# Patient Record
Sex: MALE | Race: WHITE | NOT HISPANIC OR LATINO | Employment: OTHER | URBAN - METROPOLITAN AREA
[De-identification: names, ages, dates, MRNs, and addresses within clinical notes are randomized per-mention and may not be internally consistent; named-entity substitution may affect disease eponyms.]

---

## 2017-01-11 DIAGNOSIS — E11.9 DIABETES MELLITUS, TYPE 2 (H): Primary | ICD-10-CM

## 2017-01-11 DIAGNOSIS — E11.9 TYPE 2 DIABETES MELLITUS (H): Primary | ICD-10-CM

## 2017-01-11 RX ORDER — METFORMIN HCL 500 MG
1000 TABLET, EXTENDED RELEASE 24 HR ORAL DAILY
Qty: 180 TABLET | Refills: 3 | Status: SHIPPED | OUTPATIENT
Start: 2017-01-11 | End: 2018-03-16

## 2017-01-11 NOTE — TELEPHONE ENCOUNTER
metformin 500 mg ER      Last Written Prescription Date:  6-16-16  Last Fill Quantity: 180,   # refills: 1  Last Office Visit : 11-3-16  Future Office visit:  5-16-17    Kathleen M Doege RN

## 2017-01-11 NOTE — TELEPHONE ENCOUNTER
[facility] called re: glipizide. Have been requesting new Rx since 1/8/17, pt out of meds, cannot give another emergency dose. Please advise. Message sent to refill pool.

## 2017-01-12 RX ORDER — GLIPIZIDE 10 MG/1
10 TABLET, FILM COATED, EXTENDED RELEASE ORAL DAILY
Qty: 90 TABLET | Refills: 1 | Status: SHIPPED | OUTPATIENT
Start: 2017-01-12 | End: 2017-07-24

## 2017-05-11 ENCOUNTER — TELEPHONE (OUTPATIENT)
Dept: ENDOCRINOLOGY | Facility: CLINIC | Age: 82
End: 2017-05-11

## 2017-05-11 DIAGNOSIS — E11.8 TYPE 2 DIABETES MELLITUS WITH COMPLICATION (H): ICD-10-CM

## 2017-06-13 ENCOUNTER — TELEPHONE (OUTPATIENT)
Dept: ENDOCRINOLOGY | Facility: CLINIC | Age: 82
End: 2017-06-13

## 2017-06-13 DIAGNOSIS — E11.9 DIABETES MELLITUS, TYPE 2 (H): Primary | ICD-10-CM

## 2017-06-13 NOTE — TELEPHONE ENCOUNTER
Pt called re: test strips. Was Rx'd OneTouch Verio IQ strips. Does not have meter, realized this when brought strips home. Pharmacy not allowing pt to bring strips back. Requesting either meter that works w/ strips or OneTouch Ultra Rx to go w/ meter he does have. Please advise at [phone]. DVM fine. Sent to [facility].

## 2017-06-13 NOTE — TELEPHONE ENCOUNTER
Pt called re: BG numbers. Lower than usually w/ pre-meal AM BG in 70s, 80s. Decreased PM Lantus from 10 to 8. Denied Sx's w/ exception of looking slightly pale. Please advise at  [phone]. DVM fine. Sent to [facility] pool.

## 2017-06-13 NOTE — TELEPHONE ENCOUNTER
I sent a one touch verio IQ meter to  Pharmacy as requested.  He was given  the  One touch Verio IQ test strips

## 2017-06-14 ENCOUNTER — TELEPHONE (OUTPATIENT)
Dept: ENDOCRINOLOGY | Facility: CLINIC | Age: 82
End: 2017-06-14

## 2017-06-14 NOTE — TELEPHONE ENCOUNTER
Dr Amin responded to this message to stop the lantus  for 1 week and call us with BS readings in one week. Disregard Dr Augustin suggestion.

## 2017-06-14 NOTE — TELEPHONE ENCOUNTER
Spoke with pt relayed MD message below and he will call next week with readings.  ----- Message from Jose Amin MD sent at 6/14/2017 11:10 AM CDT -----  Regarding: RE: Low blood sugars  Contact: [phone]  Can stop lantus and monitor BG. Call us with readings in 1 week   thanks  ----- Message -----     From: Emerita Lafleur RN     Sent: 6/13/2017   1:16 PM       To: Jose Amin MD  Subject: Low blood sugars                                 Former pt of Dr. SANDOR Augustin, will see you 8/1. Called today to report low BG AM, asymptomatic. Runs 70s-80s every AM, this morning was 87. Dr. Augustin told him wean off Lantus, he started 20 and is now doing 8 units HS, wants to know if he should stop Lantus or continue weaning?. Also takes glipiZIDE 10 mg QD, metFORMIN 1,000 QD and sitagliptin 50mg QD.

## 2017-06-23 ENCOUNTER — TELEPHONE (OUTPATIENT)
Dept: ENDOCRINOLOGY | Facility: CLINIC | Age: 82
End: 2017-06-23

## 2017-06-23 NOTE — TELEPHONE ENCOUNTER
Spoke with pt and relayed MD message below.  ----- Message from Jose Amin MD sent at 6/23/2017  1:40 PM CDT -----  Regarding: RE: BG updates  BG look ok, he should continue to monitor. No changes in meds  Send us reading again in 1-2 weeks. Also check BG occasionally during the day.     ----- Message -----     From: Emerita Lafleur RN     Sent: 6/23/2017  10:22 AM       To: Jose Amin MD  Subject: BG updates                                       Pt called back today to report BG since he stopped Lantus last week:  6/16 AM fasting 88  6/17 AM fasting 78  6/18 AM fasting 110  6/19 AM fasting 110  6/20 AM fasting 88  6/21 AM fasting 88  6/22 AM fasting 111  6/23 AM fasting 121  Continues taking glipiZIDE, Metformin and 1/2 tabs sitagliptin.  Any changes let us know.

## 2017-07-24 DIAGNOSIS — E11.9 DIABETES MELLITUS, TYPE 2 (H): Primary | ICD-10-CM

## 2017-07-24 RX ORDER — GLIPIZIDE 10 MG/1
10 TABLET, FILM COATED, EXTENDED RELEASE ORAL DAILY
Qty: 90 TABLET | Refills: 3 | Status: SHIPPED | OUTPATIENT
Start: 2017-07-24 | End: 2018-06-12

## 2017-07-26 ENCOUNTER — TELEPHONE (OUTPATIENT)
Dept: ENDOCRINOLOGY | Facility: CLINIC | Age: 82
End: 2017-07-26

## 2017-07-26 NOTE — TELEPHONE ENCOUNTER
Pt called today hoping to speak with Emerita, Per pt he gradually stopped taking lantus a while ago but continues to take glipizide, metformin, and sitagliptin.   Pt states that he gets tired easily. Pt is also getting high blood sugar readings in the afternoon. Today's reading was 339. Other readings not available. Pt just wanted to update you. Encouraged pt call again tomorrow with readings.   Message to Dr. Amin.

## 2017-07-31 NOTE — TELEPHONE ENCOUNTER
Spoke with pt on 7/27/17 he was busy and didn't have his meter, called today and left him voicemail to call back with BG readings dates/times.       ----- Message -----     From: Jose Amin MD     Sent: 7/26/2017   3:07 PM       To: Sanjuana Velázquez RN  Subject: RE: High afternon blood sugar                    Yes, please send few more days of glucose data for review  thanks  ----- Message -----     From: Sanjuana Velázquez RN     Sent: 7/26/2017   2:39 PM       To: Jose Amin MD  Subject: High afternon blood sugar                        Pt called today hoping to speak with Emerita, Per pt he gradually stopped taking lantus a while ago but continues to take glipizide, metformin, and sitagliptin.   Pt states that he gets tired easily. Pt is also getting high blood sugar readings in the afternoon. Today's reading was 339. Other readings not available. Pt just wanted to update you. Encouraged pt call again tomorrow with readings.     Thanks,   Sanjuana DENNY

## 2017-08-01 ENCOUNTER — OFFICE VISIT (OUTPATIENT)
Dept: ENDOCRINOLOGY | Facility: CLINIC | Age: 82
End: 2017-08-01

## 2017-08-01 VITALS
HEART RATE: 74 BPM | WEIGHT: 169.9 LBS | SYSTOLIC BLOOD PRESSURE: 120 MMHG | HEIGHT: 72 IN | DIASTOLIC BLOOD PRESSURE: 69 MMHG | BODY MASS INDEX: 23.01 KG/M2

## 2017-08-01 DIAGNOSIS — Z79.4 TYPE 2 DIABETES MELLITUS WITH COMPLICATION, WITH LONG-TERM CURRENT USE OF INSULIN (H): ICD-10-CM

## 2017-08-01 DIAGNOSIS — R35.1 NOCTURIA: ICD-10-CM

## 2017-08-01 DIAGNOSIS — E11.8 TYPE 2 DIABETES MELLITUS WITH COMPLICATION, WITH LONG-TERM CURRENT USE OF INSULIN (H): ICD-10-CM

## 2017-08-01 DIAGNOSIS — E11.8 TYPE 2 DIABETES MELLITUS WITH COMPLICATION, WITH LONG-TERM CURRENT USE OF INSULIN (H): Primary | ICD-10-CM

## 2017-08-01 DIAGNOSIS — Z79.4 TYPE 2 DIABETES MELLITUS WITH COMPLICATION, WITH LONG-TERM CURRENT USE OF INSULIN (H): Primary | ICD-10-CM

## 2017-08-01 DIAGNOSIS — D63.1 ANEMIA IN STAGE 3 CHRONIC KIDNEY DISEASE (H): ICD-10-CM

## 2017-08-01 DIAGNOSIS — N18.30 ANEMIA IN STAGE 3 CHRONIC KIDNEY DISEASE (H): ICD-10-CM

## 2017-08-01 DIAGNOSIS — I15.1 HYPERTENSION SECONDARY TO OTHER RENAL DISORDERS: ICD-10-CM

## 2017-08-01 LAB
ALT SERPL W P-5'-P-CCNC: 11 U/L (ref 0–70)
CK SERPL-CCNC: 34 U/L (ref 30–300)
CREAT SERPL-MCNC: 1.94 MG/DL (ref 0.66–1.25)
CREAT UR-MCNC: 147 MG/DL
ERYTHROCYTE [DISTWIDTH] IN BLOOD BY AUTOMATED COUNT: 13.1 % (ref 10–15)
GFR SERPL CREATININE-BSD FRML MDRD: 33 ML/MIN/1.7M2
GLUCOSE SERPL-MCNC: 228 MG/DL (ref 70–99)
HBA1C MFR BLD: 7.8 % (ref 4.3–6)
HCT VFR BLD AUTO: 35.2 % (ref 40–53)
HGB BLD-MCNC: 11.1 G/DL (ref 13.3–17.7)
LDLC SERPL DIRECT ASSAY-MCNC: 55 MG/DL
MCH RBC QN AUTO: 29.1 PG (ref 26.5–33)
MCHC RBC AUTO-ENTMCNC: 31.5 G/DL (ref 31.5–36.5)
MCV RBC AUTO: 92 FL (ref 78–100)
MICROALBUMIN UR-MCNC: 86 MG/L
MICROALBUMIN/CREAT UR: 58.44 MG/G CR (ref 0–17)
PLATELET # BLD AUTO: 351 10E9/L (ref 150–450)
POTASSIUM SERPL-SCNC: 4.9 MMOL/L (ref 3.4–5.3)
PSA SERPL-MCNC: 13 UG/L (ref 0–4)
RBC # BLD AUTO: 3.81 10E12/L (ref 4.4–5.9)
SODIUM SERPL-SCNC: 136 MMOL/L (ref 133–144)
T4 FREE SERPL-MCNC: 0.98 NG/DL (ref 0.76–1.46)
TSH SERPL DL<=0.05 MIU/L-ACNC: 4.43 MU/L (ref 0.4–4)
VIT B12 SERPL-MCNC: 536 PG/ML (ref 193–986)
WBC # BLD AUTO: 7.5 10E9/L (ref 4–11)

## 2017-08-01 ASSESSMENT — PAIN SCALES - GENERAL: PAINLEVEL: NO PAIN (0)

## 2017-08-01 NOTE — NURSING NOTE
Chief Complaint   Patient presents with     RECHECK     type 2 diabetes f/u        Initial /69 (BP Location: Right arm, Patient Position: Sitting, Cuff Size: Adult Regular)  Pulse 74  Ht 1.829 m (6')  Wt 77.1 kg (169 lb 14.4 oz)  BMI 23.04 kg/m2 Estimated body mass index is 23.04 kg/(m^2) as calculated from the following:    Height as of this encounter: 1.829 m (6').    Weight as of this encounter: 77.1 kg (169 lb 14.4 oz).  Medication Reconciliation: complete

## 2017-08-01 NOTE — LETTER
8/1/2017       RE: Farzad East  [address]  [facility] [address]     Dear Colleague,    Thank you for referring your patient, Farzad East, to the [facility] ENDOCRINOLOGY at [facility]. Please see a copy of my visit note below.    Farzad is a 81 year old male presents today for RECHECK (type 2 diabetes f/u )    HPI  Alessio is a 81-year-old male who is here for follow-up of type 2 diabetes and related problems.  Alessio was previously following with my colleague Dr. Augustin and I'm seeing him for the first time in clinic.  long-standing history of type 2 diabetes, diabetes was diagnosed when he was in his 50s.  Currently he is taking metformin  mg daily, glipizide XL 10 mg daily and Januvia 50 mg daily.  He was started on Lantus insulin 10 units at bedtime at last clinic visit.  He subsequently reduced the dose to eight units due to low blood sugars in the morning.  Despite lowering the dose he continued to have low blood glucose and subsequently stopped Lantus completely.  He checks his blood glucose mainly in the morning and recent blood glucose off of Lantus range between 103 235 mg/dL.  He occasionally checks blood glucose during the day and which are usually in the 200s off Lantus.    Diabetes related complications  Background retinopathy-follows regularly with ophthalmology  Nephropathy-creatinine has been stable over the last few years.  Most recent creatinine was 1.9, patient takes metformin and is aware that use of metformin is not recommended at this renal function.  However he is aware of the benefits and side effects and would prefer to continue.  Peripheral neuropathy-numbness and toes bilaterally  Erectile dysfunction    Patient also follows with cardiology and neurology.  He does not currently have a primary care provider at this time.    He overall feels well and denies any acute complaints today.  Denies any chest pain or shortness  of breath with exertion.  No lightheadedness or dizziness.  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    Past Medical History:   Diagnosis Date     Atrial flutter (H)      Choroidal nevus of left eye      CKD (chronic kidney disease) stage 3, GFR 30-59 ml/min      Diabetes mellitus (H)      Diabetic peripheral neuropathy (H)      Hypertension      Microalbuminuria      Overweight(278.02)      Rectal-type adenocarcinoma (H)      Retinopathies, diabetic      Vitreous detachment of both eyes        Allergies  Allergies   Allergen Reactions     Sulfa Drugs      Unknown reaction. Occurred during childhood. Has not taken Sulfa medications since allergic response.      Medications  Current Outpatient Prescriptions   Medication Sig Dispense Refill     glipiZIDE (GLIPIZIDE XL) 10 MG 24 hr tablet Take 1 tablet (10 mg) by mouth daily 90 tablet 3     blood glucose monitoring (ONETOUCH VERIO IQ SYSTEM) meter device kit Use to test blood sugar daily 1 kit 0     blood glucose monitoring (ONE TOUCH VERIO IQ) test strip Use to test blood sugar 2-3 daily or as directed. 270 each 3     metFORMIN (GLUCOPHAGE-XR) 500 MG 24 hr tablet Take 2 tablets (1,000 mg) by mouth daily 180 tablet 3     losartan-hydrochlorothiazide (HYZAAR) 100-25 MG per tablet Take 1 tablet by mouth daily 90 tablet 3     insulin pen needle 31G X 5 MM Use 1 pen needles daily or as directed. 100 each 3     sitagliptin (JANUVIA) 100 MG tablet Take 0.5 tablets (50 mg) by mouth daily 90 tablet 3     tamsulosin (FLOMAX) 0.4 MG 24 hr capsule Take 1 capsule (0.4 mg) by mouth daily 90 capsule 3     aspirin 81 MG tablet Take 1 tablet by mouth daily.       insulin glargine (LANTUS SOLOSTAR) 100 UNIT/ML PEN Inject 20 Units Subcutaneous At Bedtime (Patient not taking: Reported on 8/1/2017) 18 mL 3     sildenafil (VIAGRA) 100 MG tablet Take 1 tablet (100 mg) by mouth daily as needed for  erectile dysfunction 6 tablet 11     Family History  family history includes Arthritis in his mother; Circulatory in his father; DIABETES in his father; Macular Degeneration in his father. There is no history of Amblyopia, Retinal detachment, or Glaucoma.  Social History     Social History     Marital status:      Spouse name: N/A     Number of children: N/A     Years of education: N/A     Occupational History      [employer]     Social History Main Topics     Smoking status: Never Smoker     Smokeless tobacco: Never Used     Alcohol use 1.5 - 2.0 oz/week     3 - 4 Standard drinks or equivalent per week      Comment: 3 martinis per week     Drug use: No     Sexual activity: Not on file     Other Topics Concern     Parent/Sibling W/ Cabg, Mi Or Angioplasty Before 65f 55m? No     Social History Narrative     lives with his wife in [place], [place].  Recently they sold their house and moved into a [employer].  Alessio is retiring this week, he worked as a .  He plans to spend otto in [place] depending on the health of his wife.    ROS  Constitutional: Has lost about 10 pounds since the last visit, attributes it to change in diet  Eyes: no vision change, diplopia or red eyes   Ears, Nose, Mouth, Throat: negative  Cardiovascular: no chest pain, palpitations, or pain with walking, no orthopnea or PND   Respiratory: no dyspnea, cough, shortness of breath or wheezing   GI: no nausea, vomiting, diarrhea or constipation, no abdominal pain   : Complains of nocturia 2-4 times every night  Musculoskeletal: Chronic pain in the left leg where he underwent surgery for femur fracture  Integumentary: no concerning lesions or moles   Neuro: no loss of strength or sensation, no numbness or tingling, no tremor, no dizziness, no headache   Endo: negative  Heme/Lymph: negative   Allergy: no environmental allergies   Psych: per HPI    Physical Exam  /69 (BP Location: Right arm,  Patient Position: Sitting, Cuff Size: Adult Regular)  Pulse 74  Ht 1.829 m (6')  Wt 77.1 kg (169 lb 14.4 oz)  BMI 23.04 kg/m2  Body mass index is 23.04 kg/(m^2).    Constitutional: no distress, comfortable, pleasant   Eyes: anicteric, normal extra-ocular movements, No lig lag, retraction or proptosis  Ears, Nose and Throat: throat clear  Neck: supple, no thyromegaly.   Cardiovascular: regular rate and rhythm, normal S1 and S2, no murmurs  Respiratory: clear to auscultation, no wheezes or crackles, normal breath sounds   Gastrointestinal: nontender, no hepatosplenomegaly, no masses   Musculoskeletal: no edema   Skin: no concerning lesions, no jaundice   Neurological: cranial nerves intact, normal strength, reflexes at patella and biceps normal, mild fine tremor b/l  Psychological: appropriate mood   Lymphatic: no cervical lymphadenopathy    RESULTS  RESULTS  Lab Results   Component Value Date    A1C 7.8 08/01/2017    A1C 9.1 11/03/2016    A1C 8.2 05/05/2016    A1C 8.6 11/19/2015    A1C 7.5 05/07/2015       TSH   Date Value Ref Range Status   08/01/2017 4.43 (H) 0.40 - 4.00 mU/L Final   11/03/2016 5.27 (H) 0.40 - 4.00 mU/L Final   05/05/2016 4.38 (H) 0.40 - 4.00 mU/L Final   11/19/2015 5.03 (H) 0.40 - 4.00 mU/L Final   05/07/2015 4.65 (H) 0.40 - 4.00 mU/L Final     T4 Free   Date Value Ref Range Status   08/01/2017 0.98 0.76 - 1.46 ng/dL Final   11/03/2016 1.23 0.76 - 1.46 ng/dL Final   05/05/2016 1.00 0.76 - 1.46 ng/dL Final   11/19/2015 1.04 0.76 - 1.46 ng/dL Final   06/04/2010 1.07 0.70 - 1.85 ng/dL Final       Creatinine   Date Value Ref Range Status   08/01/2017 1.94 (H) 0.66 - 1.25 mg/dL Final   11/03/2016 2.17 (H) 0.66 - 1.25 mg/dL Final   ]    Albumin   Date Value Ref Range Status   01/25/2014 3.1 (L) 3.3 - 4.9 g/dL Final   ]    ALT   Date Value Ref Range Status   08/01/2017 11 0 - 70 U/L Final   11/03/2016 11 0 - 70 U/L Final   ]    Recent Labs   Lab Test  05/05/16   0825  11/19/15   0806  05/07/15   0739   11/07/14   0813   CHOL  119   --   120  118   HDL  43   --   38*  45   LDL  53  70  62  52   TRIG  115   --   102  105   CHOLHDLRATIO   --    --   3.2  2.6       Vitamin B12   Date Value Ref Range Status   08/01/2017 536 193 - 986 pg/mL Final   05/07/2015 1064 (H) 193 - 986 pg/mL Final     Comment:     Interp: 247-911 = Normal       ASSESSMENT AND PLAN:     1.  Type 2 diabetes: A1c is 7.8% today, discussed that given his long-standing diabetes and related complications, not targeting very tightly controlled glucose and also preventing hypoglycemia.  His blood glucose are running higher during the day mostly after meals.  We discussed that Lantus insulin alone may not address the postprandial hyperglycemia.  However he would like to do a trial of restarting Lantus at a lower dose.  At this time his goal is to keep the diabetes regimen  simple and avoid multiple injections.  Will start Lantus five units daily in the morning  Continue metformin and glipizide XL at current doses.    2.  Nephropathy: Creatinine has been stable, he also has chronic anemia.  Will check iron studies.  If there is any decline in renal function or worsening of anemia consider nephrology consult.    #3 hypertension: Well controlled continue current medications.    #4 mildly elevated TSH: Continue to monitor at this time    #5 BPH: Follow up with urology    Establish care with PCP    Return to clinic in about 3-4 months    This note was completed in part using Dragon voice recognition, and may contain word and grammatical errors.      Jose Amin MD

## 2017-08-01 NOTE — PROGRESS NOTES
Farzad is a 81 year old male presents today for RECHECK (type 2 diabetes f/u )    HPI  Alessio is a 81-year-old male who is here for follow-up of type 2 diabetes and related problems.  Alessio was previously following with my colleague Dr. Augustin and I'm seeing him for the first time in clinic.  long-standing history of type 2 diabetes, diabetes was diagnosed when he was in his 50s.  Currently he is taking metformin  mg daily, glipizide XL 10 mg daily and Januvia 50 mg daily.  He was started on Lantus insulin 10 units at bedtime at last clinic visit.  He subsequently reduced the dose to eight units due to low blood sugars in the morning.  Despite lowering the dose he continued to have low blood glucose and subsequently stopped Lantus completely.  He checks his blood glucose mainly in the morning and recent blood glucose off of Lantus range between 103 235 mg/dL.  He occasionally checks blood glucose during the day and which are usually in the 200s off Lantus.    Diabetes related complications  Background retinopathy-follows regularly with ophthalmology  Nephropathy-creatinine has been stable over the last few years.  Most recent creatinine was 1.9, patient takes metformin and is aware that use of metformin is not recommended at this renal function.  However he is aware of the benefits and side effects and would prefer to continue.  Peripheral neuropathy-numbness and toes bilaterally  Erectile dysfunction    Patient also follows with cardiology and neurology.  He does not currently have a primary care provider at this time.    He overall feels well and denies any acute complaints today.  Denies any chest pain or shortness of breath with exertion.  No lightheadedness or dizziness.  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    Past Medical History:   Diagnosis Date     Atrial flutter (H)      Choroidal nevus of left eye       CKD (chronic kidney disease) stage 3, GFR 30-59 ml/min      Diabetes mellitus (H)      Diabetic peripheral neuropathy (H)      Hypertension      Microalbuminuria      Overweight(278.02)      Rectal-type adenocarcinoma (H)      Retinopathies, diabetic      Vitreous detachment of both eyes        Allergies  Allergies   Allergen Reactions     Sulfa Drugs      Unknown reaction. Occurred during childhood. Has not taken Sulfa medications since allergic response.      Medications  Current Outpatient Prescriptions   Medication Sig Dispense Refill     glipiZIDE (GLIPIZIDE XL) 10 MG 24 hr tablet Take 1 tablet (10 mg) by mouth daily 90 tablet 3     blood glucose monitoring (ONETOUCH VERIO IQ SYSTEM) meter device kit Use to test blood sugar daily 1 kit 0     blood glucose monitoring (ONE TOUCH VERIO IQ) test strip Use to test blood sugar 2-3 daily or as directed. 270 each 3     metFORMIN (GLUCOPHAGE-XR) 500 MG 24 hr tablet Take 2 tablets (1,000 mg) by mouth daily 180 tablet 3     losartan-hydrochlorothiazide (HYZAAR) 100-25 MG per tablet Take 1 tablet by mouth daily 90 tablet 3     insulin pen needle 31G X 5 MM Use 1 pen needles daily or as directed. 100 each 3     sitagliptin (JANUVIA) 100 MG tablet Take 0.5 tablets (50 mg) by mouth daily 90 tablet 3     tamsulosin (FLOMAX) 0.4 MG 24 hr capsule Take 1 capsule (0.4 mg) by mouth daily 90 capsule 3     aspirin 81 MG tablet Take 1 tablet by mouth daily.       insulin glargine (LANTUS SOLOSTAR) 100 UNIT/ML PEN Inject 20 Units Subcutaneous At Bedtime (Patient not taking: Reported on 8/1/2017) 18 mL 3     sildenafil (VIAGRA) 100 MG tablet Take 1 tablet (100 mg) by mouth daily as needed for erectile dysfunction 6 tablet 11     Family History  family history includes Arthritis in his mother; Circulatory in his father; DIABETES in his father; Macular Degeneration in his father. There is no history of Amblyopia, Retinal detachment, or Glaucoma.  Social History     Social History      Marital status:      Spouse name: N/A     Number of children: N/A     Years of education: N/A     Occupational History      [employer]     Social History Main Topics     Smoking status: Never Smoker     Smokeless tobacco: Never Used     Alcohol use 1.5 - 2.0 oz/week     3 - 4 Standard drinks or equivalent per week      Comment: 3 martinis per week     Drug use: No     Sexual activity: Not on file     Other Topics Concern     Parent/Sibling W/ Cabg, Mi Or Angioplasty Before 65f 55m? No     Social History Narrative     lives with his wife in [place], [place].  Recently they sold their house and moved into a [employer].  Alessio is retiring this week, he worked as a .  He plans to spend otto in [place] depending on the health of his wife.    ROS  Constitutional: Has lost about 10 pounds since the last visit, attributes it to change in diet  Eyes: no vision change, diplopia or red eyes   Ears, Nose, Mouth, Throat: negative  Cardiovascular: no chest pain, palpitations, or pain with walking, no orthopnea or PND   Respiratory: no dyspnea, cough, shortness of breath or wheezing   GI: no nausea, vomiting, diarrhea or constipation, no abdominal pain   : Complains of nocturia 2-4 times every night  Musculoskeletal: Chronic pain in the left leg where he underwent surgery for femur fracture  Integumentary: no concerning lesions or moles   Neuro: no loss of strength or sensation, no numbness or tingling, no tremor, no dizziness, no headache   Endo: negative  Heme/Lymph: negative   Allergy: no environmental allergies   Psych: per HPI    Physical Exam  /69 (BP Location: Right arm, Patient Position: Sitting, Cuff Size: Adult Regular)  Pulse 74  Ht 1.829 m (6')  Wt 77.1 kg (169 lb 14.4 oz)  BMI 23.04 kg/m2  Body mass index is 23.04 kg/(m^2).    Constitutional: no distress, comfortable, pleasant   Eyes: anicteric, normal extra-ocular movements, No lig lag, retraction or  proptosis  Ears, Nose and Throat: throat clear  Neck: supple, no thyromegaly.   Cardiovascular: regular rate and rhythm, normal S1 and S2, no murmurs  Respiratory: clear to auscultation, no wheezes or crackles, normal breath sounds   Gastrointestinal: nontender, no hepatosplenomegaly, no masses   Musculoskeletal: no edema   Skin: no concerning lesions, no jaundice   Neurological: cranial nerves intact, normal strength, reflexes at patella and biceps normal, mild fine tremor b/l  Psychological: appropriate mood   Lymphatic: no cervical lymphadenopathy    RESULTS  RESULTS  Lab Results   Component Value Date    A1C 7.8 08/01/2017    A1C 9.1 11/03/2016    A1C 8.2 05/05/2016    A1C 8.6 11/19/2015    A1C 7.5 05/07/2015       TSH   Date Value Ref Range Status   08/01/2017 4.43 (H) 0.40 - 4.00 mU/L Final   11/03/2016 5.27 (H) 0.40 - 4.00 mU/L Final   05/05/2016 4.38 (H) 0.40 - 4.00 mU/L Final   11/19/2015 5.03 (H) 0.40 - 4.00 mU/L Final   05/07/2015 4.65 (H) 0.40 - 4.00 mU/L Final     T4 Free   Date Value Ref Range Status   08/01/2017 0.98 0.76 - 1.46 ng/dL Final   11/03/2016 1.23 0.76 - 1.46 ng/dL Final   05/05/2016 1.00 0.76 - 1.46 ng/dL Final   11/19/2015 1.04 0.76 - 1.46 ng/dL Final   06/04/2010 1.07 0.70 - 1.85 ng/dL Final       Creatinine   Date Value Ref Range Status   08/01/2017 1.94 (H) 0.66 - 1.25 mg/dL Final   11/03/2016 2.17 (H) 0.66 - 1.25 mg/dL Final   ]    Albumin   Date Value Ref Range Status   01/25/2014 3.1 (L) 3.3 - 4.9 g/dL Final   ]    ALT   Date Value Ref Range Status   08/01/2017 11 0 - 70 U/L Final   11/03/2016 11 0 - 70 U/L Final   ]    Recent Labs   Lab Test  05/05/16   0825  11/19/15   0806  05/07/15   0739  11/07/14   0813   CHOL  119   --   120  118   HDL  43   --   38*  45   LDL  53  70  62  52   TRIG  115   --   102  105   CHOLHDLRATIO   --    --   3.2  2.6       Vitamin B12   Date Value Ref Range Status   08/01/2017 536 193 - 986 pg/mL Final   05/07/2015 1064 (H) 193 - 986 pg/mL Final      Comment:     Interp: 247-91 = Normal   ]        ASSESSMENT AND PLAN:     1.  Type 2 diabetes: A1c is 7.8% today, discussed that given his long-standing diabetes and related complications, not targeting very tightly controlled glucose and also preventing hypoglycemia.  His blood glucose are running higher during the day mostly after meals.  We discussed that Lantus insulin alone may not address the postprandial hyperglycemia.  However he would like to do a trial of restarting Lantus at a lower dose.  At this time his goal is to keep the diabetes regimen  simple and avoid multiple injections.  Will start Lantus five units daily in the morning  Continue metformin and glipizide XL at current doses.    2.  Nephropathy: Creatinine has been stable, he also has chronic anemia.  Will check iron studies.  If there is any decline in renal function or worsening of anemia consider nephrology consult.    #3 hypertension: Well controlled continue current medications.    #4 mildly elevated TSH: Continue to monitor at this time    #5 BPH: Follow up with urology    Establish care with PCP    Return to clinic in about 3-4 months    This note was completed in part using Dragon voice recognition, and may contain word and grammatical errors.

## 2017-08-01 NOTE — MR AVS SNAPSHOT
After Visit Summary   2017    Farzad East    MRN: [id number]           Patient Information     Date Of Birth          1935        Visit Information        Provider Department      2017 4:30 PM Jose Amin MD M [facility] Endocrinology         Follow-ups after your visit        Your next 10 appointments already scheduled     Dec 12, 2017  3:30 PM CST   (Arrive by 3:15 PM)   RETURN DIABETES with MD STEVEN Nuñez [facility] Endocrinology ([facility] [place])    [address] [place] [id number] [phone]              Who to contact     Please call your clinic at [phone] to:    Ask questions about your health    Make or cancel appointments    Discuss your medicines    Learn about your test results    Speak to your doctor   If you have compliments or concerns about an experience at your clinic, or if you wish to file a complaint, please contact [facility] Patient Relations at [phone] or email us at [email or web address]         Additional Information About Your Visit        Pepp[employer]ornhart Information     [employer] is an electronic gateway that provides easy, online access to your medical records. With [employer], you can request a clinic appointment, read your test results, renew a prescription or communicate with your care team.     To sign up for [employer] visit the website at [email or web address]   You will be asked to enter the access code listed below, as well as some personal information. Please follow the directions to create your username and password.     Your access code is: [email or web address]  Expires: 10/30/2017  5:33 PM     Your access code will  in 90 days. If you need help or a new code, please contact your [facility] or call [phone] for assistance.        Care EveryWhere ID     This is your Care EveryWhere ID. This could be used by other  organizations to access your [facility] medical records  [id number]        Your Vitals Were     Pulse Height BMI (Body Mass Index)             74 1.829 m (6') 23.04 kg/m2          Blood Pressure from Last 3 Encounters:   08/01/17 120/69   11/03/16 97/57   08/19/16 145/71    Weight from Last 3 Encounters:   08/01/17 77.1 kg (169 lb 14.4 oz)   08/19/16 81.6 kg (180 lb)   05/05/16 83.5 kg (184 lb)              Today, you had the following     No orders found for display       Primary Care Provider Office Phone # Fax #    Marcus Augustin [phone]        PHYSICIANS [address] [address]        Equal Access to Services     GARRETT DAVIS : Zbigniew alanis Sonorris, waaxda luqadaha, qaybta kaalmada adeegyada, carmel todd . So [facility] [phone].    ATENCIÓN: Si habla español, tiene a dalton disposición servicios gratuitos de asistencia lingüística. Llame al [phone].    We comply with applicable federal civil rights laws and [place] laws. We do not discriminate on the basis of race, color, national origin, age, disability sex, sexual orientation or gender identity.            Thank you!     Thank you for choosing [facility]  for your care. Our goal is always to provide you with excellent care. Hearing back from our patients is one way we can continue to improve our services. Please take a few minutes to complete the written survey that you may receive in the mail after your visit with us. Thank you!             Your Updated Medication List - Protect others around you: Learn how to safely use, store and throw away your medicines at www.disposemymeds.org.          This list is accurate as of: 8/1/17  5:33 PM.  Always use your most recent med list.                   Brand Name Dispense Instructions for use Diagnosis    aspirin 81 MG tablet      Take 1 tablet by mouth daily.        blood glucose monitoring meter device kit     1 kit     Use to test blood sugar daily    Diabetes mellitus, type 2 (H)       blood glucose monitoring test strip    ONE TOUCH VERIO IQ    270 each    Use to test blood sugar 2-3 daily or as directed.    Type 2 diabetes mellitus with complication (H)       glipiZIDE 10 MG 24 hr tablet    glipiZIDE XL    90 tablet    Take 1 tablet (10 mg) by mouth daily    Diabetes mellitus, type 2 (H)       insulin glargine 100 UNIT/ML injection    LANTUS SOLOSTAR    18 mL    Inject 20 Units Subcutaneous At Bedtime    Type 2 diabetes mellitus with complication, with long-term current use of insulin (H)       insulin pen needle 31G X 5 MM     100 each    Use 1 pen needles daily or as directed.    Type 2 diabetes mellitus with complication, with long-term current use of insulin (H)       JANUVIA 100 MG tablet   Generic drug:  sitagliptin     90 tablet    Take 0.5 tablets (50 mg) by mouth daily        losartan-hydrochlorothiazide 100-25 MG per tablet    HYZAAR    90 tablet    Take 1 tablet by mouth daily    Essential hypertension, Type 2 diabetes, HbA1c goal < 7% (H)       metFORMIN 500 MG 24 hr tablet    GLUCOPHAGE-XR    180 tablet    Take 2 tablets (1,000 mg) by mouth daily    Type 2 diabetes mellitus (H)       sildenafil 100 MG cap/tab    REVATIO/VIAGRA    6 tablet    Take 1 tablet (100 mg) by mouth daily as needed for erectile dysfunction    Type 2 diabetes mellitus with complication (H)       tamsulosin 0.4 MG capsule    FLOMAX    90 capsule    Take 1 capsule (0.4 mg) by mouth daily    Type 2 diabetes mellitus with complication, with long-term current use of insulin (H)

## 2017-08-21 ENCOUNTER — PRE VISIT (OUTPATIENT)
Dept: UROLOGY | Facility: CLINIC | Age: 82
End: 2017-08-21

## 2017-09-05 ENCOUNTER — OFFICE VISIT (OUTPATIENT)
Dept: UROLOGY | Facility: CLINIC | Age: 82
End: 2017-09-05

## 2017-09-05 VITALS
HEIGHT: 70 IN | DIASTOLIC BLOOD PRESSURE: 60 MMHG | SYSTOLIC BLOOD PRESSURE: 110 MMHG | WEIGHT: 170 LBS | BODY MASS INDEX: 24.34 KG/M2 | HEART RATE: 82 BPM

## 2017-09-05 DIAGNOSIS — R97.20 ELEVATED PROSTATE SPECIFIC ANTIGEN (PSA): Primary | ICD-10-CM

## 2017-09-05 ASSESSMENT — PAIN SCALES - GENERAL: PAINLEVEL: NO PAIN (0)

## 2017-09-05 NOTE — LETTER
9/5/2017       RE: Farzad East  [address]  [place] [facility] [address]     Dear Colleague,    Thank you for referring your patient, Farzad East, to the [facility] UROLOGY AND INST FOR PROSTATE AND UROLOGIC CANCERS at [facility]. Please see a copy of my visit note below.    REASON FOR VISIT TODAY:  Elevated PSA and abnormal digital rectal exam.      HISTORY OF PRESENT ILLNESS:  Mr. East is an 82-year-old gentleman followed in our clinic for history of an abnormal digital rectal exam and mildly elevated PSA in the past.  The patient has been known to have an abnormal digital rectal exam for the past several years, and we have discussed that he very well may have some degree of prostate cancer, but the patient has opted to continue to observe things without a biopsy.  He had a PSA of 5.9 on 11/03/2016 and comes in today noting that he had another PSA drawn on 08/01/2017 that was up to 13.  He has had some worsening of nocturia and is now up 3-4 times per night, but denies any urgency during the day.  No blood in the urine.  He is on Flomax.      PHYSICAL EXAMINATION:  His blood pressure is 110/60.  Pulse is 82.  He is in no acute distress.  Digital rectal exam was deferred today.        DATA:  The patient voided, and a postvoid residual was measured at 83 mL, although the patient had voided about an hour previously.      ASSESSMENT AND PLAN:  Over half of today's 15 minute visit was spent counseling the patient regarding his elevated PSA.  I suggested to Mr. East that, unfortunately, there has been a significant jump in his PSA, although the meaning of this is unclear at this time.  I have suggested that we go ahead and obtain an MRI of the prostate to see if there are any highly suspicious lesions on the MRI that would be important for biopsy.  In the absence of any obvious lesions, we may decide whether or not we need to move forward with a standard  transrectal ultrasound-guided biopsy or not.  We again discussed that one does require a 10-15 year life expectancy to benefit from treatment of screen-detected prostate cancer.  Mr. Chaves is age 82 years, and it is unclear whether he has that life expectancy, but all in all he does continue to do well.  In any case, we will simply see the patient back after his MRI of the prostate, and we will make further decisions.         NETTIE VALENZUELA MD             D: 2017 14:12   T: 2017 10:57   MT: yanick      Name:     JEN CHAVES   MRN:      [id number]        Account:      [id number]   :      1935           Service Date: 2017      Document: [id number]

## 2017-09-05 NOTE — PATIENT INSTRUCTIONS
Please get MRI and then follow up with  after.    It was a pleasure meeting with you today.  Thank you for allowing me and my team the privilege of caring for you today.  YOU are the reason we are here, and I truly hope we provided you with the excellent service you deserve.  Please let us know if there is anything else we can do for you so that we can be sure you are leaving completely satisfied with your care experience.

## 2017-09-05 NOTE — MR AVS SNAPSHOT
After Visit Summary   9/5/2017    Farzad East    MRN: [id number]           Patient Information     Date Of Birth          1935        Visit Information        Provider Department      9/5/2017 1:30 PM Marcello Mendoza MD [facility] Urology and [facility] for Prostate and Urologic Cancers        Today's Diagnoses     Elevated prostate specific antigen (PSA)    -  1      Care Instructions    Please get MRI and then follow up with  after.    It was a pleasure meeting with you today.  Thank you for allowing me and my team the privilege of caring for you today.  YOU are the reason we are here, and I truly hope we provided you with the excellent service you deserve.  Please let us know if there is anything else we can do for you so that we can be sure you are leaving completely satisfied with your care experience.                  Follow-ups after your visit        Your next 10 appointments already scheduled     Oct 16, 2017 10:45 AM CDT   (Arrive by 10:30 AM)   MR PROSTATE with [id number]   [facility] MRI ([facility])    [address] [place] [address] [phone]           Take your medicines as usual, unless your doctor tells you not to. Bring a list of your current medicines to your exam (including vitamins, minerals and over-the-counter drugs).  You will be given intravenous contrast for this exam. To prepare:   The day before your exam, drink extra fluids at least six 8-ounce glasses (unless your doctor tells you to restrict your fluids).   Have a blood test (creatinine test) within 30 days of your exam. Go to your clinic or Diagnostic Imaging Department for this test.  The MRI machine uses a strong magnet. Please wear clothes without metal (snaps, zippers). A sweatsuit works well, or we may give you a hospital gown.  Please remove any body piercings and hair extensions before you arrive. You will also remove  watches, jewelry, hairpins, wallets, dentures, partial dental plates and hearing aids. You may wear contact lenses, and you may be able to wear your rings. We have a safe place to keep your personal items, but it is safer to leave them at home.   **IMPORTANT** THE INSTRUCTIONS BELOW ARE ONLY FOR THOSE PATIENTS WHO HAVE BEEN TOLD THEY WILL RECEIVE SEDATION OR GENERAL ANESTHESIA DURING THEIR MRI PROCEDURE:  IF YOU WILL RECEIVE SEDATION (take medicine to help you relax during your exam):   You must get the medicine from your doctor before you arrive. Bring the medicine to the exam. Do not take it at home.   Arrive one hour early. Bring someone who can take you home after the test. Your medicine will make you sleepy. After the exam, you may not drive, take a bus or take a taxi by yourself.   No eating 8 hours before your exam. You may have clear liquids up until 4 hours before your exam. (Clear liquids include water, clear tea, black coffee and fruit juice without pulp.)  IF YOU WILL RECEIVE ANESTHESIA (be asleep for your exam):   Arrive 1 1/2 hours early. Bring someone who can take you home after the test. You may not drive, take a bus or take a taxi by yourself.   No eating 8 hours before your exam. You may have clear liquids up until 4 hours before your exam. (Clear liquids include water, clear tea, black coffee and fruit juice without pulp.)  Please call the Imaging Department at your exam site with any questions.            Nov 07, 2017  2:00 PM CST   (Arrive by 1:45 PM)   Return Visit with Marcello Mendoza MD   [facility] Urology and Inst for Prostate and Urologic Cancers ([facility] [place])    [address] [place] [id number]   [phone]            Dec 12, 2017  3:30 PM CST   (Arrive by 3:15 PM)   RETURN DIABETES with Jose Amin MD   [facility] Endocrinology ([facility])    [address] [place] [address]  [id number] [phone]              Future tests that were ordered for you today     Open Future Orders        Priority Expected Expires Ordered    MR Prostate Routine  10/20/2017 2017            Who to contact     Please call your clinic at [phone] to:    Ask questions about your health    Make or cancel appointments    Discuss your medicines    Learn about your test results    Speak to your doctor   If you have compliments or concerns about an experience at your clinic, or if you wish to file a complaint, please contact [facility] Patient Relations at [phone] or email us at [email or web address]         Additional Information About Your Visit        [employer] Information     [employer] is an electronic gateway that provides easy, online access to your medical records. With [employer], you can request a clinic appointment, read your test results, renew a prescription or communicate with your care team.     To sign up for [employer] visit the website at [email or web address]   You will be asked to enter the access code listed below, as well as some personal information. Please follow the directions to create your username and password.     Your access code is: [email or web address]  Expires: 10/30/2017  5:33 PM     Your access code will  in 90 days. If you need help or a new code, please contact your [facility] or call [phone] for assistance.        Care EveryWhere ID     This is your Care EveryWhere ID. This could be used by other organizations to access your [facility] medical records  [id number]        Your Vitals Were     Pulse Height BMI (Body Mass Index)             82 1.778 m (5' 10\") 24.39 kg/m2          Blood Pressure from Last 3 Encounters:   17 110/60   17 120/69   16 97/57    Weight from Last 3 Encounters:   17 77.1 kg (170 lb)   17 77.1 kg (169 lb 14.4 oz)   16 81.6 kg (180 lb)         "      We Performed the Following     POST-VOID RESIDUAL BLADDER SCAN        Primary Care Provider Office Phone # Fax #    Marcus Augustin [phone]       [address] [place] [address]        Equal Access to Services     GARRETT DAVIS : Hadhemant elsa baires zeke Sonorris, waaxda luqadaha, qaybta kaalmada adeegyada, carmel huerta perez sheffield. So [facility] [phone].    ATENCIÓN: Si habla español, tiene a dalton disposición servicios gratuitos de asistencia lingüística. Llame al [phone].    We comply with applicable federal civil rights laws and [place] laws. We do not discriminate on the basis of race, color, national origin, age, disability sex, sexual orientation or gender identity.            Thank you!     Thank you for choosing [facility] UROLOGY AND [facility] FOR PROSTATE AND UROLOGIC CANCERS  for your care. Our goal is always to provide you with excellent care. Hearing back from our patients is one way we can continue to improve our services. Please take a few minutes to complete the written survey that you may receive in the mail after your visit with us. Thank you!             Your Updated Medication List - Protect others around you: Learn how to safely use, store and throw away your medicines at www.disposemymeds.org.          This list is accurate as of: 9/5/17  2:15 PM.  Always use your most recent med list.                   Brand Name Dispense Instructions for use Diagnosis    aspirin 81 MG tablet      Take 1 tablet by mouth daily.        blood glucose monitoring meter device kit     1 kit    Use to test blood sugar daily    Diabetes mellitus, type 2 (H)       blood glucose monitoring test strip    ONE TOUCH VERIO IQ    270 each    Use to test blood sugar 2-3 daily or as directed.    Type 2 diabetes mellitus with complication (H)       glipiZIDE 10 MG 24 hr tablet    glipiZIDE XL    90 tablet    Take 1 tablet (10 mg) by mouth daily    Diabetes mellitus, type 2 (H)        insulin glargine 100 UNIT/ML injection    LANTUS SOLOSTAR    18 mL    Inject 20 Units Subcutaneous At Bedtime    Type 2 diabetes mellitus with complication, with long-term current use of insulin (H)       insulin pen needle 31G X 5 MM     100 each    Use 1 pen needles daily or as directed.    Type 2 diabetes mellitus with complication, with long-term current use of insulin (H)       JANUVIA 100 MG tablet   Generic drug:  sitagliptin     90 tablet    Take 0.5 tablets (50 mg) by mouth daily        losartan-hydrochlorothiazide 100-25 MG per tablet    HYZAAR    90 tablet    Take 1 tablet by mouth daily    Essential hypertension, Type 2 diabetes, HbA1c goal < 7% (H)       metFORMIN 500 MG 24 hr tablet    GLUCOPHAGE-XR    180 tablet    Take 2 tablets (1,000 mg) by mouth daily    Type 2 diabetes mellitus (H)       sildenafil 100 MG cap/tab    REVATIO/VIAGRA    6 tablet    Take 1 tablet (100 mg) by mouth daily as needed for erectile dysfunction    Type 2 diabetes mellitus with complication (H)       tamsulosin 0.4 MG capsule    FLOMAX    90 capsule    Take 1 capsule (0.4 mg) by mouth daily    Type 2 diabetes mellitus with complication, with long-term current use of insulin (H)

## 2017-09-07 NOTE — PROGRESS NOTES
REASON FOR VISIT TODAY:  Elevated PSA and abnormal digital rectal exam.      HISTORY OF PRESENT ILLNESS:  Mr. East is an 82-year-old gentleman followed in our clinic for history of an abnormal digital rectal exam and mildly elevated PSA in the past.  The patient has been known to have an abnormal digital rectal exam for the past several years, and we have discussed that he very well may have some degree of prostate cancer, but the patient has opted to continue to observe things without a biopsy.  He had a PSA of 5.9 on 11/03/2016 and comes in today noting that he had another PSA drawn on 08/01/2017 that was up to 13.  He has had some worsening of nocturia and is now up 3-4 times per night, but denies any urgency during the day.  No blood in the urine.  He is on Flomax.      PHYSICAL EXAMINATION:  His blood pressure is 110/60.  Pulse is 82.  He is in no acute distress.  Digital rectal exam was deferred today.        DATA:  The patient voided, and a postvoid residual was measured at 83 mL, although the patient had voided about an hour previously.      ASSESSMENT AND PLAN:  Over half of today's 15 minute visit was spent counseling the patient regarding his elevated PSA.  I suggested to Mr. East that, unfortunately, there has been a significant jump in his PSA, although the meaning of this is unclear at this time.  I have suggested that we go ahead and obtain an MRI of the prostate to see if there are any highly suspicious lesions on the MRI that would be important for biopsy.  In the absence of any obvious lesions, we may decide whether or not we need to move forward with a standard transrectal ultrasound-guided biopsy or not.  We again discussed that one does require a 10-15 year life expectancy to benefit from treatment of screen-detected prostate cancer.  Mr. East is age 82 years, and it is unclear whether he has that life expectancy, but all in all he does continue to do well.  In any case, we will simply  see the patient back after his MRI of the prostate, and we will make further decisions.         NETTEI VALENZUELA MD             D: 2017 14:12   T: 2017 10:57   MT: yanick      Name:     JEN CHAVES   MRN:      [id number]        Account:      [id number]   :      1935           Service Date: 2017      Document: [id number]

## 2017-09-23 DIAGNOSIS — E11.65 TYPE 2 DIABETES MELLITUS WITH HYPERGLYCEMIA, UNSPECIFIED LONG TERM INSULIN USE STATUS: Primary | ICD-10-CM

## 2017-09-23 NOTE — TELEPHONE ENCOUNTER
"Received refill request from [facility] ([address]) for Januvia 100 mg tablets, stating that patient asked for new prescription.      Per Dr. Amin's progress note from 8/1/17:    \"Currently he is taking metformin  mg daily, glipizide XL 10 mg daily and Januvia 50 mg daily.  He was started on Lantus insulin 10 units at bedtime at last clinic visit.  He subsequently reduced the dose to eight units due to low blood sugars in the morning.  Despite lowering the dose he continued to have low blood glucose and subsequently stopped Lantus completely.  He checks his blood glucose mainly in the morning and recent blood glucose off of Lantus range between 103 235 mg/dL.  He occasionally checks blood glucose during the day and which are usually in the 200s off Lantus\".      \"ASSESSMENT AND PLAN:      1.  Type 2 diabetes: A1c is 7.8% today, discussed that given his long-standing diabetes and related complications, not targeting very tightly controlled glucose and also preventing hypoglycemia.  His blood glucose are running higher during the day mostly after meals.  We discussed that Lantus insulin alone may not address the postprandial hyperglycemia.  However he would like to do a trial of restarting Lantus at a lower dose.  At this time his goal is to keep the diabetes regimen  simple and avoid multiple injections.  Will start Lantus five units daily in the morning  Continue metformin and glipizide XL at current doses\".          Contacted patient to review. Patient confirms that he is still taking Januvia 50 mg daily (1/2 of 100 mg tablet). Asked patient how how Lantus 5 units daily is going. Questioned if he is having any hypoglycemia. Patient states that he has only been taking his Lantus 5 units daily about twice weekly. He states that his blood sugars have been running good (between ) so he is nervous to take the Lantus daily. Patient denies any hypoglycemia when he does take the " Lantus.      Advised patient that writer will send prescription request to Dr. Amin and update him on Lantus. Patient confirms that he has enough Januvia for the weekend. Patient verbalizes understanding and agrees to plan.         Shelby Hall RN  Endocrine Care Coordinator  [facility]

## 2017-10-31 DIAGNOSIS — R97.20 ELEVATED PROSTATE SPECIFIC ANTIGEN (PSA): Primary | ICD-10-CM

## 2017-11-10 DIAGNOSIS — E11.8 TYPE 2 DIABETES MELLITUS WITH COMPLICATION, WITH LONG-TERM CURRENT USE OF INSULIN (H): ICD-10-CM

## 2017-11-10 DIAGNOSIS — Z79.4 TYPE 2 DIABETES MELLITUS WITH COMPLICATION, WITH LONG-TERM CURRENT USE OF INSULIN (H): ICD-10-CM

## 2017-11-10 NOTE — TELEPHONE ENCOUNTER
flomax    Last Written Prescription Date:  11/3/16  Last Fill Quantity: 90,   # refills: 3  Last Office Visit : 8/1/17  Future Office visit:  12/12/17    Routing refill request to provider for review/approval because:  Drug not on the [facility], [facility] or [facility] refill protocol

## 2017-11-13 RX ORDER — TAMSULOSIN HYDROCHLORIDE 0.4 MG/1
0.4 CAPSULE ORAL DAILY
Qty: 90 CAPSULE | Refills: 3 | Status: SHIPPED | OUTPATIENT
Start: 2017-11-13 | End: 2019-01-22

## 2017-11-17 DIAGNOSIS — E11.9 TYPE 2 DIABETES, HBA1C GOAL < 7% (H): ICD-10-CM

## 2017-11-17 DIAGNOSIS — I10 ESSENTIAL HYPERTENSION: ICD-10-CM

## 2017-11-20 RX ORDER — LOSARTAN POTASSIUM AND HYDROCHLOROTHIAZIDE 25; 100 MG/1; MG/1
1 TABLET ORAL DAILY
Qty: 90 TABLET | Refills: 3 | Status: SHIPPED | OUTPATIENT
Start: 2017-11-20 | End: 2018-12-09

## 2017-11-20 NOTE — TELEPHONE ENCOUNTER
Last Written Prescription Date:  12/5/16  Last Fill Quantity: 90,   # refills: 3  Last Office Visit : 8/1/17  Future Office visit:  12/12/17

## 2017-12-01 DIAGNOSIS — E11.9 TYPE 2 DIABETES MELLITUS (H): Primary | ICD-10-CM

## 2017-12-12 ENCOUNTER — OFFICE VISIT (OUTPATIENT)
Dept: ENDOCRINOLOGY | Facility: CLINIC | Age: 82
End: 2017-12-12

## 2017-12-12 VITALS
HEART RATE: 75 BPM | SYSTOLIC BLOOD PRESSURE: 172 MMHG | DIASTOLIC BLOOD PRESSURE: 76 MMHG | HEIGHT: 70 IN | BODY MASS INDEX: 24.6 KG/M2 | WEIGHT: 171.8 LBS

## 2017-12-12 DIAGNOSIS — Z79.4 TYPE 2 DIABETES MELLITUS WITH COMPLICATION, WITH LONG-TERM CURRENT USE OF INSULIN (H): Primary | ICD-10-CM

## 2017-12-12 DIAGNOSIS — E11.8 TYPE 2 DIABETES MELLITUS WITH COMPLICATION, WITH LONG-TERM CURRENT USE OF INSULIN (H): ICD-10-CM

## 2017-12-12 DIAGNOSIS — D63.1 ANEMIA IN STAGE 3 CHRONIC KIDNEY DISEASE (H): ICD-10-CM

## 2017-12-12 DIAGNOSIS — Z79.4 TYPE 2 DIABETES MELLITUS WITH COMPLICATION, WITH LONG-TERM CURRENT USE OF INSULIN (H): ICD-10-CM

## 2017-12-12 DIAGNOSIS — E11.8 TYPE 2 DIABETES MELLITUS WITH COMPLICATION, WITH LONG-TERM CURRENT USE OF INSULIN (H): Primary | ICD-10-CM

## 2017-12-12 DIAGNOSIS — N18.30 ANEMIA IN STAGE 3 CHRONIC KIDNEY DISEASE (H): ICD-10-CM

## 2017-12-12 LAB
ANION GAP SERPL CALCULATED.3IONS-SCNC: 6 MMOL/L (ref 3–14)
BASOPHILS # BLD AUTO: 0 10E9/L (ref 0–0.2)
BASOPHILS NFR BLD AUTO: 0.4 %
BUN SERPL-MCNC: 36 MG/DL (ref 7–30)
CALCIUM SERPL-MCNC: 8.9 MG/DL (ref 8.5–10.1)
CHLORIDE SERPL-SCNC: 107 MMOL/L (ref 94–109)
CO2 SERPL-SCNC: 24 MMOL/L (ref 20–32)
CREAT SERPL-MCNC: 1.59 MG/DL (ref 0.66–1.25)
DIFFERENTIAL METHOD BLD: ABNORMAL
EOSINOPHIL # BLD AUTO: 0.3 10E9/L (ref 0–0.7)
EOSINOPHIL NFR BLD AUTO: 4.2 %
ERYTHROCYTE [DISTWIDTH] IN BLOOD BY AUTOMATED COUNT: 13.9 % (ref 10–15)
FERRITIN SERPL-MCNC: 207 NG/ML (ref 26–388)
GFR SERPL CREATININE-BSD FRML MDRD: 42 ML/MIN/1.7M2
GLUCOSE SERPL-MCNC: 133 MG/DL (ref 70–99)
HBA1C MFR BLD: 7.9 % (ref 4.3–6)
HCT VFR BLD AUTO: 30.8 % (ref 40–53)
HGB BLD-MCNC: 9.2 G/DL (ref 13.3–17.7)
IMM GRANULOCYTES # BLD: 0 10E9/L (ref 0–0.4)
IMM GRANULOCYTES NFR BLD: 0.4 %
IRON SATN MFR SERPL: 9 % (ref 15–46)
IRON SERPL-MCNC: 28 UG/DL (ref 35–180)
LYMPHOCYTES # BLD AUTO: 1.1 10E9/L (ref 0.8–5.3)
LYMPHOCYTES NFR BLD AUTO: 13.5 %
MCH RBC QN AUTO: 26.9 PG (ref 26.5–33)
MCHC RBC AUTO-ENTMCNC: 29.9 G/DL (ref 31.5–36.5)
MCV RBC AUTO: 90 FL (ref 78–100)
MONOCYTES # BLD AUTO: 0.6 10E9/L (ref 0–1.3)
MONOCYTES NFR BLD AUTO: 7.6 %
NEUTROPHILS # BLD AUTO: 5.8 10E9/L (ref 1.6–8.3)
NEUTROPHILS NFR BLD AUTO: 73.9 %
NRBC # BLD AUTO: 0 10*3/UL
NRBC BLD AUTO-RTO: 0 /100
PLATELET # BLD AUTO: 345 10E9/L (ref 150–450)
POTASSIUM SERPL-SCNC: 5.1 MMOL/L (ref 3.4–5.3)
RBC # BLD AUTO: 3.42 10E12/L (ref 4.4–5.9)
SODIUM SERPL-SCNC: 137 MMOL/L (ref 133–144)
TIBC SERPL-MCNC: 301 UG/DL (ref 240–430)
TSH SERPL DL<=0.005 MIU/L-ACNC: 3.58 MU/L (ref 0.4–4)
WBC # BLD AUTO: 7.9 10E9/L (ref 4–11)

## 2017-12-12 ASSESSMENT — PAIN SCALES - GENERAL: PAINLEVEL: NO PAIN (0)

## 2017-12-12 NOTE — PROGRESS NOTES
Farzad is a 82 year old male presents today for RECHECK (return diabetes )    HPI  Alessio is a 82-year-old male who is here for follow-up of type 2 diabetes and related problems.    long-standing history of type 2 diabetes, diabetes was diagnosed when he was in his 50s.    Currently he is taking metformin XR 1000 mg daily, glipizide XL 10 mg daily and Januvia 50 mg daily.   He's had a difficult last few months.  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  He was discharged to rehab and now he is back at home.  During hospitalization and while in rehabilitation, he was treated with NovoLog insulin before meals and correction scale.  However he is not using any insulin at this time  He did not bring his glucose meter.  But reports that blood glucose at home range between 150-225.  Denies any hypoglycemia or falls  Also reports that his wife is having some significant cognitive decline and they are considering to move into an assisted living.  Usually spends about 4-5 months in [place] during otto however he is not sure that if he will be traveling this year.  He overall reports that his appetite is okay, frequently eats out.  He currently using a walker, denies any pain.      Diabetes reled complications  Background retinopathy-follows regularly with ophthalmology  Nephropathy-creatinine has been stable over the last few years.  Most recent creatinine was 1.9, patient takes metformin and is aware that use of metformin is not recommended at this renal function.  However he is aware of the benefits and side effects and would prefer to continue.  Peripheral neuropathy-numbness and toes bilaterally  Erectile dysfunction    Patient also follows with urology, cardiology and orthopedics.  Reports that recently he is PSA was noted to be elevated and underwent MRI.     denies any acute complaints today.  Denies any chest pain or shortness of breath with exertion.  No  lightheadedness or dizziness.      Past Medical History:   Diagnosis Date     Atrial flutter (H)      Choroidal nevus of left eye      CKD (chronic kidney disease) stage 3, GFR 30-59 ml/min      Diabetes mellitus (H)      Diabetic peripheral neuropathy (H)      Hypertension      Microalbuminuria      Overweight(278.02)      Rectal-type adenocarcinoma (H)      Retinopathies, diabetic      Vitreous detachment of both eyes        Allergies  Allergies   Allergen Reactions     Sulfa Drugs      Unknown reaction. Occurred during childhood. Has not taken Sulfa medications since allergic response.      Medications  Current Outpatient Prescriptions   Medication Sig Dispense Refill     ONETOUCH ULTRA test strip Use to test blood sugar 3 times daily or as directed. 270 each 3     losartan-hydrochlorothiazide (HYZAAR) 100-25 MG per tablet Take 1 tablet by mouth daily 90 tablet 3     tamsulosin (FLOMAX) 0.4 MG capsule Take 1 capsule (0.4 mg) by mouth daily 90 capsule 3     sitagliptin (JANUVIA) 100 MG tablet Take 0.5 tablets (50 mg) by mouth daily 90 tablet 3     glipiZIDE (GLIPIZIDE XL) 10 MG 24 hr tablet Take 1 tablet (10 mg) by mouth daily 90 tablet 3     blood glucose monitoring (ONETOUCH VERIO IQ SYSTEM) meter device kit Use to test blood sugar daily 1 kit 0     blood glucose monitoring (ONE TOUCH VERIO IQ) test strip Use to test blood sugar 2-3 daily or as directed. 270 each 3     metFORMIN (GLUCOPHAGE-XR) 500 MG 24 hr tablet Take 2 tablets (1,000 mg) by mouth daily 180 tablet 3     insulin glargine (LANTUS SOLOSTAR) 100 UNIT/ML PEN Inject 20 Units Subcutaneous At Bedtime 18 mL 3     insulin pen needle 31G X 5 MM Use 1 pen needles daily or as directed. 100 each 3     sildenafil (VIAGRA) 100 MG tablet Take 1 tablet (100 mg) by mouth daily as needed for erectile dysfunction 6 tablet 11     aspirin 81 MG tablet Take 1 tablet by mouth daily.       Family History  family history includes Arthritis in his mother; Circulatory in  "his father; DIABETES in his father; Macular Degeneration in his father. There is no history of Amblyopia, Retinal detachment, or Glaucoma.  Social History     Social History     Marital status:      Spouse name: N/A     Number of children: N/A     Years of education: N/A     Occupational History      [employer]     Social History Main Topics     Smoking status: Never Smoker     Smokeless tobacco: Never Used     Alcohol use 1.5 - 2.0 oz/week     3 - 4 Standard drinks or equivalent per week      Comment: 3 martinis per week     Drug use: No     Sexual activity: Not on file     Other Topics Concern     Parent/Sibling W/ Cabg, Mi Or Angioplasty Before 65f 55m? No     Social History Narrative     lives with his wife in [place], [place].  Recently they sold their house and moved into a [employer] [place].  Alessio is retiring this week, he worked as a .  He plans to spend otto in [place] depending on the health of his wife.       ROS: 8 point ROS neg other than the symptoms noted above in the HPI.  Physical Exam  /76  Pulse 75  Ht 1.778 m (5' 10\")  Wt 77.9 kg (171 lb 12.8 oz)  BMI 24.65 kg/m2  Body mass index is 24.65 kg/(m^2).    Constitutional: no distress, comfortable, pleasant   Eyes: anicteric, normal extra-ocular movements, No lig lag, retraction or proptosis  Ears, Nose and Throat: throat clear  Neck: supple, no thyromegaly.   Cardiovascular: regular rate and rhythm, normal S1 and S2, no murmurs  Respiratory: clear to auscultation, no wheezes or crackles, normal breath sounds   Gastrointestinal: nontender, no hepatosplenomegaly, no masses   Musculoskeletal: b/l LE edema   Skin:  no jaundice   Psychological: appropriate mood   Lymphatic: no cervical lymphadenopathy    RESULTS  Lab Results   Component Value Date    A1C 7.8 08/01/2017    A1C 9.1 11/03/2016    A1C 8.2 05/05/2016    A1C 8.6 11/19/2015    A1C 7.5 05/07/2015       TSH   Date Value Ref Range Status "   08/01/2017 4.43 (H) 0.40 - 4.00 mU/L Final   11/03/2016 5.27 (H) 0.40 - 4.00 mU/L Final   05/05/2016 4.38 (H) 0.40 - 4.00 mU/L Final   11/19/2015 5.03 (H) 0.40 - 4.00 mU/L Final   05/07/2015 4.65 (H) 0.40 - 4.00 mU/L Final     T4 Free   Date Value Ref Range Status   08/01/2017 0.98 0.76 - 1.46 ng/dL Final   11/03/2016 1.23 0.76 - 1.46 ng/dL Final   05/05/2016 1.00 0.76 - 1.46 ng/dL Final   11/19/2015 1.04 0.76 - 1.46 ng/dL Final   06/04/2010 1.07 0.70 - 1.85 ng/dL Final       Creatinine   Date Value Ref Range Status   08/01/2017 1.94 (H) 0.66 - 1.25 mg/dL Final   11/03/2016 2.17 (H) 0.66 - 1.25 mg/dL Final   ]    Albumin   Date Value Ref Range Status   01/25/2014 3.1 (L) 3.3 - 4.9 g/dL Final   ]    ALT   Date Value Ref Range Status   08/01/2017 11 0 - 70 U/L Final   11/03/2016 11 0 - 70 U/L Final   ]    Recent Labs   Lab Test  05/05/16   0825  11/19/15   0806  05/07/15   0739  11/07/14   0813   CHOL  119   --   120  118   HDL  43   --   38*  45   LDL  53  70  62  52   TRIG  115   --   102  105   CHOLHDLRATIO   --    --   3.2  2.6       Vitamin B12   Date Value Ref Range Status   08/01/2017 536 193 - 986 pg/mL Final   05/07/2015 1064 (H) 193 - 986 pg/mL Final     Comment:     Interp: [id number] = Normal   ]      ASSESSMENT AND PLAN:     1.  Type 2 diabetes: last A1c was 7.1% , given his long-standing diabetes and related complications, not targeting very tightly controlled glucose and also preventing hypoglycemia.  He will check blood glucose 3-4 times daily for 2-3 days and send the numbers for review.  Will consider if Lantus needs to be restarted based on pending data.  I also discussed the risk of hypoglycemia with Lantus and glipizide.  We reviewed that he may hold glipizide on days he expects that he will have poor oral intake.      2.  Nephropathy: Creatinine has been stable    #3 hypertension: high today, but has been ok in past    #4 mildly elevated TSH: check labs    #5 BPH: Follow up with  urology    Establish care with PCP    Return to clinic in about 4 weeks.     Orders Placed This Encounter   Procedures     TSH     Basic metabolic panel     CBC with platelets differential       JUDD Briones    This note was completed in part using Dragon voice recognition, and may contain word and grammatical errors.

## 2017-12-12 NOTE — MR AVS SNAPSHOT
After Visit Summary   12/12/2017    Farzad East    MRN: [id number]           Patient Information     Date Of Birth          1935        Visit Information        Provider Department      12/12/2017 3:30 PM Jose Amin MD  [facility]        Today's Diagnoses     Type 2 diabetes mellitus with complication, with long-term current use of insulin (H)    -  1       Follow-ups after your visit        Follow-up notes from your care team     Return in about 4 weeks (around 1/9/2018).      Your next 10 appointments already scheduled     Jan 09, 2018  2:30 PM CST   (Arrive by 2:15 PM)   RETURN DIABETES with Jose Amin MD   [facility] Endocrinology ([facility])    [address] [place] [address]  [address]rd [facility] [address] [phone]              Future tests that were ordered for you today     Open Future Orders        Priority Expected Expires Ordered    TSH Routine 12/12/2017 12/12/2018 12/12/2017    Basic metabolic panel Routine 12/12/2018 12/12/2018 12/12/2017    CBC with platelets differential Routine 12/12/2018 12/12/2018 12/12/2017            Who to contact     Please call your clinic at [phone] to:    Ask questions about your health    Make or cancel appointments    Discuss your medicines    Learn about your test results    Speak to your doctor   If you have compliments or concerns about an experience at your clinic, or if you wish to file a complaint, please contact [facility] Patient Relations at [phone] or email us at [email or web address]         Additional Information About Your Visit        MyChart Information     Priztag is an electronic gateway that provides easy, online access to your medical records. With Priztag, you can request a clinic appointment, read your test results, renew a prescription or communicate with your care team.     To sign up for Priztag visit the website at  [email or web address]   You will be asked to enter the access code listed below, as well as some personal information. Please follow the directions to create your username and password.     Your access code is: [email or web address]  Expires: 2018  6:30 AM     Your access code will  in 90 days. If you need help or a new code, please contact your [facility] or call [phone] for assistance.        Care EveryWhere ID     This is your Care EveryWhere ID. This could be used by other organizations to access your [place] medical records  [id number]        Your Vitals Were     Pulse Height BMI (Body Mass Index)             75 1.778 m (5' 10\") 24.65 kg/m2          Blood Pressure from Last 3 Encounters:   17 172/76   17 110/60   17 120/69    Weight from Last 3 Encounters:   17 77.9 kg (171 lb 12.8 oz)   17 77.1 kg (170 lb)   17 77.1 kg (169 lb 14.4 oz)               Primary Care Provider Office Phone # Fax #    Marcus Augustin [phone]       [address] [place] [address]        Equal Access to Services     GARRETT DAVIS AH: Hadii elsa ku hadasho Soomaali, waaxda luqadaha, qaybta kaalmada adeegyada, carmel johnson hayjose todd . So [facility] [phone].    ATENCIÓN: Si habla español, tiene a dalton disposición servicios gratuitos de asistencia lingüística. Llame al [phone].    We comply with applicable federal civil rights laws and [place] laws. We do not discriminate on the basis of race, color, national origin, age, disability, sex, sexual orientation, or gender identity.            Thank you!     Thank you for choosing [facility]  for your care. Our goal is always to provide you with excellent care. Hearing back from our patients is one way we can continue to improve our services. Please take a few minutes to complete the written survey that you may receive in the mail after your " visit with us. Thank you!             Your Updated Medication List - Protect others around you: Learn how to safely use, store and throw away your medicines at www.disposemymeds.org.          This list is accurate as of: 12/12/17  4:34 PM.  Always use your most recent med list.                   Brand Name Dispense Instructions for use Diagnosis    aspirin 81 MG tablet      Take 1 tablet by mouth daily.        blood glucose monitoring meter device kit     1 kit    Use to test blood sugar daily    Diabetes mellitus, type 2 (H)       * blood glucose monitoring test strip    ONETOUCH VERIO IQ    270 each    Use to test blood sugar 2-3 daily or as directed.    Type 2 diabetes mellitus with complication (H)       * ONETOUCH ULTRA test strip   Generic drug:  blood glucose monitoring     270 each    Use to test blood sugar 3 times daily or as directed.    Type 2 diabetes mellitus (H)       glipiZIDE 10 MG 24 hr tablet    glipiZIDE XL    90 tablet    Take 1 tablet (10 mg) by mouth daily    Diabetes mellitus, type 2 (H)       insulin glargine 100 UNIT/ML injection    LANTUS SOLOSTAR    18 mL    Inject 20 Units Subcutaneous At Bedtime    Type 2 diabetes mellitus with complication, with long-term current use of insulin (H)       insulin pen needle 31G X 5 MM     100 each    Use 1 pen needles daily or as directed.    Type 2 diabetes mellitus with complication, with long-term current use of insulin (H)       losartan-hydrochlorothiazide 100-25 MG per tablet    HYZAAR    90 tablet    Take 1 tablet by mouth daily    Essential hypertension, Type 2 diabetes, HbA1c goal < 7% (H)       metFORMIN 500 MG 24 hr tablet    GLUCOPHAGE-XR    180 tablet    Take 2 tablets (1,000 mg) by mouth daily    Type 2 diabetes mellitus (H)       sildenafil 100 MG tablet    VIAGRA    6 tablet    Take 1 tablet (100 mg) by mouth daily as needed for erectile dysfunction    Type 2 diabetes mellitus with complication (H)       sitagliptin 100 MG tablet     JANUVIA    90 tablet    Take 0.5 tablets (50 mg) by mouth daily    Type 2 diabetes mellitus with hyperglycemia, unspecified long term insulin use status (H)       tamsulosin 0.4 MG capsule    FLOMAX    90 capsule    Take 1 capsule (0.4 mg) by mouth daily    Type 2 diabetes mellitus with complication, with long-term current use of insulin (H)       * Notice:  This list has 2 medication(s) that are the same as other medications prescribed for you. Read the directions carefully, and ask your doctor or other care provider to review them with you.

## 2017-12-12 NOTE — LETTER
12/12/2017       RE: Farzad East  [address]  [facility] [address]     Dear Colleague,    Thank you for referring your patient, Farzad East, to the [facility] ENDOCRINOLOGY at [facility]. Please see a copy of my visit note below.    Farzad is a 82 year old male presents today for RECHECK (return diabetes )    HPI  Alessio is a 82-year-old male who is here for follow-up of type 2 diabetes and related problems.    long-standing history of type 2 diabetes, diabetes was diagnosed when he was in his 50s.    Currently he is taking metformin XR 1000 mg daily, glipizide XL 10 mg daily and Januvia 50 mg daily.   He's had a difficult last few months.  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  He was discharged to rehab and now he is back at home.  During hospitalization and while in rehabilitation, he was treated with NovoLog insulin before meals and correction scale.  However he is not using any insulin at this time  He did not bring his glucose meter.  But reports that blood glucose at home range between 150-225.  Denies any hypoglycemia or falls  Also reports that his wife is having some significant cognitive decline and they are considering to move into an assisted living.  Usually spends about 4-5 months in [place] during otto however he is not sure that if he will be traveling this year.  He overall reports that his appetite is okay, frequently eats out.  He currently using a walker, denies any pain.      Diabetes reled complications  Background retinopathy-follows regularly with ophthalmology  Nephropathy-creatinine has been stable over the last few years.  Most recent creatinine was 1.9, patient takes metformin and is aware that use of metformin is not recommended at this renal function.  However he is aware of the benefits and side effects and would prefer to continue.  Peripheral  neuropathy-numbness and toes bilaterally  Erectile dysfunction    Patient also follows with urology, cardiology and orthopedics.  Reports that recently he is PSA was noted to be elevated and underwent MRI.     denies any acute complaints today.  Denies any chest pain or shortness of breath with exertion.  No lightheadedness or dizziness.      Past Medical History:   Diagnosis Date     Atrial flutter (H)      Choroidal nevus of left eye      CKD (chronic kidney disease) stage 3, GFR 30-59 ml/min      Diabetes mellitus (H)      Diabetic peripheral neuropathy (H)      Hypertension      Microalbuminuria      Overweight(278.02)      Rectal-type adenocarcinoma (H)      Retinopathies, diabetic      Vitreous detachment of both eyes        Allergies  Allergies   Allergen Reactions     Sulfa Drugs      Unknown reaction. Occurred during childhood. Has not taken Sulfa medications since allergic response.      Medications  Current Outpatient Prescriptions   Medication Sig Dispense Refill     ONETOUCH ULTRA test strip Use to test blood sugar 3 times daily or as directed. 270 each 3     losartan-hydrochlorothiazide (HYZAAR) 100-25 MG per tablet Take 1 tablet by mouth daily 90 tablet 3     tamsulosin (FLOMAX) 0.4 MG capsule Take 1 capsule (0.4 mg) by mouth daily 90 capsule 3     sitagliptin (JANUVIA) 100 MG tablet Take 0.5 tablets (50 mg) by mouth daily 90 tablet 3     glipiZIDE (GLIPIZIDE XL) 10 MG 24 hr tablet Take 1 tablet (10 mg) by mouth daily 90 tablet 3     blood glucose monitoring (ONETOUCH VERIO IQ SYSTEM) meter device kit Use to test blood sugar daily 1 kit 0     blood glucose monitoring (ONE TOUCH VERIO IQ) test strip Use to test blood sugar 2-3 daily or as directed. 270 each 3     metFORMIN (GLUCOPHAGE-XR) 500 MG 24 hr tablet Take 2 tablets (1,000 mg) by mouth daily 180 tablet 3     insulin glargine (LANTUS SOLOSTAR) 100 UNIT/ML PEN Inject 20 Units Subcutaneous At Bedtime 18 mL 3     insulin pen needle 31G X 5 MM Use 1  "pen needles daily or as directed. 100 each 3     sildenafil (VIAGRA) 100 MG tablet Take 1 tablet (100 mg) by mouth daily as needed for erectile dysfunction 6 tablet 11     aspirin 81 MG tablet Take 1 tablet by mouth daily.       Family History  family history includes Arthritis in his mother; Circulatory in his father; DIABETES in his father; Macular Degeneration in his father. There is no history of Amblyopia, Retinal detachment, or Glaucoma.  Social History     Social History     Marital status:      Spouse name: N/A     Number of children: N/A     Years of education: N/A     Occupational History      [employer]     Social History Main Topics     Smoking status: Never Smoker     Smokeless tobacco: Never Used     Alcohol use 1.5 - 2.0 oz/week     3 - 4 Standard drinks or equivalent per week      Comment: 3 martinis per week     Drug use: No     Sexual activity: Not on file     Other Topics Concern     Parent/Sibling W/ Cabg, Mi Or Angioplasty Before 65f 55m? No     Social History Narrative     lives with his wife in [place], [place].  Recently they sold their house and moved into a [employer].  Alessio is retiring this week, he worked as a .  He plans to spend otto in [place] depending on the health of his wife.       ROS: 8 point ROS neg other than the symptoms noted above in the HPI.  Physical Exam  /76  Pulse 75  Ht 1.778 m (5' 10\")  Wt 77.9 kg (171 lb 12.8 oz)  BMI 24.65 kg/m2  Body mass index is 24.65 kg/(m^2).    Constitutional: no distress, comfortable, pleasant   Eyes: anicteric, normal extra-ocular movements, No lig lag, retraction or proptosis  Ears, Nose and Throat: throat clear  Neck: supple, no thyromegaly.   Cardiovascular: regular rate and rhythm, normal S1 and S2, no murmurs  Respiratory: clear to auscultation, no wheezes or crackles, normal breath sounds   Gastrointestinal: nontender, no hepatosplenomegaly, no masses   Musculoskeletal: " b/l LE edema   Skin:  no jaundice   Psychological: appropriate mood   Lymphatic: no cervical lymphadenopathy    RESULTS  Lab Results   Component Value Date    A1C 7.8 08/01/2017    A1C 9.1 11/03/2016    A1C 8.2 05/05/2016    A1C 8.6 11/19/2015    A1C 7.5 05/07/2015       TSH   Date Value Ref Range Status   08/01/2017 4.43 (H) 0.40 - 4.00 mU/L Final   11/03/2016 5.27 (H) 0.40 - 4.00 mU/L Final   05/05/2016 4.38 (H) 0.40 - 4.00 mU/L Final   11/19/2015 5.03 (H) 0.40 - 4.00 mU/L Final   05/07/2015 4.65 (H) 0.40 - 4.00 mU/L Final     T4 Free   Date Value Ref Range Status   08/01/2017 0.98 0.76 - 1.46 ng/dL Final   11/03/2016 1.23 0.76 - 1.46 ng/dL Final   05/05/2016 1.00 0.76 - 1.46 ng/dL Final   11/19/2015 1.04 0.76 - 1.46 ng/dL Final   06/04/2010 1.07 0.70 - 1.85 ng/dL Final       Creatinine   Date Value Ref Range Status   08/01/2017 1.94 (H) 0.66 - 1.25 mg/dL Final   11/03/2016 2.17 (H) 0.66 - 1.25 mg/dL Final   ]    Albumin   Date Value Ref Range Status   01/25/2014 3.1 (L) 3.3 - 4.9 g/dL Final   ]    ALT   Date Value Ref Range Status   08/01/2017 11 0 - 70 U/L Final   11/03/2016 11 0 - 70 U/L Final   ]    Recent Labs   Lab Test  05/05/16   0825  11/19/15   0806  05/07/15   0739  11/07/14   0813   CHOL  119   --   120  118   HDL  43   --   38*  45   LDL  53  70  62  52   TRIG  115   --   102  105   CHOLHDLRATIO   --    --   3.2  2.6       Vitamin B12   Date Value Ref Range Status   08/01/2017 536 193 - 986 pg/mL Final   05/07/2015 1064 (H) 193 - 986 pg/mL Final     Comment:     Interp: [id number] = Normal   ]      ASSESSMENT AND PLAN:     1.  Type 2 diabetes: last A1c was 7.1% , given his long-standing diabetes and related complications, not targeting very tightly controlled glucose and also preventing hypoglycemia.  He will check blood glucose 3-4 times daily for 2-3 days and send the numbers for review.  Will consider if Lantus needs to be restarted based on pending data.  I also discussed the risk of hypoglycemia  with Lantus and glipizide.  We reviewed that he may hold glipizide on days he expects that he will have poor oral intake.      2.  Nephropathy: Creatinine has been stable    #3 hypertension: high today, but has been ok in past    #4 mildly elevated TSH: check labs    #5 BPH: Follow up with urology    Establish care with PCP    Return to clinic in about 4 weeks.     Orders Placed This Encounter   Procedures     TSH     Basic metabolic panel     CBC with platelets differential       JUDD Briones    This note was completed in part using Dragon voice recognition, and may contain word and grammatical errors.

## 2017-12-13 ENCOUNTER — TELEPHONE (OUTPATIENT)
Dept: ENDOCRINOLOGY | Facility: CLINIC | Age: 82
End: 2017-12-13

## 2017-12-13 NOTE — TELEPHONE ENCOUNTER
Labs discussed over the phone.   Hbg is 9.2, on review of labs in care everywhere it was 8 in setting of hip surgery and hospitalization.   He is on iron supplement.    Recheck labs in 4-6 week at next visit.

## 2018-01-09 ENCOUNTER — OFFICE VISIT (OUTPATIENT)
Dept: ENDOCRINOLOGY | Facility: CLINIC | Age: 83
End: 2018-01-09
Payer: COMMERCIAL

## 2018-01-09 VITALS
DIASTOLIC BLOOD PRESSURE: 65 MMHG | SYSTOLIC BLOOD PRESSURE: 164 MMHG | WEIGHT: 171.4 LBS | BODY MASS INDEX: 24.54 KG/M2 | HEIGHT: 70 IN | HEART RATE: 71 BPM

## 2018-01-09 DIAGNOSIS — E11.39 TYPE 2 DIABETES MELLITUS WITH OTHER OPHTHALMIC COMPLICATION, WITH LONG-TERM CURRENT USE OF INSULIN (H): Primary | ICD-10-CM

## 2018-01-09 DIAGNOSIS — Z79.4 TYPE 2 DIABETES MELLITUS WITH OTHER OPHTHALMIC COMPLICATION, WITH LONG-TERM CURRENT USE OF INSULIN (H): Primary | ICD-10-CM

## 2018-01-09 NOTE — LETTER
1/9/2018       RE: Farzad East  [address]  [facility] [address]     Dear Colleague,    Thank you for referring your patient, Farzad East, to the [facility] ENDOCRINOLOGY at [facility]. Please see a copy of my visit note below.    Farzad is a 82 year old male presents today for RECHECK (DIABETES TYPE 2 F/U )    HPI  Alessio is a 82-year-old male who is here for follow-up of type 2 diabetes and related problems.    long-standing history of type 2 diabetes, diabetes was diagnosed when he was in his 50s.    Currently he is taking metformin XR 1000 mg daily, glipizide XL 10 mg daily and Januvia 50 mg daily.   Since the last visit, he has restarted Lantus 5 units in AM.     Reports recovering well from the hip surgery.  Feels that his overall strength is improved.  Blood glucose meter was downloaded and reviewed.  Meal blood sugars morning for 137 standard deviation 32 mean blood glucose at bedtime is 276 data deviation of 83  Denies any hypoglycemia or falls  Also reports that his wife is having some significant cognitive decline and they are considering to move into an assisted living.  He reports that if wife health remains stable, they may be able to make their usual trip to [place] later this month.  He plans to be in [place] for 2-3 months.  He overall reports that his appetite is okay, frequently eats out.  denies any pain.    Diabetes reled complications  Background retinopathy-follows regularly with ophthalmology  Nephropathy-creatinine has been stable over the last few years.  Most recent creatinine was 1.9, patient takes metformin and is aware that use of metformin is not recommended at this renal function.  However he is aware of the benefits and side effects and would prefer to continue.  Peripheral neuropathy-numbness and toes bilaterally  Erectile dysfunction    Patient also follows with urology, cardiology and orthopedics.  History of  elevated  PSA     denies any acute complaints today.  Denies any chest pain or shortness of breath with exertion.  No lightheadedness or dizziness.      Past Medical History:   Diagnosis Date     Atrial flutter (H)      Choroidal nevus of left eye      CKD (chronic kidney disease) stage 3, GFR 30-59 ml/min      Diabetes mellitus (H)      Diabetic peripheral neuropathy (H)      Hypertension      Microalbuminuria      Overweight(278.02)      Rectal-type adenocarcinoma (H)      Retinopathies, diabetic      Vitreous detachment of both eyes        Allergies  Allergies   Allergen Reactions     Sulfa Drugs      Unknown reaction. Occurred during childhood. Has not taken Sulfa medications since allergic response.      Medications  Current Outpatient Prescriptions   Medication Sig Dispense Refill     insulin glargine (LANTUS SOLOSTAR) 100 UNIT/ML injection Inject 5 Units Subcutaneous every morning 15 mL 3     ONETOUCH ULTRA test strip Use to test blood sugar 3 times daily or as directed. 270 each 3     losartan-hydrochlorothiazide (HYZAAR) 100-25 MG per tablet Take 1 tablet by mouth daily 90 tablet 3     tamsulosin (FLOMAX) 0.4 MG capsule Take 1 capsule (0.4 mg) by mouth daily 90 capsule 3     sitagliptin (JANUVIA) 100 MG tablet Take 0.5 tablets (50 mg) by mouth daily 90 tablet 3     glipiZIDE (GLIPIZIDE XL) 10 MG 24 hr tablet Take 1 tablet (10 mg) by mouth daily 90 tablet 3     blood glucose monitoring (ONETOUCH VERIO IQ SYSTEM) meter device kit Use to test blood sugar daily 1 kit 0     blood glucose monitoring (ONE TOUCH VERIO IQ) test strip Use to test blood sugar 2-3 daily or as directed. 270 each 3     metFORMIN (GLUCOPHAGE-XR) 500 MG 24 hr tablet Take 2 tablets (1,000 mg) by mouth daily 180 tablet 3     insulin pen needle 31G X 5 MM Use 1 pen needles daily or as directed. 100 each 3     sildenafil (VIAGRA) 100 MG tablet Take 1 tablet (100 mg) by mouth daily as needed for erectile dysfunction 6 tablet 11     aspirin 81  "MG tablet Take 1 tablet by mouth daily.       Family History  family history includes Arthritis in his mother; Circulatory in his father; DIABETES in his father; Macular Degeneration in his father. There is no history of Amblyopia, Retinal detachment, or Glaucoma.  Social History     Social History     Marital status:      Spouse name: N/A     Number of children: N/A     Years of education: N/A     Occupational History      [employer]     Social History Main Topics     Smoking status: Never Smoker     Smokeless tobacco: Never Used     Alcohol use 1.5 - 2.0 oz/week     3 - 4 Standard drinks or equivalent per week      Comment: 3 martinis per week     Drug use: No     Sexual activity: Not on file     Other Topics Concern     Parent/Sibling W/ Cabg, Mi Or Angioplasty Before 65f 55m? No     Social History Narrative     lives with his wife in [place], [place].  Recently they sold their house and moved into a [employer].  Alessio is retiring this week, he worked as a .  He plans to spend otto in [place] depending on the health of his wife.     ROS: 8 point ROS neg other than the symptoms noted above in the HPI.  Physical Exam  /65 (BP Location: Right arm, Patient Position: Sitting, Cuff Size: Adult Regular)  Pulse 71  Ht 1.778 m (5' 10\")  Wt 77.7 kg (171 lb 6.4 oz)  BMI 24.59 kg/m2  Body mass index is 24.59 kg/(m^2).  Repeat 142/60    Constitutional: no distress, comfortable, pleasant   Neuro: alert and oriented   Psychological: appropriate mood       RESULTS  Lab Results   Component Value Date    A1C 7.8 08/01/2017    A1C 9.1 11/03/2016    A1C 8.2 05/05/2016    A1C 8.6 11/19/2015    A1C 7.5 05/07/2015       TSH   Date Value Ref Range Status   12/12/2017 3.58 0.40 - 4.00 mU/L Final   08/01/2017 4.43 (H) 0.40 - 4.00 mU/L Final   11/03/2016 5.27 (H) 0.40 - 4.00 mU/L Final   05/05/2016 4.38 (H) 0.40 - 4.00 mU/L Final   11/19/2015 5.03 (H) 0.40 - 4.00 mU/L Final "     T4 Free   Date Value Ref Range Status   08/01/2017 0.98 0.76 - 1.46 ng/dL Final   11/03/2016 1.23 0.76 - 1.46 ng/dL Final   05/05/2016 1.00 0.76 - 1.46 ng/dL Final   11/19/2015 1.04 0.76 - 1.46 ng/dL Final   06/04/2010 1.07 0.70 - 1.85 ng/dL Final       Creatinine   Date Value Ref Range Status   12/12/2017 1.59 (H) 0.66 - 1.25 mg/dL Final   08/01/2017 1.94 (H) 0.66 - 1.25 mg/dL Final   ]    Albumin   Date Value Ref Range Status   01/25/2014 3.1 (L) 3.3 - 4.9 g/dL Final   ]    ALT   Date Value Ref Range Status   08/01/2017 11 0 - 70 U/L Final   11/03/2016 11 0 - 70 U/L Final   ]    Recent Labs   Lab Test  05/05/16   0825  11/19/15   0806  05/07/15   0739  11/07/14   0813   CHOL  119   --   120  118   HDL  43   --   38*  45   LDL  53  70  62  52   TRIG  115   --   102  105   CHOLHDLRATIO   --    --   3.2  2.6       Vitamin B12   Date Value Ref Range Status   08/01/2017 536 193 - 986 pg/mL Final   05/07/2015 1064 (H) 193 - 986 pg/mL Final     Comment:     Interp: [id number] = Normal       ASSESSMENT AND PLAN:     #1.  Type 2 diabetes: last A1c was 7.9% , given his long-standing diabetes and related complications, not targeting very tightly controlled glucose and also preventing hypoglycemia.  He is slowly recovering from his hip surgery but overall has not returned to his baseline in terms of general health.  There is significant variability in his blood glucose readings.  Highest readings tend to be after dinner which is his largest meal of the day.  We discussed changes in oral medications as well as insulin regimen to improve glycemic control. Overall his goal is to keep her regimen simple and would like to avoid multiple daily injections if possible.  At this time will try to increase Lantus dose along with continuing the current oral medications.  We discussed that we may need to add Humalog insulin before dinner.  Increase lantus to 8 units daily. Counseled that based on AM BG can further increase to 10 units  daily.   Patient will call with blood glucose readings every few weeks, if there are any problems.    #2.  Nephropathy: Creatinine has been stable    #3 hypertension: continue to monitor     #4 mildly elevated TSH: TSH on last times was normal     #5 BPH: Follow up with urology    Establish care with PCP    Return to clinic in about 2-3 months after returning from [place].    JUDD Briones    I spent 25 minutes with this patient face to face and explained the conditions and plans (more than 50% of time was counseling/coordination of care, diabetes management) . The patient understood and is satisfied with today's visit.     This note was completed in part using Dragon voice recognition, and may contain word and grammatical errors.

## 2018-01-09 NOTE — MR AVS SNAPSHOT
After Visit Summary   2018    Farzad East    MRN: [id number]           Patient Information     Date Of Birth          1935        Visit Information        Provider Department      2018 2:30 PM Jose Amin MD [facility] Endocrinology        Today's Diagnoses     Type 2 diabetes mellitus with other ophthalmic complication, with long-term current use of insulin (H)    -  1       Follow-ups after your visit        Follow-up notes from your care team     Return in about 3 months (around 2018).      Who to contact     Please call your clinic at [phone] to:    Ask questions about your health    Make or cancel appointments    Discuss your medicines    Learn about your test results    Speak to your doctor   If you have compliments or concerns about an experience at your clinic, or if you wish to file a complaint, please contact [facility] Patient Relations at [phone] or email us at [email or web address]         Additional Information About Your Visit        MyChart Information     Visualnest is an electronic gateway that provides easy, online access to your medical records. With Visualnest, you can request a clinic appointment, read your test results, renew a prescription or communicate with your care team.     To sign up for [employer] visit the website at [email or web address]   You will be asked to enter the access code listed below, as well as some personal information. Please follow the directions to create your username and password.     Your access code is: [email or web address]  Expires: 2018  6:30 AM     Your access code will  in 90 days. If you need help or a new code, please contact your [facility] Clinic or call [phone] for assistance.        Care EveryWhere ID     This is your Care EveryWhere ID. This could be used by other organizations to access your [facility] medical records  [id number]       "  Your Vitals Were     Pulse Height BMI (Body Mass Index)             71 1.778 m (5' 10\") 24.59 kg/m2          Blood Pressure from Last 3 Encounters:   01/09/18 164/65   12/12/17 172/76   09/05/17 110/60    Weight from Last 3 Encounters:   01/09/18 77.7 kg (171 lb 6.4 oz)   12/12/17 77.9 kg (171 lb 12.8 oz)   09/05/17 77.1 kg (170 lb)              Today, you had the following     No orders found for display       Primary Care Provider Office Phone # Fax #    Marcus Augustin [phone]       [address] [address]        Equal Access to Services     GARRETT DAVIS : Zbiginew hargroveo Sonorris, waaxda luqadaha, qaybta kaalmada adeegyada, carmel todd . So [facility] [phone].    ATENCIÓN: Si habla español, tiene a dalton disposición servicios gratuitos de asistencia lingüística. Ll[facility] [phone].    We comply with applicable federal civil rights laws and [place] laws. We do not discriminate on the basis of race, color, national origin, age, disability, sex, sexual orientation, or gender identity.            Thank you!     Thank you for choosing [facility]  for your care. Our goal is always to provide you with excellent care. Hearing back from our patients is one way we can continue to improve our services. Please take a few minutes to complete the written survey that you may receive in the mail after your visit with us. Thank you!             Your Updated Medication List - Protect others around you: Learn how to safely use, store and throw away your medicines at www.disposemymeds.org.          This list is accurate as of: 1/9/18  3:18 PM.  Always use your most recent med list.                   Brand Name Dispense Instructions for use Diagnosis    aspirin 81 MG tablet      Take 1 tablet by mouth daily.        blood glucose monitoring meter device kit     1 kit    Use to test blood sugar daily    Diabetes mellitus, type 2 (H)       * " blood glucose monitoring test strip    ONETOUCH VERIO IQ    270 each    Use to test blood sugar 2-3 daily or as directed.    Type 2 diabetes mellitus with complication (H)       * ONETOUCH ULTRA test strip   Generic drug:  blood glucose monitoring     270 each    Use to test blood sugar 3 times daily or as directed.    Type 2 diabetes mellitus (H)       glipiZIDE 10 MG 24 hr tablet    glipiZIDE XL    90 tablet    Take 1 tablet (10 mg) by mouth daily    Diabetes mellitus, type 2 (H)       insulin glargine 100 UNIT/ML injection    LANTUS SOLOSTAR    15 mL    Inject 5 Units Subcutaneous every morning    Type 2 diabetes mellitus with complication, with long-term current use of insulin (H)       insulin pen needle 31G X 5 MM     100 each    Use 1 pen needles daily or as directed.    Type 2 diabetes mellitus with complication, with long-term current use of insulin (H)       losartan-hydrochlorothiazide 100-25 MG per tablet    HYZAAR    90 tablet    Take 1 tablet by mouth daily    Essential hypertension, Type 2 diabetes, HbA1c goal < 7% (H)       metFORMIN 500 MG 24 hr tablet    GLUCOPHAGE-XR    180 tablet    Take 2 tablets (1,000 mg) by mouth daily    Type 2 diabetes mellitus (H)       sildenafil 100 MG tablet    VIAGRA    6 tablet    Take 1 tablet (100 mg) by mouth daily as needed for erectile dysfunction    Type 2 diabetes mellitus with complication (H)       sitagliptin 100 MG tablet    JANUVIA    90 tablet    Take 0.5 tablets (50 mg) by mouth daily    Type 2 diabetes mellitus with hyperglycemia, unspecified long term insulin use status (H)       tamsulosin 0.4 MG capsule    FLOMAX    90 capsule    Take 1 capsule (0.4 mg) by mouth daily    Type 2 diabetes mellitus with complication, with long-term current use of insulin (H)       * Notice:  This list has 2 medication(s) that are the same as other medications prescribed for you. Read the directions carefully, and ask your doctor or other care provider to review them  with you.

## 2018-01-09 NOTE — NURSING NOTE
"Chief Complaint   Patient presents with     RECHECK     DIABETES TYPE 2 F/U        Initial /65 (BP Location: Right arm, Patient Position: Sitting, Cuff Size: Adult Regular)  Pulse 71  Ht 1.778 m (5' 10\")  Wt 77.7 kg (171 lb 6.4 oz)  BMI 24.59 kg/m2 Estimated body mass index is 24.59 kg/(m^2) as calculated from the following:    Height as of this encounter: 1.778 m (5' 10\").    Weight as of this encounter: 77.7 kg (171 lb 6.4 oz).  Medication Reconciliation: complete      "

## 2018-01-09 NOTE — PROGRESS NOTES
Farzad is a 82 year old male presents today for RECHECK (DIABETES TYPE 2 F/U )    HPI  Alessio is a 82-year-old male who is here for follow-up of type 2 diabetes and related problems.    long-standing history of type 2 diabetes, diabetes was diagnosed when he was in his 50s.    Currently he is taking metformin XR 1000 mg daily, glipizide XL 10 mg daily and Januvia 50 mg daily.   Since the last visit, he has restarted Lantus 5 units in AM.     Reports recovering well from the hip surgery.  Feels that his overall strength is improved.  Blood glucose meter was downloaded and reviewed.  Meal blood sugars morning for 137 standard deviation 32 mean blood glucose at bedtime is 276 data deviation of 83  Denies any hypoglycemia or falls  Also reports that his wife is having some significant cognitive decline and they are considering to move into an assisted living.  He reports that if wife health remains stable, they may be able to make their usual trip to [place] later this month.  He plans to be in [place] for 2-3 months.  He overall reports that his appetite is okay, frequently eats out.  denies any pain.    Diabetes reled complications  Background retinopathy-follows regularly with ophthalmology  Nephropathy-creatinine has been stable over the last few years.  Most recent creatinine was 1.9, patient takes metformin and is aware that use of metformin is not recommended at this renal function.  However he is aware of the benefits and side effects and would prefer to continue.  Peripheral neuropathy-numbness and toes bilaterally  Erectile dysfunction    Patient also follows with urology, cardiology and orthopedics.  History of elevated  PSA     denies any acute complaints today.  Denies any chest pain or shortness of breath with exertion.  No lightheadedness or dizziness.      Past Medical History:   Diagnosis Date     Atrial flutter (H)      Choroidal nevus of left eye      CKD (chronic kidney disease) stage 3, GFR 30-59  ml/min      Diabetes mellitus (H)      Diabetic peripheral neuropathy (H)      Hypertension      Microalbuminuria      Overweight(278.02)      Rectal-type adenocarcinoma (H)      Retinopathies, diabetic      Vitreous detachment of both eyes        Allergies  Allergies   Allergen Reactions     Sulfa Drugs      Unknown reaction. Occurred during childhood. Has not taken Sulfa medications since allergic response.      Medications  Current Outpatient Prescriptions   Medication Sig Dispense Refill     insulin glargine (LANTUS SOLOSTAR) 100 UNIT/ML injection Inject 5 Units Subcutaneous every morning 15 mL 3     ONETOUCH ULTRA test strip Use to test blood sugar 3 times daily or as directed. 270 each 3     losartan-hydrochlorothiazide (HYZAAR) 100-25 MG per tablet Take 1 tablet by mouth daily 90 tablet 3     tamsulosin (FLOMAX) 0.4 MG capsule Take 1 capsule (0.4 mg) by mouth daily 90 capsule 3     sitagliptin (JANUVIA) 100 MG tablet Take 0.5 tablets (50 mg) by mouth daily 90 tablet 3     glipiZIDE (GLIPIZIDE XL) 10 MG 24 hr tablet Take 1 tablet (10 mg) by mouth daily 90 tablet 3     blood glucose monitoring (ONETOUCH VERIO IQ SYSTEM) meter device kit Use to test blood sugar daily 1 kit 0     blood glucose monitoring (ONE TOUCH VERIO IQ) test strip Use to test blood sugar 2-3 daily or as directed. 270 each 3     metFORMIN (GLUCOPHAGE-XR) 500 MG 24 hr tablet Take 2 tablets (1,000 mg) by mouth daily 180 tablet 3     insulin pen needle 31G X 5 MM Use 1 pen needles daily or as directed. 100 each 3     sildenafil (VIAGRA) 100 MG tablet Take 1 tablet (100 mg) by mouth daily as needed for erectile dysfunction 6 tablet 11     aspirin 81 MG tablet Take 1 tablet by mouth daily.       Family History  family history includes Arthritis in his mother; Circulatory in his father; DIABETES in his father; Macular Degeneration in his father. There is no history of Amblyopia, Retinal detachment, or Glaucoma.  Social History     Social History  "    Marital status:      Spouse name: N/A     Number of children: N/A     Years of education: N/A     Occupational History      [employer]     Social History Main Topics     Smoking status: Never Smoker     Smokeless tobacco: Never Used     Alcohol use 1.5 - 2.0 oz/week     3 - 4 Standard drinks or equivalent per week      Comment: 3 martinis per week     Drug use: No     Sexual activity: Not on file     Other Topics Concern     Parent/Sibling W/ Cabg, Mi Or Angioplasty Before 65f 55m? No     Social History Narrative     lives with his wife in [place], [place].  Recently they sold their house and moved into a [employer].  Alessio is retiring this week, he worked as a .  He plans to spend otto in [place] depending on the health of his wife.     ROS: 8 point ROS neg other than the symptoms noted above in the HPI.  Physical Exam  /65 (BP Location: Right arm, Patient Position: Sitting, Cuff Size: Adult Regular)  Pulse 71  Ht 1.778 m (5' 10\")  Wt 77.7 kg (171 lb 6.4 oz)  BMI 24.59 kg/m2  Body mass index is 24.59 kg/(m^2).  Repeat 142/60    Constitutional: no distress, comfortable, pleasant   Neuro: alert and oriented   Psychological: appropriate mood       RESULTS  Lab Results   Component Value Date    A1C 7.8 08/01/2017    A1C 9.1 11/03/2016    A1C 8.2 05/05/2016    A1C 8.6 11/19/2015    A1C 7.5 05/07/2015       TSH   Date Value Ref Range Status   12/12/2017 3.58 0.40 - 4.00 mU/L Final   08/01/2017 4.43 (H) 0.40 - 4.00 mU/L Final   11/03/2016 5.27 (H) 0.40 - 4.00 mU/L Final   05/05/2016 4.38 (H) 0.40 - 4.00 mU/L Final   11/19/2015 5.03 (H) 0.40 - 4.00 mU/L Final     T4 Free   Date Value Ref Range Status   08/01/2017 0.98 0.76 - 1.46 ng/dL Final   11/03/2016 1.23 0.76 - 1.46 ng/dL Final   05/05/2016 1.00 0.76 - 1.46 ng/dL Final   11/19/2015 1.04 0.76 - 1.46 ng/dL Final   06/04/2010 1.07 0.70 - 1.85 ng/dL Final       Creatinine   Date Value Ref Range Status "   12/12/2017 1.59 (H) 0.66 - 1.25 mg/dL Final   08/01/2017 1.94 (H) 0.66 - 1.25 mg/dL Final   ]    Albumin   Date Value Ref Range Status   01/25/2014 3.1 (L) 3.3 - 4.9 g/dL Final   ]    ALT   Date Value Ref Range Status   08/01/2017 11 0 - 70 U/L Final   11/03/2016 11 0 - 70 U/L Final   ]    Recent Labs   Lab Test  05/05/16   0825  11/19/15   0806  05/07/15   0739  11/07/14   0813   CHOL  119   --   120  118   HDL  43   --   38*  45   LDL  53  70  62  52   TRIG  115   --   102  105   CHOLHDLRATIO   --    --   3.2  2.6       Vitamin B12   Date Value Ref Range Status   08/01/2017 536 193 - 986 pg/mL Final   05/07/2015 1064 (H) 193 - 986 pg/mL Final     Comment:     Interp: [id number] = Normal       ASSESSMENT AND PLAN:     #1.  Type 2 diabetes: last A1c was 7.9% , given his long-standing diabetes and related complications, not targeting very tightly controlled glucose and also preventing hypoglycemia.  He is slowly recovering from his hip surgery but overall has not returned to his baseline in terms of general health.  There is significant variability in his blood glucose readings.  Highest readings tend to be after dinner which is his largest meal of the day.  We discussed changes in oral medications as well as insulin regimen to improve glycemic control. Overall his goal is to keep her regimen simple and would like to avoid multiple daily injections if possible.  At this time will try to increase Lantus dose along with continuing the current oral medications.  We discussed that we may need to add Humalog insulin before dinner.  Increase lantus to 8 units daily. Counseled that based on AM BG can further increase to 10 units daily.   Patient will call with blood glucose readings every few weeks, if there are any problems.    #2.  Nephropathy: Creatinine has been stable    #3 hypertension: continue to monitor     #4 mildly elevated TSH: TSH on last times was normal     #5 BPH: Follow up with urology    Establish care with  PCP    Return to clinic in about 2-3 months after returning from [place].    JUDD Briones    I spent 25 minutes with this patient face to face and explained the conditions and plans (more than 50% of time was counseling/coordination of care, diabetes management) . The patient understood and is satisfied with today's visit.     This note was completed in part using Dragon voice recognition, and may contain word and grammatical errors.

## 2018-02-07 DIAGNOSIS — E11.8 TYPE 2 DIABETES MELLITUS WITH COMPLICATION (H): ICD-10-CM

## 2018-02-26 DIAGNOSIS — Z79.4 TYPE 2 DIABETES MELLITUS WITH COMPLICATION, WITH LONG-TERM CURRENT USE OF INSULIN (H): ICD-10-CM

## 2018-02-26 DIAGNOSIS — E11.8 TYPE 2 DIABETES MELLITUS WITH COMPLICATION, WITH LONG-TERM CURRENT USE OF INSULIN (H): ICD-10-CM

## 2018-03-16 DIAGNOSIS — E11.65 TYPE 2 DIABETES MELLITUS WITH HYPERGLYCEMIA, UNSPECIFIED LONG TERM INSULIN USE STATUS: Primary | ICD-10-CM

## 2018-03-16 DIAGNOSIS — E11.9 TYPE 2 DIABETES MELLITUS (H): ICD-10-CM

## 2018-03-19 RX ORDER — METFORMIN HCL 500 MG
1000 TABLET, EXTENDED RELEASE 24 HR ORAL
Qty: 180 TABLET | Refills: 2 | Status: SHIPPED | OUTPATIENT
Start: 2018-03-19 | End: 2018-12-17

## 2018-03-19 NOTE — TELEPHONE ENCOUNTER
metFORMIN (GLUCOPHAGE-XR) 500 MG 24 hr tablet  Last Written Prescription Date:  1/11/17  Last Fill Quantity: 180,   # refills: 3  Last Office Visit :1/11/17  Future Office visit: none    Routing  Because:  bp.lab

## 2018-04-06 ENCOUNTER — TELEPHONE (OUTPATIENT)
Dept: ENDOCRINOLOGY | Facility: CLINIC | Age: 83
End: 2018-04-06

## 2018-04-06 NOTE — TELEPHONE ENCOUNTER
Hospital since 04/01/2018 - for bronchitis/PNU - steroids - d/c today - no steroids after d/c Drug changes at d/c are to take off metformin creatinine was at 1.7, which he states he has had for years - Kidney specializt states no other problems - MD attempting to place on alternative - Actos produces severe gastro-side effects - now taking glipizide and jenuvia and 1000mg of metformin 1x daily - Pt is concerned about what they will put him on he would like to stay on metformin A1c was 7.7 on 04/02. Best number to reach Pt is . Forwarded to Physician on call Dr Tucker

## 2018-04-10 ENCOUNTER — TELEPHONE (OUTPATIENT)
Dept: ENDOCRINOLOGY | Facility: CLINIC | Age: 83
End: 2018-04-10

## 2018-04-10 NOTE — TELEPHONE ENCOUNTER
Unable to reach patient on tel # noted below. Left voice mail.     Joes Amin    ----- Message from Herbert Tucker MD sent at 4/9/2018  8:35 AM CDT -----  Regarding: RE: Questions regarding Rx on hospital d/c  I will let Dr. Amin address this.   Herbert      ----- Message -----     From: Harmony Hooper RN     Sent: 4/6/2018   2:20 PM       To: Jose Amin MD, Herbert Tucker MD  Subject: Questions regarding Rx on hospital d/c           Eloisa Pt:  Dr Tucker, Pt states in Hospital since 04/01/2018 - for bronchitis/PNU - on steroids - d/c today - no steroids after d/c today. Drug changes at d/c are to take Pt off metformin r/t creatinine at 1.7, which he states he has had for years - Kidney specializt states no other problems - MD attempting to place on alternative - Actos produces severe gastro-side effects so Pt has declined - now taking glipizide and januvia and 1000mg of metformin 1x daily - Pt is concerned about what they will put him on he would like to stay on metformin, or discuss your recommendations. states A1c was 7.7 on 04/02. Best number to reach Pt is . Thank you.

## 2018-04-16 ENCOUNTER — TELEPHONE (OUTPATIENT)
Dept: ENDOCRINOLOGY | Facility: CLINIC | Age: 83
End: 2018-04-16

## 2018-04-16 NOTE — TELEPHONE ENCOUNTER
Dr Amin  has  tried to reach Farzad  at both numbers provided without success. He did leave a  voicemail .

## 2018-04-16 NOTE — TELEPHONE ENCOUNTER
----- Message from Jose Amin MD sent at 4/16/2018  1:01 PM CDT -----  Regarding: RE: wants a call asap  I called him today on both numbers and left voice mail. I had also call last week and left a voice mail    ----- Message -----     From: Chrissie Medley RN     Sent: 4/13/2018   5:20 PM       To: Jose Amin MD  Subject: wants a call asap                                He is still trying to  Reach you about medications after hospitalization in [place] Cell [phone] land line  [phone]

## 2018-04-16 NOTE — TELEPHONE ENCOUNTER
Care discussed with patient over the phone.  Patient was recently hospitalized in [place] for pneumonia.  He has been discharged and and feels back to baseline.  He plans to return to [place] next month.  Patient wanted to discuss post discharge changes in medications.  Patient reports that he was told to stop metformin and start Actos.  Patient has used Actos in the past and this was stopped due to side effects.  Patient does not want to retry Actos.  Also patient has been on metformin for many years.  He has had stable chronic kidney disease, recent creatinine in [place] was 1.7 which is close to his baseline.  Patient is aware that use of metformin is not recommended at this renal function.  However he is aware of the benefits and side effects and has chosen to continue metformin.  Patient called to inform that he would like to continue his usual diabetes regimen of metformin 1000 mg daily, glipizide 10 mg daily and Januvia 50 mg daily.  Patient is also on a small dose of Lantus eight units daily however he reports that he is not using it recently.  He reports that his blood sugars have been well controlled so he has stopped Lantus for now.  Patient will call clinic if there are any issues with blood sugar control.  He was scheduled clinic appointment on his return to [place] next month.

## 2018-04-16 NOTE — TELEPHONE ENCOUNTER
----- Message from Chrissie Medley RN sent at 4/13/2018  5:20 PM CDT -----  Regarding: wants a call asap  He is still trying to  Reach you about medications after hospitalization in [place] Cell [phone] land line  [phone]

## 2018-04-17 ENCOUNTER — TELEPHONE (OUTPATIENT)
Dept: ENDOCRINOLOGY | Facility: CLINIC | Age: 83
End: 2018-04-17

## 2018-04-17 NOTE — TELEPHONE ENCOUNTER
Left message on pt vm to call clinic number to clarify any questions he would like us to forward to Dr Amin

## 2018-05-17 ENCOUNTER — TELEPHONE (OUTPATIENT)
Dept: ENDOCRINOLOGY | Facility: CLINIC | Age: 83
End: 2018-05-17

## 2018-06-12 ENCOUNTER — OFFICE VISIT (OUTPATIENT)
Dept: ENDOCRINOLOGY | Facility: CLINIC | Age: 83
End: 2018-06-12
Payer: COMMERCIAL

## 2018-06-12 VITALS
SYSTOLIC BLOOD PRESSURE: 123 MMHG | HEART RATE: 66 BPM | DIASTOLIC BLOOD PRESSURE: 73 MMHG | WEIGHT: 197.8 LBS | BODY MASS INDEX: 28.38 KG/M2

## 2018-06-12 DIAGNOSIS — E11.65 TYPE 2 DIABETES MELLITUS WITH HYPERGLYCEMIA, UNSPECIFIED LONG TERM INSULIN USE STATUS: Primary | ICD-10-CM

## 2018-06-12 DIAGNOSIS — E11.65 TYPE 2 DIABETES MELLITUS WITH HYPERGLYCEMIA, UNSPECIFIED LONG TERM INSULIN USE STATUS: ICD-10-CM

## 2018-06-12 LAB
ANION GAP SERPL CALCULATED.3IONS-SCNC: 6 MMOL/L (ref 3–14)
BUN SERPL-MCNC: 48 MG/DL (ref 7–30)
CALCIUM SERPL-MCNC: 9 MG/DL (ref 8.5–10.1)
CHLORIDE SERPL-SCNC: 109 MMOL/L (ref 94–109)
CO2 SERPL-SCNC: 22 MMOL/L (ref 20–32)
CREAT SERPL-MCNC: 1.92 MG/DL (ref 0.66–1.25)
GFR SERPL CREATININE-BSD FRML MDRD: 34 ML/MIN/1.7M2
GLUCOSE SERPL-MCNC: 110 MG/DL (ref 70–99)
HBA1C MFR BLD: 6.8 % (ref 4.3–6)
POTASSIUM SERPL-SCNC: 5.4 MMOL/L (ref 3.4–5.3)
SODIUM SERPL-SCNC: 138 MMOL/L (ref 133–144)

## 2018-06-12 RX ORDER — GLIPIZIDE 5 MG/1
5 TABLET, FILM COATED, EXTENDED RELEASE ORAL DAILY
Qty: 90 TABLET | Refills: 1 | Status: SHIPPED | OUTPATIENT
Start: 2018-06-12 | End: 2019-02-14

## 2018-06-12 ASSESSMENT — PAIN SCALES - GENERAL: PAINLEVEL: NO PAIN (0)

## 2018-06-12 NOTE — PROGRESS NOTES
[facility]  Endocrinology  Jose Amin MD  06/12/2018      Chief Complaint:   Diabetes    History of Present Illness:   Farzad East is a 82 year old male with a history of stage 3 CKD, diabetic peripheral neuropathy, and hypertension who presents for follow-up of type 2 diabetes and related problems. He was diagnosed when he was in his 50s and is currently taking metformin XR 1000 mg daily, glipizide XL 10 mg daily, and Januvia 50 mg daily. At our last visit on 1/9/18 he was experiencing significant variability in his blood glucose readings with the highest usually after dinner, which is his largest meal of the day and we increased his Lantus from 5 units to 8 units. The patient was recently in the hospital in [place] for 5-6 days for Pneumonia, where his medications were changed. He however was not happy with the changes and has returned to the normal regime stated above, except Lantus, which he discontinued 3-4 months ago. Of note, he does not take glipizide when he is his morning BG is <80.  A1c today: 6.8  He denies problems with low blood sugar during the day and denies dizziness. He does report being slightly fatigued, occasionally falling asleep for 30 minutes at a time during the day.     He reports a fair appetite with breakfast, a snack and 5PM dinner. His weight has been stable    Blood Glucose Monitoring:  We reviewed glucometer data together.  Check only fasting BG in mornings  Avg 105 with SD 21  Range:     Diabetes monitoring and complications:  CAD: No  Last eye exam results: : 12/12/2016 background retinopathy - follows regularly with ophthalmology   Last dental exam: not discussed today  Microalbuminuria: 58.44 (H) on 8/1/17.  HTN: Yes  On Statin: No  On Aspirin: No  Depression: No  Erectile dysfunction: Yes  Nephropathy - creatinine has been stable over the last few years.  Most recent creatinine was 1.9, patient takes metformin and is aware that use of metformin is not  recommended at this renal function.  However he is aware of the benefits and side effects and would prefer to continue.  Peripheral neuropathy-numbness and toes bilaterally     Review of Systems:   Pertinent items are noted in HPI.  All other systems are negative.    Active Medications:      aspirin 81 MG tablet, Take 1 tablet by mouth daily., Disp: , Rfl:      blood glucose monitoring (ONETOUCH VERIO IQ SYSTEM) meter device kit, Use to test blood sugar daily, Disp: 1 kit, Rfl: 0     blood glucose monitoring (ONETOUCH VERIO IQ) test strip, Use to test blood sugar 2-3 daily or as directed., Disp: 300 each, Rfl: 3     glipiZIDE (GLIPIZIDE XL) 10 MG 24 hr tablet, Take 1 tablet (10 mg) by mouth daily, Disp: 90 tablet, Rfl: 3     insulin glargine (LANTUS SOLOSTAR) 100 UNIT/ML injection, Inject 5 Units Subcutaneous every morning, Disp: 15 mL, Rfl: 3     insulin pen needle 31G X 5 MM, Use 1 pen needle daily or as directed, Disp: 100 each, Rfl: 3     losartan-hydrochlorothiazide (HYZAAR) 100-25 MG per tablet, Take 1 tablet by mouth daily, Disp: 90 tablet, Rfl: 3     metFORMIN (GLUCOPHAGE-XR) 500 MG 24 hr tablet, Take 2 tablets (1,000 mg) by mouth daily with food, Disp: 180 tablet, Rfl: 2     ONETOUCH ULTRA test strip, Use to test blood sugar 3 times daily or as directed., Disp: 270 each, Rfl: 3     sildenafil (VIAGRA) 100 MG tablet, Take 1 tablet (100 mg) by mouth daily as needed for erectile dysfunction, Disp: 6 tablet, Rfl: 11     sitagliptin (JANUVIA) 100 MG tablet, Take 0.5 tablets (50 mg) by mouth daily, Disp: 90 tablet, Rfl: 3     tamsulosin (FLOMAX) 0.4 MG capsule, Take 1 capsule (0.4 mg) by mouth daily, Disp: 90 capsule, Rfl: 3      Allergies:   Sulfa drugs      Past Medical History:  Atrial flutter  Choroidal nevus of left eye  CKD (chronic kidney disease) stage 3  DM2  Diabetic peripheral neuropathy  Hypertension  microalbuminuria  Overweight  Rectal-type adenocarcinoma  Vitreous detachment of both eyes     Past  Surgical History:  Cataract IOL, bilateral  Open reduction internal fixation femur proximal  Left hip replacement    Family History:   Father: diabetes, circulatory, macular degeneration  Mother: arthritis      Social History:   The patient is , a nonsmoker and consumes alcohol     Physical Exam:   BP: 123/66, Pulse 66    Wt Readings from Last 10 Encounters:   06/12/18 171.4 lb   01/09/18 77.7 kg (171 lb 6.4 oz)   12/12/17 77.9 kg (171 lb 12.8 oz)   09/05/17 77.1 kg (170 lb)   08/01/17 77.1 kg (169 lb 14.4 oz)   08/19/16 81.6 kg (180 lb)   05/05/16 83.5 kg (184 lb)   01/12/16 82.6 kg (182 lb)   11/19/15 83.8 kg (184 lb 12.8 oz)   06/23/15 87 kg (191 lb 11.2 oz)   05/07/15 82.9 kg (182 lb 11.2 oz)      Constitutional: no distress, comfortable, pleasant   Throat: throat clear  Neck: supple, no thyromegaly.   Cardiovascular: regular rate and rhythm, normal S1 and S2, no murmurs  Respiratory: clear to auscultation, no wheezes or crackles, normal breath sounds   Musculoskeletal: edema in bilateral ankles  Lymphatic: no cervical lymphadenopathy     Data:  Lab Results   Component Value Date     12/12/2017    POTASSIUM 5.1 12/12/2017    CHLORIDE 107 12/12/2017    CO2 24 12/12/2017    ANIONGAP 6 12/12/2017     (H) 12/12/2017    BUN 36 (H) 12/12/2017    CR 1.59 (H) 12/12/2017    DUSTIN 8.9 12/12/2017     Lab Results   Component Value Date    GFRESTIMATED 42 (L) 12/12/2017    GFRESTIMATED 33 (L) 08/01/2017    GFRESTIMATED 29 (L) 11/03/2016    GFRESTBLACK 51 (L) 12/12/2017    GFRESTBLACK 40 (L) 08/01/2017    GFRESTBLACK 36 (L) 11/03/2016      Lab Results   Component Value Date    MICROL 86 08/01/2017    UMALCR 58.44 (H) 08/01/2017        Lab Results   Component Value Date    A1C 7.9 (H) 12/12/2017    A1C 7.8 (H) 08/01/2017    A1C 9.1 (H) 11/03/2016    A1C 8.2 (H) 05/05/2016    A1C 8.6 (H) 11/19/2015    HEMOGLOBINA1 7.0 (A) 05/15/2014    HEMOGLOBINA1 7.6 (H) 05/05/2011    HEMOGLOBINA1 7.5 (H) 12/04/2009     HEMOGLOBINA1 7.9 (H) 08/01/2007     Lab Results   Component Value Date    CPEPT 4.2 05/05/2016       Lab Results   Component Value Date    CHOL 119 05/05/2016    CHOL 120 05/07/2015    TRIG 115 05/05/2016    TRIG 102 05/07/2015    HDL 43 05/05/2016    HDL 38 (L) 05/07/2015    LDL 55 08/01/2017    LDL 53 05/05/2016    NHDL 76 05/05/2016       Assessment and Plan:    Type 2 diabetes mellitus with hyperglycemia, unspecified long term insulin use status (H)  His A1c today is 6.8; discussed again risk of hypoglycemia with glipizide . Suggest stopping glipizide and start Lantus if needed.   He would prefer to continue glipizide but ok with reducing dose from 10mg to 5mg. If he experience lows,  he will call and we will consider using Lantus again.  - Hemoglobin A1c POCT  - glipiZIDE (GLUCOTROL XL) 5 MG 24 hr tablet  Dispense: 90 tablet; Refill: 1  - Basic metabolic panel     #2 Nephropathy - If creatinine is stable, we will continue with metformin.     #3 Deconditioning- I advised him to work on regaining his physical strength over the summer. He will consider f/u with PT    Follow-up: Return for f/u with me in 2 months.     >50% of 30 minute visit spent in face to face counseling, education and coordination of care related to options for better glycemic control as well as preventing, detecting, and treating hypoglycemia.      Scribe Disclosure:   I, Tika Shukla, am serving as a scribe to document services personally performed by Jose Amin MD at this visit, based upon the provider's statements to me. All documentation has been reviewed by the aforementioned provider prior to being entered into the official medical record.     Portions of this medical record were completed by a scribe. UPON MY REVIEW AND AUTHENTICATION BY ELECTRONIC SIGNATURE, this confirms (a) I performed the applicable clinical services, and (b) the record is accurate.      JUDD Fenton

## 2018-06-12 NOTE — LETTER
6/12/2018       RE: Farzad East  [address]  [facility] [address]     Dear Colleague,    Thank you for referring your patient, Farzad East, to the [facility] ENDOCRINOLOGY at [facility]. Please see a copy of my visit note below.    [facility]  Endocrinology  Jose Amin MD  06/12/2018      Chief Complaint:   Diabetes    History of Present Illness:   Farzad East is a 82 year old male with a history of stage 3 CKD, diabetic peripheral neuropathy, and hypertension who presents for follow-up of type 2 diabetes and related problems. He was diagnosed when he was in his 50s and is currently taking metformin XR 1000 mg daily, glipizide XL 10 mg daily, and Januvia 50 mg daily. At our last visit on 1/9/18 he was experiencing significant variability in his blood glucose readings with the highest usually after dinner, which is his largest meal of the day and we increased his Lantus from 5 units to 8 units. The patient was recently in the hospital in [place] for 5-6 days for Pneumonia, where his medications were changed. He however was not happy with the changes and has returned to the normal regime stated above, except Lantus, which he discontinued 3-4 months ago. Of note, he does not take glipizide when he is his morning BG is <80.  A1c today: 6.8  He denies problems with low blood sugar during the day and denies dizziness. He does report being slightly fatigued, occasionally falling asleep for 30 minutes at a time during the day.     He reports a fair appetite with breakfast, a snack and 5PM dinner. His weight has been stable    Blood Glucose Monitoring:  We reviewed glucometer data together.  Check only fasting BG in mornings  Avg 105 with SD 21  Range:     Diabetes monitoring and complications:  CAD: No  Last eye exam results: : 12/12/2016 background retinopathy - follows regularly with ophthalmology   Last dental exam: not discussed  CC: 28w4d IUP  Fetus active  Denies ctx  Was out of town last month-disc compliance  VS reviewed and WNL  A: 28 1/2 wk IUP  P: GTS today  O pos   today  Microalbuminuria: 58.44 (H) on 8/1/17.  HTN: Yes  On Statin: No  On Aspirin: No  Depression: No  Erectile dysfunction: Yes  Nephropathy - creatinine has been stable over the last few years.  Most recent creatinine was 1.9, patient takes metformin and is aware that use of metformin is not recommended at this renal function.  However he is aware of the benefits and side effects and would prefer to continue.  Peripheral neuropathy-numbness and toes bilaterally     Review of Systems:   Pertinent items are noted in HPI.  All other systems are negative.    Active Medications:      aspirin 81 MG tablet, Take 1 tablet by mouth daily., Disp: , Rfl:      blood glucose monitoring (ONETOUCH VERIO IQ SYSTEM) meter device kit, Use to test blood sugar daily, Disp: 1 kit, Rfl: 0     blood glucose monitoring (ONETOUCH VERIO IQ) test strip, Use to test blood sugar 2-3 daily or as directed., Disp: 300 each, Rfl: 3     glipiZIDE (GLIPIZIDE XL) 10 MG 24 hr tablet, Take 1 tablet (10 mg) by mouth daily, Disp: 90 tablet, Rfl: 3     insulin glargine (LANTUS SOLOSTAR) 100 UNIT/ML injection, Inject 5 Units Subcutaneous every morning, Disp: 15 mL, Rfl: 3     insulin pen needle 31G X 5 MM, Use 1 pen needle daily or as directed, Disp: 100 each, Rfl: 3     losartan-hydrochlorothiazide (HYZAAR) 100-25 MG per tablet, Take 1 tablet by mouth daily, Disp: 90 tablet, Rfl: 3     metFORMIN (GLUCOPHAGE-XR) 500 MG 24 hr tablet, Take 2 tablets (1,000 mg) by mouth daily with food, Disp: 180 tablet, Rfl: 2     ONETOUCH ULTRA test strip, Use to test blood sugar 3 times daily or as directed., Disp: 270 each, Rfl: 3     sildenafil (VIAGRA) 100 MG tablet, Take 1 tablet (100 mg) by mouth daily as needed for erectile dysfunction, Disp: 6 tablet, Rfl: 11     sitagliptin (JANUVIA) 100 MG tablet, Take 0.5 tablets (50 mg) by mouth daily, Disp: 90 tablet, Rfl: 3     tamsulosin (FLOMAX) 0.4 MG capsule, Take 1 capsule (0.4 mg) by mouth daily, Disp: 90 capsule, Rfl:  3      Allergies:   Sulfa drugs      Past Medical History:  Atrial flutter  Choroidal nevus of left eye  CKD (chronic kidney disease) stage 3  DM2  Diabetic peripheral neuropathy  Hypertension  microalbuminuria  Overweight  Rectal-type adenocarcinoma  Vitreous detachment of both eyes     Past Surgical History:  Cataract IOL, bilateral  Open reduction internal fixation femur proximal  Left hip replacement    Family History:   Father: diabetes, circulatory, macular degeneration  Mother: arthritis      Social History:   The patient is , a nonsmoker and consumes alcohol     Physical Exam:   BP: 123/66, Pulse 66    Wt Readings from Last 10 Encounters:   06/12/18 171.4 lb   01/09/18 77.7 kg (171 lb 6.4 oz)   12/12/17 77.9 kg (171 lb 12.8 oz)   09/05/17 77.1 kg (170 lb)   08/01/17 77.1 kg (169 lb 14.4 oz)   08/19/16 81.6 kg (180 lb)   05/05/16 83.5 kg (184 lb)   01/12/16 82.6 kg (182 lb)   11/19/15 83.8 kg (184 lb 12.8 oz)   06/23/15 87 kg (191 lb 11.2 oz)   05/07/15 82.9 kg (182 lb 11.2 oz)      Constitutional: no distress, comfortable, pleasant   Throat: throat clear  Neck: supple, no thyromegaly.   Cardiovascular: regular rate and rhythm, normal S1 and S2, no murmurs  Respiratory: clear to auscultation, no wheezes or crackles, normal breath sounds   Musculoskeletal: edema in bilateral ankles  Lymphatic: no cervical lymphadenopathy     Data:  Lab Results   Component Value Date     12/12/2017    POTASSIUM 5.1 12/12/2017    CHLORIDE 107 12/12/2017    CO2 24 12/12/2017    ANIONGAP 6 12/12/2017     (H) 12/12/2017    BUN 36 (H) 12/12/2017    CR 1.59 (H) 12/12/2017    DUSTIN 8.9 12/12/2017     Lab Results   Component Value Date    GFRESTIMATED 42 (L) 12/12/2017    GFRESTIMATED 33 (L) 08/01/2017    GFRESTIMATED 29 (L) 11/03/2016    GFRESTBLACK 51 (L) 12/12/2017    GFRESTBLACK 40 (L) 08/01/2017    GFRESTBLACK 36 (L) 11/03/2016      Lab Results   Component Value Date    MICROL 86 08/01/2017    UMALCR 58.44 (H)  08/01/2017        Lab Results   Component Value Date    A1C 7.9 (H) 12/12/2017    A1C 7.8 (H) 08/01/2017    A1C 9.1 (H) 11/03/2016    A1C 8.2 (H) 05/05/2016    A1C 8.6 (H) 11/19/2015    HEMOGLOBINA1 7.0 (A) 05/15/2014    HEMOGLOBINA1 7.6 (H) 05/05/2011    HEMOGLOBINA1 7.5 (H) 12/04/2009    HEMOGLOBINA1 7.9 (H) 08/01/2007     Lab Results   Component Value Date    CPEPT 4.2 05/05/2016       Lab Results   Component Value Date    CHOL 119 05/05/2016    CHOL 120 05/07/2015    TRIG 115 05/05/2016    TRIG 102 05/07/2015    HDL 43 05/05/2016    HDL 38 (L) 05/07/2015    LDL 55 08/01/2017    LDL 53 05/05/2016    NHDL 76 05/05/2016       Assessment and Plan:    Type 2 diabetes mellitus with hyperglycemia, unspecified long term insulin use status (H)  His A1c today is 6.8; discussed again risk of hypoglycemia with glipizide . Suggest stopping glipizide and start Lantus if needed.   He would prefer to continue glipizide but ok with reducing dose from 10mg to 5mg. If he experience lows,  he will call and we will consider using Lantus again.  - Hemoglobin A1c POCT  - glipiZIDE (GLUCOTROL XL) 5 MG 24 hr tablet  Dispense: 90 tablet; Refill: 1  - Basic metabolic panel     #2 Nephropathy - If creatinine is stable, we will continue with metformin.     #3 Deconditioning- I advised him to work on regaining his physical strength over the summer. He will consider f/u with PT    Follow-up: Return for f/u with me in 2 months.     >50% of 30 minute visit spent in face to face counseling, education and coordination of care related to options for better glycemic control as well as preventing, detecting, and treating hypoglycemia.      Scribe Disclosure:   Tika PYLE, am serving as a scribe to document services personally performed by Jose Amin MD at this visit, based upon the provider's statements to me. All documentation has been reviewed by the aforementioned provider prior to being entered into the official medical  record.     Portions of this medical record were completed by a scribe. UPON MY REVIEW AND AUTHENTICATION BY ELECTRONIC SIGNATURE, this confirms (a) I performed the applicable clinical services, and (b) the record is accurate.        Again, thank you for allowing me to participate in the care of your patient.      Sincerely,    Jose Amin MD

## 2018-06-14 ENCOUNTER — TELEPHONE (OUTPATIENT)
Dept: ENDOCRINOLOGY | Facility: CLINIC | Age: 83
End: 2018-06-14

## 2018-06-14 DIAGNOSIS — E11.39 TYPE 2 DIABETES MELLITUS WITH OTHER OPHTHALMIC COMPLICATION, WITH LONG-TERM CURRENT USE OF INSULIN (H): Primary | ICD-10-CM

## 2018-06-14 DIAGNOSIS — Z79.4 TYPE 2 DIABETES MELLITUS WITH OTHER OPHTHALMIC COMPLICATION, WITH LONG-TERM CURRENT USE OF INSULIN (H): Primary | ICD-10-CM

## 2018-06-14 NOTE — TELEPHONE ENCOUNTER
Lab results reviewed over the phone with patient.   K is slightly high, recheck labs in 1-2 weeks.

## 2018-06-19 DIAGNOSIS — Z79.4 TYPE 2 DIABETES MELLITUS WITH OTHER OPHTHALMIC COMPLICATION, WITH LONG-TERM CURRENT USE OF INSULIN (H): ICD-10-CM

## 2018-06-19 DIAGNOSIS — E11.39 TYPE 2 DIABETES MELLITUS WITH OTHER OPHTHALMIC COMPLICATION, WITH LONG-TERM CURRENT USE OF INSULIN (H): ICD-10-CM

## 2018-06-19 LAB
ANION GAP SERPL CALCULATED.3IONS-SCNC: 8 MMOL/L (ref 3–14)
BUN SERPL-MCNC: 44 MG/DL (ref 7–30)
CALCIUM SERPL-MCNC: 8.3 MG/DL (ref 8.5–10.1)
CHLORIDE SERPL-SCNC: 108 MMOL/L (ref 94–109)
CO2 SERPL-SCNC: 22 MMOL/L (ref 20–32)
CREAT SERPL-MCNC: 1.82 MG/DL (ref 0.66–1.25)
GFR SERPL CREATININE-BSD FRML MDRD: 36 ML/MIN/1.7M2
GLUCOSE SERPL-MCNC: 199 MG/DL (ref 70–99)
POTASSIUM SERPL-SCNC: 5 MMOL/L (ref 3.4–5.3)
SODIUM SERPL-SCNC: 139 MMOL/L (ref 133–144)

## 2018-06-27 DIAGNOSIS — Z79.4 TYPE 2 DIABETES MELLITUS WITH COMPLICATION, WITH LONG-TERM CURRENT USE OF INSULIN (H): ICD-10-CM

## 2018-06-27 DIAGNOSIS — E11.8 TYPE 2 DIABETES MELLITUS WITH COMPLICATION, WITH LONG-TERM CURRENT USE OF INSULIN (H): ICD-10-CM

## 2018-07-17 ENCOUNTER — APPOINTMENT (OUTPATIENT)
Age: 83
Setting detail: DERMATOLOGY
End: 2018-07-18

## 2018-07-17 DIAGNOSIS — Z71.89 OTHER SPECIFIED COUNSELING: ICD-10-CM

## 2018-07-17 PROBLEM — C44.212 BASAL CELL CARCINOMA OF SKIN OF RIGHT EAR AND EXTERNAL AURICULAR CANAL: Status: ACTIVE | Noted: 2018-07-17

## 2018-07-17 PROCEDURE — OTHER CONSULTATION FOR MOHS SURGERY: OTHER

## 2018-07-17 PROCEDURE — 14060 TIS TRNFR E/N/E/L 10 SQ CM/<: CPT

## 2018-07-17 PROCEDURE — OTHER COUNSELING: OTHER

## 2018-07-17 PROCEDURE — OTHER MIPS QUALITY: OTHER

## 2018-07-17 PROCEDURE — 17311 MOHS 1 STAGE H/N/HF/G: CPT

## 2018-07-17 PROCEDURE — OTHER DIAGNOSIS COMMENT: OTHER

## 2018-07-17 PROCEDURE — OTHER MOHS SURGERY: OTHER

## 2018-07-17 NOTE — PROCEDURE: MIPS QUALITY
Quality 110: Preventive Care And Screening: Influenza Immunization: Influenza Immunization previously received during influenza season
Detail Level: Detailed
Quality 226: Preventive Care And Screening: Tobacco Use: Screening And Cessation Intervention: Patient screened for tobacco and never smoked
Quality 130: Documentation Of Current Medications In The Medical Record: Current Medications Documented
Quality 431: Preventive Care And Screening: Unhealthy Alcohol Use - Screening: Patient screened for unhealthy alcohol use using a single question and scores less than 2 times per year
Quality 143: Oncology: Medical And Radiation- Pain Intensity Quantified: Pain severity quantified, no pain present

## 2018-07-17 NOTE — PROCEDURE: MOHS SURGERY
Body Location Override (Optional - Billing Will Still Be Based On Selected Body Map Location If Applicable): Right Superior Helix

## 2018-07-17 NOTE — PROCEDURE: MOHS SURGERY
Post-Care Instructions: The patient was provided with detailed verbal and written instructions for daily wound care, including use of H2O2 cleansing, followed by application of plain Vaseline and a bandage.  These instructions including Dr. Rosas's contact information should there be any questions or concerns.  The patient is not to engage in any heavy lifting, exercise, or swimming for the next week.  Should the patient develop any fevers, chills, bleeding, or pain not controlled by OTC analgesics, s/he should contact the office immediately.

## 2018-07-17 NOTE — PROCEDURE: CONSULTATION FOR MOHS SURGERY
Name Of The Referring Provider For Procedure: Nikki Jyo MD Name Of The Referring Provider For Procedure: Nikki Joy MD

## 2018-07-31 ENCOUNTER — APPOINTMENT (OUTPATIENT)
Age: 83
Setting detail: DERMATOLOGY
End: 2018-08-01

## 2018-07-31 DIAGNOSIS — Z71.89 OTHER SPECIFIED COUNSELING: ICD-10-CM

## 2018-07-31 DIAGNOSIS — Z48.02 ENCOUNTER FOR REMOVAL OF SUTURES: ICD-10-CM

## 2018-07-31 PROBLEM — D04.39 CARCINOMA IN SITU OF SKIN OF OTHER PARTS OF FACE: Status: ACTIVE | Noted: 2018-07-31

## 2018-07-31 PROCEDURE — OTHER MOHS SURGERY: OTHER

## 2018-07-31 PROCEDURE — OTHER SUTURE REMOVAL (GLOBAL PERIOD): OTHER

## 2018-07-31 PROCEDURE — OTHER MIPS QUALITY: OTHER

## 2018-07-31 PROCEDURE — 99024 POSTOP FOLLOW-UP VISIT: CPT

## 2018-07-31 PROCEDURE — 13132 CMPLX RPR F/C/C/M/N/AX/G/H/F: CPT | Mod: 79

## 2018-07-31 PROCEDURE — OTHER COUNSELING: OTHER

## 2018-07-31 PROCEDURE — 17311 MOHS 1 STAGE H/N/HF/G: CPT | Mod: 79

## 2018-07-31 PROCEDURE — OTHER DIAGNOSIS COMMENT: OTHER

## 2018-07-31 ASSESSMENT — LOCATION ZONE DERM: LOCATION ZONE: EAR

## 2018-07-31 ASSESSMENT — LOCATION DETAILED DESCRIPTION DERM: LOCATION DETAILED: RIGHT SUPERIOR HELIX

## 2018-07-31 ASSESSMENT — LOCATION SIMPLE DESCRIPTION DERM: LOCATION SIMPLE: RIGHT EAR

## 2018-07-31 NOTE — PROCEDURE: MOHS SURGERY
Body Location Override (Optional - Billing Will Still Be Based On Selected Body Map Location If Applicable): Left Lateral Forehead

## 2018-07-31 NOTE — PROCEDURE: DIAGNOSIS COMMENT
Detail Level: Simple
Comment: S/P Mohs Micrographic surgery for a nodular BCC
Comment: The following information was printed and given to the patient.

## 2018-07-31 NOTE — PROCEDURE: MIPS QUALITY
Quality 143: Oncology: Medical And Radiation- Pain Intensity Quantified: Pain severity quantified, no pain present
Detail Level: Detailed
Quality 431: Preventive Care And Screening: Unhealthy Alcohol Use - Screening: Patient screened for unhealthy alcohol use using a single question and scores less than 2 times per year
Quality 130: Documentation Of Current Medications In The Medical Record: Current Medications Documented
Quality 110: Preventive Care And Screening: Influenza Immunization: Influenza Immunization previously received during influenza season
Quality 226: Preventive Care And Screening: Tobacco Use: Screening And Cessation Intervention: Patient screened for tobacco and is an ex-smoker

## 2018-07-31 NOTE — PROCEDURE: SUTURE REMOVAL (GLOBAL PERIOD)
Add 37326 Cpt? (Important Note: In 2017 The Use Of 06743 Is Being Tracked By Cms To Determine Future Global Period Reimbursement For Global Periods): yes
Detail Level: Simple
Body Location Override (Optional - Billing Will Still Be Based On Selected Body Map Location If Applicable): R superior helix

## 2018-08-07 ENCOUNTER — APPOINTMENT (OUTPATIENT)
Age: 83
Setting detail: DERMATOLOGY
End: 2018-08-12

## 2018-08-07 DIAGNOSIS — L98419 CHRONIC ULCER OF OTHER SPECIFIED SITES: ICD-10-CM

## 2018-08-07 DIAGNOSIS — L98429 CHRONIC ULCER OF OTHER SPECIFIED SITES: ICD-10-CM

## 2018-08-07 DIAGNOSIS — Z48.02 ENCOUNTER FOR REMOVAL OF SUTURES: ICD-10-CM

## 2018-08-07 PROBLEM — L98.499 NON-PRESSURE CHRONIC ULCER OF SKIN OF OTHER SITES WITH UNSPECIFIED SEVERITY: Status: ACTIVE | Noted: 2018-08-07

## 2018-08-07 PROCEDURE — OTHER COUNSELING: OTHER

## 2018-08-07 PROCEDURE — OTHER RETURN TO REFERRING PROVIDER: OTHER

## 2018-08-07 PROCEDURE — OTHER SUTURE REMOVAL (GLOBAL PERIOD): OTHER

## 2018-08-07 PROCEDURE — OTHER DIAGNOSIS COMMENT: OTHER

## 2018-08-07 PROCEDURE — 99024 POSTOP FOLLOW-UP VISIT: CPT

## 2018-08-07 PROCEDURE — OTHER POST-OP WOUND EVALUATION: OTHER

## 2018-08-07 PROCEDURE — OTHER MIPS QUALITY: OTHER

## 2018-08-07 ASSESSMENT — LOCATION SIMPLE DESCRIPTION DERM
LOCATION SIMPLE: RIGHT EAR
LOCATION SIMPLE: LEFT FOREHEAD

## 2018-08-07 ASSESSMENT — LOCATION DETAILED DESCRIPTION DERM
LOCATION DETAILED: RIGHT SUPERIOR HELIX
LOCATION DETAILED: LEFT FOREHEAD

## 2018-08-07 ASSESSMENT — LOCATION ZONE DERM
LOCATION ZONE: FACE
LOCATION ZONE: EAR

## 2018-08-07 NOTE — PROCEDURE: SUTURE REMOVAL (GLOBAL PERIOD)
Body Location Override (Optional - Billing Will Still Be Based On Selected Body Map Location If Applicable): left lateral forehead
Detail Level: Simple
Add 93453 Cpt? (Important Note: In 2017 The Use Of 89497 Is Being Tracked By Cms To Determine Future Global Period Reimbursement For Global Periods): yes

## 2018-08-07 NOTE — PROCEDURE: POST-OP WOUND EVALUATION
Body Location Override (Optional): right superior helix
Wound Crusting?: crusted
Follow Up Units (Optional): 0
Wound Evaluated By (Optional): Selina Rosas, MPhil, MD
Detail Level: Detailed
Wound Diameter In Cm(Optional): 0.5
Percent Granulation (Optional): 90%

## 2018-08-07 NOTE — PROCEDURE: MIPS QUALITY
Quality 130: Documentation Of Current Medications In The Medical Record: Current Medications Documented
Detail Level: Detailed
Quality 110: Preventive Care And Screening: Influenza Immunization: Influenza Immunization previously received during influenza season
Quality 431: Preventive Care And Screening: Unhealthy Alcohol Use - Screening: Patient screened for unhealthy alcohol use using a single question and scores less than 2 times per year
Quality 226: Preventive Care And Screening: Tobacco Use: Screening And Cessation Intervention: Patient screened for tobacco and is an ex-smoker

## 2018-08-07 NOTE — PROCEDURE: DIAGNOSIS COMMENT
Detail Level: Simple
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
Comment: S/P Mohs Micrographic Surgery for Primary Nodular BCC

## 2018-09-04 ENCOUNTER — OFFICE VISIT (OUTPATIENT)
Dept: ENDOCRINOLOGY | Facility: CLINIC | Age: 83
End: 2018-09-04
Payer: COMMERCIAL

## 2018-09-04 VITALS
HEIGHT: 70 IN | WEIGHT: 180.7 LBS | DIASTOLIC BLOOD PRESSURE: 70 MMHG | BODY MASS INDEX: 25.87 KG/M2 | SYSTOLIC BLOOD PRESSURE: 139 MMHG | HEART RATE: 64 BPM

## 2018-09-04 DIAGNOSIS — Z79.4 TYPE 2 DIABETES MELLITUS WITH OTHER OPHTHALMIC COMPLICATION, WITH LONG-TERM CURRENT USE OF INSULIN (H): Primary | ICD-10-CM

## 2018-09-04 DIAGNOSIS — E11.39 TYPE 2 DIABETES MELLITUS WITH OTHER OPHTHALMIC COMPLICATION, WITH LONG-TERM CURRENT USE OF INSULIN (H): Primary | ICD-10-CM

## 2018-09-04 LAB — HBA1C MFR BLD: 7.6 % (ref 4.3–6)

## 2018-09-04 ASSESSMENT — PAIN SCALES - GENERAL: PAINLEVEL: NO PAIN (0)

## 2018-09-04 NOTE — LETTER
9/4/2018     RE: Farzad East  [address]  [facility] [address]     Dear Colleague,    Thank you for referring your patient, Farzad East, to the [facility] ENDOCRINOLOGY at [facility]. Please see a copy of my visit note below.    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 Of note, the patient and his wife are looking to transition to assisted living in the near future and have started exploring other living options for them.     Diabetes: The patient was diagnosed with diabetes when he was in his 50s and has managed using Metformin, glipizide, Lantus, and Januvia in the past. Lantus  was discontinued toward the beginning of 2018. The patient's HbA1c was found to be 6.8% in June 2018. It was recommended that he discontinue glipizide and restart Lantus due to his hypoglycemia risk. The patient elected to decrease his glipizide to 5 mg daily instead to avoid restarting Lantus.     He is currently taking Metformin XR 1000 mg daily, glipizide XL 5 mg daily, and Januvia 50 mg daily. He denies daytime low blood glucose levels. He has not experienced any appetite changes    Denies any falls     Blood Glucose Monitoring:  We reviewed glucometer data together.    Range: - 130, checking once daily     Diabetes monitoring and complications:  CAD: Not at this time  Last eye exam results: 12/12/2016, history of background retinopathy  Last dental exam: Not addressed at this time  Microalbuminuria: 58.44 (H) on 08/01/2017  HTN: Yes, 1 tablet of losartan-hydrochlorothiazide daily   On Statin: No  On Aspirin: Yes, on 81 mg daily   Depression: No  Erectile  dysfunction: Yes, using sildenafil 100 mg as needed   Nephropathy: Creatinine has been stable. Most recent creatinine was 1.9, patient takes metformin and is aware that use of metformin is not recommended at this renal function. However he is aware of the benefits and side effects and would prefer to continue.  Peripheral neuropathy: Numbness and toes bilaterally    Urinary concerns: elevated PSA, follows with urology    Strength: Overall, he reports feeling like he has been losing a some of his strength. He denies current work with physical therapy though feels that he could improve his strength with their help. The patient notes that he does not always have the energy to move around as he is occasionally staying up throughout the night to help his wife with dementia.     Cardiovascular health: The patient was previously under the care of Dr. Johnny Gonzalez of cardiology. He is now looking for a new referral to cardiology as Dr. Johnny Gonzalez has since left the [place] system. During the patient's last visit with cardiology in 2016, it was recommended that he visit with Dr. Cool at [place] for continued cares. He has experienced some slight swelling of his lower extremities and notes a history of diuretic use. He denies recent chest pain, shortness of breath, and difficulty breathing with exertion as well as dizziness.     Skin concern: Since the patient's last visit to clinic, he has had multiple melanomas removed.     Other:  1. Shingrix- recently received   2. Flu shot- recommended   3. Pneumonia vaccination- up to date     Review of Systems:   Pertinent items are noted in HPI.  All other systems are negative.    Active Medications:      aspirin 81 MG tablet, Take 1 tablet by mouth daily., Disp: , Rfl:      glipiZIDE (GLUCOTROL XL) 5 MG 24 hr tablet, Take 1 tablet (5 mg) by mouth daily, Disp: 90 tablet, Rfl: 1     insulin pen needle 31G X 5 MM, Use 1 pen needle daily or as directed, Disp: 100 each, Rfl: 3      "losartan-hydrochlorothiazide (HYZAAR) 100-25 MG per tablet, Take 1 tablet by mouth daily, Disp: 90 tablet, Rfl: 3     metFORMIN (GLUCOPHAGE-XR) 500 MG 24 hr tablet, Take 2 tablets (1,000 mg) by mouth daily with food, Disp: 180 tablet, Rfl: 2     sildenafil (VIAGRA) 100 MG tablet, Take 1 tablet (100 mg) by mouth daily as needed for erectile dysfunction, Disp: 6 tablet, Rfl: 11     sitagliptin (JANUVIA) 100 MG tablet, Take 0.5 tablets (50 mg) by mouth daily, Disp: 90 tablet, Rfl: 3     tamsulosin (FLOMAX) 0.4 MG capsule, Take 1 capsule (0.4 mg) by mouth daily, Disp: 90 capsule, Rfl: 3      Allergies:   Sulfa drugs      Past Medical History:  Atrial flutter   Choroidal nevus, left eye   Chronic kidney disease (CKD), stage 3 GFR 30-59 ml/min  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 Of note, [place] from [place].      Physical Exam:   /70  Pulse 64  Ht 1.778 m (5' 10\")  Wt 82 kg (180 lb 11.2 oz)  BMI 25.93 kg/m2     Wt Readings from Last 10 Encounters:   09/04/18 82 kg (180 lb 11.2 oz)   06/12/18 89.7 kg (197 lb 12.8 oz)   01/09/18 77.7 kg (171 lb 6.4 oz)   12/12/17 77.9 kg (171 lb 12.8 oz)   09/05/17 77.1 kg (170 lb)   08/01/17 77.1 kg (169 lb 14.4 oz)   08/19/16 81.6 kg (180 lb)   05/05/16 83.5 kg (184 lb)   01/12/16 82.6 kg (182 lb)   11/19/15 " 83.8 kg (184 lb 12.8 oz)     Constitutional: no distress, comfortable, pleasant   Eyes: anicteric, normal extra-ocular movements, No lig lag, retraction or proptosis  Ears, Nose and Throat: throat clear  Neck: supple, no thyromegaly.   Cardiovascular: regular rate and rhythm, normal S1 and S2, soft systolic heart murmur   Respiratory: clear to auscultation, no wheezes or crackles, normal breath sounds   Gastrointestinal: nontender, no striae   Musculoskeletal: trace bilateral ankle edema   Skin:  no jaundice   Neurological: cranial nerves intact, normal strength, reflexes at patella and biceps normal, no tremor   Psychological: appropriate mood   Lymphatic: no cervical lymphadenopathy     Data:  Lab Results   Component Value Date     06/19/2018    POTASSIUM 5.0 06/19/2018    CHLORIDE 108 06/19/2018    CO2 22 06/19/2018    ANIONGAP 8 06/19/2018     (H) 06/19/2018    BUN 44 (H) 06/19/2018    CR 1.82 (H) 06/19/2018    DUSTIN 8.3 (L) 06/19/2018     Lab Results   Component Value Date    GFRESTIMATED 36 (L) 06/19/2018    GFRESTIMATED 34 (L) 06/12/2018    GFRESTIMATED 42 (L) 12/12/2017    GFRESTBLACK 43 (L) 06/19/2018    GFRESTBLACK 41 (L) 06/12/2018    GFRESTBLACK 51 (L) 12/12/2017      Lab Results   Component Value Date    MICROL 86 08/01/2017    UMALCR 58.44 (H) 08/01/2017        Lab Results   Component Value Date    A1C 7.9 (H) 12/12/2017    A1C 7.8 (H) 08/01/2017    A1C 9.1 (H) 11/03/2016    A1C 8.2 (H) 05/05/2016    A1C 8.6 (H) 11/19/2015    HEMOGLOBINA1 7.6 (A) 09/04/2018    HEMOGLOBINA1 6.8 (A) 06/12/2018    HEMOGLOBINA1 7.0 (A) 05/15/2014    HEMOGLOBINA1 7.6 (H) 05/05/2011    HEMOGLOBINA1 7.5 (H) 12/04/2009     Lab Results   Component Value Date    CPEPT 4.2 05/05/2016       Lab Results   Component Value Date    CHOL 119 05/05/2016    CHOL 120 05/07/2015    TRIG 115 05/05/2016    TRIG 102 05/07/2015    HDL 43 05/05/2016    HDL 38 (L) 05/07/2015    LDL 55 08/01/2017    LDL 53 05/05/2016    NHDL 76  05/05/2016     Assessment and Plan:  Farzad East is a  83 year old male with a history of stage 3 CKD, 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    1. Type 2 diabetes mellitus  Patient's blood glucose levels are well controlled. If patient experiences lows or persistant elevated blood glucose levels, he should contact clinic. He could consider skipping his glipizide if he experiences persistent lows. Patient can completed the following laboratory studies prior to his next visit in Mid-December. Patient is due to follow up with ophthalmology.   - Hemoglobin A1c POCT  - Albumin Random Urine Quantitative with Creat Ratio  - Basic metabolic panel  - AST  - ALT  - Hemoglobin A1c  - TSH with free T4 reflex  - Lipid Profile  - CBC with platelets differential   BP controlled at this time on losartan-hydrochlorothiazide.    Cr is stable     Patient was previously followed by Dr. Johnny Gonzalez of cardiology who has left the [place] system. Patient would like to establish care with new cardiologist at the [facility] location.      Follow-up: Patient should return in Mid-December for recheck. He should complete his laboratory studies prior to evaluation.     >50% of 30 minute visit spent in face to face counseling, education and coordination of care related to options for better glycemic control as well as preventing, detecting, and treating hypoglycemia.       Scribe Disclosure:  I, Franci Garcia, am serving as a scribe to document services personally performed by Jose Amin MD at this visit, based upon the provider's statements to me. All documentation has been reviewed by the aforementioned provider prior to being entered into the official medical record.     Portions of this medical record were completed by a scribe. UPON MY REVIEW AND AUTHENTICATION BY ELECTRONIC SIGNATURE, this confirms (a) I performed the applicable  clinical services, and (b) the record is accurate.     JUDD Fenton

## 2018-09-04 NOTE — PROGRESS NOTES
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 Of note, the patient and his wife are looking to transition to assisted living in the near future and have started exploring other living options for them.     Diabetes: The patient was diagnosed with diabetes when he was in his 50s and has managed using Metformin, glipizide, Lantus, and Januvia in the past. Lantus  was discontinued toward the beginning of 2018. The patient's HbA1c was found to be 6.8% in June 2018. It was recommended that he discontinue glipizide and restart Lantus due to his hypoglycemia risk. The patient elected to decrease his glipizide to 5 mg daily instead to avoid restarting Lantus.     He is currently taking Metformin XR 1000 mg daily, glipizide XL 5 mg daily, and Januvia 50 mg daily. He denies daytime low blood glucose levels. He has not experienced any appetite changes    Denies any falls     Blood Glucose Monitoring:  We reviewed glucometer data together.    Range: - 130, checking once daily     Diabetes monitoring and complications:  CAD: Not at this time  Last eye exam results: 12/12/2016, history of background retinopathy  Last dental exam: Not addressed at this time  Microalbuminuria: 58.44 (H) on 08/01/2017  HTN: Yes, 1 tablet of losartan-hydrochlorothiazide daily   On Statin: No  On Aspirin: Yes, on 81 mg daily   Depression: No  Erectile dysfunction: Yes, using sildenafil 100 mg as needed   Nephropathy: Creatinine has been stable. Most recent creatinine was 1.9, patient takes metformin and is aware that use of metformin is not recommended at this renal function. However he is aware of the benefits and side effects and would prefer to  continue.  Peripheral neuropathy: Numbness and toes bilaterally    Urinary concerns: elevated PSA, follows with urology    Strength: Overall, he reports feeling like he has been losing a some of his strength. He denies current work with physical therapy though feels that he could improve his strength with their help. The patient notes that he does not always have the energy to move around as he is occasionally staying up throughout the night to help his wife with dementia.     Cardiovascular health: The patient was previously under the care of Dr. Johnny Gonzalez of cardiology. He is now looking for a new referral to cardiology as Dr. Johnny Gonzalez has since left the [place] system. During the patient's last visit with cardiology in 2016, it was recommended that he visit with Dr. Cool at [place] for continued cares. He has experienced some slight swelling of his lower extremities and notes a history of diuretic use. He denies recent chest pain, shortness of breath, and difficulty breathing with exertion as well as dizziness.     Skin concern: Since the patient's last visit to clinic, he has had multiple melanomas removed.     Other:  1. Shingrix- recently received   2. Flu shot- recommended   3. Pneumonia vaccination- up to date     Review of Systems:   Pertinent items are noted in HPI.  All other systems are negative.    Active Medications:      aspirin 81 MG tablet, Take 1 tablet by mouth daily., Disp: , Rfl:      glipiZIDE (GLUCOTROL XL) 5 MG 24 hr tablet, Take 1 tablet (5 mg) by mouth daily, Disp: 90 tablet, Rfl: 1     insulin pen needle 31G X 5 MM, Use 1 pen needle daily or as directed, Disp: 100 each, Rfl: 3     losartan-hydrochlorothiazide (HYZAAR) 100-25 MG per tablet, Take 1 tablet by mouth daily, Disp: 90 tablet, Rfl: 3     metFORMIN (GLUCOPHAGE-XR) 500 MG 24 hr tablet, Take 2 tablets (1,000 mg) by mouth daily with food, Disp: 180 tablet, Rfl: 2     sildenafil (VIAGRA) 100 MG tablet, Take 1 tablet (100  "mg) by mouth daily as needed for erectile dysfunction, Disp: 6 tablet, Rfl: 11     sitagliptin (JANUVIA) 100 MG tablet, Take 0.5 tablets (50 mg) by mouth daily, Disp: 90 tablet, Rfl: 3     tamsulosin (FLOMAX) 0.4 MG capsule, Take 1 capsule (0.4 mg) by mouth daily, Disp: 90 capsule, Rfl: 3      Allergies:   Sulfa drugs      Past Medical History:  Atrial flutter   Choroidal nevus, left eye   Chronic kidney disease (CKD), stage 3 GFR 30-59 ml/min  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 Of note, the patient plans on [place] from [place] through [place].      Physical Exam:   /70  Pulse 64  Ht 1.778 m (5' 10\")  Wt 82 kg (180 lb 11.2 oz)  BMI 25.93 kg/m2     Wt Readings from Last 10 Encounters:   09/04/18 82 kg (180 lb 11.2 oz)   06/12/18 89.7 kg (197 lb 12.8 oz)   01/09/18 77.7 kg (171 lb 6.4 oz)   12/12/17 77.9 kg (171 lb 12.8 oz)   09/05/17 77.1 kg (170 lb)   08/01/17 77.1 kg (169 lb 14.4 oz)   08/19/16 81.6 kg (180 lb)   05/05/16 83.5 kg (184 lb)   01/12/16 82.6 kg (182 lb)   11/19/15 83.8 kg (184 lb 12.8 oz)     Constitutional: no distress, comfortable, pleasant   Eyes: anicteric, normal extra-ocular movements, No lig lag, retraction or proptosis  Ears, Nose and Throat: throat clear  Neck: supple, no thyromegaly.   Cardiovascular: regular rate and rhythm, normal S1 and S2, soft " systolic heart murmur   Respiratory: clear to auscultation, no wheezes or crackles, normal breath sounds   Gastrointestinal: nontender, no striae   Musculoskeletal: trace bilateral ankle edema   Skin:  no jaundice   Neurological: cranial nerves intact, normal strength, reflexes at patella and biceps normal, no tremor   Psychological: appropriate mood   Lymphatic: no cervical lymphadenopathy     Data:  Lab Results   Component Value Date     06/19/2018    POTASSIUM 5.0 06/19/2018    CHLORIDE 108 06/19/2018    CO2 22 06/19/2018    ANIONGAP 8 06/19/2018     (H) 06/19/2018    BUN 44 (H) 06/19/2018    CR 1.82 (H) 06/19/2018    DUSTIN 8.3 (L) 06/19/2018     Lab Results   Component Value Date    GFRESTIMATED 36 (L) 06/19/2018    GFRESTIMATED 34 (L) 06/12/2018    GFRESTIMATED 42 (L) 12/12/2017    GFRESTBLACK 43 (L) 06/19/2018    GFRESTBLACK 41 (L) 06/12/2018    GFRESTBLACK 51 (L) 12/12/2017      Lab Results   Component Value Date    MICROL 86 08/01/2017    UMALCR 58.44 (H) 08/01/2017        Lab Results   Component Value Date    A1C 7.9 (H) 12/12/2017    A1C 7.8 (H) 08/01/2017    A1C 9.1 (H) 11/03/2016    A1C 8.2 (H) 05/05/2016    A1C 8.6 (H) 11/19/2015    HEMOGLOBINA1 7.6 (A) 09/04/2018    HEMOGLOBINA1 6.8 (A) 06/12/2018    HEMOGLOBINA1 7.0 (A) 05/15/2014    HEMOGLOBINA1 7.6 (H) 05/05/2011    HEMOGLOBINA1 7.5 (H) 12/04/2009     Lab Results   Component Value Date    CPEPT 4.2 05/05/2016       Lab Results   Component Value Date    CHOL 119 05/05/2016    CHOL 120 05/07/2015    TRIG 115 05/05/2016    TRIG 102 05/07/2015    HDL 43 05/05/2016    HDL 38 (L) 05/07/2015    LDL 55 08/01/2017    LDL 53 05/05/2016    NHDL 76 05/05/2016       Assessment and Plan:  Farzad East is a  83 year old male with a history of stage 3 CKD, 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    1. Type 2 diabetes mellitus  Patient's blood glucose levels  are well controlled. If patient experiences lows or persistant elevated blood glucose levels, he should contact clinic. He could consider skipping his glipizide if he experiences persistent lows. Patient can completed the following laboratory studies prior to his next visit in Mid-December. Patient is due to follow up with ophthalmology.   - Hemoglobin A1c POCT  - Albumin Random Urine Quantitative with Creat Ratio  - Basic metabolic panel  - AST  - ALT  - Hemoglobin A1c  - TSH with free T4 reflex  - Lipid Profile  - CBC with platelets differential   BP controlled at this time on losartan-hydrochlorothiazide.    Cr is stable     Patient was previously followed by Dr. Johnny Gonzalez of cardiology who has left the [place] system. Patient would like to establish care with new cardiologist at the [facility] location.      Follow-up: Patient should return in Mid-December for recheck. He should complete his laboratory studies prior to evaluation.     >50% of 30 minute visit spent in face to face counseling, education and coordination of care related to options for better glycemic control as well as preventing, detecting, and treating hypoglycemia.       Scribe Disclosure:  I, Franci Garcia, am serving as a scribe to document services personally performed by Jose Amin MD at this visit, based upon the provider's statements to me. All documentation has been reviewed by the aforementioned provider prior to being entered into the official medical record.     Portions of this medical record were completed by a scribe. UPON MY REVIEW AND AUTHENTICATION BY ELECTRONIC SIGNATURE, this confirms (a) I performed the applicable clinical services, and (b) the record is accurate.     JUDD Fenton

## 2018-09-04 NOTE — MR AVS SNAPSHOT
After Visit Summary   9/4/2018    Farzad East    MRN: [id number]           Patient Information     Date Of Birth          1935        Visit Information        Provider Department      9/4/2018 4:30 PM Jose Amin MD [facility] Endocrinology        Today's Diagnoses     Type 2 diabetes mellitus with other ophthalmic complication, with long-term current use of insulin (H)    -  1       Follow-ups after your visit        Your next 10 appointments already scheduled     Oct 03, 2018 12:30 PM CDT   (Arrive by 12:15 PM)   Return Visit with Eddie Levi MD   [facility] ([facility] [place])    [address]  [address]  [facility] [address]   [phone]            Dec 04, 2018  4:00 PM CST   (Arrive by 3:45 PM)   RETURN DIABETES with Jose Amin MD   [facility] Endocrinology ([facility] [place])    [address]  3rd Floor  [facility] [facility] [id number] [phone]            Dec 18, 2018  1:15 PM CST   RETURN RETINA with Rosemary Arreguin MD   [facility] ([facility])    [address]  [facility] [address]   [phone]              Future tests that were ordered for you today     Open Future Orders        Priority Expected Expires Ordered    Albumin Random Urine Quantitative with Creat Ratio Routine 12/3/2018 9/4/2019 9/4/2018    Basic metabolic panel Routine 12/3/2018 9/4/2019 9/4/2018    AST Routine 12/3/2018 9/4/2019 9/4/2018    ALT Routine 12/3/2018 9/4/2019 9/4/2018    Hemoglobin A1c Routine 12/3/2018 9/4/2019 9/4/2018    TSH with free T4 reflex Routine 12/3/2018 9/4/2019 9/4/2018    Lipid Profile Routine 12/3/2018 9/4/2019 9/4/2018    CBC with platelets differential Routine 12/3/2018 9/4/2019 9/4/2018            Who to contact     Please call your clinic at [phone] to:    Ask questions about your health    Make or cancel appointments    Discuss  "your medicines    Learn about your test results    Speak to your doctor            Additional Information About Your Visit        Care EveryWhere ID     This is your Care EveryWhere ID. This could be used by other organizations to access your [place] medical records  [id number]        Your Vitals Were     Pulse Height BMI (Body Mass Index)             64 1.778 m (5' 10\") 25.93 kg/m2          Blood Pressure from Last 3 Encounters:   09/04/18 139/70   06/12/18 123/73   01/09/18 164/65    Weight from Last 3 Encounters:   09/04/18 82 kg (180 lb 11.2 oz)   06/12/18 89.7 kg (197 lb 12.8 oz)   01/09/18 77.7 kg (171 lb 6.4 oz)              We Performed the Following     Hemoglobin A1c POCT          Today's Medication Changes          These changes are accurate as of 9/4/18  5:09 PM.  If you have any questions, ask your nurse or doctor.               Stop taking these medicines if you haven't already. Please contact your care team if you have questions.     insulin glargine 100 UNIT/ML injection   Commonly known as:  LANTUS SOLOSTAR   Stopped by:  Jose Amin MD                    Primary Care Provider Office Phone # Fax #    Marcus Augustin [phone]       [address] [place] [address]        Equal Access to Services     [facility] Center[facility]RUTHI AH: Hadii elsa alanis Sonorris, waaxda luqadaha, qaybta kaalmada daija, carmel sheffield. So [facility] [phone].    ATENCIÓN: Si habla español, tiene a dalton disposición servicios gratuitos de asistencia lingüística. Bayron al [phone].    We comply with applicable federal civil rights laws and [place] laws. We do not discriminate on the basis of race, color, national origin, age, disability, sex, sexual orientation, or gender identity.            Thank you!     Thank you for choosing [facility] ENDOCRINOLOGY  for your care. Our goal is always to provide you with excellent care. Hearing back from our patients is one " way we can continue to improve our services. Please take a few minutes to complete the written survey that you may receive in the mail after your visit with us. Thank you!             Your Updated Medication List - Protect others around you: Learn how to safely use, store and throw away your medicines at www.disposemymeds.org.          This list is accurate as of 9/4/18  5:09 PM.  Always use your most recent med list.                   Brand Name Dispense Instructions for use Diagnosis    aspirin 81 MG tablet      Take 1 tablet by mouth daily.        blood glucose monitoring meter device kit     1 kit    Use to test blood sugar daily    Diabetes mellitus, type 2 (H)       glipiZIDE 5 MG 24 hr tablet    glipiZIDE XL    90 tablet    Take 1 tablet (5 mg) by mouth daily    Type 2 diabetes mellitus with hyperglycemia, unspecified long term insulin use status (H)       insulin pen needle 31G X 5 MM     100 each    Use 1 pen needle daily or as directed    Type 2 diabetes mellitus with complication, with long-term current use of insulin (H)       losartan-hydrochlorothiazide 100-25 MG per tablet    HYZAAR    90 tablet    Take 1 tablet by mouth daily    Essential hypertension, Type 2 diabetes, HbA1c goal < 7% (H)       metFORMIN 500 MG 24 hr tablet    GLUCOPHAGE-XR    180 tablet    Take 2 tablets (1,000 mg) by mouth daily with food    Type 2 diabetes mellitus with hyperglycemia, unspecified long term insulin use status (H)       * ONETOUCH ULTRA test strip   Generic drug:  blood glucose monitoring     270 each    Use to test blood sugar 3 times daily or as directed.    Type 2 diabetes mellitus (H)       * blood glucose monitoring test strip    ONETOUCH VERIO IQ    300 each    Use to test blood sugar 2-3 daily or as directed.    Type 2 diabetes mellitus with complication (H)       sildenafil 100 MG tablet    VIAGRA    6 tablet    Take 1 tablet (100 mg) by mouth daily as needed for erectile dysfunction    Type 2 diabetes  mellitus with complication (H)       sitagliptin 100 MG tablet    JANUVIA    90 tablet    Take 0.5 tablets (50 mg) by mouth daily    Type 2 diabetes mellitus with hyperglycemia, unspecified long term insulin use status (H)       tamsulosin 0.4 MG capsule    FLOMAX    90 capsule    Take 1 capsule (0.4 mg) by mouth daily    Type 2 diabetes mellitus with complication, with long-term current use of insulin (H)       * Notice:  This list has 2 medication(s) that are the same as other medications prescribed for you. Read the directions carefully, and ask your doctor or other care provider to review them with you.

## 2018-10-03 ENCOUNTER — OFFICE VISIT (OUTPATIENT)
Dept: CARDIOLOGY | Facility: CLINIC | Age: 83
End: 2018-10-03
Attending: INTERNAL MEDICINE
Payer: MEDICARE

## 2018-10-03 VITALS
BODY MASS INDEX: 24.64 KG/M2 | HEIGHT: 71 IN | HEART RATE: 70 BPM | SYSTOLIC BLOOD PRESSURE: 152 MMHG | DIASTOLIC BLOOD PRESSURE: 70 MMHG | WEIGHT: 176 LBS | OXYGEN SATURATION: 96 %

## 2018-10-03 DIAGNOSIS — R60.0 BILATERAL LOWER EXTREMITY EDEMA: ICD-10-CM

## 2018-10-03 DIAGNOSIS — I10 HYPERTENSION, UNSPECIFIED TYPE: Primary | ICD-10-CM

## 2018-10-03 DIAGNOSIS — R01.1 HEART MURMUR: ICD-10-CM

## 2018-10-03 PROCEDURE — 93005 ELECTROCARDIOGRAM TRACING: CPT | Mod: ZF

## 2018-10-03 PROCEDURE — 99214 OFFICE O/P EST MOD 30 MIN: CPT | Mod: ZP | Performed by: INTERNAL MEDICINE

## 2018-10-03 PROCEDURE — 93010 ELECTROCARDIOGRAM REPORT: CPT | Mod: ZP | Performed by: INTERNAL MEDICINE

## 2018-10-03 PROCEDURE — G0463 HOSPITAL OUTPT CLINIC VISIT: HCPCS | Mod: 25,ZF

## 2018-10-03 RX ORDER — AMLODIPINE BESYLATE 2.5 MG/1
2.5 TABLET ORAL EVERY MORNING
Qty: 90 TABLET | Refills: 1 | Status: SHIPPED | OUTPATIENT
Start: 2018-10-03 | End: 2019-03-30

## 2018-10-03 ASSESSMENT — PAIN SCALES - GENERAL: PAINLEVEL: NO PAIN (0)

## 2018-10-03 NOTE — PROGRESS NOTES
"Service Date: 10/03/2018      October 3, 2018      Jose Amin MD    [facility] Endocrinology    [address]    [place]  [address]       RE: Farzad East   MRN: [id number]   : 1935      Dear Dr. Amin:      It was a pleasure participating in the care of your patient, Mr. Farzad East.  As you know, he is an 83-year-old gentleman whom I see today to establish cardiac care from his prior primary cardiologist, Dr. Estrada.      His past medical history is significant for the followin.  Type 2 diabetes.   2.  Hypertension.   3.  Chronic renal insufficiency with a baseline GFR of 36 mL/min as of 2018.   4.  Left hip pain.   5.  Left femur fracture.      He last saw Dr. Estrada 2016.  At that time, he had changed his metoprolol to diltiazem.  He presents today to establish cardiac care.      Since his last visit, he has been doing well.  He has noticed some ankle edema since the summer months that improves and resolves by the morning hours after a full night's sleep.  If he is on his feet during the day he will have increased amounts of edema.      He otherwise denies chest pains or shortness of breath.  He denies PND or orthopnea.  He denies palpitations, syncope or near-syncope.  He does give a history of supposedly having rheumatic fever in his college days.      In terms of his cardiac risk factors, he does have diabetes and hypertension.  He does not smoke, drinks 2-3 drinks a week.  Denies family history of heart disease.  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  He is finding it difficult in [employer] because he enjoyed work so much and he is now taking care of his wife as well, who has Lewy body dementia.      CURRENT MEDICATIONS:     1.  Aspirin 81 mg a day.   2.  Glipizide.   3.  Losartan/hydrochlorothiazide 100/25 a day.   4.  Metformin.   5.  Viagra.      PHYSICAL " EXAMINATION:     VITAL SIGNS:  His blood pressure here is 152/70.  At home it runs in the 130s.  Pulse of 70.  His weight is 176 pounds.   NECK:  Exam reveals a jugular venous pressure of 7 without significant hepatojugular reflux.   LUNGS:  Clear to auscultation.  Respiratory effort is normal.   CARDIAC:  Reveals a regular rate and rhythm.  There is a 1 to 2/6 systolic ejection murmur and also a 2/6 diastolic murmur present as well.   ABDOMEN:  Belly soft, nontender.   EXTREMITIES:  Significant for +2 pitting edema.      Echocardiogram 12/22/2008 revealed normal LV systolic function without gross valvular pathology.     06/19/2018 Potassium 5.0, GFR 36.        IMPRESSION:      Alessio is an 83-year-old gentleman who has several active cardiac issues:     1.  Heart murmur.      He has a systolic murmur on exam today, and has not had an echocardiogram in the past 10 years.  He also gives a verbal history of having had rheumatic fever in the past in his college years.  Further noninvasive evaluation would be indicated.     2.  Hypertension.      His blood pressures at home are running above 130 mmHg range.  Goal range would be less than 130mmHg if tolerated without side effects in an ideal setting.  We will attempt to optimize blood pressure control.        PLAN:     1.  Echocardiogram to rule out significant valvular pathology.     2.  Below-knee compression stockings to be worn during the day and can be taken off at night.     3.  Amlodipine 2.5 mg a day, in order to optimize blood pressure control.  However, he can stop this if he notices increased degree of lower extremity edema.     4.  Follow up in 6 months or earlier if needed.      Once again, it was a pleasure participating in the care of your patient, Mr. Farzad East.  Please feel free to contact me anytime if you have any questions regarding his care in the future.      Sincerely,            Eddie Levi MD         Addendum:    Echo reveals no gross  valvular pathology, normal EF    F/u as scheduled             D: 10/03/2018   T: 10/03/2018   MT: EVELYN      Name:     JEN CHAVES   MRN:      [id number]        Account:      [id number]   :      1935           Service Date: 10/03/2018      Document: [id number]

## 2018-10-03 NOTE — MR AVS SNAPSHOT
After Visit Summary   10/3/2018    Farzad East    MRN: [id number]           Patient Information     Date Of Birth          1935        Visit Information        Provider Department      10/3/2018 12:30 PM Eddie Levi MD [facility]        Today's Diagnoses     Hypertension, unspecified type    -  1    Heart murmur        Bilateral lower extremity edema          Care Instructions    Patient Instructions:  It was a pleasure to see you in the cardiology clinic today.      If you have any questions, you can reach my nurse, Nishi Shelley LPN, at [phone].  Press Option #1 for the [facility], and then press Option #3 for nursing.    We are encouraging the use of CNS Responsehart to communicate with your HealthCare Provider    Medication Changes: Start Amlodipine 2.5 mg every morning.    Studies Ordered: Echocardiogram in 1-2 weeks at your convenience.    The results from today include: EKG    Recommendations:  Compression stockings daily.    Please follow up: With Dr. Levi in 6 months.    Sincerely,    Eddie Levi MD     If you have an urgent need after hours (8:00 am to 4:30 pm) please call [phone] and ask for the cardiology fellow on call.                    Follow-ups after your visit        Additional Services     Follow-Up with Cardiologist                 Your next 10 appointments already scheduled     Oct 11, 2018 11:00 AM CDT   Ech Complete with [id number]   [facility] Echo ([facility])    [address] [place] [address] [phone]           1.  Please bring or wear a comfortable two-piece outfit. 2.  You may eat, drink and take your normal medicines. 3.  For any questions that cannot be answered, please contact the ordering physician 4.  Please do not wear perfumes or scented lotions on the day of your exam.            Dec 04, 2018  3:30 PM CST   LAB with  LAB   [facility] [place] [facility]    [address]  1st Floor  [facility] [address]   [phone]           Please do not eat 10-12 hours before your appointment if you are coming in fasting for labs on lipids, cholesterol, or glucose (sugar). This does not apply to pregnant women. Water, hot tea and black coffee (with nothing added) are okay. Do not drink other fluids, diet soda or chew gum.            Dec 04, 2018  4:00 PM CST   (Arrive by 3:45 PM)   RETURN DIABETES with Jose Amin MD   [facility] Endocrinology ([facility])    [address]  3rd Floor  [facility] [address]   [phone]            Dec 18, 2018  1:15 PM CST   RETURN RETINA with Rosemary Arreguin MD   [facility] ([facility])    [address]  [facility] [address]   [phone]            May 01, 2019 12:30 PM CDT   (Arrive by 12:15 PM)   Return Visit with Eddie Levi MD   [facility] ([facility])    [address]  [address]  [facility] [address] [phone]              Future tests that were ordered for you today     Open Future Orders        Priority Expected Expires Ordered    Follow-Up with Cardiologist Routine 4/1/2019 10/3/2019 10/3/2018    Echo Complete Routine 10/10/2018 10/3/2019 10/3/2018            Who to contact     If you have questions or need follow up information about today's clinic visit or your schedule please contact [facility] directly at [phone].  Normal or non-critical lab and imaging results will be communicated to you by MyChart, letter or phone within 4 business days after the clinic has received the results. If you do not hear from us within 7 days, please contact the clinic through MyChart or phone. If you have a critical or abnormal lab result, we will notify you by phone as soon as possible.  Submit refill requests through [employer]ert or  "call your pharmacy and they will forward the refill request to us. Please allow 3 business days for your refill to be completed.          Additional Information About Your Visit        Care EveryWhere ID     This is your Care EveryWhere ID. This could be used by other organizations to access your [place] medical records  [id number]        Your Vitals Were     Pulse Height Pulse Oximetry BMI (Body Mass Index)          70 1.803 m (5' 11\") 96% 24.55 kg/m2         Blood Pressure from Last 3 Encounters:   10/03/18 152/70   09/04/18 139/70   06/12/18 123/73    Weight from Last 3 Encounters:   10/03/18 79.8 kg (176 lb)   09/04/18 82 kg (180 lb 11.2 oz)   06/12/18 89.7 kg (197 lb 12.8 oz)              We Performed the Following     EKG 12-lead, tracing only (Same Day)          Today's Medication Changes          These changes are accurate as of 10/3/18  1:06 PM.  If you have any questions, ask your nurse or doctor.               Start taking these medicines.        Dose/Directions    amLODIPine 2.5 MG tablet   Commonly known as:  NORVASC   Used for:  Hypertension, unspecified type   Started by:  Eddie Levi MD        Dose:  2.5 mg   Take 1 tablet (2.5 mg) by mouth every morning   Quantity:  90 tablet   Refills:  1       COMPRESSION STOCKINGS   Used for:  Bilateral lower extremity edema   Started by:  Eddie Levi MD        Dose:  1 each   1 each daily   Quantity:  2 each   Refills:  11            Where to get your medicines      These medications were sent to [facility]/pharmacy #[id number] - [place] [id number] AT [facility] OF [address] [place] [address]     Phone:  [phone]     amLODIPine 2.5 MG tablet         Some of these will need a paper prescription and others can be bought over the counter.  Ask your nurse if you have questions.     Bring a paper prescription for each of these medications     COMPRESSION STOCKINGS                Primary Care Provider Office Phone # Fax #    Marcus " Mirza Augustin -[phone]       [address] [place] [address]        Equal Access to Services     GARRETT DAVIS : Zbigniew aad ku hadalida Coon, wajuan carlosda katarinakarlieha, mercedesta kanickda daija, carmel blancocaity nettie. So [facility] [phone].    ATENCIÓN: Si habla español, tiene a dalton disposición servicios gratuitos de asistencia lingüística. Llame al [phone].    We comply with applicable federal civil rights laws and [place] laws. We do not discriminate on the basis of race, color, national origin, age, disability, sex, sexual orientation, or gender identity.            Thank you!     Thank you for choosing [facility]  for your care. Our goal is always to provide you with excellent care. Hearing back from our patients is one way we can continue to improve our services. Please take a few minutes to complete the written survey that you may receive in the mail after your visit with us. Thank you!             Your Updated Medication List - Protect others around you: Learn how to safely use, store and throw away your medicines at www.disposemymeds.org.          This list is accurate as of 10/3/18  1:06 PM.  Always use your most recent med list.                   Brand Name Dispense Instructions for use Diagnosis    amLODIPine 2.5 MG tablet    NORVASC    90 tablet    Take 1 tablet (2.5 mg) by mouth every morning    Hypertension, unspecified type       aspirin 81 MG tablet      Take 1 tablet by mouth daily.        blood glucose monitoring meter device kit     1 kit    Use to test blood sugar daily    Diabetes mellitus, type 2 (H)       COMPRESSION STOCKINGS     2 each    1 each daily    Bilateral lower extremity edema       glipiZIDE 5 MG 24 hr tablet    glipiZIDE XL    90 tablet    Take 1 tablet (5 mg) by mouth daily    Type 2 diabetes mellitus with hyperglycemia, unspecified long term insulin use status       insulin pen needle 31G X 5 MM     100 each    Use 1  pen needle daily or as directed    Type 2 diabetes mellitus with complication, with long-term current use of insulin (H)       losartan-hydrochlorothiazide 100-25 MG per tablet    HYZAAR    90 tablet    Take 1 tablet by mouth daily    Essential hypertension, Type 2 diabetes, HbA1c goal < 7% (H)       metFORMIN 500 MG 24 hr tablet    GLUCOPHAGE-XR    180 tablet    Take 2 tablets (1,000 mg) by mouth daily with food    Type 2 diabetes mellitus with hyperglycemia, unspecified long term insulin use status       * ONETOUCH ULTRA test strip   Generic drug:  blood glucose monitoring     270 each    Use to test blood sugar 3 times daily or as directed.    Type 2 diabetes mellitus (H)       * blood glucose monitoring test strip    ONETOUCH VERIO IQ    300 each    Use to test blood sugar 2-3 daily or as directed.    Type 2 diabetes mellitus with complication (H)       sildenafil 100 MG tablet    VIAGRA    6 tablet    Take 1 tablet (100 mg) by mouth daily as needed for erectile dysfunction    Type 2 diabetes mellitus with complication (H)       sitagliptin 100 MG tablet    JANUVIA    90 tablet    Take 0.5 tablets (50 mg) by mouth daily    Type 2 diabetes mellitus with hyperglycemia, unspecified long term insulin use status       tamsulosin 0.4 MG capsule    FLOMAX    90 capsule    Take 1 capsule (0.4 mg) by mouth daily    Type 2 diabetes mellitus with complication, with long-term current use of insulin (H)       * Notice:  This list has 2 medication(s) that are the same as other medications prescribed for you. Read the directions carefully, and ask your doctor or other care provider to review them with you.

## 2018-10-03 NOTE — LETTER
"10/3/2018      RE: Farzad East  [address]  [facility] [address]         Service Date: 10/03/2018      October 3, 2018      Jose Amin MD    [facility] Endocrinology    [address] [facility] [address]    [place]  [address]       RE: Farzad East   MRN: [id number]   : 1935      Dear Dr. Amin:      It was a pleasure participating in the care of your patient, Mr. Farzad East.  As you know, he is an 83-year-old gentleman whom I see today to establish cardiac care from his prior primary cardiologist, Dr. Estrada.      His past medical history is significant for the followin.  Type 2 diabetes.   2.  Hypertension.   3.  Chronic renal insufficiency with a baseline GFR of 36 mL/min as of 2018.   4.  Left hip pain.   5.  Left femur fracture.      He last saw Dr. Estrada 2016.  At that time, he had changed his metoprolol to diltiazem.  He presents today to establish cardiac care.      Since his last visit, he has been doing well.  He has noticed some ankle edema since the summer months that improves and resolves by the morning hours after a full night's sleep.  If he is on his feet during the day he will have increased amounts of edema.      He otherwise denies chest pains or shortness of breath.  He denies PND or orthopnea.  He denies palpitations, syncope or near-syncope.  He does give a history of supposedly having rheumatic fever in his college days.      In terms of his cardiac risk factors, he does have diabetes and hypertension.  He does not smoke, drinks 2-3 drinks a week.  Denies family history of heart disease.  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  He is finding it difficult in [employer] because he enjoyed work so much and he is now taking care of his wife as well, who has Lewy body dementia.      CURRENT MEDICATIONS:   1.  Aspirin 81 mg a day.   2.  Glipizide. "   3.  Losartan/hydrochlorothiazide 100/25 a day.   4.  Metformin.   5.  Viagra.      PHYSICAL EXAMINATION:   VITAL SIGNS:  His blood pressure here is 152/70.  At home it runs in the 130s.  Pulse of 70.  His weight is 176 pounds.   NECK:  Exam reveals a jugular venous pressure of 7 without significant hepatojugular reflux.   LUNGS:  Clear to auscultation.  Respiratory effort is normal.   CARDIAC:  Reveals a regular rate and rhythm.  There is a 1 to 2/6 systolic ejection murmur and also a 2/6 diastolic murmur present as well.   ABDOMEN:  Belly soft, nontender.   EXTREMITIES:  Significant for +2 pitting edema.      Echocardiogram 12/22/2008 revealed normal LV systolic function without gross valvular pathology.     06/19/2018 Potassium 5.0, GFR 36.      IMPRESSION:  Alessio is an 83-year-old gentleman who has several active cardiac issues:   1.  Heart murmurs.  He has both systolic and diastolic murmur on exam today.  He has not had an echocardiogram in the past 10 years.  He also gives a verbal history of having had rheumatic fever in the past in his college years.  Further noninvasive evaluation would be indicated.   2.  Hypertension.  His blood pressures at home are running above 130 mmHg range.  Goal range would be less than 130 if tolerated without side effects in an ideal setting.  We will attempt to optimize blood pressure control.      PLAN:   1.  Echocardiogram to rule out significant valvular pathology.   2.  Below-knee compression stockings to be worn during the day and can be taken off at night.   3.  Amlodipine 2.5 mg a day, in order to optimize blood pressure control.  However, he can stop this if he notices increased degree of lower extremity edema.   4.  Follow up in 6 months or earlier if needed.      Once again, it was a pleasure participating in the care of your patient, Mr. Farzad East.  Please feel free to contact me anytime if you have any questions regarding his care in the future.       Sincerely,            Eddie Levi MD              D: 10/03/2018   T: 10/03/2018   MT: EVELYN      Name:     JEN CHAVES   MRN:      [id number]        Account:      [id number]   :      1935           Service Date: 10/03/2018      Document: [id number]

## 2018-10-03 NOTE — PATIENT INSTRUCTIONS
Patient Instructions:  It was a pleasure to see you in the cardiology clinic today.      If you have any questions, you can reach my nurse, Nishi Shelley LPN, at [phone].  Press Option #1 for the [facility], and then press Option #3 for nursing.    We are encouraging the use of [employer] to communicate with your HealthCare Provider    Medication Changes: Start Amlodipine 2.5 mg every morning.    Studies Ordered: Echocardiogram in 1-2 weeks at your convenience.    The results from today include: EKG    Recommendations:  Compression stockings daily.    Please follow up: With Dr. Levi in 6 months.    Sincerely,    Eddie Levi MD     If you have an urgent need after hours (8:00 am to 4:30 pm) please call [phone] and ask for the cardiology fellow on call.

## 2018-10-03 NOTE — NURSING NOTE
Chief Complaint   Patient presents with     Follow Up For     Return Visit - Establish Care     Vitals were taken and medications were reconciled. EKG was performed    Emily MCLEOD  12:04 PM

## 2018-10-03 NOTE — LETTER
10/3/2018      RE: Farzad East  [address]  [facility] [address]       Dear Colleague,    Thank you for the opportunity to participate in the care of your patient, Farzad East, at the [facility]. Please see a copy of my visit note below.    [id number]      Please do not hesitate to contact me if you have any questions/concerns.     Sincerely,     Eddie Levi MD

## 2018-10-03 NOTE — NURSING NOTE
Cardiac Testing: Patient given instructions regarding  echocardiogram . Discussed purpose, preparation, procedure and when to expect results reported back to the patient. Patient demonstrated understanding of this information and agreed to call with further questions or concerns.  Med Reconcile: Reviewed and verified all current medications with the patient. The updated medication list was printed and given to the patient.  New Medication: Patient was educated regarding newly prescribed medication, including discussion of  the indication, administration, side effects, and when to report to MD or RN. Patient demonstrated understanding of this information and agreed to call with further questions or concerns.  Return Appointment: Patient given instructions regarding scheduling next clinic visit. Patient demonstrated understanding of this information and agreed to call with further questions or concerns.  Patient stated he understood all health information given and agreed to call with further questions or concerns.    Nishi Shelley LPN

## 2018-10-08 LAB — INTERPRETATION ECG - MUSE: NORMAL

## 2018-10-11 ENCOUNTER — RADIANT APPOINTMENT (OUTPATIENT)
Dept: CARDIOLOGY | Facility: CLINIC | Age: 83
End: 2018-10-11
Payer: COMMERCIAL

## 2018-10-11 DIAGNOSIS — R01.1 HEART MURMUR: ICD-10-CM

## 2018-10-11 NOTE — NURSING NOTE
Patient presents today for resting echo ordered by MD.   Echo Tech provided patient education.    Echo technician completed resting echo. Data transferred to [facility] for final interpretation through [employer].   Patient education provided about cardiology interpretation and primary provider will be notified of results.    Ofelia Martinez RDCS

## 2018-11-01 ENCOUNTER — APPOINTMENT (OUTPATIENT)
Age: 83
Setting detail: DERMATOLOGY
End: 2018-11-02

## 2018-11-01 DIAGNOSIS — Z71.89 OTHER SPECIFIED COUNSELING: ICD-10-CM

## 2018-11-01 PROBLEM — C44.311 BASAL CELL CARCINOMA OF SKIN OF NOSE: Status: ACTIVE | Noted: 2018-11-01

## 2018-11-01 PROCEDURE — 13151 CMPLX RPR E/N/E/L 1.1-2.5 CM: CPT

## 2018-11-01 PROCEDURE — OTHER PRESCRIPTION: OTHER

## 2018-11-01 PROCEDURE — OTHER COUNSELING: OTHER

## 2018-11-01 PROCEDURE — OTHER MIPS QUALITY: OTHER

## 2018-11-01 PROCEDURE — OTHER MOHS SURGERY: OTHER

## 2018-11-01 PROCEDURE — 17311 MOHS 1 STAGE H/N/HF/G: CPT

## 2018-11-01 RX ORDER — AMOXICILLIN 500 MG/1
TABLET, FILM COATED ORAL
Qty: 10 | Refills: 0 | Status: ERX | COMMUNITY
Start: 2018-11-01

## 2018-11-01 NOTE — PROCEDURE: MIPS QUALITY
Quality 431: Preventive Care And Screening: Unhealthy Alcohol Use - Screening: Patient screened for unhealthy alcohol use using a single question and scores less than 2 times per year
Quality 143: Oncology: Medical And Radiation- Pain Intensity Quantified: Pain severity quantified, no pain present
Detail Level: Detailed
Quality 130: Documentation Of Current Medications In The Medical Record: Current Medications Documented
Quality 110: Preventive Care And Screening: Influenza Immunization: Influenza Immunization previously received during influenza season
Quality 226: Preventive Care And Screening: Tobacco Use: Screening And Cessation Intervention: Patient screened for tobacco and never smoked

## 2018-11-09 ENCOUNTER — THERAPY VISIT (OUTPATIENT)
Dept: PHYSICAL THERAPY | Facility: CLINIC | Age: 83
End: 2018-11-09
Payer: MEDICARE

## 2018-11-09 DIAGNOSIS — R29.898 WEAKNESS OF LEFT LOWER EXTREMITY: Primary | ICD-10-CM

## 2018-11-09 PROBLEM — M25.552 HIP PAIN, LEFT: Status: ACTIVE | Noted: 2018-11-09

## 2018-11-09 PROCEDURE — G8982 BODY POS GOAL STATUS: HCPCS | Mod: GP | Performed by: PHYSICAL THERAPIST

## 2018-11-09 PROCEDURE — 97161 PT EVAL LOW COMPLEX 20 MIN: CPT | Mod: GP | Performed by: PHYSICAL THERAPIST

## 2018-11-09 PROCEDURE — G8981 BODY POS CURRENT STATUS: HCPCS | Mod: GP | Performed by: PHYSICAL THERAPIST

## 2018-11-09 PROCEDURE — 97110 THERAPEUTIC EXERCISES: CPT | Mod: GP | Performed by: PHYSICAL THERAPIST

## 2018-11-09 ASSESSMENT — ACTIVITIES OF DAILY LIVING (ADL)
RECREATIONAL_ACTIVITIES: EXTREME DIFFICULTY
GOING_UP_1_FLIGHT_OF_STAIRS: MODERATE DIFFICULTY
PUTTING_ON_SOCKS_AND_SHOES: MODERATE DIFFICULTY
WALKING_APPROXIMATELY_10_MINUTES: MODERATE DIFFICULTY
WALKING_DOWN_STEEP_HILLS: MODERATE DIFFICULTY
STANDING_FOR_15_MINUTES: MODERATE DIFFICULTY
HOS_ADL_SCORE(%): 53.12
HOS_ADL_ITEM_SCORE_TOTAL: 34
HOS_ADL_HIGHEST_POTENTIAL_SCORE: 64
WALKING_INITIALLY: MODERATE DIFFICULTY
HOS_ADL_COUNT: 16
STEPPING_UP_AND_DOWN_CURBS: MODERATE DIFFICULTY
GOING_DOWN_1_FLIGHT_OF_STAIRS: MODERATE DIFFICULTY
WALKING_15_MINUTES_OR_GREATER: EXTREME DIFFICULTY
DEEP_SQUATTING: EXTREME DIFFICULTY
TWISTING/PIVOTING_ON_INVOLVED_LEG: SLIGHT DIFFICULTY
HEAVY_WORK: MODERATE DIFFICULTY
GETTING_INTO_AND_OUT_OF_AN_AVERAGE_CAR: SLIGHT DIFFICULTY
WALKING_UP_STEEP_HILLS: MODERATE DIFFICULTY
ROLLING_OVER_IN_BED: NO DIFFICULTY AT ALL
SITTING_FOR_15_MINUTES: NO DIFFICULTY AT ALL
LIGHT_TO_MODERATE_WORK: SLIGHT DIFFICULTY

## 2018-11-09 NOTE — PROGRESS NOTES
[facility] Initial Evaluation -- Lower Extremity    Evaluation Date: November 9, 2018  Farzad East is a 83 year old male with a L hip condition.   Referral: ortho- [facility]  Work mechanical stresses:  Retired   Employment status:   Leisure mechanical stresses: amb with [employer] 1/2 block caregiver to wife who has dementia  Functional disability score:   VAS score (0-10): 0  Patient goals/expectations:  Uses a walker periodically around the house or a SEC for balance and weakness and he would like to be less dependent on assistive devices. Bend over to  paper off floor with confidence.     HISTORY:    Present symptoms: L hip weakness LOB during gait  Pain quality (sharp/shooting/stabbing/aching/burning/cramping):      Present since (onset date): MD order 10.18.2018 KIMBERLY revision 10/11/2017 after he got up one am and couldn't put weight on leg the tyra broke.[facility]. He did well with PT there.    Symptoms (improving/unchaning/worsening):   Worsening because he sits at computer a lot.    Symptoms commenced as a result of: surgery   Condition occurred in the following environment:     Symptoms at onset:   Paresthesia (yes/no):    Spinal history:    Cough/Sneeze (pos/neg):      Constant symptoms:   Intermittent symptoms:     Symptoms are worse with the following: Other -    Symptoms are better with the following: Other -     Continued use makes the pain (better/worse/no effect):     Disturbed night (yes/no):       Pain at rest (yes/no):    Site (back/hip/knee/ankle/foot):      Other questions (swelling/clicking/locking/giving way/falling):  none     Previous episodes: PT prior to revision in 2015  Previous treatments:     Specific Questions:  General health (excellent/good/fair/poor):  good  Pertinent medical history includes: He had pneumonia 3/2018 hospitalized for 5-7 days in [facility].  He had a visiting nurse for 5 weeks or so. He came home in May 2018.  HTN diabetic.CA.  Medications (nil/NSAIDS/analg/steroids/anticoag/other):  None  Medical allergies:  sulfa  Imaging (none/Xray/MRI/other):  none  Recent or major surgery (yes/no):  no  Night pain (yes/no):  no  Accidents (yes/no):  no  Unexplained weight loss (yes/no):  na  Barriers at home: loss of sleep due to wife's dementia  Other red flags: none    Sites for physical examination (back/hip/knee/ankle/foot/other): LE    EXAMINATION    Posture:  Sitting (good/fair/poor):     Correction of Posture (better/worse/no effect/NA):   Standing (good/fair/poor):   Other observations:      Neurological: (NA/motor/sensory/reflexes/dural):     Baselines (pain or functional activity):     Extremities (Hip / Knee / Ankle / Foot):     Movement Loss Dago Mod Min Nil Pain   Flexion        Extension        Abduction        Adduction        Internal Rotation        External Rotation        Dorsiflexion        Plantarflexion        Inversion        Eversion          Passive Movement (+/- over pressure)/(PDM/ERP):  Hip ROM wnl  Resisted Test Response (pain): standing SL heel raise 10 x max with effort, pushing with arms knee bends with poor technique 20 x max HAB 2+/5   Other Tests: amb with SEC antalgic- he locks L knee  SLS left with EO 15 sec    Spine:  Movement loss:   Effect of repeated movements:   Effect of static positioning:   Spine testing (not relevant/relevant/secondary problem):     Baseline Symptoms:   Repeated Tests Symptom Response Mechanical Response   Active/Passive movement, resisted test, functional test During -  Produce, Abolish, Increase, Decrease, NE After -  Better, Worse, NB, NW, NE Effect -   ? or ? ROM, strength or key functional test No   Effect                                       Effect of static positioning                  Provisional Classification (Extremity/Spine):  Extremity - weakness LOB      Princicple of Management:   Education:  ROM at hip is wnl weakness quads calf gluts    Equipment  provided:    Exercise and dosage:  See FS    ASSESSMENT/PLAN:    Patient is a 83 year old male with left side hip complaints.    Patient has the following significant findings with corresponding treatment plan.                Diagnosis 1:  weakness  Decreased strength - therapeutic exercise, therapeutic activities and home program  Impaired balance - neuro re-education, gait training, therapeutic activities, adaptive equipment/assistive device and home program  Impaired gait - gait training, assistive devices and home program  Decreased function - therapeutic activities and home program    Therapy Evaluation Codes:   1) History comprised of:   Personal factors that impact the plan of care:      None.    Comorbidity factors that impact the plan of care are:      Cancer, Diabetes, High blood pressure and Weakness.     Medications impacting care: None.  2) Examination of Body Systems comprised of:   Body structures and functions that impact the plan of care:      Hip.   Activity limitations that impact the plan of care are:      Bending and Walking.  3) Clinical presentation characteristics are:   Evolving/Changing.  4) Decision-Making    Low complexity using standardized patient assessment instrument and/or measureable assessment of functional outcome.  Cumulative Therapy Evaluation is: Low complexity.    Previous and current functional limitations:  (See Goal Flow Sheet for this information)    Short term and Long term goals: (See Goal Flow Sheet for this information)     Communication ability:  Patient appears to be able to clearly communicate and understand verbal and written communication and follow directions correctly.  Treatment Explanation - The following has been discussed with the patient:   RX ordered/plan of care  Anticipated outcomes  Possible risks and side effects  This patient would benefit from PT intervention to resume normal activities.   Rehab potential is good.    Frequency:  2 X week, once  daily  Duration:  for 4 weeks  Discharge Plan:  Achieve all LTG.  Independent in home treatment program.  Reach maximal therapeutic benefit.    Please refer to the daily flowsheet for treatment today, total treatment time and time spent performing 1:1 timed codes.     [facility] Initial Evaluation  Subjective:  HPI                    Objective:  System    Physical Exam    General     ROS

## 2018-11-09 NOTE — LETTER
DEPARTMENT OF HEALTH AND HUMAN SERVICES  CENTERS FOR MEDICARE & MEDICAID SERVICES    PLAN/UPDATED PLAN OF PROGRESS FOR OUTPATIENT REHABILITATION    PATIENTS NAME:  Farzad East   : 1935    PROVIDER NUMBER:    [id number]  HICN:  [id number]    PROVIDER NAME: [place] [facility] PHYSICAL THERAPY  MEDICAL RECORD NUMBER: [id number]     START OF CARE DATE:  SOC Date: 18   TYPE:  PT    PRIMARY/TREATMENT DIAGNOSIS: (Pertinent Medical Diagnosis)  Weakness of left lower extremity    VISITS FROM START OF CARE:    1     [place] [facility] Initial Evaluation -- Lower Extremity  Evaluation Date: 2018  Farzad East is a 83 year old male with a L hip condition.   Referral: ortho- [facility]  Work mechanical stresses:  Retired   Employment status:   Leisure mechanical stresses: amb with [employer] 1/2 block caregiver to wife who has dementia  Functional disability score:   VAS score (0-10): 0  Patient goals/expectations:  Uses a walker periodically around the house or a SEC for balance and weakness and he would like to be less dependent on assistive devices. Bend over to  paper off floor with confidence.     HISTORY:  Present symptoms: L hip weakness LOB during gait  Pain quality (sharp/shooting/stabbing/aching/burning/cramping):    Present since (onset date): MD order 10.18..2018 KIMBERLY revision 10/11/2017 after he got up one am and couldn't put weight on leg the tyra broke.[facility]. He did well with PT there.    Symptoms (improving/unchaning/worsening):   Worsening because he sits at computer a lot.  Symptoms commenced as a result of: surgery   Condition occurred in the following environment:   Symptoms at onset:     Paresthesia (yes/no):    Spinal history:      Cough/Sneeze (pos/neg):    Constant symptoms:   Intermittent symptoms:   Symptoms are worse with the following: Other -    Symptoms are better with the following:  Other -     PATIENTS NAME:  Farzad East   : 1935  PRIMARY/TREATMENT DIAGNOSIS: (Pertinent Medical Diagnosis)  Weakness of left lower extremity    HISTORY (continued)  Continued use makes the pain (better/worse/no effect):   Disturbed night (yes/no):     Pain at rest (yes/no):      Site (back/hip/knee/ankle/foot):    Other questions (swelling/clicking/locking/giving way/falling):  none  Previous episodes: PT prior to revision in   Previous treatments:     Specific Questions:  General health (excellent/good/fair/poor):  good  Pertinent medical history includes: He had pneumonia 3/2018 hospitalized for 5-7 days in [facility].  He had a visiting nurse for 5 weeks or so. He came home in May 2018. HTN diabetic.CA.  Medications (nil/NSAIDS/analg/steroids/anticoag/other):  None  Medical allergies:  sulfa  Imaging (none/Xray/MRI/other):  none  Recent or major surgery (yes/no):  no  Night pain (yes/no):  no  Accidents (yes/no):  no  Unexplained weight loss (yes/no):  na  Barriers at home: loss of sleep due to wife's dementia  Other red flags: none    Sites for physical examination (back/hip/knee/ankle/foot/other): LE    EXAMINATION    Posture:  Sitting (good/fair/poor):     Correction of Posture (better/worse/no effect/NA):   Standing (good/fair/poor):   Other observations:    Neurological: (NA/motor/sensory/reflexes/dural):   Baselines (pain or functional activity):                               PATIENTS NAME:  Farzad East   : 1935  PRIMARY/TREATMENT DIAGNOSIS: (Pertinent Medical Diagnosis)  Weakness of left lower extremity    Extremities (Hip / Knee / Ankle / Foot):   Movement Loss Dago Mod Min Nil Pain   Flexion        Extension        Abduction        Adduction        Internal Rotation        External Rotation        Dorsiflexion        Plantarflexion        Inversion        Eversion        Passive Movement (+/- over pressure)/(PDM/ERP):  Hip ROM wnl  Resisted Test Response (pain): standing SL heel  raise 10 x max with effort, pushing with arms knee bends with poor technique 20 x max HAB 2+/5   Other Tests: amb with SEC antalgic- he locks L knee  SLS left with EO 15 sec    Spine:  Movement loss:   Effect of repeated movements:   Effect of static positioning:   Spine testing (not relevant/relevant/secondary problem):     Baseline Symptoms:   Repeated Tests Symptom Response Mechanical Response   Active/Passive movement, resisted test, functional test During -  Produce, Abolish, Increase, Decrease, NE After -  Better, Worse, NB, NW, NE Effect -   ? or ? ROM, strength or key functional test No   Effect   Effect of static positioning       Provisional Classification (Extremity/Spine):  Extremity - weakness LOB    Princicple of Management:   Education:  ROM at hip is wnl weakness quads calf gluts    Equipment provided:    Exercise and dosage:  See FS          PATIENTS NAME:  Farzad East   : 1935  PRIMARY/TREATMENT DIAGNOSIS: (Pertinent Medical Diagnosis)  Weakness of left lower extremity    ASSESSMENT/PLAN:  Patient is a 83 year old male with left side hip complaints.    Patient has the following significant findings with corresponding treatment plan.                Diagnosis 1:  weakness  Decreased strength - therapeutic exercise, therapeutic activities and home program  Impaired balance - neuro re-education, gait training, therapeutic activities, adaptive equipment/assistive device and home program  Impaired gait - gait training, assistive devices and home program  Decreased function - therapeutic activities and home program    Therapy Evaluation Codes:   1) History comprised of:   Personal factors that impact the plan of care:      None.    Comorbidity factors that impact the plan of care are:      Cancer, Diabetes, High blood pressure and Weakness.     Medications impacting care: None.  2) Examination of Body Systems comprised of:   Body structures and functions that impact the plan of care:   "    Hip.   Activity limitations that impact the plan of care are:      Bending and Walking.  3) Clinical presentation characteristics are:   Evolving/Changing.  4) Decision-Making    Low complexity using standardized patient assessment instrument and/or   measureable assessment of functional outcome.  Cumulative Therapy Evaluation is: Low complexity.    Previous and current functional limitations:  (See Goal Flow Sheet for this information)    Short term and Long term goals: (See Goal Flow Sheet for this information)   Communication ability:  Patient appears to be able to clearly communicate and understand verbal and written communication and follow directions correctly.  Treatment Explanation - The following has been discussed with the patient:   RX ordered/plan of care, Anticipated outcomes, Possible risks and side effects                            PATIENTS NAME:  Farzad East   : 1935  PRIMARY/TREATMENT DIAGNOSIS: (Pertinent Medical Diagnosis)  Weakness of left lower extremity    This patient would benefit from PT intervention to resume normal activities.   Rehab potential is good.  Frequency:  2 X week, once daily  Duration:  for 4 weeks  Discharge Plan:  Achieve all LTG.  Independent in home treatment program.  Reach maximal therapeutic benefit.          Caregiver Signature/Credentials _____________________________ Date ________         Felipa Laguerre, PT, Cert. MDT     I have reviewed and certified the need for these services and plan of treatment while under my care.        PHYSICIAN'S SIGNATURE:   _________________________________________      Date___________   Andi Garcia MD    Certification period:  Beginning of Cert date period: 18 to   19     Functional Level Progress Report: Please see attached \"Goal Flow sheet for Functional level.\"    ____X____ Continue Services or       ________ DC Services                Service dates: From  SOC Date: 18  to present            "

## 2018-11-12 ENCOUNTER — APPOINTMENT (OUTPATIENT)
Age: 83
Setting detail: DERMATOLOGY
End: 2018-11-18

## 2018-11-12 DIAGNOSIS — Z48.02 ENCOUNTER FOR REMOVAL OF SUTURES: ICD-10-CM

## 2018-11-12 PROCEDURE — OTHER DIAGNOSIS COMMENT: OTHER

## 2018-11-12 PROCEDURE — OTHER COUNSELING: OTHER

## 2018-11-12 PROCEDURE — OTHER MIPS QUALITY: OTHER

## 2018-11-12 PROCEDURE — OTHER RETURN TO REFERRING PROVIDER: OTHER

## 2018-11-12 PROCEDURE — OTHER SUTURE REMOVAL (GLOBAL PERIOD): OTHER

## 2018-11-12 ASSESSMENT — LOCATION SIMPLE DESCRIPTION DERM: LOCATION SIMPLE: NOSE

## 2018-11-12 ASSESSMENT — LOCATION DETAILED DESCRIPTION DERM: LOCATION DETAILED: NASAL SUPRATIP

## 2018-11-12 ASSESSMENT — LOCATION ZONE DERM: LOCATION ZONE: NOSE

## 2018-11-12 NOTE — PROCEDURE: SUTURE REMOVAL (GLOBAL PERIOD)
Body Location Override (Optional - Billing Will Still Be Based On Selected Body Map Location If Applicable): nasal tip
Detail Level: Detailed
Add 06642 Cpt? (Important Note: In 2017 The Use Of 66171 Is Being Tracked By Cms To Determine Future Global Period Reimbursement For Global Periods): no

## 2018-11-16 ENCOUNTER — THERAPY VISIT (OUTPATIENT)
Dept: PHYSICAL THERAPY | Facility: CLINIC | Age: 83
End: 2018-11-16
Payer: MEDICARE

## 2018-11-16 DIAGNOSIS — M25.552 HIP PAIN, LEFT: ICD-10-CM

## 2018-11-16 DIAGNOSIS — R29.898 WEAKNESS OF LEFT LOWER EXTREMITY: ICD-10-CM

## 2018-11-16 PROCEDURE — 97112 NEUROMUSCULAR REEDUCATION: CPT | Mod: GP | Performed by: PHYSICAL THERAPIST

## 2018-11-16 PROCEDURE — 97110 THERAPEUTIC EXERCISES: CPT | Mod: GP | Performed by: PHYSICAL THERAPIST

## 2018-11-23 ENCOUNTER — THERAPY VISIT (OUTPATIENT)
Dept: PHYSICAL THERAPY | Facility: CLINIC | Age: 83
End: 2018-11-23
Payer: MEDICARE

## 2018-11-23 DIAGNOSIS — M25.552 HIP PAIN, LEFT: ICD-10-CM

## 2018-11-23 DIAGNOSIS — R29.898 WEAKNESS OF LEFT LOWER EXTREMITY: ICD-10-CM

## 2018-11-23 PROCEDURE — 97110 THERAPEUTIC EXERCISES: CPT | Mod: GP | Performed by: PHYSICAL THERAPIST

## 2018-11-28 ENCOUNTER — THERAPY VISIT (OUTPATIENT)
Dept: PHYSICAL THERAPY | Facility: CLINIC | Age: 83
End: 2018-11-28
Payer: MEDICARE

## 2018-11-28 DIAGNOSIS — M25.552 HIP PAIN, LEFT: ICD-10-CM

## 2018-11-28 DIAGNOSIS — R29.898 WEAKNESS OF LEFT LOWER EXTREMITY: ICD-10-CM

## 2018-11-28 PROCEDURE — 97110 THERAPEUTIC EXERCISES: CPT | Mod: GP | Performed by: PHYSICAL THERAPIST

## 2018-11-28 PROCEDURE — 97530 THERAPEUTIC ACTIVITIES: CPT | Mod: GP | Performed by: PHYSICAL THERAPIST

## 2018-12-04 ENCOUNTER — OFFICE VISIT (OUTPATIENT)
Dept: ENDOCRINOLOGY | Facility: CLINIC | Age: 83
End: 2018-12-04
Payer: COMMERCIAL

## 2018-12-04 VITALS
SYSTOLIC BLOOD PRESSURE: 111 MMHG | DIASTOLIC BLOOD PRESSURE: 56 MMHG | BODY MASS INDEX: 24.78 KG/M2 | HEART RATE: 95 BPM | HEIGHT: 71 IN | WEIGHT: 177 LBS

## 2018-12-04 DIAGNOSIS — Z79.4 TYPE 2 DIABETES MELLITUS WITH OTHER OPHTHALMIC COMPLICATION, WITH LONG-TERM CURRENT USE OF INSULIN (H): Primary | ICD-10-CM

## 2018-12-04 DIAGNOSIS — E11.39 TYPE 2 DIABETES MELLITUS WITH OTHER OPHTHALMIC COMPLICATION, WITH LONG-TERM CURRENT USE OF INSULIN (H): Primary | ICD-10-CM

## 2018-12-04 DIAGNOSIS — Z79.4 TYPE 2 DIABETES MELLITUS WITH OTHER OPHTHALMIC COMPLICATION, WITH LONG-TERM CURRENT USE OF INSULIN (H): ICD-10-CM

## 2018-12-04 DIAGNOSIS — E11.39 TYPE 2 DIABETES MELLITUS WITH OTHER OPHTHALMIC COMPLICATION, WITH LONG-TERM CURRENT USE OF INSULIN (H): ICD-10-CM

## 2018-12-04 LAB
ALT SERPL W P-5'-P-CCNC: 16 U/L (ref 0–70)
ANION GAP SERPL CALCULATED.3IONS-SCNC: 5 MMOL/L (ref 3–14)
AST SERPL W P-5'-P-CCNC: 10 U/L (ref 0–45)
BASOPHILS # BLD AUTO: 0 10E9/L (ref 0–0.2)
BASOPHILS NFR BLD AUTO: 0.4 %
BUN SERPL-MCNC: 49 MG/DL (ref 7–30)
CALCIUM SERPL-MCNC: 9 MG/DL (ref 8.5–10.1)
CHLORIDE SERPL-SCNC: 108 MMOL/L (ref 94–109)
CHOLEST SERPL-MCNC: 120 MG/DL
CO2 SERPL-SCNC: 26 MMOL/L (ref 20–32)
CREAT SERPL-MCNC: 2.19 MG/DL (ref 0.66–1.25)
CREAT UR-MCNC: 117 MG/DL
DIFFERENTIAL METHOD BLD: ABNORMAL
EOSINOPHIL # BLD AUTO: 0.3 10E9/L (ref 0–0.7)
EOSINOPHIL NFR BLD AUTO: 3.9 %
ERYTHROCYTE [DISTWIDTH] IN BLOOD BY AUTOMATED COUNT: 14.5 % (ref 10–15)
GFR SERPL CREATININE-BSD FRML MDRD: 29 ML/MIN/1.7M2
GLUCOSE SERPL-MCNC: 163 MG/DL (ref 70–99)
HBA1C MFR BLD: 8.3 % (ref 0–5.6)
HCT VFR BLD AUTO: 34.9 % (ref 40–53)
HDLC SERPL-MCNC: 48 MG/DL
HGB BLD-MCNC: 10.9 G/DL (ref 13.3–17.7)
IMM GRANULOCYTES # BLD: 0 10E9/L (ref 0–0.4)
IMM GRANULOCYTES NFR BLD: 0.3 %
LDLC SERPL CALC-MCNC: 54 MG/DL
LYMPHOCYTES # BLD AUTO: 1.5 10E9/L (ref 0.8–5.3)
LYMPHOCYTES NFR BLD AUTO: 20.8 %
MCH RBC QN AUTO: 29.1 PG (ref 26.5–33)
MCHC RBC AUTO-ENTMCNC: 31.2 G/DL (ref 31.5–36.5)
MCV RBC AUTO: 93 FL (ref 78–100)
MICROALBUMIN UR-MCNC: 442 MG/L
MICROALBUMIN/CREAT UR: 377.78 MG/G CR (ref 0–17)
MONOCYTES # BLD AUTO: 0.6 10E9/L (ref 0–1.3)
MONOCYTES NFR BLD AUTO: 8.4 %
NEUTROPHILS # BLD AUTO: 4.7 10E9/L (ref 1.6–8.3)
NEUTROPHILS NFR BLD AUTO: 66.2 %
NONHDLC SERPL-MCNC: 73 MG/DL
NRBC # BLD AUTO: 0 10*3/UL
NRBC BLD AUTO-RTO: 0 /100
PLATELET # BLD AUTO: 267 10E9/L (ref 150–450)
POTASSIUM SERPL-SCNC: 5.4 MMOL/L (ref 3.4–5.3)
RBC # BLD AUTO: 3.74 10E12/L (ref 4.4–5.9)
SODIUM SERPL-SCNC: 139 MMOL/L (ref 133–144)
T4 FREE SERPL-MCNC: 0.92 NG/DL (ref 0.76–1.46)
TRIGL SERPL-MCNC: 92 MG/DL
TSH SERPL DL<=0.005 MIU/L-ACNC: 4.49 MU/L (ref 0.4–4)
WBC # BLD AUTO: 7.2 10E9/L (ref 4–11)

## 2018-12-04 RX ORDER — MULTIPLE VITAMINS W/ MINERALS TAB 9MG-400MCG
1 TAB ORAL DAILY
COMMUNITY
End: 2022-01-06

## 2018-12-04 ASSESSMENT — PAIN SCALES - GENERAL: PAINLEVEL: NO PAIN (0)

## 2018-12-04 NOTE — LETTER
12/4/2018       RE: Farzad East  [address]  [facility] [address]     Dear Colleague,    Thank you for referring your patient, Farzad East, to the [facility] ENDOCRINOLOGY at [facility]. Please see a copy of my visit note below.    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 He ambulates with a cane. The patient was diagnosed with diabetes in his 50s and has managed using Metformin, glipizide, Lantus, and Januvia in the past. Lantus was discontinued toward the beginning of 2018. His A1c was 6.8 in June 2018 so he decreased his glipizide to 5 mg daily due to hypoglycemia risk. His current regimen is Metformin XR 1000 mg daily, glipizide XL 5 mg daily, and Januvia 50 mg daily.     Over the past few weeks, the patient reports a blood sugar average of about 100-110 with some highs in the 150's. He reports only a few highs which he attributes to eating an unhealthy meal. Also, he feels that if he does not eat regularly, his blood glucose rises.     He had lab work done completed today, but most of his results have not returned. We discussed the results of his CBC and I will send him his results later. We reviewed his previous labs.     Blood Glucose Monitoring:  We reviewed reported data together.    Reported avg around 100-110  Some highs in the 150's     Hypoglycemia:  Denies any symptoms or readings of blood sugars well below goal. Reports lowest blood glucose rarely in the 70s.     Diabetes monitoring and complications:  CAD: No  Last eye exam results: : 12/12/2016  Last dental exam: not discussed today   Microalbuminuria: 58.44 on 8/1/17  HTN: Yes: blood pressure today is  controlled at 111/56. He is on amlodipine 2.5 mg every morning.   On Statin: No  On Aspirin: Yes  Depression: No  Erectile dysfunction: Yes  Does note some occasional stinging sensation in his feet when laying in bed at night. Denies any other problems with his feet.     Ankle Edema: He has not gotten his compression stockings yet, but is hoping to get them soon especially before traveling to [place] this winter. The swelling is usually slightly worse in the left leg.     Other Concerns Discussed:  1. His wife is sick which causes stress for the patient.   2. Starting to get arthritis with some occasional difficulties buttoning his shirt. He uses Tylenol for pain relief and is doing physical therapy. Denies any falls.   3. Weight is relatively stable.    Review of Systems:   Pertinent items are noted in HPI.  All other systems are negative.    Active Medications:      amLODIPine (NORVASC) 2.5 MG tablet, Take 1 tablet (2.5 mg) by mouth every morning, Disp: 90 tablet, Rfl: 1     aspirin 81 MG tablet, Take 1 tablet by mouth daily., Disp: , Rfl:      Cholecalciferol (VITAMIN D-3 PO), , Disp: , Rfl:      glipiZIDE (GLUCOTROL XL) 5 MG 24 hr tablet, Take 1 tablet (5 mg) by mouth daily, Disp: 90 tablet, Rfl: 1     losartan-hydrochlorothiazide (HYZAAR) 100-25 MG per tablet, Take 1 tablet by mouth daily, Disp: 90 tablet, Rfl: 3     metFORMIN (GLUCOPHAGE-XR) 500 MG 24 hr tablet, Take 2 tablets (1,000 mg) by mouth daily with food, Disp: 180 tablet, Rfl: 2     multivitamin w/minerals (MULTI-VITAMIN) tablet, Take 1 tablet by mouth daily, Disp: , Rfl:      sitagliptin (JANUVIA) 100 MG tablet, Take 0.5 tablets (50 mg) by mouth daily, Disp: 90 tablet, Rfl: 3     tamsulosin (FLOMAX) 0.4 MG capsule, Take 1 capsule (0.4 mg) by mouth daily, Disp: 90 capsule, Rfl: 3     insulin pen needle 31G X 5 MM, Use 1 pen needle daily or as directed (Patient not taking: Reported on 10/3/2018), Disp: 100 each, Rfl: 3     sildenafil (VIAGRA) 100 MG  "tablet, Take 1 tablet (100 mg) by mouth daily as needed for erectile dysfunction (Patient not taking: Reported on 12/4/2018), Disp: 6 tablet, Rfl: 11     Allergies:   Sulfa drugs      Past Medical History:  Atrial flutter  CKD stage III  Type II diabetes mellitus  Hypertension  Rectal-type adenocarcinoma  Retinopathy  Hip joint pain  Femur fracture  Leg weakness  Abnormal gait      Past Surgical History:  Cataract IOL, bilateral  Left hip replacement  Open reduction internal fixation femur proximal     Family History:   Father: diabetes, PAD, macular degeneration  Mother: arthritis       Social History:   The patient is , a nonsmoker, and consumes alcohol.     Physical Exam:   /56  Pulse 95  Ht 1.803 m (5' 11\")  Wt 80.3 kg (177 lb)  BMI 24.69 kg/m2     Wt Readings from Last 10 Encounters:   12/04/18 80.3 kg (177 lb)   10/03/18 79.8 kg (176 lb)   09/04/18 82 kg (180 lb 11.2 oz)   06/12/18 89.7 kg (197 lb 12.8 oz)   01/09/18 77.7 kg (171 lb 6.4 oz)   12/12/17 77.9 kg (171 lb 12.8 oz)   09/05/17 77.1 kg (170 lb)   08/01/17 77.1 kg (169 lb 14.4 oz)   08/19/16 81.6 kg (180 lb)   05/05/16 83.5 kg (184 lb)      Constitutional: no distress, comfortable, pleasant   Eyes: anicteric, normal extra-ocular movements, No lig lag, retraction or proptosis  Ears, Nose and Throat: throat clear  Neck: supple, no thyromegaly.   Cardiovascular: regular rate and rhythm, normal S1 and S2, no murmurs  Respiratory: clear to auscultation, no wheezes or crackles, normal breath sounds   Musculoskeletal: bilateral swelling in LE's, left more than right.   Skin:  no jaundice   Neurological:  reflexes at patella and biceps normal, no tremor   Psychological: appropriate mood   Lymphatic: no cervical lymphadenopathy      Data:  Component Latest Ref Rng & Units 12/4/2018   WBC 4.0 - 11.0 10e9/L 7.2   RBC Count 4.4 - 5.9 10e12/L 3.74 (L)   Hemoglobin 13.3 - 17.7 g/dL 10.9 (L)   Hematocrit 40.0 - 53.0 % 34.9 (L)   MCV 78 - 100 fl 93 "   MCH 26.5 - 33.0 pg 29.1   MCHC 31.5 - 36.5 g/dL 31.2 (L)   RDW 10.0 - 15.0 % 14.5   Platelet Count 150 - 450 10e9/L 267   Diff Method  Automated Method   % Neutrophils % 66.2   % Lymphocytes % 20.8   % Monocytes % 8.4   % Eosinophils % 3.9   % Basophils % 0.4   % Immature Granulocytes % 0.3   Nucleated RBCs 0 /100 0   Absolute Neutrophil 1.6 - 8.3 10e9/L 4.7   Absolute Lymphocytes 0.8 - 5.3 10e9/L 1.5   Absolute Monocytes 0.0 - 1.3 10e9/L 0.6   Absolute Eosinophils 0.0 - 0.7 10e9/L 0.3   Absolute Basophils 0.0 - 0.2 10e9/L 0.0   Abs Immature Granulocytes 0 - 0.4 10e9/L 0.0   Absolute Nucleated RBC  0.0     Lab Results   Component Value Date     12/04/2018    POTASSIUM 5.4 (H) 12/04/2018    CHLORIDE 108 12/04/2018    CO2 26 12/04/2018    ANIONGAP 5 12/04/2018     (H) 12/04/2018    BUN 49 (H) 12/04/2018    CR 2.19 (H) 12/04/2018    DUSTIN 9.0 12/04/2018     Lab Results   Component Value Date    GFRESTIMATED 29 (L) 12/04/2018    GFRESTIMATED 36 (L) 06/19/2018    GFRESTIMATED 34 (L) 06/12/2018    GFRESTBLACK 35 (L) 12/04/2018    GFRESTBLACK 43 (L) 06/19/2018    GFRESTBLACK 41 (L) 06/12/2018      Lab Results   Component Value Date    MICROL 86 08/01/2017    UMALCR 58.44 (H) 08/01/2017      Lab Results   Component Value Date    A1C 7.9 (H) 12/12/2017    A1C 7.8 (H) 08/01/2017    A1C 9.1 (H) 11/03/2016    A1C 8.2 (H) 05/05/2016    A1C 8.6 (H) 11/19/2015    HEMOGLOBINA1 7.6 (A) 09/04/2018    HEMOGLOBINA1 6.8 (A) 06/12/2018    HEMOGLOBINA1 7.0 (A) 05/15/2014    HEMOGLOBINA1 7.6 (H) 05/05/2011    HEMOGLOBINA1 7.5 (H) 12/04/2009     Lab Results   Component Value Date    CPEPT 4.2 05/05/2016     Lab Results   Component Value Date    CHOL 120 12/04/2018    CHOL 119 05/05/2016    TRIG 92 12/04/2018    TRIG 115 05/05/2016    HDL 48 12/04/2018    HDL 43 05/05/2016    LDL 54 12/04/2018    LDL 55 08/01/2017    NHDL 73 12/04/2018    NHDL 76 05/05/2016     Assessment and Plan:  Farzad East is a 83 year old here for a  diabetes mellitus follow-up.     Type II diabetes mellitus   Appears to be in good control based on self-reported home blood glucose readings. Denies hypoglycemia. A1c from today is pending (drawn in lab). We discussed again, the potential risk of hypoglycemia with Glipizide especially in elderly and in the setting of CKD. However, he has not had any issues and would like to continue  at this time.   We also reviewed fall precautions. Blood pressure is in good range, continue current diabetes medications. Labs from today are pending, will communicate the results with him once available.      Follow-up: Return in about 4 months (around 4/4/2019).     >50% of 25 minute visit spent in face to face counseling, education and coordination of care related to options for better glycemic control as well as preventing, detecting, and treating hypoglycemia.      Scribe Disclosure:   I, Tika Shukla, am serving as a scribe to document services personally performed by Jose Amin MD at this visit, based upon the provider's statements to me. All documentation has been reviewed by the aforementioned provider prior to being entered into the official medical record.     Portions of this medical record were completed by a scribe. UPON MY REVIEW AND AUTHENTICATION BY ELECTRONIC SIGNATURE, this confirms (a) I performed the applicable clinical services, and (b) the record is accurate.      JUDD Fenton

## 2018-12-04 NOTE — MR AVS SNAPSHOT
After Visit Summary   12/4/2018    Farzad East    MRN: [id number]           Patient Information     Date Of Birth          1935        Visit Information        Provider Department      12/4/2018 4:00 PM Jose Amin MD [facility]         Follow-ups after your visit        Follow-up notes from your care team     Return in about 4 months (around 4/4/2019).      Your next 10 appointments already scheduled     Dec 12, 2018  1:50 PM CST   GURDEEP Extremity with Felipa Laguerre PT   [facility] [place] [facility] ([facility] [place])    [address]  [place] [address]   [phone]            Dec 18, 2018  1:15 PM CST   RETURN RETINA with Rosemary Arreguin MD   [facility] ([facility])    [address]  [address]  [facility] [address]   [phone]            Dec 19, 2018  1:50 PM CST   GURDEEP Extremity with Felipa Laguerre PT   [facility] [place] [facility] ([facility] [place])    [address]  [place] [address]   [phone]            Dec 26, 2018  1:50 PM CST   GURDEEP Extremity with Felipa Laguerre PT   [facility] [place] [facility] ([facility] [place])    [address]  [place] [address]   [phone]            May 01, 2019 12:30 PM CDT   (Arrive by 12:15 PM)   Return Visit with Eddie Levi MD   [facility] ([facility] [place])    [address]  [address]  [facility] [address] [phone]            May 14, 2019  3:30 PM CDT   (Arrive by 3:15 PM)   RETURN DIABETES with Jose Amin MD   [facility] ([facility] [place])    [address]  [address]  [facility] [address] [phone]              Who to contact     Please call your clinic at [phone] to:    Ask questions about your health    Make or cancel  "appointments    Discuss your medicines    Learn about your test results    Speak to your doctor            Additional Information About Your Visit        [employer] Information     [employer] gives you secure access to your electronic health record. If you see a primary care provider, you can also send messages to your care team and make appointments. If you have questions, please call your primary care clinic.  If you do not have a primary care provider, please call [phone] and they will assist you.      [employer] is an electronic gateway that provides easy, online access to your medical records. With [employer], you can request a clinic appointment, read your test results, renew a prescription or communicate with your care team.     To access your existing account, please contact your [facility] or call [phone] for assistance.        Care EveryWhere ID     This is your Care EveryWhere ID. This could be used by other organizations to access your [place] medical records  [id number]        Your Vitals Were     Pulse Height BMI (Body Mass Index)             95 1.803 m (5' 11\") 24.69 kg/m2          Blood Pressure from Last 3 Encounters:   12/04/18 111/56   10/03/18 152/70   09/04/18 139/70    Weight from Last 3 Encounters:   12/04/18 80.3 kg (177 lb)   10/03/18 79.8 kg (176 lb)   09/04/18 82 kg (180 lb 11.2 oz)              Today, you had the following     No orders found for display       Primary Care Provider Office Phone # Fax #    [facility] [phone] [address]  [facility] [address]        Equal Access to Services     GARRETT DAVIS : Hadii elsa baires hadasho Soarjunali, waaxda luqadaha, qaybta kaalmada adeegyada, carmel sheffield. So [facility] [phone].    ATENCIÓN: Si habla español, tiene a dalton disposición servicios gratuitos de asistencia lingüística. Llame al [phone].    We comply with applicable federal civil " rights laws and [place] laws. We do not discriminate on the basis of race, color, national origin, age, disability, sex, sexual orientation, or gender identity.            Thank you!     Thank you for choosing [facility] ENDOCRINOLOGY  for your care. Our goal is always to provide you with excellent care. Hearing back from our patients is one way we can continue to improve our services. Please take a few minutes to complete the written survey that you may receive in the mail after your visit with us. Thank you!             Your Updated Medication List - Protect others around you: Learn how to safely use, store and throw away your medicines at www.disposemymeds.org.          This list is accurate as of 12/4/18  4:12 PM.  Always use your most recent med list.                   Brand Name Dispense Instructions for use Diagnosis    amLODIPine 2.5 MG tablet    NORVASC    90 tablet    Take 1 tablet (2.5 mg) by mouth every morning    Hypertension, unspecified type       aspirin 81 MG tablet    ASA     Take 1 tablet by mouth daily.        blood glucose monitoring meter device kit     1 kit    Use to test blood sugar daily    Diabetes mellitus, type 2 (H)       COMPRESSION STOCKINGS     2 each    1 each daily    Bilateral lower extremity edema       glipiZIDE 5 MG 24 hr tablet    glipiZIDE XL    90 tablet    Take 1 tablet (5 mg) by mouth daily    Type 2 diabetes mellitus with hyperglycemia, unspecified long term insulin use status       insulin pen needle 31G X 5 MM miscellaneous    31G X 5 MM    100 each    Use 1 pen needle daily or as directed    Type 2 diabetes mellitus with complication, with long-term current use of insulin (H)       losartan-hydrochlorothiazide 100-25 MG tablet    HYZAAR    90 tablet    Take 1 tablet by mouth daily    Essential hypertension, Type 2 diabetes, HbA1c goal < 7% (H)       metFORMIN 500 MG 24 hr tablet    GLUCOPHAGE-XR    180 tablet    Take 2 tablets (1,000 mg) by mouth daily with food     Type 2 diabetes mellitus with hyperglycemia, unspecified long term insulin use status       Multi-vitamin tablet      Take 1 tablet by mouth daily        * ONETOUCH ULTRA test strip   Generic drug:  blood glucose monitoring     270 each    Use to test blood sugar 3 times daily or as directed.    Type 2 diabetes mellitus (H)       * blood glucose monitoring test strip    ONETOUCH VERIO IQ    300 each    Use to test blood sugar 2-3 daily or as directed.    Type 2 diabetes mellitus with complication (H)       sildenafil 100 MG tablet    VIAGRA    6 tablet    Take 1 tablet (100 mg) by mouth daily as needed for erectile dysfunction    Type 2 diabetes mellitus with complication (H)       sitagliptin 100 MG tablet    JANUVIA    90 tablet    Take 0.5 tablets (50 mg) by mouth daily    Type 2 diabetes mellitus with hyperglycemia, unspecified long term insulin use status       tamsulosin 0.4 MG capsule    FLOMAX    90 capsule    Take 1 capsule (0.4 mg) by mouth daily    Type 2 diabetes mellitus with complication, with long-term current use of insulin (H)       VITAMIN D-3 PO           * Notice:  This list has 2 medication(s) that are the same as other medications prescribed for you. Read the directions carefully, and ask your doctor or other care provider to review them with you.

## 2018-12-04 NOTE — PROGRESS NOTES
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 He ambulates with a cane. The patient was diagnosed with diabetes in his 50s and has managed using Metformin, glipizide, Lantus, and Januvia in the past. Lantus was discontinued toward the beginning of 2018. His A1c was 6.8 in June 2018 so he decreased his glipizide to 5 mg daily due to hypoglycemia risk. His current regimen is Metformin XR 1000 mg daily, glipizide XL 5 mg daily, and Januvia 50 mg daily.     Over the past few weeks, the patient reports a blood sugar average of about 100-110 with some highs in the 150's. He reports only a few highs which he attributes to eating an unhealthy meal. Also, he feels that if he does not eat regularly, his blood glucose rises.     He had lab work done completed today, but most of his results have not returned. We discussed the results of his CBC and I will send him his results later. We reviewed his previous labs.     Blood Glucose Monitoring:  We reviewed reported data together.    Reported avg around 100-110  Some highs in the 150's     Hypoglycemia:  Denies any symptoms or readings of blood sugars well below goal. Reports lowest blood glucose rarely in the 70s.     Diabetes monitoring and complications:  CAD: No  Last eye exam results: : 12/12/2016  Last dental exam: not discussed today   Microalbuminuria: 58.44 on 8/1/17  HTN: Yes: blood pressure today is controlled at 111/56. He is on amlodipine 2.5 mg every morning.   On Statin: No  On Aspirin: Yes  Depression: No  Erectile dysfunction: Yes  Does note some occasional stinging sensation in his feet when laying in bed at night. Denies any other problems with his feet.     Ankle Edema: He has not  gotten his compression stockings yet, but is hoping to get them soon especially before traveling to [place] this winter. The swelling is usually slightly worse in the left leg.     Other Concerns Discussed:  1. His wife is sick which causes stress for the patient.   2. Starting to get arthritis with some occasional difficulties buttoning his shirt. He uses Tylenol for pain relief and is doing physical therapy. Denies any falls.   3. Weight is relatively stable.    Review of Systems:   Pertinent items are noted in HPI.  All other systems are negative.    Active Medications:      amLODIPine (NORVASC) 2.5 MG tablet, Take 1 tablet (2.5 mg) by mouth every morning, Disp: 90 tablet, Rfl: 1     aspirin 81 MG tablet, Take 1 tablet by mouth daily., Disp: , Rfl:      Cholecalciferol (VITAMIN D-3 PO), , Disp: , Rfl:      glipiZIDE (GLUCOTROL XL) 5 MG 24 hr tablet, Take 1 tablet (5 mg) by mouth daily, Disp: 90 tablet, Rfl: 1     losartan-hydrochlorothiazide (HYZAAR) 100-25 MG per tablet, Take 1 tablet by mouth daily, Disp: 90 tablet, Rfl: 3     metFORMIN (GLUCOPHAGE-XR) 500 MG 24 hr tablet, Take 2 tablets (1,000 mg) by mouth daily with food, Disp: 180 tablet, Rfl: 2     multivitamin w/minerals (MULTI-VITAMIN) tablet, Take 1 tablet by mouth daily, Disp: , Rfl:      sitagliptin (JANUVIA) 100 MG tablet, Take 0.5 tablets (50 mg) by mouth daily, Disp: 90 tablet, Rfl: 3     tamsulosin (FLOMAX) 0.4 MG capsule, Take 1 capsule (0.4 mg) by mouth daily, Disp: 90 capsule, Rfl: 3     insulin pen needle 31G X 5 MM, Use 1 pen needle daily or as directed (Patient not taking: Reported on 10/3/2018), Disp: 100 each, Rfl: 3     sildenafil (VIAGRA) 100 MG tablet, Take 1 tablet (100 mg) by mouth daily as needed for erectile dysfunction (Patient not taking: Reported on 12/4/2018), Disp: 6 tablet, Rfl: 11     Allergies:   Sulfa drugs      Past Medical History:  Atrial flutter  CKD stage III  Type II diabetes mellitus  Hypertension  Rectal-type  "adenocarcinoma  Retinopathy  Hip joint pain  Femur fracture  Leg weakness  Abnormal gait      Past Surgical History:  Cataract IOL, bilateral  Left hip replacement  Open reduction internal fixation femur proximal     Family History:   Father: diabetes, PAD, macular degeneration  Mother: arthritis       Social History:   The patient is , a nonsmoker, and consumes alcohol.     Physical Exam:   /56  Pulse 95  Ht 1.803 m (5' 11\")  Wt 80.3 kg (177 lb)  BMI 24.69 kg/m2     Wt Readings from Last 10 Encounters:   12/04/18 80.3 kg (177 lb)   10/03/18 79.8 kg (176 lb)   09/04/18 82 kg (180 lb 11.2 oz)   06/12/18 89.7 kg (197 lb 12.8 oz)   01/09/18 77.7 kg (171 lb 6.4 oz)   12/12/17 77.9 kg (171 lb 12.8 oz)   09/05/17 77.1 kg (170 lb)   08/01/17 77.1 kg (169 lb 14.4 oz)   08/19/16 81.6 kg (180 lb)   05/05/16 83.5 kg (184 lb)      Constitutional: no distress, comfortable, pleasant   Eyes: anicteric, normal extra-ocular movements, No lig lag, retraction or proptosis  Ears, Nose and Throat: throat clear  Neck: supple, no thyromegaly.   Cardiovascular: regular rate and rhythm, normal S1 and S2, no murmurs  Respiratory: clear to auscultation, no wheezes or crackles, normal breath sounds   Musculoskeletal: bilateral swelling in LE's, left more than right.   Skin:  no jaundice   Neurological:  reflexes at patella and biceps normal, no tremor   Psychological: appropriate mood   Lymphatic: no cervical lymphadenopathy      Data:  Component Latest Ref Rng & Units 12/4/2018   WBC 4.0 - 11.0 10e9/L 7.2   RBC Count 4.4 - 5.9 10e12/L 3.74 (L)   Hemoglobin 13.3 - 17.7 g/dL 10.9 (L)   Hematocrit 40.0 - 53.0 % 34.9 (L)   MCV 78 - 100 fl 93   MCH 26.5 - 33.0 pg 29.1   MCHC 31.5 - 36.5 g/dL 31.2 (L)   RDW 10.0 - 15.0 % 14.5   Platelet Count 150 - 450 10e9/L 267   Diff Method  Automated Method   % Neutrophils % 66.2   % Lymphocytes % 20.8   % Monocytes % 8.4   % Eosinophils % 3.9   % Basophils % 0.4   % Immature Granulocytes % 0.3 "   Nucleated RBCs 0 /100 0   Absolute Neutrophil 1.6 - 8.3 10e9/L 4.7   Absolute Lymphocytes 0.8 - 5.3 10e9/L 1.5   Absolute Monocytes 0.0 - 1.3 10e9/L 0.6   Absolute Eosinophils 0.0 - 0.7 10e9/L 0.3   Absolute Basophils 0.0 - 0.2 10e9/L 0.0   Abs Immature Granulocytes 0 - 0.4 10e9/L 0.0   Absolute Nucleated RBC  0.0     Lab Results   Component Value Date     12/04/2018    POTASSIUM 5.4 (H) 12/04/2018    CHLORIDE 108 12/04/2018    CO2 26 12/04/2018    ANIONGAP 5 12/04/2018     (H) 12/04/2018    BUN 49 (H) 12/04/2018    CR 2.19 (H) 12/04/2018    DUSTIN 9.0 12/04/2018     Lab Results   Component Value Date    GFRESTIMATED 29 (L) 12/04/2018    GFRESTIMATED 36 (L) 06/19/2018    GFRESTIMATED 34 (L) 06/12/2018    GFRESTBLACK 35 (L) 12/04/2018    GFRESTBLACK 43 (L) 06/19/2018    GFRESTBLACK 41 (L) 06/12/2018      Lab Results   Component Value Date    MICROL 86 08/01/2017    UMALCR 58.44 (H) 08/01/2017      Lab Results   Component Value Date    A1C 7.9 (H) 12/12/2017    A1C 7.8 (H) 08/01/2017    A1C 9.1 (H) 11/03/2016    A1C 8.2 (H) 05/05/2016    A1C 8.6 (H) 11/19/2015    HEMOGLOBINA1 7.6 (A) 09/04/2018    HEMOGLOBINA1 6.8 (A) 06/12/2018    HEMOGLOBINA1 7.0 (A) 05/15/2014    HEMOGLOBINA1 7.6 (H) 05/05/2011    HEMOGLOBINA1 7.5 (H) 12/04/2009     Lab Results   Component Value Date    CPEPT 4.2 05/05/2016     Lab Results   Component Value Date    CHOL 120 12/04/2018    CHOL 119 05/05/2016    TRIG 92 12/04/2018    TRIG 115 05/05/2016    HDL 48 12/04/2018    HDL 43 05/05/2016    LDL 54 12/04/2018    LDL 55 08/01/2017    NHDL 73 12/04/2018    NHDL 76 05/05/2016     Assessment and Plan:  Farzad East is a 83 year old here for a diabetes mellitus follow-up.     Type II diabetes mellitus   Appears to be in good control based on self-reported home blood glucose readings. Denies hypoglycemia. A1c from today is pending (drawn in lab). We discussed again, the potential risk of hypoglycemia with Glipizide especially in  elderly and in the setting of CKD. However, he has not had any issues and would like to continue  at this time.   We also reviewed fall precautions. Blood pressure is in good range, continue current diabetes medications. Labs from today are pending, will communicate the results with him once available.      Follow-up: Return in about 4 months (around 4/4/2019).     >50% of 25 minute visit spent in face to face counseling, education and coordination of care related to options for better glycemic control as well as preventing, detecting, and treating hypoglycemia.      Scribe Disclosure:   I, Tika Shukla, am serving as a scribe to document services personally performed by Jose Amin MD at this visit, based upon the provider's statements to me. All documentation has been reviewed by the aforementioned provider prior to being entered into the official medical record.     Portions of this medical record were completed by a scribe. UPON MY REVIEW AND AUTHENTICATION BY ELECTRONIC SIGNATURE, this confirms (a) I performed the applicable clinical services, and (b) the record is accurate.      JUDD Fenton

## 2018-12-07 ENCOUNTER — TELEPHONE (OUTPATIENT)
Dept: ENDOCRINOLOGY | Facility: CLINIC | Age: 83
End: 2018-12-07

## 2018-12-07 NOTE — TELEPHONE ENCOUNTER
Called patient x 2 today on home and cell #'s  to review abnormal lab results.  Left voice mail with my telephone # to call back.     Jose Amin

## 2018-12-09 ENCOUNTER — TELEPHONE (OUTPATIENT)
Dept: ENDOCRINOLOGY | Facility: CLINIC | Age: 83
End: 2018-12-09

## 2018-12-09 DIAGNOSIS — E11.65 TYPE 2 DIABETES MELLITUS WITH HYPERGLYCEMIA, WITHOUT LONG-TERM CURRENT USE OF INSULIN (H): Primary | ICD-10-CM

## 2018-12-09 RX ORDER — LOSARTAN POTASSIUM AND HYDROCHLOROTHIAZIDE 12.5; 5 MG/1; MG/1
1 TABLET ORAL DAILY
Qty: 90 TABLET | Refills: 3 | Status: SHIPPED | OUTPATIENT
Start: 2018-12-09 | End: 2020-01-03

## 2018-12-09 NOTE — TELEPHONE ENCOUNTER
Lab results reviewed with Alessio over the phone.  Discussed that Cr is higher compared to baseline and K is also high.  He c/o some diarrhea for last couple of days. Otherwise feels fine and denies any other symptoms.   Discussed that we would hold metformin for now.   Also reviewed that would discharge current dose of Hyzaar. Will recheck  BMP on Tuesday when he comes for an eye visit.  Also would restart Hyzaar at lower dose, 50-12.5 mg daily.   If Cr remains above baseline would consider renal consult.      Results for ALESSIO CHAVES (MRN [id number]) as of 12/9/2018 15:36   Ref. Range 12/4/2018 15:26 12/4/2018 15:32   Sodium Latest Ref Range: 133 - 144 mmol/L 139    Potassium Latest Ref Range: 3.4 - 5.3 mmol/L 5.4 (H)    Chloride Latest Ref Range: 94 - 109 mmol/L 108    Carbon Dioxide Latest Ref Range: 20 - 32 mmol/L 26    Urea Nitrogen Latest Ref Range: 7 - 30 mg/dL 49 (H)    Creatinine Latest Ref Range: 0.66 - 1.25 mg/dL 2.19 (H)    GFR Estimate Latest Ref Range: >60 mL/min/1.7m2 29 (L)    GFR Estimate If Black Latest Ref Range: >60 mL/min/1.7m2 35 (L)    Calcium Latest Ref Range: 8.5 - 10.1 mg/dL 9.0    Anion Gap Latest Ref Range: 3 - 14 mmol/L 5    ALT Latest Ref Range: 0 - 70 U/L 16    AST Latest Ref Range: 0 - 45 U/L 10    Hemoglobin A1C Latest Ref Range: 0 - 5.6 % 8.3 (H)    Albumin Urine mg/g Cr Latest Ref Range: 0 - 17 mg/g Cr  377.78 (H)   Albumin Urine mg/L Latest Units: mg/L  442   Cholesterol Latest Ref Range: <200 mg/dL 120    Creatinine Urine Latest Units: mg/dL  117   HDL Cholesterol Latest Ref Range: >39 mg/dL 48    LDL Cholesterol Calculated Latest Ref Range: <100 mg/dL 54    Non HDL Cholesterol Latest Ref Range: <130 mg/dL 73    T4 Free Latest Ref Range: 0.76 - 1.46 ng/dL 0.92    Triglycerides Latest Ref Range: <150 mg/dL 92    TSH Latest Ref Range: 0.40 - 4.00 mU/L 4.49 (H)    Glucose Latest Ref Range: 70 - 99 mg/dL 163 (H)    WBC Latest Ref Range: 4.0 - 11.0 10e9/L 7.2    Hemoglobin Latest  Ref Range: 13.3 - 17.7 g/dL 10.9 (L)    Hematocrit Latest Ref Range: 40.0 - 53.0 % 34.9 (L)    Platelet Count Latest Ref Range: 150 - 450 10e9/L 267    RBC Count Latest Ref Range: 4.4 - 5.9 10e12/L 3.74 (L)    MCV Latest Ref Range: 78 - 100 fl 93    MCH Latest Ref Range: 26.5 - 33.0 pg 29.1    MCHC Latest Ref Range: 31.5 - 36.5 g/dL 31.2 (L)    RDW Latest Ref Range: 10.0 - 15.0 % 14.5    Diff Method Unknown Automated Method    % Neutrophils Latest Units: % 66.2    % Lymphocytes Latest Units: % 20.8    % Monocytes Latest Units: % 8.4    % Eosinophils Latest Units: % 3.9    % Basophils Latest Units: % 0.4    % Immature Granulocytes Latest Units: % 0.3    Nucleated RBCs Latest Ref Range: 0 /100 0    Absolute Neutrophil Latest Ref Range: 1.6 - 8.3 10e9/L 4.7    Absolute Lymphocytes Latest Ref Range: 0.8 - 5.3 10e9/L 1.5    Absolute Monocytes Latest Ref Range: 0.0 - 1.3 10e9/L 0.6    Absolute Eosinophils Latest Ref Range: 0.0 - 0.7 10e9/L 0.3    Absolute Basophils Latest Ref Range: 0.0 - 0.2 10e9/L 0.0    Abs Immature Granulocytes Latest Ref Range: 0 - 0.4 10e9/L 0.0    Absolute Nucleated RBC Unknown 0.0

## 2018-12-13 DIAGNOSIS — E11.65 TYPE 2 DIABETES MELLITUS WITH HYPERGLYCEMIA (H): ICD-10-CM

## 2018-12-16 NOTE — TELEPHONE ENCOUNTER
sitagliptin (JANUVIA) 100 MG tablet   Last Written Prescription Date:  9/25/17  Last Fill Quantity: 90,   # refills: 3  Last Office Visit :12/4/18  Future Office visit:  5/4/19    Routing refill request to provider for review/approval because:  Creat HIGH.

## 2018-12-17 DIAGNOSIS — E11.65 TYPE 2 DIABETES MELLITUS WITH HYPERGLYCEMIA (H): ICD-10-CM

## 2018-12-17 RX ORDER — SITAGLIPTIN 100 MG/1
TABLET, FILM COATED ORAL
Qty: 90 TABLET | Refills: 0 | Status: SHIPPED | OUTPATIENT
Start: 2018-12-17 | End: 2019-04-24

## 2018-12-18 ENCOUNTER — OFFICE VISIT (OUTPATIENT)
Dept: OPHTHALMOLOGY | Facility: CLINIC | Age: 83
End: 2018-12-18
Attending: OPHTHALMOLOGY
Payer: MEDICARE

## 2018-12-18 DIAGNOSIS — H43.813 VITREOUS DEGENERATION, BILATERAL: ICD-10-CM

## 2018-12-18 DIAGNOSIS — H35.3131 EARLY DRY STAGE NONEXUDATIVE AGE-RELATED MACULAR DEGENERATION OF BOTH EYES: ICD-10-CM

## 2018-12-18 DIAGNOSIS — D31.32 CHOROIDAL NEVUS OF LEFT EYE: ICD-10-CM

## 2018-12-18 DIAGNOSIS — E11.65 TYPE 2 DIABETES MELLITUS WITH HYPERGLYCEMIA, WITHOUT LONG-TERM CURRENT USE OF INSULIN (H): ICD-10-CM

## 2018-12-18 LAB
ANION GAP SERPL CALCULATED.3IONS-SCNC: 8 MMOL/L (ref 3–14)
BUN SERPL-MCNC: 47 MG/DL (ref 7–30)
CALCIUM SERPL-MCNC: 8.8 MG/DL (ref 8.5–10.1)
CHLORIDE SERPL-SCNC: 107 MMOL/L (ref 94–109)
CO2 SERPL-SCNC: 23 MMOL/L (ref 20–32)
CREAT SERPL-MCNC: 1.83 MG/DL (ref 0.66–1.25)
GFR SERPL CREATININE-BSD FRML MDRD: 36 ML/MIN/1.7M2
GLUCOSE SERPL-MCNC: 266 MG/DL (ref 70–99)
POTASSIUM SERPL-SCNC: 5.3 MMOL/L (ref 3.4–5.3)
SODIUM SERPL-SCNC: 138 MMOL/L (ref 133–144)

## 2018-12-18 PROCEDURE — 92250 FUNDUS PHOTOGRAPHY W/I&R: CPT | Mod: ZF | Performed by: OPHTHALMOLOGY

## 2018-12-18 PROCEDURE — G0463 HOSPITAL OUTPT CLINIC VISIT: HCPCS | Mod: ZF

## 2018-12-18 PROCEDURE — 92134 CPTRZ OPH DX IMG PST SGM RTA: CPT | Mod: ZF | Performed by: OPHTHALMOLOGY

## 2018-12-18 PROCEDURE — 92015 DETERMINE REFRACTIVE STATE: CPT | Mod: ZF

## 2018-12-18 RX ORDER — METFORMIN HCL 500 MG
1000 TABLET, EXTENDED RELEASE 24 HR ORAL
Qty: 180 TABLET | Refills: 1 | Status: SHIPPED | OUTPATIENT
Start: 2018-12-18 | End: 2019-07-09

## 2018-12-18 ASSESSMENT — TONOMETRY
IOP_METHOD: TONOPEN
OS_IOP_MMHG: 10
OD_IOP_MMHG: 10

## 2018-12-18 ASSESSMENT — VISUAL ACUITY
METHOD: SNELLEN - LINEAR
OD_SC: 20/20
OS_SC: 20/25
OD_SC+: -2
OS_SC+: -2

## 2018-12-18 ASSESSMENT — SLIT LAMP EXAM - LIDS
COMMENTS: DERMATOCHALSIS
COMMENTS: DERMATOCHALSIS

## 2018-12-18 ASSESSMENT — CONF VISUAL FIELD
OS_NORMAL: 1
OD_NORMAL: 1

## 2018-12-18 ASSESSMENT — CUP TO DISC RATIO
OS_RATIO: 0.2
OD_RATIO: 0.2

## 2018-12-18 ASSESSMENT — REFRACTION_MANIFEST
OS_AXIS: 119
OD_CYLINDER: +0.25
OD_ADD: +2.75
OD_AXIS: 160
OS_ADD: +2.75
OS_CYLINDER: +0.25
OS_SPHERE: +0.50
OD_SPHERE: +0.25

## 2018-12-18 ASSESSMENT — EXTERNAL EXAM - RIGHT EYE: OD_EXAM: NORMAL

## 2018-12-18 ASSESSMENT — EXTERNAL EXAM - LEFT EYE: OS_EXAM: NORMAL

## 2018-12-18 NOTE — TELEPHONE ENCOUNTER
metFORMIN (GLUCOPHAGE-XR) 500 MG 24 hr tablet      Last Written Prescription Date:  3/19/18  Last Fill Quantity: 180,   # refills: 2  Last Office Visit : 12/4/18  Future Office visit:  5/14/19    Routing refill request to provider for review/approval because:  Failed protocol: abnormal/not within parameters labs- Creatinine, EGFR

## 2018-12-18 NOTE — PROGRESS NOTES
CC - Diabetes Mellitus & Choroidal Nevus    INTERVAL HISTORY -   No changes tressa OTERO  Says his vision remains good  He is able to do what he wants to do  Eyes are comfortable   No current concerns, no f/f  Last A1c 8.0 12/2018    HPI -  Farzad East is an 83 year old male who presents for follow-up for diabetes mellitus, choridal nevus, and dry AMD      PAST OCULAR SURGERY  CE/IOL OU ~ 2009    RETINAL IMAGING  OCT 12-18-18  OD - mild atrophic change, PVD  OS -  ERM, central distortion with cyst, ?VMT, stable from 2012    Photos 12-18-18  Right eye: normal  Left eye: inferiortemporal choroidal nevus, flat, pigmented, drusen; stable from prior photos 2005    ASSESSMENT/PLAN  1. DM II with mild NPDR OU   - BP/BG control    2. PVD OU with VMT OS   - OCT with vitreomacular attachment/traction left eye    -advised S/Sx RD 12/2018    3. Choroidal Nevus LE   - located inferior temporally, trace elevation, pigmented, drusen, no orange deposits or fluid   - stable from previous photos (poor quality today)    4.  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 Conjunctival Lesions, Left Eye   Biopsided 5/2015 and found to be an epidermoid cyst    6.  Pseudophakia, Both Eyes   Multifocal lenses      Return to Clinic: 1 year, OCT OU, photos OS    Brodie Rivera M.D.  PGY-3, Ophthalmology    ATTESTATION     Attending Physician Attestation:      Complete documentation of historical and exam elements from today's encounter can be found in the full encounter summary report (not reduplicated in this progress note).  I personally obtained the chief complaint(s) and history of present illness.  I confirmed and edited as necessary the review of systems, past medical/surgical history, family history, social history, and examination findings as documented by others; and I examined the patient myself.  I personally  reviewed the relevant tests, images, and reports as documented above.  I personally reviewed the ophthalmic test(s) associated with this encounter, agree with the interpretation(s) as documented by the resident/fellow, and have edited the corresponding report(s) as necessary.   I formulated and edited as necessary the assessment and plan and discussed the findings and management plan with the patient and family    Rosemary Arreguin MD, PhD  , Vitreoretinal Surgery  Department of Ophthalmology  [facility]

## 2018-12-20 ENCOUNTER — TELEPHONE (OUTPATIENT)
Dept: ENDOCRINOLOGY | Facility: CLINIC | Age: 83
End: 2018-12-20

## 2018-12-20 DIAGNOSIS — E11.8 TYPE 2 DIABETES MELLITUS WITH COMPLICATION, UNSPECIFIED WHETHER LONG TERM INSULIN USE: Primary | ICD-10-CM

## 2018-12-20 NOTE — TELEPHONE ENCOUNTER
Called patient x 2 to review labs but unable to reach over the phone, left voice mail.     JUDD Fenton

## 2018-12-21 NOTE — TELEPHONE ENCOUNTER
labs reviewed with patient over the phone.   Patient picked up the lower dose prescription of Hyzaar couple of days ago.  Cr is around baseline, discussed that K in high normal.   restart metformin, recheck BMP in about 10-14 days on the new dose of Hyzaar. Patient is agreeable.

## 2018-12-26 ENCOUNTER — THERAPY VISIT (OUTPATIENT)
Dept: PHYSICAL THERAPY | Facility: CLINIC | Age: 83
End: 2018-12-26
Payer: MEDICARE

## 2018-12-26 DIAGNOSIS — M25.552 HIP PAIN, LEFT: Primary | ICD-10-CM

## 2018-12-26 DIAGNOSIS — R29.898 WEAKNESS OF LEFT LOWER EXTREMITY: ICD-10-CM

## 2018-12-26 PROCEDURE — 97530 THERAPEUTIC ACTIVITIES: CPT | Mod: GP | Performed by: PHYSICAL THERAPIST

## 2018-12-26 PROCEDURE — 97110 THERAPEUTIC EXERCISES: CPT | Mod: GP | Performed by: PHYSICAL THERAPIST

## 2018-12-31 ENCOUNTER — THERAPY VISIT (OUTPATIENT)
Dept: PHYSICAL THERAPY | Facility: CLINIC | Age: 83
End: 2018-12-31
Payer: MEDICARE

## 2018-12-31 DIAGNOSIS — M25.552 HIP PAIN, LEFT: ICD-10-CM

## 2018-12-31 DIAGNOSIS — R29.898 WEAKNESS OF LEFT LOWER EXTREMITY: ICD-10-CM

## 2018-12-31 PROCEDURE — 97530 THERAPEUTIC ACTIVITIES: CPT | Mod: GP | Performed by: PHYSICAL THERAPIST

## 2018-12-31 PROCEDURE — 97110 THERAPEUTIC EXERCISES: CPT | Mod: GP | Performed by: PHYSICAL THERAPIST

## 2019-01-11 ENCOUNTER — THERAPY VISIT (OUTPATIENT)
Dept: PHYSICAL THERAPY | Facility: CLINIC | Age: 84
End: 2019-01-11
Payer: MEDICARE

## 2019-01-11 DIAGNOSIS — R29.898 WEAKNESS OF LEFT LOWER EXTREMITY: ICD-10-CM

## 2019-01-11 DIAGNOSIS — M25.552 HIP PAIN, LEFT: Primary | ICD-10-CM

## 2019-01-11 PROCEDURE — 97110 THERAPEUTIC EXERCISES: CPT | Mod: GP | Performed by: PHYSICAL THERAPIST

## 2019-01-11 PROCEDURE — 97530 THERAPEUTIC ACTIVITIES: CPT | Mod: GP | Performed by: PHYSICAL THERAPIST

## 2019-01-11 NOTE — PROGRESS NOTES
Subjective:  HPI                    Objective:  System    Physical Exam    General     ROS    Assessment/Plan:    PROGRESS  REPORT    Progress reporting period is from [id number] to [id number]      SUBJECTIVE  Subjective changes noted by patient:  Subjective: He has difficulty lifting L leg up stairs and somewhat cautious on descending.      Changes in function:  Yes (See Goal flowsheet attached for changes in current functional level)  Adverse reaction to treatment or activity: None    OBJECTIVE  Changes noted in objective findings:  Yes,   Objective: MMT: quad 2 + HS no loss HF 2+ GM  2   He is now able to march in place and hold weight on his L leg.      ASSESSMENT/PLAN  Updated problem list and treatment plan: Diagnosis 1:  GM weakness LOB [place]  Decreased strength - therapeutic exercise and therapeutic activities  Impaired balance - therapeutic activities and home program  Impaired gait - home program  Decreased function - therapeutic activities and home program  STG/LTGs have been met or progress has been made towards goals:  Yes (See Goal flow sheet completed today.)  Assessment of Progress: The patient's condition is improving.  Self Management Plans:  Patient is independent in a home treatment program.  Patient is independent in self management of symptoms.    Farzad continues to require the following intervention to meet STG and LTG's:  PT    Recommendations:  This patient would benefit from continued therapy.     Frequency:  1 X week, once daily  Duration:  for 4 weeks        Please refer to the daily flowsheet for treatment today, total treatment time and time spent performing 1:1 timed codes.

## 2019-01-22 DIAGNOSIS — Z79.4 TYPE 2 DIABETES MELLITUS WITH COMPLICATION, WITH LONG-TERM CURRENT USE OF INSULIN (H): ICD-10-CM

## 2019-01-22 DIAGNOSIS — E11.8 TYPE 2 DIABETES MELLITUS WITH COMPLICATION, WITH LONG-TERM CURRENT USE OF INSULIN (H): ICD-10-CM

## 2019-01-22 RX ORDER — TAMSULOSIN HYDROCHLORIDE 0.4 MG/1
0.4 CAPSULE ORAL DAILY
Qty: 90 CAPSULE | Refills: 1 | Status: SHIPPED | OUTPATIENT
Start: 2019-01-22 | End: 2019-07-31

## 2019-01-22 NOTE — TELEPHONE ENCOUNTER
tamsulosin (FLOMAX) 0.4 MG capsule        Last Written Prescription Date:  11/13/2017  Last Fill Quantity: 90,   # refills: 3  Last Office Visit : 12/04/2018  Future Office visit:  05/14/2019    Routing refill request to provider for review/approval because: potential drug interaction with sildenafil

## 2019-02-13 RX ORDER — LOSARTAN POTASSIUM AND HYDROCHLOROTHIAZIDE 25; 100 MG/1; MG/1
TABLET ORAL
Qty: 90 TABLET | Refills: 2 | OUTPATIENT
Start: 2019-02-13

## 2019-02-14 DIAGNOSIS — E11.65 TYPE 2 DIABETES MELLITUS WITH HYPERGLYCEMIA (H): ICD-10-CM

## 2019-02-14 RX ORDER — GLIPIZIDE 5 MG/1
5 TABLET, FILM COATED, EXTENDED RELEASE ORAL DAILY
Qty: 90 TABLET | Refills: 1 | Status: SHIPPED | OUTPATIENT
Start: 2019-02-14 | End: 2019-10-02

## 2019-02-14 NOTE — TELEPHONE ENCOUNTER
glipiZIDE (GLUCOTROL XL) 5 MG 24 hr tablet        Last Written Prescription Date:  06/12/2018  Last Fill Quantity: 90,   # refills: 1  Last Office Visit : 12/04/2018  Future Office visit:  05/14/2019    Routing refill request to provider for review/approval because: labs

## 2019-03-30 DIAGNOSIS — I10 HYPERTENSION, UNSPECIFIED TYPE: ICD-10-CM

## 2019-04-02 NOTE — TELEPHONE ENCOUNTER
amLODIPine (NORVASC) 2.5 MG tablet      Last Written Prescription Date:  10-3-18  Last Fill Quantity: 90,   # refills: 1  Last Office Visit : 10-3-18  Future Office visit:  5-1-19    Routing refill request to provider for review/approval because:  Abnormal lab: Cr    Lab Test 12/18/18  1231   CR 1.83*       90 day RX pending

## 2019-04-05 RX ORDER — AMLODIPINE BESYLATE 2.5 MG/1
2.5 TABLET ORAL EVERY MORNING
Qty: 90 TABLET | Refills: 0 | Status: SHIPPED | OUTPATIENT
Start: 2019-04-05 | End: 2019-07-01

## 2019-04-24 DIAGNOSIS — E11.65 TYPE 2 DIABETES MELLITUS WITH HYPERGLYCEMIA (H): ICD-10-CM

## 2019-04-25 NOTE — TELEPHONE ENCOUNTER
JANUVIA 100 MG tablet   Last Written Prescription Date:  12/17/18  Last Fill Quantity: 90,   # refills: 0  Last Office Visit :12/4/18  Future Office visit:  5/14/19    Routing refill request to provider for review/approval because:  Sukhwinder GOODMAN Microalbumin. A1C past due

## 2019-05-10 ENCOUNTER — TELEPHONE (OUTPATIENT)
Dept: CARDIOLOGY | Facility: CLINIC | Age: 84
End: 2019-05-10

## 2019-05-10 NOTE — TELEPHONE ENCOUNTER
Called and left VM for Pt requesting a return call to clinic.  Clinic telephone provided.    **Need to discuss with Pt waitlist and Dr Levi availability.    Nishi Shelley LPN

## 2019-05-14 ENCOUNTER — TELEPHONE (OUTPATIENT)
Dept: CARDIOLOGY | Facility: CLINIC | Age: 84
End: 2019-05-14

## 2019-05-14 NOTE — TELEPHONE ENCOUNTER
Called and left VM for Pt to return call to clinic to discuss wait list.  Clinic telephone provided.    **Availability with Dr Levi 05/15 at 12:30 PM.    Nishi Shelley LPN

## 2019-06-21 DIAGNOSIS — Z79.4 TYPE 2 DIABETES MELLITUS WITH COMPLICATION, WITH LONG-TERM CURRENT USE OF INSULIN (H): ICD-10-CM

## 2019-06-21 DIAGNOSIS — E11.8 TYPE 2 DIABETES MELLITUS WITH COMPLICATION, WITH LONG-TERM CURRENT USE OF INSULIN (H): ICD-10-CM

## 2019-07-01 DIAGNOSIS — I10 HYPERTENSION, UNSPECIFIED TYPE: ICD-10-CM

## 2019-07-03 RX ORDER — AMLODIPINE BESYLATE 2.5 MG/1
2.5 TABLET ORAL DAILY
Qty: 30 TABLET | Refills: 0 | Status: SHIPPED | OUTPATIENT
Start: 2019-07-03 | End: 2019-08-09

## 2019-07-22 DIAGNOSIS — E11.65 TYPE 2 DIABETES MELLITUS WITH HYPERGLYCEMIA, UNSPECIFIED WHETHER LONG TERM INSULIN USE (H): ICD-10-CM

## 2019-07-22 LAB
BASOPHILS # BLD AUTO: 0 10E9/L (ref 0–0.2)
BASOPHILS NFR BLD AUTO: 0.5 %
DIFFERENTIAL METHOD BLD: ABNORMAL
EOSINOPHIL # BLD AUTO: 0.4 10E9/L (ref 0–0.7)
EOSINOPHIL NFR BLD AUTO: 4 %
ERYTHROCYTE [DISTWIDTH] IN BLOOD BY AUTOMATED COUNT: 14.9 % (ref 10–15)
HBA1C MFR BLD: 7.7 % (ref 0–5.6)
HCT VFR BLD AUTO: 35.3 % (ref 40–53)
HGB BLD-MCNC: 11.2 G/DL (ref 13.3–17.7)
LYMPHOCYTES # BLD AUTO: 1.4 10E9/L (ref 0.8–5.3)
LYMPHOCYTES NFR BLD AUTO: 15.9 %
MCH RBC QN AUTO: 29 PG (ref 26.5–33)
MCHC RBC AUTO-ENTMCNC: 31.7 G/DL (ref 31.5–36.5)
MCV RBC AUTO: 92 FL (ref 78–100)
MONOCYTES # BLD AUTO: 0.7 10E9/L (ref 0–1.3)
MONOCYTES NFR BLD AUTO: 7.8 %
NEUTROPHILS # BLD AUTO: 6.3 10E9/L (ref 1.6–8.3)
NEUTROPHILS NFR BLD AUTO: 71.8 %
PLATELET # BLD AUTO: 294 10E9/L (ref 150–450)
RBC # BLD AUTO: 3.86 10E12/L (ref 4.4–5.9)
WBC # BLD AUTO: 8.8 10E9/L (ref 4–11)

## 2019-07-22 PROCEDURE — 80061 LIPID PANEL: CPT | Performed by: INTERNAL MEDICINE

## 2019-07-22 PROCEDURE — 84439 ASSAY OF FREE THYROXINE: CPT | Performed by: INTERNAL MEDICINE

## 2019-07-22 PROCEDURE — 82306 VITAMIN D 25 HYDROXY: CPT | Performed by: INTERNAL MEDICINE

## 2019-07-22 PROCEDURE — 36415 COLL VENOUS BLD VENIPUNCTURE: CPT | Performed by: INTERNAL MEDICINE

## 2019-07-22 PROCEDURE — 83036 HEMOGLOBIN GLYCOSYLATED A1C: CPT | Performed by: INTERNAL MEDICINE

## 2019-07-22 PROCEDURE — 85025 COMPLETE CBC W/AUTO DIFF WBC: CPT | Performed by: INTERNAL MEDICINE

## 2019-07-22 PROCEDURE — 80053 COMPREHEN METABOLIC PANEL: CPT | Performed by: INTERNAL MEDICINE

## 2019-07-22 PROCEDURE — 84443 ASSAY THYROID STIM HORMONE: CPT | Performed by: INTERNAL MEDICINE

## 2019-07-23 ENCOUNTER — OFFICE VISIT (OUTPATIENT)
Dept: ENDOCRINOLOGY | Facility: CLINIC | Age: 84
End: 2019-07-23
Payer: MEDICARE

## 2019-07-23 VITALS
WEIGHT: 181.5 LBS | DIASTOLIC BLOOD PRESSURE: 72 MMHG | BODY MASS INDEX: 25.41 KG/M2 | HEART RATE: 84 BPM | SYSTOLIC BLOOD PRESSURE: 146 MMHG | HEIGHT: 71 IN

## 2019-07-23 DIAGNOSIS — M81.0 OSTEOPOROSIS, UNSPECIFIED OSTEOPOROSIS TYPE, UNSPECIFIED PATHOLOGICAL FRACTURE PRESENCE: ICD-10-CM

## 2019-07-23 DIAGNOSIS — Z96.649 HIP JOINT REPLACEMENT BY OTHER MEANS: ICD-10-CM
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
## 2019-07-23 DIAGNOSIS — E55.9 VITAMIN D DEFICIENCY: Primary | ICD-10-CM

## 2019-07-23 DIAGNOSIS — R26.89 BALANCE PROBLEMS: ICD-10-CM

## 2019-07-23 LAB
ALBUMIN SERPL-MCNC: 3.5 G/DL (ref 3.4–5)
ALP SERPL-CCNC: 87 U/L (ref 40–150)
ALT SERPL W P-5'-P-CCNC: 13 U/L (ref 0–70)
ANION GAP SERPL CALCULATED.3IONS-SCNC: 7 MMOL/L (ref 3–14)
AST SERPL W P-5'-P-CCNC: 8 U/L (ref 0–45)
BILIRUB SERPL-MCNC: 0.3 MG/DL (ref 0.2–1.3)
BUN SERPL-MCNC: 52 MG/DL (ref 7–30)
CALCIUM SERPL-MCNC: 8.2 MG/DL (ref 8.5–10.1)
CHLORIDE SERPL-SCNC: 109 MMOL/L (ref 94–109)
CHOLEST SERPL-MCNC: 126 MG/DL
CO2 SERPL-SCNC: 23 MMOL/L (ref 20–32)
CREAT SERPL-MCNC: 2 MG/DL (ref 0.66–1.25)
GFR SERPL CREATININE-BSD FRML MDRD: 30 ML/MIN/{1.73_M2}
GLUCOSE SERPL-MCNC: 217 MG/DL (ref 70–99)
HDLC SERPL-MCNC: 52 MG/DL
LDLC SERPL CALC-MCNC: 54 MG/DL
NONHDLC SERPL-MCNC: 74 MG/DL
POTASSIUM SERPL-SCNC: 4.8 MMOL/L (ref 3.4–5.3)
PROT SERPL-MCNC: 7.5 G/DL (ref 6.8–8.8)
SODIUM SERPL-SCNC: 139 MMOL/L (ref 133–144)
T4 FREE SERPL-MCNC: 0.91 NG/DL (ref 0.76–1.46)
TRIGL SERPL-MCNC: 100 MG/DL
TSH SERPL DL<=0.005 MIU/L-ACNC: 4.11 MU/L (ref 0.4–4)

## 2019-07-23 ASSESSMENT — MIFFLIN-ST. JEOR: SCORE: 1545.41

## 2019-07-23 ASSESSMENT — PAIN SCALES - GENERAL: PAINLEVEL: NO PAIN (0)

## 2019-07-23 NOTE — PROGRESS NOTES
[facility]  Endocrinology  Amisulema Amin MD  07/23/2019      Chief Complaint:   Diabetes    History of Present Illness:   Frazad East is a 83 year old male with a history of  CKD stage 3, type 2 diabetes mellitus with nephropathy, and hypertension who presents for follow up of type 2 diabetes mellitus. The patient was diagnosed with diabetes in his 50s and has managed using Metformin, glipizide, Lantus, and Januvia in the past. Lantus was discontinued toward the beginning of 2018. His A1c was 6.8 in June 2018 so he decreased his glipizide to 5 mg daily due to hypoglycemia risk.     During his winter in [place] he felt his blood glucose was well controlled, around 100 fasting. If he noticed sugars below 100 he would hold Glipizide for that day. Once he returned to [place] he has had a couple high readings, up to 160. He occasionally skips meals if his schedule does not permit him to stop. His GFR was 30 on most recent labs, he understands the increased risk of hypoglycemia with his current regimen and impaired renal function. He is on Januvia 50mg, Metformin 1000mg, and Glipizide 5 mg and would like to remain on this with continued monitoring of his kidney function. His lowest blood sugars have been in the 80s and 90s.     He had h a fall after losing his balance while getting into his car, injuring his knee, shoulder, and head. No fractures. He had some bleeding from the head which stopped on its own, he did not go to the ED for evaluation. Because of this fall he would like a DEXA, his hip/emur fracture was ~2005 and he had a revision for hardware failure in 2014. His hip pain limits him and he requires a cane. He is on a vitamin D supplement, no calcium supplement but he does drink 2-5 glasses of milk per day with occasional yogurt. He has grab bars in the shower and on his toilet for fall prevention.     Blood Glucose Monitoring:  We reviewed glucometer data together. Checks BG daily in the morning   Average  127 fasting    Diabetes monitoring and complications:  CAD: No  Last eye exam [id number]/8/2018 mild NPDR  Microalbuminuria: 377 mg/ g Cr 12/4/2018  HTN: Yes  On Statin: No  On Aspirin: Yes  Erectile dysfunction: Yes    Review of Systems:   Pertinent items are noted in HPI.  All other systems are negative.    Active Medications:     Current Outpatient Medications:      amLODIPine (NORVASC) 2.5 MG tablet, Take 1 tablet (2.5 mg) by mouth daily Next refill at 7-23-19 appointment., Disp: 30 tablet, Rfl: 0     aspirin 81 MG tablet, Take 1 tablet by mouth daily., Disp: , Rfl:      blood glucose monitoring (ONETOUCH VERIO IQ SYSTEM) meter device kit, Use to test blood sugar daily, Disp: 1 kit, Rfl: 0     blood glucose monitoring (ONETOUCH VERIO IQ) test strip, Use to test blood sugar 2-3 daily or as directed., Disp: 300 each, Rfl: 3     Cholecalciferol (VITAMIN D-3 PO), , Disp: , Rfl:      COMPRESSION STOCKINGS, 1 each daily, Disp: 2 each, Rfl: 11     glipiZIDE (GLUCOTROL XL) 5 MG 24 hr tablet, Take 1 tablet (5 mg) by mouth daily, Disp: 90 tablet, Rfl: 1     hydrochlorothiazide (MICROZIDE) 12.5 MG capsule, Take 1 capsule (12.5 mg) by mouth daily Use while combination (HyZaar) is unavailable., Disp: 90 capsule, Rfl: 0     insulin pen needle (31G X 5 MM) 31G X 5 MM miscellaneous, Use 1 pen needle daily or as directed, Disp: 100 each, Rfl: 1     losartan (COZAAR) 50 MG tablet, Take 1 tablet (50 mg) by mouth daily Use while Combination (HyZaar) is unavailable., Disp: 90 tablet, Rfl: 0     losartan-hydrochlorothiazide (HYZAAR) 50-12.5 MG tablet, Take 1 tablet by mouth daily, Disp: 90 tablet, Rfl: 3     metFORMIN (GLUCOPHAGE-XR) 500 MG 24 hr tablet, Take 2 tablets (1,000 mg) by mouth daily with food, Disp: 180 tablet, Rfl: 1     multivitamin w/minerals (MULTI-VITAMIN) tablet, Take 1 tablet by mouth daily, Disp: , Rfl:      ONETOUCH ULTRA test strip, Use to test blood sugar 3 times daily or as directed., Disp: 270 each, Rfl: 3      "sildenafil (VIAGRA) 100 MG tablet, Take 1 tablet (100 mg) by mouth daily as needed for erectile dysfunction, Disp: 6 tablet, Rfl: 11     sitagliptin (JANUVIA) 50 MG tablet, Take 1 tablet (50 MG) by mouth daily., Disp: 90 tablet, Rfl: 1     tamsulosin (FLOMAX) 0.4 MG capsule, Take 1 capsule (0.4 mg) by mouth daily, Disp: 90 capsule, Rfl: 1      Allergies:   Sulfa drugs      Past Medical History:  Atrial flutter  Chronic kidney disease stage 3  Type 2 diabetes mellitus  Hypertension  Peripheral neuropathy  Rectal type adenocarcinoma  Femur fracture     Past Surgical History:  Cataract  ORIF femur  Left KIMBERLY    Family History:   Father: diabetes mellitus , peripheral artery disease , macular degeneration  Mother: arthritis      Social History:     Non smoker     Physical Exam:   /72   Pulse 84   Ht 1.803 m (5' 11\")   Wt 82.3 kg (181 lb 8 oz)   BMI 25.31 kg/m       Wt Readings from Last 10 Encounters:   07/23/19 82.3 kg (181 lb 8 oz)   12/04/18 80.3 kg (177 lb)   10/03/18 79.8 kg (176 lb)   09/04/18 82 kg (180 lb 11.2 oz)   06/12/18 89.7 kg (197 lb 12.8 oz)   01/09/18 77.7 kg (171 lb 6.4 oz)   12/12/17 77.9 kg (171 lb 12.8 oz)   09/05/17 77.1 kg (170 lb)   08/01/17 77.1 kg (169 lb 14.4 oz)   08/19/16 81.6 kg (180 lb)        Constitutional: no distress, comfortable, pleasant   Eyes: anicteric, normal extra-ocular movements, No lig lag, retraction or proptosis  Ears, Nose and Throat: throat clear  Neck: supple, no thyromegaly.   Cardiovascular: regular rate and rhythm, normal S1 and S2, no murmurs  Respiratory: clear to auscultation, no wheezes or crackles, normal breath sounds   Gastrointestinal: nontender, no striae   Musculoskeletal: b/l LE  edema   Skin:  no jaundice   Psychological: appropriate mood   Lymphatic: no cervical lymphadenopathy        Data:  Component      Latest Ref Rng & Units 7/22/2019   WBC      4.0 - 11.0 10e9/L 8.8   RBC Count      4.4 - 5.9 10e12/L 3.86 (L)   Hemoglobin      13.3 - " 17.7 g/dL 11.2 (L)   Hematocrit      40.0 - 53.0 % 35.3 (L)   MCV      78 - 100 fl 92   MCH      26.5 - 33.0 pg 29.0   MCHC      31.5 - 36.5 g/dL 31.7   RDW      10.0 - 15.0 % 14.9   Platelet Count      150 - 450 10e9/L 294   % Neutrophils      % 71.8   % Lymphocytes      % 15.9   % Monocytes      % 7.8   % Eosinophils      % 4.0   % Basophils      % 0.5   Absolute Neutrophil      1.6 - 8.3 10e9/L 6.3   Absolute Lymphocytes      0.8 - 5.3 10e9/L 1.4   Absolute Monocytes      0.0 - 1.3 10e9/L 0.7   Absolute Eosinophils      0.0 - 0.7 10e9/L 0.4   Absolute Basophils      0.0 - 0.2 10e9/L 0.0   Diff Method       Automated Method   Sodium      133 - 144 mmol/L 139   Potassium      3.4 - 5.3 mmol/L 4.8   Chloride      94 - 109 mmol/L 109   Carbon Dioxide      20 - 32 mmol/L 23   Anion Gap      3 - 14 mmol/L 7   Glucose      70 - 99 mg/dL 217 (H)   Urea Nitrogen      7 - 30 mg/dL 52 (H)   Creatinine      0.66 - 1.25 mg/dL 2.00 (H)   GFR Estimate      >60 mL/min/1.73:m2 30 (L)   GFR Estimate If Black      >60 mL/min/1.73:m2 35 (L)   Calcium      8.5 - 10.1 mg/dL 8.2 (L)   Bilirubin Total      0.2 - 1.3 mg/dL 0.3   Albumin      3.4 - 5.0 g/dL 3.5   Protein Total      6.8 - 8.8 g/dL 7.5   Alkaline Phosphatase      40 - 150 U/L 87   ALT      0 - 70 U/L 13   AST      0 - 45 U/L 8   Cholesterol      <200 mg/dL 126   Triglycerides      <150 mg/dL 100   HDL Cholesterol      >39 mg/dL 52   LDL Cholesterol Calculated      <100 mg/dL 54   Non HDL Cholesterol      <130 mg/dL 74   Hemoglobin A1C      0 - 5.6 % 7.7 (H)   TSH      0.40 - 4.00 mU/L 4.11 (H)   T4 Free      0.76 - 1.46 ng/dL 0.91     Assessment and Plan:     Type 2 diabetes, complicated by nephropathy     We reviewed the risk of hypoglycemia in setting of his impaired renal function with creatinine of 2.0 and GFR of 30 He is satisfied with his current management and would like to remain on this for now. He is tolerating this and based on recent A1c of 7.7% and fasting  glucometer data it is working well for him. His creatinine has fluctuated slightly over time but is overall stable.  We have reviewed the concern about metformin and SPENCER multiple times in the past and prefers to continue current regimen.  He agreed to close monitoring and will follow up with labs in 3 months.   Consider nephrology consult, he was not interested when offered in the past     Vitamin D deficiency  Vitamin D added to labs from his blood draw yesterday.     Osteoporosis, unspecified osteoporosis type, unspecified pathological fracture presence  Patient has a history of hip fracture and is at risk for falling. He has not had a  DEXA  and and is requested one to be done.   Review results and treatment option at next visit  - Dexa hip/pelvis/spine    Fall, subsequent encounter, Balance problems  His balance is worsening leading to a fall two weeks ago. He is at risk for future falls and would benefit from PT to work on strength, balance, and fall prevention. He agrees that this would be helpful.   - PHYSICAL THERAPY REFERRAL     Follow-up: Return in about 3 months (around 10/23/2019).     >50% of 30 minute visit spent in face to face counseling, education and coordination of care related to options for better glycemic control as well as preventing, detecting, and treating hypoglycemia.       Scribe Disclosure:  I, Negrito Milner, am serving as a scribe to document services personally performed by Jose Amin MD at this visit, based upon the provider's statements to me. All documentation has been reviewed by the aforementioned provider prior to being entered into the official medical record.     Portions of this medical record were completed by a scribe. UPON MY REVIEW AND AUTHENTICATION BY ELECTRONIC SIGNATURE, this confirms (a) I performed the applicable clinical services, and (b) the record is accurate.      JUDD Fenton

## 2019-07-23 NOTE — LETTER
7/23/2019       RE: Farzad East  [address]  [facility] [address]     Dear Colleague,    Thank you for referring your patient, Farzad East, to the [facility] ENDOCRINOLOGY at [facility]. Please see a copy of my visit note below.    [facility]  Endocrinology  Jose Amin MD  07/23/2019      Chief Complaint:   Diabetes    History of Present Illness:   Farzad East is a 83 year old male with a history of  CKD stage 3, type 2 diabetes mellitus with nephropathy, and hypertension who presents for follow up of type 2 diabetes mellitus. The patient was diagnosed with diabetes in his 50s and has managed using Metformin, glipizide, Lantus, and Januvia in the past. Lantus was discontinued toward the beginning of 2018. His A1c was 6.8 in June 2018 so he decreased his glipizide to 5 mg daily due to hypoglycemia risk.     During his winter in [place] he felt his blood glucose was well controlled, around 100 fasting. If he noticed sugars below 100 he would hold Glipizide for that day. Once he returned to [place] he has had a couple high readings, up to 160. He occasionally skips meals if his schedule does not permit him to stop. His GFR was 30 on most recent labs, he understands the increased risk of hypoglycemia with his current regimen and impaired renal function. He is on Januvia 50mg, Metformin 1000mg, and Glipizide 5 mg and would like to remain on this with continued monitoring of his kidney function. His lowest blood sugars have been in the 80s and 90s.     He had h a fall after losing his balance while getting into his car, injuring his knee, shoulder, and head. No fractures. He had some bleeding from the head which stopped on its own, he did not go to the ED for evaluation. Because of this fall he would like a DEXA, his hip/emur fracture was ~2005 and he had a revision for hardware failure in 2014. His hip pain limits him and he requires a cane. He is on  a vitamin D supplement, no calcium supplement but he does drink 2-5 glasses of milk per day with occasional yogurt. He has grab bars in the shower and on his toilet for fall prevention.     Blood Glucose Monitoring:  We reviewed glucometer data together. Checks BG daily in the morning   Average 127 fasting    Diabetes monitoring and complications:  CAD: No  Last eye exam [id number]/8/2018 mild NPDR  Microalbuminuria: 377 mg/ g Cr 12/4/2018  HTN: Yes  On Statin: No  On Aspirin: Yes  Erectile dysfunction: Yes    Review of Systems:   Pertinent items are noted in HPI.  All other systems are negative.    Active Medications:     Current Outpatient Medications:      amLODIPine (NORVASC) 2.5 MG tablet, Take 1 tablet (2.5 mg) by mouth daily Next refill at 7-23-19 appointment., Disp: 30 tablet, Rfl: 0     aspirin 81 MG tablet, Take 1 tablet by mouth daily., Disp: , Rfl:      blood glucose monitoring (ONETOUCH VERIO IQ SYSTEM) meter device kit, Use to test blood sugar daily, Disp: 1 kit, Rfl: 0     blood glucose monitoring (ONETOUCH VERIO IQ) test strip, Use to test blood sugar 2-3 daily or as directed., Disp: 300 each, Rfl: 3     Cholecalciferol (VITAMIN D-3 PO), , Disp: , Rfl:      COMPRESSION STOCKINGS, 1 each daily, Disp: 2 each, Rfl: 11     glipiZIDE (GLUCOTROL XL) 5 MG 24 hr tablet, Take 1 tablet (5 mg) by mouth daily, Disp: 90 tablet, Rfl: 1     hydrochlorothiazide (MICROZIDE) 12.5 MG capsule, Take 1 capsule (12.5 mg) by mouth daily Use while combination (HyZaar) is unavailable., Disp: 90 capsule, Rfl: 0     insulin pen needle (31G X 5 MM) 31G X 5 MM miscellaneous, Use 1 pen needle daily or as directed, Disp: 100 each, Rfl: 1     losartan (COZAAR) 50 MG tablet, Take 1 tablet (50 mg) by mouth daily Use while Combination (HyZaar) is unavailable., Disp: 90 tablet, Rfl: 0     losartan-hydrochlorothiazide (HYZAAR) 50-12.5 MG tablet, Take 1 tablet by mouth daily, Disp: 90 tablet, Rfl: 3     metFORMIN (GLUCOPHAGE-XR) 500 MG 24  "hr tablet, Take 2 tablets (1,000 mg) by mouth daily with food, Disp: 180 tablet, Rfl: 1     multivitamin w/minerals (MULTI-VITAMIN) tablet, Take 1 tablet by mouth daily, Disp: , Rfl:      ONETOUCH ULTRA test strip, Use to test blood sugar 3 times daily or as directed., Disp: 270 each, Rfl: 3     sildenafil (VIAGRA) 100 MG tablet, Take 1 tablet (100 mg) by mouth daily as needed for erectile dysfunction, Disp: 6 tablet, Rfl: 11     sitagliptin (JANUVIA) 50 MG tablet, Take 1 tablet (50 MG) by mouth daily., Disp: 90 tablet, Rfl: 1     tamsulosin (FLOMAX) 0.4 MG capsule, Take 1 capsule (0.4 mg) by mouth daily, Disp: 90 capsule, Rfl: 1      Allergies:   Sulfa drugs      Past Medical History:  Atrial flutter  Chronic kidney disease stage 3  Type 2 diabetes mellitus  Hypertension  Peripheral neuropathy  Rectal type adenocarcinoma  Femur fracture     Past Surgical History:  Cataract  ORIF femur  Left KIMBERLY    Family History:   Father: diabetes mellitus , peripheral artery disease , macular degeneration  Mother: arthritis      Social History:     Non smoker     Physical Exam:   /72   Pulse 84   Ht 1.803 m (5' 11\")   Wt 82.3 kg (181 lb 8 oz)   BMI 25.31 kg/m        Wt Readings from Last 10 Encounters:   07/23/19 82.3 kg (181 lb 8 oz)   12/04/18 80.3 kg (177 lb)   10/03/18 79.8 kg (176 lb)   09/04/18 82 kg (180 lb 11.2 oz)   06/12/18 89.7 kg (197 lb 12.8 oz)   01/09/18 77.7 kg (171 lb 6.4 oz)   12/12/17 77.9 kg (171 lb 12.8 oz)   09/05/17 77.1 kg (170 lb)   08/01/17 77.1 kg (169 lb 14.4 oz)   08/19/16 81.6 kg (180 lb)        Constitutional: no distress, comfortable, pleasant   Eyes: anicteric, normal extra-ocular movements, No lig lag, retraction or proptosis  Ears, Nose and Throat: throat clear  Neck: supple, no thyromegaly.   Cardiovascular: regular rate and rhythm, normal S1 and S2, no murmurs  Respiratory: clear to auscultation, no wheezes or crackles, normal breath sounds   Gastrointestinal: nontender, no " striae   Musculoskeletal: b/l LE  edema   Skin:  no jaundice   Psychological: appropriate mood   Lymphatic: no cervical lymphadenopathy        Data:  Component      Latest Ref Rng & Units 7/22/2019   WBC      4.0 - 11.0 10e9/L 8.8   RBC Count      4.4 - 5.9 10e12/L 3.86 (L)   Hemoglobin      13.3 - 17.7 g/dL 11.2 (L)   Hematocrit      40.0 - 53.0 % 35.3 (L)   MCV      78 - 100 fl 92   MCH      26.5 - 33.0 pg 29.0   MCHC      31.5 - 36.5 g/dL 31.7   RDW      10.0 - 15.0 % 14.9   Platelet Count      150 - 450 10e9/L 294   % Neutrophils      % 71.8   % Lymphocytes      % 15.9   % Monocytes      % 7.8   % Eosinophils      % 4.0   % Basophils      % 0.5   Absolute Neutrophil      1.6 - 8.3 10e9/L 6.3   Absolute Lymphocytes      0.8 - 5.3 10e9/L 1.4   Absolute Monocytes      0.0 - 1.3 10e9/L 0.7   Absolute Eosinophils      0.0 - 0.7 10e9/L 0.4   Absolute Basophils      0.0 - 0.2 10e9/L 0.0   Diff Method       Automated Method   Sodium      133 - 144 mmol/L 139   Potassium      3.4 - 5.3 mmol/L 4.8   Chloride      94 - 109 mmol/L 109   Carbon Dioxide      20 - 32 mmol/L 23   Anion Gap      3 - 14 mmol/L 7   Glucose      70 - 99 mg/dL 217 (H)   Urea Nitrogen      7 - 30 mg/dL 52 (H)   Creatinine      0.66 - 1.25 mg/dL 2.00 (H)   GFR Estimate      >60 mL/min/1.73:m2 30 (L)   GFR Estimate If Black      >60 mL/min/1.73:m2 35 (L)   Calcium      8.5 - 10.1 mg/dL 8.2 (L)   Bilirubin Total      0.2 - 1.3 mg/dL 0.3   Albumin      3.4 - 5.0 g/dL 3.5   Protein Total      6.8 - 8.8 g/dL 7.5   Alkaline Phosphatase      40 - 150 U/L 87   ALT      0 - 70 U/L 13   AST      0 - 45 U/L 8   Cholesterol      <200 mg/dL 126   Triglycerides      <150 mg/dL 100   HDL Cholesterol      >39 mg/dL 52   LDL Cholesterol Calculated      <100 mg/dL 54   Non HDL Cholesterol      <130 mg/dL 74   Hemoglobin A1C      0 - 5.6 % 7.7 (H)   TSH      0.40 - 4.00 mU/L 4.11 (H)   T4 Free      0.76 - 1.46 ng/dL 0.91     Assessment and Plan:     Type 2 diabetes,  complicated by nephropathy     We reviewed the risk of hypoglycemia in setting of his impaired renal function with creatinine of 2.0 and GFR of 30 He is satisfied with his current management and would like to remain on this for now. He is tolerating this and based on recent A1c of 7.7% and fasting glucometer data it is working well for him. His creatinine has fluctuated slightly over time but is overall stable.  We have reviewed the concern about metformin and SPENCER multiple times in the past and prefers to continue current regimen.  He agreed to close monitoring and will follow up with labs in 3 months.   Consider nephrology consult, he was not interested when offered in the past     Vitamin D deficiency  Vitamin D added to labs from his blood draw yesterday.     Osteoporosis, unspecified osteoporosis type, unspecified pathological fracture presence  Patient has a history of hip fracture and is at risk for falling. He has not had a  DEXA  and and is requested one to be done.   Review results and treatment option at next visit  - Dexa hip/pelvis/spine    Fall, subsequent encounter, Balance problems  His balance is worsening leading to a fall two weeks ago. He is at risk for future falls and would benefit from PT to work on strength, balance, and fall prevention. He agrees that this would be helpful.   - PHYSICAL THERAPY REFERRAL     Follow-up: Return in about 3 months (around 10/23/2019).     >50% of 30 minute visit spent in face to face counseling, education and coordination of care related to options for better glycemic control as well as preventing, detecting, and treating hypoglycemia.       Scribe Disclosure:  I, Negrito Milner, am serving as a scribe to document services personally performed by Jose Amin MD at this visit, based upon the provider's statements to me. All documentation has been reviewed by the aforementioned provider prior to being entered into the official medical record.     Portions of  this medical record were completed by a scribe. UPON MY REVIEW AND AUTHENTICATION BY ELECTRONIC SIGNATURE, this confirms (a) I performed the applicable clinical services, and (b) the record is accurate.      JUDD Fenton

## 2019-07-24 LAB — DEPRECATED CALCIDIOL+CALCIFEROL SERPL-MC: 25 UG/L (ref 20–75)

## 2019-07-30 ENCOUNTER — ANCILLARY PROCEDURE (OUTPATIENT)
Dept: BONE DENSITY | Facility: CLINIC | Age: 84
End: 2019-07-30
Attending: INTERNAL MEDICINE
Payer: MEDICARE

## 2019-07-30 DIAGNOSIS — E55.9 VITAMIN D DEFICIENCY: ICD-10-CM

## 2019-07-30 DIAGNOSIS — Z79.4 TYPE 2 DIABETES MELLITUS WITH COMPLICATION, WITH LONG-TERM CURRENT USE OF INSULIN (H): ICD-10-CM

## 2019-07-30 DIAGNOSIS — E11.8 TYPE 2 DIABETES MELLITUS WITH COMPLICATION, WITH LONG-TERM CURRENT USE OF INSULIN (H): ICD-10-CM

## 2019-07-30 DIAGNOSIS — M81.0 OSTEOPOROSIS, UNSPECIFIED OSTEOPOROSIS TYPE, UNSPECIFIED PATHOLOGICAL FRACTURE PRESENCE: ICD-10-CM

## 2019-07-31 NOTE — TELEPHONE ENCOUNTER
tamsulosin (FLOMAX) 0.4 MG capsule      Last Written Prescription Date:  1-22-19  Last Fill Quantity: 90,   # refills: 1  Last Office Visit : 7-23-19  Future Office visit:  11-5-19    Routing refill request to provider for review/approval because:  Viagra on med list  FYI: BP slightly above protocol parameter

## 2019-08-01 RX ORDER — TAMSULOSIN HYDROCHLORIDE 0.4 MG/1
0.4 CAPSULE ORAL DAILY
Qty: 90 CAPSULE | Refills: 3 | Status: SHIPPED | OUTPATIENT
Start: 2019-08-01 | End: 2020-07-30

## 2019-08-09 DIAGNOSIS — I10 HYPERTENSION, UNSPECIFIED TYPE: ICD-10-CM

## 2019-08-13 ENCOUNTER — DOCUMENTATION ONLY (OUTPATIENT)
Dept: CARE COORDINATION | Facility: CLINIC | Age: 84
End: 2019-08-13

## 2019-08-13 RX ORDER — AMLODIPINE BESYLATE 2.5 MG/1
2.5 TABLET ORAL DAILY
Qty: 30 TABLET | Refills: 1 | Status: SHIPPED | OUTPATIENT
Start: 2019-08-13 | End: 2019-10-14

## 2019-08-13 NOTE — TELEPHONE ENCOUNTER
amLODIPine (NORVASC) 2.5 MG tablet      Last Written Prescription Date:  7-3-19  Last Fill Quantity: 30,   # refills: 0  Last Office Visit : 10-3-19  Future Office visit:  9-25-19    Routing refill request to provider for review/approval because:  Failed protocol: abnormal Lab; Cr    Creatinine   Date Value Ref Range Status   07/22/2019 2.00 (H) 0.66 - 1.25 mg/dL Final     FYI:  BP Readings from Last 3 Encounters:   07/23/19 146/72   12/04/18 111/56   10/03/18 152/70

## 2019-08-28 ENCOUNTER — THERAPY VISIT (OUTPATIENT)
Dept: PHYSICAL THERAPY | Facility: CLINIC | Age: 84
End: 2019-08-28
Attending: INTERNAL MEDICINE
Payer: MEDICARE
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
## 2019-08-28 DIAGNOSIS — R26.89 BALANCE PROBLEMS: ICD-10-CM

## 2019-08-28 NOTE — PROGRESS NOTES
[facility]        OUTPATIENT PHYSICAL THERAPY FUNCTIONAL EVALUATION  PLAN OF TREATMENT FOR OUTPATIENT REHABILITATION  (COMPLETE FOR INITIAL CLAIMS ONLY)  Patient's Last Name, First Name, M.I.  YOB: 1935  Farzad East     Provider's Name   [facility]   Medical Record No.  [id number]     Start of Care Date:  08/28/19   Onset Date:      Type:     _X__PT   ____OT  ____SLP Medical Diagnosis:        PT Diagnosis:  impaired balance and gait Visits from SOC:  1                              __________________________________________________________________________________  Plan of Treatment/Functional Goals:  balance training, gait training, strengthening           GOALS  falls  no falls in 6 week period  11/20/19    step length  patient to perform 25' timed walk in 18 steps or less with SPC or 4WW  11/20/19    endurance  patient to demonstrate 10 minutes continuous ambulation with SPC or 4WW   11/20/19                                                           Therapy Frequency:  other (see comments)(4 visits)   Predicted Duration of Therapy Intervention:  12 weeks    Michael Xie PT                                  {Rehab Co-Sign/Paper:[id number]}    Certification Date From:      Certification Date To:       Referring Provider:  Jose Amin MD    Initial Assessment  See Epic Evaluation- Start of Care Date: 08/28/19

## 2019-08-28 NOTE — PROGRESS NOTES
[facility]        OUTPATIENT PHYSICAL THERAPY FUNCTIONAL EVALUATION  PLAN OF TREATMENT FOR OUTPATIENT REHABILITATION  (COMPLETE FOR INITIAL CLAIMS ONLY)  Patient's Last Name, First Name, M.I.  YOB: 1935  Farzad East     Provider's Name   [facility]   Medical Record No.  [id number]     Start of Care Date:  08/28/19   Onset Date:   7/23/19   Type:     _X__PT   ____OT  ____SLP Medical Diagnosis:  balance problems     PT Diagnosis:  impaired balance and gait Visits from SOC:  1                              __________________________________________________________________________________  Plan of Treatment/Functional Goals:  balance training, gait training, strengthening           GOALS  falls  no falls in 6 week period  11/20/19    step length  patient to perform 25' timed walk in 18 steps or less with SPC or 4WW  11/20/19    endurance  patient to demonstrate 10 minutes continuous ambulation with SPC or 4WW   11/20/19                                                           Therapy Frequency:  other (see comments)(4 visits)   Predicted Duration of Therapy Intervention:  12 weeks    Michael Xie, PT                                    I CERTIFY THE NEED FOR THESE SERVICES FURNISHED UNDER        THIS PLAN OF TREATMENT AND WHILE UNDER MY CARE     (Physician attestation of this document indicates review and certification of the therapy plan).                Certification Date From:  08/28/19   Certification Date To:  11/20/19    Referring Provider:  Jose Amin MD    Initial Assessment  See Epic Evaluation- Start of Care Date: 08/28/19

## 2019-09-13 RX ORDER — HYDROCHLOROTHIAZIDE 12.5 MG/1
12.5 CAPSULE ORAL DAILY
Qty: 90 CAPSULE | Refills: 1 | Status: SHIPPED | OUTPATIENT
Start: 2019-09-13 | End: 2020-02-27

## 2019-09-13 RX ORDER — LOSARTAN POTASSIUM 50 MG/1
50 TABLET ORAL DAILY
Qty: 90 TABLET | Refills: 1 | Status: SHIPPED | OUTPATIENT
Start: 2019-09-13 | End: 2020-01-03

## 2019-09-13 NOTE — TELEPHONE ENCOUNTER
hydrochlorothiazide (MICROZIDE) 12.5 MG capsule        Last Written Prescription Date:  06/14/2019  Last Fill Quantity: 90,   # refills: 0  Last Office Visit : 07/23/2019  Future Office visit:  11/05/2019    losartan (COZAAR) 50 MG tablet        Last Written Prescription Date:  06/14/2019  Last Fill Quantity: 90,   # refills: 0  Last Office Visit : 07/23/2019  Future Office visit:  11/05/2019    Routing refill request to provider for review/approval because: labs and BP

## 2019-09-18 ENCOUNTER — THERAPY VISIT (OUTPATIENT)
Dept: PHYSICAL THERAPY | Facility: CLINIC | Age: 84
End: 2019-09-18
Payer: MEDICARE

## 2019-09-18 DIAGNOSIS — R26.89 BALANCE PROBLEMS: Primary | ICD-10-CM

## 2019-09-25 ENCOUNTER — OFFICE VISIT (OUTPATIENT)
Dept: CARDIOLOGY | Facility: CLINIC | Age: 84
End: 2019-09-25
Attending: INTERNAL MEDICINE
Payer: MEDICARE

## 2019-09-25 VITALS
HEART RATE: 64 BPM | BODY MASS INDEX: 25.94 KG/M2 | DIASTOLIC BLOOD PRESSURE: 74 MMHG | WEIGHT: 181.2 LBS | OXYGEN SATURATION: 97 % | HEIGHT: 70 IN | SYSTOLIC BLOOD PRESSURE: 131 MMHG

## 2019-09-25 DIAGNOSIS — I10 HYPERTENSION, UNSPECIFIED TYPE: ICD-10-CM

## 2019-09-25 PROCEDURE — 99213 OFFICE O/P EST LOW 20 MIN: CPT | Mod: ZP | Performed by: INTERNAL MEDICINE

## 2019-09-25 PROCEDURE — G0463 HOSPITAL OUTPT CLINIC VISIT: HCPCS | Mod: 25,ZF

## 2019-09-25 PROCEDURE — 93005 ELECTROCARDIOGRAM TRACING: CPT | Mod: ZF

## 2019-09-25 PROCEDURE — 93010 ELECTROCARDIOGRAM REPORT: CPT | Mod: ZP | Performed by: INTERNAL MEDICINE

## 2019-09-25 RX ORDER — FINASTERIDE 5 MG/1
1 TABLET, FILM COATED ORAL DAILY
Refills: 3 | COMMUNITY
Start: 2019-09-15 | End: 2021-12-08

## 2019-09-25 ASSESSMENT — MIFFLIN-ST. JEOR: SCORE: 1523.17

## 2019-09-25 NOTE — LETTER
RE: Farzad East  [address]  [facility] [address]     Dear Colleague,    Thank you for the opportunity to participate in the care of your patient, Farzad East, at the [facility]. Please see a copy of my visit note below.     2019      RE: Farzad East   MRN: [id number]   : 1935      To Whom It May Concern:      It was a pleasure participating in the care of your patient, Mr. Alessio East.  As you know, he is an 84-year-old gentleman who I see today for hypertension.      His past medical history is significant for the followin.  Type 2 diabetes.   2.  Hypertension.   3.  Chronic renal insufficiency with a baseline GFR of 30 mL/min on 2019.   4.  Left hip pain secondary to prior hip replacement and subsequent surgery.   5.  Left femur fracture.      I last saw him 10/03/2018.  At that time, he was doing adequately.  He presents today for continuing care.      Since our last visit, he has continued to do well.  He continues to care for his wife, who has Lewy body dementia, at home and he says that she is fine during the day but has trouble with hallucinations at night.      He does not take his blood pressures at home, but in general, he finds that his blood pressures in the doctor's office have ranged anywhere from 111 to the 130s.  He feels well on his medications and has not had gross side effects.  He is not as ambulatory and does not exert himself quite as much as before due to his left hip problems, but he otherwise denies gross new chest pain or problems breathing, PND, orthopnea, edema, palpitations, syncope or near-syncope.      CURRENT MEDICATIONS:   1.  Aspirin 81 mg a day.   2.  Amlodipine 2.5 mg a day.   3.  Glipizide.   4.  Metformin.   5.  Januvia.   6.  Hydrochlorothiazide 12.5 mg a day.   7.  Losartan 50 mg a day.      PHYSICAL EXAMINATION:   VITAL SIGNS:  Blood pressure  is 130/70 with a pulse of 64.  His weight is 181 pounds.   NECK:  Exam reveals a normal jugular venous pressure.   LUNGS:  Clear to auscultation.  Respiratory effort is normal.   CARDIAC:  Exam reveals a regular rate and rhythm.  There is a 1 to 2/6 systolic ejection murmur present.   ABDOMEN:  Belly soft, nontender.   EXTREMITIES:  Significant for +2 pitting edema on the left, +1 pitting edema on the right.      Echocardiogram 10/11/2018 reveals an ejection fraction of 65%-70% without gross valvular pathology.      On 2019 potassium 4.8, GFR 30.      EKG today reveals normal sinus rhythm without gross ST changes.      IMPRESSION:  Alessio is an 84-year-old gentleman who presents for followup of hypertension.      His blood pressures have been running generally anywhere from the 111 to the 130 range systolic.  He feels well on his current regimen and does not wish to make any significant changes today, which I think is quite reasonable.      PLAN:   1.  Continue present medications at present doses.   2.  Continue compression stockings during the day if possible for his dependent edema (he is euvolemic today).   3.  Follow up in 1 year or earlier if needed.      Once again, it was a pleasure participating in the care of your patient, Mr. Alessio Chaves.  Please feel free to contact me anytime if you have any questions regarding his care in the future.      Sincerely,       Eddie Levi MD    cc:   Jose Amin MD    [facility]    [address] [facility], [address]  [address]     D: 2019   T: 2019   MT: EVELYN    Name:     JEN CHAVES   MRN:      [id number]        Account:      [id number]   :      1935           Service Date: 2019   Document: [id number]

## 2019-09-25 NOTE — PATIENT INSTRUCTIONS
Patient Instructions:  It was a pleasure to see you in the cardiology clinic today.      If you have any questions, you can reach my nurse, Nishi VASQUEZ LPN, at [phone].  Press Option #1 for the [facility], and then press Option #4 for nursing.    We are encouraging the use of NPRhart to communicate with your HealthCare Provider    Medication Changes: None.    Recommendations: None.    Studies Ordered: None.    The results from today include: EKG.    Please follow up: With Dr. Levi in one year.    Sincerely,    Eddie Levi MD     If you have an urgent need after hours (8:00 am to 4:30 pm) please call [phone] and ask for the cardiology fellow on call.

## 2019-09-25 NOTE — PROGRESS NOTES
Service Date: 2019      RE: Farzad East   MRN: [id number]   : 1935      To Whom It May Concern:      It was a pleasure participating in the care of your patient, Mr. Alessio East.  As you know, he is an 84-year-old gentleman who I see today for hypertension.      His past medical history is significant for the followin.  Type 2 diabetes.   2.  Hypertension.   3.  Chronic renal insufficiency with a baseline GFR of 30 mL/min on 2019.   4.  Left hip pain secondary to prior hip replacement and subsequent surgery.   5.  Left femur fracture.      I last saw him 10/03/2018.  At that time, he was doing adequately.  He presents today for continuing care.      Since our last visit, he has continued to do well.  He continues to care for his wife, who has Lewy body dementia, at home and he says that she is fine during the day but has trouble with hallucinations at night.      He does not take his blood pressures at home, but in general, he finds that his blood pressures in the doctor's office have ranged anywhere from 111 to the 130s.  He feels well on his medications and has not had gross side effects.  He is not as ambulatory and does not exert himself quite as much as before due to his left hip problems, but he otherwise denies gross new chest pain or problems breathing, PND, orthopnea, edema, palpitations, syncope or near-syncope.      CURRENT MEDICATIONS:     1.  Aspirin 81 mg a day.   2.  Amlodipine 2.5 mg a day.   3.  Glipizide.   4.  Metformin.   5.  Januvia.   6.  Hydrochlorothiazide 12.5 mg a day.   7.  Losartan 50 mg a day.      PHYSICAL EXAMINATION:     VITAL SIGNS:  Blood pressure is 130/70 with a pulse of 64.  His weight is 181 pounds.   NECK:  Exam reveals a normal jugular venous pressure.   LUNGS:  Clear to auscultation.  Respiratory effort is normal.   CARDIAC:  Exam reveals a regular rate and rhythm.  There is a 1 to 2/6 systolic ejection murmur  present.   ABDOMEN:  Belly soft, nontender.   EXTREMITIES:  Significant for +2 pitting edema on the left, +1 pitting edema on the right.      Echocardiogram 10/11/2018 reveals an ejection fraction of 65%-70% without gross valvular pathology.      On 2019 potassium 4.8, GFR 30.      EKG today reveals normal sinus rhythm without gross ST changes.        IMPRESSION:      Alessio is an 84-year-old gentleman who presents for followup of hypertension.      His blood pressures have been running generally anywhere from the 111 to the 130 range systolic.  He feels well on his current regimen and does not wish to make any significant changes today, which I think is quite reasonable.        PLAN:     1.  Continue present medications at present doses.     2.  Compression stockings during the day if possible for his dependent edema (he is euvolemic today).     3.  Follow up in 1 year or earlier if needed.      Once again, it was a pleasure participating in the care of your patient, Mr. Alessio Chaves.  Please feel free to contact me anytime if you have any questions regarding his care in the future.      Sincerely,            Eddie Levi MD      cc:   Jose Amin MD    [facility]    [address] [place] [address], [address]  [address]              D: 2019   T: 2019   MT: EVELYN      Name:     JEN CHAVES   MRN:      [id number]        Account:      [id number]   :      1935           Service Date: 2019      Document: [id number]

## 2019-09-27 LAB — INTERPRETATION ECG - MUSE: NORMAL

## 2019-10-02 DIAGNOSIS — E11.65 TYPE 2 DIABETES MELLITUS WITH HYPERGLYCEMIA (H): ICD-10-CM

## 2019-10-04 ENCOUNTER — HEALTH MAINTENANCE LETTER (OUTPATIENT)
Age: 84
End: 2019-10-04

## 2019-10-06 RX ORDER — GLIPIZIDE 5 MG/1
5 TABLET, FILM COATED, EXTENDED RELEASE ORAL DAILY
Qty: 90 TABLET | Refills: 1 | Status: SHIPPED | OUTPATIENT
Start: 2019-10-06 | End: 2020-04-24

## 2019-10-06 NOTE — TELEPHONE ENCOUNTER
glipiZIDE (GLUCOTROL XL) 5 MG 24 hr tablet   Last Written Prescription Date:  2/14/19  Last Fill Quantity: 90,   # refills: 1  Last Office Visit : 7/23/19  Future Office visit: 11/5/19    Sukhwinder SORIANO   All Reviewers List   Jose Amin MD on 8/1/2019 12:12 PM     No change on med list

## 2019-10-08 RX ORDER — HYDROCHLOROTHIAZIDE 12.5 MG/1
12.5 CAPSULE ORAL DAILY
Qty: 90 CAPSULE | Refills: 0 | OUTPATIENT
Start: 2019-10-08

## 2019-10-14 DIAGNOSIS — I10 HYPERTENSION, UNSPECIFIED TYPE: ICD-10-CM

## 2019-10-16 ENCOUNTER — THERAPY VISIT (OUTPATIENT)
Dept: PHYSICAL THERAPY | Facility: CLINIC | Age: 84
End: 2019-10-16
Payer: MEDICARE

## 2019-10-16 DIAGNOSIS — R26.89 BALANCE PROBLEMS: Primary | ICD-10-CM

## 2019-10-17 NOTE — TELEPHONE ENCOUNTER
amLODIPine (NORVASC) 2.5 MG tablet     Last Written Prescription Date:  8/13/19  Last Fill Quantity: 30,   # refills: 1  Last Office Visit : 9/25/19  Future Office visit:  none    Routing refill request to provider for review/approval because:  Per protocol:  Abnormal Cr  Lab Test 07/22/19  1615   CR 2.00     Thanks, Tasha PATTEN RN Refill Team

## 2019-10-18 RX ORDER — AMLODIPINE BESYLATE 2.5 MG/1
2.5 TABLET ORAL DAILY
Qty: 90 TABLET | Refills: 3 | Status: SHIPPED | OUTPATIENT
Start: 2019-10-18 | End: 2020-02-24 | Stop reason: ALTCHOICE

## 2019-10-30 ASSESSMENT — ENCOUNTER SYMPTOMS
DIZZINESS: 0
TREMORS: 1
HEADACHES: 0
DISTURBANCES IN COORDINATION: 0
DYSURIA: 0
PARALYSIS: 0
NECK PAIN: 0
MYALGIAS: 0
BACK PAIN: 0
SPEECH CHANGE: 0
SKIN CHANGES: 0
DIFFICULTY URINATING: 1
POOR WOUND HEALING: 0
SEIZURES: 0
WEAKNESS: 1
MEMORY LOSS: 0
MUSCLE WEAKNESS: 1
JOINT SWELLING: 0
STIFFNESS: 0
FLANK PAIN: 0
NUMBNESS: 1
HEMATURIA: 0
TINGLING: 0
ARTHRALGIAS: 1
MUSCLE CRAMPS: 0
NAIL CHANGES: 0
LOSS OF CONSCIOUSNESS: 0

## 2019-11-05 ENCOUNTER — OFFICE VISIT (OUTPATIENT)
Dept: ENDOCRINOLOGY | Facility: CLINIC | Age: 84
End: 2019-11-05
Payer: MEDICARE

## 2019-11-05 VITALS
HEIGHT: 70 IN | DIASTOLIC BLOOD PRESSURE: 78 MMHG | HEART RATE: 62 BPM | BODY MASS INDEX: 25.91 KG/M2 | SYSTOLIC BLOOD PRESSURE: 162 MMHG | WEIGHT: 181 LBS

## 2019-11-05 DIAGNOSIS — E55.9 VITAMIN D DEFICIENCY: ICD-10-CM

## 2019-11-05 DIAGNOSIS — M81.0 OSTEOPOROSIS, UNSPECIFIED OSTEOPOROSIS TYPE, UNSPECIFIED PATHOLOGICAL FRACTURE PRESENCE: Primary | ICD-10-CM

## 2019-11-05 DIAGNOSIS — E11.8 TYPE 2 DIABETES MELLITUS WITH COMPLICATION, WITH LONG-TERM CURRENT USE OF INSULIN (H): ICD-10-CM

## 2019-11-05 DIAGNOSIS — E11.65 TYPE 2 DIABETES MELLITUS WITH HYPERGLYCEMIA, UNSPECIFIED WHETHER LONG TERM INSULIN USE (H): ICD-10-CM

## 2019-11-05 DIAGNOSIS — Z79.4 TYPE 2 DIABETES MELLITUS WITH COMPLICATION, WITH LONG-TERM CURRENT USE OF INSULIN (H): ICD-10-CM

## 2019-11-05 DIAGNOSIS — M81.0 OSTEOPOROSIS, UNSPECIFIED OSTEOPOROSIS TYPE, UNSPECIFIED PATHOLOGICAL FRACTURE PRESENCE: ICD-10-CM

## 2019-11-05 LAB
ALBUMIN SERPL-MCNC: 3.4 G/DL (ref 3.4–5)
ALT SERPL W P-5'-P-CCNC: 14 U/L (ref 0–70)
ANION GAP SERPL CALCULATED.3IONS-SCNC: 5 MMOL/L (ref 3–14)
AST SERPL W P-5'-P-CCNC: 11 U/L (ref 0–45)
BUN SERPL-MCNC: 44 MG/DL (ref 7–30)
CALCIUM SERPL-MCNC: 9 MG/DL (ref 8.5–10.1)
CHLORIDE SERPL-SCNC: 106 MMOL/L (ref 94–109)
CO2 SERPL-SCNC: 26 MMOL/L (ref 20–32)
CREAT SERPL-MCNC: 1.86 MG/DL (ref 0.66–1.25)
CREAT UR-MCNC: 96 MG/DL
GFR SERPL CREATININE-BSD FRML MDRD: 33 ML/MIN/{1.73_M2}
GLUCOSE SERPL-MCNC: 150 MG/DL (ref 70–99)
HBA1C MFR BLD: 8 % (ref 4.3–6)
MICROALBUMIN UR-MCNC: 882 MG/L
MICROALBUMIN/CREAT UR: 915.89 MG/G CR (ref 0–17)
PHOSPHATE SERPL-MCNC: 3.4 MG/DL (ref 2.5–4.5)
POTASSIUM SERPL-SCNC: 4.5 MMOL/L (ref 3.4–5.3)
PTH-INTACT SERPL-MCNC: 64 PG/ML (ref 18–80)
SODIUM SERPL-SCNC: 136 MMOL/L (ref 133–144)

## 2019-11-05 PROCEDURE — 82306 VITAMIN D 25 HYDROXY: CPT | Performed by: INTERNAL MEDICINE

## 2019-11-05 PROCEDURE — 83970 ASSAY OF PARATHORMONE: CPT | Performed by: INTERNAL MEDICINE

## 2019-11-05 ASSESSMENT — PAIN SCALES - GENERAL: PAINLEVEL: NO PAIN (0)

## 2019-11-05 ASSESSMENT — MIFFLIN-ST. JEOR: SCORE: 1522.26

## 2019-11-05 NOTE — PROGRESS NOTES
[facility]  Endocrinology  Jose Amin MD  11/05/2019      Chief Complaint:   Follow Up     History of Present Illness:   Farzad East is a 84 year old male with a history of CKD stage 3, type 2 diabetes mellitus with nephropathy, and hypertension who presents for follow up of type 2 diabetes mellitus. The patient was diagnosed with diabetes in his 50s and has managed using Metformin, Glipizide, Lantus, and Januvia in the past. Lantus was discontinued toward the beginning of 2018. His A1c was 6.8 in June 2018 so he decreased his glipizide to 5 mg daily due to hypoglycemia risk.     Interval History: Overall, he says he is doing well. He feels his blood sugars have erratic because he has not been eating well lately. His wife fell and hurt herself and as a result she cannot cook. He takes blood sugar readings daily. The average morning reading is 125 but he has had a few high spikes. Check BG only in the morning. As for medications, if he noticed sugars below 100 he would hold Glipizide for that day. He is on Januvia 50mg, Metformin 1000mg, and Glipizide 5 mg.     He saw his cardiologist on 9/25. His blood pressure has been okay recently with averages in the 130s for systolic. His blood pressure is high today in clinic but he says this could be due to rushing to get to his appointment. They did not make any medication changes at this visit. He is not wearing his compression stockings because he cannot get them on himself.     He had a Dexa scan done on 7/30/2019. He has a history of femur fracture following a fall. He also had a left hip replacement which was damaged in the fall. He is taking Vitamin D and C but does not remember the dosage. He is also taking a multivitamin. He does not take calcium on its own.      Of note, he will leave for [place] at the end of December.     Blood Glucose Monitoring:  We reviewed glucometer data together. BG been erratic due to diet change.   Average 125 fasting, SD  26  A1c: 8.0%    Diabetes monitoring and complications:  CAD: No  Last eye exam results: reviewed (12/18/2018)  Microalbuminuria: 377 mg/gCr (12/4/2018)  HTN: Yes  On Statin: No  On Aspirin: Yes  Erectile dysfunction: Yes     Review of Systems:   Pertinent items are noted in HPI, remainder of complete ROS is negative.      Active Medications:     Current Outpatient Medications:      amLODIPine (NORVASC) 2.5 MG tablet, Take 1 tablet (2.5 mg) by mouth daily, Disp: 90 tablet, Rfl: 3     aspirin 81 MG tablet, Take 1 tablet by mouth daily., Disp: , Rfl:      blood glucose monitoring (ONETOUCH VERIO IQ SYSTEM) meter device kit, Use to test blood sugar daily, Disp: 1 kit, Rfl: 0     blood glucose monitoring (ONETOUCH VERIO IQ) test strip, Use to test blood sugar 2-3 daily or as directed., Disp: 300 each, Rfl: 3     Cholecalciferol (VITAMIN D-3 PO), , Disp: , Rfl:      COMPRESSION STOCKINGS, 1 each daily, Disp: 2 each, Rfl: 11     finasteride (PROSCAR) 5 MG tablet, Take 1 tablet by mouth daily, Disp: , Rfl: 3     glipiZIDE (GLUCOTROL XL) 5 MG 24 hr tablet, Take 1 tablet (5 mg) by mouth daily, Disp: 90 tablet, Rfl: 1     hydrochlorothiazide (MICROZIDE) 12.5 MG capsule, TAKE 1 CAPSULE (12.5 MG) BY MOUTH DAILY USE WHILE COMBINATION (HYZAAR) IS UNAVAILABLE., Disp: 90 capsule, Rfl: 1     insulin pen needle (31G X 5 MM) 31G X 5 MM miscellaneous, Use 1 pen needle daily or as directed, Disp: 100 each, Rfl: 1     losartan (COZAAR) 50 MG tablet, TAKE 1 TABLET (50 MG) BY MOUTH DAILY USE WHILE COMBINATION (HYZAAR) IS UNAVAILABLE., Disp: 90 tablet, Rfl: 1     losartan-hydrochlorothiazide (HYZAAR) 50-12.5 MG tablet, Take 1 tablet by mouth daily, Disp: 90 tablet, Rfl: 3     metFORMIN (GLUCOPHAGE-XR) 500 MG 24 hr tablet, Take 2 tablets (1,000 mg) by mouth daily with food, Disp: 180 tablet, Rfl: 1     multivitamin w/minerals (MULTI-VITAMIN) tablet, Take 1 tablet by mouth daily, Disp: , Rfl:      ONETOUCH ULTRA test strip, Use to test blood  "sugar 3 times daily or as directed., Disp: 270 each, Rfl: 3     sildenafil (VIAGRA) 100 MG tablet, Take 1 tablet (100 mg) by mouth daily as needed for erectile dysfunction, Disp: 6 tablet, Rfl: 11     sitagliptin (JANUVIA) 50 MG tablet, Take 1 tablet (50 MG) by mouth daily., Disp: 90 tablet, Rfl: 1     tamsulosin (FLOMAX) 0.4 MG capsule, Take 1 capsule (0.4 mg) by mouth daily, Disp: 90 capsule, Rfl: 3      Allergies:   Sulfa drugs      Past Medical History:  Atrial flutter  Chronic kidney disease stage 3  Type 2 diabetes mellitus  Hypertension  Peripheral neuropathy  Rectal type adenocarcinoma  Femur fracture     Past Surgical History:  Cataract  ORIF femur  Left KIMBERLY    Family History:   Diabetes - father  Circulatory - father  Macular degeneration - father  Arthritis - mother     Social History:   Tobacco Use: none  Alcohol Use: 3 martinis/week  Drug Use: none  [facility]      Physical Exam:   BP (!) 162/68   Pulse 62   Ht 1.778 m (5' 10\")   Wt 82.1 kg (181 lb)   BMI 25.97 kg/m       Wt Readings from Last 10 Encounters:   11/05/19 82.1 kg (181 lb)   09/25/19 82.2 kg (181 lb 3.2 oz)   07/23/19 82.3 kg (181 lb 8 oz)   12/04/18 80.3 kg (177 lb)   10/03/18 79.8 kg (176 lb)   09/04/18 82 kg (180 lb 11.2 oz)   06/12/18 89.7 kg (197 lb 12.8 oz)   01/09/18 77.7 kg (171 lb 6.4 oz)   12/12/17 77.9 kg (171 lb 12.8 oz)   09/05/17 77.1 kg (170 lb)     Constitutional: no distress, comfortable, pleasant   Eyes: anicteric, normal extra-ocular movements, No lig lag, retraction or proptosis  Ears, Nose and Throat: throat clear  Neck: supple, no thyromegaly.   Cardiovascular: regular rate and rhythm, normal S1 and S2, no murmurs  Respiratory: clear to auscultation, no wheezes or crackles, normal breath sounds   Musculoskeletal: b/l pitting edema L>R    Skin:  no jaundice   Psychological: appropriate mood     Data:  Component      Latest Ref Rng & Units 7/22/2019   WBC      4.0 - 11.0 10e9/L 8.8   RBC Count      " 4.4 - 5.9 10e12/L 3.86 (L)   Hemoglobin      13.3 - 17.7 g/dL 11.2 (L)   Hematocrit      40.0 - 53.0 % 35.3 (L)   MCV      78 - 100 fl 92   MCH      26.5 - 33.0 pg 29.0   MCHC      31.5 - 36.5 g/dL 31.7   RDW      10.0 - 15.0 % 14.9   Platelet Count      150 - 450 10e9/L 294   % Neutrophils      % 71.8   % Lymphocytes      % 15.9   % Monocytes      % 7.8   % Eosinophils      % 4.0   % Basophils      % 0.5   Absolute Neutrophil      1.6 - 8.3 10e9/L 6.3   Absolute Lymphocytes      0.8 - 5.3 10e9/L 1.4   Absolute Monocytes      0.0 - 1.3 10e9/L 0.7   Absolute Eosinophils      0.0 - 0.7 10e9/L 0.4   Absolute Basophils      0.0 - 0.2 10e9/L 0.0   Diff Method       Automated Method   Sodium      133 - 144 mmol/L 139   Potassium      3.4 - 5.3 mmol/L 4.8   Chloride      94 - 109 mmol/L 109   Carbon Dioxide      20 - 32 mmol/L 23   Anion Gap      3 - 14 mmol/L 7   Glucose      70 - 99 mg/dL 217 (H)   Urea Nitrogen      7 - 30 mg/dL 52 (H)   Creatinine      0.66 - 1.25 mg/dL 2.00 (H)   GFR Estimate      >60 mL/min/1.73:m2 30 (L)   GFR Estimate If Black      >60 mL/min/1.73:m2 35 (L)   Calcium      8.5 - 10.1 mg/dL 8.2 (L)   Bilirubin Total      0.2 - 1.3 mg/dL 0.3   Albumin      3.4 - 5.0 g/dL 3.5   Protein Total      6.8 - 8.8 g/dL 7.5   Alkaline Phosphatase      40 - 150 U/L 87   ALT      0 - 70 U/L 13   AST      0 - 45 U/L 8   Cholesterol      <200 mg/dL 126   Triglycerides      <150 mg/dL 100   HDL Cholesterol      >39 mg/dL 52   LDL Cholesterol Calculated      <100 mg/dL 54   Non HDL Cholesterol      <130 mg/dL 74   Hemoglobin A1C      0 - 5.6 % 7.7 (H)   TSH      0.40 - 4.00 mU/L 4.11 (H)   T4 Free      0.76 - 1.46 ng/dL 0.91   Vitamin D Deficiency screening      20 - 75 ug/L 25     Assessment and Plan:  Type 2 diabetes mellitus, uncontrolled (H)  For his diabetes, his numbers are reasonable. Given his age and risk of hypoglycemia A1C ~ 8% is ok. We can continue on the same medication regimen for now. He is not  interested in starting injectable medications, including insulin   - Hemoglobin A1c POCT    Osteoporosis, unspecified osteoporosis type, unspecified pathological fracture presence  Vitamin D deficiency  He has osteoporosis based on hx of femur fracture. T-score is in low bone density range. He is not a candidate for bisphosphonate due to kidney function. I suggested adding Calcium supplement and adjust  Vitamin D if needed based on labs today. I recommended taking Calcium 600 mg twice a day. We discussed options for injections of medications. Prolia every 6 months is one option. He must be complaint taking calcium and vitamin D to ensure his levels stay up while on Prolia because one side effect is lowering calcium. Also reviewed side effects of Prolia, including risk of increased fracture when the medication is stopped. He was given a written handout on Prolia. Also discussed use of Forteo. The patient agreed on the medication of Prolia. We will get the following labs done prior to scheduling an injection.  - 25 Hydroxyvitamin D2 and D3  - Phosphorus  - Parathyroid Hormone Intact  - Calcium  - Bone specific alk phosphatase  - Basic metabolic panel  - ALT  - AST  - Albumin level      Follow-up: Return in about 6 months (around 5/5/2020).      Scribe Disclosure:  I, Gila Potter, am serving as a scribe to document services personally performed by Jose Amin MD at this visit, based upon the provider's statements to me. All documentation has been reviewed by the aforementioned provider prior to being entered into the official medical record.     Portions of this medical record were completed by a scribe. UPON MY REVIEW AND AUTHENTICATION BY ELECTRONIC SIGNATURE, this confirms (a) I performed the applicable clinical services, and (b) the record is accurate.      JUDD Fenton

## 2019-11-05 NOTE — LETTER
11/5/2019       RE: Farzad East  [address]  [facility] [address]     Dear Colleague,    Thank you for referring your patient, Farzad East, to the [facility] ENDOCRINOLOGY at [facility]. Please see a copy of my visit note below.    [facility]  Endocrinology  Jose Amin MD  11/05/2019      Chief Complaint:   Follow Up     History of Present Illness:   Farzad East is a 84 year old male with a history of CKD stage 3, type 2 diabetes mellitus with nephropathy, and hypertension who presents for follow up of type 2 diabetes mellitus. The patient was diagnosed with diabetes in his 50s and has managed using Metformin, Glipizide, Lantus, and Januvia in the past. Lantus was discontinued toward the beginning of 2018. His A1c was 6.8 in June 2018 so he decreased his glipizide to 5 mg daily due to hypoglycemia risk.     Interval History: Overall, he says he is doing well. He feels his blood sugars have erratic because he has not been eating well lately. His wife fell and hurt herself and as a result she cannot cook. He takes blood sugar readings daily. The average morning reading is 125 but he has had a few high spikes. Check BG only in the morning. As for medications, if he noticed sugars below 100 he would hold Glipizide for that day. He is on Januvia 50mg, Metformin 1000mg, and Glipizide 5 mg.     He saw his cardiologist on 9/25. His blood pressure has been okay recently with averages in the 130s for systolic. His blood pressure is high today in clinic but he says this could be due to rushing to get to his appointment. They did not make any medication changes at this visit. He is not wearing his compression stockings because he cannot get them on himself.     He had a Dexa scan done on 7/30/2019. He has a history of femur fracture following a fall. He also had a left hip replacement which was damaged in the fall. He is taking Vitamin D and C but does  not remember the dosage. He is also taking a multivitamin. He does not take calcium on its own.      Of note, he will leave for [place] at the end of December.     Blood Glucose Monitoring:  We reviewed glucometer data together. BG been erratic due to diet change.   Average 125 fasting, SD 26  A1c: 8.0%    Diabetes monitoring and complications:  CAD:  No  Last eye exam results: reviewed (12/18/2018)  Microalbuminuria:  377 mg/gCr (12/4/2018)  HTN: Yes  On Statin: No  On Aspirin: Yes  Erectile dysfunction: Yes     Review of Systems:   Pertinent items are noted in HPI, remainder of complete ROS is negative.      Active Medications:     Current Outpatient Medications:      amLODIPine (NORVASC) 2.5 MG tablet, Take 1 tablet (2.5 mg) by mouth daily, Disp: 90 tablet, Rfl: 3     aspirin 81 MG tablet, Take 1 tablet by mouth daily., Disp: , Rfl:      blood glucose monitoring (ONETOUCH VERIO IQ SYSTEM) meter device kit, Use to test blood sugar daily, Disp: 1 kit, Rfl: 0     blood glucose monitoring (ONETOUCH VERIO IQ) test strip, Use to test blood sugar 2-3 daily or as directed., Disp: 300 each, Rfl: 3     Cholecalciferol (VITAMIN D-3 PO), , Disp: , Rfl:      COMPRESSION STOCKINGS, 1 each daily, Disp: 2 each, Rfl: 11     finasteride (PROSCAR) 5 MG tablet, Take 1 tablet by mouth daily, Disp: , Rfl: 3     glipiZIDE (GLUCOTROL XL) 5 MG 24 hr tablet, Take 1 tablet (5 mg) by mouth daily, Disp: 90 tablet, Rfl: 1     hydrochlorothiazide (MICROZIDE) 12.5 MG capsule, TAKE 1 CAPSULE (12.5 MG) BY MOUTH DAILY USE WHILE COMBINATION (HYZAAR) IS UNAVAILABLE., Disp: 90 capsule, Rfl: 1     insulin pen needle (31G X 5 MM) 31G X 5 MM miscellaneous, Use 1 pen needle daily or as directed, Disp: 100 each, Rfl: 1     losartan (COZAAR) 50 MG tablet, TAKE 1 TABLET (50 MG) BY MOUTH DAILY USE WHILE COMBINATION (HYZAAR) IS UNAVAILABLE., Disp: 90 tablet, Rfl: 1     losartan-hydrochlorothiazide (HYZAAR) 50-12.5 MG tablet, Take 1 tablet by mouth daily,  "Disp: 90 tablet, Rfl: 3     metFORMIN (GLUCOPHAGE-XR) 500 MG 24 hr tablet, Take 2 tablets (1,000 mg) by mouth daily with food, Disp: 180 tablet, Rfl: 1     multivitamin w/minerals (MULTI-VITAMIN) tablet, Take 1 tablet by mouth daily, Disp: , Rfl:      ONETOUCH ULTRA test strip, Use to test blood sugar 3 times daily or as directed., Disp: 270 each, Rfl: 3     sildenafil (VIAGRA) 100 MG tablet, Take 1 tablet (100 mg) by mouth daily as needed for erectile dysfunction, Disp: 6 tablet, Rfl: 11     sitagliptin (JANUVIA) 50 MG tablet, Take 1 tablet (50 MG) by mouth daily., Disp: 90 tablet, Rfl: 1     tamsulosin (FLOMAX) 0.4 MG capsule, Take 1 capsule (0.4 mg) by mouth daily, Disp: 90 capsule, Rfl: 3      Allergies:   Sulfa drugs      Past Medical History:  Atrial flutter  Chronic kidney disease stage 3  Type 2 diabetes mellitus  Hypertension  Peripheral neuropathy  Rectal type adenocarcinoma  Femur fracture     Past Surgical History:  Cataract  ORIF femur  Left KIMBERLY    Family History:   Diabetes - father  Circulatory - father  Macular degeneration - father  Arthritis - mother     Social History:   Tobacco Use: none  Alcohol Use: 3 martinis/week  Drug Use: none  [facility]P: [facility]      Physical Exam:   BP (!) 162/68   Pulse 62   Ht 1.778 m (5' 10\")   Wt 82.1 kg (181 lb)   BMI 25.97 kg/m        Wt Readings from Last 10 Encounters:   11/05/19 82.1 kg (181 lb)   09/25/19 82.2 kg (181 lb 3.2 oz)   07/23/19 82.3 kg (181 lb 8 oz)   12/04/18 80.3 kg (177 lb)   10/03/18 79.8 kg (176 lb)   09/04/18 82 kg (180 lb 11.2 oz)   06/12/18 89.7 kg (197 lb 12.8 oz)   01/09/18 77.7 kg (171 lb 6.4 oz)   12/12/17 77.9 kg (171 lb 12.8 oz)   09/05/17 77.1 kg (170 lb)     Constitutional: no distress, comfortable, pleasant   Eyes: anicteric, normal extra-ocular movements, No lig lag, retraction or proptosis  Ears, Nose and Throat: throat clear  Neck: supple, no thyromegaly.   Cardiovascular: regular rate and rhythm, normal S1 and S2, " no murmurs  Respiratory: clear to auscultation, no wheezes or crackles, normal breath sounds   Musculoskeletal: b/l pitting edema L>R    Skin:  no jaundice   Psychological: appropriate mood     Data:  Component      Latest Ref Rng & Units 7/22/2019   WBC      4.0 - 11.0 10e9/L 8.8   RBC Count      4.4 - 5.9 10e12/L 3.86 (L)   Hemoglobin      13.3 - 17.7 g/dL 11.2 (L)   Hematocrit      40.0 - 53.0 % 35.3 (L)   MCV      78 - 100 fl 92   MCH      26.5 - 33.0 pg 29.0   MCHC      31.5 - 36.5 g/dL 31.7   RDW      10.0 - 15.0 % 14.9   Platelet Count      150 - 450 10e9/L 294   % Neutrophils      % 71.8   % Lymphocytes      % 15.9   % Monocytes      % 7.8   % Eosinophils      % 4.0   % Basophils      % 0.5   Absolute Neutrophil      1.6 - 8.3 10e9/L 6.3   Absolute Lymphocytes      0.8 - 5.3 10e9/L 1.4   Absolute Monocytes      0.0 - 1.3 10e9/L 0.7   Absolute Eosinophils      0.0 - 0.7 10e9/L 0.4   Absolute Basophils      0.0 - 0.2 10e9/L 0.0   Diff Method       Automated Method   Sodium      133 - 144 mmol/L 139   Potassium      3.4 - 5.3 mmol/L 4.8   Chloride      94 - 109 mmol/L 109   Carbon Dioxide      20 - 32 mmol/L 23   Anion Gap      3 - 14 mmol/L 7   Glucose      70 - 99 mg/dL 217 (H)   Urea Nitrogen      7 - 30 mg/dL 52 (H)   Creatinine      0.66 - 1.25 mg/dL 2.00 (H)   GFR Estimate      >60 mL/min/1.73:m2 30 (L)   GFR Estimate If Black      >60 mL/min/1.73:m2 35 (L)   Calcium      8.5 - 10.1 mg/dL 8.2 (L)   Bilirubin Total      0.2 - 1.3 mg/dL 0.3   Albumin      3.4 - 5.0 g/dL 3.5   Protein Total      6.8 - 8.8 g/dL 7.5   Alkaline Phosphatase      40 - 150 U/L 87   ALT      0 - 70 U/L 13   AST      0 - 45 U/L 8   Cholesterol      <200 mg/dL 126   Triglycerides      <150 mg/dL 100   HDL Cholesterol      >39 mg/dL 52   LDL Cholesterol Calculated      <100 mg/dL 54   Non HDL Cholesterol      <130 mg/dL 74   Hemoglobin A1C      0 - 5.6 % 7.7 (H)   TSH      0.40 - 4.00 mU/L 4.11 (H)   T4 Free      0.76 - 1.46 ng/dL  0.91   Vitamin D Deficiency screening      20 - 75 ug/L 25     Assessment and Plan:  Type 2 diabetes mellitus, uncontrolled (H)  For his diabetes, his numbers are reasonable. Given his age and risk of hypoglycemia A1C ~ 8% is ok. We can continue on the same medication regimen for now. He is not interested in starting injectable medications, including insulin   - Hemoglobin A1c POCT    Osteoporosis, unspecified osteoporosis type, unspecified pathological fracture presence  Vitamin D deficiency  He has osteoporosis based on hx of femur fracture. T-score is in low bone density range. He is not a candidate for bisphosphonate due to kidney function. I suggested adding Calcium supplement and adjust  Vitamin D if needed based on labs today. I recommended taking Calcium 600 mg twice a day. We discussed options for injections of medications. Prolia every 6 months is one option. He must be complaint taking calcium and vitamin D to ensure his levels stay up while on Prolia because one side effect is lowering calcium. Also reviewed side effects of Prolia, including risk of increased fracture when the medication is stopped. He was given a written handout on Prolia. Also discussed use of Forteo. The patient agreed on the medication of Prolia. We will get the following labs done prior to scheduling an injection.  - 25 Hydroxyvitamin D2 and D3  - Phosphorus  - Parathyroid Hormone Intact  - Calcium  - Bone specific alk phosphatase  - Basic metabolic panel  - ALT  - AST  - Albumin level      Follow-up: Return in about 6 months (around 5/5/2020).      Scribe Disclosure:  I, Gila Potter, am serving as a scribe to document services personally performed by Jose Amin MD at this visit, based upon the provider's statements to me. All documentation has been reviewed by the aforementioned provider prior to being entered into the official medical record.     Portions of this medical record were completed by a scribe. UPON MY REVIEW  AND AUTHENTICATION BY ELECTRONIC SIGNATURE, this confirms (a) I performed the applicable clinical services, and (b) the record is accurate.      JUDD Fenton

## 2019-11-06 LAB — ALP BONE SERPL-MCNC: 12.1 UG/L (ref 6.5–20.1)

## 2019-11-11 LAB
DEPRECATED CALCIDIOL+CALCIFEROL SERPL-MC: <36 UG/L (ref 20–75)
VITAMIN D2 SERPL-MCNC: <5 UG/L
VITAMIN D3 SERPL-MCNC: 31 UG/L

## 2019-12-06 ENCOUNTER — TELEPHONE (OUTPATIENT)
Dept: ENDOCRINOLOGY | Facility: CLINIC | Age: 84
End: 2019-12-06

## 2019-12-06 NOTE — TELEPHONE ENCOUNTER
Reviewed lab results over the phone with patient.  Kidney function is stable, calcium and vitamin D were normal.  Urine microalbumin progressively increasing.  Discussed importance of controlling blood pressure besides diabetes.  He has a blood pressure monitor at home and will chart blood pressure at home over the next week.  Discussed increasing losartan dose and also possibly nephrology consult.  Also discussed use of Prolia for osteoporosis.  We had also discussed this during last visit.  He is not sure about prolia at this time.  He will inform us when he would like to proceed with this.    JUDD Fenton

## 2019-12-18 ENCOUNTER — TELEPHONE (OUTPATIENT)
Dept: CARDIOLOGY | Facility: CLINIC | Age: 84
End: 2019-12-18

## 2019-12-18 NOTE — TELEPHONE ENCOUNTER
[facility] Call Center    Phone Message    May a detailed message be left on voicemail: yes    Reason for Call: Symptoms or Concerns     If patient has red-flag symptoms, warm transfer to triage line    Current symptom or concern: swelling in left leg    Symptoms have been present for:  2 week(s)    Are there any new or worsening symptoms? Yes: worsening w/ next opening in april      Action Taken: Message routed to:  [facility]

## 2019-12-20 NOTE — TELEPHONE ENCOUNTER
Called and spoke with Pt.  Discussed his reoccurring issue with Left LE edema.  He noted that his edema has been present for about 10 days and is worse than he has seen previously.  Edema present from left foot to knee.     He said he has not wore compression stockings d/t his hip he is unable to put them on.  Pt wife is unable to assist d/t her dementia.      Pt denied edema elsewhere, SOB, increased weight (not checked at home regularly per Pt), orthopnea.  He reports he is flying after the new year and wants to ensure there is no concern.    Informed Pt the above information would be discussed with Dr Levi and Pt would be called with additional information.  Informed there may be a delay d/t the upcoming holidays.  Pt verbalized understanding, agreed to current plan and denied any further questions.     Nishi Shelley LPN

## 2019-12-30 DIAGNOSIS — E11.65 TYPE 2 DIABETES MELLITUS WITH HYPERGLYCEMIA, WITHOUT LONG-TERM CURRENT USE OF INSULIN (H): ICD-10-CM

## 2019-12-31 NOTE — TELEPHONE ENCOUNTER
Called and spoke with Pt.  Informed Pt Dr Levi would like Pt evaluated in clinic.  Scheduled Pt with Araceli Chiang NP on 01/03 at 3:30 PM to rule out hypervolemia, DVT or other potential causes of swelling.  Pt agreed verbalized understanding, agreed to current plan and denied any further questions.  Sent to Navigator to schedule.    Nishi Shelley LPN

## 2020-01-02 NOTE — PROGRESS NOTES
Cardiology Clinic Note  January 3, 2020      HPI:  Farzad East is a 84 year old with a past medical history significant for hypertension, type II diabetes mellitus, chronic kidney disease and chronic left hip pain s/p prior hip replacement. He is followed by Dr. Levi for hypertension management and was last evaluated 9/2019. He presents to clinic today for evaluation of the left lower extremity swelling.     Patient endorses a 2 week history of worsening left leg swelling. He reports a longstanding history of chronic lower extremity swelling L>R. He states that his swelling has been mainly isolated to the left ankle though recently he notes significant swelling of the left leg extending up to the knee. He denies associated pain or erythema of the posterior calf but does report itching and redness. He denies recent trauma, long-distance travel, immobility or surgeries. No history of VTE or family hx of clotting disorders. He has not experienced any s/s concerning for heart failure such as dyspnea, orthopnea, PND, abdominal distention or weight gain. He denies recent chest pain, lightheadedness or syncope.     He seldomly checks his blood pressure at home but when he does it is usually around 130-140 systolic. His blood pressure at his last endocrinology appt was 160 systolic and today in clinic it is 180 which is unusually high for him. He has been compliant with his medications including hydrochlorothiazide 12.5 mg, losartan 50 mg daily and amlodipine 2.5 mg daily. He attributes his elevated blood pressure to rushing into clinic today.     Past medical history:  Past Medical History:   Diagnosis Date     Atrial flutter (H)      Choroidal nevus of left eye      CKD (chronic kidney disease) stage 3, GFR 30-59 ml/min (H)      Diabetes mellitus (H)      Diabetic peripheral neuropathy (H)      Hypertension      Microalbuminuria      Overweight(278.02)      Rectal-type adenocarcinoma (H)      Retinopathies, diabetic       Vitreous detachment of both eyes        Medications:  amLODIPine (NORVASC) 2.5 MG tablet, Take 1 tablet (2.5 mg) by mouth daily  aspirin 81 MG tablet, Take 1 tablet by mouth daily.  blood glucose monitoring (ONETOUCH VERIO IQ SYSTEM) meter device kit, Use to test blood sugar daily  blood glucose monitoring (ONETOUCH VERIO IQ) test strip, Use to test blood sugar 2-3 daily or as directed.  Cholecalciferol (VITAMIN D-3 PO),   COMPRESSION STOCKINGS, 1 each daily  finasteride (PROSCAR) 5 MG tablet, Take 1 tablet by mouth daily  glipiZIDE (GLUCOTROL XL) 5 MG 24 hr tablet, Take 1 tablet (5 mg) by mouth daily  hydrochlorothiazide (MICROZIDE) 12.5 MG capsule, TAKE 1 CAPSULE (12.5 MG) BY MOUTH DAILY USE WHILE COMBINATION (HYZAAR) IS UNAVAILABLE.  insulin pen needle (31G X 5 MM) 31G X 5 MM miscellaneous, Use 1 pen needle daily or as directed  losartan (COZAAR) 50 MG tablet, TAKE 1 TABLET (50 MG) BY MOUTH DAILY USE WHILE COMBINATION (HYZAAR) IS UNAVAILABLE.  losartan-hydrochlorothiazide (HYZAAR) 50-12.5 MG tablet, Take 1 tablet by mouth daily  metFORMIN (GLUCOPHAGE-XR) 500 MG 24 hr tablet, Take 2 tablets (1,000 mg) by mouth daily with food  multivitamin w/minerals (MULTI-VITAMIN) tablet, Take 1 tablet by mouth daily  ONETOUCH ULTRA test strip, Use to test blood sugar 3 times daily or as directed.  sildenafil (VIAGRA) 100 MG tablet, Take 1 tablet (100 mg) by mouth daily as needed for erectile dysfunction  sitagliptin (JANUVIA) 50 MG tablet, Take 1 tablet (50 MG) by mouth daily.  tamsulosin (FLOMAX) 0.4 MG capsule, Take 1 capsule (0.4 mg) by mouth daily    No current facility-administered medications on file prior to visit.     Allergies:      Allergies   Allergen Reactions     Sulfa Drugs      Unknown reaction. Occurred during childhood. Has not taken Sulfa medications since allergic response.        Family and social history:    Family History   Problem Relation Age of Onset     Diabetes Father      Circulatory Father          PAD     Macular Degeneration Father      Arthritis Mother      Amblyopia No family hx of      Retinal detachment No family hx of      Glaucoma No family hx of        Social History     Socioeconomic History     Marital status:      Spouse name: Not on file     Number of children: Not on file     Years of education: Not on file     Highest education level: Not on file   Occupational History     Occupation:      Employer: [employer]   Social Needs     Financial resource strain: Not on file     Food insecurity:     Worry: Not on file     Inability: Not on file     Transportation needs:     Medical: Not on file     Non-medical: Not on file   Tobacco Use     Smoking status: Never Smoker     Smokeless tobacco: Never Used   Substance and Sexual Activity     Alcohol use: Yes     Alcohol/week: 2.5 - 3.3 standard drinks     Types: 3 - 4 Standard drinks or equivalent per week     Comment: 3 martinis per week     Drug use: No     Sexual activity: Not on file   Lifestyle     Physical activity:     Days per week: Not on file     Minutes per session: Not on file     Stress: Not on file   Relationships     Social connections:     Talks on phone: Not on file     Gets together: Not on file     Attends Advent service: Not on file     Active member of club or organization: Not on file     Attends meetings of clubs or organizations: Not on file     Relationship status: Not on file     Intimate partner violence:     Fear of current or ex partner: Not on file     Emotionally abused: Not on file     Physically abused: Not on file     Forced sexual activity: Not on file   Other Topics Concern     Parent/sibling w/ CABG, MI or angioplasty before 65F 55M? No   Social History Narrative     Not on file       Review of Systems:  Skin: No skin rash or ulcers.  Respiratory: No cough or hemoptysis.  Cardiovascular: See HPI.    Gastrointestinal: No abdominal pain, nausea, vomiting, hematemesis or  "melena.  Genitourinary: No increased frequency or urgency of urine. No dysuria or hematuria.  Musculoskeletal: No polyarthralgia or myalgias.  Neurologic: No headaches, seizure or focal weakness.  Hematologic/Lymphatic/Immunologic: No bleeding tendency.    Vital signs:  BP (!) 183/88 (BP Location: Right arm)   Pulse 68   Ht 1.778 m (5' 10\")   Wt 81.6 kg (180 lb)   SpO2 98%   BMI 25.83 kg/m     Wt Readings from Last 2 Encounters:   11/05/19 82.1 kg (181 lb)   09/25/19 82.2 kg (181 lb 3.2 oz)     Physical Exam:  Gen: NAD.    HEENT: No conjunctival pallor or scleral icterus, MMM. Clear oropharynx.    Neck: No JVD. No thyroid enlargement or cervical adenopathy.    Chest: Clear to auscultation bilaterally.    CV: Normal first and second heart sounds. No murmurs or gallop appreciated.    Abdomen: Soft, non-tender, non-distended, BS+.  Ext: Nontender extremities. 3+ LLE edema, 1+ RLE edema.  Warm and well perfused with normal capillary refill.    Skin: erythemic rash on bilateral shins L>R  Neuro: alert, oriented and appropriately conversant.    Psych: Normal affect and speech.    Labs:    Last Comprehensive Metabolic Panel:  Sodium   Date Value Ref Range Status   01/03/2020 137 133 - 144 mmol/L Final     Potassium   Date Value Ref Range Status   01/03/2020 5.0 3.4 - 5.3 mmol/L Final     Chloride   Date Value Ref Range Status   01/03/2020 107 94 - 109 mmol/L Final     Carbon Dioxide   Date Value Ref Range Status   01/03/2020 24 20 - 32 mmol/L Final     Anion Gap   Date Value Ref Range Status   01/03/2020 5 3 - 14 mmol/L Final     Glucose   Date Value Ref Range Status   01/03/2020 120 (H) 70 - 99 mg/dL Final     Urea Nitrogen   Date Value Ref Range Status   01/03/2020 52 (H) 7 - 30 mg/dL Final     Creatinine   Date Value Ref Range Status   01/03/2020 1.89 (H) 0.66 - 1.25 mg/dL Final     GFR Estimate   Date Value Ref Range Status   01/03/2020 32 (L) >60 mL/min/[1.73_m2] Final     Comment:     Non  GFR " Calc  Starting 12/18/2018, serum creatinine based estimated GFR (eGFR) will be   calculated using the Chronic Kidney Disease Epidemiology Collaboration   (CKD-EPI) equation.       Calcium   Date Value Ref Range Status   01/03/2020 8.9 8.5 - 10.1 mg/dL Final       Diagnostics:    Venous duplex today:   Findings:     Right leg:     CFV: Thrombus: No, Phasic: Yes  Femoral vein, proximal: Thrombus: No, Phasic: Yes  Femoral vein, mid: Thrombus: No, Phasic: Yes  Femoral vein, distal: Thrombus: No, Phasic: Yes  Popliteal vein: Thrombus: No, Phasic: Yes  PTV: Thrombus: No  Peroneal vein: Thrombus: No     Left leg:     CFV: Thrombus: No, Phasic: Yes  Femoral vein, proximal: Thrombus: No, Phasic: Yes  Femoral vein, mid: Thrombus: No, Phasic: Yes  Femoral vein, distal: Thrombus: No, Phasic: Yes  Popliteal vein: Thrombus: No, Phasic: Yes  PTV: Thrombus: Thrombus: No  Peroneal vein: Thrombus: No                                                                      Impression:     Right leg: No deep venous thrombosis.      Left leg: No deep venous thrombosis.     ECHO 2018:     Interpretation Summary  Left ventricular hypertrophy.  Global and regional left ventricular function is hyperkinetic with an EF of  65-70%.  Abnormal diastolic function.  No significant valve disease.    Assessment and Plan:  Farzad East is a 84 year old with a past medical history significant for hypertension, type II diabetes mellitus, chronic kidney disease and chronic left hip pain s/p prior hip replacement. He is followed by Dr. Levi for hypertension management and was last evaluated 9/2019. He presents to clinic today for evaluation of the left lower extremity swelling.     1. Left lower extremity swelling: Patient with chronic lower extremity L>R now with presenting with worsening left lower extremity swelling over the past 2 weeks, no associated pain or erythema.  Differential diagnosis includes DVT, CHF vs venous insufficiency. Bilateral  venous duplex performed today shows no evidence of DVT. While patient does have diastolic dysfunction on echo, he denies s/s of CHF and he appear euvolemic today with flat neck veins, clear LS and stable weight. Furthermore, it would be unusual for heart failure to present as unilateral leg swelling. Suspect patient has worsening chronic venous insufficiency. Would recommend use of prescription grade compression hosiery (20-30 mmHg) to reduce leg swelling. Historically, he has had difficulty getting stockings on and off, therefore I have provided him with a prescription for a doff and jean-pierre. He will continue to monitor swelling and keep us updated.     2. Hypertension, goal < 140/90: Patient rarely checks his blood pressure at home but states that when he does it has been well controlled in the 130's-140's. Per chart review his BP has been elevated in the 160's-180's at recent clinic visits. I have recommended that we increase his antihypertensives today. I am hesitant to increase his amlodipine given his leg swelling, there I have recommended that we increase his losartan to 50 mg twice daily. Given his baseline CKD we will monitor his creatinine closely. Will obtain a baseline BMP today and repeat in 1 week. I would also like him to start checking home BP at least 3 times a week and log results. Ideally I would like to see him back in clinic in 1 month for ongoing hypertension management; however, he will be leaving for [place] within the next 1-2 weeks. I have recommended he see his PCP in [place] in 1 month for BP check and repeat labs.     Follow-up: RTC in 1 year.     Addendum 1/21/20:   Patient reports improvement in leg swelling with use of compression.  Repeat labs show hyperkalemia on higher dose of losartan. Patient to decrease dose back to 50 mg daily and start Coreg 6.25 mg twice daily for blood pressure management. We will touch base again in 2 weeks to review blood pressure trend.

## 2020-01-03 ENCOUNTER — ANCILLARY PROCEDURE (OUTPATIENT)
Dept: ULTRASOUND IMAGING | Facility: CLINIC | Age: 85
End: 2020-01-03
Attending: NURSE PRACTITIONER
Payer: MEDICARE

## 2020-01-03 ENCOUNTER — OFFICE VISIT (OUTPATIENT)
Dept: CARDIOLOGY | Facility: CLINIC | Age: 85
End: 2020-01-03
Attending: NURSE PRACTITIONER
Payer: MEDICARE

## 2020-01-03 ENCOUNTER — TELEPHONE (OUTPATIENT)
Dept: CARDIOLOGY | Facility: CLINIC | Age: 85
End: 2020-01-03

## 2020-01-03 VITALS
BODY MASS INDEX: 25.77 KG/M2 | SYSTOLIC BLOOD PRESSURE: 187 MMHG | DIASTOLIC BLOOD PRESSURE: 73 MMHG | HEIGHT: 70 IN | HEART RATE: 68 BPM | OXYGEN SATURATION: 98 % | WEIGHT: 180 LBS

## 2020-01-03 DIAGNOSIS — R60.0 LOWER EXTREMITY EDEMA: ICD-10-CM

## 2020-01-03 DIAGNOSIS — E11.65 UNCONTROLLED TYPE 2 DIABETES MELLITUS WITH HYPERGLYCEMIA (H): ICD-10-CM

## 2020-01-03 DIAGNOSIS — I10 HYPERTENSION, UNSPECIFIED TYPE: ICD-10-CM

## 2020-01-03 DIAGNOSIS — R60.0 LOWER EXTREMITY EDEMA: Primary | ICD-10-CM

## 2020-01-03 LAB
ANION GAP SERPL CALCULATED.3IONS-SCNC: 5 MMOL/L (ref 3–14)
BUN SERPL-MCNC: 52 MG/DL (ref 7–30)
CALCIUM SERPL-MCNC: 8.9 MG/DL (ref 8.5–10.1)
CHLORIDE SERPL-SCNC: 107 MMOL/L (ref 94–109)
CO2 SERPL-SCNC: 24 MMOL/L (ref 20–32)
CREAT SERPL-MCNC: 1.89 MG/DL (ref 0.66–1.25)
GFR SERPL CREATININE-BSD FRML MDRD: 32 ML/MIN/{1.73_M2}
GLUCOSE SERPL-MCNC: 120 MG/DL (ref 70–99)
POTASSIUM SERPL-SCNC: 5 MMOL/L (ref 3.4–5.3)
SODIUM SERPL-SCNC: 137 MMOL/L (ref 133–144)

## 2020-01-03 PROCEDURE — 80048 BASIC METABOLIC PNL TOTAL CA: CPT | Performed by: NURSE PRACTITIONER

## 2020-01-03 PROCEDURE — G0463 HOSPITAL OUTPT CLINIC VISIT: HCPCS

## 2020-01-03 PROCEDURE — 36415 COLL VENOUS BLD VENIPUNCTURE: CPT | Performed by: NURSE PRACTITIONER

## 2020-01-03 PROCEDURE — 99214 OFFICE O/P EST MOD 30 MIN: CPT | Mod: ZP | Performed by: NURSE PRACTITIONER

## 2020-01-03 RX ORDER — METFORMIN HCL 500 MG
1000 TABLET, EXTENDED RELEASE 24 HR ORAL
Qty: 180 TABLET | Refills: 1 | Status: SHIPPED | OUTPATIENT
Start: 2020-01-03 | End: 2020-07-14

## 2020-01-03 RX ORDER — LOSARTAN POTASSIUM 50 MG/1
50 TABLET ORAL 2 TIMES DAILY
Qty: 90 TABLET | Refills: 1 | Status: SHIPPED | OUTPATIENT
Start: 2020-01-03 | End: 2020-01-16

## 2020-01-03 ASSESSMENT — MIFFLIN-ST. JEOR: SCORE: 1517.72

## 2020-01-03 ASSESSMENT — PAIN SCALES - GENERAL: PAINLEVEL: NO PAIN (0)

## 2020-01-03 NOTE — TELEPHONE ENCOUNTER
METFORMIN HCL  MG TABLET      Last Written Prescription Date:  7/10/19  Last Fill Quantity: 180,   # refills: 1  Last Office Visit : 11/5/19  Future Office visit:  5/5/20  Follows with Dr Amin  Routing refill request to provider for review/approval because:  Blood pressure out of range   BP Readings from Last 3 Encounters:   11/05/19 (!) 162/78   09/25/19 131/74   07/23/19 146/72   Creatinine elevated ( has been trending high)    Lab Test 11/05/19  1411   CR 1.86*

## 2020-01-03 NOTE — LETTER
1/3/2020      RE: Farzad East  [address]  [facility] [address]       Dear Colleague,    Thank you for the opportunity to participate in the care of your patient, Farzad East, at the [facility]. Please see a copy of my visit note below.    Cardiology Clinic Note  January 3, 2020      HPI:  Farzad East is a 84 year old with a past medical history significant for hypertension, type II diabetes mellitus, chronic kidney disease and chronic left hip pain s/p prior hip replacement. He is followed by Dr. Levi for hypertension management and was last evaluated 9/2019. He presents to clinic today for evaluation of the left lower extremity swelling.     Patient endorses a 2 week history of worsening left leg swelling. He reports a longstanding history of chronic lower extremity swelling L>R. He states that his swelling has been mainly isolated to the left ankle though recently he notes significant swelling of the left leg extending up to the knee. He denies associated pain or erythema of the posterior calf but does report itching and redness. He denies recent trauma, long-distance travel, immobility or surgeries. No history of VTE or family hx of clotting disorders. He has not experienced any s/s concerning for heart failure such as dyspnea, orthopnea, PND, abdominal distention or weight gain. He denies recent chest pain, lightheadedness or syncope.     He seldomly checks his blood pressure at home but when he does it is usually around 130-140 systolic. His blood pressure at his last endocrinology appt was 160 systolic and today in clinic it is 180 which is unusually high for him. He has been compliant with his medications including hydrochlorothiazide 12.5 mg, losartan 50 mg daily and amlodipine 2.5 mg daily. He attributes his elevated blood pressure to rushing into clinic today.     Past medical history:  Past Medical History:    Diagnosis Date     Atrial flutter (H)      Choroidal nevus of left eye      CKD (chronic kidney disease) stage 3, GFR 30-59 ml/min (H)      Diabetes mellitus (H)      Diabetic peripheral neuropathy (H)      Hypertension      Microalbuminuria      Overweight(278.02)      Rectal-type adenocarcinoma (H)      Retinopathies, diabetic      Vitreous detachment of both eyes        Medications:  amLODIPine (NORVASC) 2.5 MG tablet, Take 1 tablet (2.5 mg) by mouth daily  aspirin 81 MG tablet, Take 1 tablet by mouth daily.  blood glucose monitoring (ONETOUCH VERIO IQ SYSTEM) meter device kit, Use to test blood sugar daily  blood glucose monitoring (ONETOUCH VERIO IQ) test strip, Use to test blood sugar 2-3 daily or as directed.  Cholecalciferol (VITAMIN D-3 PO),   COMPRESSION STOCKINGS, 1 each daily  finasteride (PROSCAR) 5 MG tablet, Take 1 tablet by mouth daily  glipiZIDE (GLUCOTROL XL) 5 MG 24 hr tablet, Take 1 tablet (5 mg) by mouth daily  hydrochlorothiazide (MICROZIDE) 12.5 MG capsule, TAKE 1 CAPSULE (12.5 MG) BY MOUTH DAILY USE WHILE COMBINATION (HYZAAR) IS UNAVAILABLE.  insulin pen needle (31G X 5 MM) 31G X 5 MM miscellaneous, Use 1 pen needle daily or as directed  losartan (COZAAR) 50 MG tablet, TAKE 1 TABLET (50 MG) BY MOUTH DAILY USE WHILE COMBINATION (HYZAAR) IS UNAVAILABLE.  losartan-hydrochlorothiazide (HYZAAR) 50-12.5 MG tablet, Take 1 tablet by mouth daily  metFORMIN (GLUCOPHAGE-XR) 500 MG 24 hr tablet, Take 2 tablets (1,000 mg) by mouth daily with food  multivitamin w/minerals (MULTI-VITAMIN) tablet, Take 1 tablet by mouth daily  ONETOUCH ULTRA test strip, Use to test blood sugar 3 times daily or as directed.  sildenafil (VIAGRA) 100 MG tablet, Take 1 tablet (100 mg) by mouth daily as needed for erectile dysfunction  sitagliptin (JANUVIA) 50 MG tablet, Take 1 tablet (50 MG) by mouth daily.  tamsulosin (FLOMAX) 0.4 MG capsule, Take 1 capsule (0.4 mg) by mouth daily    No current facility-administered  medications on file prior to visit.     Allergies:      Allergies   Allergen Reactions     Sulfa Drugs      Unknown reaction. Occurred during childhood. Has not taken Sulfa medications since allergic response.        Family and social history:    Family History   Problem Relation Age of Onset     Diabetes Father      Circulatory Father         PAD     Macular Degeneration Father      Arthritis Mother      Amblyopia No family hx of      Retinal detachment No family hx of      Glaucoma No family hx of        Social History     Socioeconomic History     Marital status:      Spouse name: Not on file     Number of children: Not on file     Years of education: Not on file     Highest education level: Not on file   Occupational History     Occupation:      Employer: [employer]   Social Needs     Financial resource strain: Not on file     Food insecurity:     Worry: Not on file     Inability: Not on file     Transportation needs:     Medical: Not on file     Non-medical: Not on file   Tobacco Use     Smoking status: Never Smoker     Smokeless tobacco: Never Used   Substance and Sexual Activity     Alcohol use: Yes     Alcohol/week: 2.5 - 3.3 standard drinks     Types: 3 - 4 Standard drinks or equivalent per week     Comment: 3 martinis per week     Drug use: No     Sexual activity: Not on file   Lifestyle     Physical activity:     Days per week: Not on file     Minutes per session: Not on file     Stress: Not on file   Relationships     Social connections:     Talks on phone: Not on file     Gets together: Not on file     Attends Anabaptist service: Not on file     Active member of club or organization: Not on file     Attends meetings of clubs or organizations: Not on file     Relationship status: Not on file     Intimate partner violence:     Fear of current or ex partner: Not on file     Emotionally abused: Not on file     Physically abused: Not on file     Forced sexual activity: Not on file  "  Other Topics Concern     Parent/sibling w/ CABG, MI or angioplasty before 65F 55M? No   Social History Narrative     Not on file       Review of Systems:  Skin: No skin rash or ulcers.  Respiratory: No cough or hemoptysis.  Cardiovascular: See HPI.    Gastrointestinal: No abdominal pain, nausea, vomiting, hematemesis or melena.  Genitourinary: No increased frequency or urgency of urine. No dysuria or hematuria.  Musculoskeletal: No polyarthralgia or myalgias.  Neurologic: No headaches, seizure or focal weakness.  Hematologic/Lymphatic/Immunologic: No bleeding tendency.    Vital signs:  BP (!) 183/88 (BP Location: Right arm)   Pulse 68   Ht 1.778 m (5' 10\")   Wt 81.6 kg (180 lb)   SpO2 98%   BMI 25.83 kg/m      Wt Readings from Last 2 Encounters:   11/05/19 82.1 kg (181 lb)   09/25/19 82.2 kg (181 lb 3.2 oz)     Physical Exam:  Gen: NAD.    HEENT: No conjunctival pallor or scleral icterus, MMM. Clear oropharynx.    Neck: No JVD. No thyroid enlargement or cervical adenopathy.    Chest: Clear to auscultation bilaterally.    CV: Normal first and second heart sounds. No murmurs or gallop appreciated.    Abdomen: Soft, non-tender, non-distended, BS+.  Ext: Nontender extremities. 3+ LLE edema, 1+ RLE edema.  Warm and well perfused with normal capillary refill.    Skin: erythemic rash on bilateral shins L>R  Neuro: alert, oriented and appropriately conversant.    Psych: Normal affect and speech.    Labs:    Last Comprehensive Metabolic Panel:  Sodium   Date Value Ref Range Status   01/03/2020 137 133 - 144 mmol/L Final     Potassium   Date Value Ref Range Status   01/03/2020 5.0 3.4 - 5.3 mmol/L Final     Chloride   Date Value Ref Range Status   01/03/2020 107 94 - 109 mmol/L Final     Carbon Dioxide   Date Value Ref Range Status   01/03/2020 24 20 - 32 mmol/L Final     Anion Gap   Date Value Ref Range Status   01/03/2020 5 3 - 14 mmol/L Final     Glucose   Date Value Ref Range Status   01/03/2020 120 (H) 70 - 99 " mg/dL Final     Urea Nitrogen   Date Value Ref Range Status   01/03/2020 52 (H) 7 - 30 mg/dL Final     Creatinine   Date Value Ref Range Status   01/03/2020 1.89 (H) 0.66 - 1.25 mg/dL Final     GFR Estimate   Date Value Ref Range Status   01/03/2020 32 (L) >60 mL/min/[1.73_m2] Final     Comment:     Non  GFR Calc  Starting 12/18/2018, serum creatinine based estimated GFR (eGFR) will be   calculated using the Chronic Kidney Disease Epidemiology Collaboration   (CKD-EPI) equation.       Calcium   Date Value Ref Range Status   01/03/2020 8.9 8.5 - 10.1 mg/dL Final       Diagnostics:    Venous duplex today:   Findings:     Right leg:     CFV: Thrombus: No, Phasic: Yes  Femoral vein, proximal: Thrombus: No, Phasic: Yes  Femoral vein, mid: Thrombus: No, Phasic: Yes  Femoral vein, distal: Thrombus: No, Phasic: Yes  Popliteal vein: Thrombus: No, Phasic: Yes  PTV: Thrombus: No  Peroneal vein: Thrombus: No     Left leg:     CFV: Thrombus: No, Phasic: Yes  Femoral vein, proximal: Thrombus: No, Phasic: Yes  Femoral vein, mid: Thrombus: No, Phasic: Yes  Femoral vein, distal: Thrombus: No, Phasic: Yes  Popliteal vein: Thrombus: No, Phasic: Yes  PTV: Thrombus: Thrombus: No  Peroneal vein: Thrombus: No                                                                      Impression:     Right leg: No deep venous thrombosis.      Left leg: No deep venous thrombosis.     ECHO 2018:     Interpretation Summary  Left ventricular hypertrophy.  Global and regional left ventricular function is hyperkinetic with an EF of  65-70%.  Abnormal diastolic function.  No significant valve disease.    Assessment and Plan:  Farzad East is a 84 year old with a past medical history significant for hypertension, type II diabetes mellitus, chronic kidney disease and chronic left hip pain s/p prior hip replacement. He is followed by Dr. Levi for hypertension management and was last evaluated 9/2019. He presents to clinic today for  evaluation of the left lower extremity swelling.     1. Left lower extremity swelling: Patient with chronic lower extremity L>R now with presenting with worsening left lower extremity swelling over the past 2 weeks, no associated pain or erythema.  Differential diagnosis includes DVT, CHF vs venous insufficiency. Bilateral venous duplex performed today shows no evidence of DVT. While patient does have diastolic dysfunction on echo, he denies s/s of CHF and he appear euvolemic today with flat neck veins, clear LS and stable weight. Furthermore, it would be unusual for heart failure to present as unilateral leg swelling. Suspect patient has worsening chronic venous insufficiency. Would recommend use of prescription grade compression hosiery (20-30 mmHg) to reduce leg swelling. Historically, he has had difficulty getting stockings on and off, therefore I have provided him with a prescription for a doff and jean-pierre. He will continue to monitor swelling and keep us updated.     2. Hypertension, goal < 140/90: Patient rarely checks his blood pressure at home but states that when he does it has been well controlled in the 130's-140's. Per chart review his BP has been elevated in the 160's-180's at recent clinic visits. I have recommended that we increase his antihypertensives today. I am hesitant to increase his amlodipine given his leg swelling, there I have recommended that we increase his losartan to 50 mg twice daily. Given his baseline CKD we will monitor his creatinine closely. Will obtain a baseline BMP today and repeat in 1 week. I would also like him to start checking home BP at least 3 times a week and log results. Ideally I would like to see him back in clinic in 1 month for ongoing hypertension management; however, he will be leaving for [place] within the next 1-2 weeks. I have recommended he see his PCP in [place] in 1 month for BP check and repeat labs.     Follow-up: RTC in 1 year.     Please do not hesitate  to contact me if you have any questions/concerns.     Sincerely,     Araceli Chiang NP

## 2020-01-03 NOTE — PATIENT INSTRUCTIONS
You were seen today in the Cardiovascular Clinic at the [facility].    Cardiology provider you saw during your visit Araceli Chiang NP.     1. I will contact you with leg ultrasound results.   2. Recommend compression stockings with use of doff and jean-pierre to help you get stockings on and off.  3. Your blood pressure is elevated today - increase losartan to 50 mg twice daily.   4. Have labs drawn today and again in a week.  5. Check home BP 3 days a week and log results.  6. See PCP in [place] in 1 month with repeat labs for ongoing blood pressure management.     Questions and [phone].   First press #1 for the University and then press #3 for Medical Questions to reach the Cardiology triage nurse.     On Call Cardiologist for after hours or on weekends: [phone], press option #4 and ask to speak to the on-call Cardiologist.     [facility]:  [address] [place] [phone]

## 2020-01-03 NOTE — TELEPHONE ENCOUNTER
Metformin refill requested.      11/5/2019 14:11   GFR Estimate 33 (L)     GFR below 35 noted; given his age suspect it might be lower than documented.   Discuss further with ordering provider regarding this refill.

## 2020-01-03 NOTE — NURSING NOTE
Chief Complaint   Patient presents with     Follow Up     Pt c/o left leg edema.      Vitals were taken and medications were reconciled.     Emily Chiang RMA  3:11 PM

## 2020-01-03 NOTE — TELEPHONE ENCOUNTER
Called and spoke with Pt and discussed that Araceli Chiang NP would like for him to complete an US LE today to evaluate for DVT, since it is Friday and imaging is not available on the weekend.  Pt offered 2 PM appt today.  Pt accepted.    Nishi Shelley LPN

## 2020-01-10 ENCOUNTER — TELEPHONE (OUTPATIENT)
Dept: CARDIOLOGY | Facility: CLINIC | Age: 85
End: 2020-01-10

## 2020-01-10 DIAGNOSIS — I10 HYPERTENSION, UNSPECIFIED TYPE: ICD-10-CM

## 2020-01-10 DIAGNOSIS — N18.30 CHRONIC KIDNEY DISEASE, STAGE 3 (MODERATE): ICD-10-CM

## 2020-01-10 DIAGNOSIS — R60.0 LOWER EXTREMITY EDEMA: Primary | ICD-10-CM

## 2020-01-10 NOTE — TELEPHONE ENCOUNTER
Called patient to reassess symptoms of leg swelling and to obtain home blood pressure measurements following recent medication change.     Patient was able to get prescription grade compression stockings and has been wearing them daily with the use of a Doff N Wilfrid. He reports mild improvement in leg swelling and states that his legs feel better with the compression stockings on. He is overall happy with the results.     He increased his losartan to 50 mg twice daily and reports home blood pressures in the 150's. He will continue to monitor his home blood pressures and have a BMP drawn today for monitoring of kidney function. He will be leaving for [place] next Friday and has agreed to see a PCP there within 3-4 weeks for ongoing blood pressure management and repeat labs.     I spoke with his endocrinologist regarding increasing his ARB in the setting of his CKD. He agreed with this medication change with close monitoring of renal function. He also recommended a nephrology referral which is have placed. Patient agrees to see nephrology when he gets back to [place] in the spring.     ESTEPHANIE Ramon, CNP  Cardiology Nurse Practitioner  [facility]  Clinic: [phone]  [facility]: [phone]

## 2020-01-14 DIAGNOSIS — I10 HYPERTENSION, UNSPECIFIED TYPE: ICD-10-CM

## 2020-01-14 PROCEDURE — 36415 COLL VENOUS BLD VENIPUNCTURE: CPT | Performed by: NURSE PRACTITIONER

## 2020-01-14 PROCEDURE — 80048 BASIC METABOLIC PNL TOTAL CA: CPT | Performed by: NURSE PRACTITIONER

## 2020-01-15 LAB
ANION GAP SERPL CALCULATED.3IONS-SCNC: 5 MMOL/L (ref 3–14)
BUN SERPL-MCNC: 51 MG/DL (ref 7–30)
CALCIUM SERPL-MCNC: 9.1 MG/DL (ref 8.5–10.1)
CHLORIDE SERPL-SCNC: 112 MMOL/L (ref 94–109)
CO2 SERPL-SCNC: 23 MMOL/L (ref 20–32)
CREAT SERPL-MCNC: 1.97 MG/DL (ref 0.66–1.25)
GFR SERPL CREATININE-BSD FRML MDRD: 30 ML/MIN/{1.73_M2}
GLUCOSE SERPL-MCNC: 145 MG/DL (ref 70–99)
POTASSIUM SERPL-SCNC: 5.3 MMOL/L (ref 3.4–5.3)
SODIUM SERPL-SCNC: 140 MMOL/L (ref 133–144)

## 2020-01-16 ENCOUNTER — TELEPHONE (OUTPATIENT)
Dept: CARDIOLOGY | Facility: CLINIC | Age: 85
End: 2020-01-16

## 2020-01-16 DIAGNOSIS — E11.65 UNCONTROLLED TYPE 2 DIABETES MELLITUS WITH HYPERGLYCEMIA (H): ICD-10-CM

## 2020-01-16 DIAGNOSIS — I10 HYPERTENSION, UNSPECIFIED TYPE: Primary | ICD-10-CM

## 2020-01-16 RX ORDER — LOSARTAN POTASSIUM 50 MG/1
50 TABLET ORAL DAILY
Qty: 90 TABLET | Refills: 1 | Status: SHIPPED | OUTPATIENT
Start: 2020-01-16 | End: 2020-07-30

## 2020-01-16 RX ORDER — CARVEDILOL 6.25 MG/1
6.25 TABLET ORAL 2 TIMES DAILY WITH MEALS
Qty: 180 TABLET | Refills: 1 | Status: SHIPPED | OUTPATIENT
Start: 2020-01-16 | End: 2020-08-20

## 2020-01-16 NOTE — PROCEDURE: MIPS QUALITY
Detail Level: Detailed
Quality 431: Preventive Care And Screening: Unhealthy Alcohol Use - Screening: Patient screened for unhealthy alcohol use using a single question and scores less than 2 times per year
Quality 130: Documentation Of Current Medications In The Medical Record: Current Medications Documented
Quality 226: Preventive Care And Screening: Tobacco Use: Screening And Cessation Intervention: Patient screened for tobacco and never smoked
Quality 110: Preventive Care And Screening: Influenza Immunization: Influenza Immunization previously received during influenza season
Quality 131: Pain Assessment And Follow-Up: Pain assessment using a standardized tool is documented as negative, no follow-up plan required
normal...

## 2020-01-16 NOTE — TELEPHONE ENCOUNTER
Date: 1/16/2020    Time of Call: 4:07 PM     Diagnosis:  HTN    [ TORB ] Ordering provider: Araceli Chiang NP  Order:   - Decrease Losartan to 50 mg every day  - Start Carvedilol 6.25 mg bid  - F/U PCP in [place] in 2-3 weeks with repeat labs     Order received by: Nishi Shelley LPN     Follow-up/additional notes: Per staff message.  Called and spoke with Pt.  Discussed labs and recommendations.  Pt verbalized understanding, agreed to current plan and denied any further questions.  Pt noted he started Losartan 50 bid about 1 week ago. Orders placed.

## 2020-01-17 NOTE — PROCEDURE: MOHS SURGERY
Home Care Instructions                Procedure: Epidural injection or joint injection    Activity:    Rest today    Do not work today    Resume normal activity tomorrow    Pain:    You may experience soreness at the injection site for 1 to 3 days.    You may use an ice pack for 20 minutes every 2 hours for the first 24 hours    You may use a heating pad after the first 24 hours    You may use Tylenol  (acetaminophen) every 4 hours or other pain medicines as directed by your physician    Safety  Sedation medicine, if given may remain active for many hours.    It is important for the next 24 hours that you do not:    Drive a car    Operate machines or power tools    Consume alcohol, including beer    Sign any important papers or legal documents    You may experience numbness radiating into your legs or arms, (depending on the procedure location)  This numbness may last several hours.  Until the numb sensation returns to normal please use caution in walking, climbing stairs, stepping out of your vehicle, etc.    Common side effects of steroids:  Not everyone will experience corticosteroid side effects. If side effects are experienced they will gradually subside in the 7-10 day period following an injection.    Most common side effects include:    Flushed face and/or chest    Feeling of warmth, particularly in face but could be overall feeling of warmth    Increased blood sugar in diabetic patients    Menstrual irregularities may occur.  If taking hormone based birth control an alternate method of birth control is recommended    Sleep disturbances and/or mood swings are possible    Leg cramps    Please contact us if you have:  Severe pain   Fever more than 101.5 degrees Fahrenheit  Signs of infection (redness, swelling or drainage)      If you have questions during normal business hours (8am-5pm Monday-Friday) contact the [facility] at [phone]. If you need help after hours, we recommend that you go to a  hospital emergency room or dial 911.   Mohs Case Number:

## 2020-01-24 NOTE — PROCEDURE: MOHS SURGERY
I advised patient that there is no background diabetic retinopathy in either eye and that they should continue to keep their blood sugar under control and continue to see their physician as directed. I stressed the importance of yearly diabetic eye exams. Location Indication Override (Is Already Calculated Based On Selected Body Location): Area M

## 2020-02-10 ENCOUNTER — HEALTH MAINTENANCE LETTER (OUTPATIENT)
Age: 85
End: 2020-02-10

## 2020-02-18 DIAGNOSIS — E11.8 TYPE 2 DIABETES MELLITUS WITH COMPLICATION (H): ICD-10-CM

## 2020-02-18 NOTE — TELEPHONE ENCOUNTER
blood glucose monitoring (ONETOUCH VERIO IQ) test strip     Last Written Prescription Date:  2-7-18  Last Fill Quantity: 300,   # refills: 3  Last Office Visit : 11-5-19  Future Office visit:  5-5-2020    Routing refill request to provider for review/approval because:  Last note dose not indicate frequency pt is checking blood sugar. Please refill for appropriate amount.      Kathleen M Doege RN

## 2020-02-18 NOTE — TELEPHONE ENCOUNTER
[facility] Call Center    Phone Message    May a detailed message be left on voicemail: yes     Reason for Call: Medication Refill Request    Has the patient contacted the pharmacy for the refill? Yes   Name of medication being requested: blood glucose monitoring (ONETOUCH VERIO IQ) test strip    Provider who prescribed the medication: debora    Pharmacy: [id number] - [place], [place] - [address] [place] [address] AT CORNER OF [facility]    Date medication is needed: asap/ pt is vacationing in [place] please send rx to the above pharmacy and please call pt  when rx is sent, per pt ok to leave message. Thank you      Action Taken: Message routed to:  [facility] ([facility] endo    Travel Screening: Not Applicable

## 2020-02-24 DIAGNOSIS — R60.0 LOWER EXTREMITY EDEMA: ICD-10-CM

## 2020-02-24 DIAGNOSIS — R60.0 BILATERAL LOWER EXTREMITY EDEMA: ICD-10-CM

## 2020-02-24 DIAGNOSIS — I10 HYPERTENSION, UNSPECIFIED TYPE: Primary | ICD-10-CM

## 2020-02-24 DIAGNOSIS — R01.1 HEART MURMUR: ICD-10-CM

## 2020-02-24 RX ORDER — HYDRALAZINE HYDROCHLORIDE 25 MG/1
25 TABLET, FILM COATED ORAL 3 TIMES DAILY
Qty: 270 TABLET | Refills: 3 | Status: SHIPPED | OUTPATIENT
Start: 2020-02-24 | End: 2021-01-28 | Stop reason: DRUGHIGH

## 2020-02-27 NOTE — TELEPHONE ENCOUNTER
HYDROCHLOROTHIAZIDE 12.5 MG CP      Last Written Prescription Date:  9/13/19  Last Fill Quantity: 90,   # refills: 1  Last Office Visit : 11/5/19  Future Office visit:  5/5/20    Routing refill request to provider for review/approval because:  Blood pressure out of range   BP Readings from Last 3 Encounters:   01/03/20 (!) 187/73   11/05/19 (!) 162/78   09/25/19 131/74     Abnormal  Creatinine  Lab Test 01/14/20  1021   CR 1.97*

## 2020-02-28 RX ORDER — HYDROCHLOROTHIAZIDE 12.5 MG/1
12.5 CAPSULE ORAL DAILY
Qty: 90 CAPSULE | Refills: 0 | Status: SHIPPED | OUTPATIENT
Start: 2020-02-28 | End: 2020-08-19

## 2020-03-04 DIAGNOSIS — E11.9 DIABETES MELLITUS, TYPE 2 (H): ICD-10-CM

## 2020-03-04 DIAGNOSIS — E11.8 TYPE 2 DIABETES MELLITUS WITH COMPLICATION (H): ICD-10-CM

## 2020-04-09 ENCOUNTER — DOCUMENTATION ONLY (OUTPATIENT)
Dept: CARE COORDINATION | Facility: CLINIC | Age: 85
End: 2020-04-09

## 2020-04-12 DIAGNOSIS — E11.65 TYPE 2 DIABETES MELLITUS WITH HYPERGLYCEMIA (H): ICD-10-CM

## 2020-04-15 NOTE — TELEPHONE ENCOUNTER
sitagliptin (JANUVIA) 50 MG tablet      Last Written Prescription Date:  4/25/2019  Last Fill Quantity: 90,   # refills: 1  Last Office Visit : 11/5/2019  Future Office visit:  5/5/2020  90 Tabs to cover Pt until office visit 5/5/2020    Corrie Carrera RN  Central Triage Red Flags/Med Refills

## 2020-04-20 NOTE — PROGRESS NOTES
"Farzad East is a 84 year old male who is being evaluated via a billable telephone visit.      The patient has been notified of following:     \"This telephone visit will be conducted via a call between you and your physician/provider. We have found that certain health care needs can be provided without the need for a physical exam.  This service lets us provide the care you need with a short phone conversation.  If a prescription is necessary we can send it directly to your pharmacy.  If lab work is needed we can place an order for that and you can then stop by our lab to have the test done at a later time.    If during the course of the call the physician/provider feels a telephone visit is not appropriate, you will not be charged for this service.\"     Patient has given verbal consent for Telephone visit?  Yes    How would you like to obtain your AVS? Chante    Farzad East is being evaluated for hypertension follow-up.     HPI: Farzad East is a 84 year old with a history of hypertension, type II diabetes mellitus, chronic kidney disease, chronic left hip pain s/p prior hip replacement and chronic leg swelling. He was last evaluated 1/2020 for left leg swelling. His work-up was negative for DVT and he had no evidence of heart failure. His swelling was thought related to venous insufficiency and compression stockings were recommended. He was also noted to be hypertensive and his medications were adjusted.    Patient states that he has overall been feeling well. He is not particularly active due to chronic left hip pain. He states that his left leg swelling has improved with use of compression hosiery. If he misses a day or two he will notice that his left leg swells. In regards to cardiac symptoms, he denies recent chest pain, shortness of breath, orthopnea, PND, weight gain, palpitations, lightheadedness or syncope. His home blood pressures have improved with addition of hydralazine to his " regimen. His home blood pressures have been ranging 120s-150s/50-70s. Current cardiac medications include hydrochlorothiazide 12.5 mg, losartan 50 mg daily, hydralazine 25 mg TID. He is unintentionally taking Coreg 6.25 mg once daily rather than twice daily.    Past Medical History:   Past Medical History:   Diagnosis Date     Atrial flutter (H)      Choroidal nevus of left eye      CKD (chronic kidney disease) stage 3, GFR 30-59 ml/min (H)      Diabetes mellitus (H)      Diabetic peripheral neuropathy (H)      Hypertension      Microalbuminuria      Overweight(278.02)      Rectal-type adenocarcinoma (H)      Retinopathies, diabetic      Vitreous detachment of both eyes        I have reviewed the patient's social History and family History:    Medication List.  Current Outpatient Medications   Medication     aspirin 81 MG tablet     blood glucose (ONETOUCH VERIO IQ) test strip     blood glucose monitoring (ONETOUCH VERIO IQ SYSTEM) meter device kit     carvedilol (COREG) 6.25 MG tablet     Cholecalciferol (VITAMIN D-3 PO)     COMPRESSION STOCKINGS     COMPRESSION STOCKINGS     finasteride (PROSCAR) 5 MG tablet     glipiZIDE (GLUCOTROL XL) 5 MG 24 hr tablet     hydrALAZINE (APRESOLINE) 25 MG tablet     hydrochlorothiazide (MICROZIDE) 12.5 MG capsule     insulin pen needle (31G X 5 MM) 31G X 5 MM miscellaneous     losartan (COZAAR) 50 MG tablet     metFORMIN (GLUCOPHAGE-XR) 500 MG 24 hr tablet     multivitamin w/minerals (MULTI-VITAMIN) tablet     ONETOUCH ULTRA test strip     sildenafil (VIAGRA) 100 MG tablet     sitagliptin (JANUVIA) 50 MG tablet     tamsulosin (FLOMAX) 0.4 MG capsule     No current facility-administered medications for this visit.        ALLERGIES  Sulfa drugs    ROS:  Review Of Systems  Skin: negative  Eyes: negative  Ears/Nose/Throat: negative  Respiratory: No shortness of breath, dyspnea on exertion, cough, or hemoptysis  Cardiovascular: negative  Gastrointestinal: negative  Genitourinary:  negative  Musculoskeletal: negative  Neurologic: negative  Psychiatric: negative  Hematologic/Lymphatic/Immunologic: negative  Endocrine: negative    Assessment/Plan:  Farzad East is a 84 year old with a history of hypertension, type II diabetes mellitus, chronic kidney disease, chronic left hip pain s/p prior hip replacement and chronic leg swelling secondary to venous insuffiencey being evaluated today for hypertension follow-up.    1. Hypertension, unspecified type  Patient presented to clinic in January 2020 with blood pressures in the 180s. We increased losartan but he developed hyperkalemia. Uptitration of hydrochlorothiazide was limited by renal dysfunction. Patient was started on Coreg but remained hypertensive, further uptitration limited by heart rate. Given limited options we resorted to hydralazine 25 mg TID. Amlodipine was discontinued due to leg swelling and to simplify his regimen. Home blood pressures are now relatively well controlled 120-150's/60-70's. He is taking Coreg once daily, will increase to twice daily.  - Start taking Coreg 6.25 mg twice daily, rather than once daily  - Continue losartan 50 mg daily and hydrochlorothiazide 12.5 mg daily  - Continue hydralazine 25 mg TID  - Patient will continue to monitor home BP and call if > 140/90 consistently     2. Lower extremity edema  Patient with chronic lower extremity L>R. He presented to clinic 1/2020 with progressive left leg swelling. Bilateral venous duplex showed no DVT. He denied s/s of CHF and appeared euvolemic on exam. Leg swelling attributed to chronic venous insufficiency and has since improved with prescription grade compression.   - Plan to continue compression and elevation  - Patient advised to monitor leg swelling and call if symptoms worsen    3. Type II DM:  - Management per endocrine    4. CKD stage IV:  - Nephrology referral placed, patient to see when he returns from [place]    Phone call duration:  11  minutes    Araceli Chiang, NP

## 2020-04-21 ENCOUNTER — TELEPHONE (OUTPATIENT)
Dept: ENDOCRINOLOGY | Facility: CLINIC | Age: 85
End: 2020-04-21

## 2020-04-21 NOTE — TELEPHONE ENCOUNTER
[facility] Call Center    Phone Message    May a detailed message be left on voicemail: yes     Reason for Call: Other: Pt will be stuck in [place] over month of May.  Can we change his appt to a telephone appt?  Please call to tell him.     Action Taken: Message routed to:  [facility] ([facility] endocrine    Travel Screening: Not Applicable

## 2020-04-22 ENCOUNTER — VIRTUAL VISIT (OUTPATIENT)
Dept: CARDIOLOGY | Facility: CLINIC | Age: 85
End: 2020-04-22
Attending: NURSE PRACTITIONER
Payer: MEDICARE

## 2020-04-22 VITALS — DIASTOLIC BLOOD PRESSURE: 71 MMHG | SYSTOLIC BLOOD PRESSURE: 161 MMHG

## 2020-04-22 DIAGNOSIS — R60.0 LOWER EXTREMITY EDEMA: ICD-10-CM

## 2020-04-22 DIAGNOSIS — I10 HYPERTENSION, UNSPECIFIED TYPE: Primary | ICD-10-CM

## 2020-04-22 PROCEDURE — 99213 OFFICE O/P EST LOW 20 MIN: CPT | Mod: 95 | Performed by: NURSE PRACTITIONER

## 2020-04-22 ASSESSMENT — PAIN SCALES - GENERAL: PAINLEVEL: NO PAIN (0)

## 2020-04-22 NOTE — TELEPHONE ENCOUNTER
CLINIC COORDINATOR SCHEDULING NOTES    CALL RESULT: VM box full, no answer    APPT TYPE: TELEPHONE VISIT RETURN    PROVIDER: [facility]    DATE APPT NEEDED: already scheduled    ADDITIONAL NOTES: switched to telephone visit per pt request. Sent MyC message with confirmation.

## 2020-04-22 NOTE — PATIENT INSTRUCTIONS
You were seen today in the Cardiovascular Clinic at the [facility].    Cardiology provider you saw during your visit Araceli Chiang NP.    1. Blood pressures have improved, but remain slightly above goal.  2. Start taking Carvedilol 6.25 mg twice daily.  3. Continue losartan 50 mg daily, hydrochlorothiazide 12.5 mg daily and hydralazine 25 mg TID.  4. Please make a follow-up appointment w/Dr. Levi in 2020 with labs prior to visit.     Questions and [phone].   First press #1 for the [employer] and then press #3 for Medical Questions to reach the Cardiology triage nurse.     On Call Cardiologist for after hours or on weekends: [phone], press option #4 and ask to speak to the on-call Cardiologist.

## 2020-04-24 DIAGNOSIS — E11.65 TYPE 2 DIABETES MELLITUS WITH HYPERGLYCEMIA (H): ICD-10-CM

## 2020-04-24 RX ORDER — GLIPIZIDE 5 MG/1
5 TABLET, FILM COATED, EXTENDED RELEASE ORAL DAILY
Qty: 90 TABLET | Refills: 0 | Status: SHIPPED | OUTPATIENT
Start: 2020-04-24 | End: 2020-07-17

## 2020-04-24 NOTE — TELEPHONE ENCOUNTER
glipiZIDE (GLUCOTROL XL) 5 MG 24 hr tablet       Last Written Prescription Date:  10/6/19  Last Fill Quantity: 90,   # refills: 1  Last Office Visit : 11/15/19  Future Office visit:  5/5/20    Routing refill request to provider for review/approval because:  Last A1c and creatinine abnormal    Lab Test 11/05/19 07/22/19  1615   A1C  --  7.7*   HEMOGLOBINA1 8.0*  --      Lab Test 01/14/20  1021   CR 1.97*       No orders in que

## 2020-04-30 NOTE — PROGRESS NOTES
"Call patient 5/5/2020@ 12:05pm mail box full also sent message to mike    Call patient on 5/5/2020@ 10:38am and mail box is full also sent message to mike ATKINS  lvm on 5/4/2020 @11:43 regarding video appointment also mike message was sent on 4/302020@ 10:42am  NG    Dm 2  Olamide Marcelino CMA    Week of__/__/__ Date  _5_/_1_  Date  5__/_2_ Date  _5_/_3_ Date  _5_/_4_ Date  _5_/_5_ Date  __/__ Date  __/__    Time BG Time BG Time BG Time BG Time BG Time BG Time BG     133  123  96  142  97                                                            Week of__/__/__ Date  __/__  Date  __/__ Date  __/__ Date  __/__ Date  __/__ Date  __/__ Date  __/__    Time BG Time BG Time BG Time BG Time BG Time BG Time BG                                                                           The patient has been notified of the following:      \"We have found that certain health care needs can be provided without the need for a face to face visit.  This service lets us provide the care you need with a phone conversation.       I will have full access to your [facility] medical record during this entire phone call.   I will be taking notes for your medical record.      Since this is like an office visit, we will bill your insurance company for this service.       There are potential benefits and risks of telephone visits (e.g. limits to patient confidentiality) that differ from in-person visits.?  Confidentiality still applies for telephone services, and nobody will record the visit.  It is important to be in a quiet, private space that is free of distractions (including cell phone or other devices) during the visit.??      If during the course of the call I believe a telephone visit is not appropriate, you will not be charged for this service\"     Consent has been obtained for this service by care team member: Yes     [facility]  Endocrinology  Jose Amin MD  05/05/2020      Chief Complaint:   RECHECK     History of Present " Illness:   Farzad East is a 84 year old male with a history of CKD stage 3, type 2 diabetes mellitus with nephropathy, and hypertension who presents for follow up of type 2 diabetes mellitus. The patient was diagnosed with diabetes in his 50s and has managed using Metformin, Glipizide, Lantus, and Januvia in the past. Lantus was discontinued toward the beginning of 2018.     Interval History: Current diabetes regimen Januvia 25 mg, Metformin 1000 mg, and Glipizide 5 mg.     Overall he is feeling fine.  Reports that he is in stable health and has not had any acute issues since the last visit.  He has extended his stay in [place] due to the COVID pandemic but is planning to return to [place] sometime in June.    He checks fasting blood glucose daily in the morning.  Most readings are in range.  He denies any symptoms of hypoglycemia.  He denies any recent falls but notes that he is extremely careful to prevent possibility of fall.    Denies shortness of breath, reports his left leg has more edema compared to right.  He feels that the edema improves when he is able to wear pressure stockings.     Review of Systems:   Pertinent items are noted in HPI, remainder of complete ROS is negative.      Active Medications:     Current Outpatient Medications:      aspirin 81 MG tablet, Take 1 tablet by mouth daily., Disp: , Rfl:      blood glucose (ONETOUCH VERIO IQ) test strip, Use to test blood sugar 3 daily or as directed., Disp: 300 each, Rfl: 3     blood glucose monitoring (ONETOUCH VERIO IQ SYSTEM) meter device kit, Use to test blood sugar daily, Disp: 1 kit, Rfl: 0     carvedilol (COREG) 6.25 MG tablet, Take 1 tablet (6.25 mg) by mouth 2 times daily (with meals) (Patient not taking: Reported on 4/30/2020), Disp: 180 tablet, Rfl: 1     Cholecalciferol (VITAMIN D-3 PO), , Disp: , Rfl:      COMPRESSION STOCKINGS, 1 each daily, Disp: 1 each, Rfl: 3     COMPRESSION STOCKINGS, 1 each daily, Disp: 2 each, Rfl: 11      finasteride (PROSCAR) 5 MG tablet, Take 1 tablet by mouth daily, Disp: , Rfl: 3     glipiZIDE (GLUCOTROL XL) 5 MG 24 hr tablet, Take 1 tablet (5 mg) by mouth daily, Disp: 90 tablet, Rfl: 0     hydrALAZINE (APRESOLINE) 25 MG tablet, Take 1 tablet (25 mg) by mouth 3 times daily, Disp: 270 tablet, Rfl: 3     hydrochlorothiazide (MICROZIDE) 12.5 MG capsule, Take 1 capsule (12.5 mg) by mouth daily Use while combination (HyZaar) is unavailable., Disp: 90 capsule, Rfl: 0     insulin pen needle (31G X 5 MM) 31G X 5 MM miscellaneous, Use 1 pen needle daily or as directed, Disp: 100 each, Rfl: 1     losartan (COZAAR) 50 MG tablet, Take 1 tablet (50 mg) by mouth daily Use while Combination (HyZaar) is unavailable., Disp: 90 tablet, Rfl: 1     metFORMIN (GLUCOPHAGE-XR) 500 MG 24 hr tablet, Take 2 tablets (1,000 mg) by mouth daily with food, Disp: 180 tablet, Rfl: 1     multivitamin w/minerals (MULTI-VITAMIN) tablet, Take 1 tablet by mouth daily, Disp: , Rfl:      ONETOUCH ULTRA test strip, Use to test blood sugar 3 times daily or as directed., Disp: 270 each, Rfl: 3     sildenafil (VIAGRA) 100 MG tablet, Take 1 tablet (100 mg) by mouth daily as needed for erectile dysfunction (Patient not taking: Reported on 4/22/2020), Disp: 6 tablet, Rfl: 11     sitagliptin (JANUVIA) 50 MG tablet, Take 1 tablet (50 mg) by mouth daily, Disp: 90 tablet, Rfl: 0     tamsulosin (FLOMAX) 0.4 MG capsule, Take 1 capsule (0.4 mg) by mouth daily, Disp: 90 capsule, Rfl: 3      Allergies:   Sulfa drugs      Past Medical History:  Atrial flutter  Chronic kidney disease stage 3  Type 2 diabetes mellitus  Hypertension  Peripheral neuropathy  Rectal type adenocarcinoma  Femur fracture     Past Surgical History:  Cataract  ORIF femur  Left KIMBERLY    Family History:   Diabetes - father  Circulatory - father  Macular degeneration - father  Arthritis - mother     Social History:   Tobacco Use: none  Alcohol Use: 3 martinis/week  Drug Use: none  PCP: Shiela  [facility]      Physical Exam:   There were no vitals taken for this visit.      Data:  Reviewed in epic    Assessment and Plan:  Type 2 diabetes mellitus, uncontrolled (H)  Fasting blood glucose readings are mostly in range.  He does not check usually blood glucose during the day.  Will check A1c and other routine labs when he returns to [place] next month.  Discussed that if stays in [place] beyond June then would plan to do routine labs in locally in [place]  He will inform us if he decides to extend his stay in [place].    CKD with worsening albuminuria  Nephrology consult when he returns from [place]    HTN: Following with cardiology    Osteoporosis, unspecified osteoporosis type, unspecified pathological fracture presence  Vitamin D deficiency  Not discussed today    Follow-up: Return in about 3 months (around 8/5/2020).      JUDD Fenton    Total telephone call time 23 minutes    Note: Chart documentation done in part with Dragon Voice Recognition software. Although reviewed after completion, some word and grammatical errors may remain.  Please consider this when interpreting information in this chart

## 2020-05-05 ENCOUNTER — VIRTUAL VISIT (OUTPATIENT)
Dept: ENDOCRINOLOGY | Facility: CLINIC | Age: 85
End: 2020-05-05
Payer: MEDICARE

## 2020-05-05 DIAGNOSIS — Z79.4 TYPE 2 DIABETES MELLITUS WITH OTHER OPHTHALMIC COMPLICATION, WITH LONG-TERM CURRENT USE OF INSULIN (H): Primary | ICD-10-CM

## 2020-05-05 DIAGNOSIS — E11.39 TYPE 2 DIABETES MELLITUS WITH OTHER OPHTHALMIC COMPLICATION, WITH LONG-TERM CURRENT USE OF INSULIN (H): Primary | ICD-10-CM

## 2020-06-20 DIAGNOSIS — E11.65 UNCONTROLLED TYPE 2 DIABETES MELLITUS WITH HYPERGLYCEMIA (H): ICD-10-CM

## 2020-06-24 RX ORDER — LOSARTAN POTASSIUM 50 MG/1
TABLET ORAL
Qty: 90 TABLET | Refills: 0 | OUTPATIENT
Start: 2020-06-24

## 2020-07-01 DIAGNOSIS — E11.65 UNCONTROLLED TYPE 2 DIABETES MELLITUS WITH HYPERGLYCEMIA (H): ICD-10-CM

## 2020-07-01 RX ORDER — LOSARTAN POTASSIUM 50 MG/1
50 TABLET ORAL DAILY
Qty: 90 TABLET | OUTPATIENT
Start: 2020-07-01

## 2020-07-01 NOTE — TELEPHONE ENCOUNTER
losartan (COZAAR) 50 MG tablet   Last Written Prescription Date:  1/16/2020  Last Fill Quantity: 90,   # refills: 1  Last Office Visit : 4/22/2020  Future Office visit:  9/23/2020    Routing refill request to provider for review/approval because:  Pt needing new orders sent to pharm for Pt care.       In the last order.     Losartan was a substitute for Hyzaar because Hyzaar was on back order?  Would the Provider like to use Hyzaar 50/12.5 Mg Tabs, take 1 Tabs daily?      Or Continue using Losartan 50 Mg Tabs for Pt care.    Sending to Provider for review and new orders for Pt care.      Corrie Carrera RN  Central Triage Red Flags/Med Refills

## 2020-07-13 DIAGNOSIS — E11.65 TYPE 2 DIABETES MELLITUS WITH HYPERGLYCEMIA, WITHOUT LONG-TERM CURRENT USE OF INSULIN (H): ICD-10-CM

## 2020-07-14 RX ORDER — METFORMIN HCL 500 MG
1000 TABLET, EXTENDED RELEASE 24 HR ORAL
Qty: 180 TABLET | Refills: 0 | Status: ON HOLD | OUTPATIENT
Start: 2020-07-14 | End: 2020-09-25

## 2020-07-14 NOTE — TELEPHONE ENCOUNTER
metFORMIN (GLUCOPHAGE-XR) 500 MG 24 hr tablet       Last Written Prescription Date:  1/3/2020  Last Fill Quantity: 180,   # refills: 1  Last Office Visit : 5/5/2020  Future Office visit:  9/8/2020    Routing refill request to provider for review/approval because:  Failed protocol: abnormal lab- Creatinine (HIGH) and lab past due-A1C    Creatinine   Date Value Ref Range Status   01/14/2020 1.97 (H) 0.66 - 1.25 mg/dL Final       Hemoglobin A1C  4.3 - 6.0 %  8.0Abnormal             Specimen Collected: 11/05/19

## 2020-07-15 DIAGNOSIS — E11.65 TYPE 2 DIABETES MELLITUS WITH HYPERGLYCEMIA (H): ICD-10-CM

## 2020-07-17 RX ORDER — GLIPIZIDE 5 MG/1
5 TABLET, FILM COATED, EXTENDED RELEASE ORAL DAILY
Qty: 90 TABLET | Refills: 1 | Status: SHIPPED | OUTPATIENT
Start: 2020-07-17 | End: 2021-03-04

## 2020-07-17 NOTE — TELEPHONE ENCOUNTER
GLIPIZIDE ER 5 MG TABLET    Last Written Prescription Date:  4/24/2020  Last Fill Quantity: 90,   # refills: 0  Last Office Visit : 5/5/2020  Future Office visit:  9/8/2020    Routing refill request to provider for review/approval because:  Labs Due:   A1C, CR         90 day pended  Referred to Provider for review    Corrie Carrera RN  Central Triage Red Flags/Med Refills

## 2020-07-28 DIAGNOSIS — E11.8 TYPE 2 DIABETES MELLITUS WITH COMPLICATION (H): ICD-10-CM

## 2020-07-28 RX ORDER — BLOOD SUGAR DIAGNOSTIC
STRIP MISCELLANEOUS
Qty: 100 EACH | Refills: 3 | Status: SHIPPED | OUTPATIENT
Start: 2020-07-28 | End: 2021-10-04

## 2020-07-29 DIAGNOSIS — E11.8 TYPE 2 DIABETES MELLITUS WITH COMPLICATION, WITH LONG-TERM CURRENT USE OF INSULIN (H): ICD-10-CM

## 2020-07-29 DIAGNOSIS — E11.65 UNCONTROLLED TYPE 2 DIABETES MELLITUS WITH HYPERGLYCEMIA (H): ICD-10-CM

## 2020-07-29 DIAGNOSIS — Z79.4 TYPE 2 DIABETES MELLITUS WITH COMPLICATION, WITH LONG-TERM CURRENT USE OF INSULIN (H): ICD-10-CM

## 2020-07-29 NOTE — TELEPHONE ENCOUNTER
Contacted pharmacy. Switched back to Hyzaar 50 mg/12.5 mg daily. Refilled prescription for 90 days. Needs labs in September 2020 prior to additional refills.

## 2020-07-30 NOTE — TELEPHONE ENCOUNTER
"Cardiology: ZULAY Chiang        losartan (COZAAR) 50 MG tablet  Last Written Prescription Date:  1/16/20  Last Fill Quantity: 90,   # refills: 1  Last Office Visit : 4/22/20  Future Office visit:  9/23/20  [facility]    Araceli Fowler, NP   1/15/2020  8:53 PM   \" I would like him to decrease his losartan back to 50 mg daily\"        Routing refill request to provider for review/approval because: bp > 140/90, requested order has notation \" use while combination hyzaar unavailable \"  Do you want that on order?    "

## 2020-07-30 NOTE — TELEPHONE ENCOUNTER
ENDO: Eloisa     tamsulosin (FLOMAX) 0.4 MG capsule  Last Written Prescription Date:  8/1/19  Last Fill Quantity: 90,   # refills: 3  Last Office Visit : 5/5/20  E[place]  [facility]  Future Office visit: 9/8/20      Routing refill request to provider for review/approval because: bp > 140/90   sildenafil (VIAGRA) 100 MG tablet  11/15 ?

## 2020-07-31 RX ORDER — LOSARTAN POTASSIUM 50 MG/1
50 TABLET ORAL DAILY
Qty: 90 TABLET | Refills: 1 | Status: ON HOLD | OUTPATIENT
Start: 2020-07-31 | End: 2020-09-25

## 2020-07-31 RX ORDER — TAMSULOSIN HYDROCHLORIDE 0.4 MG/1
0.4 CAPSULE ORAL DAILY
Qty: 90 CAPSULE | Refills: 1 | Status: SHIPPED | OUTPATIENT
Start: 2020-07-31 | End: 2021-02-08

## 2020-08-17 DIAGNOSIS — I10 HYPERTENSION, UNSPECIFIED TYPE: ICD-10-CM

## 2020-08-19 RX ORDER — HYDROCHLOROTHIAZIDE 12.5 MG/1
12.5 CAPSULE ORAL DAILY
Qty: 90 CAPSULE | Refills: 3 | Status: ON HOLD | OUTPATIENT
Start: 2020-08-19 | End: 2020-09-25

## 2020-08-19 NOTE — TELEPHONE ENCOUNTER
hydrochlorothiazide (MICROZIDE) 12.5 MG capsule   Take 1 capsule (12.5 mg) by mouth daily Use while combination (HyZaar) is unavailable  Last Written Prescription Date:  2/28/20  Last Fill Quantity: 90,   # refills: 0  Last Office Visit : 5/5/20  Future Office visit: 9/8/20    Routing refill request to provider for review/approval because:  ABN Creatinine 1/14/20 01/14/20  1021    CR 1.97*

## 2020-08-20 RX ORDER — CARVEDILOL 6.25 MG/1
TABLET ORAL
Qty: 180 TABLET | Refills: 0 | Status: SHIPPED | OUTPATIENT
Start: 2020-08-20 | End: 2020-11-16

## 2020-08-21 NOTE — TELEPHONE ENCOUNTER
"    CARVEDILOL 6.25 MG TABLET Take 1 tablet (6.25 mg) by mouth 2 times daily (with meals) - Oral   Last Written Prescription Date:  1/16/20  Last Fill Quantity: 180,   # refills: 1  Last Office Visit : 4/22/20  Future Office visit:  9/23/20       BP ADDRESSED AT 4/22/20 VISIT. 90- rf GIVEN, APPT 9/23/20 SI    04/22/20 (!) 161/71   01/03/20 (!) 187/73   11/05/19 (!) 162/78   \"He is taking Coreg once daily, will increase to twice daily.  - Start taking Coreg 6.25 mg twice daily, rather than once daily  - Continue losartan 50 mg daily and hydrochlorothiazide 12.5 mg daily  - Continue hydralazine 25 mg TID  - Patient will continue to monitor home BP and call if > 140/90 consistently \"      "

## 2020-09-02 NOTE — PROGRESS NOTES
"dm 2  Olamide Marcelino [facility]    Patients Glucose Data was Obtained via Phone and Located in Table Below      Week of__/__/__ Date  _8_/26__  Date  _8_/__27 Date  _8_/_28_ Date  _8_/_29_ Date  8__/_30_ Date  _8_/_31_ Date  _9_/__1    Time BG Time BG Time BG Time BG Time BG Time BG Time BG    9am 107 8:30am 133 8:30am 116 8:30am 104 9am 112 8am 97 9am 122                                                        Week of__/__/__ Date  9__/_2_  Date  __9/_3_ Date  __9/_4_ Date  _9_/_5_ Date  _9_/_6_ Date  9__/_7_ Date  _9_/_8_    Time BG Time BG Time BG Time BG Time BG Time BG Time BG    7:30am 151 9am 93 7:30am 136 8am 112 8:30am 131 9am 114 7:45am 109                                                          Farzad East is a 85 year old male who is being evaluated via a billable telephone visit.      The patient has been notified of following:     \"This telephone visit will be conducted via a call between you and your physician/provider. We have found that certain health care needs can be provided without the need for a physical exam.  This service lets us provide the care you need with a short phone conversation.  If a prescription is necessary we can send it directly to your pharmacy.  If lab work is needed we can place an order for that and you can then stop by our lab to have the test done at a later time.    Telephone visits are billed at different rates depending on your insurance coverage. During this emergency period, for some insurers they may be billed the same as an in-person visit.  Please reach out to your insurance provider with any questions.    If during the course of the call the physician/provider feels a telephone visit is not appropriate, you will not be charged for this service.\"    Patient has given verbal consent for Telephone visit?  Yes    What phone number would you like to be contacted at? [phone]    How would you like to obtain your AVS? Mail a copy      [facility]  Endocrinology  Amir " Israel Amin MD  09/08/2020      Chief Complaint:   RECHECK     History of Present Illness:   Farzad East is a 85 year old male with a history of CKD stage 3, type 2 diabetes mellitus with nephropathy, and hypertension who presents for follow up of type 2 diabetes mellitus. The patient was diagnosed with diabetes in his 50s and has managed using Metformin, Glipizide, Lantus, and Januvia in the past. Lantus was discontinued toward the beginning of 2018.     Interval History: Current diabetes regimen Januvia 50 mg, Metformin 1000 mg, and Glipizide 5 mg.     Overall he is feeling fine.  Reports that he is in stable health and has not had any acute issues since the last visit.    He checks fasting blood glucose daily in the morning.  Most readings are in range.  He denies any symptoms of hypoglycemia.  He reports that he had one reading in 70s.  He does not take the glipizide dose if his fasting blood glucose is below 100.  He denies any recent falls but notes that he is extremely careful to prevent possibility of fall.    He is requesting that I place a colonoscopy order for him as he does not have a PCP.  A 5-year follow-up colonoscopy was recommended by GI.    He has an upcoming appointment with cardiology.    He is moving to senior living facility.    Review of Systems:   Pertinent items are noted in HPI, remainder of complete ROS is negative.      Active Medications:     Current Outpatient Medications:      aspirin 81 MG tablet, Take 1 tablet by mouth daily., Disp: , Rfl:      blood glucose (ONETOUCH VERIO IQ) test strip, Use to test blood sugar 1 time daily or as directed., Disp: 100 each, Rfl: 3     blood glucose monitoring (ONETOUCH VERIO IQ SYSTEM) meter device kit, Use to test blood sugar daily, Disp: 1 kit, Rfl: 0     carvedilol (COREG) 6.25 MG tablet, TAKE 1 TABLET BY MOUTH TWICE A DAY WITH MEALS, Disp: 180 tablet, Rfl: 0     Cholecalciferol (VITAMIN D-3 PO), , Disp: , Rfl:      COMPRESSION STOCKINGS,  1 each daily, Disp: 1 each, Rfl: 3     COMPRESSION STOCKINGS, 1 each daily, Disp: 2 each, Rfl: 11     finasteride (PROSCAR) 5 MG tablet, Take 1 tablet by mouth daily, Disp: , Rfl: 3     glipiZIDE (GLUCOTROL XL) 5 MG 24 hr tablet, Take 1 tablet (5 mg) by mouth daily, Disp: 90 tablet, Rfl: 1     hydrALAZINE (APRESOLINE) 25 MG tablet, Take 1 tablet (25 mg) by mouth 3 times daily, Disp: 270 tablet, Rfl: 3     hydrochlorothiazide (MICROZIDE) 12.5 MG capsule, Take 1 capsule (12.5 mg) by mouth daily Use while combination (HyZaar) is unavailable., Disp: 90 capsule, Rfl: 3     insulin pen needle (31G X 5 MM) 31G X 5 MM miscellaneous, Use 1 pen needle daily or as directed, Disp: 100 each, Rfl: 1     losartan (COZAAR) 50 MG tablet, Take 1 tablet (50 mg) by mouth daily Use while Combination (HyZaar) is unavailable., Disp: 90 tablet, Rfl: 1     metFORMIN (GLUCOPHAGE-XR) 500 MG 24 hr tablet, Take 2 tablets (1,000 mg) by mouth daily with food, Disp: 180 tablet, Rfl: 0     multivitamin w/minerals (MULTI-VITAMIN) tablet, Take 1 tablet by mouth daily, Disp: , Rfl:      ONETOUCH ULTRA test strip, Use to test blood sugar 3 times daily or as directed., Disp: 270 each, Rfl: 3     sitagliptin (JANUVIA) 50 MG tablet, Take 1 tablet (50 mg) by mouth daily, Disp: 90 tablet, Rfl: 0     tamsulosin (FLOMAX) 0.4 MG capsule, Take 1 capsule (0.4 mg) by mouth daily, Disp: 90 capsule, Rfl: 1     sildenafil (VIAGRA) 100 MG tablet, Take 1 tablet (100 mg) by mouth daily as needed for erectile dysfunction (Patient not taking: Reported on 4/22/2020), Disp: 6 tablet, Rfl: 11      Allergies:   Sulfa drugs      Past Medical History:  Atrial flutter  Chronic kidney disease stage 3  Type 2 diabetes mellitus  Hypertension  Peripheral neuropathy  Rectal type adenocarcinoma  Femur fracture     Past Surgical History:  Cataract  ORIF femur  Left KIMBERLY    Family History:   Diabetes - father  Circulatory - father  Macular degeneration - father  Arthritis - mother      Social History:   Tobacco Use: none  Alcohol Use: 3 martinis/week  Drug Use: none  PCP: [facility]      Physical Exam:   There were no vitals taken for this visit.      Data:  Reviewed in epic    Assessment and Plan:  Type 2 diabetes mellitus  Fasting blood glucose readings are mostly in range.  He does not check usually blood glucose during the day.  Will check A1c and other routine labs later this month at time of his cardiology appointment.  Reviewed risk of hypoglycemia with glipizide.    CKD with worsening albuminuria  Nephrology consult was placed but he has not scheduled    HTN: Following with cardiology    Osteoporosis, unspecified osteoporosis type, unspecified pathological fracture presence  Vitamin D deficiency    Colonoscopy order placed per patient request.  He was also encouraged to establish care with PCP    Orders Placed This Encounter   Procedures     Albumin Random Urine Quantitative with Creat Ratio     Basic metabolic panel     AST     ALT     Hemoglobin A1c     Lipid panel reflex to direct LDL Fasting     TSH with free T4 reflex     CBC with platelets differential     Vitamin D Deficiency     GASTROENTEROLOGY ADULT REF PROCEDURE ONLY       Follow-up: Return in about 4 months (around 1/8/2021).      JUDD Fenton    Total telephone call time 20 minutes    Note: Chart documentation done in part with Dragon Voice Recognition software. Although reviewed after completion, some word and grammatical errors may remain.  Please consider this when interpreting information in this chart

## 2020-09-08 ENCOUNTER — VIRTUAL VISIT (OUTPATIENT)
Dept: ENDOCRINOLOGY | Facility: CLINIC | Age: 85
End: 2020-09-08
Payer: MEDICARE

## 2020-09-08 DIAGNOSIS — E55.9 VITAMIN D DEFICIENCY: ICD-10-CM

## 2020-09-08 DIAGNOSIS — Z86.0100 HISTORY OF COLONIC POLYPS: ICD-10-CM

## 2020-09-08 DIAGNOSIS — E11.8 TYPE 2 DIABETES MELLITUS WITH COMPLICATION (H): Primary | ICD-10-CM

## 2020-09-08 NOTE — LETTER
"9/8/2020       RE: Farzad East  [address]  [facility] [address]     Dear Colleague,    Thank you for referring your patient, Farzad East, to the [facility] ENDOCRINOLOGY at [facility]. Please see a copy of my visit note below.    dm 2  Olamide Marcelino CMA    Patients Glucose Data was Obtained via Phone and Located in Table Below      Week of__/__/__ Date  _8_/26__  Date  _8_/__27 Date  _8_/_28_ Date  _8_/_29_ Date  8__/_30_ Date  _8_/_31_ Date  _9_/__1    Time BG Time BG Time BG Time BG Time BG Time BG Time BG    9am 107 8:30am 133 8:30am 116 8:30am 104 9am 112 8am 97 9am 122                                                        Week of__/__/__ Date  9__/_2_  Date  __9/_3_ Date  __9/_4_ Date  _9_/_5_ Date  _9_/_6_ Date  9__/_7_ Date  _9_/_8_    Time BG Time BG Time BG Time BG Time BG Time BG Time BG    7:30am 151 9am 93 7:30am 136 8am 112 8:30am 131 9am 114 7:45am 109                                                          Farzad East is a 85 year old male who is being evaluated via a billable telephone visit.      The patient has been notified of following:     \"This telephone visit will be conducted via a call between you and your physician/provider. We have found that certain health care needs can be provided without the need for a physical exam.  This service lets us provide the care you need with a short phone conversation.  If a prescription is necessary we can send it directly to your pharmacy.  If lab work is needed we can place an order for that and you can then stop by our lab to have the test done at a later time.    Telephone visits are billed at different rates depending on your insurance coverage. During this emergency period, for some insurers they may be billed the same as an in-person visit.  Please reach out to your insurance provider with any questions.    If during the course of the call the physician/provider feels a " "telephone visit is not appropriate, you will not be charged for this service.\"    Patient has given verbal consent for Telephone visit?  Yes    What phone number would you like to be contacted at? [phone]    How would you like to obtain your AVS? Mail a copy      [facility]  Endocrinology  Jose Amin MD  09/08/2020      Chief Complaint:   RECHECK     History of Present Illness:   Farzad East is a 85 year old male with a history of CKD stage 3, type 2 diabetes mellitus with nephropathy, and hypertension who presents for follow up of type 2 diabetes mellitus. The patient was diagnosed with diabetes in his 50s and has managed using Metformin, Glipizide, Lantus, and Januvia in the past. Lantus was discontinued toward the beginning of 2018.     Interval History: Current diabetes regimen Januvia 50 mg, Metformin 1000 mg, and Glipizide 5 mg.     Overall he is feeling fine.  Reports that he is in stable health and has not had any acute issues since the last visit.    He checks fasting blood glucose daily in the morning.  Most readings are in range.  He denies any symptoms of hypoglycemia.  He reports that he had one reading in 70s.  He does not take the glipizide dose if his fasting blood glucose is below 100.  He denies any recent falls but notes that he is extremely careful to prevent possibility of fall.    He is requesting that I place a colonoscopy order for him as he does not have a PCP.  A 5-year follow-up colonoscopy was recommended by GI.    He has an upcoming appointment with cardiology.    He is moving to senior living facility.    Review of Systems:   Pertinent items are noted in HPI, remainder of complete ROS is negative.      Active Medications:     Current Outpatient Medications:      aspirin 81 MG tablet, Take 1 tablet by mouth daily., Disp: , Rfl:      blood glucose (ONETOUCH VERIO IQ) test strip, Use to test blood sugar 1 time daily or as directed., Disp: 100 each, Rfl: 3     blood glucose " monitoring ([employer] VERIO IQ SYSTEM) meter device kit, Use to test blood sugar daily, Disp: 1 kit, Rfl: 0     carvedilol (COREG) 6.25 MG tablet, TAKE 1 TABLET BY MOUTH TWICE A DAY WITH MEALS, Disp: 180 tablet, Rfl: 0     Cholecalciferol (VITAMIN D-3 PO), , Disp: , Rfl:      COMPRESSION STOCKINGS, 1 each daily, Disp: 1 each, Rfl: 3     COMPRESSION STOCKINGS, 1 each daily, Disp: 2 each, Rfl: 11     finasteride (PROSCAR) 5 MG tablet, Take 1 tablet by mouth daily, Disp: , Rfl: 3     glipiZIDE (GLUCOTROL XL) 5 MG 24 hr tablet, Take 1 tablet (5 mg) by mouth daily, Disp: 90 tablet, Rfl: 1     hydrALAZINE (APRESOLINE) 25 MG tablet, Take 1 tablet (25 mg) by mouth 3 times daily, Disp: 270 tablet, Rfl: 3     hydrochlorothiazide (MICROZIDE) 12.5 MG capsule, Take 1 capsule (12.5 mg) by mouth daily Use while combination (HyZaar) is unavailable., Disp: 90 capsule, Rfl: 3     insulin pen needle (31G X 5 MM) 31G X 5 MM miscellaneous, Use 1 pen needle daily or as directed, Disp: 100 each, Rfl: 1     losartan (COZAAR) 50 MG tablet, Take 1 tablet (50 mg) by mouth daily Use while Combination (HyZaar) is unavailable., Disp: 90 tablet, Rfl: 1     metFORMIN (GLUCOPHAGE-XR) 500 MG 24 hr tablet, Take 2 tablets (1,000 mg) by mouth daily with food, Disp: 180 tablet, Rfl: 0     multivitamin w/minerals (MULTI-VITAMIN) tablet, Take 1 tablet by mouth daily, Disp: , Rfl:      ONETOUCH ULTRA test strip, Use to test blood sugar 3 times daily or as directed., Disp: 270 each, Rfl: 3     sitagliptin (JANUVIA) 50 MG tablet, Take 1 tablet (50 mg) by mouth daily, Disp: 90 tablet, Rfl: 0     tamsulosin (FLOMAX) 0.4 MG capsule, Take 1 capsule (0.4 mg) by mouth daily, Disp: 90 capsule, Rfl: 1     sildenafil (VIAGRA) 100 MG tablet, Take 1 tablet (100 mg) by mouth daily as needed for erectile dysfunction (Patient not taking: Reported on 4/22/2020), Disp: 6 tablet, Rfl: 11      Allergies:   Sulfa drugs      Past Medical History:  Atrial flutter  Chronic kidney  disease stage 3  Type 2 diabetes mellitus  Hypertension  Peripheral neuropathy  Rectal type adenocarcinoma  Femur fracture     Past Surgical History:  Cataract  ORIF femur  Left KIMBERLY    Family History:   Diabetes - father  Circulatory - father  Macular degeneration - father  Arthritis - mother     Social History:   Tobacco Use: none  Alcohol Use: 3 martinis/week  Drug Use: none  [facility]      Physical Exam:   There were no vitals taken for this visit.      Data:  Reviewed in epic    Assessment and Plan:  Type 2 diabetes mellitus  Fasting blood glucose readings are mostly in range.  He does not check usually blood glucose during the day.  Will check A1c and other routine labs later this month at time of his cardiology appointment.  Reviewed risk of hypoglycemia with glipizide.    CKD with worsening albuminuria  Nephrology consult was placed but he has not scheduled    HTN: Following with cardiology    Osteoporosis, unspecified osteoporosis type, unspecified pathological fracture presence  Vitamin D deficiency    Colonoscopy order placed per patient request.  He was also encouraged to establish care with PCP    Orders Placed This Encounter   Procedures     Albumin Random Urine Quantitative with Creat Ratio     Basic metabolic panel     AST     ALT     Hemoglobin A1c     Lipid panel reflex to direct LDL Fasting     TSH with free T4 reflex     CBC with platelets differential     Vitamin D Deficiency     GASTROENTEROLOGY ADULT REF PROCEDURE ONLY       Follow-up: Return in about 4 months (around 1/8/2021).      JUDD Fenton    Total telephone call time 20 minutes    Note: Chart documentation done in part with Dragon Voice Recognition software. Although reviewed after completion, some word and grammatical errors may remain.  Please consider this when interpreting information in this chart

## 2020-09-10 ENCOUNTER — TELEPHONE (OUTPATIENT)
Dept: ENDOCRINOLOGY | Facility: CLINIC | Age: 85
End: 2020-09-10

## 2020-09-10 NOTE — TELEPHONE ENCOUNTER
CLINIC COORDINATOR SCHEDULING NOTES    CALL RESULT: LVM    APPT TYPE: VIRTUAL VISIT RETURN    PROVIDER: Eloisa    DATE APPT NEEDED: January 2021    ADDITIONAL NOTES: 4 month follow-up from 9/8. Sent [employer] as well.

## 2020-09-23 ENCOUNTER — VIRTUAL VISIT (OUTPATIENT)
Dept: CARDIOLOGY | Facility: CLINIC | Age: 85
End: 2020-09-23
Attending: INTERNAL MEDICINE
Payer: MEDICARE

## 2020-09-23 DIAGNOSIS — E11.8 TYPE 2 DIABETES MELLITUS WITH COMPLICATION (H): ICD-10-CM

## 2020-09-23 DIAGNOSIS — E55.9 VITAMIN D DEFICIENCY: ICD-10-CM

## 2020-09-23 DIAGNOSIS — I10 HYPERTENSION, UNSPECIFIED TYPE: ICD-10-CM

## 2020-09-23 LAB
BASOPHILS # BLD AUTO: 0.1 10E9/L (ref 0–0.2)
BASOPHILS NFR BLD AUTO: 0.9 %
DIFFERENTIAL METHOD BLD: ABNORMAL
EOSINOPHIL # BLD AUTO: 0.5 10E9/L (ref 0–0.7)
EOSINOPHIL NFR BLD AUTO: 8.9 %
ERYTHROCYTE [DISTWIDTH] IN BLOOD BY AUTOMATED COUNT: 14.5 % (ref 10–15)
HBA1C MFR BLD: 7.3 % (ref 0–5.6)
HCT VFR BLD AUTO: 26.8 % (ref 40–53)
HGB BLD-MCNC: 8.5 G/DL (ref 13.3–17.7)
LYMPHOCYTES # BLD AUTO: 0.9 10E9/L (ref 0.8–5.3)
LYMPHOCYTES NFR BLD AUTO: 15.1 %
MCH RBC QN AUTO: 30 PG (ref 26.5–33)
MCHC RBC AUTO-ENTMCNC: 31.7 G/DL (ref 31.5–36.5)
MCV RBC AUTO: 95 FL (ref 78–100)
MONOCYTES # BLD AUTO: 0.5 10E9/L (ref 0–1.3)
MONOCYTES NFR BLD AUTO: 9.1 %
NEUTROPHILS # BLD AUTO: 3.7 10E9/L (ref 1.6–8.3)
NEUTROPHILS NFR BLD AUTO: 66 %
PLATELET # BLD AUTO: 301 10E9/L (ref 150–450)
RBC # BLD AUTO: 2.83 10E12/L (ref 4.4–5.9)
WBC # BLD AUTO: 5.6 10E9/L (ref 4–11)

## 2020-09-23 PROCEDURE — 40001009 ZZH VIDEO/TELEPHONE VISIT; NO CHARGE

## 2020-09-23 PROCEDURE — 85025 COMPLETE CBC W/AUTO DIFF WBC: CPT | Performed by: INTERNAL MEDICINE

## 2020-09-23 PROCEDURE — 84450 TRANSFERASE (AST) (SGOT): CPT | Performed by: INTERNAL MEDICINE

## 2020-09-23 PROCEDURE — 83036 HEMOGLOBIN GLYCOSYLATED A1C: CPT | Performed by: INTERNAL MEDICINE

## 2020-09-23 PROCEDURE — 80048 BASIC METABOLIC PNL TOTAL CA: CPT | Performed by: INTERNAL MEDICINE

## 2020-09-23 PROCEDURE — 80061 LIPID PANEL: CPT | Performed by: INTERNAL MEDICINE

## 2020-09-23 PROCEDURE — 84443 ASSAY THYROID STIM HORMONE: CPT | Performed by: INTERNAL MEDICINE

## 2020-09-23 PROCEDURE — 82306 VITAMIN D 25 HYDROXY: CPT | Performed by: INTERNAL MEDICINE

## 2020-09-23 PROCEDURE — 99213 OFFICE O/P EST LOW 20 MIN: CPT | Mod: 95 | Performed by: INTERNAL MEDICINE

## 2020-09-23 PROCEDURE — 84439 ASSAY OF FREE THYROXINE: CPT | Performed by: INTERNAL MEDICINE

## 2020-09-23 PROCEDURE — 36415 COLL VENOUS BLD VENIPUNCTURE: CPT | Performed by: INTERNAL MEDICINE

## 2020-09-23 PROCEDURE — 84460 ALANINE AMINO (ALT) (SGPT): CPT | Performed by: INTERNAL MEDICINE

## 2020-09-23 PROCEDURE — 82043 UR ALBUMIN QUANTITATIVE: CPT | Performed by: INTERNAL MEDICINE

## 2020-09-23 NOTE — PROGRESS NOTES
"Farzad East is a 85 year old male who is being evaluated via a billable video visit.      The patient has been notified of following:     \"This video visit will be conducted via a call between you and your physician/provider. We have found that certain health care needs can be provided without the need for an in-person physical exam.  This service lets us provide the care you need with a video conversation.  If a prescription is necessary we can send it directly to your pharmacy.  If lab work is needed we can place an order for that and you can then stop by our lab to have the test done at a later time.    If during the course of the call the physician/provider feels a video visit is not appropriate, you will not be charged for this service.\"    Patient has given verbal consent for Video visit? Yes  How would you like to obtain your AVS? Mail a copy  If you are dropped from the video visit, the video invite should be resent to: Text to cell phone: [phone]  Will anyone else be joining your video visit? No      Video-Visit Details    Type of service:  Video Visit    Video Start Time:1028am    Video End Time:1045am    Originating Location (pt. Location):patient home      Distant Location (provider location):  home office    Platform used for Video Visit: [employer]      See dictation    "

## 2020-09-23 NOTE — PROGRESS NOTES
Service Date: 2020      RE: Farzad East    MRN: [id number]   : 1935      To Whom It May Concern:       It was a pleasure participating in the care of your patient Mr. Alessio East.  As you know, he is an 85-year-old gentleman whom I saw today over a virtual video visit via [employer] for hypertension and venous insufficiency.      PAST MEDICAL HISTORY:      1.  Type 2 diabetes.   2.  Hypertension.   3.  Chronic renal insufficiency with a baseline GFR of 30 mL/min as of 2020.   4.  Left hip pain secondary to hip replacement and subsequent surgery.   5.  Left femur fracture.   6.  Chronic venous insufficiency.      I last saw him 2019.  At that time he was doing adequately.  He presents today for continuing care.      He last saw Araceli Chiang, our nurse practitioner, in April of this year, and at that time he was still battling some venous insufficiency.  He has been having trouble putting on the compression stockings.  He has noticed that with inactivity, his legs have swollen further because he is unable to do the exercise bike in the exercise facility for 10 minutes each day.  When he was able to be more active, he says that it was improved.  He has not been keeping track of his blood pressures at home.  He, however, has denied any significant new chest pain, shortness of breath, PND, orthopnea, palpitations, syncope or near syncope.  He feels that his weight has been stable and not been increasing.  His breathing has been good.  He does not complain of any shortness of breath.  He is quite inactive and maybe is only able to walk about half a block, but he has not tested his exertional capacity at all.  He leads a relatively sedentary lifestyle.      In terms of his present medications, he is takin.  Aspirin 81 mg a day.   2.  Carvedilol 6.25 twice daily.   3.  Glipizide.   4.  Hydralazine 25 mg 3 times daily.   5.  Hydrochlorothiazide 25 a day.   6.  Losartan 50 mg a day.    7.  Metformin.   8.  Januvia.        PHYSICAL EXAMINATION:       VITAL SIGNS:  He has not been keeping track of his blood pressures at home.   GENERAL:  He is comfortable, well groomed.   PSYCHIATRIC:  He is alert and oriented.   EYES:  His eyes do not appear grossly erythematous or have exudate.   RESPIRATORY:  He is breathing comfortably without gross cough.      The remainder of the comprehensive physical exam was deferred secondary to the COVID-19 pandemic and secondary to video visit restrictions.      LABORATORY:  Today's labs are pending.  Prior labs 01/14/2020, potassium 5.3, GFR 30.      IMAGING:  His last echocardiogram 10/11/2018 reveals an ejection fraction of 65%-70% without significant valvular pathology.        IMPRESSION:      Alessio is an 85-year-old gentleman with several active issues:     1.  Hypertension.      His blood pressures generally ran in the 110-130mmHg range previously; however, he has not been keeping track of them as of late.  He will gather further information and report back regarding his blood pressure control.     2.  Lower extremity edema, likely secondary to venous insufficiency.      He reports that his weight has been stable; in fact, trending downwards in the 170 lb range.  He has not had problems breathing, PND, orthopnea or other shortness of breath.  He says that his lower extremity edema has improved with compression stockings and was better when he was able to use the exercise bike in his facility for 10 minutes a day.  However, since the COVID pandemic, he has been much more sedentary and less active, and he has not been able to put on the compression stockings due to physical difficulty.        PLAN:     1.  I have advised him to try Ace wraps to his feet for now.  In order to maintain his level of activity that he needs in order to help likely venous drainage from his lower extremities, we also discussed lying on his back to pedal an imaginary exercise bike for at  least 10 minutes and work his way upwards to 20-30 minutes with breaks.      This will help him not only keep active, but likely make use of gravity in order to help drainage of fluid from his lower extremities along with the Ace wraps.     2.  He will keep track of his blood pressures at home for further evaluation and titration of meds.     3.  Virtual followup with Araceli Chiang in 3 months.     4.  Virtual video visit via [employer] in 6 months with me with labs, earlier if needed.      Once again, it was a pleasure participating in the care of your patient Mr. Jen Chaves.  Please feel free to contact me at any time if you have any questions regarding his care in the future.      Sincerely,              PRAVEEN OHARA MD             D: 2020   T: 2020   MT: [email or web address]      Name:     JEN CHAVES   MRN:      [id number]        Account:      [id number]   :      1935           Service Date: 2020      Document: [id number]

## 2020-09-23 NOTE — PATIENT INSTRUCTIONS
Patient Instructions:  It was a pleasure to see you in the cardiology clinic today.      If you have any questions, you can reach my nurse, Nishi VASQUEZ LPN, at [phone].  Press Option #1 for the [facility], and then press Option #4 for nursing.    We are encouraging the use of [employer] to communicate with your HealthCare Provider    Medication Changes: None.    Recommendations:   - Use ACE wraps on both legs.  - Fasting labs in 6 months prior to seeing Dr Levi    Studies Ordered: Echocardiogram in 6 months prior to your visit with Dr Levi.    The results from today include: None.    Please follow up: With a nurse practitioner in 3 months and with Dr Levi in 6 months.    Sincerely,    Eddie Levi MD     If you have an urgent need after hours (8:00 am to 4:30 pm) please call [phone] and ask for the cardiology fellow on call.

## 2020-09-23 NOTE — LETTER
2020      RE: Farzad East  [address]  [facility] [address]       Dear Colleague,    Thank you for the opportunity to participate in the care of your patient, Farzad East, at the [facility] at [facility]. Please see a copy of my visit note below.    Service Date: 2020      RE: Farzad East    MRN: [id number]   : 1935      To Whom It May Concern:       It was a pleasure participating in the care of your patient Mr. Alessio East.  As you know, he is an 85-year-old gentleman whom I saw today over a virtual video visit via [employer] for hypertension and venous insufficiency.      PAST MEDICAL HISTORY:      1.  Type 2 diabetes.   2.  Hypertension.   3.  Chronic renal insufficiency with a baseline GFR of 30 mL/min as of 2020.   4.  Left hip pain secondary to hip replacement and subsequent surgery.   5.  Left femur fracture.   6.  Chronic venous insufficiency.      I last saw him 2019.  At that time he was doing adequately.  He presents today for continuing care.      He last saw Araceli Chiang, our nurse practitioner, in April of this year, and at that time he was still battling some venous insufficiency.  He has been having trouble putting on the compression stockings.  He has noticed that with inactivity, his legs have swollen further because he is unable to do the exercise bike in the exercise facility for 10 minutes each day.  When he was able to be more active, he says that it was improved.  He has not been keeping track of his blood pressures at home.  He, however, has denied any significant new chest pain, shortness of breath, PND, orthopnea, palpitations, syncope or near syncope.  He feels that his weight has been stable and not been increasing.  His breathing has been good.  He does not complain of any shortness of breath.  He is quite inactive and maybe is only able to walk about half a block, but he  has not tested his exertional capacity at all.  He leads a relatively sedentary lifestyle.      In terms of his present medications, he is takin.  Aspirin 81 mg a day.   2.  Carvedilol 6.25 twice daily.   3.  Glipizide.   4.  Hydralazine 25 mg 3 times daily.   5.  Hydrochlorothiazide 25 a day.   6.  Losartan 50 mg a day.   7.  Metformin.   8.  Januvia.        PHYSICAL EXAMINATION:       VITAL SIGNS:  He has not been keeping track of his blood pressures at home.   GENERAL:  He is comfortable, well groomed.   PSYCHIATRIC:  He is alert and oriented.   EYES:  His eyes do not appear grossly erythematous or have exudate.   RESPIRATORY:  He is breathing comfortably without gross cough.      The remainder of the comprehensive physical exam was deferred secondary to the COVID- pandemic and secondary to video visit restrictions.      LABORATORY:  Today's labs are pending.  Prior labs 2020, potassium 5.3, GFR 30.      IMAGING:  His last echocardiogram 10/11/2018 reveals an ejection fraction of 65%-70% without significant valvular pathology.        IMPRESSION:      Alessio is an 85-year-old gentleman with several active issues:     1.  Hypertension.      His blood pressures generally ran in the 110-130mmHg range previously; however, he has not been keeping track of them as of late.  He will gather further information and report back regarding his blood pressure control.     2.  Lower extremity edema, likely secondary to venous insufficiency.      He reports that his weight has been stable; in fact, trending downwards in the 170 lb range.  He has not had problems breathing, PND, orthopnea or other shortness of breath.  He says that his lower extremity edema has improved with compression stockings and was better when he was able to use the exercise bike in his facility for 10 minutes a day.  However, since the COVID pandemic, he has been much more sedentary and less active, and he has not been able to put on the  "compression stockings due to physical difficulty.        PLAN:     1.  I have advised him to try Ace wraps to his feet for now.  In order to maintain his level of activity that he needs in order to help likely venous drainage from his lower extremities, we also discussed lying on his back to pedal an imaginary exercise bike for at least 10 minutes and work his way upwards to 20-30 minutes with breaks.      This will help him not only keep active, but likely make use of gravity in order to help drainage of fluid from his lower extremities along with the Ace wraps.     2.  He will keep track of his blood pressures at home for further evaluation and titration of meds.     3.  Virtual followup with Araceli Chiang in 3 months.     4.  Virtual video visit via [employer] in 6 months with me with labs, earlier if needed.      Once again, it was a pleasure participating in the care of your patient Mr. Jen Chaves.  Please feel free to contact me at any time if you have any questions regarding his care in the future.      Sincerely,              PRAVEEN OHARA MD             D: 2020   T: 2020   MT: [email or web address]      Name:     JEN CHAVES   MRN:      [id number]        Account:      [id number]   :      1935           Service Date: 2020      Document: [id number]        Jen Chaves is a 85 year old male who is being evaluated via a billable video visit.      The patient has been notified of following:     \"This video visit will be conducted via a call between you and your physician/provider. We have found that certain health care needs can be provided without the need for an in-person physical exam.  This service lets us provide the care you need with a video conversation.  If a prescription is necessary we can send it directly to your pharmacy.  If lab work is needed we can place an order for that and you can then stop by our lab to have the test done at a later time.    If during the course of " "the call the physician/provider feels a video visit is not appropriate, you will not be charged for this service.\"    Patient has given verbal consent for Video visit? Yes  How would you like to obtain your AVS? Mail a copy  If you are dropped from the video visit, the video invite should be resent to: Text to cell phone: [phone]  Will anyone else be joining your video visit? No      Video-Visit Details    Type of service:  Video Visit    Video Start Time:1028am    Video End Time:1045am    Originating Location (pt. Location):patient home      Distant Location (provider location):  home office    Platform used for Video Visit: [employer]      See dictation      Please do not hesitate to contact me if you have any questions/concerns.     Sincerely,     Eddie Levi MD    "

## 2020-09-24 ENCOUNTER — HOSPITAL ENCOUNTER (OUTPATIENT)
Facility: CLINIC | Age: 85
Setting detail: OBSERVATION
Discharge: HOME | End: 2020-09-25
Attending: EMERGENCY MEDICINE | Admitting: INTERNAL MEDICINE
Payer: MEDICARE

## 2020-09-24 ENCOUNTER — TELEPHONE (OUTPATIENT)
Dept: ENDOCRINOLOGY | Facility: CLINIC | Age: 85
End: 2020-09-24

## 2020-09-24 ENCOUNTER — APPOINTMENT (OUTPATIENT)
Dept: URBAN - METROPOLITAN AREA CLINIC 255 | Age: 85
Setting detail: DERMATOLOGY
End: 2020-10-04

## 2020-09-24 DIAGNOSIS — Z20.828 CONTACT WITH AND (SUSPECTED) EXPOSURE TO OTHER VIRAL COMMUNICABLE DISEASES: ICD-10-CM

## 2020-09-24 DIAGNOSIS — E87.5 HYPERKALEMIA: ICD-10-CM

## 2020-09-24 DIAGNOSIS — Z48.02 ENCOUNTER FOR REMOVAL OF SUTURES: ICD-10-CM

## 2020-09-24 DIAGNOSIS — I10 HYPERTENSION, UNSPECIFIED TYPE: Primary | ICD-10-CM

## 2020-09-24 PROBLEM — D04.21 CARCINOMA IN SITU OF SKIN OF RIGHT EAR AND EXTERNAL AURICULAR CANAL: Status: ACTIVE | Noted: 2020-09-24

## 2020-09-24 LAB
ALBUMIN SERPL-MCNC: 3.3 G/DL (ref 3.4–5)
ALP SERPL-CCNC: 73 U/L (ref 40–150)
ALT SERPL W P-5'-P-CCNC: 14 U/L (ref 0–70)
ALT SERPL W P-5'-P-CCNC: 15 U/L (ref 0–70)
ANION GAP SERPL CALCULATED.3IONS-SCNC: 5 MMOL/L (ref 3–14)
ANION GAP SERPL CALCULATED.3IONS-SCNC: 8 MMOL/L (ref 3–14)
AST SERPL W P-5'-P-CCNC: 13 U/L (ref 0–45)
AST SERPL W P-5'-P-CCNC: 16 U/L (ref 0–45)
BASOPHILS # BLD AUTO: 0.1 10E9/L (ref 0–0.2)
BASOPHILS NFR BLD AUTO: 0.9 %
BILIRUB SERPL-MCNC: 0.3 MG/DL (ref 0.2–1.3)
BUN SERPL-MCNC: 64 MG/DL (ref 7–30)
BUN SERPL-MCNC: 71 MG/DL (ref 7–30)
CALCIUM SERPL-MCNC: 8.5 MG/DL (ref 8.5–10.1)
CALCIUM SERPL-MCNC: 8.9 MG/DL (ref 8.5–10.1)
CHLORIDE SERPL-SCNC: 116 MMOL/L (ref 94–109)
CHLORIDE SERPL-SCNC: 116 MMOL/L (ref 94–109)
CHOLEST SERPL-MCNC: 90 MG/DL
CO2 SERPL-SCNC: 16 MMOL/L (ref 20–32)
CO2 SERPL-SCNC: 20 MMOL/L (ref 20–32)
CREAT SERPL-MCNC: 2.23 MG/DL (ref 0.66–1.25)
CREAT SERPL-MCNC: 2.42 MG/DL (ref 0.66–1.25)
CREAT UR-MCNC: 97 MG/DL
DEPRECATED CALCIDIOL+CALCIFEROL SERPL-MC: 22 UG/L (ref 20–75)
DIFFERENTIAL METHOD BLD: ABNORMAL
EOSINOPHIL # BLD AUTO: 0.5 10E9/L (ref 0–0.7)
EOSINOPHIL NFR BLD AUTO: 7.4 %
ERYTHROCYTE [DISTWIDTH] IN BLOOD BY AUTOMATED COUNT: 14 % (ref 10–15)
FERRITIN SERPL-MCNC: 109 NG/ML (ref 26–388)
FOLATE SERPL-MCNC: 18.7 NG/ML
GFR SERPL CREATININE-BSD FRML MDRD: 24 ML/MIN/{1.73_M2}
GFR SERPL CREATININE-BSD FRML MDRD: 26 ML/MIN/{1.73_M2}
GLUCOSE BLDC GLUCOMTR-MCNC: 100 MG/DL (ref 70–99)
GLUCOSE BLDC GLUCOMTR-MCNC: 113 MG/DL (ref 70–99)
GLUCOSE BLDC GLUCOMTR-MCNC: 119 MG/DL (ref 70–99)
GLUCOSE BLDC GLUCOMTR-MCNC: 136 MG/DL (ref 70–99)
GLUCOSE BLDC GLUCOMTR-MCNC: 148 MG/DL (ref 70–99)
GLUCOSE SERPL-MCNC: 105 MG/DL (ref 70–99)
GLUCOSE SERPL-MCNC: 133 MG/DL (ref 70–99)
HCT VFR BLD AUTO: 31.3 % (ref 40–53)
HDLC SERPL-MCNC: 45 MG/DL
HGB BLD-MCNC: 9.5 G/DL (ref 13.3–17.7)
IMM GRANULOCYTES # BLD: 0 10E9/L (ref 0–0.4)
IMM GRANULOCYTES NFR BLD: 0.3 %
IRON SATN MFR SERPL: 21 % (ref 15–46)
IRON SERPL-MCNC: 64 UG/DL (ref 35–180)
LDLC SERPL CALC-MCNC: 34 MG/DL
LYMPHOCYTES # BLD AUTO: 1 10E9/L (ref 0.8–5.3)
LYMPHOCYTES NFR BLD AUTO: 15.1 %
MCH RBC QN AUTO: 29 PG (ref 26.5–33)
MCHC RBC AUTO-ENTMCNC: 30.4 G/DL (ref 31.5–36.5)
MCV RBC AUTO: 95 FL (ref 78–100)
MICROALBUMIN UR-MCNC: 475 MG/L
MICROALBUMIN/CREAT UR: 491.21 MG/G CR (ref 0–17)
MONOCYTES # BLD AUTO: 0.5 10E9/L (ref 0–1.3)
MONOCYTES NFR BLD AUTO: 7.3 %
NEUTROPHILS # BLD AUTO: 4.6 10E9/L (ref 1.6–8.3)
NEUTROPHILS NFR BLD AUTO: 69 %
NONHDLC SERPL-MCNC: 45 MG/DL
NRBC # BLD AUTO: 0 10*3/UL
NRBC BLD AUTO-RTO: 0 /100
PLATELET # BLD AUTO: 329 10E9/L (ref 150–450)
POTASSIUM SERPL-SCNC: 4.2 MMOL/L (ref 3.4–5.3)
POTASSIUM SERPL-SCNC: 6 MMOL/L (ref 3.4–5.3)
POTASSIUM SERPL-SCNC: 6.2 MMOL/L (ref 3.4–5.3)
PROT SERPL-MCNC: 8 G/DL (ref 6.8–8.8)
RBC # BLD AUTO: 3.28 10E12/L (ref 4.4–5.9)
SARS-COV-2 RNA SPEC QL NAA+PROBE: NORMAL
SODIUM SERPL-SCNC: 140 MMOL/L (ref 133–144)
SODIUM SERPL-SCNC: 141 MMOL/L (ref 133–144)
SPECIMEN SOURCE: NORMAL
T4 FREE SERPL-MCNC: 1 NG/DL (ref 0.76–1.46)
TIBC SERPL-MCNC: 303 UG/DL (ref 240–430)
TRIGL SERPL-MCNC: 56 MG/DL
TSH SERPL DL<=0.005 MIU/L-ACNC: 5.08 MU/L (ref 0.4–4)
VIT B12 SERPL-MCNC: 1306 PG/ML (ref 193–986)
WBC # BLD AUTO: 6.6 10E9/L (ref 4–11)

## 2020-09-24 PROCEDURE — 25800025 ZZH RX 258: Performed by: EMERGENCY MEDICINE

## 2020-09-24 PROCEDURE — 84132 ASSAY OF SERUM POTASSIUM: CPT | Performed by: INTERNAL MEDICINE

## 2020-09-24 PROCEDURE — 99219 ZZC INITIAL OBSERVATION CARE,LEVL II: CPT | Performed by: INTERNAL MEDICINE

## 2020-09-24 PROCEDURE — U0003 INFECTIOUS AGENT DETECTION BY NUCLEIC ACID (DNA OR RNA); SEVERE ACUTE RESPIRATORY SYNDROME CORONAVIRUS 2 (SARS-COV-2) (CORONAVIRUS DISEASE [COVID-19]), AMPLIFIED PROBE TECHNIQUE, MAKING USE OF HIGH THROUGHPUT TECHNOLOGIES AS DESCRIBED BY CMS-2020-01-R: HCPCS | Performed by: INTERNAL MEDICINE

## 2020-09-24 PROCEDURE — OTHER MIPS QUALITY: OTHER

## 2020-09-24 PROCEDURE — 99207 ZZC APP CREDIT; MD BILLING SHARED VISIT: CPT | Performed by: PHYSICIAN ASSISTANT

## 2020-09-24 PROCEDURE — 85025 COMPLETE CBC W/AUTO DIFF WBC: CPT | Performed by: EMERGENCY MEDICINE

## 2020-09-24 PROCEDURE — 25800030 ZZH RX IP 258 OP 636: Performed by: EMERGENCY MEDICINE

## 2020-09-24 PROCEDURE — OTHER SUTURE REMOVAL (GLOBAL PERIOD): OTHER

## 2020-09-24 PROCEDURE — 25000132 ZZH RX MED GY IP 250 OP 250 PS 637: Mod: GY | Performed by: EMERGENCY MEDICINE

## 2020-09-24 PROCEDURE — 96365 THER/PROPH/DIAG IV INF INIT: CPT | Performed by: EMERGENCY MEDICINE

## 2020-09-24 PROCEDURE — 82746 ASSAY OF FOLIC ACID SERUM: CPT | Performed by: EMERGENCY MEDICINE

## 2020-09-24 PROCEDURE — OTHER COUNSELING: OTHER

## 2020-09-24 PROCEDURE — 99285 EMERGENCY DEPT VISIT HI MDM: CPT | Mod: 25 | Performed by: EMERGENCY MEDICINE

## 2020-09-24 PROCEDURE — 25000132 ZZH RX MED GY IP 250 OP 250 PS 637: Mod: GY | Performed by: PHYSICIAN ASSISTANT

## 2020-09-24 PROCEDURE — 25000128 H RX IP 250 OP 636: Performed by: INTERNAL MEDICINE

## 2020-09-24 PROCEDURE — 80053 COMPREHEN METABOLIC PANEL: CPT | Performed by: EMERGENCY MEDICINE

## 2020-09-24 PROCEDURE — 25800030 ZZH RX IP 258 OP 636: Performed by: INTERNAL MEDICINE

## 2020-09-24 PROCEDURE — 17311 MOHS 1 STAGE H/N/HF/G: CPT

## 2020-09-24 PROCEDURE — OTHER RETURN TO REFERRING PROVIDER: OTHER

## 2020-09-24 PROCEDURE — 83540 ASSAY OF IRON: CPT | Performed by: EMERGENCY MEDICINE

## 2020-09-24 PROCEDURE — 99283 EMERGENCY DEPT VISIT LOW MDM: CPT | Mod: Z6 | Performed by: EMERGENCY MEDICINE

## 2020-09-24 PROCEDURE — 96375 TX/PRO/DX INJ NEW DRUG ADDON: CPT | Performed by: EMERGENCY MEDICINE

## 2020-09-24 PROCEDURE — 00000146 ZZHCL STATISTIC GLUCOSE BY METER IP

## 2020-09-24 PROCEDURE — OTHER MOHS SURGERY: OTHER

## 2020-09-24 PROCEDURE — G0378 HOSPITAL OBSERVATION PER HR: HCPCS

## 2020-09-24 PROCEDURE — 96366 THER/PROPH/DIAG IV INF ADDON: CPT | Performed by: EMERGENCY MEDICINE

## 2020-09-24 PROCEDURE — 25000125 ZZHC RX 250: Performed by: EMERGENCY MEDICINE

## 2020-09-24 PROCEDURE — 25000128 H RX IP 250 OP 636: Performed by: EMERGENCY MEDICINE

## 2020-09-24 PROCEDURE — 82728 ASSAY OF FERRITIN: CPT | Performed by: EMERGENCY MEDICINE

## 2020-09-24 PROCEDURE — OTHER DIAGNOSIS COMMENT: OTHER

## 2020-09-24 PROCEDURE — 99207 ZZC CDG-CODE CATEGORY CHANGED: CPT | Performed by: INTERNAL MEDICINE

## 2020-09-24 PROCEDURE — 84132 ASSAY OF SERUM POTASSIUM: CPT | Performed by: EMERGENCY MEDICINE

## 2020-09-24 PROCEDURE — 82607 VITAMIN B-12: CPT | Performed by: EMERGENCY MEDICINE

## 2020-09-24 PROCEDURE — 83550 IRON BINDING TEST: CPT | Performed by: EMERGENCY MEDICINE

## 2020-09-24 PROCEDURE — 17312 MOHS ADDL STAGE: CPT

## 2020-09-24 PROCEDURE — C9803 HOPD COVID-19 SPEC COLLECT: HCPCS | Performed by: EMERGENCY MEDICINE

## 2020-09-24 RX ORDER — HYDROCHLOROTHIAZIDE 12.5 MG/1
12.5 CAPSULE ORAL DAILY
Status: DISCONTINUED | OUTPATIENT
Start: 2020-09-25 | End: 2020-09-25

## 2020-09-24 RX ORDER — NALOXONE HYDROCHLORIDE 0.4 MG/ML
.1-.4 INJECTION, SOLUTION INTRAMUSCULAR; INTRAVENOUS; SUBCUTANEOUS
Status: DISCONTINUED | OUTPATIENT
Start: 2020-09-24 | End: 2020-09-25 | Stop reason: HOSPADM

## 2020-09-24 RX ORDER — ALBUTEROL SULFATE 5 MG/ML
10 SOLUTION RESPIRATORY (INHALATION) ONCE
Status: COMPLETED | OUTPATIENT
Start: 2020-09-24 | End: 2020-09-24

## 2020-09-24 RX ORDER — DEXTROSE MONOHYDRATE 25 G/50ML
25-50 INJECTION, SOLUTION INTRAVENOUS
Status: DISCONTINUED | OUTPATIENT
Start: 2020-09-24 | End: 2020-09-25 | Stop reason: HOSPADM

## 2020-09-24 RX ORDER — LIDOCAINE 40 MG/G
CREAM TOPICAL
Status: DISCONTINUED | OUTPATIENT
Start: 2020-09-24 | End: 2020-09-25 | Stop reason: HOSPADM

## 2020-09-24 RX ORDER — NICOTINE POLACRILEX 4 MG
15-30 LOZENGE BUCCAL
Status: DISCONTINUED | OUTPATIENT
Start: 2020-09-24 | End: 2020-09-25

## 2020-09-24 RX ORDER — MULTIPLE VITAMINS W/ MINERALS TAB 9MG-400MCG
1 TAB ORAL DAILY
Status: DISCONTINUED | OUTPATIENT
Start: 2020-09-25 | End: 2020-09-25 | Stop reason: HOSPADM

## 2020-09-24 RX ORDER — AMOXICILLIN 250 MG
1 CAPSULE ORAL 2 TIMES DAILY PRN
Status: DISCONTINUED | OUTPATIENT
Start: 2020-09-24 | End: 2020-09-25 | Stop reason: HOSPADM

## 2020-09-24 RX ORDER — FUROSEMIDE 10 MG/ML
40 INJECTION INTRAMUSCULAR; INTRAVENOUS ONCE
Status: COMPLETED | OUTPATIENT
Start: 2020-09-24 | End: 2020-09-24

## 2020-09-24 RX ORDER — ONDANSETRON 4 MG/1
4 TABLET, ORALLY DISINTEGRATING ORAL EVERY 6 HOURS PRN
Status: DISCONTINUED | OUTPATIENT
Start: 2020-09-24 | End: 2020-09-25 | Stop reason: HOSPADM

## 2020-09-24 RX ORDER — AMOXICILLIN 250 MG
2 CAPSULE ORAL 2 TIMES DAILY PRN
Status: DISCONTINUED | OUTPATIENT
Start: 2020-09-24 | End: 2020-09-25 | Stop reason: HOSPADM

## 2020-09-24 RX ORDER — BISACODYL 10 MG
10 SUPPOSITORY, RECTAL RECTAL DAILY PRN
Status: DISCONTINUED | OUTPATIENT
Start: 2020-09-24 | End: 2020-09-25 | Stop reason: HOSPADM

## 2020-09-24 RX ORDER — CALCIUM GLUCONATE 94 MG/ML
2 INJECTION, SOLUTION INTRAVENOUS ONCE
Status: DISCONTINUED | OUTPATIENT
Start: 2020-09-24 | End: 2020-09-24

## 2020-09-24 RX ORDER — ACETAMINOPHEN 325 MG/1
650 TABLET ORAL EVERY 4 HOURS PRN
Status: DISCONTINUED | OUTPATIENT
Start: 2020-09-24 | End: 2020-09-25 | Stop reason: HOSPADM

## 2020-09-24 RX ORDER — DEXTROSE MONOHYDRATE 25 G/50ML
25-50 INJECTION, SOLUTION INTRAVENOUS
Status: DISCONTINUED | OUTPATIENT
Start: 2020-09-24 | End: 2020-09-25

## 2020-09-24 RX ORDER — CARVEDILOL 6.25 MG/1
6.25 TABLET ORAL 2 TIMES DAILY WITH MEALS
Status: DISCONTINUED | OUTPATIENT
Start: 2020-09-25 | End: 2020-09-25 | Stop reason: HOSPADM

## 2020-09-24 RX ORDER — FINASTERIDE 5 MG/1
5 TABLET, FILM COATED ORAL DAILY
Status: DISCONTINUED | OUTPATIENT
Start: 2020-09-25 | End: 2020-09-25 | Stop reason: HOSPADM

## 2020-09-24 RX ORDER — TAMSULOSIN HYDROCHLORIDE 0.4 MG/1
0.4 CAPSULE ORAL DAILY
Status: DISCONTINUED | OUTPATIENT
Start: 2020-09-25 | End: 2020-09-25 | Stop reason: HOSPADM

## 2020-09-24 RX ORDER — ONDANSETRON 2 MG/ML
4 INJECTION INTRAMUSCULAR; INTRAVENOUS EVERY 6 HOURS PRN
Status: DISCONTINUED | OUTPATIENT
Start: 2020-09-24 | End: 2020-09-25 | Stop reason: HOSPADM

## 2020-09-24 RX ORDER — HYDRALAZINE HYDROCHLORIDE 20 MG/ML
10 INJECTION INTRAMUSCULAR; INTRAVENOUS EVERY 6 HOURS PRN
Status: DISCONTINUED | OUTPATIENT
Start: 2020-09-24 | End: 2020-09-25 | Stop reason: HOSPADM

## 2020-09-24 RX ORDER — DEXTROSE MONOHYDRATE 25 G/50ML
25 INJECTION, SOLUTION INTRAVENOUS ONCE
Status: COMPLETED | OUTPATIENT
Start: 2020-09-24 | End: 2020-09-24

## 2020-09-24 RX ORDER — SODIUM CHLORIDE 9 MG/ML
INJECTION, SOLUTION INTRAVENOUS CONTINUOUS
Status: DISCONTINUED | OUTPATIENT
Start: 2020-09-24 | End: 2020-09-24

## 2020-09-24 RX ORDER — HYDRALAZINE HYDROCHLORIDE 25 MG/1
25 TABLET, FILM COATED ORAL 3 TIMES DAILY
Status: DISCONTINUED | OUTPATIENT
Start: 2020-09-24 | End: 2020-09-25

## 2020-09-24 RX ORDER — NICOTINE POLACRILEX 4 MG
15-30 LOZENGE BUCCAL
Status: DISCONTINUED | OUTPATIENT
Start: 2020-09-24 | End: 2020-09-25 | Stop reason: HOSPADM

## 2020-09-24 RX ORDER — ACETAMINOPHEN 650 MG/1
650 SUPPOSITORY RECTAL EVERY 4 HOURS PRN
Status: DISCONTINUED | OUTPATIENT
Start: 2020-09-24 | End: 2020-09-25 | Stop reason: HOSPADM

## 2020-09-24 RX ADMIN — HUMAN INSULIN 5 UNITS: 100 INJECTION, SOLUTION SUBCUTANEOUS at 17:37

## 2020-09-24 RX ADMIN — HYDRALAZINE HYDROCHLORIDE 10 MG: 20 INJECTION, SOLUTION INTRAMUSCULAR; INTRAVENOUS at 20:28

## 2020-09-24 RX ADMIN — DEXTROSE MONOHYDRATE 25 G: 500 INJECTION PARENTERAL at 17:32

## 2020-09-24 RX ADMIN — FUROSEMIDE 40 MG: 10 INJECTION, SOLUTION INTRAVENOUS at 17:39

## 2020-09-24 RX ADMIN — SODIUM CHLORIDE 1000 ML: 9 INJECTION, SOLUTION INTRAVENOUS at 19:55

## 2020-09-24 RX ADMIN — CALCIUM GLUCONATE 2 G: 98 INJECTION, SOLUTION INTRAVENOUS at 17:00

## 2020-09-24 RX ADMIN — HYDRALAZINE HYDROCHLORIDE 25 MG: 25 TABLET, FILM COATED ORAL at 22:34

## 2020-09-24 RX ADMIN — SODIUM BICARBONATE 50 MEQ: 84 INJECTION, SOLUTION INTRAVENOUS at 17:23

## 2020-09-24 RX ADMIN — ALBUTEROL SULFATE 10 MG: 2.5 SOLUTION RESPIRATORY (INHALATION) at 18:12

## 2020-09-24 ASSESSMENT — LOCATION SIMPLE DESCRIPTION DERM: LOCATION SIMPLE: RIGHT HAND

## 2020-09-24 ASSESSMENT — MIFFLIN-ST. JEOR
SCORE: 1478.25
SCORE: 1490.04

## 2020-09-24 ASSESSMENT — LOCATION ZONE DERM: LOCATION ZONE: HAND

## 2020-09-24 ASSESSMENT — LOCATION DETAILED DESCRIPTION DERM: LOCATION DETAILED: RIGHT ULNAR DORSAL HAND

## 2020-09-24 NOTE — ED PROVIDER NOTES
ED Provider Note  [facility]      History     Chief Complaint   Patient presents with     Abnormal Labs     HPI  Farzad East is a 85 year old male with a medical history significant for type 2 diabetes mellitus with nephropathy, hypertension, CKD stage III, chronic venous insufficiency and atrial flutter who presents to the Emergency Department for evaluation of abnormal laboratories.  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    Per review of patient's chart, patient had laboratory testing done yesterday and his BMP returned showing a potassium of 6.2.  Patient was contacted today and advised to come to the Emergency Department for further evaluation and management.    He otherwise feels very well today.  Denies any chest pain or palpitations shortness of breath or weakness.  No change in urinary habits.  No nausea or vomiting.  Diarrhea.  He is essentially asymptomatic.    Past Medical History  Past Medical History:   Diagnosis Date     Atrial flutter (H)      Choroidal nevus of left eye      CKD (chronic kidney disease) stage 3, GFR 30-59 ml/min (H)      Diabetes mellitus (H)      Diabetic peripheral neuropathy (H)      Hypertension      Microalbuminuria      Overweight(278.02)      Rectal-type adenocarcinoma (H)      Retinopathies, diabetic      Vitreous detachment of both eyes      Past Surgical History:   Procedure Laterality Date     CATARACT IOL, RT/LT Bilateral 2005     left hip replacement       OPEN REDUCTION INTERNAL FIXATION FEMUR PROXIMAL  1/24/2014    Procedure: OPEN REDUCTION INTERNAL FIXATION FEMUR PROXIMAL;  Periprosthetic Fracture, ORIF Left Femur;  Surgeon: Andi Garcia MD;  Location: [facility] OR          hydrochlorothiazide (MICROZIDE) 12.5 MG capsule       aspirin 81 MG tablet       blood glucose (ONETOUCH VERIO IQ) test strip       blood glucose monitoring (ONETOUCH VERIO IQ SYSTEM) meter device kit       carvedilol (COREG) 6.25 MG  "tablet       Cholecalciferol (VITAMIN D-3 PO)       COMPRESSION STOCKINGS       finasteride (PROSCAR) 5 MG tablet       glipiZIDE (GLUCOTROL XL) 5 MG 24 hr tablet       hydrALAZINE (APRESOLINE) 25 MG tablet       insulin pen needle (31G X 5 MM) 31G X 5 MM miscellaneous       multivitamin w/minerals (MULTI-VITAMIN) tablet       ONETOUCH ULTRA test strip       sildenafil (VIAGRA) 100 MG tablet       sitagliptin (JANUVIA) 50 MG tablet       tamsulosin (FLOMAX) 0.4 MG capsule      Allergies   Allergen Reactions     Sulfa Drugs      Unknown reaction. Occurred during childhood. Has not taken Sulfa medications since allergic response.      Family History  Family History   Problem Relation Age of Onset     Diabetes Father      Circulatory Father         PAD     Macular Degeneration Father      Arthritis Mother      Amblyopia No family hx of      Retinal detachment No family hx of      Glaucoma No family hx of      Social History   Social History     Tobacco Use     Smoking status: Never Smoker     Smokeless tobacco: Never Used   Substance Use Topics     Alcohol use: Yes     Alcohol/week: 2.5 - 3.3 standard drinks     Types: 3 - 4 Standard drinks or equivalent per week     Comment: 3 martinis per week     Drug use: No      Past medical history, past surgical history, medications, allergies, family history, and social history were reviewed with the patient. No additional pertinent items.       Review of Systems   10 point review of symptoms was performed and is negative except as noted above.       Physical Exam   BP: (!) 191/77  Pulse: 62  Temp: 97.6  F (36.4  C)  Resp: 18  Height: 177.8 cm (5' 10\")  Weight: 79.4 kg (175 lb)  SpO2: 99 %  Physical Exam  GEN: Well appearing, non toxic, cooperative and conversant.   HEENT: The head is normocephalic and atraumatic. Pupils are equal round and reactive to light. Extraocular motions are intact. There is no facial swelling. The neck is nontender and supple.   CV: Regular rate. 2+ " radial pulses bilaterally.  PULM: Unlabored breathing   ABD: Soft, nt  EXT: Full range of motion.  No edema.  NEURO: Cranial nerves II through XII are intact and symmetric. Bilateral upper and lower extremities grossly show full range of motion without any focal deficits.  Normal gait  PSYCH: Calm and cooperative, interactive.     ED Course      Procedures          EKG at 1610.    asx at time of ECG.  ECG interpretted by me within 10 minutes of production  NSR at 66 bpm. Normal axis.  Normal intervals. Nl R-wave progress. No ST-T elevations or depressions. Pathologic T-wave inversion are absent, however precordial T waves appear to be sharp and peaked compared to prior ECG September 25, 2019    Interpretation: Peak T waves suggestive of hyperkalemia as known based on laboratories     Results for orders placed or performed during the hospital encounter of 09/24/20   Comprehensive metabolic panel     Status: Abnormal   Result Value Ref Range    Sodium 141 133 - 144 mmol/L    Potassium 6.0 (H) 3.4 - 5.3 mmol/L    Chloride 116 (H) 94 - 109 mmol/L    Carbon Dioxide 20 20 - 32 mmol/L    Anion Gap 5 3 - 14 mmol/L    Glucose 105 (H) 70 - 99 mg/dL    Urea Nitrogen 64 (H) 7 - 30 mg/dL    Creatinine 2.23 (H) 0.66 - 1.25 mg/dL    GFR Estimate 26 (L) >60 mL/min/[1.73_m2]    GFR Estimate If Black 30 (L) >60 mL/min/[1.73_m2]    Calcium 8.9 8.5 - 10.1 mg/dL    Bilirubin Total 0.3 0.2 - 1.3 mg/dL    Albumin 3.3 (L) 3.4 - 5.0 g/dL    Protein Total 8.0 6.8 - 8.8 g/dL    Alkaline Phosphatase 73 40 - 150 U/L    ALT 15 0 - 70 U/L    AST 16 0 - 45 U/L   CBC with platelets differential     Status: Abnormal   Result Value Ref Range    WBC 6.6 4.0 - 11.0 10e9/L    RBC Count 3.28 (L) 4.4 - 5.9 10e12/L    Hemoglobin 9.5 (L) 13.3 - 17.7 g/dL    Hematocrit 31.3 (L) 40.0 - 53.0 %    MCV 95 78 - 100 fl    MCH 29.0 26.5 - 33.0 pg    MCHC 30.4 (L) 31.5 - 36.5 g/dL    RDW 14.0 10.0 - 15.0 %    Platelet Count 329 150 - 450 10e9/L    Diff Method  Automated Method     % Neutrophils 69.0 %    % Lymphocytes 15.1 %    % Monocytes 7.3 %    % Eosinophils 7.4 %    % Basophils 0.9 %    % Immature Granulocytes 0.3 %    Nucleated RBCs 0 0 /100    Absolute Neutrophil 4.6 1.6 - 8.3 10e9/L    Absolute Lymphocytes 1.0 0.8 - 5.3 10e9/L    Absolute Monocytes 0.5 0.0 - 1.3 10e9/L    Absolute Eosinophils 0.5 0.0 - 0.7 10e9/L    Absolute Basophils 0.1 0.0 - 0.2 10e9/L    Abs Immature Granulocytes 0.0 0 - 0.4 10e9/L    Absolute Nucleated RBC 0.0    Glucose by meter     Status: Abnormal   Result Value Ref Range    Glucose 119 (H) 70 - 99 mg/dL   Glucose by meter     Status: Abnormal   Result Value Ref Range    Glucose 148 (H) 70 - 99 mg/dL   Glucose by meter     Status: Abnormal   Result Value Ref Range    Glucose 136 (H) 70 - 99 mg/dL   Potassium     Status: None   Result Value Ref Range    Potassium 4.2 3.4 - 5.3 mmol/L   Asymptomatic COVID-19 Virus (Coronavirus) by PCR     Status: None    Specimen: Nasopharyngeal   Result Value Ref Range    COVID-19 Virus PCR to [facility] - Source Nasopharyngeal     COVID-19 Virus PCR to [facility] - Result       Test received-See reflex to IDDL test SARS CoV2 (COVID-19) Virus RT-PCR   Glucose by meter     Status: Abnormal   Result Value Ref Range    Glucose 113 (H) 70 - 99 mg/dL   Iron and iron binding capacity     Status: None   Result Value Ref Range    Iron 64 35 - 180 ug/dL    Iron Binding Cap 303 240 - 430 ug/dL    Iron Saturation Index 21 15 - 46 %   Ferritin     Status: None   Result Value Ref Range    Ferritin 109 26 - 388 ng/mL   Folate     Status: None   Result Value Ref Range    Folate 18.7 >5.4 ng/mL   Vitamin B12     Status: Abnormal   Result Value Ref Range    Vitamin B12 1,306 (H) 193 - 986 pg/mL   Glucose by meter     Status: Abnormal   Result Value Ref Range    Glucose 100 (H) 70 - 99 mg/dL   SARS-CoV-2 COVID-19 Virus (Coronavirus) RT-PCR Nasopharyngeal     Status: None    Specimen: Nasopharyngeal   Result Value Ref Range     SARS-CoV-2 Virus Specimen Source Nasopharyngeal     SARS-CoV-2 PCR Result NEGATIVE     SARS-CoV-2 PCR Comment       Testing was performed using the Aptima SARS-CoV-2 Assay on the [employer] Instrument System.   Additional information about this Emergency Use Authorization (EUA) assay can be found via   the Lab Guide.     Glucose by meter     Status: Abnormal   Result Value Ref Range    Glucose 141 (H) 70 - 99 mg/dL   Glucose by meter     Status: Abnormal   Result Value Ref Range    Glucose 127 (H) 70 - 99 mg/dL   Basic metabolic panel     Status: Abnormal   Result Value Ref Range    Sodium 141 133 - 144 mmol/L    Potassium 5.3 3.4 - 5.3 mmol/L    Chloride 116 (H) 94 - 109 mmol/L    Carbon Dioxide 24 20 - 32 mmol/L    Anion Gap 2 (L) 3 - 14 mmol/L    Glucose 106 (H) 70 - 99 mg/dL    Urea Nitrogen 56 (H) 7 - 30 mg/dL    Creatinine 1.99 (H) 0.66 - 1.25 mg/dL    GFR Estimate 30 (L) >60 mL/min/[1.73_m2]    GFR Estimate If Black 35 (L) >60 mL/min/[1.73_m2]    Calcium 8.8 8.5 - 10.1 mg/dL   CBC with platelets     Status: Abnormal   Result Value Ref Range    WBC 6.9 4.0 - 11.0 10e9/L    RBC Count 3.02 (L) 4.4 - 5.9 10e12/L    Hemoglobin 8.6 (L) 13.3 - 17.7 g/dL    Hematocrit 28.4 (L) 40.0 - 53.0 %    MCV 94 78 - 100 fl    MCH 28.5 26.5 - 33.0 pg    MCHC 30.3 (L) 31.5 - 36.5 g/dL    RDW 14.0 10.0 - 15.0 %    Platelet Count 289 150 - 450 10e9/L   Glucose by meter     Status: Abnormal   Result Value Ref Range    Glucose 230 (H) 70 - 99 mg/dL   EKG 12-lead, tracing only     Status: None   Result Value Ref Range    Interpretation ECG Click View Image link to view waveform and result    Social Work IP Consult     Status: None ()    Nirmal Hart MSW     9/25/2020  1:03 PM  HAYLEY consult in for discharge planning.     HAYLEY spoke with RNCC who will follow pt. Pt going home today.    BRAD Montanez, [facility]  Acute Care Float   [facility]         Medications   calcium gluconate in D5W 100 mL  intermittent infusion 2 g (2 g Intravenous Given 9/24/20 1700)   sodium bicarbonate 8.4 % injection 50 mEq (50 mEq Intravenous Given 9/24/20 1723)   dextrose 50 % injection 25 g (25 g Intravenous Given 9/24/20 1732)   insulin regular 1 unit/mL injection 5 Units (5 Units Intravenous Given 9/24/20 1737)   furosemide (LASIX) injection 40 mg (40 mg Intravenous Given 9/24/20 1739)   albuterol (PROVENTIL) neb solution 10 mg (10 mg Nebulization Given 9/24/20 1812)   0.9% sodium chloride BOLUS (0 mLs Intravenous Stopped 9/24/20 2103)        Assessments & Plan (with Medical Decision Making)   85-year-old male with multiple medical problems as described, CKD, presented with incidental hyperkalemia, otherwise asymptomatic.  ECG with peak T waves, only slightly changed compared to his baseline.    Etiology is likely mixed including chronic CKD, orb use, possibly dehydration  He has suffered no recent trauma has no significant RONY today.  He is clinically very well-appearing.    Initiate hyperkalemia cocktail as ordered and will admit to medicine for further management.  Will repeat potassium following all medications    Patient signed out to evening attending.      I have reviewed the nursing notes. I have reviewed the findings, diagnosis, plan and need for follow up with the patient.    Discharge Medication List as of 9/25/2020  2:01 PM          Final diagnoses:   Hyperkalemia       --  Devon Hollis MD  [facility], [place], EMERGENCY DEPARTMENT  9/24/2020     Devon Hollis MD  10/11/20 0123

## 2020-09-24 NOTE — PROCEDURE: MIPS QUALITY
Quality 111:Pneumonia Vaccination Status For Older Adults: Pneumococcal Vaccination Previously Received
Quality 143: Oncology: Medical And Radiation- Pain Intensity Quantified: Pain severity quantified, no pain present
Quality 226: Preventive Care And Screening: Tobacco Use: Screening And Cessation Intervention: Patient screened for tobacco use and is an ex/non-smoker
Quality 110: Preventive Care And Screening: Influenza Immunization: Influenza Immunization previously received during influenza season
Detail Level: Detailed
Quality 431: Preventive Care And Screening: Unhealthy Alcohol Use - Screening: Patient screened for unhealthy alcohol use using a single question and scores less than 2 times per year
Quality 130: Documentation Of Current Medications In The Medical Record: Current Medications Documented

## 2020-09-24 NOTE — PROCEDURE: MOHS SURGERY
Body Location Override (Optional - Billing Will Still Be Based On Selected Body Map Location If Applicable): Right Antihelix

## 2020-09-24 NOTE — TELEPHONE ENCOUNTER
Called patient to review labs.  K critically high   Worsening renal function and also hemoglobin is low.  Patient is asymptomatic. Denies any obvious bleeding.  counseled him to stop losartan and metformin.   Discussed options for further management. Given the critical high K, patient is agreeable to be evaluated at ER. Depending on ER course, would need nephrology consult either as inpatient or outpatient.   He will go to [facility] ER.     Results for SHANT CHAVES (MRN [id number]) as of 9/24/2020 12:58   Ref. Range 9/23/2020 13:23   Sodium Latest Ref Range: 133 - 144 mmol/L 140   Potassium Latest Ref Range: 3.4 - 5.3 mmol/L 6.2 (HH)   Chloride Latest Ref Range: 94 - 109 mmol/L 116 (H)   Carbon Dioxide Latest Ref Range: 20 - 32 mmol/L 16 (L)   Urea Nitrogen Latest Ref Range: 7 - 30 mg/dL 71 (H)   Creatinine Latest Ref Range: 0.66 - 1.25 mg/dL 2.42 (H)   GFR Estimate Latest Ref Range: >60 mL/min/1.73_m2 24 (L)   GFR Estimate If Black Latest Ref Range: >60 mL/min/1.73_m2 27 (L)   Calcium Latest Ref Range: 8.5 - 10.1 mg/dL 8.5   Anion Gap Latest Ref Range: 3 - 14 mmol/L 8   ALT Latest Ref Range: 0 - 70 U/L 14   AST Latest Ref Range: 0 - 45 U/L 13   Hemoglobin A1C Latest Ref Range: 0 - 5.6 % 7.3 (H)   Cholesterol Latest Ref Range: <200 mg/dL 90   HDL Cholesterol Latest Ref Range: >39 mg/dL 45   LDL Cholesterol Calculated Latest Ref Range: <100 mg/dL 34   Non HDL Cholesterol Latest Ref Range: <130 mg/dL 45   Triglycerides Latest Ref Range: <150 mg/dL 56   TSH Latest Ref Range: 0.40 - 4.00 mU/L 5.08 (H)   Vitamin D Deficiency screening Latest Ref Range: 20 - 75 ug/L 22   Glucose Latest Ref Range: 70 - 99 mg/dL 133 (H)   WBC Latest Ref Range: 4.0 - 11.0 10e9/L 5.6   Hemoglobin Latest Ref Range: 13.3 - 17.7 g/dL 8.5 (L)   Hematocrit Latest Ref Range: 40.0 - 53.0 % 26.8 (L)   Platelet Count Latest Ref Range: 150 - 450 10e9/L 301   RBC Count Latest Ref Range: 4.4 - 5.9 10e12/L 2.83 (L)   MCV Latest Ref Range: 78 - 100 fl 95    MCH Latest Ref Range: 26.5 - 33.0 pg 30.0   MCHC Latest Ref Range: 31.5 - 36.5 g/dL 31.7   RDW Latest Ref Range: 10.0 - 15.0 % 14.5   Diff Method Unknown Automated Method   % Neutrophils Latest Units: % 66.0   % Lymphocytes Latest Units: % 15.1   % Monocytes Latest Units: % 9.1   % Eosinophils Latest Units: % 8.9   % Basophils Latest Units: % 0.9   Absolute Neutrophil Latest Ref Range: 1.6 - 8.3 10e9/L 3.7   Absolute Lymphocytes Latest Ref Range: 0.8 - 5.3 10e9/L 0.9   Absolute Monocytes Latest Ref Range: 0.0 - 1.3 10e9/L 0.5   Absolute Eosinophils Latest Ref Range: 0.0 - 0.7 10e9/L 0.5   Absolute Basophils Latest Ref Range: 0.0 - 0.2 10e9/L 0.1

## 2020-09-24 NOTE — PROCEDURE: SUTURE REMOVAL (GLOBAL PERIOD)
Detail Level: Detailed
Add 62494 Cpt? (Important Note: In 2017 The Use Of 20880 Is Being Tracked By Cms To Determine Future Global Period Reimbursement For Global Periods): no

## 2020-09-24 NOTE — ED TRIAGE NOTES
Triage Assessment & Note:    There were no vitals taken for this visit.    Patient presents with: C/o abnormal labs. PT was told his K+ was off. PT reports feeling fine at this time.     Home Treatments/Remedies: None    Febrile / Afebrile? Afebrile     Duration of C/o:      Aris Reyna RN  September 24, 2020

## 2020-09-25 ENCOUNTER — PATIENT OUTREACH (OUTPATIENT)
Dept: CARE COORDINATION | Facility: CLINIC | Age: 85
End: 2020-09-25

## 2020-09-25 VITALS
OXYGEN SATURATION: 94 % | WEIGHT: 172.4 LBS | BODY MASS INDEX: 24.68 KG/M2 | HEIGHT: 70 IN | HEART RATE: 82 BPM | SYSTOLIC BLOOD PRESSURE: 148 MMHG | DIASTOLIC BLOOD PRESSURE: 52 MMHG | RESPIRATION RATE: 18 BRPM | TEMPERATURE: 96.9 F

## 2020-09-25 LAB
ANION GAP SERPL CALCULATED.3IONS-SCNC: 2 MMOL/L (ref 3–14)
BUN SERPL-MCNC: 56 MG/DL (ref 7–30)
CALCIUM SERPL-MCNC: 8.8 MG/DL (ref 8.5–10.1)
CHLORIDE SERPL-SCNC: 116 MMOL/L (ref 94–109)
CO2 SERPL-SCNC: 24 MMOL/L (ref 20–32)
CREAT SERPL-MCNC: 1.99 MG/DL (ref 0.66–1.25)
ERYTHROCYTE [DISTWIDTH] IN BLOOD BY AUTOMATED COUNT: 14 % (ref 10–15)
GFR SERPL CREATININE-BSD FRML MDRD: 30 ML/MIN/{1.73_M2}
GLUCOSE BLDC GLUCOMTR-MCNC: 127 MG/DL (ref 70–99)
GLUCOSE BLDC GLUCOMTR-MCNC: 141 MG/DL (ref 70–99)
GLUCOSE BLDC GLUCOMTR-MCNC: 230 MG/DL (ref 70–99)
GLUCOSE SERPL-MCNC: 106 MG/DL (ref 70–99)
HCT VFR BLD AUTO: 28.4 % (ref 40–53)
HGB BLD-MCNC: 8.6 G/DL (ref 13.3–17.7)
INTERPRETATION ECG - MUSE: NORMAL
LABORATORY COMMENT REPORT: NORMAL
MCH RBC QN AUTO: 28.5 PG (ref 26.5–33)
MCHC RBC AUTO-ENTMCNC: 30.3 G/DL (ref 31.5–36.5)
MCV RBC AUTO: 94 FL (ref 78–100)
PLATELET # BLD AUTO: 289 10E9/L (ref 150–450)
POTASSIUM SERPL-SCNC: 5.3 MMOL/L (ref 3.4–5.3)
RBC # BLD AUTO: 3.02 10E12/L (ref 4.4–5.9)
SARS-COV-2 RNA SPEC QL NAA+PROBE: NEGATIVE
SODIUM SERPL-SCNC: 141 MMOL/L (ref 133–144)
SPECIMEN SOURCE: NORMAL
WBC # BLD AUTO: 6.9 10E9/L (ref 4–11)

## 2020-09-25 PROCEDURE — 99207 ZZC CDG-CODE CATEGORY CHANGED: CPT | Performed by: STUDENT IN AN ORGANIZED HEALTH CARE EDUCATION/TRAINING PROGRAM

## 2020-09-25 PROCEDURE — 99217 ZZC OBSERVATION CARE DISCHARGE: CPT | Performed by: STUDENT IN AN ORGANIZED HEALTH CARE EDUCATION/TRAINING PROGRAM

## 2020-09-25 PROCEDURE — 96374 THER/PROPH/DIAG INJ IV PUSH: CPT

## 2020-09-25 PROCEDURE — 00000146 ZZHCL STATISTIC GLUCOSE BY METER IP

## 2020-09-25 PROCEDURE — 36415 COLL VENOUS BLD VENIPUNCTURE: CPT | Performed by: PHYSICIAN ASSISTANT

## 2020-09-25 PROCEDURE — 25000132 ZZH RX MED GY IP 250 OP 250 PS 637: Mod: GY | Performed by: PHYSICIAN ASSISTANT

## 2020-09-25 PROCEDURE — 85027 COMPLETE CBC AUTOMATED: CPT | Performed by: PHYSICIAN ASSISTANT

## 2020-09-25 PROCEDURE — 25000128 H RX IP 250 OP 636: Performed by: PHYSICIAN ASSISTANT

## 2020-09-25 PROCEDURE — 96376 TX/PRO/DX INJ SAME DRUG ADON: CPT

## 2020-09-25 PROCEDURE — G0378 HOSPITAL OBSERVATION PER HR: HCPCS

## 2020-09-25 PROCEDURE — 80048 BASIC METABOLIC PNL TOTAL CA: CPT | Performed by: PHYSICIAN ASSISTANT

## 2020-09-25 PROCEDURE — 25000132 ZZH RX MED GY IP 250 OP 250 PS 637: Mod: GY | Performed by: STUDENT IN AN ORGANIZED HEALTH CARE EDUCATION/TRAINING PROGRAM

## 2020-09-25 RX ORDER — HYDRALAZINE HYDROCHLORIDE 25 MG/1
25 TABLET, FILM COATED ORAL 3 TIMES DAILY
Status: DISCONTINUED | OUTPATIENT
Start: 2020-09-25 | End: 2020-09-25 | Stop reason: HOSPADM

## 2020-09-25 RX ORDER — HYDRALAZINE HYDROCHLORIDE 25 MG/1
50 TABLET, FILM COATED ORAL 3 TIMES DAILY
Status: DISCONTINUED | OUTPATIENT
Start: 2020-09-25 | End: 2020-09-25

## 2020-09-25 RX ORDER — HYDROCHLOROTHIAZIDE 25 MG/1
25 TABLET ORAL DAILY
Status: DISCONTINUED | OUTPATIENT
Start: 2020-09-26 | End: 2020-09-25 | Stop reason: HOSPADM

## 2020-09-25 RX ORDER — HYDROCHLOROTHIAZIDE 12.5 MG/1
25 CAPSULE ORAL DAILY
Qty: 60 CAPSULE | Refills: 0 | Status: SHIPPED | OUTPATIENT
Start: 2020-09-26 | End: 2021-01-28 | Stop reason: DRUGHIGH

## 2020-09-25 RX ADMIN — HYDROCHLOROTHIAZIDE 12.5 MG: 12.5 CAPSULE, GELATIN COATED ORAL at 08:30

## 2020-09-25 RX ADMIN — FINASTERIDE 5 MG: 5 TABLET, FILM COATED ORAL at 08:30

## 2020-09-25 RX ADMIN — MULTIPLE VITAMINS W/ MINERALS TAB 1 TABLET: TAB at 08:30

## 2020-09-25 RX ADMIN — HYDRALAZINE HYDROCHLORIDE 25 MG: 25 TABLET, FILM COATED ORAL at 13:42

## 2020-09-25 RX ADMIN — TAMSULOSIN HYDROCHLORIDE 0.4 MG: 0.4 CAPSULE ORAL at 08:30

## 2020-09-25 RX ADMIN — HYDRALAZINE HYDROCHLORIDE 25 MG: 25 TABLET, FILM COATED ORAL at 08:30

## 2020-09-25 RX ADMIN — HYDRALAZINE HYDROCHLORIDE 10 MG: 20 INJECTION, SOLUTION INTRAMUSCULAR; INTRAVENOUS at 08:29

## 2020-09-25 RX ADMIN — CARVEDILOL 6.25 MG: 6.25 TABLET, FILM COATED ORAL at 08:30

## 2020-09-25 NOTE — ED NOTES
[facility], [place]   ED Nurse to Floor Handoff     Fazrad East is a 85 year old male who speaks English and lives with others,  in a home  They arrived in the ED by car from emergency room    ED Chief Complaint: Abnormal Labs    ED Dx;   Final diagnoses:   Hyperkalemia         Needed?: No    Allergies:   Allergies   Allergen Reactions     Sulfa Drugs      Unknown reaction. Occurred during childhood. Has not taken Sulfa medications since allergic response.    .  Past Medical Hx:   Past Medical History:   Diagnosis Date     Atrial flutter (H)      Choroidal nevus of left eye      CKD (chronic kidney disease) stage 3, GFR 30-59 ml/min (H)      Diabetes mellitus (H)      Diabetic peripheral neuropathy (H)      Hypertension      Microalbuminuria      Overweight(278.02)      Rectal-type adenocarcinoma (H)      Retinopathies, diabetic      Vitreous detachment of both eyes       Baseline Mental status: WDL  Current Mental Status changes: at basesline    Infection present or suspected this encounter: no  Sepsis suspected: No  Isolation type: No active isolations     Activity level - Baseline/Home:  Stand with Assist  Activity Level - Current:   Stand with Assist    Bariatric equipment needed?: No    In the ED these meds were given:   Medications   glucose gel 15-30 g (has no administration in time range)     Or   dextrose 50 % injection 25-50 mL (has no administration in time range)     Or   glucagon injection 1 mg (has no administration in time range)   hydrALAZINE (APRESOLINE) injection 10 mg (10 mg Intravenous Given 9/24/20 2028)   calcium gluconate in D5W 100 mL intermittent infusion 2 g (2 g Intravenous Given 9/24/20 1700)   sodium bicarbonate 8.4 % injection 50 mEq (50 mEq Intravenous Given 9/24/20 1723)   dextrose 50 % injection 25 g (25 g Intravenous Given 9/24/20 1732)   insulin regular 1 unit/mL injection 5 Units (5 Units Intravenous Given 9/24/20 1737)   furosemide  (LASIX) injection 40 mg (40 mg Intravenous Given 9/24/20 4059)   albuterol (PROVENTIL) neb solution 10 mg (10 mg Nebulization Given 9/24/20 1812)   0.9% sodium chloride BOLUS (0 mLs Intravenous Stopped 9/24/20 2103)       Drips running?  No    Home pump  No    Current LDAs  Peripheral IV 01/24/14 Left Hand (Active)   Number of days: 2435       Peripheral IV 09/24/20 Right (Active)   Site Assessment WDL 09/24/20 1412   Line Status Saline locked;Checked every 1-2 hour 09/24/20 1412   Number of days: 0       ETT (adult) 8  (Active)   Number of days:        ETT (adult) (Active)   Number of days:        Wound 01/31/14 Left;Posterior Thigh Abrasion(s) (Active)   Number of days: 2428       Rash 02/08/14 0900 Left ankle (Active)   Number of days: 2420       Incision/Surgical Site 01/24/14 Left;Lateral;Midline Hip (Active)   Number of days: 2435       Labs results:   Labs Ordered and Resulted from Time of ED Arrival Up to the Time of Departure from the ED   COMPREHENSIVE METABOLIC PANEL - Abnormal; Notable for the following components:       Result Value    Potassium 6.0 (*)     Chloride 116 (*)     Glucose 105 (*)     Urea Nitrogen 64 (*)     Creatinine 2.23 (*)     GFR Estimate 26 (*)     GFR Estimate If Black 30 (*)     Albumin 3.3 (*)     All other components within normal limits   CBC WITH PLATELETS DIFFERENTIAL - Abnormal; Notable for the following components:    RBC Count 3.28 (*)     Hemoglobin 9.5 (*)     Hematocrit 31.3 (*)     MCHC 30.4 (*)     All other components within normal limits   GLUCOSE BY METER - Abnormal; Notable for the following components:    Glucose 119 (*)     All other components within normal limits   GLUCOSE BY METER - Abnormal; Notable for the following components:    Glucose 148 (*)     All other components within normal limits   GLUCOSE BY METER - Abnormal; Notable for the following components:    Glucose 136 (*)     All other components within normal limits   GLUCOSE BY METER - Abnormal;  "Notable for the following components:    Glucose 113 (*)     All other components within normal limits   POTASSIUM   COVID-19 VIRUS (CORONAVIRUS) BY PCR   IRON AND IRON BINDING CAPACITY   FERRITIN   FOLATE   VITAMIN B12   CARDIAC CONTINUOUS MONITORING   ASSESS FOR HYPOGLYCEMIA SYMPTOMS   NOTIFY PHYSICIAN   PATIENT CARE ORDER       Imaging Studies: No results found for this or any previous visit (from the past 24 hour(s)).    Recent vital signs:   BP (!) 189/74   Pulse 67   Temp 97.6  F (36.4  C) (Oral)   Resp 17   Ht 1.778 m (5' 10\")   Wt 79.4 kg (175 lb)   SpO2 98%   BMI 25.11 kg/m      Guillermina Coma Scale Score: 15 (09/24/20 1914)       Cardiac Rhythm: Normal sinus  Pt needs tele? Yes  Skin/wound Issues: Derm procedure to the right ear    Code Status: To be discussed with MD    Pain control: pt had none    Nausea control: pt had none    Abnormal labs/tests/findings requiring intervention: High K    Family present during ED course? Yes   Family Comments/Social Situation comments: Daughter    Tasks needing completion: None    Kusum Baker RN    [phone] [facility]      "

## 2020-09-25 NOTE — SUMMARY OF CARE
Admitted/transferred from: ED at 2320 9/24/2020  2 RN skin assessment completed by Mariana Thacker RN and Gin Hargrove RN.  Skin assessment finding: mole on bottom; L calf edema and pink; R PIV.  Interventions/actions: none at this time    Will continue to monitor.     Arrived with walker from home, clothing, watch, and hearing aidsx2.  Nothing sent to security.

## 2020-09-25 NOTE — DISCHARGE SUMMARY
[facility], [place]  Hospitalist Discharge Summary      Date of Admission:  9/24/2020  Date of Discharge:  9/25/2020  3:05 PM  Discharging Provider: Melina Carrington MD  Discharge Team: Hospitalist Service Gold 8    Discharge Diagnoses    - Hyperkalemia - resolved   - HTN   - CKD stage 3-4   - DM II   - Normocytic anemia    Follow-ups Needed After Discharge   Follow-up Appointments     Adult [facility] Follow-up and recommended labs and tests      Follow up with Ms. Grzegorz NP , as scheduled on 9/29 for establishment of   primary care. The following labs/tests are recommended: BMP, blood   pressure check.    Appointments on [place] and/or [facility] (with [facility] or [facility]   provider or service). Call [phone] if you haven't heard regarding   these appointments within 7 days of discharge.           Unresulted Labs Ordered in the Past 30 Days of this Admission     No orders found for last 31 day(s).      These results will be followed up by N/A    Discharge Disposition   Discharged to home  Condition at discharge: Stable    Hospital Course     CKD 3-4  Hyperkalemia  6.3 on admission. Multifactorial etiology- CKD, losartan. No EKG changes, bicarb normal. Given fluids, lasix, albuterol, calcium, insulin and D50 in the ED. Quickly resolved by time he got to the floor. Held losartan and stopped on discharge. K on discharge was 5.3. GFR <30. Does have 2+ edema in left leg that has been present for ~8 months, no edema in right leg. Held metformin on discharge   - Renal referral on discharge   - PCP establishment of care appt on 9/29 with BMP check    HTN  BP in 180s/70s on admission, 150/80 on discharge without morning losartan. Discharged on PTA coreg and hydralazine, increased hydrochlorothiazide to 50mg daily.    - BP check with PCP on 9/29, consider amlodipine    DM II  Has been utilizing endocrinology clinic as PCP. Discussed patients status with endocrinologist Dr. Amin. He  recommended stopping metformin due to worsening renal function. Held on discharge. Dr. Amin will set up virtual visit with Alessio in the next 1-2 weeks to discuss further blood sugar needs.    Consultations This Hospital Stay   PHARMACY IP CONSULT  SOCIAL WORK IP CONSULT    Code Status   Full Code    Time Spent on this Encounter   I, Melina Carrington MD, personally saw the patient today and spent greater than 30 minutes discharging this patient.       Melina Carrington MD  [facility], [place]  ______________________________________________________________________    Physical Exam   Vital Signs: Temp: 96.9  F (36.1  C) Temp src: Oral BP: (!) 148/52 Pulse: 82   Resp: 18 SpO2: 94 % O2 Device: None (Room air)    Weight: 172 lbs 6.4 oz    Gen: NAD, alert, pleasant, cooperative, non-toxic  HEENT: EOMI, no scleral icterus, tracking appropriately, right ear with clean/dry bandage present  Resp: CTAB, no crackles or wheezes, no increased WOB  Cardiac: RRR, no S3/S4, no M/R/G appreciated  GI: soft, non-tender, non-distended  Ext: WWP, left leg with 2+ non pitting edema, right leg without edema, spontaneous movement in all 4  Neuro: AOx3, CN 2-12 grossly intact, appropriate mentation    Primary Care Physician   Renu Lantigua    Discharge Orders      Basic metabolic panel     Nephrology Adult Referral      Reason for your hospital stay    Dear Farzad East    Your were hospitalized at [facility] with high potassium and high blood pressure and treated with changes to your medications.  Over your hospitalization your potassium levels and blood pressure improved and today you are ready to be discharged home.  If you develop fever, shortness of breath, light headedness, chest pain please seek medical attention.    We are suggesting the following medication changes:   - STOP metformin   - STOP Losartan   - INCREASE your hydrochlorothiazide to 25 mg daily    Please get  the following tests done:   - Blood pressure check   - Electrolyte levels (BMP) on Tuesday    Please set up an appointment with:   - Endocrinology to discuss stopping your metformin   - Nephrology (kidney team) to discuss your worsening kidney function   - KEEP appointment with Ms. Lantigua, nurse practitioner, on 9/29 as set up to establish primary care - this will help you coordinate all of your specialist and hopefully prevent future inpatient hospitalizations.     It was a pleasure meeting with you today. Thank you for allowing me and my team the privilege of caring for you today. You are the reason we are here, and I truly hope we provided you with the excellent service you deserve. Please let us know if there is anything else we can do for you so that we can be sure you are leaving completely satisfied with your care experience.    Your hospital unit at the time of discharge is 5B so if you have any questions please call the hospital at [phone] and ask to talk to a nurse on 5B.    Take care!    Melina Carrington MD  Internal Medicine Hospitalist  [facility]     Adult [facility] Follow-up and recommended labs and tests    Follow up with Ms. Lantigua, DOUG , as scheduled on 9/29 for establishment of primary care. The following labs/tests are recommended: BMP, blood pressure check.    Appointments on [place] and/or [facility] (with [facility] or [facility] provider or service). Call [phone] if you haven't heard regarding these appointments within 7 days of discharge.     Activity    Your activity upon discharge: activity as tolerated     Full Code     Diet    Follow this diet upon discharge: Orders Placed This Encounter      Regular Diet Adult       Significant Results and Procedures   None    Discharge Medications   Discharge Medication List as of 9/25/2020  2:01 PM      CONTINUE these medications which have CHANGED    Details   hydrochlorothiazide (MICROZIDE) 12.5 MG capsule Take 2 capsules (25 mg) by  mouth daily, Disp-60 capsule,R-0, E-Prescribe         CONTINUE these medications which have NOT CHANGED    Details   aspirin 81 MG tablet Take 1 tablet by mouth daily., Historical      !! blood glucose (ONETOUCH VERIO IQ) test strip Use to test blood sugar 1 time daily or as directed., Disp-100 each,R-3, E-Prescribe      blood glucose monitoring (ONETOUCH VERIO IQ SYSTEM) meter device kit Use to test blood sugar dailyDisp-1 kit, [id number]      carvedilol (COREG) 6.25 MG tablet TAKE 1 TABLET BY MOUTH TWICE A DAY WITH MEALS, Disp-180 tablet,R-0, E-Prescribe      Cholecalciferol (VITAMIN D-3 PO) Historical      !! COMPRESSION STOCKINGS 1 each daily, Disp-1 each, R-3, Local PrintLower Extremity: Knee High;  bilateral;  20-30 mm Hg  Please size and fit, color per patient preference.  Patient needs doff and jean-pierre.      !! COMPRESSION STOCKINGS 1 each daily, Disp-2 each, R-11, Local Nbyrw33-94 mmHg, Knee high, on in the morning and off at night.      finasteride (PROSCAR) 5 MG tablet Take 1 tablet by mouth daily, R-3, Historical      glipiZIDE (GLUCOTROL XL) 5 MG 24 hr tablet Take 1 tablet (5 mg) by mouth daily, Disp-90 tablet,R-1, E-Prescribe      hydrALAZINE (APRESOLINE) 25 MG tablet Take 1 tablet (25 mg) by mouth 3 times daily, Disp-270 tablet, R-3, E-Prescribe      insulin pen needle (31G X 5 MM) 31G X 5 MM miscellaneous Use 1 pen needle daily or as directedDisp-100 each, R-1, DAWE-PrescribeOkay to dispense BD Ultra Fine PEN Needle 5 MM x 31 G per fax request.      multivitamin w/minerals (MULTI-VITAMIN) tablet Take 1 tablet by mouth daily, Historical      !! ONETOUCH ULTRA test strip Use to test blood sugar 3 times daily or as directed.Disp-270 each, R-3, DAWE-Prescribe      sildenafil (VIAGRA) 100 MG tablet Take 1 tablet (100 mg) by mouth daily as needed for erectile dysfunction, Disp-6 tablet, R-11, Local Print      sitagliptin (JANUVIA) 50 MG tablet Take 1 tablet (50 mg) by mouth daily, Disp-90 tablet,R-0,  E-Prescribe      tamsulosin (FLOMAX) 0.4 MG capsule Take 1 capsule (0.4 mg) by mouth daily, Disp-90 capsule,R-1, E-Prescribe       !! - Potential duplicate medications found. Please discuss with provider.      STOP taking these medications       losartan (COZAAR) 50 MG tablet Comments:   Reason for Stopping:         metFORMIN (GLUCOPHAGE-XR) 500 MG 24 hr tablet Comments:   Reason for Stopping:             Allergies   Allergies   Allergen Reactions     Sulfa Drugs      Unknown reaction. Occurred during childhood. Has not taken Sulfa medications since allergic response.

## 2020-09-25 NOTE — PLAN OF CARE
"BP (!) 159/51   Pulse 74   Temp 96.9  F (36.1  C) (Oral)   Resp 18   Ht 1.778 m (5' 10\")   Wt 78.2 kg (172 lb 6.4 oz)   SpO2 94%   BMI 24.74 kg/m      Patient status deemed adequate for discharge by primary team. Discharge details:     Discharge Instructions: Reviewed instructions and AVS reviewed with patient and family/caregivers. No further questions at this time    Patient Belongings: Patient acknowledges receipt of all personal belongings at time of discharge.     Prescriptions: To be picked up at discharge pharmacy on the way out    Disposition: Pt will return to prior living situation     Transportation: Family to provide ride     Patient left 5B at 15:00     "

## 2020-09-25 NOTE — PROVIDER NOTIFICATION
AM /65.  Hydralazine scheduled for 8AM. Any orders or interventions for now?    PRN IV push ordered up from pharmacy; passed on to oncoming shift.

## 2020-09-25 NOTE — PROGRESS NOTES
Care Coordinator Progress Note    Admission Date/Time:  9/24/2020  Attending MD:  No att. providers found    Data  Chart reviewed, discussed with interdisciplinary team.   Patient was admitted for: Hyperkalemia.    Concerns with insurance coverage for discharge needs: None.  Current Living Situation: Patient lives w/spouse in [facility].  Support System: Supportive and Involved  Services Involved: None prior to admission.  Transportation at Discharge: Family or friend will provide  Transportation to Medical Appointments:   - NA, patient drives self.  Barriers to Discharge: None noted.    Coordination of Care and Referrals: Provided patient/family with options for establishing care with a primary care provider.      Assessment  Patient is an 85yr old male with a medical history of w/normocytic anemia, CKD stage 3, HTN, DM2, and venous insufficiency who was admitted w/hyperkalemia. Writer introduced self and role of RNCC. Patient confirms he lives independently w/his spouse at the [facility] in [place]. Writer contacted the [facility] ([phone]) at this time to confirm that there are no constraints regarding discharge planning, voicemail left at this time requesting call back. Patient confirms that he does not have a primary care provider, writer assisted with establishing care with a PCP at the [facility], appointment scheduled for Tuesday 9/29 at 12:30pm w/ Renu Lantigua NP., AVS updated. No additional needs noted at this time.      Plan  Anticipated Discharge Date:  9/25/2020  Anticipated Discharge Plan:  Home w/OP follow up    Ofelia Martines, MICKIE, BSN    [facility]    Medicine Group  [address] [place] [id number]    [email or web address]  Critical access [email or web address]    Office: [phone] Pager: [phone]  To contact the weekend RNCC, page [phone].

## 2020-09-25 NOTE — PLAN OF CARE
Observation goals for 0000 9/25/2020:    -diagnostic tests and consults completed and resulted: MET   Latest ordered labs have resulted within normal ranges    -K normalized: MET    Latest check was 4.2    -vital signs normal or at patient baseline: NOT MET   BP elevated from pt baseline.  States usually 130s/60-70s    -tolerating oral intake to maintain hydration: MET   Encouraged and given fluids; tolerating well     -adequate pain control on oral analgesics: MET   Denies pain     -safe disposition plan has been identified: NOT MET   No new plans made; SBA with walker for safety; no cognition issues; normal gait.

## 2020-09-25 NOTE — PLAN OF CARE
Observation goals for 0400 9/25/2020:     -diagnostic tests and consults completed and resulted: MET              Latest ordered labs have resulted within normal ranges    -K normalized: MET               Latest check was 4.2    -vital signs normal or at patient baseline: NOT MET              BP elevated from pt baseline.  States usually 130s/60-70s    -tolerating oral intake to maintain hydration: MET              Encouraged and given fluids; tolerating well     -adequate pain control on oral analgesics: MET              Denies pain     -safe disposition plan has been identified: NOT MET              No new plans made; SBA with walker for safety; no cognition issues; normal gait.

## 2020-09-25 NOTE — PLAN OF CARE
Neuro: A&Ox4; WDL; hard of hearing but utilizes bilateral hearing aids  GI: bowel sounds present x4; no BM this shift; denies nausea; WDL  : voids spontaneously in bedside urinal  Resp: denies SOB or dyspnea on exertion; VSS on RA  Mobility: SBA with personal walker; calls appropriately  Cardiac: HTN noted  Skin: R ear primapore CDI from biopsy before admission.  Lines/Drains: R PIV saline locked and patent  Pain: denies  Behavior: calm; cooperative  VS: HTN this AM; all other VSS; AM labs within normal range.  K+ slightly elevated.    Plan of Care: Continue to monitor VS, K+, and lab results. Observation status; recheck goals q4hrs.

## 2020-09-25 NOTE — CONSULTS
SW consult in for discharge planning.     HAYLEY spoke with RNCC who will follow pt. Pt going home today.    BRAD Montanez, LGSW  Acute Care Float   [facility]

## 2020-09-25 NOTE — H&P
[facility], [place]    History and Physical - Hospitalist Service, Gold Evening       Date of Admission:  9/24/2020    Assessment & Plan   Farzad East is a 85 year old male with a past medical history of normocytic anemia, CKD stage 3, HTN, DM2, venous insufficiency admitted on 9/24/2020 for hyperkalemia.     #Hyperkalemia: K of 6.2 in clinic yesterday, 6 on presentation to ED. Creat of 2.23, BUN of 64, bicarb normal. EKG w/o peaked T waves. PTA maintained on ARB. Given 1 L of IVF, lasix, calcium gluconate, albuterol, insulin and d50, sodium bicarb in ED. Repeat K 4.2.   -Repeat in AM  -Hold losartan  -Pharmacy consult to [facility] for other medication causes  -Consider nephrology consult pending trends  -Needs PCP referral on discharge    #CKD stage 3: BL creat ~2 w/ GFR of ~30. On admission, creat 2.23 w/ GFR of 26, BUN of 64.   -Repeat in AM, will need OP nephrology management    #HTN: PTA maintained on coreg 6.25 mg BID, hydralazine 25 mg TID, hydrochlorothiazide 25 mg qday, lostartan 50 mg qday. BP elevated on admission.   -Hold ARB  -Continue BB, hydralazine and hydrochlorothiazide  -PRN hydralazine  -Please consider reaching out to Dr. Casiano team tomorrow to assist w/ OP BP meds (saw in clinic yesterday)    #Normocytic anemia: Hgb of 9.5 w/ MCV of 95. Denies s/s of blood loss. Likely due to renal disease. Also considering iron/b12/folate deficiency.   -Iron studies, b12 and folate  -Repeat in AM    #DM2: PTA maintained on Januvia 50 mg, Metformin 1000 mg, and Glipizide 5 mg. Evaluated by Endo yesterday w/ recs to stop metformin. A1C 7.3 09/23/2020.   -Hold oral meds  -sliding scale insulin for now  -Please reach out to Dr. Amin tomorrow to discuss diabetes regimen on discharge (saw in clinic yesterday)    #Melanoma s/p excision: On right ear.   -Will assist with dressing change tomorrow       Diet: Regular diet  DVT Prophylaxis: Pneumatic Compression Devices and Ambulate  "every shift  High Catheter: not present  Code Status: FULL         Disposition Plan   Expected discharge: Tomorrow vs Saturday, recommended to prior living arrangement once stable K and blood pressure.  Entered: Thelma Brown PA-C 09/24/2020, 7:44 PM     The patient's care was discussed with the Attending Physician, Dr. Rodriguez, Bedside Nurse and Patient.    Thelma Brown PA-C  [facility], [place]  Pager: [phone]  Please see sticky note for cross cover information  ______________________________________________________________________    Chief Complaint   \"I feel fine\"    History is obtained from the patient    History of Present Illness   Farzad East is a 85 year old male with a past medical history of normocytic anemia, CKD stage 3, HTN, DM2, venous insufficiency admitted on 9/24/2020 for hyperkalemia.      The patient notes he feels fine. He was getting a melanoma removed today, and was called by his endocrinologist to come in to the hospital. He was told to stop his ARB and metformin. He notes he doesn't have chest pain, dyspnea, abdominal pain/nausea/vomiting. He has chronic LE edema. No new issues.     Lives in a new apt, moved in last week at the [facility] [employer]. Wife is needing more help. Has 2-3 drinks/week. No tobacco. No illicit drugs.     Review of Systems    The 10 point Review of Systems is negative other than noted in the HPI or here.     Past Medical History    I have reviewed this patient's medical history and updated it with pertinent information if needed.   Past Medical History:   Diagnosis Date     Atrial flutter (H)      Choroidal nevus of left eye      CKD (chronic kidney disease) stage 3, GFR 30-59 ml/min (H)      Diabetes mellitus (H)      Diabetic peripheral neuropathy (H)      Hypertension      Microalbuminuria      Overweight(278.02)      Rectal-type adenocarcinoma (H)      Retinopathies, diabetic      Vitreous detachment of both " eyes        Past Surgical History   I have reviewed this patient's surgical history and updated it with pertinent information if needed.  Past Surgical History:   Procedure Laterality Date     CATARACT IOL, RT/LT Bilateral      left hip replacement       OPEN REDUCTION INTERNAL FIXATION FEMUR PROXIMAL  2014    Procedure: OPEN REDUCTION INTERNAL FIXATION FEMUR PROXIMAL;  Periprosthetic Fracture, ORIF Left Femur;  Surgeon: Andi Garcia MD;  Location: [facility] OR       Social History   I have reviewed this patient's social history and updated it with pertinent information if needed.  Social History     Tobacco Use     Smoking status: Never Smoker     Smokeless tobacco: Never Used   Substance Use Topics     Alcohol use: Yes     Alcohol/week: 2.5 - 3.3 standard drinks     Types: 3 - 4 Standard drinks or equivalent per week     Comment: 3 martinis per week     Drug use: No       Family History   I have reviewed this patient's family history and updated it with pertinent information if needed.  Family History   Problem Relation Age of Onset     Diabetes Father      Circulatory Father         PAD     Macular Degeneration Father      Arthritis Mother      Amblyopia No family hx of      Retinal detachment No family hx of      Glaucoma No family hx of        Prior to Admission Medications   Prior to Admission Medications   Prescriptions Last Dose Informant Patient Reported? Taking?   COMPRESSION STOCKINGS   No No   Si each daily   COMPRESSION STOCKINGS   No No   Si each daily   Cholecalciferol (VITAMIN D-3 PO)   Yes No   ONETOUCH ULTRA test strip   No No   Sig: Use to test blood sugar 3 times daily or as directed.   aspirin 81 MG tablet   Yes No   Sig: Take 1 tablet by mouth daily.   blood glucose (ONETOUCH VERIO IQ) test strip   No No   Sig: Use to test blood sugar 1 time daily or as directed.   blood glucose monitoring (ONETOUCH VERIO IQ SYSTEM) meter device kit   No No   Sig: Use to test blood sugar daily    carvedilol (COREG) 6.25 MG tablet   No No   Sig: TAKE 1 TABLET BY MOUTH TWICE A DAY WITH MEALS   finasteride (PROSCAR) 5 MG tablet   Yes No   Sig: Take 1 tablet by mouth daily   glipiZIDE (GLUCOTROL XL) 5 MG 24 hr tablet   No No   Sig: Take 1 tablet (5 mg) by mouth daily   hydrALAZINE (APRESOLINE) 25 MG tablet   No No   Sig: Take 1 tablet (25 mg) by mouth 3 times daily   hydrochlorothiazide (MICROZIDE) 12.5 MG capsule   No No   Sig: Take 1 capsule (12.5 mg) by mouth daily Use while combination (HyZaar) is unavailable.   insulin pen needle (31G X 5 MM) 31G X 5 MM miscellaneous   No No   Sig: Use 1 pen needle daily or as directed   losartan (COZAAR) 50 MG tablet   No No   Sig: Take 1 tablet (50 mg) by mouth daily Use while Combination (HyZaar) is unavailable.   metFORMIN (GLUCOPHAGE-XR) 500 MG 24 hr tablet   No No   Sig: Take 2 tablets (1,000 mg) by mouth daily with food   multivitamin w/minerals (MULTI-VITAMIN) tablet   Yes No   Sig: Take 1 tablet by mouth daily   sildenafil (VIAGRA) 100 MG tablet   No No   Sig: Take 1 tablet (100 mg) by mouth daily as needed for erectile dysfunction   sitagliptin (JANUVIA) 50 MG tablet   No No   Sig: Take 1 tablet (50 mg) by mouth daily   tamsulosin (FLOMAX) 0.4 MG capsule   No No   Sig: Take 1 capsule (0.4 mg) by mouth daily      Facility-Administered Medications: None     Allergies   Allergies   Allergen Reactions     Sulfa Drugs      Unknown reaction. Occurred during childhood. Has not taken Sulfa medications since allergic response.        Physical Exam   Vital Signs: Temp: 97.6  F (36.4  C) Temp src: Oral BP: (!) 162/112 Pulse: 77   Resp: 15 SpO2: 98 % O2 Device: None (Room air)    Weight: 175 lbs 0 oz  GENERAL: Alert and oriented x 3. Lying comfortably in bed.   HEENT: Anicteric sclera. Mucous membranes moist and without lesions.   CV: RRR. S1, S2. No murmurs appreciated.   RESPIRATORY: Effort normal on RA. Lungs CTAB with no wheezing, rales, rhonchi.   GI: Abdomen soft and  non distended, bowel sounds present. No tenderness, rebound, guarding.   MUSCULOSKELETAL: No joint swelling or tenderness. Moves all extremities.   NEUROLOGICAL: No focal deficits.   EXTREMITIES: 2+ bilateral LE peripheral edema. Intact bilateral pedal pulses.   SKIN: No jaundice. No rashes.       Data   Data reviewed today: I reviewed all medications, new labs and imaging results over the last 24 hours. I personally reviewed the EKG tracing showing no peaked t waves.    Reviewed CMP, CBC, glucose

## 2020-09-25 NOTE — PHARMACY-CONSULT NOTE
Patient is being treated for hyperkalemia. A pharmacy consult was initiated to review the patient's medication list for possible causes of hyperkalemia.  The following medications for this patient may cause or exacerbate hyperkalemia: losartan (>4% incidence reported), carvedilol (1-3% incidence reported)     Continue current medication regimen. Losartan is the most likely culprit (has not been reordered this admission).      Nathaniel Salinas, StantonD, BCPS

## 2020-09-28 NOTE — PROGRESS NOTES
[facility]: Post-Discharge Note  SITUATION                                                      Admission:    Admission Date: 09/24/20   Reason for Admission: Hyperkalemia - resolved  Discharge:   Discharge Date: 09/25/20  Discharge Diagnosis: Hyperkalemia - resolved    BACKGROUND                                                      6.3 on admission. Multifactorial etiology- CKD, losartan. No EKG changes, bicarb normal. Given fluids, lasix, albuterol, calcium, insulin and D50 in the ED. Quickly resolved by time he got to the floor. Held losartan and stopped on discharge. K on discharge was 5.3. GFR <30. Does have 2+ edema in left leg that has been present for ~8 months, no edema in right leg. Held metformin on discharge   - Renal referral on discharge   - PCP establishment of care appt on 9/29 with BMP check    ASSESSMENT      Discharge Assessment  Patient reports symptoms are: Improved  Does the patient have all of their medications?: Yes  Does patient know what their new medications are for?: Yes  Does patient have a follow-up appointment scheduled?: Yes  Does patient have any other questions or concerns?: No    Post-op  Did the patient have surgery or a procedure: No  Fever: No  Chills: No  Eating & Drinking: eating and drinking without complaints/concerns  PO Intake: regular diet  Bowel Function: normal  Urinary Status: voiding without complaint/concerns        PLAN                                                      Outpatient Plan:  Follow-up Appointments     Adult [facility] Follow-up and recommended labs and tests      Follow up with Ms. Grzegorz NP , as scheduled on 9/29 for establishment of   primary care. The following labs/tests are recommended: BMP, blood   pressure check.     Appointments on [place] and/or [facility] (with [facility] or [facility]   provider or service). Call [phone] if you haven't heard regarding   these appointments within 7 days of discharge.            Future  Appointments   Date Time Provider Department Center   9/29/2020 12:30 PM Renu Lantigua, [facility]           Estefania Escalona, CMA                 none

## 2020-09-29 ENCOUNTER — OFFICE VISIT (OUTPATIENT)
Dept: FAMILY MEDICINE | Facility: CLINIC | Age: 85
End: 2020-09-29
Payer: MEDICARE

## 2020-09-29 VITALS
DIASTOLIC BLOOD PRESSURE: 84 MMHG | BODY MASS INDEX: 24.68 KG/M2 | WEIGHT: 172 LBS | SYSTOLIC BLOOD PRESSURE: 186 MMHG | OXYGEN SATURATION: 97 % | TEMPERATURE: 98.1 F | HEART RATE: 72 BPM

## 2020-09-29 DIAGNOSIS — Z12.11 SCREENING FOR COLON CANCER: ICD-10-CM

## 2020-09-29 DIAGNOSIS — E11.9 TYPE 2 DIABETES MELLITUS TREATED WITHOUT INSULIN (H): ICD-10-CM

## 2020-09-29 DIAGNOSIS — I10 ESSENTIAL HYPERTENSION: ICD-10-CM

## 2020-09-29 DIAGNOSIS — R05.9 COUGH: Primary | ICD-10-CM

## 2020-09-29 DIAGNOSIS — E87.5 HYPERKALEMIA: ICD-10-CM

## 2020-09-29 LAB
ANION GAP SERPL CALCULATED.3IONS-SCNC: 8 MMOL/L (ref 3–14)
BUN SERPL-MCNC: 61 MG/DL (ref 7–30)
CALCIUM SERPL-MCNC: 8.9 MG/DL (ref 8.5–10.1)
CHLORIDE SERPL-SCNC: 109 MMOL/L (ref 94–109)
CO2 SERPL-SCNC: 22 MMOL/L (ref 20–32)
CREAT SERPL-MCNC: 2.29 MG/DL (ref 0.66–1.25)
ERYTHROCYTE [DISTWIDTH] IN BLOOD BY AUTOMATED COUNT: 13.9 % (ref 10–15)
GFR SERPL CREATININE-BSD FRML MDRD: 25 ML/MIN/{1.73_M2}
GLUCOSE SERPL-MCNC: 125 MG/DL (ref 70–99)
HCT VFR BLD AUTO: 31 % (ref 40–53)
HGB BLD-MCNC: 9.4 G/DL (ref 13.3–17.7)
MCH RBC QN AUTO: 29.2 PG (ref 26.5–33)
MCHC RBC AUTO-ENTMCNC: 30.3 G/DL (ref 31.5–36.5)
MCV RBC AUTO: 96 FL (ref 78–100)
PLATELET # BLD AUTO: 326 10E9/L (ref 150–450)
POTASSIUM SERPL-SCNC: 5.3 MMOL/L (ref 3.4–5.3)
RBC # BLD AUTO: 3.22 10E12/L (ref 4.4–5.9)
SODIUM SERPL-SCNC: 139 MMOL/L (ref 133–144)
WBC # BLD AUTO: 8.9 10E9/L (ref 4–11)

## 2020-09-29 ASSESSMENT — PAIN SCALES - GENERAL: PAINLEVEL: NO PAIN (0)

## 2020-09-29 NOTE — PATIENT INSTRUCTIONS
First, have your labs completed today. I will call with results. Next, GI will call you to schedule your colonoscopy. Finally, please call with any questions or concerns any time. Thank you.   [facility] Medication Refill Request Information:  * Please contact your pharmacy regarding ANY request for medication refills.  ** NP Clinic Prescription Fax = [phone]  * Please allow 3 business days for routine medication refills.  * Please allow 5 business days for controlled substance medication refills.     [facility] Test Result notification information:  *You will be notified with in 7-10 days of your appointment day regarding the results of your test.  If you are on MyChart you will be notified as soon as the provider has reviewed the results and signed off on them.    [facility]: [phone]

## 2020-09-29 NOTE — PROGRESS NOTES
SUBJECTIVE:                                                      Patient presents for Hospital Followup Visit:    Hospital:  [facility]      Date of Admission: 09/24/2020  Date of Discharge: 09/25/2020  Transitional Care Management Phone Call:   Reason(s) for Admission: Hyperkalemia, HTN, CKD stage 3-4. Type 2 DM, Normocytic anemia            Problems taking medications regularly:  None       Medication changes since discharge: None       Problems adhering to non-medication therapy:  None    Summary of hospitalization:  [facility] discharge summary reviewed  Diagnostic Tests/Treatments reviewed.  Follow up needed: none  Other Healthcare Providers Involved in Patient s Care:         None  Update since discharge: improved.   He lives in Independent living at the [place]. He can get help if he needs it. His wife lives with him. She has Dementia.   Past medical history includes:   Diabetes type 2 with nephropathy, hypertension, atrial flutter, venous insufficiency, and Chronic Kidney Disease state III   Currently, he is wearing a wrap on his left leg, not a compression stocking. Checks his blood sugars every am, today it was 126. If it goes below 100, he does not take his Glipizide. Endocrinologist is aware that Alessio is no longer taking the Metformin due to his recent kidney tests.   ROS:  Constitutional, HEENT, cardiovascular, pulmonary, gi and gu systems are negative, except as otherwise noted.    OBJECTIVE:                                                    BP (!) 186/84   Pulse 72   Temp 98.1  F (36.7  C)   Wt 78 kg (172 lb)   SpO2 97%   BMI 24.68 kg/m    140/78  GENERAL APPEARANCE: healthy, alert and no distress  HENT: ear canals and TM's normal and nose and mouth without ulcers or lesions  RESP: lungs clear to auscultation - no rales, rhonchi or wheezes  CV: regular rates and rhythm, normal S1 S2, no S3 or S4 and no murmur, click or rub  ABDOMEN: soft, nontender, without  hepatosplenomegaly or masses and bowel sounds normal  ORTHO: Knee Exam: Inspection: AP/lateral alignment normal  NEURO: Normal strength and tone, mentation intact and speech normal  PSYCH: mentation appears normal and affect normal/bright  MENTAL STATUS EXAM:  Appearance/Behavior: No apparent distress  Speech: Normal  Mood/Affect: normal affect  Insight: Adequate    ASSESSMENT/PLAN:                                                      1.Hyperkalemia:   2. CKD Stage III:  3.Screening for colon cancer:  Post Discharge Medication Reconciliation: discharge medications reconciled, continue medications without change.  Issues to address: Follow up lab work and BP  Plan of care communicated with patient and none    Type of Medical Decision Making Face-to-Face Visit within 7 Days Face-to-Face Visit within 14 days   Moderate Complexity 41743 [id number]   High Complexity 39356 10621     First, have your labs completed today. I will call with results. Next, GI will call you to schedule your colonoscopy. Finally, please call with any questions or concerns any time. Thank you.     ESTEPHANIE Green, CNP  Addendum 10/01/2020 1423:labs show worsening BUN and creatinine. Will check on nephrology referral for patient. He will call clinic next week by mid week if he has not heard anything. He verbalizes understanding of the plan.   ESTEPHANIE Green, CNP

## 2020-09-29 NOTE — PROGRESS NOTES
Transition Care Management Services  Admission Date: 09/24/20    Discharge Date: 09/25/20    Discharge Diagnosis: Hyperkalemia - resolved    Interactive contact date: 9/25/2020  Face-to-face visit date: 9/29/2020  {DELETE FROM NOTE: the table below shows the elements to determine the level of medical decision making. Two of the three elements must be met.   0-7 Days 8-14 days   MODERATE complexity decision making 05185 [id number]   HIGH complexity decision making 46016 80898   }  Medical complexity decision making: {UMP FM COMPLEXITY:[id number]}

## 2020-09-29 NOTE — NURSING NOTE
Chief Complaint   Patient presents with     Hospital F/U     Hospital follow up     Establish Care     Pt is here to establish care.     Depression Response    Patient completed the PHQ-9 assessment for depression and scored >9? No  Question 9 on the PHQ-9 was positive for suicidality? No  Does patient have current mental health provider? No    Is this a virtual visit? No    I personally notified the following: visit provider       HARSHA Boland 12:41 PM  9/29/2020

## 2020-10-01 ENCOUNTER — APPOINTMENT (OUTPATIENT)
Dept: URBAN - METROPOLITAN AREA CLINIC 255 | Age: 85
Setting detail: DERMATOLOGY
End: 2020-10-04

## 2020-10-01 PROBLEM — D04.21 CARCINOMA IN SITU OF SKIN OF RIGHT EAR AND EXTERNAL AURICULAR CANAL: Status: ACTIVE | Noted: 2020-10-01

## 2020-10-01 PROCEDURE — OTHER MOHS SURGERY: OTHER

## 2020-10-01 PROCEDURE — 17311 MOHS 1 STAGE H/N/HF/G: CPT

## 2020-10-01 PROCEDURE — 17312 MOHS ADDL STAGE: CPT

## 2020-10-01 PROCEDURE — OTHER MIPS QUALITY: OTHER

## 2020-10-01 NOTE — PROCEDURE: MIPS QUALITY
Detail Level: Detailed
Quality 431: Preventive Care And Screening: Unhealthy Alcohol Use - Screening: Patient screened for unhealthy alcohol use using a single question and scores less than 2 times per year
Quality 130: Documentation Of Current Medications In The Medical Record: Current Medications Documented
Quality 111:Pneumonia Vaccination Status For Older Adults: Pneumococcal Vaccination Previously Received
Quality 226: Preventive Care And Screening: Tobacco Use: Screening And Cessation Intervention: Patient screened for tobacco use and is an ex/non-smoker
Quality 143: Oncology: Medical And Radiation- Pain Intensity Quantified: Pain severity quantified, no pain present
Quality 110: Preventive Care And Screening: Influenza Immunization: Influenza Immunization previously received during influenza season

## 2020-10-02 ENCOUNTER — TELEPHONE (OUTPATIENT)
Dept: NEPHROLOGY | Facility: CLINIC | Age: 85
End: 2020-10-02

## 2020-10-02 DIAGNOSIS — E87.5 HYPERKALEMIA: Primary | ICD-10-CM

## 2020-10-02 NOTE — TELEPHONE ENCOUNTER
Health Call Center    Phone Message    May a detailed message be left on voicemail: yes     Reason for Call: Order(s): Other:   Reason for requested: Pt is requesting for their lab orders to be sent to the  in [place].   Date needed: asap  Provider name: Dr. Kim      Action Taken: Message routed to:  [facility] neph    Travel Screening: Not Applicable

## 2020-10-05 DIAGNOSIS — E11.65 TYPE 2 DIABETES MELLITUS WITH HYPERGLYCEMIA, WITHOUT LONG-TERM CURRENT USE OF INSULIN (H): ICD-10-CM

## 2020-10-06 NOTE — TELEPHONE ENCOUNTER
RECORDS RECEIVED FROM: Internal - Hyperkalemia   DATE RECEIVED: 11.04.2020   NOTES STATUS DETAILS   OFFICE NOTE from referring provider Internal 09.24.2020 Melina Carrington MD   OFFICE NOTE from other specialist  N/A    *Only VASCULITIS or LUPUS gather office notes for the following N/A    *PULMONARY   N/A    *ENT N/A    *DERMATOLOGY N/A    *RHEUMATOLOGY N/A    DISCHARGE SUMMARY from hospital Internal 09.24.2020   DISCHARGE REPORT from the ER N/A    MEDICATION LIST Internal / CE    IMAGING  (NEED IMAGES AND REPORTS)     KIDNEY CT SCAN N/A    KIDNEY ULTRASOUND N/A    MR ABDOMEN N/A    NUCLEAR MEDICINE RENAL N/A    LABS     CBC Internal 09.29.2020   CMP Internal 09.29.2020   BMP Internal 09.29.2020   UA Future 10.02.2020   URINE PROTEIN Future 10.02.202   RENAL PANEL Future 10.02.2020   BIOPSY     KIDNEY BIOPSY  N/A

## 2020-10-08 RX ORDER — METFORMIN HCL 500 MG
1000 TABLET, EXTENDED RELEASE 24 HR ORAL
Qty: 180 TABLET | Refills: 0 | OUTPATIENT
Start: 2020-10-08

## 2020-10-08 NOTE — TELEPHONE ENCOUNTER
metFORMIN (GLUCOPHAGE-XR) 500 MG 24 hr tablet   Take 2 tablets (1,000 mg) by mouth daily with food      Last Written Prescription Date:  7/14/20 - 9/25/20  Last Fill Quantity: 180,   # refills: 0  Last Office Visit : 9/8/20 virtual visit   Future Office visit:  none    Routing refill request to provider for review/approval because:  Drug not active on patient's medication list  Discontinued Stop at Discharge Mleina Carrington MD 9/25/20 9700

## 2020-10-15 ENCOUNTER — APPOINTMENT (OUTPATIENT)
Dept: URBAN - METROPOLITAN AREA CLINIC 255 | Age: 85
Setting detail: DERMATOLOGY
End: 2020-10-21

## 2020-10-15 DIAGNOSIS — L98419 CHRONIC ULCER OF OTHER SPECIFIED SITES: ICD-10-CM

## 2020-10-15 DIAGNOSIS — L98429 CHRONIC ULCER OF OTHER SPECIFIED SITES: ICD-10-CM

## 2020-10-15 PROBLEM — L98.499 NON-PRESSURE CHRONIC ULCER OF SKIN OF OTHER SITES WITH UNSPECIFIED SEVERITY: Status: ACTIVE | Noted: 2020-10-15

## 2020-10-15 PROCEDURE — 99212 OFFICE O/P EST SF 10 MIN: CPT

## 2020-10-15 PROCEDURE — OTHER COUNSELING: OTHER

## 2020-10-15 PROCEDURE — OTHER MIPS QUALITY: OTHER

## 2020-10-15 PROCEDURE — OTHER ULCER EVALUATION: OTHER

## 2020-10-15 PROCEDURE — OTHER DIAGNOSIS COMMENT: OTHER

## 2020-10-15 ASSESSMENT — LOCATION DETAILED DESCRIPTION DERM: LOCATION DETAILED: RIGHT SUPERIOR CRUS OF ANTIHELIX

## 2020-10-15 ASSESSMENT — LOCATION ZONE DERM: LOCATION ZONE: EAR

## 2020-10-15 ASSESSMENT — LOCATION SIMPLE DESCRIPTION DERM: LOCATION SIMPLE: RIGHT EAR

## 2020-10-15 NOTE — PROCEDURE: MIPS QUALITY
Quality 130: Documentation Of Current Medications In The Medical Record: Current Medications Documented
Detail Level: Detailed
Quality 111:Pneumonia Vaccination Status For Older Adults: Pneumococcal Vaccination Previously Received
Quality 431: Preventive Care And Screening: Unhealthy Alcohol Use - Screening: Patient screened for unhealthy alcohol use using a single question and scores less than 2 times per year
Quality 226: Preventive Care And Screening: Tobacco Use: Screening And Cessation Intervention: Patient screened for tobacco use and is an ex/non-smoker
Quality 110: Preventive Care And Screening: Influenza Immunization: Influenza Immunization previously received during influenza season

## 2020-10-15 NOTE — PROCEDURE: ULCER EVALUATION
X Size Of Lesion In Cm (Optional): 1
Detail Level: Detailed
Instructions (Optional): Physical Examination:\\nEschar: dry\\nGranulation Tissue: present \\nRe-Epithelialization: progressing\\nDrainage: none\\nSurrounding Edema: absent\\nPain to palpation: 3/10\\nOdor: none \\nSurrounding Dermatitis: absent\\n\\nAssessment:\\nHealing as expected for dates\\nNo signs / symptoms of infection\\nNo dressing-associated contact dermatitis\\n\\nPlan:\\nWound cleaned with H2O2\\nWhite petrolatum ointment and non-adherent dressing applied
Body Location Override (Optional - Billing Will Still Be Based On Selected Body Map Location If Applicable): Right antihelix
Size Of Lesion In Cm (Optional): 2.4

## 2020-10-19 DIAGNOSIS — E11.65 TYPE 2 DIABETES MELLITUS WITH HYPERGLYCEMIA (H): ICD-10-CM

## 2020-10-20 NOTE — TELEPHONE ENCOUNTER
sitagliptin (JANUVIA) 50 MG tablet   Last Written Prescription Date:  4/15/20  Last Fill Quantity: 90,   # refills: 0  Last Office Visit : 9/8/20  Future Office visit:  None      Routing refill request to provider for review/approval because: mackenzie SORIANO  Ordered by other provider. Gap in med rf.

## 2020-10-22 ENCOUNTER — HOSPITAL ENCOUNTER (OUTPATIENT)
Facility: AMBULATORY SURGERY CENTER | Age: 85
End: 2020-10-22
Attending: INTERNAL MEDICINE
Payer: MEDICARE

## 2020-10-22 DIAGNOSIS — Z79.4 TYPE 2 DIABETES MELLITUS WITH COMPLICATION, WITH LONG-TERM CURRENT USE OF INSULIN (H): ICD-10-CM

## 2020-10-22 DIAGNOSIS — E11.8 TYPE 2 DIABETES MELLITUS WITH COMPLICATION, WITH LONG-TERM CURRENT USE OF INSULIN (H): ICD-10-CM

## 2020-10-24 DIAGNOSIS — Z11.59 ENCOUNTER FOR SCREENING FOR OTHER VIRAL DISEASES: Primary | ICD-10-CM

## 2020-10-25 NOTE — TELEPHONE ENCOUNTER
insulin pen needle (31G X 5 MM) 31G X 5 MM miscellaneous    9/8/2020  [facility]   Jose Amin MD  INTERNAL MEDICINE - ENDOCRINOLOGY, DIABETES & METABOLISM       Routed because: I dont see insulin on med list. Refuse?

## 2020-10-26 DIAGNOSIS — E87.5 HYPERKALEMIA: ICD-10-CM

## 2020-10-26 LAB
ALBUMIN UR-MCNC: >=300 MG/DL
APPEARANCE UR: CLEAR
BACTERIA #/AREA URNS HPF: ABNORMAL /HPF
BILIRUB UR QL STRIP: NEGATIVE
COLOR UR AUTO: YELLOW
CREAT UR-MCNC: 76 MG/DL
ERYTHROCYTE [DISTWIDTH] IN BLOOD BY AUTOMATED COUNT: 15 % (ref 10–15)
GLUCOSE UR STRIP-MCNC: NEGATIVE MG/DL
HCT VFR BLD AUTO: 30.5 % (ref 40–53)
HGB BLD-MCNC: 9.5 G/DL (ref 13.3–17.7)
HGB UR QL STRIP: NEGATIVE
KETONES UR STRIP-MCNC: NEGATIVE MG/DL
LEUKOCYTE ESTERASE UR QL STRIP: NEGATIVE
MCH RBC QN AUTO: 29.1 PG (ref 26.5–33)
MCHC RBC AUTO-ENTMCNC: 31.1 G/DL (ref 31.5–36.5)
MCV RBC AUTO: 94 FL (ref 78–100)
NITRATE UR QL: NEGATIVE
NON-SQ EPI CELLS #/AREA URNS LPF: ABNORMAL /LPF
PH UR STRIP: 5.5 PH (ref 5–7)
PLATELET # BLD AUTO: 292 10E9/L (ref 150–450)
PROT UR-MCNC: 1.14 G/L
PROT/CREAT 24H UR: 1.49 G/G CR (ref 0–0.2)
PTH-INTACT SERPL-MCNC: 105 PG/ML (ref 18–80)
RBC # BLD AUTO: 3.26 10E12/L (ref 4.4–5.9)
RBC #/AREA URNS AUTO: ABNORMAL /HPF
SOURCE: ABNORMAL
SP GR UR STRIP: 1.02 (ref 1–1.03)
UROBILINOGEN UR STRIP-ACNC: 0.2 EU/DL (ref 0.2–1)
WBC # BLD AUTO: 5.6 10E9/L (ref 4–11)
WBC #/AREA URNS AUTO: ABNORMAL /HPF

## 2020-10-26 PROCEDURE — 84156 ASSAY OF PROTEIN URINE: CPT | Performed by: INTERNAL MEDICINE

## 2020-10-26 PROCEDURE — 80069 RENAL FUNCTION PANEL: CPT | Performed by: INTERNAL MEDICINE

## 2020-10-26 PROCEDURE — 83970 ASSAY OF PARATHORMONE: CPT | Performed by: INTERNAL MEDICINE

## 2020-10-26 PROCEDURE — 36415 COLL VENOUS BLD VENIPUNCTURE: CPT | Performed by: INTERNAL MEDICINE

## 2020-10-26 PROCEDURE — 81001 URINALYSIS AUTO W/SCOPE: CPT | Performed by: INTERNAL MEDICINE

## 2020-10-26 PROCEDURE — 85027 COMPLETE CBC AUTOMATED: CPT | Performed by: INTERNAL MEDICINE

## 2020-10-27 LAB
ALBUMIN SERPL-MCNC: 3.3 G/DL (ref 3.4–5)
ANION GAP SERPL CALCULATED.3IONS-SCNC: 3 MMOL/L (ref 3–14)
BUN SERPL-MCNC: 60 MG/DL (ref 7–30)
CALCIUM SERPL-MCNC: 8.9 MG/DL (ref 8.5–10.1)
CHLORIDE SERPL-SCNC: 109 MMOL/L (ref 94–109)
CO2 SERPL-SCNC: 26 MMOL/L (ref 20–32)
CREAT SERPL-MCNC: 2.1 MG/DL (ref 0.66–1.25)
GFR SERPL CREATININE-BSD FRML MDRD: 28 ML/MIN/{1.73_M2}
GLUCOSE SERPL-MCNC: 221 MG/DL (ref 70–99)
PHOSPHATE SERPL-MCNC: 4.5 MG/DL (ref 2.5–4.5)
POTASSIUM SERPL-SCNC: 5.3 MMOL/L (ref 3.4–5.3)
SODIUM SERPL-SCNC: 138 MMOL/L (ref 133–144)

## 2020-10-29 ENCOUNTER — APPOINTMENT (OUTPATIENT)
Dept: URBAN - METROPOLITAN AREA CLINIC 255 | Age: 85
Setting detail: DERMATOLOGY
End: 2020-10-30

## 2020-10-29 DIAGNOSIS — L98419 CHRONIC ULCER OF OTHER SPECIFIED SITES: ICD-10-CM

## 2020-10-29 DIAGNOSIS — L98429 CHRONIC ULCER OF OTHER SPECIFIED SITES: ICD-10-CM

## 2020-10-29 PROBLEM — L98.499 NON-PRESSURE CHRONIC ULCER OF SKIN OF OTHER SITES WITH UNSPECIFIED SEVERITY: Status: ACTIVE | Noted: 2020-10-29

## 2020-10-29 PROCEDURE — OTHER MIPS QUALITY: OTHER

## 2020-10-29 PROCEDURE — 99212 OFFICE O/P EST SF 10 MIN: CPT

## 2020-10-29 PROCEDURE — OTHER DIAGNOSIS COMMENT: OTHER

## 2020-10-29 PROCEDURE — OTHER ULCER EVALUATION: OTHER

## 2020-10-29 PROCEDURE — OTHER COUNSELING: OTHER

## 2020-10-29 ASSESSMENT — LOCATION SIMPLE DESCRIPTION DERM: LOCATION SIMPLE: RIGHT EAR

## 2020-10-29 ASSESSMENT — LOCATION ZONE DERM: LOCATION ZONE: EAR

## 2020-10-29 ASSESSMENT — LOCATION DETAILED DESCRIPTION DERM: LOCATION DETAILED: RIGHT ANTIHELIX

## 2020-10-29 NOTE — PROCEDURE: MIPS QUALITY
Detail Level: Detailed
Quality 431: Preventive Care And Screening: Unhealthy Alcohol Use - Screening: Patient screened for unhealthy alcohol use using a single question and scores less than 2 times per year
Quality 130: Documentation Of Current Medications In The Medical Record: Current Medications Documented
Quality 111:Pneumonia Vaccination Status For Older Adults: Pneumococcal Vaccination Previously Received
Quality 226: Preventive Care And Screening: Tobacco Use: Screening And Cessation Intervention: Patient screened for tobacco use and is an ex/non-smoker
Quality 110: Preventive Care And Screening: Influenza Immunization: Influenza Immunization previously received during influenza season

## 2020-10-29 NOTE — PROCEDURE: ULCER EVALUATION
Detail Level: Detailed
Size Of Lesion In Cm (Optional): 1.5
X Size Of Lesion In Cm (Optional): 0.5
Body Location Override (Optional - Billing Will Still Be Based On Selected Body Map Location If Applicable): Right Antihelix
Instructions (Optional): Physical Examination:\\nEschar:  fibrinous + dry\\nGranulation Tissue: present\\nRe-Epithelialization: progressing \\nDrainage: None\\nSurrounding Edema: absent\\nPain to palpation: 0 / 10\\nOdor: none\\nSurrounding Dermatitis: absent\\n\\nAssessment:\\nHealing as expected for dates\\nNo signs / symptoms of infection\\nNo dressing-associated contact dermatitis\\n\\nPlan:\\nWound cleaned with H2O2\\nWhite petrolatum ointment and non-adherent dressing applied\\nContinue daily wound care as directed \\n

## 2020-10-30 DIAGNOSIS — E11.65 TYPE 2 DIABETES MELLITUS WITH HYPERGLYCEMIA, WITHOUT LONG-TERM CURRENT USE OF INSULIN (H): ICD-10-CM

## 2020-11-02 DIAGNOSIS — E87.5 HYPERKALEMIA: Primary | ICD-10-CM

## 2020-11-04 ENCOUNTER — PRE VISIT (OUTPATIENT)
Dept: NEPHROLOGY | Facility: CLINIC | Age: 85
End: 2020-11-04

## 2020-11-04 ENCOUNTER — VIRTUAL VISIT (OUTPATIENT)
Dept: NEPHROLOGY | Facility: CLINIC | Age: 85
End: 2020-11-04
Attending: NURSE PRACTITIONER
Payer: MEDICARE

## 2020-11-04 VITALS — HEIGHT: 71 IN | BODY MASS INDEX: 24.08 KG/M2 | WEIGHT: 172 LBS

## 2020-11-04 DIAGNOSIS — Z79.4 TYPE 2 DIABETES MELLITUS WITH OTHER OPHTHALMIC COMPLICATION, WITH LONG-TERM CURRENT USE OF INSULIN (H): ICD-10-CM

## 2020-11-04 DIAGNOSIS — E11.39 TYPE 2 DIABETES MELLITUS WITH OTHER OPHTHALMIC COMPLICATION, WITH LONG-TERM CURRENT USE OF INSULIN (H): ICD-10-CM

## 2020-11-04 DIAGNOSIS — N18.4 CKD (CHRONIC KIDNEY DISEASE) STAGE 4, GFR 15-29 ML/MIN (H): Primary | ICD-10-CM

## 2020-11-04 PROCEDURE — 99443 PR PHYSICIAN TELEPHONE EVALUATION 21-30 MIN: CPT | Mod: 95 | Performed by: INTERNAL MEDICINE

## 2020-11-04 RX ORDER — LOSARTAN POTASSIUM AND HYDROCHLOROTHIAZIDE 12.5; 5 MG/1; MG/1
1 TABLET ORAL DAILY
Qty: 90 TABLET | Refills: 3 | OUTPATIENT
Start: 2020-11-04

## 2020-11-04 ASSESSMENT — MIFFLIN-ST. JEOR: SCORE: 1484.38

## 2020-11-04 NOTE — LETTER
"11/4/2020       RE: Farzad East  [address]  [facility] [address]     Dear Colleague,    Thank you for referring your patient, Farzad East, to the [facility] [place] at [facility]. Please see a copy of my visit note below.    Chief Complaint   Patient presents with     Consult     new patient     Height 1.791 m (5' 10.5\"), weight 78 kg (172 lb).    Farzad East is a 85 year old male who is being evaluated via a billable telephone visit.      The patient has been notified of following:    \"This telephone visit will be conducted via a call between you and your physician/provider. We have found that certain health care needs can be provided without the need for a physical exam.  This service lets us provide the care you need with a short phone conversation.  If a prescription is necessary we can send it directly to your pharmacy.  If lab work is needed we can place an order for that and you can then stop by our lab to have the test done at a later time.    Telephone visits are billed at different rates depending on your insurance coverage. During this emergency period, for some insurers they may be billed the same as an in-person visit.  Please reach out to your insurance provider with any questions.    If during the course of the call the physician/provider feels a telephone visit is not appropriate, you will not be charged for this service.\"    Patient has given verbal consent for Telephone visit?  Yes    What phone number would you like to be contacted at? [phone]    How would you like to obtain your AVS? Chante    Phone call duration: 28 minutes    Taylor Melgar CMA    November 4, 2020      Assessment/Plan:  CKD  Baseline Cr 1.8-2 as far back as 2015. Most recent Cr 2.1, stable. Underlying etiology likely combination of DM2 with proteinuria, HTN and atherosclerotic disease. UA did show 2-5 RBCs and GN in " Notification of Observation Admission/Observation Authorization Request   This is a Notification of Observation Admission for Thien 48  Be advised that this patient was admitted to our facility under Observation Status  Contact Kaylee Leyva at [phone] for additional admission information  [facility] DEPT  DEDICATED -[phone]  Patient Name:   David Santiago   YOB: 1938       [address]   [address]:   [address]   Tax ID: [id number]  NPI: [id number] Attending Provider/NPI: Jacquelynn Eisenmenger [[id number]]   Place of Service Code: 25     Place of Service Name:  CPT Code for Observation:  On [address] [facility] / CPT [address]55   Start Date:      Discharge Date & Time: No discharge date for patient encounter  Type of Admission: Observation Status Discharge Disposition (if discharged): Home/Self Care   Patient Diagnoses: Chest pain [R07 9]     Orders: Admission Orders (From admission, onward)     Ordered        07/14/20 1724  Place in Observation (expected length of stay for this patient is less than two midnights)  Once                    Assigned Utilization Review Contact: Jonna Sparks Review Department  Phone: [phone]; Fax [phone]  Email: [email or web address]   ATTENTION PAYERS: Please call the assigned Utilization  directly with any questions or concerns ALL voicemails in the department are confidential  Send all requests for admission clinical reviews, approved or denied determinations and any other requests to dedicated fax number belonging to the campus where the patient is receiving treatment  differential but with stable Cr appears unlikely at this time.   - Will recheck urinalysis and continue to monitor renal function closely.   - Patient also endorses a remote history of nephrolithiasis and will obtain imaging to evaluate for this or any other anatomic abnormality.   - If hematuria is persistent will consider further testing for autoimmune disease.   - Given proteinuria will also consider testing for myeloma if progression in CKD is observed.    HTN  Discussed keeping a diary of BP readings and patient expressed understanding. H noted he will get his current BP cuff fixed or obtain a new one. No changes to medication regimen at this time. Will re-evaluate if ACEi/ARB can be restarted as patient has proteinuria.  - Continue coreg 6.25 mg BID, hydralazine 25 mg TID, HCTZ 50 mg daily for now    DM2 with retinopathy and nephropathy  BG controlled per report after discontinuation of metformin.   - Will recheck A1c at follow-up    Orders Placed This Encounter   Procedures     US Renal Complete     **A1C FUTURE anytime     Renal panel     **UA reflex to Microscopic FUTURE 14d     Follow-up in 4 weeks with BP diary and repeat labs.  Patient will be traveling to [place] with his wife in January to spend the winter there.    Discussed with Dr. Jessi Kim MD  Renal Fellow  [phone]      I was asked to see this patient by Dr. Lantigua    CC: CKD    HPI: Farzad East is a 85 year old male who presents for evaluation of CKD. PMH includes DM2, HTN, Aflutter, lap cecectomy for TVA, chronic normocytic anemia.    Patient is referred by his PCP and denies any new symptoms or concerns.  He notes he has had chronic kidney disease for years and thinks his kidney function is stable.  He denies any changes in his urine including dysuria, hematuria, increase in frequency.  He notes chronic nocturia which is stable.  He denies headaches, vision changes, chest pain or shortness of breath, abdominal pain,  fever/chills, lightheadedness/dizzines, cough, new joint pain/stiffness, new rash.  He endorses chronic left leg swelling which is stable and he does not have any swelling in the RLE.  He also notes skin changes and dryness in both legs L>R.    Notes he does have a blood pressure cuff but has not been able to get it to work and does not know recent Bps.  While he was admitted his blood pressure was running high with SBP in the 150s to 160s.  He had been admitted for hyperkalemia and his losartan and Metformin have been held since.  He notes his blood sugars remain in the 120 range.    - History of Hematuria: no  - Swelling: chronic LLE swelling  - Hx of UTIs: no  - Hx of stones: nephrolithiasis x2 several years ago  - Rashes/Joint pain: chronic OA, s/p KIMBERLY  - Family hx of kidney disease: kidney stones in son and daughter  - NSAID use: none       Allergies   Allergen Reactions     Sulfa Drugs      Unknown reaction. Occurred during childhood. Has not taken Sulfa medications since allergic response.             aspirin 81 MG tablet, Take 1 tablet by mouth daily.       blood glucose (ONETOUCH VERIO IQ) test strip, Use to test blood sugar 1 time daily or as directed.       blood glucose monitoring (ONETOUCH VERIO IQ SYSTEM) meter device kit, Use to test blood sugar daily       carvedilol (COREG) 6.25 MG tablet, TAKE 1 TABLET BY MOUTH TWICE A DAY WITH MEALS       Cholecalciferol (VITAMIN D-3 PO),        COMPRESSION STOCKINGS, 1 each daily       finasteride (PROSCAR) 5 MG tablet, Take 1 tablet by mouth daily       glipiZIDE (GLUCOTROL XL) 5 MG 24 hr tablet, Take 1 tablet (5 mg) by mouth daily       hydrALAZINE (APRESOLINE) 25 MG tablet, Take 1 tablet (25 mg) by mouth 3 times daily       hydrochlorothiazide (MICROZIDE) 12.5 MG capsule, Take 2 capsules (25 mg) by mouth daily       insulin pen needle (31G X 5 MM) 31G X 5 MM miscellaneous, Use 1 pen needle daily or as directed       multivitamin w/minerals (MULTI-VITAMIN)  "tablet, Take 1 tablet by mouth daily       ONETOUCH ULTRA test strip, Use to test blood sugar 3 times daily or as directed.       sildenafil (VIAGRA) 100 MG tablet, Take 1 tablet (100 mg) by mouth daily as needed for erectile dysfunction       sitagliptin (JANUVIA) 50 MG tablet, Take 1 tablet (50 mg) by mouth daily       tamsulosin (FLOMAX) 0.4 MG capsule, Take 1 capsule (0.4 mg) by mouth daily    No current facility-administered medications on file prior to visit.       Past Medical History:   Diagnosis Date     Atrial flutter (H)      Choroidal nevus of left eye      CKD (chronic kidney disease) stage 3, GFR 30-59 ml/min      Diabetes mellitus (H)      Diabetic peripheral neuropathy (H)      Hypertension      Microalbuminuria      Overweight(278.02)      Rectal-type adenocarcinoma (H)      Retinopathies, diabetic      Vitreous detachment of both eyes        Past Surgical History:   Procedure Laterality Date     CATARACT IOL, RT/LT Bilateral 2005     left hip replacement       OPEN REDUCTION INTERNAL FIXATION FEMUR PROXIMAL  1/24/2014    Procedure: OPEN REDUCTION INTERNAL FIXATION FEMUR PROXIMAL;  Periprosthetic Fracture, ORIF Left Femur;  Surgeon: Andi Garcia MD;  Location:  OR       Social History     Tobacco Use     Smoking status: Never Smoker     Smokeless tobacco: Never Used   Substance Use Topics     Alcohol use: Yes     Alcohol/week: 2.5 - 3.3 standard drinks     Types: 3 - 4 Standard drinks or equivalent per week     Comment: 3 martinis per week     Drug use: No       Family History   Problem Relation Age of Onset     Diabetes Father      Circulatory Father         PAD     Macular Degeneration Father      Arthritis Mother      Amblyopia No family hx of      Retinal detachment No family hx of      Glaucoma No family hx of        ROS: A 12 system review of systems was negative other than noted here or above.     Exam:  Ht 1.791 m (5' 10.5\")   Wt 78 kg (172 lb)   BMI 24.33 kg/m      Results:  "   No visits with results within 1 Day(s) from this visit.   Latest known visit with results is:   Orders Only on 10/26/2020   Component Date Value Ref Range Status     Parathyroid Hormone Intact 10/26/2020 105* 18 - 80 pg/mL Final     Protein Random Urine 10/26/2020 1.14  g/L Final     Protein Total Urine g/gr Creatinine 10/26/2020 1.49* 0 - 0.2 g/g Cr Final     WBC 10/26/2020 5.6  4.0 - 11.0 10e9/L Final     RBC Count 10/26/2020 3.26* 4.4 - 5.9 10e12/L Final     Hemoglobin 10/26/2020 9.5* 13.3 - 17.7 g/dL Final     Hematocrit 10/26/2020 30.5* 40.0 - 53.0 % Final     MCV 10/26/2020 94  78 - 100 fl Final     MCH 10/26/2020 29.1  26.5 - 33.0 pg Final     MCHC 10/26/2020 31.1* 31.5 - 36.5 g/dL Final     RDW 10/26/2020 15.0  10.0 - 15.0 % Final     Platelet Count 10/26/2020 292  150 - 450 10e9/L Final     Sodium 10/26/2020 138  133 - 144 mmol/L Final     Potassium 10/26/2020 5.3  3.4 - 5.3 mmol/L Final     Chloride 10/26/2020 109  94 - 109 mmol/L Final     Carbon Dioxide 10/26/2020 26  20 - 32 mmol/L Final     Anion Gap 10/26/2020 3  3 - 14 mmol/L Final     Glucose 10/26/2020 221* 70 - 99 mg/dL Final     Urea Nitrogen 10/26/2020 60* 7 - 30 mg/dL Final     Creatinine 10/26/2020 2.10* 0.66 - 1.25 mg/dL Final     GFR Estimate 10/26/2020 28* >60 mL/min/[1.73_m2] Final    Comment: Non  GFR Calc  Starting 12/18/2018, serum creatinine based estimated GFR (eGFR) will be   calculated using the Chronic Kidney Disease Epidemiology Collaboration   (CKD-EPI) equation.       GFR Estimate If Black 10/26/2020 32* >60 mL/min/[1.73_m2] Final    Comment:  GFR Calc  Starting 12/18/2018, serum creatinine based estimated GFR (eGFR) will be   calculated using the Chronic Kidney Disease Epidemiology Collaboration   (CKD-EPI) equation.       Calcium 10/26/2020 8.9  8.5 - 10.1 mg/dL Final     Phosphorus 10/26/2020 4.5  2.5 - 4.5 mg/dL Final     Albumin 10/26/2020 3.3* 3.4 - 5.0 g/dL Final     Color Urine  10/26/2020 Yellow   Final     Appearance Urine 10/26/2020 Clear   Final     Glucose Urine 10/26/2020 Negative  NEG^Negative mg/dL Final     Bilirubin Urine 10/26/2020 Negative  NEG^Negative Final     Ketones Urine 10/26/2020 Negative  NEG^Negative mg/dL Final     Specific Gravity Urine 10/26/2020 1.020  1.003 - 1.035 Final     pH Urine 10/26/2020 5.5  5.0 - 7.0 pH Final     Protein Albumin Urine 10/26/2020 >=300* NEG^Negative mg/dL Final     Urobilinogen Urine 10/26/2020 0.2  0.2 - 1.0 EU/dL Final     Nitrite Urine 10/26/2020 Negative  NEG^Negative Final     Blood Urine 10/26/2020 Negative  NEG^Negative Final     Leukocyte Esterase Urine 10/26/2020 Negative  NEG^Negative Final     Source 10/26/2020 Midstream Urine   Final     WBC Urine 10/26/2020 0 - 5  OTO5^0 - 5 /HPF Final     RBC Urine 10/26/2020 2-5* OTO2^O - 2 /HPF Final     Squamous Epithelial /LPF Urine 10/26/2020 Few  FEW^Few /LPF Final     Bacteria Urine 10/26/2020 Few* NEG^Negative /HPF Final     Creatinine Urine 10/26/2020 76  mg/dL Final     I have evaluated the patient. Discussed with the fellow, Dr. Kim, and agree with fellow's findings and plan as documented in the fellow's note.  I have reviewed today's vital signs, notes, medications, labs and imaging.     Marbin Bowen MD  [phone]      Again, thank you for allowing me to participate in the care of your patient.      Sincerely,    Nehemias Kim MD

## 2020-11-04 NOTE — PROGRESS NOTES
"Chief Complaint   Patient presents with     Consult     new patient     Height 1.791 m (5' 10.5\"), weight 78 kg (172 lb).    Farzad East is a 85 year old male who is being evaluated via a billable telephone visit.      The patient has been notified of following:    \"This telephone visit will be conducted via a call between you and your physician/provider. We have found that certain health care needs can be provided without the need for a physical exam.  This service lets us provide the care you need with a short phone conversation.  If a prescription is necessary we can send it directly to your pharmacy.  If lab work is needed we can place an order for that and you can then stop by our lab to have the test done at a later time.    Telephone visits are billed at different rates depending on your insurance coverage. During this emergency period, for some insurers they may be billed the same as an in-person visit.  Please reach out to your insurance provider with any questions.    If during the course of the call the physician/provider feels a telephone visit is not appropriate, you will not be charged for this service.\"    Patient has given verbal consent for Telephone visit?  Yes    What phone number would you like to be contacted at? [phone]    How would you like to obtain your AVS? [employer]    Phone call duration: 28 minutes    Taylor Melgar CMA    November 4, 2020      Assessment/Plan:  CKD  Baseline Cr 1.8-2 as far back as 2015. Most recent Cr 2.1, stable. Underlying etiology likely combination of DM2 with proteinuria, HTN and atherosclerotic disease. UA did show 2-5 RBCs and GN in differential but with stable Cr appears unlikely at this time.   - Will recheck urinalysis and continue to monitor renal function closely.   - Patient also endorses a remote history of nephrolithiasis and will obtain imaging to evaluate for this or any other anatomic abnormality.   - If hematuria is persistent will consider " further testing for autoimmune disease.   - Given proteinuria will also consider testing for myeloma if progression in CKD is observed.    HTN  Discussed keeping a diary of BP readings and patient expressed understanding. H noted he will get his current BP cuff fixed or obtain a new one. No changes to medication regimen at this time. Will re-evaluate if ACEi/ARB can be restarted as patient has proteinuria.  - Continue coreg 6.25 mg BID, hydralazine 25 mg TID, HCTZ 50 mg daily for now    DM2 with retinopathy and nephropathy  BG controlled per report after discontinuation of metformin.   - Will recheck A1c at follow-up    Orders Placed This Encounter   Procedures     US Renal Complete     **A1C FUTURE anytime     Renal panel     **UA reflex to Microscopic FUTURE 14d     Follow-up in 4 weeks with BP diary and repeat labs.  Patient will be traveling to [place] with his wife in January to spend the winter there.    Discussed with Dr. Jessi Kim MD  Renal Fellow  [phone]      I was asked to see this patient by Dr. Lantigua    CC: CKD    HPI: Farzad East is a 85 year old male who presents for evaluation of CKD. PMH includes DM2, HTN, Aflutter, lap cecectomy for TVA, chronic normocytic anemia.    Patient is referred by his PCP and denies any new symptoms or concerns.  He notes he has had chronic kidney disease for years and thinks his kidney function is stable.  He denies any changes in his urine including dysuria, hematuria, increase in frequency.  He notes chronic nocturia which is stable.  He denies headaches, vision changes, chest pain or shortness of breath, abdominal pain, fever/chills, lightheadedness/dizzines, cough, new joint pain/stiffness, new rash.  He endorses chronic left leg swelling which is stable and he does not have any swelling in the RLE.  He also notes skin changes and dryness in both legs L>R.    Notes he does have a blood pressure cuff but has not been able to get it to work  and does not know recent Bps.  While he was admitted his blood pressure was running high with SBP in the 150s to 160s.  He had been admitted for hyperkalemia and his losartan and Metformin have been held since.  He notes his blood sugars remain in the 120 range.    - History of Hematuria: no  - Swelling: chronic LLE swelling  - Hx of UTIs: no  - Hx of stones: nephrolithiasis x2 several years ago  - Rashes/Joint pain: chronic OA, s/p KIMBERLY  - Family hx of kidney disease: kidney stones in son and daughter  - NSAID use: none       Allergies   Allergen Reactions     Sulfa Drugs      Unknown reaction. Occurred during childhood. Has not taken Sulfa medications since allergic response.             aspirin 81 MG tablet, Take 1 tablet by mouth daily.       blood glucose (ONETOUCH VERIO IQ) test strip, Use to test blood sugar 1 time daily or as directed.       blood glucose monitoring (ONETOUCH VERIO IQ SYSTEM) meter device kit, Use to test blood sugar daily       carvedilol (COREG) 6.25 MG tablet, TAKE 1 TABLET BY MOUTH TWICE A DAY WITH MEALS       Cholecalciferol (VITAMIN D-3 PO),        COMPRESSION STOCKINGS, 1 each daily       finasteride (PROSCAR) 5 MG tablet, Take 1 tablet by mouth daily       glipiZIDE (GLUCOTROL XL) 5 MG 24 hr tablet, Take 1 tablet (5 mg) by mouth daily       hydrALAZINE (APRESOLINE) 25 MG tablet, Take 1 tablet (25 mg) by mouth 3 times daily       hydrochlorothiazide (MICROZIDE) 12.5 MG capsule, Take 2 capsules (25 mg) by mouth daily       insulin pen needle (31G X 5 MM) 31G X 5 MM miscellaneous, Use 1 pen needle daily or as directed       multivitamin w/minerals (MULTI-VITAMIN) tablet, Take 1 tablet by mouth daily       ONETOUCH ULTRA test strip, Use to test blood sugar 3 times daily or as directed.       sildenafil (VIAGRA) 100 MG tablet, Take 1 tablet (100 mg) by mouth daily as needed for erectile dysfunction       sitagliptin (JANUVIA) 50 MG tablet, Take 1 tablet (50 mg) by mouth daily        "tamsulosin (FLOMAX) 0.4 MG capsule, Take 1 capsule (0.4 mg) by mouth daily    No current facility-administered medications on file prior to visit.       Past Medical History:   Diagnosis Date     Atrial flutter (H)      Choroidal nevus of left eye      CKD (chronic kidney disease) stage 3, GFR 30-59 ml/min      Diabetes mellitus (H)      Diabetic peripheral neuropathy (H)      Hypertension      Microalbuminuria      Overweight(278.02)      Rectal-type adenocarcinoma (H)      Retinopathies, diabetic      Vitreous detachment of both eyes        Past Surgical History:   Procedure Laterality Date     CATARACT IOL, RT/LT Bilateral 2005     left hip replacement       OPEN REDUCTION INTERNAL FIXATION FEMUR PROXIMAL  1/24/2014    Procedure: OPEN REDUCTION INTERNAL FIXATION FEMUR PROXIMAL;  Periprosthetic Fracture, ORIF Left Femur;  Surgeon: Andi Garcia MD;  Location:  OR       Social History     Tobacco Use     Smoking status: Never Smoker     Smokeless tobacco: Never Used   Substance Use Topics     Alcohol use: Yes     Alcohol/week: 2.5 - 3.3 standard drinks     Types: 3 - 4 Standard drinks or equivalent per week     Comment: 3 martinis per week     Drug use: No       Family History   Problem Relation Age of Onset     Diabetes Father      Circulatory Father         PAD     Macular Degeneration Father      Arthritis Mother      Amblyopia No family hx of      Retinal detachment No family hx of      Glaucoma No family hx of        ROS: A 12 system review of systems was negative other than noted here or above.     Exam:  Ht 1.791 m (5' 10.5\")   Wt 78 kg (172 lb)   BMI 24.33 kg/m      Results:    No visits with results within 1 Day(s) from this visit.   Latest known visit with results is:   Orders Only on 10/26/2020   Component Date Value Ref Range Status     Parathyroid Hormone Intact 10/26/2020 105* 18 - 80 pg/mL Final     Protein Random Urine 10/26/2020 1.14  g/L Final     Protein Total Urine g/gr Creatinine " 10/26/2020 1.49* 0 - 0.2 g/g Cr Final     WBC 10/26/2020 5.6  4.0 - 11.0 10e9/L Final     RBC Count 10/26/2020 3.26* 4.4 - 5.9 10e12/L Final     Hemoglobin 10/26/2020 9.5* 13.3 - 17.7 g/dL Final     Hematocrit 10/26/2020 30.5* 40.0 - 53.0 % Final     MCV 10/26/2020 94  78 - 100 fl Final     MCH 10/26/2020 29.1  26.5 - 33.0 pg Final     MCHC 10/26/2020 31.1* 31.5 - 36.5 g/dL Final     RDW 10/26/2020 15.0  10.0 - 15.0 % Final     Platelet Count 10/26/2020 292  150 - 450 10e9/L Final     Sodium 10/26/2020 138  133 - 144 mmol/L Final     Potassium 10/26/2020 5.3  3.4 - 5.3 mmol/L Final     Chloride 10/26/2020 109  94 - 109 mmol/L Final     Carbon Dioxide 10/26/2020 26  20 - 32 mmol/L Final     Anion Gap 10/26/2020 3  3 - 14 mmol/L Final     Glucose 10/26/2020 221* 70 - 99 mg/dL Final     Urea Nitrogen 10/26/2020 60* 7 - 30 mg/dL Final     Creatinine 10/26/2020 2.10* 0.66 - 1.25 mg/dL Final     GFR Estimate 10/26/2020 28* >60 mL/min/[1.73_m2] Final    Comment: Non  GFR Calc  Starting 12/18/2018, serum creatinine based estimated GFR (eGFR) will be   calculated using the Chronic Kidney Disease Epidemiology Collaboration   (CKD-EPI) equation.       GFR Estimate If Black 10/26/2020 32* >60 mL/min/[1.73_m2] Final    Comment:  GFR Calc  Starting 12/18/2018, serum creatinine based estimated GFR (eGFR) will be   calculated using the Chronic Kidney Disease Epidemiology Collaboration   (CKD-EPI) equation.       Calcium 10/26/2020 8.9  8.5 - 10.1 mg/dL Final     Phosphorus 10/26/2020 4.5  2.5 - 4.5 mg/dL Final     Albumin 10/26/2020 3.3* 3.4 - 5.0 g/dL Final     Color Urine 10/26/2020 Yellow   Final     Appearance Urine 10/26/2020 Clear   Final     Glucose Urine 10/26/2020 Negative  NEG^Negative mg/dL Final     Bilirubin Urine 10/26/2020 Negative  NEG^Negative Final     Ketones Urine 10/26/2020 Negative  NEG^Negative mg/dL Final     Specific Gravity Urine 10/26/2020 1.020  1.003 - 1.035 Final     pH  Urine 10/26/2020 5.5  5.0 - 7.0 pH Final     Protein Albumin Urine 10/26/2020 >=300* NEG^Negative mg/dL Final     Urobilinogen Urine 10/26/2020 0.2  0.2 - 1.0 EU/dL Final     Nitrite Urine 10/26/2020 Negative  NEG^Negative Final     Blood Urine 10/26/2020 Negative  NEG^Negative Final     Leukocyte Esterase Urine 10/26/2020 Negative  NEG^Negative Final     Source 10/26/2020 Midstream Urine   Final     WBC Urine 10/26/2020 0 - 5  OTO5^0 - 5 /HPF Final     RBC Urine 10/26/2020 2-5* OTO2^O - 2 /HPF Final     Squamous Epithelial /LPF Urine 10/26/2020 Few  FEW^Few /LPF Final     Bacteria Urine 10/26/2020 Few* NEG^Negative /HPF Final     Creatinine Urine 10/26/2020 76  mg/dL Final     I have evaluated the patient. Discussed with the fellow, Dr. Kim, and agree with fellow's findings and plan as documented in the fellow's note.  I have reviewed today's vital signs, notes, medications, labs and imaging.     Marbin Bowen MD  [phone]

## 2020-11-04 NOTE — PATIENT INSTRUCTIONS
Please keep a diary of your BP readings and we will discuss changes to your medications at follow-up.    Your kidney function estimate appears stable compared to before.    Your urine sample did have a few blood cells and I recommend rechecking another urine sample to see if this is persistent. I will also order an ultrasound of your kidneys to see if there are stones or any other abnormality that may explain the bleeding in your urine.    I have also ordered blood tests to keep an eye on your kidney function and blood sugar level, please obtain these before your follow-up appointment with me.    Pleasure talking to you today!    Nehemias Kim MD

## 2020-11-05 ENCOUNTER — TELEPHONE (OUTPATIENT)
Dept: NEPHROLOGY | Facility: CLINIC | Age: 85
End: 2020-11-05

## 2020-11-09 DIAGNOSIS — E11.8 TYPE 2 DIABETES MELLITUS WITH COMPLICATION, WITH LONG-TERM CURRENT USE OF INSULIN (H): Primary | ICD-10-CM

## 2020-11-09 DIAGNOSIS — Z79.4 TYPE 2 DIABETES MELLITUS WITH COMPLICATION, WITH LONG-TERM CURRENT USE OF INSULIN (H): Primary | ICD-10-CM

## 2020-11-10 ENCOUNTER — TELEPHONE (OUTPATIENT)
Dept: ENDOCRINOLOGY | Facility: CLINIC | Age: 85
End: 2020-11-10

## 2020-11-16 DIAGNOSIS — I10 HYPERTENSION, UNSPECIFIED TYPE: ICD-10-CM

## 2020-11-19 RX ORDER — CARVEDILOL 6.25 MG/1
TABLET ORAL
Qty: 180 TABLET | Refills: 0 | Status: SHIPPED | OUTPATIENT
Start: 2020-11-19 | End: 2021-02-25

## 2020-11-19 NOTE — TELEPHONE ENCOUNTER
carvedilol (COREG) 6.25 MG tablet    Last Written Prescription Date:  8/20/20  Last Fill Quantity: 180,   # refills: 0  Last Office Visit : 9/23/20  Future Office visit:  None    bp > 140 htn addressed in note

## 2020-11-24 ENCOUNTER — APPOINTMENT (OUTPATIENT)
Dept: URBAN - METROPOLITAN AREA CLINIC 255 | Age: 85
Setting detail: DERMATOLOGY
End: 2020-12-01

## 2020-11-24 DIAGNOSIS — D485 NEOPLASM OF UNCERTAIN BEHAVIOR OF SKIN: ICD-10-CM

## 2020-11-24 DIAGNOSIS — L98429 CHRONIC ULCER OF OTHER SPECIFIED SITES: ICD-10-CM

## 2020-11-24 DIAGNOSIS — L98419 CHRONIC ULCER OF OTHER SPECIFIED SITES: ICD-10-CM

## 2020-11-24 DIAGNOSIS — Z86.0100 HISTORY OF COLONIC POLYPS: Primary | ICD-10-CM

## 2020-11-24 PROBLEM — D48.5 NEOPLASM OF UNCERTAIN BEHAVIOR OF SKIN: Status: ACTIVE | Noted: 2020-11-24

## 2020-11-24 PROBLEM — L98.499 NON-PRESSURE CHRONIC ULCER OF SKIN OF OTHER SITES WITH UNSPECIFIED SEVERITY: Status: ACTIVE | Noted: 2020-11-24

## 2020-11-24 PROCEDURE — OTHER ULCER EVALUATION: OTHER

## 2020-11-24 PROCEDURE — OTHER DIAGNOSIS COMMENT: OTHER

## 2020-11-24 PROCEDURE — 99212 OFFICE O/P EST SF 10 MIN: CPT | Mod: 25

## 2020-11-24 PROCEDURE — OTHER MIPS QUALITY: OTHER

## 2020-11-24 PROCEDURE — OTHER BIOPSY BY SHAVE METHOD: OTHER

## 2020-11-24 PROCEDURE — OTHER COUNSELING: OTHER

## 2020-11-24 PROCEDURE — 69100 BIOPSY OF EXTERNAL EAR: CPT

## 2020-11-24 RX ORDER — BISACODYL 5 MG
5 TABLET, DELAYED RELEASE (ENTERIC COATED) ORAL SEE ADMIN INSTRUCTIONS
Qty: 4 TABLET | Refills: 0 | Status: SHIPPED | OUTPATIENT
Start: 2020-11-24 | End: 2020-12-22

## 2020-11-24 ASSESSMENT — LOCATION ZONE DERM: LOCATION ZONE: EAR

## 2020-11-24 ASSESSMENT — LOCATION SIMPLE DESCRIPTION DERM
LOCATION SIMPLE: LEFT EAR
LOCATION SIMPLE: RIGHT EAR

## 2020-11-24 ASSESSMENT — LOCATION DETAILED DESCRIPTION DERM
LOCATION DETAILED: RIGHT ANTIHELIX
LOCATION DETAILED: LEFT ANTIHELIX

## 2020-11-24 NOTE — PROCEDURE: BIOPSY BY SHAVE METHOD
Cryotherapy Text: The wound bed was treated with cryotherapy after the biopsy was performed.
X Size Of Lesion In Cm: 0.7
Bill For Surgical Tray: no
Anesthesia Volume In Cc (Will Not Render If 0): 0.3
Silver Nitrate Text: The wound bed was treated with silver nitrate after the biopsy was performed.
Additional Anesthesia Volume In Cc (Will Not Render If 0): 0
Hemostasis: Drysol and Electrocautery
Validate Note Data (See Information Below): Yes
Consent: Written consent was obtained and risks were reviewed including but not limited to scarring, infection, bleeding, scabbing, incomplete removal, nerve damage and allergy to anesthesia.
Biopsy Method: double edge Personna blade
Anesthesia Type: 1% lidocaine with epinephrine and a 1:10 solution of 8.4% sodium bicarbonate
Post-Care Instructions: I reviewed with the patient in detail post-care instructions. Patient is to keep the biopsy site dry overnight, and then apply bacitracin twice daily until healed. Patient may apply hydrogen peroxide soaks to remove any crusting.
Information: Selecting Yes will display possible errors in your note based on the variables you have selected. This validation is only offered as a suggestion for you. PLEASE NOTE THAT THE VALIDATION TEXT WILL BE REMOVED WHEN YOU FINALIZE YOUR NOTE. IF YOU WANT TO FAX A PRELIMINARY NOTE YOU WILL NEED TO TOGGLE THIS TO 'NO' IF YOU DO NOT WANT IT IN YOUR FAXED NOTE.
Wound Care: Petrolatum
Type Of Destruction Used: Curettage
Notification Instructions: Patient will be notified of biopsy results. However, patient instructed to call the office if not contacted within 2 weeks.
Body Location Override (Optional - Billing Will Still Be Based On Selected Body Map Location If Applicable): Left mid antihelix
Depth Of Biopsy: dermis
Billing Type: Third-Party Bill
Electrodesiccation Text: The wound bed was treated with electrodesiccation after the biopsy was performed.
Detail Level: Detailed
Dressing: bandage
Curettage Text: The wound bed was treated with curettage after the biopsy was performed.
Path Notes (To The Dermatopathologist): Please check margins.
Electrodesiccation And Curettage Text: The wound bed was treated with electrodesiccation and curettage after the biopsy was performed.
Biopsy Type: H and E

## 2020-11-24 NOTE — PROCEDURE: ULCER EVALUATION
X Size Of Lesion In Cm (Optional): 0.4
Body Location Override (Optional - Billing Will Still Be Based On Selected Body Map Location If Applicable): Right Antihelix
Instructions (Optional): Physical Examination:\\nEschar:  fibrinous + dry\\nGranulation Tissue: present\\nRe-Epithelialization: progressing \\nDrainage: None\\nSurrounding Edema: absent\\nPain to palpation: 0 / 10\\nOdor: none\\nSurrounding Dermatitis: absent\\n\\nAssessment:\\nHealing as expected for dates\\nNo signs / symptoms of infection\\nNo dressing-associated contact dermatitis\\n\\nPlan:\\nWound cleaned with H2O2\\nWhite petrolatum ointment and non-adherent dressing applied\\nContinue daily wound care as directed
Detail Level: Detailed
Size Of Lesion In Cm (Optional): 0.7

## 2020-11-24 NOTE — PROCEDURE: MIPS QUALITY
Quality 110: Preventive Care And Screening: Influenza Immunization: Influenza Immunization previously received during influenza season
Quality 130: Documentation Of Current Medications In The Medical Record: Current Medications Documented
Quality 265: Biopsy Follow-Up: Biopsy results reviewed, communicated, tracked, and documented
Detail Level: Detailed
Quality 431: Preventive Care And Screening: Unhealthy Alcohol Use - Screening: Patient screened for unhealthy alcohol use using a single question and scores less than 2 times per year
Quality 226: Preventive Care And Screening: Tobacco Use: Screening And Cessation Intervention: Patient screened for tobacco use and is an ex/non-smoker
Quality 111:Pneumonia Vaccination Status For Older Adults: Pneumococcal Vaccination Previously Received

## 2020-11-27 ENCOUNTER — TELEPHONE (OUTPATIENT)
Dept: GASTROENTEROLOGY | Facility: CLINIC | Age: 85
End: 2020-11-27

## 2020-11-27 DIAGNOSIS — Z11.59 ENCOUNTER FOR SCREENING FOR OTHER VIRAL DISEASES: Primary | ICD-10-CM

## 2020-11-27 NOTE — TELEPHONE ENCOUNTER
Patient is scheduled for COLONOSCOPY with Dr. LEVENTHAL    Spoke with: SHANT    Date of Procedure: 12/16/2020    Location: [facility]    Sedation Type CS    Pre-op for Unit J MAC NOT NEEDED    Informed patient they will need an adult  YES    Informed Patient of COVID Test Requirement YES; SCHEDULED 12/12    Preferred Pharmacy for Pre Prescription UNKNOWN    Confirmed Nurse will call to complete assessment YES    Additional comments: NA

## 2020-12-07 ENCOUNTER — HOSPITAL ENCOUNTER (OUTPATIENT)
Facility: AMBULATORY SURGERY CENTER | Age: 85
End: 2020-12-07
Attending: INTERNAL MEDICINE
Payer: MEDICARE

## 2020-12-07 DIAGNOSIS — Z11.59 ENCOUNTER FOR SCREENING FOR OTHER VIRAL DISEASES: Primary | ICD-10-CM

## 2020-12-21 ENCOUNTER — OFFICE VISIT (OUTPATIENT)
Dept: FAMILY MEDICINE | Facility: CLINIC | Age: 85
End: 2020-12-21
Payer: MEDICARE

## 2020-12-21 VITALS
SYSTOLIC BLOOD PRESSURE: 199 MMHG | HEART RATE: 74 BPM | TEMPERATURE: 97.8 F | OXYGEN SATURATION: 98 % | DIASTOLIC BLOOD PRESSURE: 78 MMHG

## 2020-12-21 DIAGNOSIS — E11.9 TYPE 2 DIABETES MELLITUS WITHOUT COMPLICATION, WITHOUT LONG-TERM CURRENT USE OF INSULIN (H): ICD-10-CM

## 2020-12-21 DIAGNOSIS — K45.8 HERNIA OF PELVIC FLOOR: ICD-10-CM

## 2020-12-21 DIAGNOSIS — I10 ESSENTIAL HYPERTENSION: Primary | ICD-10-CM

## 2020-12-21 DIAGNOSIS — Z12.11 ENCOUNTER FOR SCREENING COLONOSCOPY: Primary | ICD-10-CM

## 2020-12-21 PROCEDURE — 99213 OFFICE O/P EST LOW 20 MIN: CPT | Performed by: NURSE PRACTITIONER

## 2020-12-21 RX ORDER — BISACODYL 5 MG
5 TABLET, DELAYED RELEASE (ENTERIC COATED) ORAL SEE ADMIN INSTRUCTIONS
Qty: 4 TABLET | Refills: 0 | Status: SHIPPED | OUTPATIENT
Start: 2020-12-21 | End: 2021-03-25

## 2020-12-21 ASSESSMENT — PAIN SCALES - GENERAL: PAINLEVEL: NO PAIN (0)

## 2020-12-21 NOTE — NURSING NOTE
Chief Complaint   Patient presents with     Recheck Medication     6 month follow up.     Mable De Leon, FRANCISCO 1:57 PM  12/21/2020

## 2020-12-21 NOTE — PROGRESS NOTES
HPI       Farzad East is a 85 year old man who presents with multiple concerns:   Chief Complaint   Patient presents with     Recheck Medication     6 month follow up.     Alessio Tested positive for Covid on 12/04/2020, he has quarantined at home for the past 14 days, he is feeling well, no concerns. Alessio has a Colonsocopy scheduled in the next few weeks, he wonders if at 85, he really needs to have this. Jesús has elevated blood pressures. He has a history of having elevated systolic pressures with normal diastolic pressures. He does have a history of CKD and sees Nephrology for this. Current BP meds include Carvedilol, Hydralazine, Hydrochlorothiazide. Type 2 DM currently managed with Januvia, due for A1C.   Alessio noticed a new hernia recently on the left side, low abdomen. He does not recall lifting something heavy or any accident, this just showed up. Alessio would like to confirm that this is a hernia.     Problem, Medication and Allergy Lists were reviewed and updated if needed.    Patient is an established patient of this clinic.         Review of Systems:   Review of Systems     Constitutional:  Negative for fever, chills and fatigue.               Physical Exam:     Vitals:    12/21/20 1347 12/21/20 1349   BP: (!) 203/79 (!) 199/78   Pulse: 74    Temp: 97.8  F (36.6  C)    TempSrc: Oral    SpO2: 98%    BP sitting, manual right arm 156/80  There is no height or weight on file to calculate BMI.  Vitals were reviewed and were normal.     Physical Exam  Vitals signs reviewed.   Constitutional:       Appearance: Normal appearance.   HENT:      Head: Normocephalic.   Genitourinary:         Comments: Hernia palpated in area indicated.   Skin:     General: Skin is warm and dry.   Neurological:      General: No focal deficit present.      Mental Status: He is alert and oriented to person, place, and time.   Psychiatric:         Mood and Affect: Mood normal.         Behavior: Behavior normal.          Results:   Labs and US pending.   Assessment and Plan     1. Essential hypertension    - Comprehensive metabolic panel; Future    2. Type 2 diabetes mellitus without complication, without long-term current use of insulin (H)    - Hemoglobin A1c; Future    3. Hernia of pelvic floor    - US Hernia Evaluation; Future  Patient instructions: Please have your lab tests and Ultra sound at your convenience. Follow up in 3 days by telephone to discuss results and Dr. Amin's response regarding your blood pressure treatment. Options for treatment and follow-up care were reviewed with the patient. Farzad East  engaged in the decision making process and verbalized understanding of the options discussed and agreed with the final plan.  Renu Lantigua, APRN, CNP

## 2020-12-21 NOTE — PATIENT INSTRUCTIONS
Please have your lab tests and Ultra sound at your convenience. Lets follow up in 3 days by telephone to discuss results and Dr. Amin's response regarding your blood pressure.   Please schedule  Labs and US _________  Follow up with Renu PRINCE__12/24 At_________  [facility] Medication Refill Request Information:  * Please contact your pharmacy regarding ANY request for medication refills.  ** NP Clinic Prescription Fax = [phone]  * Please allow 3 business days for routine medication refills.  * Please allow 5 business days for controlled substance medication refills.     [facility] Test Result notification information:  *You will be notified with in 7-10 days of your appointment day regarding the results of your test.  If you are on MyChart you will be notified as soon as the provider has reviewed the results and signed off on them.    [facility]: [phone]

## 2020-12-22 ASSESSMENT — ENCOUNTER SYMPTOMS
FEVER: 0
CHILLS: 0
FATIGUE: 0

## 2020-12-23 ENCOUNTER — APPOINTMENT (OUTPATIENT)
Dept: URBAN - METROPOLITAN AREA CLINIC 255 | Age: 85
Setting detail: DERMATOLOGY
End: 2020-12-27

## 2020-12-23 PROBLEM — D04.22 CARCINOMA IN SITU OF SKIN OF LEFT EAR AND EXTERNAL AURICULAR CANAL: Status: ACTIVE | Noted: 2020-12-23

## 2020-12-23 PROCEDURE — 17311 MOHS 1 STAGE H/N/HF/G: CPT

## 2020-12-23 PROCEDURE — OTHER MOHS SURGERY: OTHER

## 2020-12-23 PROCEDURE — 17312 MOHS ADDL STAGE: CPT

## 2020-12-23 PROCEDURE — OTHER MIPS QUALITY: OTHER

## 2020-12-23 NOTE — PROCEDURE: MOHS SURGERY
Body Location Override (Optional - Billing Will Still Be Based On Selected Body Map Location If Applicable): Left Mid Antihelix

## 2020-12-23 NOTE — PROCEDURE: MIPS QUALITY
Quality 130: Documentation Of Current Medications In The Medical Record: Current Medications Documented
Quality 226: Preventive Care And Screening: Tobacco Use: Screening And Cessation Intervention: Patient screened for tobacco use and is an ex/non-smoker
Quality 431: Preventive Care And Screening: Unhealthy Alcohol Use - Screening: Patient screened for unhealthy alcohol use using a single question and scores less than 2 times per year
Quality 143: Oncology: Medical And Radiation- Pain Intensity Quantified: Pain severity quantified, no pain present
Detail Level: Detailed
Quality 110: Preventive Care And Screening: Influenza Immunization: Influenza Immunization previously received during influenza season

## 2021-01-25 NOTE — PROGRESS NOTES
Outcome for 01/25/21 3:30 PM :Reached patient but they declined to share any data because info below      Patient states BS is higher then normal and he also been stressed. He ran out of testing strips two weeks ago. Just got more and numbers are 150-160 patient stated.      Alessio is a 85 year old who is being evaluated via a billable telephone visit.      What phone number would you like to be contacted at? [phone]  How would you like to obtain your AVS? Chante  Phone call duration: 36 minutes         [facility]  Endocrinology  Sandhya Mena PA-C, MPAS  01/26/2021      Chief Complaint:   Telephone     History of Present Illness:   Farzad East is a 85 year old male with a history of CKD stage 3, type 2 diabetes mellitus with nephropathy, and hypertension who presents for follow up of type 2 diabetes mellitus. The patient was diagnosed with diabetes in his 50s and has managed using Metformin, Glipizide, Lantus, and Januvia in the past. Lantus was discontinued toward the beginning of 2018.     Interval History: Current diabetes regimen Januvia 50 mg, and Glipizide 5 mg. Glipizide was started in September.      Patient states BS is higher then normal he last 1-2 weeks after being in hospital for Potassium. Metformin and Losartan stopped.  Wife had to go into Memory Care.  They are in [place] [facility] [place]. She has not adjusted well at all.  It appears he will be able to visit her hopefully this weekend.  He has a separate apartment in the same building.  It has been a remarkably stressful time for him after so many years together and watching her deteriorate, have hallucinations, delusions and then hours where she is herself.  He has not been eating or sleeping well.    He ran out of testing strips two weeks ago. Just got more and numbers are 150-160 patient stated. The last couple of weeks up higher.  He checks BG only in the morning.  He is having increased thirst and urination at night at times,  maybe once weekly, but he denies weakness, blurry vision, nausea or muscle aches or any symptoms of low blood sugar at any time.      He has another blood draw scheduled for February 3rd.      Review of Systems:   Pertinent items are noted in HPI, remainder of complete ROS is negative.      Active Medications:     Current Outpatient Medications:      aspirin 81 MG tablet, Take 1 tablet by mouth daily., Disp: , Rfl:      blood glucose (NO BRAND SPECIFIED) lancets standard, Use to test blood sugar 1 times daily or as directed. Use brand compatible with patients insurance and device., Disp: 100 each, Rfl: 3     blood glucose (ONETOUCH VERIO IQ) test strip, Use to test blood sugar 1 time daily or as directed., Disp: 100 each, Rfl: 3     blood glucose monitoring (ONETOUCH VERIO IQ SYSTEM) meter device kit, Use to test blood sugar daily, Disp: 1 kit, Rfl: 0     carvedilol (COREG) 6.25 MG tablet, TAKE 1 TABLET BY MOUTH TWICE A DAY WITH MEALS, Disp: 180 tablet, Rfl: 0     Cholecalciferol (VITAMIN D-3 PO), , Disp: , Rfl:      COMPRESSION STOCKINGS, 1 each daily, Disp: 1 each, Rfl: 3     finasteride (PROSCAR) 5 MG tablet, Take 1 tablet by mouth daily, Disp: , Rfl: 3     glipiZIDE (GLUCOTROL XL) 5 MG 24 hr tablet, Take 1 tablet (5 mg) by mouth daily, Disp: 90 tablet, Rfl: 1     hydrALAZINE (APRESOLINE) 25 MG tablet, Take 1 tablet (25 mg) by mouth 3 times daily, Disp: 270 tablet, Rfl: 3     hydrochlorothiazide (MICROZIDE) 12.5 MG capsule, Take 2 capsules (25 mg) by mouth daily, Disp: 60 capsule, Rfl: 0     multivitamin w/minerals (MULTI-VITAMIN) tablet, Take 1 tablet by mouth daily, Disp: , Rfl:      sitagliptin (JANUVIA) 50 MG tablet, Take 1 tablet (50 mg) by mouth daily, Disp: 90 tablet, Rfl: 1     tamsulosin (FLOMAX) 0.4 MG capsule, Take 1 capsule (0.4 mg) by mouth daily, Disp: 90 capsule, Rfl: 1     bisacodyl (DULCOLAX) 5 MG EC tablet, Take 1 tablet (5 mg) by mouth See Admin Instructions Refer to My chart messaget (Patient  not taking: Reported on 1/25/2021), Disp: 4 tablet, Rfl: 0     polyethylene glycol (GOLYTELY) 236 g suspension, Take 4,000 mLs by mouth See Admin Instructions Refer to My chart message (Patient not taking: Reported on 1/25/2021), Disp: 4000 mL, Rfl: 0      Allergies:   Sulfa drugs      Past Medical History:  Atrial flutter  Chronic kidney disease stage 3  Type 2 diabetes mellitus  Hypertension  Peripheral neuropathy  Rectal type adenocarcinoma  Femur fracture     Past Surgical History:  Cataract  ORIF femur  Left KIMBERLY    Family History:   Diabetes - father  Circulatory - father  Macular degeneration - father  Arthritis - mother     Social History:   Tobacco Use: none  Alcohol Use: 3 martinis/week  Drug Use: none  PCP: Renu Lantigua      Physical Exam:   There were no vitals taken for this visit.      Data:  Reviewed in epic.    Lab Results   Component Value Date    A1C 7.3 09/23/2020    A1C 7.7 07/22/2019    A1C 8.3 12/04/2018    A1C 7.9 12/12/2017    A1C 7.8 08/01/2017         Assessment and Plan:  Type 2 diabetes mellitus  Fasting blood sugars are above goal.  It sounds like they are at times >200 in the evenings.  We discussed that indeed stress and poor sleep is likely contributing to this.     He feels he can work on getting better sleep and eating more regularly.  He will start checking bedtime blood sugars and we will check back in two weeks, to see if further management is warranted.    CKD with worsening albuminuria  I believe he is now seeing nephrology  - reported they advised of elevated K+ - but I do not see this note.  Will need to inquire where seen at follow-up.      HTN: Following with cardiology and I believe nephrology.        Follow-up:2 weeks     40 minutes in preparation for visit reviewing chart, labs and documentation, visiting with patient gathering history and in exam, education and counseling, as well as coordination of care, further chart review and documentation following visit on this  date of service and as alluded to documented above.      It is my privilege to be involved in the care of the above patient.     Sandhya Mena PA-C, MPAS  [facility]  Diabetes, Endocrinology, and Metabolism  [phone] Appointments/Nurse  [phone] Fax  [phone] pager  [phone] nurse line

## 2021-01-26 ENCOUNTER — VIRTUAL VISIT (OUTPATIENT)
Dept: ENDOCRINOLOGY | Facility: CLINIC | Age: 86
End: 2021-01-26
Payer: MEDICARE

## 2021-01-26 DIAGNOSIS — N18.4 CKD (CHRONIC KIDNEY DISEASE) STAGE 4, GFR 15-29 ML/MIN (H): ICD-10-CM

## 2021-01-26 DIAGNOSIS — Z79.4 TYPE 2 DIABETES MELLITUS WITH OTHER OPHTHALMIC COMPLICATION, WITH LONG-TERM CURRENT USE OF INSULIN (H): ICD-10-CM

## 2021-01-26 DIAGNOSIS — E11.39 TYPE 2 DIABETES MELLITUS WITH OTHER OPHTHALMIC COMPLICATION, WITH LONG-TERM CURRENT USE OF INSULIN (H): ICD-10-CM

## 2021-01-26 PROCEDURE — 99443 PR PHYSICIAN TELEPHONE EVALUATION 21-30 MIN: CPT | Mod: 95 | Performed by: PHYSICIAN ASSISTANT

## 2021-01-26 NOTE — PATIENT INSTRUCTIONS
Dear Alessio,    Please check your blood sugar in the evening as well.    I look forward to speaking again soon.      My best wishes,    Sandhya Mena PA-C, MPAS  [facility]  Diabetes, Endocrinology, and Metabolism  [phone] Appointments/Nurse  [phone] Fax  [phone] URGENTafter hours/weekend Endocrinologist on call

## 2021-01-26 NOTE — LETTER
1/26/2021       RE: Farzad East  [address]  [facility] [address]     Dear Colleague,    Thank you for referring your patient, Farzad East, to the [facility] ENDOCR[facility]OLOGY [facility] [place] [facility]. Please see a copy of my visit note below.    Outcome for 01/25/21 3:30 PM :Reached patient but they declined to share any data because info below      Patient states BS is higher then normal and he also been stressed. He ran out of testing strips two weeks ago. Just got more and numbers are 150-160 patient stated.      Alessio is a 85 year old who is being evaluated via a billable telephone visit.      What phone number would you like to be contacted at? [phone]  How would you like to obtain your AVS? [employer]  Phone call duration: 36 minutes         [facility]  Endocrinology  Sandhya Mena PA-C, MPAS  01/26/2021      Chief Complaint:   Telephone     History of Present Illness:   Farzad East is a 85 year old male with a history of CKD stage 3, type 2 diabetes mellitus with nephropathy, and hypertension who presents for follow up of type 2 diabetes mellitus. The patient was diagnosed with diabetes in his 50s and has managed using Metformin, Glipizide, Lantus, and Januvia in the past. Lantus was discontinued toward the beginning of 2018.     Interval History: Current diabetes regimen Januvia 50 mg, and Glipizide 5 mg. Glipizide was started in September.      Patient states BS is higher then normal he last 1-2 weeks after being in hospital for Potassium. Metformin and Losartan stopped.  Wife had to go into Memory Care.  They are in [place] [facility] [place]. She has not adjusted well at all.  It appears he will be able to visit her hopefully this weekend.  He has a separate apartment in the same building.  It has been a remarkably stressful time for him after so many years together and watching her deteriorate, have  hallucinations, delusions and then hours where she is herself.  He has not been eating or sleeping well.    He ran out of testing strips two weeks ago. Just got more and numbers are 150-160 patient stated. The last couple of weeks up higher.  He checks BG only in the morning.  He is having increased thirst and urination at night at times, maybe once weekly, but he denies weakness, blurry vision, nausea or muscle aches or any symptoms of low blood sugar at any time.      He has another blood draw scheduled for February 3rd.      Review of Systems:   Pertinent items are noted in HPI, remainder of complete ROS is negative.      Active Medications:     Current Outpatient Medications:      aspirin 81 MG tablet, Take 1 tablet by mouth daily., Disp: , Rfl:      blood glucose (NO BRAND SPECIFIED) lancets standard, Use to test blood sugar 1 times daily or as directed. Use brand compatible with patients insurance and device., Disp: 100 each, Rfl: 3     blood glucose (ONETOUCH VERIO IQ) test strip, Use to test blood sugar 1 time daily or as directed., Disp: 100 each, Rfl: 3     blood glucose monitoring (ONETOUCH VERIO IQ SYSTEM) meter device kit, Use to test blood sugar daily, Disp: 1 kit, Rfl: 0     carvedilol (COREG) 6.25 MG tablet, TAKE 1 TABLET BY MOUTH TWICE A DAY WITH MEALS, Disp: 180 tablet, Rfl: 0     Cholecalciferol (VITAMIN D-3 PO), , Disp: , Rfl:      COMPRESSION STOCKINGS, 1 each daily, Disp: 1 each, Rfl: 3     finasteride (PROSCAR) 5 MG tablet, Take 1 tablet by mouth daily, Disp: , Rfl: 3     glipiZIDE (GLUCOTROL XL) 5 MG 24 hr tablet, Take 1 tablet (5 mg) by mouth daily, Disp: 90 tablet, Rfl: 1     hydrALAZINE (APRESOLINE) 25 MG tablet, Take 1 tablet (25 mg) by mouth 3 times daily, Disp: 270 tablet, Rfl: 3     hydrochlorothiazide (MICROZIDE) 12.5 MG capsule, Take 2 capsules (25 mg) by mouth daily, Disp: 60 capsule, Rfl: 0     multivitamin w/minerals (MULTI-VITAMIN) tablet, Take 1 tablet by mouth daily, Disp: ,  Rfl:      sitagliptin (JANUVIA) 50 MG tablet, Take 1 tablet (50 mg) by mouth daily, Disp: 90 tablet, Rfl: 1     tamsulosin (FLOMAX) 0.4 MG capsule, Take 1 capsule (0.4 mg) by mouth daily, Disp: 90 capsule, Rfl: 1     bisacodyl (DULCOLAX) 5 MG EC tablet, Take 1 tablet (5 mg) by mouth See Admin Instructions Refer to My chart messaget (Patient not taking: Reported on 1/25/2021), Disp: 4 tablet, Rfl: 0     polyethylene glycol (GOLYTELY) 236 g suspension, Take 4,000 mLs by mouth See Admin Instructions Refer to My chart message (Patient not taking: Reported on 1/25/2021), Disp: 4000 mL, Rfl: 0      Allergies:   Sulfa drugs      Past Medical History:  Atrial flutter  Chronic kidney disease stage 3  Type 2 diabetes mellitus  Hypertension  Peripheral neuropathy  Rectal type adenocarcinoma  Femur fracture     Past Surgical History:  Cataract  ORIF femur  Left KIMBERLY    Family History:   Diabetes - father  Circulatory - father  Macular degeneration - father  Arthritis - mother     Social History:   Tobacco Use: none  Alcohol Use: 3 martinis/week  Drug Use: none  PCP: Renu Lantigua      Physical Exam:   There were no vitals taken for this visit.      Data:  Reviewed in epic.    Lab Results   Component Value Date    A1C 7.3 09/23/2020    A1C 7.7 07/22/2019    A1C 8.3 12/04/2018    A1C 7.9 12/12/2017    A1C 7.8 08/01/2017         Assessment and Plan:  Type 2 diabetes mellitus  Fasting blood sugars are above goal.  It sounds like they are at times >200 in the evenings.  We discussed that indeed stress and poor sleep is likely contributing to this.     He feels he can work on getting better sleep and eating more regularly.  He will start checking bedtime blood sugars and we will check back in two weeks, to see if further management is warranted.    CKD with worsening albuminuria  I believe he is now seeing nephrology  - reported they advised of elevated K+ - but I do not see this note.  Will need to inquire where seen at follow-up.       HTN: Following with cardiology and I believe nephrology.        Follow-up:2 weeks     40 minutes in preparation for visit reviewing chart, labs and documentation, visiting with patient gathering history and in exam, education and counseling, as well as coordination of care, further chart review and documentation following visit on this date of service and as alluded to documented above.      It is my privilege to be involved in the care of the above patient.     Sandhya Mena PA-C, MPAS  [facility]  Diabetes, Endocrinology, and Metabolism  [phone] Appointments/Nurse  [phone] Fax  [phone] pager  [phone] nurse line

## 2021-01-28 ENCOUNTER — VIRTUAL VISIT (OUTPATIENT)
Dept: FAMILY MEDICINE | Facility: CLINIC | Age: 86
End: 2021-01-28
Payer: MEDICARE

## 2021-01-28 DIAGNOSIS — I10 ESSENTIAL HYPERTENSION: Primary | ICD-10-CM

## 2021-01-28 PROCEDURE — 99442 PR PHYSICIAN TELEPHONE EVALUATION 11-20 MIN: CPT | Mod: 95 | Performed by: NURSE PRACTITIONER

## 2021-01-28 RX ORDER — HYDROCHLOROTHIAZIDE 12.5 MG/1
12.5 TABLET ORAL EVERY MORNING
Qty: 60 TABLET | Refills: 0 | COMMUNITY
Start: 2021-01-28 | End: 2021-06-09

## 2021-01-28 RX ORDER — HYDRALAZINE HYDROCHLORIDE 50 MG/1
50 TABLET, FILM COATED ORAL 3 TIMES DAILY
Qty: 90 TABLET | Refills: 0 | Status: SHIPPED | OUTPATIENT
Start: 2021-01-28 | End: 2021-03-17

## 2021-01-28 ASSESSMENT — PAIN SCALES - GENERAL: PAINLEVEL: NO PAIN (0)

## 2021-01-28 NOTE — NURSING NOTE
Chief Complaint   Patient presents with     Hypertension     Discuss hypertension.     Mable De Leon, FRANCISCO 6:59 AM  1/28/2021

## 2021-01-28 NOTE — PROGRESS NOTES
Alessio is a 85 year old who is being evaluated via a billable telephone visit.      What phone number would you like to be contacted at? [phone]  How would you like to obtain your AVS? Chante Keller     Alessio is a 85 year old who presents to clinic today for follow up on blood pressure;  HPI     Hypertension: Alessio has felt increased stress and pressure the last few months. About 10 days ago, his wife had to go to [facility]. His wife was up and wandering every night and he was not sleeping much. He has been very emotional about this. His life has been up and down as he has not been able to visit her. His blood pressure has been up the past few months. He has a cuff at home. He has been checking his blood pressure recently. He had another 190 reading the other day. He had his ears cleaned out the other day, they check him and his blood pressure was 190 over 90. They told him to call his doctor. He denies any symptoms such as headache, dizziness, unsteadiness.      Review of Systems   Constitutional, HEENT, cardiovascular, pulmonary, gi and gu systems are negative, except as otherwise noted.      Objective    Vitals - Patient Reported  Pain Score: No Pain (0)    Physical Exam   Healthy, alert and no distress  PSYCH: Alert and oriented times 3; coherent speech, normal   rate and volume, able to articulate logical thoughts, able   to abstract reason, no tangential thoughts, no hallucinations   or delusions  His affect is normal  RESP: No cough, no audible wheezing, able to talk in full sentences  Remainder of exam unable to be completed due to telephone visits    Labs pending.     Phone call duration:20 minutes    Upon collaboration with Dr. Amin, Alessio will increase his Hydralazine from 25 mg po TID to 50 mg po TID. Alessio will have his labs repeated as scheduled next week. Next, Alessio will follow up as needed. He verbalizes understanding of the plan.   Renu Lantigua, ESTEPHANIE, CNP  Addendum 02/01/2021  1104:Dr. Kim responded to questions regarding blood pressure. Recommendation to increase Hydralazine or Coreg. The Hydralazine was increased last week. If needed, the Coreg could be increased as well; however we would need to monitor his heart rate. Will follow up with patient as needed.  ESTEPHANIE Green, CNP

## 2021-01-28 NOTE — PATIENT INSTRUCTIONS
[facility] Medication Refill Request Information:  * Please contact your pharmacy regarding ANY request for medication refills.  ** NP Clinic Prescription Fax = [phone]  * Please allow 3 business days for routine medication refills.  * Please allow 5 business days for controlled substance medication refills.     [facility] Test Result notification information:  *You will be notified with in 7-10 days of your appointment day regarding the results of your test.  If you are on MyChart you will be notified as soon as the provider has reviewed the results and signed off on them.    [facility]: [phone]     If you have questions regarding Covid-19 and the Covid-19 vaccine, please visit this website.    [email or web address]

## 2021-02-02 ENCOUNTER — APPOINTMENT (OUTPATIENT)
Dept: URBAN - METROPOLITAN AREA CLINIC 255 | Age: 86
Setting detail: DERMATOLOGY
End: 2021-02-03

## 2021-02-02 DIAGNOSIS — L98419 CHRONIC ULCER OF OTHER SPECIFIED SITES: ICD-10-CM

## 2021-02-02 DIAGNOSIS — L98429 CHRONIC ULCER OF OTHER SPECIFIED SITES: ICD-10-CM

## 2021-02-02 PROBLEM — L98.499 NON-PRESSURE CHRONIC ULCER OF SKIN OF OTHER SITES WITH UNSPECIFIED SEVERITY: Status: ACTIVE | Noted: 2021-02-02

## 2021-02-02 PROCEDURE — 99213 OFFICE O/P EST LOW 20 MIN: CPT

## 2021-02-02 PROCEDURE — OTHER MIPS QUALITY: OTHER

## 2021-02-02 PROCEDURE — OTHER DIAGNOSIS COMMENT: OTHER

## 2021-02-02 PROCEDURE — OTHER ULCER EVALUATION: OTHER

## 2021-02-02 PROCEDURE — OTHER COUNSELING: OTHER

## 2021-02-02 ASSESSMENT — LOCATION SIMPLE DESCRIPTION DERM: LOCATION SIMPLE: LEFT EAR

## 2021-02-02 ASSESSMENT — LOCATION DETAILED DESCRIPTION DERM: LOCATION DETAILED: LEFT ANTIHELIX

## 2021-02-02 ASSESSMENT — LOCATION ZONE DERM: LOCATION ZONE: EAR

## 2021-02-02 NOTE — PROCEDURE: ULCER EVALUATION
Detail Level: Detailed
X Size Of Lesion In Cm (Optional): 0.4
Instructions (Optional): Physical Examination: base of wound desiccated cartilage \\nEschar: soft, fibrinous\\nGranulation Tissue: absent \\nRe-Epithelialization: progressing\\nDrainage: serous\\nSurrounding Edema: present\\nPain to palpation: 2 / 10\\nOdor: none\\nSurrounding Dermatitis: absent\\n\\nAssessment:\\ncessation of wound healing\\nNo signs / symptoms of infection\\n\\nPlan:\\nWound cleaned with H2O2\\nPunch removal of cartilage to facilitate granulation\\nWhite petrolatum ointment and non-adherent dressing applied\\nContinue QD wound care as instructed
Body Location Override (Optional - Billing Will Still Be Based On Selected Body Map Location If Applicable): Left Mid Antihelix

## 2021-02-02 NOTE — PROCEDURE: DIAGNOSIS COMMENT
Detail Level: Simple
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
Render Risk Assessment In Note?: yes

## 2021-02-03 ENCOUNTER — ANCILLARY PROCEDURE (OUTPATIENT)
Dept: ULTRASOUND IMAGING | Facility: CLINIC | Age: 86
End: 2021-02-03
Attending: NURSE PRACTITIONER
Payer: MEDICARE

## 2021-02-03 DIAGNOSIS — E11.9 TYPE 2 DIABETES MELLITUS WITHOUT COMPLICATION, WITHOUT LONG-TERM CURRENT USE OF INSULIN (H): ICD-10-CM

## 2021-02-03 DIAGNOSIS — K45.8 HERNIA OF PELVIC FLOOR: ICD-10-CM

## 2021-02-03 DIAGNOSIS — I10 ESSENTIAL HYPERTENSION: ICD-10-CM

## 2021-02-03 LAB
ALBUMIN SERPL-MCNC: 3.4 G/DL (ref 3.4–5)
ALP SERPL-CCNC: 77 U/L (ref 40–150)
ALT SERPL W P-5'-P-CCNC: 16 U/L (ref 0–70)
ANION GAP SERPL CALCULATED.3IONS-SCNC: 5 MMOL/L (ref 3–14)
AST SERPL W P-5'-P-CCNC: 8 U/L (ref 0–45)
BILIRUB SERPL-MCNC: 0.3 MG/DL (ref 0.2–1.3)
BUN SERPL-MCNC: 58 MG/DL (ref 7–30)
CALCIUM SERPL-MCNC: 9.1 MG/DL (ref 8.5–10.1)
CHLORIDE SERPL-SCNC: 110 MMOL/L (ref 94–109)
CO2 SERPL-SCNC: 24 MMOL/L (ref 20–32)
CREAT SERPL-MCNC: 2.22 MG/DL (ref 0.66–1.25)
GFR SERPL CREATININE-BSD FRML MDRD: 26 ML/MIN/{1.73_M2}
GLUCOSE SERPL-MCNC: 182 MG/DL (ref 70–99)
HBA1C MFR BLD: 8.4 % (ref 0–5.6)
POTASSIUM SERPL-SCNC: 4.7 MMOL/L (ref 3.4–5.3)
PROT SERPL-MCNC: 7.6 G/DL (ref 6.8–8.8)
SODIUM SERPL-SCNC: 139 MMOL/L (ref 133–144)

## 2021-02-03 PROCEDURE — 80053 COMPREHEN METABOLIC PANEL: CPT | Performed by: NURSE PRACTITIONER

## 2021-02-03 PROCEDURE — 36415 COLL VENOUS BLD VENIPUNCTURE: CPT | Performed by: NURSE PRACTITIONER

## 2021-02-03 PROCEDURE — 83036 HEMOGLOBIN GLYCOSYLATED A1C: CPT | Performed by: NURSE PRACTITIONER

## 2021-02-04 ENCOUNTER — TELEPHONE (OUTPATIENT)
Dept: FAMILY MEDICINE | Facility: CLINIC | Age: 86
End: 2021-02-04

## 2021-02-04 ENCOUNTER — VIRTUAL VISIT (OUTPATIENT)
Dept: FAMILY MEDICINE | Facility: CLINIC | Age: 86
End: 2021-02-04
Payer: MEDICARE

## 2021-02-04 DIAGNOSIS — E11.9 TYPE 2 DIABETES MELLITUS TREATED WITHOUT INSULIN (H): ICD-10-CM

## 2021-02-04 DIAGNOSIS — K40.90 NON-RECURRENT UNILATERAL INGUINAL HERNIA WITHOUT OBSTRUCTION OR GANGRENE: Primary | ICD-10-CM

## 2021-02-04 PROCEDURE — 99212 OFFICE O/P EST SF 10 MIN: CPT | Mod: 95 | Performed by: NURSE PRACTITIONER

## 2021-02-04 ASSESSMENT — PAIN SCALES - GENERAL: PAINLEVEL: NO PAIN (0)

## 2021-02-04 NOTE — PROGRESS NOTES
Alessio is a 85 year old who is being evaluated via a billable telephone visit.      What phone number would you like to be contacted at? [phone]   How would you like to obtain your AVS? Chante Keller     Alessio is a 85 year old who presents to clinic today for the follow up on recent labs and imaging.     HPI     Non-recurrent unilateral inguinal hernia:US shows hernia. Patient is somewhat bothered by this. He would like to see general surgery to discuss possible repair.   Type 2 DM: A1C is up to 8.4; kidney studies are stable. Patient continues or ordered oral agents. He feels that his recent diet has been poor.     Review of Systems   Constitutional, HEENT, cardiovascular, pulmonary, gi and gu systems are negative, except as otherwise noted.      Objective       Vitals:  No vitals were obtained today due to virtual visit.    Physical Exam   Healthy, alert and no distress  PSYCH: Alert and oriented times 3; coherent speech, normal   rate and volume, able to articulate logical thoughts, able   to abstract reason, no tangential thoughts, no hallucinations   or delusions  His affect is normal  RESP: No cough, no audible wheezing, able to talk in full sentences  Remainder of exam unable to be completed due to telephone visits    Recent labs and imaging results:   Component      Latest Ref Rng & Units 2/3/2021   Sodium      133 - 144 mmol/L 139   Potassium      3.4 - 5.3 mmol/L 4.7   Chloride      94 - 109 mmol/L 110 (H)   Carbon Dioxide      20 - 32 mmol/L 24   Anion Gap      3 - 14 mmol/L 5   Glucose      70 - 99 mg/dL 182 (H)   Urea Nitrogen      7 - 30 mg/dL 58 (H)   Creatinine      0.66 - 1.25 mg/dL 2.22 (H)   GFR Estimate      >60 mL/min/1.73:m2 26 (L)   GFR Estimate If Black      >60 mL/min/1.73:m2 30 (L)   Calcium      8.5 - 10.1 mg/dL 9.1   Bilirubin Total      0.2 - 1.3 mg/dL 0.3   Albumin      3.4 - 5.0 g/dL 3.4   Protein Total      6.8 - 8.8 g/dL 7.6   Alkaline Phosphatase      40 - 150 U/L 77    ALT      0 - 70 U/L 16   AST      0 - 45 U/L 8   Hemoglobin A1C      0 - 5.6 % 8.4 (H)   Imaging:   IMPRESSION: Left lower quadrant/inguinal region hernia measuring 3.5 x  2.2 x 2.8 cm.     Assessment & Plan     1.Non-recurrent inquinal hernia without obstruction or gangrene  2.Type 2 DM without Insulin    Phone call duration: 10 minutes  First, surgical consult placed. Next, I will collaborate with Dr. Amin on medication changes for recent diabetic labs. Alessio will return for follow up as needed. He verbalizes understanding of the plan.   Renu Lantigua, APRN, CNP

## 2021-02-04 NOTE — TELEPHONE ENCOUNTER
LIOM to schedule a virtual appointment to discuss test results.    HARSHA Boland 10:46 AM  2/4/2021

## 2021-02-04 NOTE — NURSING NOTE
Chief Complaint   Patient presents with     Results     Discuss test results.       Mable De Leon, FRANCISCO 4:30 PM  2/4/2021

## 2021-02-04 NOTE — PATIENT INSTRUCTIONS
[facility] Medication Refill Request Information:  * Please contact your pharmacy regarding ANY request for medication refills.  ** NP Clinic Prescription Fax = [phone]  * Please allow 3 business days for routine medication refills.  * Please allow 5 business days for controlled substance medication refills.     [facility] Test Result notification information:  *You will be notified with in 7-10 days of your appointment day regarding the results of your test.  If you are on MyChart you will be notified as soon as the provider has reviewed the results and signed off on them.    [facility]: [phone]     If you have questions regarding Covid-19 and the Covid-19 vaccine, please visit this website.    [email or web address]

## 2021-02-05 DIAGNOSIS — E11.8 TYPE 2 DIABETES MELLITUS WITH COMPLICATION, WITH LONG-TERM CURRENT USE OF INSULIN (H): ICD-10-CM

## 2021-02-05 DIAGNOSIS — Z79.4 TYPE 2 DIABETES MELLITUS WITH COMPLICATION, WITH LONG-TERM CURRENT USE OF INSULIN (H): ICD-10-CM

## 2021-02-08 RX ORDER — TAMSULOSIN HYDROCHLORIDE 0.4 MG/1
0.4 CAPSULE ORAL DAILY
Qty: 90 CAPSULE | Refills: 3 | Status: SHIPPED | OUTPATIENT
Start: 2021-02-08 | End: 2021-12-10

## 2021-02-08 NOTE — TELEPHONE ENCOUNTER
TAMSULOSIN HCL 0.4 MG CAPSULE      Last Written Prescription Date: 7/31/20  Last Fill Quantity: 90, # refills: 1    Last Office Visit :  1/26/21   Next Office Visit: 2/9/21    BP Readings from Last 3 Encounters:   12/21/20 (!) 199/78   09/29/20 (!) 186/84   09/25/20 (!) 148/52

## 2021-02-08 NOTE — PROGRESS NOTES
Alessio is a 85 year old who is being evaluated via a billable telephone visit.    What phone number would you like to be contacted at? [phone]  How would you like to obtain your AVS? Chante  Phone call duration: 32 minutes       [facility]  Endocrinology  Sandhya Mena PA-C, MPAS  02/09/2021      Chief Complaint:   Telephone     History of Present Illness:   Farzad East is a 85 year old male with a history of CKD stage 3, type 2 diabetes mellitus with nephropathy, and hypertension who presents for follow up of type 2 diabetes mellitus. The patient was diagnosed with diabetes in his 50s and has managed using Metformin, Glipizide, Lantus, and Januvia in the past. Lantus was discontinued toward the beginning of 2018.     Interval History:     Hemoglobin A1C 0 - 5.6 % 8.4High        Ref Range & Units 8d ago     Sodium 133 - 144 mmol/L 139     Potassium 3.4 - 5.3 mmol/L 4.7     Chloride 94 - 109 mmol/L 110High      Carbon Dioxide 20 - 32 mmol/L 24     Anion Gap 3 - 14 mmol/L 5     Glucose 70 - 99 mg/dL 182High     Comment: Non Fasting    Urea Nitrogen 7 - 30 mg/dL 58High      Creatinine 0.66 - 1.25 mg/dL 2.22High      GFR Estimate >60 mL/min/1.73_m2} 26Low         Today:  Alessio is happy to have learned how to scroll through his glucometer and see past numbers.  He saw indeed they have been higher.  He wonders if stress making his BG higher.  Relates his distress previously attempting to care for his wife of nearly 60 years who has been wandering at night having hallucinations and delusions at times.  He was sleeping very poorly and eating very poorly.  She is now in Memory Care and he is indeed sleeping better, feeling more energy this week.  Ongoing distress as she calls him and children asking for help getting out of locked unit.  He is there frequently helping calm her.      He is now working to get meals in restaurant at his residence, he doesn't love the food, but at least he is eating better.   Hoping that BG will come down as he gets into more of a rhythm.  Reluctant to start more medication. Denies increased urination, nocturia, though this may have been an issue at times over the holidays. At times with nausea.  This is now resolved.       He has not been checking BG in evening at all.      He denies any symptoms of low BG.        Recalls taking Lantus before, feels he could do again, but would rather not.        Current diabetes regimen Januvia 50 mg, and Glipizide 5 mg.     BG monitoring:  Week of__/__/__ Date  2__/8__  Date  2__/_7_ Date  __/_6_ Date  __/_5_ Date  __/_3_ Date  __/_2_ Date  __/_1_    Time BG Time BG Time BG Time BG Time BG Time BG Time BG      196  176  161  187  147  166                                                        Week of__/__/__ Date  __/_31_  Date  __/_30_ Date  __/_29_ Date  __/28__ Date  __/_27_ Date  __/_26_ Date  __/__    Time BG Time BG Time BG Time BG Time BG Time BG Time BG      168  143  212  126  138  176                                                            Review of Systems:   Pertinent items are noted in HPI, remainder of complete ROS is negative.      Active Medications:     Current Outpatient Medications:      aspirin 81 MG tablet, Take 1 tablet by mouth daily., Disp: , Rfl:      bisacodyl (DULCOLAX) 5 MG EC tablet, Take 1 tablet (5 mg) by mouth See Admin Instructions Refer to My chart messaget, Disp: 4 tablet, Rfl: 0     blood glucose (NO BRAND SPECIFIED) lancets standard, Use to test blood sugar 1 times daily or as directed. Use brand compatible with patients insurance and device., Disp: 100 each, Rfl: 3     blood glucose (ONETOUCH VERIO IQ) test strip, Use to test blood sugar 1 time daily or as directed., Disp: 100 each, Rfl: 3     blood glucose monitoring (ONETOUCH VERIO IQ SYSTEM) meter device kit, Use to test blood sugar daily, Disp: 1 kit, Rfl: 0     carvedilol (COREG) 6.25 MG tablet, TAKE 1 TABLET BY MOUTH TWICE A DAY WITH MEALS,  "Disp: 180 tablet, Rfl: 0     Cholecalciferol (VITAMIN D-3 PO), , Disp: , Rfl:      COMPRESSION STOCKINGS, 1 each daily, Disp: 1 each, Rfl: 3     finasteride (PROSCAR) 5 MG tablet, Take 1 tablet by mouth daily, Disp: , Rfl: 3     glipiZIDE (GLUCOTROL XL) 5 MG 24 hr tablet, Take 1 tablet (5 mg) by mouth daily, Disp: 90 tablet, Rfl: 1     hydrALAZINE (APRESOLINE) 50 MG tablet, Take 1 tablet (50 mg) by mouth 3 times daily, Disp: 90 tablet, Rfl: 0     hydrochlorothiazide (HYDRODIURIL) 12.5 MG tablet, Take 2 tablets (25 mg) by mouth daily, Disp: 60 tablet, Rfl: 0     multivitamin w/minerals (MULTI-VITAMIN) tablet, Take 1 tablet by mouth daily, Disp: , Rfl:      polyethylene glycol (GOLYTELY) 236 g suspension, Take 4,000 mLs by mouth See Admin Instructions Refer to My chart message, Disp: 4000 mL, Rfl: 0     sitagliptin (JANUVIA) 50 MG tablet, Take 1 tablet (50 mg) by mouth daily, Disp: 90 tablet, Rfl: 1     tamsulosin (FLOMAX) 0.4 MG capsule, Take 1 capsule (0.4 mg) by mouth daily Advise clinic if causes lightheadedness., Disp: 90 capsule, Rfl: 3      Allergies:   Sulfa drugs      Past Medical History:  Atrial flutter  Chronic kidney disease stage 3  Type 2 diabetes mellitus  Hypertension  Peripheral neuropathy  Rectal type adenocarcinoma  Femur fracture     Past Surgical History:  Cataract  ORIF femur  Left KIMBERLY    Family History:   Diabetes - father  Circulatory - father  Macular degeneration - father  Arthritis - mother     Social History:   Tobacco Use: none  Alcohol Use: 3 martinis/week - not currently.  Drug Use: none  PCP: Renu Lantigua     Has support of adult children.  Living at [facility] in Independent senior apartment.       Physical Exam:   There were no vitals taken for this visit.     PHONE VISIT    Farzad is alerted and oriented.  Mood is \"alright I suppose,\" affect is congruent.  Thoughtful form and content are fluid and coherent.  He does become rather emotional at times and apologizes for this.    No " signs of distress are appreciated.         Data:  Component      Latest Ref Rng & Units 9/25/2020 9/25/2020 9/29/2020 10/26/2020           6:18 AM 12:42 PM     Creatinine      0.66 - 1.25 mg/dL 1.99 (H)  2.29 (H) 2.10 (H)   GFR Estimate      >60 mL/min/1.73:m2 30 (L)  25 (L) 28 (L)     Component      Latest Ref Rng & Units 2/3/2021             Creatinine      0.66 - 1.25 mg/dL 2.22 (H)   GFR Estimate      >60 mL/min/1.73:m2 26 (L)     Lab Results   Component Value Date    A1C 8.4 02/03/2021    A1C 7.3 09/23/2020    A1C 7.7 07/22/2019    A1C 8.3 12/04/2018    A1C 7.9 12/12/2017         Recent data also reviewed above.    Assessment and Plan:  Type 2 diabetes mellitus  Recent A1C and several fasting BG are above goal.  Patient is reluctant to resume Lantus.  He is going to work on regular sleep eating and physical activity as tolerated. He agrees to check some evening BG values ot get a better idea of how high BG rising.  Reviewed risk of hypoglycemia with glipizide.  Might consider low dose prandin with largest meal as long as GFR remains >20.      Adjustment disorder/ Stress reaction:      Continues to report high level of distress, low appetite related to wife's decline and living and eating alone though sleep is improving.  He defers therapy referral at this time.      CKD stage 4  He is being followed by nephrology.      HTN: Following with cardiology    Osteoporosis, unspecified osteoporosis type, unspecified pathological fracture presence  Vitamin D deficiency    Follow-up: 6 weeks     It is my privilege to be involved in the care of the above patient.     Sandhya Mena PA-C, MPAS  [facility]  Diabetes, Endocrinology, and Metabolism  [phone] Appointments/Nurse  [phone] Fax  [phone] pager  [phone] nurse line        40 minutes in preparation for visit reviewing chart, labs and documentation, visiting with patient gathering history and in exam, education and counseling, as  well as coordination of care, further chart review and documentation following visit on this date of service and as alluded to documented above.

## 2021-02-09 ENCOUNTER — VIRTUAL VISIT (OUTPATIENT)
Dept: ENDOCRINOLOGY | Facility: CLINIC | Age: 86
End: 2021-02-09
Payer: MEDICARE

## 2021-02-09 DIAGNOSIS — E11.65 TYPE 2 DIABETES MELLITUS WITH HYPERGLYCEMIA, WITHOUT LONG-TERM CURRENT USE OF INSULIN (H): Primary | ICD-10-CM

## 2021-02-09 DIAGNOSIS — N18.4 CKD (CHRONIC KIDNEY DISEASE) STAGE 4, GFR 15-29 ML/MIN (H): ICD-10-CM

## 2021-02-09 DIAGNOSIS — F43.22 ADJUSTMENT DISORDER WITH ANXIOUS MOOD: ICD-10-CM

## 2021-02-09 PROCEDURE — 99443 PR PHYSICIAN TELEPHONE EVALUATION 21-30 MIN: CPT | Mod: 95 | Performed by: PHYSICIAN ASSISTANT

## 2021-02-09 NOTE — LETTER
2/9/2021       RE: Farzad East  [address]  [facility] [address]     Dear Colleague,    Thank you for referring your patient, aFrzad East, to the [facility] [place] at [facility]. Please see a copy of my visit note below.     Alessio is a 85 year old who is being evaluated via a billable telephone visit.    What phone number would you like to be contacted at? [phone]  How would you like to obtain your AVS? MyChart  Phone call duration: 32 minutes       [facility]  Endocrinology  Sandhya Mena PA-C, MPAS  02/09/2021      Chief Complaint:   Telephone     History of Present Illness:   Farzad East is a 85 year old male with a history of CKD stage 3, type 2 diabetes mellitus with nephropathy, and hypertension who presents for follow up of type 2 diabetes mellitus. The patient was diagnosed with diabetes in his 50s and has managed using Metformin, Glipizide, Lantus, and Januvia in the past. Lantus was discontinued toward the beginning of 2018.     Interval History:     Hemoglobin A1C 0 - 5.6 % 8.4High        Ref Range & Units 8d ago     Sodium 133 - 144 mmol/L 139     Potassium 3.4 - 5.3 mmol/L 4.7     Chloride 94 - 109 mmol/L 110High      Carbon Dioxide 20 - 32 mmol/L 24     Anion Gap 3 - 14 mmol/L 5     Glucose 70 - 99 mg/dL 182High     Comment: Non Fasting    Urea Nitrogen 7 - 30 mg/dL 58High      Creatinine 0.66 - 1.25 mg/dL 2.22High      GFR Estimate >60 mL/min/1.73_m2} 26Low         Today:  Alessio is happy to have learned how to scroll through his glucometer and see past numbers.  He saw indeed they have been higher.  He wonders if stress making his BG higher.  Relates his distress previously attempting to care for his wife of nearly 60 years who has been wandering at night having hallucinations and delusions at times.  He was sleeping very poorly and eating very poorly.  She is now in Memory  Care and he is indeed sleeping better, feeling more energy this week.  Ongoing distress as she calls him and children asking for help getting out of locked unit.  He is there frequently helping calm her.      He is now working to get meals in restaurant at his residence, he doesn't love the food, but at least he is eating better.  Hoping that BG will come down as he gets into more of a rhythm.  Reluctant to start more medication. Denies increased urination, nocturia, though this may have been an issue at times over the holidays. At times with nausea.  This is now resolved.       He has not been checking BG in evening at all.      He denies any symptoms of low BG.        Recalls taking Lantus before, feels he could do again, but would rather not.        Current diabetes regimen Januvia 50 mg, and Glipizide 5 mg.     BG monitoring:  Week of__/__/__ Date  2__/8__  Date  2__/_7_ Date  __/_6_ Date  __/_5_ Date  __/_3_ Date  __/_2_ Date  __/_1_    Time BG Time BG Time BG Time BG Time BG Time BG Time BG      196  176  161  187  147  166                                                        Week of__/__/__ Date  __/_31_  Date  __/_30_ Date  __/_29_ Date  __/28__ Date  __/_27_ Date  __/_26_ Date  __/__    Time BG Time BG Time BG Time BG Time BG Time BG Time BG      168  143  212  126  138  176                                                            Review of Systems:   Pertinent items are noted in HPI, remainder of complete ROS is negative.      Active Medications:     Current Outpatient Medications:      aspirin 81 MG tablet, Take 1 tablet by mouth daily., Disp: , Rfl:      bisacodyl (DULCOLAX) 5 MG EC tablet, Take 1 tablet (5 mg) by mouth See Admin Instructions Refer to My chart messaget, Disp: 4 tablet, Rfl: 0     blood glucose (NO BRAND SPECIFIED) lancets standard, Use to test blood sugar 1 times daily or as directed. Use brand compatible with patients insurance and device., Disp: 100 each, Rfl: 3      blood glucose (ONETOUCH VERIO IQ) test strip, Use to test blood sugar 1 time daily or as directed., Disp: 100 each, Rfl: 3     blood glucose monitoring (ONETOUCH VERIO IQ SYSTEM) meter device kit, Use to test blood sugar daily, Disp: 1 kit, Rfl: 0     carvedilol (COREG) 6.25 MG tablet, TAKE 1 TABLET BY MOUTH TWICE A DAY WITH MEALS, Disp: 180 tablet, Rfl: 0     Cholecalciferol (VITAMIN D-3 PO), , Disp: , Rfl:      COMPRESSION STOCKINGS, 1 each daily, Disp: 1 each, Rfl: 3     finasteride (PROSCAR) 5 MG tablet, Take 1 tablet by mouth daily, Disp: , Rfl: 3     glipiZIDE (GLUCOTROL XL) 5 MG 24 hr tablet, Take 1 tablet (5 mg) by mouth daily, Disp: 90 tablet, Rfl: 1     hydrALAZINE (APRESOLINE) 50 MG tablet, Take 1 tablet (50 mg) by mouth 3 times daily, Disp: 90 tablet, Rfl: 0     hydrochlorothiazide (HYDRODIURIL) 12.5 MG tablet, Take 2 tablets (25 mg) by mouth daily, Disp: 60 tablet, Rfl: 0     multivitamin w/minerals (MULTI-VITAMIN) tablet, Take 1 tablet by mouth daily, Disp: , Rfl:      polyethylene glycol (GOLYTELY) 236 g suspension, Take 4,000 mLs by mouth See Admin Instructions Refer to My chart message, Disp: 4000 mL, Rfl: 0     sitagliptin (JANUVIA) 50 MG tablet, Take 1 tablet (50 mg) by mouth daily, Disp: 90 tablet, Rfl: 1     tamsulosin (FLOMAX) 0.4 MG capsule, Take 1 capsule (0.4 mg) by mouth daily Advise clinic if causes lightheadedness., Disp: 90 capsule, Rfl: 3      Allergies:   Sulfa drugs      Past Medical History:  Atrial flutter  Chronic kidney disease stage 3  Type 2 diabetes mellitus  Hypertension  Peripheral neuropathy  Rectal type adenocarcinoma  Femur fracture     Past Surgical History:  Cataract  ORIF femur  Left KIMBERLY    Family History:   Diabetes - father  Circulatory - father  Macular degeneration - father  Arthritis - mother     Social History:   Tobacco Use: none  Alcohol Use: 3 martinis/week - not currently.  Drug Use: none  PCP: Renu Lantigua     Has support of adult children.  Living at [facility] senior apartment.       Physical Exam:   There were no vitals taken for this visit.     PHONE VISIT    Farzad is alerted and oriented.  Mood is \"alright I suppose,\" affect is congruent.  Thoughtful form and content are fluid and coherent.  He does become rather emotional at times and apologizes for this.    No signs of distress are appreciated.         Data:  Component      Latest Ref Rng & Units 9/25/2020 9/25/2020 9/29/2020 10/26/2020           6:18 AM 12:42 PM     Creatinine      0.66 - 1.25 mg/dL 1.99 (H)  2.29 (H) 2.10 (H)   GFR Estimate      >60 mL/min/1.73:m2 30 (L)  25 (L) 28 (L)     Component      Latest Ref Rng & Units 2/3/2021             Creatinine      0.66 - 1.25 mg/dL 2.22 (H)   GFR Estimate      >60 mL/min/1.73:m2 26 (L)     Lab Results   Component Value Date    A1C 8.4 02/03/2021    A1C 7.3 09/23/2020    A1C 7.7 07/22/2019    A1C 8.3 12/04/2018    A1C 7.9 12/12/2017         Recent data also reviewed above.    Assessment and Plan:  Type 2 diabetes mellitus  Recent A1C and several fasting BG are above goal.  Patient is reluctant to resume Lantus.  He is going to work on regular sleep eating and physical activity as tolerated. He agrees to check some evening BG values ot get a better idea of how high BG rising.  Reviewed risk of hypoglycemia with glipizide.  Might consider low dose prandin with largest meal as long as GFR remains >20.      Adjustment disorder/ Stress reaction:      Continues to report high level of distress, low appetite related to wife's decline and living and eating alone though sleep is improving.  He defers therapy referral at this time.      CKD stage 4  He is being followed by nephrology.      HTN: Following with cardiology    Osteoporosis, unspecified osteoporosis type, unspecified pathological fracture presence  Vitamin D deficiency    Follow-up: 6 weeks     It is my privilege to be involved in the care of the above patient.     Sandhya Mena " AMNA, MPAS  [facility]  Diabetes, Endocrinology, and Metabolism  [phone] Appointments/Nurse  [phone] Fax  [phone] pager  [phone] nurse line        40 minutes in preparation for visit reviewing chart, labs and documentation, visiting with patient gathering history and in exam, education and counseling, as well as coordination of care, further chart review and documentation following visit on this date of service and as alluded to documented above.

## 2021-02-09 NOTE — PATIENT INSTRUCTIONS
Dear Alessio,    I hope that you are able to work on your sleep and diet and stress management and that your blood sugar comes down.    Our goal is blood sugar in the morning 100 - 140, and at bedtime 140 - 200.    If in some weeks still higher, we can talk about trying to start a low dose of long acting Lantus insulin.    Physical activity and deep breathing and relaxation can also be helpful.      We can also consider a referral to Behavioral Health if that might be helpful as well.    My best wishes,    Sandhya Mena PA-C, MPAS  [facility]  Diabetes, Endocrinology, and Metabolism  [phone] Appointments/Nurse  [phone] Fax  [phone] URGENTafter hours/weekend Endocrinologist on call

## 2021-02-10 NOTE — TELEPHONE ENCOUNTER
REFERRAL INFORMATION:    Referring Provider:  Renu Lantigua NP     Referring Clinic:   [facility]     Reason for Visit/Diagnosis: Inguinal Hernia        FUTURE VISIT INFORMATION:    Appointment Date: 2/18/2021    Appointment Time: 3 PM      NOTES RECORD STATUS  DETAILS   OFFICE NOTE from Referring Provider Internal 2/4/2021, 12/21/2020 Office visit with Renu Lantigua NP    OFFICE NOTE from Other Specialists N/A    HOSPITAL DISCHARGE SUMMARY/ ED VISITS  N/A    OPERATIVE REPORT N/A    ENDOSCOPY (EGD)  N/A    PERTINENT LABS Internal    PATHOLOGY REPORTS (RELATED) N/A    IMAGING (CT, MRI, US, XR)  Internal US Hernia Evaluation: 2/3/2021

## 2021-02-16 ENCOUNTER — APPOINTMENT (OUTPATIENT)
Dept: URBAN - METROPOLITAN AREA CLINIC 255 | Age: 86
Setting detail: DERMATOLOGY
End: 2021-02-17

## 2021-02-16 ENCOUNTER — TRANSFERRED RECORDS (OUTPATIENT)
Dept: HEALTH INFORMATION MANAGEMENT | Facility: CLINIC | Age: 86
End: 2021-02-16

## 2021-02-16 DIAGNOSIS — L98419 CHRONIC ULCER OF OTHER SPECIFIED SITES: ICD-10-CM

## 2021-02-16 DIAGNOSIS — L98429 CHRONIC ULCER OF OTHER SPECIFIED SITES: ICD-10-CM

## 2021-02-16 PROBLEM — L98.499 NON-PRESSURE CHRONIC ULCER OF SKIN OF OTHER SITES WITH UNSPECIFIED SEVERITY: Status: ACTIVE | Noted: 2021-02-16

## 2021-02-16 PROBLEM — C44.612 BASAL CELL CARCINOMA OF SKIN OF RIGHT UPPER LIMB, INCLUDING SHOULDER: Status: ACTIVE | Noted: 2021-02-16

## 2021-02-16 PROCEDURE — 11603 EXC TR-EXT MAL+MARG 2.1-3 CM: CPT

## 2021-02-16 PROCEDURE — OTHER COUNSELING: OTHER

## 2021-02-16 PROCEDURE — OTHER ULCER EVALUATION: OTHER

## 2021-02-16 PROCEDURE — OTHER EXCISION: OTHER

## 2021-02-16 PROCEDURE — OTHER DIAGNOSIS COMMENT: OTHER

## 2021-02-16 PROCEDURE — OTHER MIPS QUALITY: OTHER

## 2021-02-16 PROCEDURE — 99212 OFFICE O/P EST SF 10 MIN: CPT | Mod: 25

## 2021-02-16 ASSESSMENT — LOCATION DETAILED DESCRIPTION DERM: LOCATION DETAILED: LEFT ANTIHELIX

## 2021-02-16 ASSESSMENT — LOCATION ZONE DERM: LOCATION ZONE: EAR

## 2021-02-16 ASSESSMENT — LOCATION SIMPLE DESCRIPTION DERM: LOCATION SIMPLE: LEFT EAR

## 2021-02-16 NOTE — PROCEDURE: MIPS QUALITY
Detail Level: Detailed
Quality 110: Preventive Care And Screening: Influenza Immunization: Influenza Immunization previously received during influenza season
Quality 226: Preventive Care And Screening: Tobacco Use: Screening And Cessation Intervention: Patient screened for tobacco use and is an ex/non-smoker
Quality 130: Documentation Of Current Medications In The Medical Record: Current Medications Documented
Quality 265: Biopsy Follow-Up: Biopsy results reviewed, communicated, tracked, and documented
Quality 431: Preventive Care And Screening: Unhealthy Alcohol Use - Screening: Patient screened for unhealthy alcohol use using a single question and scores less than 2 times per year

## 2021-02-16 NOTE — PROCEDURE: EXCISION
Undermining Type: Entire Wound
Show Accession Variable: Yes
A-T Advancement Flap Text: The defect edges were debeveled with a #15 scalpel blade.  Given the location of the defect, shape of the defect and the proximity to free margins an A-T advancement flap was deemed most appropriate.  Using a sterile surgical marker, an appropriate advancement flap was drawn incorporating the defect and placing the expected incisions within the relaxed skin tension lines where possible.    The area thus outlined was incised deep to adipose tissue with a #15 scalpel blade.  The skin margins were undermined to an appropriate distance in all directions utilizing iris scissors.
Crescentic Intermediate Repair Preamble Text (Leave Blank If You Do Not Want): Undermining was performed with blunt dissection.
Saucerization Excision Additional Text (Leave Blank If You Do Not Want): The margin was drawn around the clinically apparent lesion.  Incisions were then made along these lines, in a tangential fashion, to the appropriate tissue plane and the lesion was extirpated.
Advancement Flap (Single) Text: The defect edges were debeveled with a #15 scalpel blade.  Given the location of the defect and the proximity to free margins a single advancement flap was deemed most appropriate.  Using a sterile surgical marker, an appropriate advancement flap was drawn incorporating the defect and placing the expected incisions within the relaxed skin tension lines where possible.    The area thus outlined was incised deep to adipose tissue with a #15 scalpel blade.  The skin margins were undermined to an appropriate distance in all directions utilizing iris scissors.
Pre-Excision Curettage Text (Leave Blank If You Do Not Want): Prior to drawing the surgical margin the visible lesion was removed with electrodesiccation and curettage to clearly define the lesion size.
Complex Repair And Split-Thickness Skin Graft Text: The defect edges were debeveled with a #15 scalpel blade.  The primary defect was closed partially with a complex linear closure.  Given the location of the defect, shape of the defect and the proximity to free margins a split thickness skin graft was deemed most appropriate to repair the remaining defect.  The graft was trimmed to fit the size of the remaining defect.  The graft was then placed in the primary defect, oriented appropriately, and sutured into place.
Validate That Simple Repair Length Is Not Zero (If You Leave At 0 It Will Not Render In Note): No
Skin Substitute Text: The defect edges were debeveled with a #15 scalpel blade.  Given the location of the defect, shape of the defect and the proximity to free margins a skin substitute graft was deemed most appropriate.  The graft material was trimmed to fit the size of the defect. The graft was then placed in the primary defect and oriented appropriately.
Complex Repair And Single Advancement Flap Text: The defect edges were debeveled with a #15 scalpel blade.  The primary defect was closed partially with a complex linear closure.  Given the location of the remaining defect, shape of the defect and the proximity to free margins a single advancement flap was deemed most appropriate for complete closure of the defect.  Using a sterile surgical marker, an appropriate advancement flap was drawn incorporating the defect and placing the expected incisions within the relaxed skin tension lines where possible.    The area thus outlined was incised deep to adipose tissue with a #15 scalpel blade.  The skin margins were undermined to an appropriate distance in all directions utilizing iris scissors.
Complex Repair And Melolabial Flap Text: The defect edges were debeveled with a #15 scalpel blade.  The primary defect was closed partially with a complex linear closure.  Given the location of the remaining defect, shape of the defect and the proximity to free margins a melolabial flap was deemed most appropriate for complete closure of the defect.  Using a sterile surgical marker, an appropriate advancement flap was drawn incorporating the defect and placing the expected incisions within the relaxed skin tension lines where possible.    The area thus outlined was incised deep to adipose tissue with a #15 scalpel blade.  The skin margins were undermined to an appropriate distance in all directions utilizing iris scissors.
Complex Repair And W Plasty Text: The defect edges were debeveled with a #15 scalpel blade.  The primary defect was closed partially with a complex linear closure.  Given the location of the remaining defect, shape of the defect and the proximity to free margins a W plasty was deemed most appropriate for complete closure of the defect.  Using a sterile surgical marker, an appropriate advancement flap was drawn incorporating the defect and placing the expected incisions within the relaxed skin tension lines where possible.    The area thus outlined was incised deep to adipose tissue with a #15 scalpel blade.  The skin margins were undermined to an appropriate distance in all directions utilizing iris scissors.
Cheek-To-Nose Interpolation Flap Text: A decision was made to reconstruct the defect utilizing an interpolation axial flap and a staged reconstruction.  A telfa template was made of the defect.  This telfa template was then used to outline the Cheek-To-Nose Interpolation flap.  The donor area for the pedicle flap was then injected with anesthesia.  The flap was excised through the skin and subcutaneous tissue down to the layer of the underlying musculature.  The interpolation flap was carefully excised within this deep plane to maintain its blood supply.  The edges of the donor site were undermined.   The donor site was closed in a primary fashion.  The pedicle was then rotated into position and sutured.  Once the tube was sutured into place, adequate blood supply was confirmed with blanching and refill.  The pedicle was then wrapped with xeroform gauze and dressed appropriately with a telfa and gauze bandage to ensure continued blood supply and protect the attached pedicle.
Ftsg Text: The defect edges were debeveled with a #15 scalpel blade.  Given the location of the defect, shape of the defect and the proximity to free margins a full thickness skin graft was deemed most appropriate.  Using a sterile surgical marker, the primary defect shape was transferred to the donor site. The area thus outlined was incised deep to adipose tissue with a #15 scalpel blade.  The harvested graft was then trimmed of adipose tissue until only dermis and epidermis was left.  The skin margins of the secondary defect were undermined to an appropriate distance in all directions utilizing iris scissors.  The secondary defect was closed with interrupted buried subcutaneous sutures.  The skin edges were then re-apposed with running  sutures.  The skin graft was then placed in the primary defect and oriented appropriately.
Dressing: dry sterile dressing
Size Of Lesion In Cm: 1.5
Primary Defect Length (In Cm): 0
Repair Type: None - Secondary Intention
O-T Plasty Text: The defect edges were debeveled with a #15 scalpel blade.  Given the location of the defect, shape of the defect and the proximity to free margins an O-T plasty was deemed most appropriate.  Using a sterile surgical marker, an appropriate O-T plasty was drawn incorporating the defect and placing the expected incisions within the relaxed skin tension lines where possible.    The area thus outlined was incised deep to adipose tissue with a #15 scalpel blade.  The skin margins were undermined to an appropriate distance in all directions utilizing iris scissors.
Retention Suture Bite Size: 3 mm
Peng Advancement Flap Text: The defect edges were debeveled with a #15 scalpel blade.  Given the location of the defect, shape of the defect and the proximity to free margins a Peng advancement flap was deemed most appropriate.  Using a sterile surgical marker, an appropriate advancement flap was drawn incorporating the defect and placing the expected incisions within the relaxed skin tension lines where possible. The area thus outlined was incised deep to adipose tissue with a #15 scalpel blade.  The skin margins were undermined to an appropriate distance in all directions utilizing iris scissors.
Nasal Turnover Hinge Flap Text: The defect edges were debeveled with a #15 scalpel blade.  Given the size, depth, location of the defect and the defect being full thickness a nasal turnover hinge flap was deemed most appropriate.  Using a sterile surgical marker, an appropriate hinge flap was drawn incorporating the defect. The area thus outlined was incised with a #15 scalpel blade. The flap was designed to recreate the nasal mucosal lining and the alar rim. The skin margins were undermined to an appropriate distance in all directions utilizing iris scissors.
Helical Rim Advancement Flap Text: The defect edges were debeveled with a #15 blade scalpel.  Given the location of the defect and the proximity to free margins (helical rim) a double helical rim advancement flap was deemed most appropriate.  Using a sterile surgical marker, the appropriate advancement flaps were drawn incorporating the defect and placing the expected incisions between the helical rim and antihelix where possible.  The area thus outlined was incised through and through with a #15 scalpel blade.  With a skin hook and iris scissors, the flaps were gently and sharply undermined and freed up.
Dorsal Nasal Flap Text: The defect edges were debeveled with a #15 scalpel blade.  Given the location of the defect and the proximity to free margins a dorsal nasal flap was deemed most appropriate.  Using a sterile surgical marker, an appropriate dorsal nasal flap was drawn around the defect.    The area thus outlined was incised deep to adipose tissue with a #15 scalpel blade.  The skin margins were undermined to an appropriate distance in all directions utilizing iris scissors.
O-Z Flap Text: The defect edges were debeveled with a #15 scalpel blade.  Given the location of the defect, shape of the defect and the proximity to free margins an O-Z flap was deemed most appropriate.  Using a sterile surgical marker, an appropriate transposition flap was drawn incorporating the defect and placing the expected incisions within the relaxed skin tension lines where possible. The area thus outlined was incised deep to adipose tissue with a #15 scalpel blade.  The skin margins were undermined to an appropriate distance in all directions utilizing iris scissors.
Perilesional Excision Additional Text (Leave Blank If You Do Not Want): The margin was drawn around the clinically apparent lesion. Incisions were then made along these lines to the appropriate tissue plane and the lesion was extirpated.
Chonodrocutaneous Helical Advancement Flap Text: The defect edges were debeveled with a #15 scalpel blade.  Given the location of the defect and the proximity to free margins a chondrocutaneous helical advancement flap was deemed most appropriate.  Using a sterile surgical marker, the appropriate advancement flap was drawn incorporating the defect and placing the expected incisions within the relaxed skin tension lines where possible.    The area thus outlined was incised deep to adipose tissue with a #15 scalpel blade.  The skin margins were undermined to an appropriate distance in all directions utilizing iris scissors.
Hemigard Intro: Due to skin fragility and wound tension, it was decided to use HEMIGARD adhesive retention suture devices to permit a linear closure. The skin was cleaned and dried for a 6cm distance away from the wound. Excessive hair, if present, was removed to allow for adhesion.
Complex Repair And Tissue Cultured Epidermal Autograft Text: The defect edges were debeveled with a #15 scalpel blade.  The primary defect was closed partially with a complex linear closure.  Given the location of the defect, shape of the defect and the proximity to free margins an tissue cultured epidermal autograft was deemed most appropriate to repair the remaining defect.  The graft was trimmed to fit the size of the remaining defect.  The graft was then placed in the primary defect, oriented appropriately, and sutured into place.
Crescentic Complex Repair Preamble Text (Leave Blank If You Do Not Want): Extensive wide undermining was performed.
Anesthesia Volume In Cc: 6
Complex Repair And A-T Advancement Flap Text: The defect edges were debeveled with a #15 scalpel blade.  The primary defect was closed partially with a complex linear closure.  Given the location of the remaining defect, shape of the defect and the proximity to free margins an A-T advancement flap was deemed most appropriate for complete closure of the defect.  Using a sterile surgical marker, an appropriate advancement flap was drawn incorporating the defect and placing the expected incisions within the relaxed skin tension lines where possible.    The area thus outlined was incised deep to adipose tissue with a #15 scalpel blade.  The skin margins were undermined to an appropriate distance in all directions utilizing iris scissors.
Complex Repair And Transposition Flap Text: The defect edges were debeveled with a #15 scalpel blade.  The primary defect was closed partially with a complex linear closure.  Given the location of the remaining defect, shape of the defect and the proximity to free margins a transposition flap was deemed most appropriate for complete closure of the defect.  Using a sterile surgical marker, an appropriate advancement flap was drawn incorporating the defect and placing the expected incisions within the relaxed skin tension lines where possible.    The area thus outlined was incised deep to adipose tissue with a #15 scalpel blade.  The skin margins were undermined to an appropriate distance in all directions utilizing iris scissors.
W Plasty Text: The lesion was extirpated to the level of the fat with a #15 scalpel blade.  Given the location of the defect, shape of the defect and the proximity to free margins a W-plasty was deemed most appropriate for repair.  Using a sterile surgical marker, the appropriate transposition arms of the W-plasty were drawn incorporating the defect and placing the expected incisions within the relaxed skin tension lines where possible.    The area thus outlined was incised deep to adipose tissue with a #15 scalpel blade.  The skin margins were undermined to an appropriate distance in all directions utilizing iris scissors.  The opposing transposition arms were then transposed into place in opposite direction and anchored with interrupted buried subcutaneous sutures.
Mastoid Interpolation Flap Text: A decision was made to reconstruct the defect utilizing an interpolation axial flap and a staged reconstruction.  A telfa template was made of the defect.  This telfa template was then used to outline the mastoid interpolation flap.  The donor area for the pedicle flap was then injected with anesthesia.  The flap was excised through the skin and subcutaneous tissue down to the layer of the underlying musculature.  The pedicle flap was carefully excised within this deep plane to maintain its blood supply.  The edges of the donor site were undermined.   The donor site was closed in a primary fashion.  The pedicle was then rotated into position and sutured.  Once the tube was sutured into place, adequate blood supply was confirmed with blanching and refill.  The pedicle was then wrapped with xeroform gauze and dressed appropriately with a telfa and gauze bandage to ensure continued blood supply and protect the attached pedicle.
Estimated Blood Loss (Cc): minimal
Cartilage Graft Text: The defect edges were debeveled with a #15 scalpel blade.  Given the location of the defect, shape of the defect, the fact the defect involved a full thickness cartilage defect a cartilage graft was deemed most appropriate.  An appropriate donor site was identified, cleansed, and anesthetized. The cartilage graft was then harvested and transferred to the recipient site, oriented appropriately and then sutured into place.  The secondary defect was then repaired using a primary closure.
Purse String (Intermediate) Text: Given the location of the defect and the characteristics of the surrounding skin a purse string intermediate closure was deemed most appropriate.  Undermining was performed circumfirentially around the surgical defect.  A purse string suture was then placed and tightened.
Helical Rim Text: The closure involved the helical rim.
Excision Method: Saucerization
Deep Sutures: 5-0 Vicryl
Complex Repair And Z Plasty Text: The defect edges were debeveled with a #15 scalpel blade.  The primary defect was closed partially with a complex linear closure.  Given the location of the remaining defect, shape of the defect and the proximity to free margins a Z plasty was deemed most appropriate for complete closure of the defect.  Using a sterile surgical marker, an appropriate advancement flap was drawn incorporating the defect and placing the expected incisions within the relaxed skin tension lines where possible.    The area thus outlined was incised deep to adipose tissue with a #15 scalpel blade.  The skin margins were undermined to an appropriate distance in all directions utilizing iris scissors.
Scalpel Size: double edge Personna blade
Wound Care: Petrolatum
Surgeon (Optional): Selina Rosas, MPhil, MD
Anesthesia Type: 1% lidocaine with epinephrine
Suturegard Intro: Intraoperative tissue expansion was performed, utilizing the SUTUREGARD device, in order to reduce wound tension.
Split-Thickness Skin Graft Text: The defect edges were debeveled with a #15 scalpel blade.  Given the location of the defect, shape of the defect and the proximity to free margins a split thickness skin graft was deemed most appropriate.  Using a sterile surgical marker, the primary defect shape was transferred to the donor site. The split thickness graft was then harvested.  The skin graft was then placed in the primary defect and oriented appropriately.
Tissue Cultured Epidermal Autograft Text: The defect edges were debeveled with a #15 scalpel blade.  Given the location of the defect, shape of the defect and the proximity to free margins a tissue cultured epidermal autograft was deemed most appropriate.  The graft was then trimmed to fit the size of the defect.  The graft was then placed in the primary defect and oriented appropriately.
Complex Repair And Double Advancement Flap Text: The defect edges were debeveled with a #15 scalpel blade.  The primary defect was closed partially with a complex linear closure.  Given the location of the remaining defect, shape of the defect and the proximity to free margins a double advancement flap was deemed most appropriate for complete closure of the defect.  Using a sterile surgical marker, an appropriate advancement flap was drawn incorporating the defect and placing the expected incisions within the relaxed skin tension lines where possible.    The area thus outlined was incised deep to adipose tissue with a #15 scalpel blade.  The skin margins were undermined to an appropriate distance in all directions utilizing iris scissors.
Complex Repair And Rotation Flap Text: The defect edges were debeveled with a #15 scalpel blade.  The primary defect was closed partially with a complex linear closure.  Given the location of the remaining defect, shape of the defect and the proximity to free margins a rotation flap was deemed most appropriate for complete closure of the defect.  Using a sterile surgical marker, an appropriate advancement flap was drawn incorporating the defect and placing the expected incisions within the relaxed skin tension lines where possible.    The area thus outlined was incised deep to adipose tissue with a #15 scalpel blade.  The skin margins were undermined to an appropriate distance in all directions utilizing iris scissors.
Debridement Text: The wound edges were debrided prior to proceeding with the closure to facilitate wound healing.
Epidermal Closure Graft Donor Site (Optional): simple interrupted
X Size Of Lesion In Cm (Optional): 1.3
Suturegard Retention Suture: 2-0 Nylon
Interpolation Flap Text: A decision was made to reconstruct the defect utilizing an interpolation axial flap and a staged reconstruction.  A telfa template was made of the defect.  This telfa template was then used to outline the interpolation flap.  The donor area for the pedicle flap was then injected with anesthesia.  The flap was excised through the skin and subcutaneous tissue down to the layer of the underlying musculature.  The interpolation flap was carefully excised within this deep plane to maintain its blood supply.  The edges of the donor site were undermined.   The donor site was closed in a primary fashion.  The pedicle was then rotated into position and sutured.  Once the tube was sutured into place, adequate blood supply was confirmed with blanching and refill.  The pedicle was then wrapped with xeroform gauze and dressed appropriately with a telfa and gauze bandage to ensure continued blood supply and protect the attached pedicle.
Island Pedicle Flap Text: The defect edges were debeveled with a #15 scalpel blade.  Given the location of the defect, shape of the defect and the proximity to free margins an island pedicle advancement flap was deemed most appropriate.  Using a sterile surgical marker, an appropriate advancement flap was drawn incorporating the defect, outlining the appropriate donor tissue and placing the expected incisions within the relaxed skin tension lines where possible.    The area thus outlined was incised deep to adipose tissue with a #15 scalpel blade.  The skin margins were undermined to an appropriate distance in all directions around the primary defect and laterally outward around the island pedicle utilizing iris scissors.  There was minimal undermining beneath the pedicle flap.
O-Z Plasty Text: The defect edges were debeveled with a #15 scalpel blade.  Given the location of the defect, shape of the defect and the proximity to free margins an O-Z plasty (double transposition flap) was deemed most appropriate.  Using a sterile surgical marker, the appropriate transposition flaps were drawn incorporating the defect and placing the expected incisions within the relaxed skin tension lines where possible.    The area thus outlined was incised deep to adipose tissue with a #15 scalpel blade.  The skin margins were undermined to an appropriate distance in all directions utilizing iris scissors.  Hemostasis was achieved with electrocautery.  The flaps were then transposed into place, one clockwise and the other counterclockwise, and anchored with interrupted buried subcutaneous sutures.
Double O-Z Flap Text: The defect edges were debeveled with a #15 scalpel blade.  Given the location of the defect, shape of the defect and the proximity to free margins a Double O-Z flap was deemed most appropriate.  Using a sterile surgical marker, an appropriate transposition flap was drawn incorporating the defect and placing the expected incisions within the relaxed skin tension lines where possible. The area thus outlined was incised deep to adipose tissue with a #15 scalpel blade.  The skin margins were undermined to an appropriate distance in all directions utilizing iris scissors.
Hatchet Flap Text: The defect edges were debeveled with a #15 scalpel blade.  Given the location of the defect, shape of the defect and the proximity to free margins a hatchet flap was deemed most appropriate.  Using a sterile surgical marker, an appropriate hatchet flap was drawn incorporating the defect and placing the expected incisions within the relaxed skin tension lines where possible.    The area thus outlined was incised deep to adipose tissue with a #15 scalpel blade.  The skin margins were undermined to an appropriate distance in all directions utilizing iris scissors.
Nasalis-Muscle-Based Myocutaneous Island Pedicle Flap Text: Using a #15 blade, an incision was made around the donor flap to the level of the nasalis muscle. Wide lateral undermining was then performed in both the subcutaneous plane above the nasalis muscle, and in a submuscular plane just above periosteum. This allowed the formation of a free nasalis muscle axial pedicle (based on the angular artery) which was still attached to the actual cutaneous flap, increasing its mobility and vascular viability. Hemostasis was obtained with pinpoint electrocoagulation. The flap was mobilized into position and the pivotal anchor points positioned and stabilized with buried interrupted sutures. Subcutaneous and dermal tissues were closed in a multilayered fashion with sutures. Tissue redundancies were excised, and the epidermal edges were apposed without significant tension and sutured with sutures.
Bilateral Helical Rim Advancement Flap Text: The defect edges were debeveled with a #15 blade scalpel.  Given the location of the defect and the proximity to free margins (helical rim) a bilateral helical rim advancement flap was deemed most appropriate.  Using a sterile surgical marker, the appropriate advancement flaps were drawn incorporating the defect and placing the expected incisions between the helical rim and antihelix where possible.  The area thus outlined was incised through and through with a #15 scalpel blade.  With a skin hook and iris scissors, the flaps were gently and sharply undermined and freed up.
Detail Level: Detailed
Complex Repair And Xenograft Text: The defect edges were debeveled with a #15 scalpel blade.  The primary defect was closed partially with a complex linear closure.  Given the location of the defect, shape of the defect and the proximity to free margins a xenograft was deemed most appropriate to repair the remaining defect.  The graft was trimmed to fit the size of the remaining defect.  The graft was then placed in the primary defect, oriented appropriately, and sutured into place.
Curvilinear Excision Additional Text (Leave Blank If You Do Not Want): The margin was drawn around the clinically apparent lesion.  A curvilinear shape was then drawn on the skin incorporating the lesion and margins.  Incisions were then made along these lines to the appropriate tissue plane and the lesion was extirpated.
Hemostasis: Electrocautery
Crescentic Advancement Flap Text: The defect edges were debeveled with a #15 scalpel blade.  Given the location of the defect and the proximity to free margins a crescentic advancement flap was deemed most appropriate.  Using a sterile surgical marker, the appropriate advancement flap was drawn incorporating the defect and placing the expected incisions within the relaxed skin tension lines where possible.    The area thus outlined was incised deep to adipose tissue with a #15 scalpel blade.  The skin margins were undermined to an appropriate distance in all directions utilizing iris scissors.
Hemigard Postcare Instructions: The HEMIGARD strips are to remain completely dry for at least 5-7 days.
Composite Graft Text: The defect edges were debeveled with a #15 scalpel blade.  Given the location of the defect, shape of the defect, the proximity to free margins and the fact the defect was full thickness a composite graft was deemed most appropriate.  The defect was outline and then transferred to the donor site.  A full thickness graft was then excised from the donor site. The graft was then placed in the primary defect, oriented appropriately and then sutured into place.  The secondary defect was then repaired using a primary closure.
Purse String (Simple) Text: Given the location of the defect and the characteristics of the surrounding skin a purse string simple closure was deemed most appropriate.  Undermining was performed circumferentially around the surgical defect.  A purse string suture was then placed and tightened.
Complex Repair And O-T Advancement Flap Text: The defect edges were debeveled with a #15 scalpel blade.  The primary defect was closed partially with a complex linear closure.  Given the location of the remaining defect, shape of the defect and the proximity to free margins an O-T advancement flap was deemed most appropriate for complete closure of the defect.  Using a sterile surgical marker, an appropriate advancement flap was drawn incorporating the defect and placing the expected incisions within the relaxed skin tension lines where possible.    The area thus outlined was incised deep to adipose tissue with a #15 scalpel blade.  The skin margins were undermined to an appropriate distance in all directions utilizing iris scissors.
Complex Repair And V-Y Plasty Text: The defect edges were debeveled with a #15 scalpel blade.  The primary defect was closed partially with a complex linear closure.  Given the location of the remaining defect, shape of the defect and the proximity to free margins a V-Y plasty was deemed most appropriate for complete closure of the defect.  Using a sterile surgical marker, an appropriate advancement flap was drawn incorporating the defect and placing the expected incisions within the relaxed skin tension lines where possible.    The area thus outlined was incised deep to adipose tissue with a #15 scalpel blade.  The skin margins were undermined to an appropriate distance in all directions utilizing iris scissors.
Z Plasty Text: The lesion was extirpated to the level of the fat with a #15 scalpel blade.  Given the location of the defect, shape of the defect and the proximity to free margins a Z-plasty was deemed most appropriate for repair.  Using a sterile surgical marker, the appropriate transposition arms of the Z-plasty were drawn incorporating the defect and placing the expected incisions within the relaxed skin tension lines where possible.    The area thus outlined was incised deep to adipose tissue with a #15 scalpel blade.  The skin margins were undermined to an appropriate distance in all directions utilizing iris scissors.  The opposing transposition arms were then transposed into place in opposite direction and anchored with interrupted buried subcutaneous sutures.
Posterior Auricular Interpolation Flap Text: A decision was made to reconstruct the defect utilizing an interpolation axial flap and a staged reconstruction.  A telfa template was made of the defect.  This telfa template was then used to outline the posterior auricular interpolation flap.  The donor area for the pedicle flap was then injected with anesthesia.  The flap was excised through the skin and subcutaneous tissue down to the layer of the underlying musculature.  The pedicle flap was carefully excised within this deep plane to maintain its blood supply.  The edges of the donor site were undermined.   The donor site was closed in a primary fashion.  The pedicle was then rotated into position and sutured.  Once the tube was sutured into place, adequate blood supply was confirmed with blanching and refill.  The pedicle was then wrapped with xeroform gauze and dressed appropriately with a telfa and gauze bandage to ensure continued blood supply and protect the attached pedicle.
Vermilion Border Text: The closure involved the vermilion border.
Double Island Pedicle Flap Text: The defect edges were debeveled with a #15 scalpel blade.  Given the location of the defect, shape of the defect and the proximity to free margins a double island pedicle advancement flap was deemed most appropriate.  Using a sterile surgical marker, an appropriate advancement flap was drawn incorporating the defect, outlining the appropriate donor tissue and placing the expected incisions within the relaxed skin tension lines where possible.    The area thus outlined was incised deep to adipose tissue with a #15 scalpel blade.  The skin margins were undermined to an appropriate distance in all directions around the primary defect and laterally outward around the island pedicle utilizing iris scissors.  There was minimal undermining beneath the pedicle flap.
H Plasty Text: Given the location of the defect, shape of the defect and the proximity to free margins a H-plasty was deemed most appropriate for repair.  Using a sterile surgical marker, the appropriate advancement arms of the H-plasty were drawn incorporating the defect and placing the expected incisions within the relaxed skin tension lines where possible. The area thus outlined was incised deep to adipose tissue with a #15 scalpel blade. The skin margins were undermined to an appropriate distance in all directions utilizing iris scissors.  The opposing advancement arms were then advanced into place in opposite direction and anchored with interrupted buried subcutaneous sutures.
Length To Time In Minutes Device Was In Place: 10
Number Of Hemigard Strips Per Side: 1
Mercedes Flap Text: The defect edges were debeveled with a #15 scalpel blade.  Given the location of the defect, shape of the defect and the proximity to free margins a Mercedes flap was deemed most appropriate.  Using a sterile surgical marker, an appropriate advancement flap was drawn incorporating the defect and placing the expected incisions within the relaxed skin tension lines where possible. The area thus outlined was incised deep to adipose tissue with a #15 scalpel blade.  The skin margins were undermined to an appropriate distance in all directions utilizing iris scissors.
Star Wedge Flap Text: The defect edges were debeveled with a #15 scalpel blade.  Given the location of the defect, shape of the defect and the proximity to free margins a star wedge flap was deemed most appropriate.  Using a sterile surgical marker, an appropriate rotation flap was drawn incorporating the defect and placing the expected incisions within the relaxed skin tension lines where possible. The area thus outlined was incised deep to adipose tissue with a #15 scalpel blade.  The skin margins were undermined to an appropriate distance in all directions utilizing iris scissors.
Rhombic Flap Text: The defect edges were debeveled with a #15 scalpel blade.  Given the location of the defect and the proximity to free margins a rhombic flap was deemed most appropriate.  Using a sterile surgical marker, an appropriate rhombic flap was drawn incorporating the defect.    The area thus outlined was incised deep to adipose tissue with a #15 scalpel blade.  The skin margins were undermined to an appropriate distance in all directions utilizing iris scissors.
Where Do You Want The Question To Include Opioid Counseling Located?: Case Summary Tab
Bilobed Flap Text: The defect edges were debeveled with a #15 scalpel blade.  Given the location of the defect and the proximity to free margins a bilobe flap was deemed most appropriate.  Using a sterile surgical marker, an appropriate bilobe flap drawn around the defect.    The area thus outlined was incised deep to adipose tissue with a #15 scalpel blade.  The skin margins were undermined to an appropriate distance in all directions utilizing iris scissors.
Size Of Margin In Cm: 0.3
Consent was obtained from the patient. The risks and benefits to therapy were discussed in detail. Specifically, the risks of infection, scarring, bleeding, prolonged wound healing, incomplete removal, allergy to anesthesia, nerve injury and recurrence were addressed. Prior to the procedure, the treatment site was clearly identified and confirmed by the patient. All components of Universal Protocol/PAUSE Rule completed.
Ear Star Wedge Flap Text: The defect edges were debeveled with a #15 blade scalpel.  Given the location of the defect and the proximity to free margins (helical rim) an ear star wedge flap was deemed most appropriate.  Using a sterile surgical marker, the appropriate flap was drawn incorporating the defect and placing the expected incisions between the helical rim and antihelix where possible.  The area thus outlined was incised through and through with a #15 scalpel blade.
Island Pedicle Flap With Canthal Suspension Text: The defect edges were debeveled with a #15 scalpel blade.  Given the location of the defect, shape of the defect and the proximity to free margins an island pedicle advancement flap was deemed most appropriate.  Using a sterile surgical marker, an appropriate advancement flap was drawn incorporating the defect, outlining the appropriate donor tissue and placing the expected incisions within the relaxed skin tension lines where possible. The area thus outlined was incised deep to adipose tissue with a #15 scalpel blade.  The skin margins were undermined to an appropriate distance in all directions around the primary defect and laterally outward around the island pedicle utilizing iris scissors.  There was minimal undermining beneath the pedicle flap. A suspension suture was placed in the canthal tendon to prevent tension and prevent ectropion.
Anesthesia Volume In Cc: 2
Double O-Z Plasty Text: The defect edges were debeveled with a #15 scalpel blade.  Given the location of the defect, shape of the defect and the proximity to free margins a Double O-Z plasty (double transposition flap) was deemed most appropriate.  Using a sterile surgical marker, the appropriate transposition flaps were drawn incorporating the defect and placing the expected incisions within the relaxed skin tension lines where possible. The area thus outlined was incised deep to adipose tissue with a #15 scalpel blade.  The skin margins were undermined to an appropriate distance in all directions utilizing iris scissors.  Hemostasis was achieved with electrocautery.  The flaps were then transposed into place, one clockwise and the other counterclockwise, and anchored with interrupted buried subcutaneous sutures.
V-Y Flap Text: The defect edges were debeveled with a #15 scalpel blade.  Given the location of the defect, shape of the defect and the proximity to free margins a V-Y flap was deemed most appropriate.  Using a sterile surgical marker, an appropriate advancement flap was drawn incorporating the defect and placing the expected incisions within the relaxed skin tension lines where possible.    The area thus outlined was incised deep to adipose tissue with a #15 scalpel blade.  The skin margins were undermined to an appropriate distance in all directions utilizing iris scissors.
Epidermal Sutures: 4-0 Ethilon
Rotation Flap Text: The defect edges were debeveled with a #15 scalpel blade.  Given the location of the defect, shape of the defect and the proximity to free margins a rotation flap was deemed most appropriate.  Using a sterile surgical marker, an appropriate rotation flap was drawn incorporating the defect and placing the expected incisions within the relaxed skin tension lines where possible.    The area thus outlined was incised deep to adipose tissue with a #15 scalpel blade.  The skin margins were undermined to an appropriate distance in all directions utilizing iris scissors.
Orbicularis Oris Muscle Flap Text: The defect edges were debeveled with a #15 scalpel blade.  Given that the defect affected the competency of the oral sphincter an obicularis oris muscle flap was deemed most appropriate to restore this competency and normal muscle function.  Using a sterile surgical marker, an appropriate flap was drawn incorporating the defect. The area thus outlined was incised with a #15 scalpel blade.
Post-Care Instructions: I reviewed with the patient in detail post-care instructions. Patient is not to engage in any heavy lifting, exercise, or swimming for the next 14 days. Should the patient develop any fevers, chills, bleeding, severe pain patient will contact the office immediately.
Complex Repair And Ftsg Text: The defect edges were debeveled with a #15 scalpel blade.  The primary defect was closed partially with a complex linear closure.  Given the location of the defect, shape of the defect and the proximity to free margins a full thickness skin graft was deemed most appropriate to repair the remaining defect.  The graft was trimmed to fit the size of the remaining defect.  The graft was then placed in the primary defect, oriented appropriately, and sutured into place.
Complex Repair And Skin Substitute Graft Text: The defect edges were debeveled with a #15 scalpel blade.  The primary defect was closed partially with a complex linear closure.  Given the location of the remaining defect, shape of the defect and the proximity to free margins a skin substitute graft was deemed most appropriate to repair the remaining defect.  The graft was trimmed to fit the size of the remaining defect.  The graft was then placed in the primary defect, oriented appropriately, and sutured into place.
Fusiform Excision Additional Text (Leave Blank If You Do Not Want): The margin was drawn around the clinically apparent lesion.  A fusiform shape was then drawn on the skin incorporating the lesion and margins.  Incisions were then made along these lines to the appropriate tissue plane and the lesion was extirpated.
Repair Performed By Another Provider Text (Leave Blank If You Do Not Want): After the tissue was excised the defect was repaired by another provider.
Complex Repair And M Plasty Text: The defect edges were debeveled with a #15 scalpel blade.  The primary defect was closed partially with a complex linear closure.  Given the location of the remaining defect, shape of the defect and the proximity to free margins an M plasty was deemed most appropriate for complete closure of the defect.  Using a sterile surgical marker, an appropriate advancement flap was drawn incorporating the defect and placing the expected incisions within the relaxed skin tension lines where possible.    The area thus outlined was incised deep to adipose tissue with a #15 scalpel blade.  The skin margins were undermined to an appropriate distance in all directions utilizing iris scissors.
Paramedian Forehead Flap Text: A decision was made to reconstruct the defect utilizing an interpolation axial flap and a staged reconstruction.  A telfa template was made of the defect.  This telfa template was then used to outline the paramedian forehead pedicle flap.  The donor area for the pedicle flap was then injected with anesthesia.  The flap was excised through the skin and subcutaneous tissue down to the layer of the underlying musculature.  The pedicle flap was carefully excised within this deep plane to maintain its blood supply.  The edges of the donor site were undermined.   The donor site was closed in a primary fashion.  The pedicle was then rotated into position and sutured.  Once the tube was sutured into place, adequate blood supply was confirmed with blanching and refill.  The pedicle was then wrapped with xeroform gauze and dressed appropriately with a telfa and gauze bandage to ensure continued blood supply and protect the attached pedicle.
Epidermal Autograft Text: The defect edges were debeveled with a #15 scalpel blade.  Given the location of the defect, shape of the defect and the proximity to free margins an epidermal autograft was deemed most appropriate.  Using a sterile surgical marker, the primary defect shape was transferred to the donor site. The epidermal graft was then harvested.  The skin graft was then placed in the primary defect and oriented appropriately.
Partial Purse String (Intermediate) Text: Given the location of the defect and the characteristics of the surrounding skin an intermediate purse string closure was deemed most appropriate.  Undermining was performed circumferentially around the surgical defect.  A purse string suture was then placed and tightened. Wound tension of the circular defect prevented complete closure of the wound.
Complex Repair And O-L Flap Text: The defect edges were debeveled with a #15 scalpel blade.  The primary defect was closed partially with a complex linear closure.  Given the location of the remaining defect, shape of the defect and the proximity to free margins an O-L flap was deemed most appropriate for complete closure of the defect.  Using a sterile surgical marker, an appropriate flap was drawn incorporating the defect and placing the expected incisions within the relaxed skin tension lines where possible.    The area thus outlined was incised deep to adipose tissue with a #15 scalpel blade.  The skin margins were undermined to an appropriate distance in all directions utilizing iris scissors.
Zygomaticofacial Flap Text: Given the location of the defect, shape of the defect and the proximity to free margins a zygomaticofacial flap was deemed most appropriate for repair.  Using a sterile surgical marker, the appropriate flap was drawn incorporating the defect and placing the expected incisions within the relaxed skin tension lines where possible. The area thus outlined was incised deep to adipose tissue with a #15 scalpel blade with preservation of a vascular pedicle.  The skin margins were undermined to an appropriate distance in all directions utilizing iris scissors.  The flap was then placed into the defect and anchored with interrupted buried subcutaneous sutures.
Nostril Rim Text: The closure involved the nostril rim.
Retention Suture Text: Retention sutures were placed to support the closure and prevent dehiscence.
Bilobed Transposition Flap Text: The defect edges were debeveled with a #15 scalpel blade.  Given the location of the defect and the proximity to free margins a bilobed transposition flap was deemed most appropriate.  Using a sterile surgical marker, an appropriate bilobe flap drawn around the defect.    The area thus outlined was incised deep to adipose tissue with a #15 scalpel blade.  The skin margins were undermined to an appropriate distance in all directions utilizing iris scissors.
Island Pedicle Flap-Requiring Vessel Identification Text: The defect edges were debeveled with a #15 scalpel blade.  Given the location of the defect, shape of the defect and the proximity to free margins an island pedicle advancement flap was deemed most appropriate.  Using a sterile surgical marker, an appropriate advancement flap was drawn, based on the axial vessel mentioned above, incorporating the defect, outlining the appropriate donor tissue and placing the expected incisions within the relaxed skin tension lines where possible.    The area thus outlined was incised deep to adipose tissue with a #15 scalpel blade.  The skin margins were undermined to an appropriate distance in all directions around the primary defect and laterally outward around the island pedicle utilizing iris scissors.  There was minimal undermining beneath the pedicle flap.
O-T Advancement Flap Text: The defect edges were debeveled with a #15 scalpel blade.  Given the location of the defect, shape of the defect and the proximity to free margins an O-T advancement flap was deemed most appropriate.  Using a sterile surgical marker, an appropriate advancement flap was drawn incorporating the defect and placing the expected incisions within the relaxed skin tension lines where possible.    The area thus outlined was incised deep to adipose tissue with a #15 scalpel blade.  The skin margins were undermined to an appropriate distance in all directions utilizing iris scissors.
Modified Advancement Flap Text: The defect edges were debeveled with a #15 scalpel blade.  Given the location of the defect, shape of the defect and the proximity to free margins a modified advancement flap was deemed most appropriate.  Using a sterile surgical marker, an appropriate advancement flap was drawn incorporating the defect and placing the expected incisions within the relaxed skin tension lines where possible.    The area thus outlined was incised deep to adipose tissue with a #15 scalpel blade.  The skin margins were undermined to an appropriate distance in all directions utilizing iris scissors.
Transposition Flap Text: The defect edges were debeveled with a #15 scalpel blade.  Given the location of the defect and the proximity to free margins a transposition flap was deemed most appropriate.  Using a sterile surgical marker, an appropriate transposition flap was drawn incorporating the defect.    The area thus outlined was incised deep to adipose tissue with a #15 scalpel blade.  The skin margins were undermined to an appropriate distance in all directions utilizing iris scissors.
Rhomboid Transposition Flap Text: The defect edges were debeveled with a #15 scalpel blade.  Given the location of the defect and the proximity to free margins a rhomboid transposition flap was deemed most appropriate.  Using a sterile surgical marker, an appropriate rhomboid flap was drawn incorporating the defect.    The area thus outlined was incised deep to adipose tissue with a #15 scalpel blade.  The skin margins were undermined to an appropriate distance in all directions utilizing iris scissors.
Complex Repair And Epidermal Autograft Text: The defect edges were debeveled with a #15 scalpel blade.  The primary defect was closed partially with a complex linear closure.  Given the location of the defect, shape of the defect and the proximity to free margins an epidermal autograft was deemed most appropriate to repair the remaining defect.  The graft was trimmed to fit the size of the remaining defect.  The graft was then placed in the primary defect, oriented appropriately, and sutured into place.
Slit Excision Additional Text (Leave Blank If You Do Not Want): A linear line was drawn on the skin overlying the lesion. An incision was made slowly until the lesion was visualized.  Once visualized, the lesion was removed with blunt dissection.
Advancement Flap (Double) Text: The defect edges were debeveled with a #15 scalpel blade.  Given the location of the defect and the proximity to free margins a double advancement flap was deemed most appropriate.  Using a sterile surgical marker, the appropriate advancement flaps were drawn incorporating the defect and placing the expected incisions within the relaxed skin tension lines where possible.    The area thus outlined was incised deep to adipose tissue with a #15 scalpel blade.  The skin margins were undermined to an appropriate distance in all directions utilizing iris scissors.
V-Y Plasty Text: The defect edges were debeveled with a #15 scalpel blade.  Given the location of the defect, shape of the defect and the proximity to free margins an V-Y advancement flap was deemed most appropriate.  Using a sterile surgical marker, an appropriate advancement flap was drawn incorporating the defect and placing the expected incisions within the relaxed skin tension lines where possible.    The area thus outlined was incised deep to adipose tissue with a #15 scalpel blade.  The skin margins were undermined to an appropriate distance in all directions utilizing iris scissors.
Melolabial Transposition Flap Text: The defect edges were debeveled with a #15 scalpel blade.  Given the location of the defect and the proximity to free margins a melolabial flap was deemed most appropriate.  Using a sterile surgical marker, an appropriate melolabial transposition flap was drawn incorporating the defect.    The area thus outlined was incised deep to adipose tissue with a #15 scalpel blade.  The skin margins were undermined to an appropriate distance in all directions utilizing iris scissors.
Banner Transposition Flap Text: The defect edges were debeveled with a #15 scalpel blade.  Given the location of the defect and the proximity to free margins a Banner transposition flap was deemed most appropriate.  Using a sterile surgical marker, an appropriate flap drawn around the defect. The area thus outlined was incised deep to adipose tissue with a #15 scalpel blade.  The skin margins were undermined to an appropriate distance in all directions utilizing iris scissors.
Alar Island Pedicle Flap Text: The defect edges were debeveled with a #15 scalpel blade.  Given the location of the defect, shape of the defect and the proximity to the alar rim an island pedicle advancement flap was deemed most appropriate.  Using a sterile surgical marker, an appropriate advancement flap was drawn incorporating the defect, outlining the appropriate donor tissue and placing the expected incisions within the nasal ala running parallel to the alar rim. The area thus outlined was incised with a #15 scalpel blade.  The skin margins were undermined minimally to an appropriate distance in all directions around the primary defect and laterally outward around the island pedicle utilizing iris scissors.  There was minimal undermining beneath the pedicle flap.
Advancement-Rotation Flap Text: The defect edges were debeveled with a #15 scalpel blade.  Given the location of the defect, shape of the defect and the proximity to free margins an advancement-rotation flap was deemed most appropriate.  Using a sterile surgical marker, an appropriate flap was drawn incorporating the defect and placing the expected incisions within the relaxed skin tension lines where possible. The area thus outlined was incised deep to adipose tissue with a #15 scalpel blade.  The skin margins were undermined to an appropriate distance in all directions utilizing iris scissors.
Spiral Flap Text: The defect edges were debeveled with a #15 scalpel blade.  Given the location of the defect, shape of the defect and the proximity to free margins a spiral flap was deemed most appropriate.  Using a sterile surgical marker, an appropriate rotation flap was drawn incorporating the defect and placing the expected incisions within the relaxed skin tension lines where possible. The area thus outlined was incised deep to adipose tissue with a #15 scalpel blade.  The skin margins were undermined to an appropriate distance in all directions utilizing iris scissors.
Elliptical Excision Additional Text (Leave Blank If You Do Not Want): The margin was drawn around the clinically apparent lesion.  An elliptical shape was then drawn on the skin incorporating the lesion and margins.  Incisions were then made along these lines to the appropriate tissue plane and the lesion was extirpated.
No Repair - Repaired With Adjacent Surgical Defect Text (Leave Blank If You Do Not Want): After the excision the defect was repaired concurrently with another surgical defect which was in close approximation.
Home Suture Removal Text: Patient was provided a home suture removal kit and will remove their sutures at home.  If they have any questions or difficulties they will call the office.
Body Location Override (Optional - Billing Will Still Be Based On Selected Body Map Location If Applicable): Right deltoid shoulder
Positioning (Leave Blank If You Do Not Want): The patient was placed in a comfortable position exposing the surgical site.
Complex Repair And Burow's Graft Text: The defect edges were debeveled with a #15 scalpel blade.  The primary defect was closed partially with a complex linear closure.  Given the location of the defect, shape of the defect, the proximity to free margins and the presence of a standing cone deformity a Burow's graft was deemed most appropriate to repair the remaining defect.  The graft was trimmed to fit the size of the remaining defect.  The graft was then placed in the primary defect, oriented appropriately, and sutured into place.
Complex Repair And Bilobe Flap Text: The defect edges were debeveled with a #15 scalpel blade.  The primary defect was closed partially with a complex linear closure.  Given the location of the remaining defect, shape of the defect and the proximity to free margins a bilobe flap was deemed most appropriate for complete closure of the defect.  Using a sterile surgical marker, an appropriate advancement flap was drawn incorporating the defect and placing the expected incisions within the relaxed skin tension lines where possible.    The area thus outlined was incised deep to adipose tissue with a #15 scalpel blade.  The skin margins were undermined to an appropriate distance in all directions utilizing iris scissors.
Complex Repair And Double M Plasty Text: The defect edges were debeveled with a #15 scalpel blade.  The primary defect was closed partially with a complex linear closure.  Given the location of the remaining defect, shape of the defect and the proximity to free margins a double M plasty was deemed most appropriate for complete closure of the defect.  Using a sterile surgical marker, an appropriate advancement flap was drawn incorporating the defect and placing the expected incisions within the relaxed skin tension lines where possible.    The area thus outlined was incised deep to adipose tissue with a #15 scalpel blade.  The skin margins were undermined to an appropriate distance in all directions utilizing iris scissors.
Cheek Interpolation Flap Text: A decision was made to reconstruct the defect utilizing an interpolation axial flap and a staged reconstruction.  A telfa template was made of the defect.  This telfa template was then used to outline the Cheek Interpolation flap.  The donor area for the pedicle flap was then injected with anesthesia.  The flap was excised through the skin and subcutaneous tissue down to the layer of the underlying musculature.  The interpolation flap was carefully excised within this deep plane to maintain its blood supply.  The edges of the donor site were undermined.   The donor site was closed in a primary fashion.  The pedicle was then rotated into position and sutured.  Once the tube was sutured into place, adequate blood supply was confirmed with blanching and refill.  The pedicle was then wrapped with xeroform gauze and dressed appropriately with a telfa and gauze bandage to ensure continued blood supply and protect the attached pedicle.
Lip Wedge Excision Repair Text: Given the location of the defect and the proximity to free margins a full thickness wedge repair was deemed most appropriate.  Using a sterile surgical marker, the appropriate repair was drawn incorporating the defect and placing the expected incisions perpendicular to the vermilion border.  The vermilion border was also meticulously outlined to ensure appropriate reapproximation during the repair.  The area thus outlined was incised through and through with a #15 scalpel blade.  The muscularis and dermis were reaproximated with deep sutures following hemostasis. Care was taken to realign the vermilion border before proceeding with the superficial closure.  Once the vermilion was realigned the superfical and mucosal closure was finished.
Dermal Autograft Text: The defect edges were debeveled with a #15 scalpel blade.  Given the location of the defect, shape of the defect and the proximity to free margins a dermal autograft was deemed most appropriate.  Using a sterile surgical marker, the primary defect shape was transferred to the donor site. The area thus outlined was incised deep to adipose tissue with a #15 scalpel blade.  The harvested graft was then trimmed of adipose and epidermal tissue until only dermis was left.  The skin graft was then placed in the primary defect and oriented appropriately.
Partial Purse String (Simple) Text: Given the location of the defect and the characteristics of the surrounding skin a simple purse string closure was deemed most appropriate.  Undermining was performed circumferentially around the surgical defect.  A purse string suture was then placed and tightened. Wound tension of the circular defect prevented complete closure of the wound.
Path Notes (To The Dermatopathologist): Please check margins.
Information: Selecting Yes will display possible errors in your note based on the variables you have selected. This validation is only offered as a suggestion for you. PLEASE NOTE THAT THE VALIDATION TEXT WILL BE REMOVED WHEN YOU FINALIZE YOUR NOTE. IF YOU WANT TO FAX A PRELIMINARY NOTE YOU WILL NEED TO TOGGLE THIS TO 'NO' IF YOU DO NOT WANT IT IN YOUR FAXED NOTE.
Bi-Rhombic Flap Text: The defect edges were debeveled with a #15 scalpel blade.  Given the location of the defect and the proximity to free margins a bi-rhombic flap was deemed most appropriate.  Using a sterile surgical marker, an appropriate rhombic flap was drawn incorporating the defect. The area thus outlined was incised deep to adipose tissue with a #15 scalpel blade.  The skin margins were undermined to an appropriate distance in all directions utilizing iris scissors.
Trilobed Flap Text: The defect edges were debeveled with a #15 scalpel blade.  Given the location of the defect and the proximity to free margins a trilobed flap was deemed most appropriate.  Using a sterile surgical marker, an appropriate trilobed flap drawn around the defect.    The area thus outlined was incised deep to adipose tissue with a #15 scalpel blade.  The skin margins were undermined to an appropriate distance in all directions utilizing iris scissors.
Keystone Flap Text: The defect edges were debeveled with a #15 scalpel blade.  Given the location of the defect, shape of the defect a keystone flap was deemed most appropriate.  Using a sterile surgical marker, an appropriate keystone flap was drawn incorporating the defect, outlining the appropriate donor tissue and placing the expected incisions within the relaxed skin tension lines where possible. The area thus outlined was incised deep to adipose tissue with a #15 scalpel blade.  The skin margins were undermined to an appropriate distance in all directions around the primary defect and laterally outward around the flap utilizing iris scissors.
O-L Flap Text: The defect edges were debeveled with a #15 scalpel blade.  Given the location of the defect, shape of the defect and the proximity to free margins an O-L flap was deemed most appropriate.  Using a sterile surgical marker, an appropriate advancement flap was drawn incorporating the defect and placing the expected incisions within the relaxed skin tension lines where possible.    The area thus outlined was incised deep to adipose tissue with a #15 scalpel blade.  The skin margins were undermined to an appropriate distance in all directions utilizing iris scissors.
Billing Type: Third-Party Bill
Excision Depth: adipose tissue
Mucosal Advancement Flap Text: Given the location of the defect, shape of the defect and the proximity to free margins a mucosal advancement flap was deemed most appropriate. Incisions were made with a 15 blade scalpel in the appropriate fashion along the cutaneous vermilion border and the mucosal lip. The remaining actinically damaged mucosal tissue was excised.  The mucosal advancement flap was then elevated to the gingival sulcus with care taken to preserve the neurovascular structures and advanced into the primary defect. Care was taken to ensure that precise realignment of the vermilion border was achieved.
Muscle Hinge Flap Text: The defect edges were debeveled with a #15 scalpel blade.  Given the size, depth and location of the defect and the proximity to free margins a muscle hinge flap was deemed most appropriate.  Using a sterile surgical marker, an appropriate hinge flap was drawn incorporating the defect. The area thus outlined was incised with a #15 scalpel blade.  The skin margins were undermined to an appropriate distance in all directions utilizing iris scissors.
Excisional Biopsy Additional Text (Leave Blank If You Do Not Want): The margin was drawn around the clinically apparent lesion. An elliptical shape was then drawn on the skin incorporating the lesion and margins.  Incisions were then made along these lines to the appropriate tissue plane and the lesion was extirpated.
Burow's Advancement Flap Text: The defect edges were debeveled with a #15 scalpel blade.  Given the location of the defect and the proximity to free margins a Burow's advancement flap was deemed most appropriate.  Using a sterile surgical marker, the appropriate advancement flap was drawn incorporating the defect and placing the expected incisions within the relaxed skin tension lines where possible.    The area thus outlined was incised deep to adipose tissue with a #15 scalpel blade.  The skin margins were undermined to an appropriate distance in all directions utilizing iris scissors.
Suturegard Body: The suture ends were repeatedly re-tightened and re-clamped to achieve the desired tissue expansion.
Complex Repair And Dermal Autograft Text: The defect edges were debeveled with a #15 scalpel blade.  The primary defect was closed partially with a complex linear closure.  Given the location of the defect, shape of the defect and the proximity to free margins an dermal autograft was deemed most appropriate to repair the remaining defect.  The graft was trimmed to fit the size of the remaining defect.  The graft was then placed in the primary defect, oriented appropriately, and sutured into place.
Complex Repair And Rhombic Flap Text: The defect edges were debeveled with a #15 scalpel blade.  The primary defect was closed partially with a complex linear closure.  Given the location of the remaining defect, shape of the defect and the proximity to free margins a rhombic flap was deemed most appropriate for complete closure of the defect.  Using a sterile surgical marker, an appropriate advancement flap was drawn incorporating the defect and placing the expected incisions within the relaxed skin tension lines where possible.    The area thus outlined was incised deep to adipose tissue with a #15 scalpel blade.  The skin margins were undermined to an appropriate distance in all directions utilizing iris scissors.
Complex Repair And Dorsal Nasal Flap Text: The defect edges were debeveled with a #15 scalpel blade.  The primary defect was closed partially with a complex linear closure.  Given the location of the remaining defect, shape of the defect and the proximity to free margins a dorsal nasal flap was deemed most appropriate for complete closure of the defect.  Using a sterile surgical marker, an appropriate flap was drawn incorporating the defect and placing the expected incisions within the relaxed skin tension lines where possible.    The area thus outlined was incised deep to adipose tissue with a #15 scalpel blade.  The skin margins were undermined to an appropriate distance in all directions utilizing iris scissors.
Melolabial Interpolation Flap Text: A decision was made to reconstruct the defect utilizing an interpolation axial flap and a staged reconstruction.  A telfa template was made of the defect.  This telfa template was then used to outline the melolabial interpolation flap.  The donor area for the pedicle flap was then injected with anesthesia.  The flap was excised through the skin and subcutaneous tissue down to the layer of the underlying musculature.  The pedicle flap was carefully excised within this deep plane to maintain its blood supply.  The edges of the donor site were undermined.   The donor site was closed in a primary fashion.  The pedicle was then rotated into position and sutured.  Once the tube was sutured into place, adequate blood supply was confirmed with blanching and refill.  The pedicle was then wrapped with xeroform gauze and dressed appropriately with a telfa and gauze bandage to ensure continued blood supply and protect the attached pedicle.
Burow's Graft Text: The defect edges were debeveled with a #15 scalpel blade.  Given the location of the defect, shape of the defect, the proximity to free margins and the presence of a standing cone deformity a Burow's skin graft was deemed most appropriate. The standing cone was removed and this tissue was then trimmed to the shape of the primary defect. The adipose tissue was also removed until only dermis and epidermis were left.  The skin margins of the secondary defect were undermined to an appropriate distance in all directions utilizing iris scissors.  The secondary defect was closed with interrupted buried subcutaneous sutures.  The skin edges were then re-apposed with running  sutures.  The skin graft was then placed in the primary defect and oriented appropriately.
Xenograft Text: The defect edges were debeveled with a #15 scalpel blade.  Given the location of the defect, shape of the defect and the proximity to free margins a xenograft was deemed most appropriate.  The graft was then trimmed to fit the size of the defect.  The graft was then placed in the primary defect and oriented appropriately.
Complex Repair And Modified Advancement Flap Text: The defect edges were debeveled with a #15 scalpel blade.  The primary defect was closed partially with a complex linear closure.  Given the location of the remaining defect, shape of the defect and the proximity to free margins a modified advancement flap was deemed most appropriate for complete closure of the defect.  Using a sterile surgical marker, an appropriate advancement flap was drawn incorporating the defect and placing the expected incisions within the relaxed skin tension lines where possible.    The area thus outlined was incised deep to adipose tissue with a #15 scalpel blade.  The skin margins were undermined to an appropriate distance in all directions utilizing iris scissors.

## 2021-02-16 NOTE — PROCEDURE: DIAGNOSIS COMMENT
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
Render Risk Assessment In Note?: yes
Detail Level: Simple

## 2021-02-16 NOTE — PROCEDURE: ULCER EVALUATION
Body Location Override (Optional - Billing Will Still Be Based On Selected Body Map Location If Applicable): Left Mid Antihelix
X Size Of Lesion In Cm (Optional): 0.3
Detail Level: Detailed
Instructions (Optional): Physical Examination:\\nEschar: soft, fibrinous\\nGranulation Tissue: present\\nRe-Epithelialization: progressing\\nDrainage: serous\\nSurrounding Edema: present\\nPain to palpation: 0/ 10\\nOdor: none\\nSurrounding Dermatitis: absent\\n\\nAssessment:\\nMuch improved healing\\nNo signs / symptoms of infection\\n\\nPlan:\\nWound cleaned with H2O2\\nWhite petrolatum ointment and non-adherent dressing applied\\nContinue QD wound care as instructed for 2 weeks, then D/C

## 2021-02-17 ENCOUNTER — PATIENT OUTREACH (OUTPATIENT)
Dept: SURGERY | Facility: CLINIC | Age: 86
End: 2021-02-17

## 2021-02-17 NOTE — PROGRESS NOTES
Patient Telephone Reminder Call    Date of call:  02/17/21  Phone numbers:  Home number on file [phone] (home)    Reached patient/confirmed appointment:  Yes  Appointment with:   Dr. Giuliano De La Cruz  Reason for visit:  Hernia

## 2021-02-18 ENCOUNTER — PRE VISIT (OUTPATIENT)
Dept: SURGERY | Facility: CLINIC | Age: 86
End: 2021-02-18

## 2021-02-18 ENCOUNTER — OFFICE VISIT (OUTPATIENT)
Dept: SURGERY | Facility: CLINIC | Age: 86
End: 2021-02-18
Payer: MEDICARE

## 2021-02-18 VITALS
BODY MASS INDEX: 26.21 KG/M2 | DIASTOLIC BLOOD PRESSURE: 84 MMHG | OXYGEN SATURATION: 97 % | SYSTOLIC BLOOD PRESSURE: 183 MMHG | HEART RATE: 69 BPM | WEIGHT: 183.1 LBS | HEIGHT: 70 IN

## 2021-02-18 DIAGNOSIS — K40.90 LEFT INGUINAL HERNIA: Primary | ICD-10-CM

## 2021-02-18 PROCEDURE — 99203 OFFICE O/P NEW LOW 30 MIN: CPT | Performed by: SURGERY

## 2021-02-18 RX ORDER — CEFAZOLIN SODIUM 2 G/50ML
2 SOLUTION INTRAVENOUS
Status: CANCELLED | OUTPATIENT
Start: 2021-02-18

## 2021-02-18 RX ORDER — CEFAZOLIN SODIUM 2 G/50ML
2 SOLUTION INTRAVENOUS SEE ADMIN INSTRUCTIONS
Status: CANCELLED | OUTPATIENT
Start: 2021-02-18

## 2021-02-18 ASSESSMENT — PAIN SCALES - GENERAL: PAINLEVEL: NO PAIN (0)

## 2021-02-18 ASSESSMENT — MIFFLIN-ST. JEOR: SCORE: 1526.79

## 2021-02-18 NOTE — LETTER
2/18/2021       RE: Farzad East  [address]  [facility] [address]     Dear Colleague,    Thank you for referring your patient, aFrzad East, to the [facility] [place] at [facility]. Please see a copy of my visit note below.    ......................Farzad East is a 85 year old male with a 2 month history of a left inguinal mass with the following symptoms of lump.    Onset did not occur with lifting.  Obstructive symptoms:  no  Urinary difficulties:  yes  Chronic cough: no  Constipation:  occasionally  Current level of activity:  Low, uses walker, had left hip surgery some time ago., wife recently placed in memory care with fall pelvic fx soon there after.    Past medical and surgical history, medications, allergies, family history, and social history were reviewed with the patient. Many medical issues.    ROS: 10 point review of systems negative except noted in HPI  PHYSICAL EXAM  General appearance- [place], alert, and in no distress.  Lungs- Respiratory effort unlabored.  Gait- uses walker  Left inguinal hernia  Impression:  Inguinal hernia, left  Recommendation:  Open mesh repair LIH, under MAC, will need PAC eval    A full discussion regarding the alternatives, risks, goals, and potential complications for this surgery was completed today.  The patient understood I would use non recalled mesh and  that the potential problems included but are not limited to:  Infection, bleeding, hematoma, seroma, recurrence, injury to nerve/muscle or involved testicle(if male), ischemic orchitis(if male), and chronic pain.    The patient verbally expressed understanding, was given the opportunity for questions, and gives full informed consent for the procedure.        The total time spent with this patient was 30 minutes.  The total time was spent on the date of encounter doing chart review, history and physical,  dressing changes, documentation, patient education, and any further activity as noted above.      Again, thank you for allowing me to participate in the care of your patient.      Sincerely,    Giuliano De La Cruz MD

## 2021-02-18 NOTE — PROGRESS NOTES
......................Farzad East is a 85 year old male with a 2 month history of a left inguinal mass with the following symptoms of lump.    Onset did not occur with lifting.  Obstructive symptoms:  no  Urinary difficulties:  yes  Chronic cough: no  Constipation:  occasionally  Current level of activity:  Low, uses walker, had left hip surgery some time ago., wife recently placed in memory care with fall pelvic fx soon there after.    Past medical and surgical history, medications, allergies, family history, and social history were reviewed with the patient. Many medical issues.    ROS: 10 point review of systems negative except noted in HPI  PHYSICAL EXAM  General appearance- [place], alert, and in no distress.  Lungs- Respiratory effort unlabored.  Gait- uses walker  Left inguinal hernia  Impression:  Inguinal hernia, left  Recommendation:  Open mesh repair LIH, under MAC, will need PAC eval    A full discussion regarding the alternatives, risks, goals, and potential complications for this surgery was completed today.  The patient understood I would use non recalled mesh and  that the potential problems included but are not limited to:  Infection, bleeding, hematoma, seroma, recurrence, injury to nerve/muscle or involved testicle(if male), ischemic orchitis(if male), and chronic pain.    The patient verbally expressed understanding, was given the opportunity for questions, and gives full informed consent for the procedure.        The total time spent with this patient was 30 minutes.  The total time was spent on the date of encounter doing chart review, history and physical, dressing changes, documentation, patient education, and any further activity as noted above.

## 2021-02-18 NOTE — PATIENT INSTRUCTIONS
You met with Dr. Giuliano De La Cruz.      Today's visit instructions:    Reminder:  Surgery Requirements  1. Your surgery will be at [address] [place] [address]  2. You will need to arrive 1 1/2 to 2 hours early based on the location of your surgery.  3. You will need someone to drive you home (over 18 years old) and stay with you for 24 hours after the procedure  4. You will need a preop physical with your regular doctor (or PAC if requested by your surgeon) within 30 days of surgery- closer is always better  5. Stop any blood thinners, vitamins, minerals, or herbal supplements 5 days before surgery.  If you are taking a prescribed blood thinner please let us know for specific instructions  6. Fasting- a nurse from [facility] will call you 1-2 days before surgery to confirm your procedure and tell you when to stop eating and drinking  7. Wash with the soap the night before surgery and morning of surgery. See instructions in the Surgery Packet.  8. If you would like a procedure estimate please call Sabrina BISWAS Financial Counselor at [phone] or [phone].    At this time, any patient that does not have COVID-19 testing within 4 days of surgery and results available to the surgeon and anesthesia team before the procedure may have their procedure postponed or canceled. We do this to keep our patients, providers, and employees safe. If you decline to test, then you will need to contact your surgeon to determine when or if your procedure will still take place.    OR    We highly encourage patients to get tested for COVID-19 at one of our designated [facility] testing sites. We process the tests in our lab, which allows us to get the results quickly. If you choose to get tested at a non-[facility] location, you will need to contact your primary care provider to make those arrangements and ensure the results are available to your surgeon before you arrive for your procedure. If we do not receive the results in  time, your procedure may be postponed or canceled. Please make sure your test is collected 3-days prior to your procedure date. The results will need to get faxed to [phone].     If you have questions please contact Arabella DENNY during regular clinic hours, Monday through Friday 7:30 AM - 4:00 PM, or you can contact us via [employer] at anytime.       If you have urgent needs after-hours, weekends, or holidays please call the hospital at [phone] and ask to speak with our on-call General Surgery Team.    Appointment [phone], option #1   Nurse Advice (Arabella): [phone]   Surgery Scheduler (Mable): [phone]  Fax: [phone]    After Your Open Inguinal Hernia Repair         Incision care     You may take a shower the day after surgery. Carefully wash your incision with soap and water. Do not submerge yourself in water (bath, whirlpool, hot tub, pool, lake) for 14 days after surgery.     Remove the bandage the day after surgery, but leave the medical tape (Steri-Strips) or glue in place. These will loosen and fall off on their own 5 to 7 days after surgery.      Always wash your hands before touching your incisions or removing bandages.     It is not unusual to form a collection of fluid or blood under your incision that may feel firm or squishy- it can take several weeks to months for your body to reabsorb it.  At times, it may even drain.  If that should happen keep the area clean with soap, water,  and cover with a clean gauze dressing. You can change this daily or as needed.     Other medicines     Wait to start aspirin or blood thinners until the day after surgery. You can take any other regular medicines at your normal time the day after surgery.     Your pain medicine may cause constipation (hard, dry stools). To help with this, take the stool softener your doctor gave you or an over-the-counter stool softener or laxative. You can stop taking this when you are no longer taking pain  medicine and your bowel movements are back to normal.      For pain or discomfort     Take the narcotic pain medicine your doctor gave you as needed and as instructed on the bottle. If you prefer to use over-the-counter medication, use acetaminophen (Tylenol) or ibuprofen (Advil, Motrin) as instructed on the box. Do not take Tylenol if it is in your narcotic pain medication.      Use an ice pack on your groin for 20 minutes at a time as needed for the first 24 hours. Be sure to protect your skin by putting a cloth between the ice pack and your skin.     After 24 hours you can switch to heat for 20 minutes as needed. Be sure to protect your skin by putting a cloth between the heat pack and your skin.      It is normal to feel a lump in your groin after surgery. This lump may take up to 8 weeks to go away.      Activities     No driving until you feel it s safe to do so. Don t drive while taking narcotic pain medicine.     Don t lift anything heavier than 20 pounds for 3 weeks after surgery.      Special equipment     If we gave you an athletic supporter, wear this for the first 3 days after surgery. You can wear it longer than this if you wish.      Diet   You can eat your regular meals after surgery.      When to call the doctor   Call your doctor if you have:     A fever above 101 F (38.3 C) (taken under the tongue), or a fever or chills lasting more than a day.     Redness at the incision site.     Any fluid or blood draining from the incision, especially if it smells bad.      Severe pain that doesn t improve with pain medicine.      We will call you 2 to 4 days after surgery to review this handout, answer questions and help arrange after-surgery care. If you have questions or concerns, please call [phone] during regular office hours. If you need to call after business hours, call [phone] and ask to page the surgeon on-call.

## 2021-02-23 DIAGNOSIS — I10 HYPERTENSION, UNSPECIFIED TYPE: ICD-10-CM

## 2021-02-25 RX ORDER — CARVEDILOL 6.25 MG/1
TABLET ORAL
Qty: 180 TABLET | Refills: 0 | Status: SHIPPED | OUTPATIENT
Start: 2021-02-25 | End: 2021-03-25

## 2021-02-25 NOTE — TELEPHONE ENCOUNTER
Last Clinic Visit: 9/23/2020  [facility]  BP above protocol parameter -- FYI to clinic  RF 90 day  BP Readings from Last 3 Encounters:   02/18/21 (!) 183/84   12/21/20 (!) 199/78   09/29/20 (!) 186/84

## 2021-02-26 NOTE — PROCEDURE: MOHS SURGERY
I have reviewed the HP and agree with all findings  Galen Ramos DDS    Transposition Flap Text: The defect edges were debeveled with a #15 scalpel blade.  Given the location of the defect and the proximity to free margins a transposition flap was deemed most appropriate.  Using a sterile surgical marker, an appropriate transposition flap was drawn incorporating the defect.    The area thus outlined was incised deep to adipose tissue with a #15 scalpel blade.  The skin margins were undermined to an appropriate distance in all directions utilizing iris scissors.

## 2021-03-04 DIAGNOSIS — E11.65 TYPE 2 DIABETES MELLITUS WITH HYPERGLYCEMIA (H): ICD-10-CM

## 2021-03-04 RX ORDER — GLIPIZIDE 5 MG/1
5 TABLET, FILM COATED, EXTENDED RELEASE ORAL DAILY
Qty: 90 TABLET | Refills: 3 | Status: ON HOLD | OUTPATIENT
Start: 2021-03-04 | End: 2021-09-01

## 2021-03-04 NOTE — TELEPHONE ENCOUNTER
[facility] Call Center    Phone Message    May a detailed message be left on voicemail: yes     Reason for Call: Medication Refill Request    Has the patient contacted the pharmacy for the refill? Yes   Name of medication being requested: glipiZIDE (GLUCOTROL XL) 5 MG 24 hr tablet  Provider who prescribed the medication:   Pharmacy:      [facility] #[id number] - [place], [place] - [address]      Date medication is needed: ASAP  Pt called the pharmacy over a week ago and that he is been out for 3 days.         Action Taken: Message routed to:  [facility] ([facility] Endo    Travel Screening: Not Applicable

## 2021-03-05 ENCOUNTER — TRANSFERRED RECORDS (OUTPATIENT)
Dept: HEALTH INFORMATION MANAGEMENT | Facility: CLINIC | Age: 86
End: 2021-03-05

## 2021-03-10 ENCOUNTER — TELEPHONE (OUTPATIENT)
Dept: SURGERY | Facility: CLINIC | Age: 86
End: 2021-03-10

## 2021-03-10 NOTE — TELEPHONE ENCOUNTER
Case Request received to schedule an open SDS procedure with Dr. Giuliano De La Cruz at [place].    Phone call to patient, spoke with him, he is not ready to discuss scheduling yet. He'd like a call back in April to discuss scheduling his surgery sometime in May.    More info below:    JN spoke with patient via phone on 3/10, he said he wanted to schedule this surgery with Dr. De La Cruz in April. When he found out that if he waited until April to schedule it, then it would likely be too late to schedule it in April, then he said to call him back in April to schedule his procedure in May. Not sure of date yet, but added a hold on Dr. De La Cruz's Google Calendar on 5/7 as a reminder to call patient once we are able to schedule into May.

## 2021-03-11 ENCOUNTER — OFFICE VISIT (OUTPATIENT)
Dept: FAMILY MEDICINE | Facility: CLINIC | Age: 86
End: 2021-03-11
Payer: MEDICARE

## 2021-03-11 ENCOUNTER — HOSPITAL ENCOUNTER (OUTPATIENT)
Facility: CLINIC | Age: 86
End: 2021-03-11
Attending: PLASTIC SURGERY | Admitting: PLASTIC SURGERY
Payer: MEDICARE

## 2021-03-11 ENCOUNTER — TRANSFERRED RECORDS (OUTPATIENT)
Dept: HEALTH INFORMATION MANAGEMENT | Facility: CLINIC | Age: 86
End: 2021-03-11

## 2021-03-11 VITALS
HEART RATE: 71 BPM | OXYGEN SATURATION: 98 % | TEMPERATURE: 97.8 F | DIASTOLIC BLOOD PRESSURE: 80 MMHG | RESPIRATION RATE: 16 BRPM | WEIGHT: 181 LBS | BODY MASS INDEX: 25.91 KG/M2 | SYSTOLIC BLOOD PRESSURE: 200 MMHG | HEIGHT: 70 IN

## 2021-03-11 DIAGNOSIS — E11.9 TYPE 2 DIABETES MELLITUS WITHOUT COMPLICATION, WITHOUT LONG-TERM CURRENT USE OF INSULIN (H): ICD-10-CM

## 2021-03-11 DIAGNOSIS — Z01.818 PREOP GENERAL PHYSICAL EXAM: Primary | ICD-10-CM

## 2021-03-11 DIAGNOSIS — Z01.818 PREOP GENERAL PHYSICAL EXAM: ICD-10-CM

## 2021-03-11 DIAGNOSIS — I10 ESSENTIAL HYPERTENSION: ICD-10-CM

## 2021-03-11 DIAGNOSIS — Z11.59 ENCOUNTER FOR SCREENING FOR OTHER VIRAL DISEASES: ICD-10-CM

## 2021-03-11 DIAGNOSIS — N18.4 CKD (CHRONIC KIDNEY DISEASE) STAGE 4, GFR 15-29 ML/MIN (H): ICD-10-CM

## 2021-03-11 LAB
ALBUMIN SERPL-MCNC: 3 G/DL (ref 3.4–5)
ALP SERPL-CCNC: 88 U/L (ref 40–150)
ALT SERPL W P-5'-P-CCNC: 13 U/L (ref 0–70)
ANION GAP SERPL CALCULATED.3IONS-SCNC: 5 MMOL/L (ref 3–14)
AST SERPL W P-5'-P-CCNC: 5 U/L (ref 0–45)
BASOPHILS # BLD AUTO: 0 10E9/L (ref 0–0.2)
BASOPHILS NFR BLD AUTO: 0.5 %
BILIRUB SERPL-MCNC: 0.2 MG/DL (ref 0.2–1.3)
BUN SERPL-MCNC: 57 MG/DL (ref 7–30)
CALCIUM SERPL-MCNC: 8.6 MG/DL (ref 8.5–10.1)
CHLORIDE SERPL-SCNC: 106 MMOL/L (ref 94–109)
CO2 SERPL-SCNC: 27 MMOL/L (ref 20–32)
CREAT SERPL-MCNC: 2.34 MG/DL (ref 0.66–1.25)
DIFFERENTIAL METHOD BLD: ABNORMAL
EOSINOPHIL # BLD AUTO: 0.3 10E9/L (ref 0–0.7)
EOSINOPHIL NFR BLD AUTO: 4.7 %
ERYTHROCYTE [DISTWIDTH] IN BLOOD BY AUTOMATED COUNT: 14.5 % (ref 10–15)
GFR SERPL CREATININE-BSD FRML MDRD: 25 ML/MIN/{1.73_M2}
GLUCOSE SERPL-MCNC: 360 MG/DL (ref 70–99)
HCT VFR BLD AUTO: 29.1 % (ref 40–53)
HGB BLD-MCNC: 9 G/DL (ref 13.3–17.7)
IMM GRANULOCYTES # BLD: 0 10E9/L (ref 0–0.4)
IMM GRANULOCYTES NFR BLD: 0.4 %
LYMPHOCYTES # BLD AUTO: 1.1 10E9/L (ref 0.8–5.3)
LYMPHOCYTES NFR BLD AUTO: 15.6 %
MCH RBC QN AUTO: 28.4 PG (ref 26.5–33)
MCHC RBC AUTO-ENTMCNC: 30.9 G/DL (ref 31.5–36.5)
MCV RBC AUTO: 92 FL (ref 78–100)
MONOCYTES # BLD AUTO: 0.7 10E9/L (ref 0–1.3)
MONOCYTES NFR BLD AUTO: 9.6 %
NEUTROPHILS # BLD AUTO: 5.1 10E9/L (ref 1.6–8.3)
NEUTROPHILS NFR BLD AUTO: 69.2 %
NRBC # BLD AUTO: 0 10*3/UL
NRBC BLD AUTO-RTO: 0 /100
PLATELET # BLD AUTO: 247 10E9/L (ref 150–450)
POTASSIUM SERPL-SCNC: 4.8 MMOL/L (ref 3.4–5.3)
PROT SERPL-MCNC: 7.1 G/DL (ref 6.8–8.8)
RBC # BLD AUTO: 3.17 10E12/L (ref 4.4–5.9)
SODIUM SERPL-SCNC: 138 MMOL/L (ref 133–144)
WBC # BLD AUTO: 7.3 10E9/L (ref 4–11)

## 2021-03-11 PROCEDURE — 80053 COMPREHEN METABOLIC PANEL: CPT | Performed by: PATHOLOGY

## 2021-03-11 PROCEDURE — 93000 ELECTROCARDIOGRAM COMPLETE: CPT | Mod: 59 | Performed by: INTERNAL MEDICINE

## 2021-03-11 PROCEDURE — 36415 COLL VENOUS BLD VENIPUNCTURE: CPT | Performed by: PATHOLOGY

## 2021-03-11 PROCEDURE — 85025 COMPLETE CBC W/AUTO DIFF WBC: CPT | Performed by: PATHOLOGY

## 2021-03-11 PROCEDURE — 99214 OFFICE O/P EST MOD 30 MIN: CPT | Mod: 25 | Performed by: NURSE PRACTITIONER

## 2021-03-11 PROCEDURE — 93000 ELECTROCARDIOGRAM COMPLETE: CPT | Performed by: NURSE PRACTITIONER

## 2021-03-11 ASSESSMENT — MIFFLIN-ST. JEOR: SCORE: 1517.26

## 2021-03-11 ASSESSMENT — PAIN SCALES - GENERAL: PAINLEVEL: NO PAIN (0)

## 2021-03-11 NOTE — PATIENT INSTRUCTIONS

## 2021-03-11 NOTE — NURSING NOTE
"85 year old  Chief Complaint   Patient presents with     Pre-Op Exam     Patient comes in for a pre op exam.        Blood pressure (!) 206/71, pulse 71, temperature 97.8  F (36.6  C), temperature source Oral, resp. rate 16, height 1.778 m (5' 10\"), weight 82.1 kg (181 lb), SpO2 98 %. Body mass index is 25.97 kg/m .  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     Unsteady gait     Femur fracture, left (H)     Periprosthetic L femur fx s/p ORIF     Periprosthetic hip fracture     Leg weakness     Femur fracture (H)     DM (diabetes mellitus), type 2 with ophthalmic complications (H)     Choroidal nevus of left eye     Left hip pain     Abnormal gait     Type 2 diabetes mellitus with complication (H)     Hip pain, left     Weakness of left lower extremity     Hyperkalemia     CKD (chronic kidney disease) stage 4, GFR 15-29 ml/min (H)       Wt Readings from Last 2 Encounters:   03/11/21 82.1 kg (181 lb)   02/18/21 83.1 kg (183 lb 1.6 oz)     BP Readings from Last 3 Encounters:   03/11/21 (!) 206/71   02/18/21 (!) 183/84   12/21/20 (!) 199/78       Allergies   Allergen Reactions     Sulfa Drugs      Unknown reaction. Occurred during childhood. Has not taken Sulfa medications since allergic response.        Current Outpatient Medications   Medication     aspirin 81 MG tablet     bisacodyl (DULCOLAX) 5 MG EC tablet     blood glucose (NO BRAND SPECIFIED) lancets standard     blood glucose (ONETOUCH VERIO IQ) test strip     blood glucose monitoring (ONETOUCH VERIO IQ SYSTEM) meter device kit     carvedilol (COREG) 6.25 MG tablet     Cholecalciferol (VITAMIN D-3 PO)     COMPRESSION STOCKINGS     finasteride (PROSCAR) 5 MG tablet     glipiZIDE (GLUCOTROL XL) 5 MG 24 hr tablet     hydrALAZINE (APRESOLINE) 50 MG tablet     hydrochlorothiazide (HYDRODIURIL) 12.5 MG tablet     multivitamin w/minerals " (MULTI-VITAMIN) tablet     polyethylene glycol (GOLYTELY) 236 g suspension     sitagliptin (JANUVIA) 50 MG tablet     tamsulosin (FLOMAX) 0.4 MG capsule     No current facility-administered medications for this visit.        Social History     Tobacco Use     Smoking status: Never Smoker     Smokeless tobacco: Never Used   Substance Use Topics     Alcohol use: Yes     Alcohol/week: 2.5 - 3.3 standard drinks     Types: 3 - 4 Standard drinks or equivalent per week     Comment: 3 martinis per week     Drug use: No       Honoring Choices - Health Care Directive Guide offered to patient at time of visit.    Health Maintenance Due   Topic Date Due     DIABETIC FOOT EXAM  Never done     ADVANCE CARE PLANNING  Never done     COVID-19 Vaccine (1 of 2) Never done     MEDICARE ANNUAL WELLNESS VISIT  Never done     FALL RISK ASSESSMENT  Never done     EYE EXAM  12/18/2019       Immunization History   Administered Date(s) Administered     Influenza (IIV3) PF 10/01/2013     Influenza, Quad, High Dose, Pf, 65yr + 10/07/2020     Pneumococcal 23 valent 10/11/2012, 11/19/2015     TD (ADULT, 7+) 01/24/2014       No results found for: PAP      Recent Labs   Lab Test 02/03/21  1527 10/26/20  1319 09/24/20  1413 09/24/20  1413 09/23/20  1323 07/22/19  1615 07/22/19  1615 12/04/18  1526 12/04/18  1526   A1C 8.4*  --   --   --  7.3*  --  7.7*  --  8.3*   LDL  --   --   --   --  34  --  54  --  54   HDL  --   --   --   --  45  --  52  --  48   TRIG  --   --   --   --  56  --  100  --  92   ALT 16  --   --  15 14   < > 13  --  16   CR 2.22* 2.10*   < > 2.23* 2.42*   < > 2.00*   < > 2.19*   GFRESTIMATED 26* 28*   < > 26* 24*   < > 30*   < > 29*   GFRESTBLACK 30* 32*   < > 30* 27*   < > 35*   < > 35*   ALBUMIN 3.4 3.3*  --  3.3*  --    < > 3.5  --   --    POTASSIUM 4.7 5.3   < > 6.0* 6.2*   < > 4.8   < > 5.4*   TSH  --   --   --   --  5.08*  --  4.11*  --  4.49*    < > = values in this interval not displayed.       PHQ-2 ( 1999 Pfizer)  1/25/2021 9/29/2020   Q1: Little interest or pleasure in doing things 0 0   Q2: Feeling down, depressed or hopeless 0 0   PHQ-2 Score 0 0       No flowsheet data found.    No flowsheet data found.    No flowsheet data found.    Abraham Greene, EMT  March 11, 2021 2:19 PM

## 2021-03-11 NOTE — PROGRESS NOTES
[facility] [address]  [address] [facility] [address]  Phone: [phone]  Fax: [phone]  Primary Provider: Renu Lantigua  Pre-op Performing Provider: ABDULKADIR KIDD      PREOPERATIVE EVALUATION:  Today's date: 3/11/2021    Farzad East is a 85 year old male who presents for a preoperative evaluation.    Surgical Information:  Surgery/Procedure:    Right shoulder excision melaona with sentinal lymph node biopsy  Surgery Location:  [facility]  Surgeon:  Dr. Linder  Surgery Date:  3/19/2021  Time of Surgery:  TBD  Where patient plans to recover: At home alone  Fax number for surgical facility: Note does not need to be faxed, will be available electronically in Epic.    Type of Anesthesia Anticipated: General    Assessment & Plan     The proposed surgical procedure is considered INTERMEDIATE risk.    Preop general physical exam  - CBC with platelets differential  - Comprehensive metabolic panel  - EKG 12-lead complete w/read - Clinics    Essential hypertension    Type 2 diabetes mellitus without complication, without long-term current use of insulin (H)    CKD (chronic kidney disease) stage 4, GFR 15-29 ml/min (H)        Medication Instructions:  Patient is to take all scheduled medications on the day of surgery EXCEPT for modifications listed below:   - ACE/ARB: HOLD morning of surgery   - sulfonylurea (e.g. glyburide, glipizide): HOLD day of surgery    RECOMMENDATION:  Patient referred to cardiology due to elevated blood pressure/uncontrolled hypertension for evaluation before surgery. Surgery approval pending completion of consultation.  Patient already has an appointment schedule for 3/17/2021 and will also be having an echocardiogram.  Sent secure [employer] message to cardiology and nephrology in follow-up.       33 minutes spent on the date of the encounter doing chart review, history and exam, documentation and further  "activities as noted above    ADDENDUM:  3/18/2021 - patient seen by cardiology yesterday - per Dr. Levi note -    \"This would have to be balanced with his overall risk of malignancy and how delaying his procedure might affect his overall mortality and quality of life.  This very complex picture will need to be discussed with his plastic surgeon, and I have called Dr. Linder at [phone] and discussed this today.     We have weighed the situation and in order to balance his multiple co-morbidities/increased risk of surgery with increased mortality from delaying removal of potential aggressive melanoma, the patient would like to attempt simple improved blood pressure control aggressively in the short term, without additional workup or treatment for pulmonary HTN or other concomitant comorbidities     PLAN:      1.  Increase hydralazine to 50 mg 3 times a day. Surgery rescheduled for March 26th.  If can achieve SBP<150mmHg by then, will proceed with surgery as is without further delay.  If not, will continue efforts at BP control and delay another 2 weeks.     2.  We can consider also increasing Coreg to 12.5 mg twice a day and perhaps even a small dose of amlodipine could also be reconsidered as well on a temporary basis if it helps his blood pressures and he can tolerate it from the skin breakdown standpoint with lower extremity wrapping in the short-term to control his blood pressures.\"        Subjective     HPI related to upcoming procedure:   Patient is to undergo a right shoulder excision due to melanoma.    1. No - Have you ever had a heart attack or stroke?  2. No - Have you ever had surgery on your heart or blood vessels, such as a stent, coronary (heart) bypass, or surgery on an artery in the head, neck, heart, or legs?  3. No - Do you have chest pain when you are physically active?  4. No - Do you have a history of heart failure?  5. No - Do you currently have a cold, bronchitis, or symptoms of other " respiratory (head and chest) infections?  6. No - Do you have a cough, shortness of breath, or wheezing?  7. No - Do you or anyone in your family have a history of blood clots?  8. No - Do you or anyone in your family have a serious bleeding problem, such as long-lasting bleeding after surgeries or cuts?  9. No - Have you ever had anemia or been told to take iron pills?  10. No - Have you had any abnormal blood loss such as black, tarry or bloody stools, or abnormal vaginal bleeding?  11. No - Have you ever had a blood transfusion?  12. Yes - Are you willing to have a blood transfusion if it is medically needed before, during, or after your surgery?  13. No - Have you or anyone in your family ever had problems with anesthesia (sedation for surgery)?  14. No - Do you have sleep apnea, excessive snoring, or daytime drowsiness?   15. No - Do you have any artifical heart valves or other implanted medical devices, such as a pacemaker, defibrillator, or continuous glucose monitor?  16. No - Do you have any artifical joints?  Left hip replacement   17. No - Are you allergic to latex?  18. No - Is there any chance that you may be pregnant?  N/a       Health Care Directive:  Patient does not have a Health Care Directive or Living Will: Patient states has Advance Directive and will bring in a copy to clinic.    Preoperative Review of :    Status of Chronic Conditions:  See problem list for active medical problems.  Problems all longstanding and stable, except as noted/documented.  See ROS for pertinent symptoms related to these conditions.    DIABETES - Patient has a longstanding history of DiabetesType Type II . Patient is being treated with oral agents and denies significant side effects. Control has been good. Complicating factors include but are not limited to: hypertension and chronic kidney disease.     Blood sugars 120 to 130's, elevated in the last month so.  160, before eating breakfast.  Lab Results   Component  Value Date    A1C 8.4 02/03/2021    A1C 7.3 09/23/2020    A1C 7.7 07/22/2019    A1C 8.3 12/04/2018    A1C 7.9 12/12/2017         HYPERTENSION - Patient has longstanding history of HTN , currently denies any symptoms referable to elevated blood pressure. Specifically denies chest pain, palpitations, dyspnea, orthopnea, PND or peripheral edema. Blood pressure readings have not been in normal range. Current medication regimen is as listed below. Patient denies any side effects of medication. Blood pressures have been 170's/60's.   Patient does not check his blood pressures at home.  He has been taking his carvedilol 6.25mg, hydralazine 50mg three times a day and his hydrochlorothiazide 25mg daily.  Followed by cardiology and nephrology.       Review of Systems  Constitutional, neuro, ENT, endocrine, pulmonary, cardiac, gastrointestinal, genitourinary, musculoskeletal, integument and psychiatric systems are negative, except as otherwise noted.    Patient Active Problem List    Diagnosis Date Noted     CKD (chronic kidney disease) stage 4, GFR 15-29 ml/min (H) 01/26/2021     Priority: Medium     Hyperkalemia 09/24/2020     Priority: Medium     Hip pain, left 11/09/2018     Priority: Medium     Weakness of left lower extremity 11/09/2018     Priority: Medium     Type 2 diabetes mellitus with complication (H) 11/19/2015     Priority: Medium     Left hip pain 06/16/2015     Priority: Medium     Abnormal gait 06/16/2015     Priority: Medium     DM (diabetes mellitus), type 2 with ophthalmic complications (H) 12/08/2014     Priority: Medium     Problem list name updated by automated process. Provider to review       Choroidal nevus of left eye 12/08/2014     Priority: Medium     Femur fracture (H) 03/06/2014     Priority: Medium     Leg weakness 03/03/2014     Priority: Medium     Periprosthetic hip fracture 01/30/2014     Priority: Medium     Femur fracture, left (H) 01/24/2014     Priority: Medium     Periprosthetic L femur  fx s/p ORIF 01/24/2014     Priority: Medium     Hip joint pain 12/28/2010     Priority: Medium     Aftercare following joint replacement 12/28/2010     Priority: Medium     Hip joint replacement by other means 12/28/2010     Priority: Medium     Unsteady gait 12/28/2010     Priority: Medium      Past Medical History:   Diagnosis Date     Atrial flutter (H)      Choroidal nevus of left eye      CKD (chronic kidney disease) stage 3, GFR 30-59 ml/min      Diabetes mellitus (H)      Diabetic peripheral neuropathy (H)      Hypertension      Microalbuminuria      Overweight(278.02)      Rectal-type adenocarcinoma (H)      Retinopathies, diabetic      Vitreous detachment of both eyes      Past Surgical History:   Procedure Laterality Date     CATARACT IOL, RT/LT Bilateral 2005     left hip replacement       OPEN REDUCTION INTERNAL FIXATION FEMUR PROXIMAL  1/24/2014    Procedure: OPEN REDUCTION INTERNAL FIXATION FEMUR PROXIMAL;  Periprosthetic Fracture, ORIF Left Femur;  Surgeon: Andi Garcia MD;  Location: [facility] OR     Current Outpatient Medications   Medication Sig Dispense Refill     aspirin 81 MG tablet Take 1 tablet by mouth daily.       bisacodyl (DULCOLAX) 5 MG EC tablet Take 1 tablet (5 mg) by mouth See Admin Instructions Refer to My chart messaget 4 tablet 0     blood glucose (NO BRAND SPECIFIED) lancets standard Use to test blood sugar 1 times daily or as directed. Use brand compatible with patients insurance and device. 100 each 3     blood glucose (ONETOUCH VERIO IQ) test strip Use to test blood sugar 1 time daily or as directed. 100 each 3     blood glucose monitoring (ONETOUCH VERIO IQ SYSTEM) meter device kit Use to test blood sugar daily 1 kit 0     carvedilol (COREG) 6.25 MG tablet TAKE 1 TABLET BY MOUTH TWICE A DAY WITH MEALS 180 tablet 0     Cholecalciferol (VITAMIN D-3 PO)        COMPRESSION STOCKINGS 1 each daily (Patient not taking: Reported on 3/17/2021) 1 each 3     finasteride (PROSCAR) 5 MG  "tablet Take 1 tablet by mouth daily  3     glipiZIDE (GLUCOTROL XL) 5 MG 24 hr tablet Take 1 tablet (5 mg) by mouth daily 90 tablet 3     hydrochlorothiazide (HYDRODIURIL) 12.5 MG tablet Take 2 tablets (25 mg) by mouth daily 60 tablet 0     multivitamin w/minerals (MULTI-VITAMIN) tablet Take 1 tablet by mouth daily       polyethylene glycol (GOLYTELY) 236 g suspension Take 4,000 mLs by mouth See Admin Instructions Refer to My chart message (Patient not taking: Reported on 3/17/2021) 4000 mL 0     sitagliptin (JANUVIA) 50 MG tablet Take 1 tablet (50 mg) by mouth daily 90 tablet 1     tamsulosin (FLOMAX) 0.4 MG capsule Take 1 capsule (0.4 mg) by mouth daily Advise clinic if causes lightheadedness. 90 capsule 3     hydrALAZINE (APRESOLINE) 50 MG tablet Take 2 tablets (100 mg) by mouth 3 times daily 600 tablet 3       Allergies   Allergen Reactions     Sulfa Drugs      Unknown reaction. Occurred during childhood. Has not taken Sulfa medications since allergic response.         Social History     Tobacco Use     Smoking status: Never Smoker     Smokeless tobacco: Never Used   Substance Use Topics     Alcohol use: Yes     Alcohol/week: 2.5 - 3.3 standard drinks     Types: 3 - 4 Standard drinks or equivalent per week     Comment: 3 martinis per week       Family History   Problem Relation Age of Onset     Diabetes Father      Circulatory Father         PAD     Macular Degeneration Father      Arthritis Mother      Amblyopia No family hx of      Retinal detachment No family hx of      Glaucoma No family hx of      History   Drug Use No         Objective     BP (!) 200/80   Pulse 71   Temp 97.8  F (36.6  C) (Oral)   Resp 16   Ht 1.778 m (5' 10\")   Wt 82.1 kg (181 lb)   SpO2 98%   BMI 25.97 kg/m      Physical Exam    GENERAL APPEARANCE: healthy, alert and no distress     EYES: EOMI, PERRL     HENT: ear canals and TM's normal and nose and mouth without ulcers or lesions     NECK: no adenopathy, no asymmetry, masses, or " scars and thyroid normal to palpation     RESP: lungs clear to auscultation - no rales, rhonchi or wheezes     CV: regular rates and rhythm, normal S1 S2, no S3 or S4 and no murmur, click or rub     ABDOMEN:  soft, nontender, no HSM or masses and bowel sounds normal     MS: extremities normal- no gross deformities noted, no evidence of inflammation in joints, FROM in all extremities.     SKIN: no suspicious lesions or rashes     NEURO: Normal strength and tone, sensory exam grossly normal, mentation intact and speech normal     PSYCH: mentation appears normal. and affect normal/bright     LYMPHATICS: No cervical adenopathy    Recent Labs   Lab Test 02/03/21  1527 10/26/20  1319 09/29/20  1343 09/23/20  1323 09/23/20  1323   HGB  --  9.5* 9.4*   < > 8.5*   PLT  --  292 326   < > 301    138 139   < > 140   POTASSIUM 4.7 5.3 5.3   < > 6.2*   CR 2.22* 2.10* 2.29*   < > 2.42*   A1C 8.4*  --   --   --  7.3*    < > = values in this interval not displayed.        Diagnostics:  No results found for this or any previous visit (from the past 24 hour(s)).   EKG: appears normal, NSR, normal axis, normal intervals, no acute ST/T changes c/w ischemia, no LVH by voltage criteria, unchanged from previous tracings    Revised Cardiac Risk Index (RCRI):  The patient has the following serious cardiovascular risks for perioperative complications:   - Serum Creatinine >2.0 mg/dl = 1 point     RCRI Interpretation: 1 point: Class II (low risk - 0.9% complication rate)           Signed Electronically by: ESTEPHANIE Turner CNP  Copy of this evaluation report is provided to requesting physician.

## 2021-03-12 ENCOUNTER — TELEPHONE (OUTPATIENT)
Dept: FAMILY MEDICINE | Facility: CLINIC | Age: 86
End: 2021-03-12

## 2021-03-12 ENCOUNTER — TRANSFERRED RECORDS (OUTPATIENT)
Dept: HEALTH INFORMATION MANAGEMENT | Facility: CLINIC | Age: 86
End: 2021-03-12

## 2021-03-12 LAB — INTERPRETATION ECG - MUSE: NORMAL

## 2021-03-12 RX ORDER — CEFAZOLIN SODIUM 2 G/100ML
2 INJECTION, SOLUTION INTRAVENOUS SEE ADMIN INSTRUCTIONS
Status: CANCELLED | OUTPATIENT
Start: 2021-03-12

## 2021-03-12 RX ORDER — CEFAZOLIN SODIUM 2 G/100ML
2 INJECTION, SOLUTION INTRAVENOUS
Status: CANCELLED | OUTPATIENT
Start: 2021-03-12

## 2021-03-12 NOTE — TELEPHONE ENCOUNTER
BP Readings from Last 6 Encounters:   03/11/21 (!) 200/80   02/18/21 (!) 183/84   12/21/20 (!) 199/78   09/29/20 (!) 186/84   09/25/20 (!) 148/52   04/22/20 (!) 161/71

## 2021-03-13 ENCOUNTER — TELEPHONE (OUTPATIENT)
Dept: FAMILY MEDICINE | Facility: CLINIC | Age: 86
End: 2021-03-13

## 2021-03-16 ENCOUNTER — ANCILLARY PROCEDURE (OUTPATIENT)
Dept: CARDIOLOGY | Facility: CLINIC | Age: 86
End: 2021-03-16
Attending: INTERNAL MEDICINE
Payer: MEDICARE

## 2021-03-16 DIAGNOSIS — I10 HYPERTENSION, UNSPECIFIED TYPE: ICD-10-CM

## 2021-03-16 DIAGNOSIS — N18.4 CKD (CHRONIC KIDNEY DISEASE) STAGE 4, GFR 15-29 ML/MIN (H): ICD-10-CM

## 2021-03-16 DIAGNOSIS — Z11.59 ENCOUNTER FOR SCREENING FOR OTHER VIRAL DISEASES: ICD-10-CM

## 2021-03-16 DIAGNOSIS — E11.39 TYPE 2 DIABETES MELLITUS WITH OTHER OPHTHALMIC COMPLICATION, WITH LONG-TERM CURRENT USE OF INSULIN (H): ICD-10-CM

## 2021-03-16 DIAGNOSIS — Z79.4 TYPE 2 DIABETES MELLITUS WITH OTHER OPHTHALMIC COMPLICATION, WITH LONG-TERM CURRENT USE OF INSULIN (H): ICD-10-CM

## 2021-03-16 LAB
ALBUMIN SERPL-MCNC: 3 G/DL (ref 3.4–5)
ANION GAP SERPL CALCULATED.3IONS-SCNC: 7 MMOL/L (ref 3–14)
BUN SERPL-MCNC: 58 MG/DL (ref 7–30)
CALCIUM SERPL-MCNC: 8.5 MG/DL (ref 8.5–10.1)
CHLORIDE SERPL-SCNC: 105 MMOL/L (ref 94–109)
CHOLEST SERPL-MCNC: 133 MG/DL
CO2 SERPL-SCNC: 24 MMOL/L (ref 20–32)
CREAT SERPL-MCNC: 2.32 MG/DL (ref 0.66–1.25)
ERYTHROCYTE [DISTWIDTH] IN BLOOD BY AUTOMATED COUNT: 14.5 % (ref 10–15)
GFR SERPL CREATININE-BSD FRML MDRD: 25 ML/MIN/{1.73_M2}
GLUCOSE SERPL-MCNC: 373 MG/DL (ref 70–99)
HBA1C MFR BLD: 8.8 % (ref 0–5.6)
HCT VFR BLD AUTO: 28.3 % (ref 40–53)
HDLC SERPL-MCNC: 50 MG/DL
HGB BLD-MCNC: 8.8 G/DL (ref 13.3–17.7)
LDLC SERPL CALC-MCNC: 66 MG/DL
MCH RBC QN AUTO: 29.1 PG (ref 26.5–33)
MCHC RBC AUTO-ENTMCNC: 31.1 G/DL (ref 31.5–36.5)
MCV RBC AUTO: 94 FL (ref 78–100)
NONHDLC SERPL-MCNC: 83 MG/DL
PHOSPHATE SERPL-MCNC: 3.9 MG/DL (ref 2.5–4.5)
PLATELET # BLD AUTO: 239 10E9/L (ref 150–450)
POTASSIUM SERPL-SCNC: 4.5 MMOL/L (ref 3.4–5.3)
RBC # BLD AUTO: 3.02 10E12/L (ref 4.4–5.9)
SARS-COV-2 RNA RESP QL NAA+PROBE: NORMAL
SODIUM SERPL-SCNC: 136 MMOL/L (ref 133–144)
SPECIMEN SOURCE: NORMAL
T4 FREE SERPL-MCNC: 0.89 NG/DL (ref 0.76–1.46)
TRIGL SERPL-MCNC: 86 MG/DL
TSH SERPL DL<=0.005 MIU/L-ACNC: 4.7 MU/L (ref 0.4–4)
WBC # BLD AUTO: 6 10E9/L (ref 4–11)

## 2021-03-16 PROCEDURE — 85027 COMPLETE CBC AUTOMATED: CPT | Performed by: PATHOLOGY

## 2021-03-16 PROCEDURE — U0003 INFECTIOUS AGENT DETECTION BY NUCLEIC ACID (DNA OR RNA); SEVERE ACUTE RESPIRATORY SYNDROME CORONAVIRUS 2 (SARS-COV-2) (CORONAVIRUS DISEASE [COVID-19]), AMPLIFIED PROBE TECHNIQUE, MAKING USE OF HIGH THROUGHPUT TECHNOLOGIES AS DESCRIBED BY CMS-2020-01-R: HCPCS | Performed by: PLASTIC SURGERY

## 2021-03-16 PROCEDURE — 36415 COLL VENOUS BLD VENIPUNCTURE: CPT | Performed by: PATHOLOGY

## 2021-03-16 PROCEDURE — 84443 ASSAY THYROID STIM HORMONE: CPT | Performed by: PATHOLOGY

## 2021-03-16 PROCEDURE — 80061 LIPID PANEL: CPT | Performed by: PATHOLOGY

## 2021-03-16 PROCEDURE — U0005 INFEC AGEN DETEC AMPLI PROBE: HCPCS | Performed by: PLASTIC SURGERY

## 2021-03-16 PROCEDURE — 84439 ASSAY OF FREE THYROXINE: CPT | Performed by: PATHOLOGY

## 2021-03-16 PROCEDURE — 80069 RENAL FUNCTION PANEL: CPT | Performed by: PATHOLOGY

## 2021-03-16 PROCEDURE — 83036 HEMOGLOBIN GLYCOSYLATED A1C: CPT | Mod: 90 | Performed by: PATHOLOGY

## 2021-03-16 PROCEDURE — 93306 TTE W/DOPPLER COMPLETE: CPT | Performed by: STUDENT IN AN ORGANIZED HEALTH CARE EDUCATION/TRAINING PROGRAM

## 2021-03-17 ENCOUNTER — TELEPHONE (OUTPATIENT)
Dept: FAMILY MEDICINE | Facility: CLINIC | Age: 86
End: 2021-03-17

## 2021-03-17 ENCOUNTER — VIRTUAL VISIT (OUTPATIENT)
Dept: CARDIOLOGY | Facility: CLINIC | Age: 86
End: 2021-03-17
Attending: INTERNAL MEDICINE
Payer: MEDICARE

## 2021-03-17 ENCOUNTER — TELEPHONE (OUTPATIENT)
Dept: CARDIOLOGY | Facility: CLINIC | Age: 86
End: 2021-03-17

## 2021-03-17 DIAGNOSIS — I10 ESSENTIAL HYPERTENSION: ICD-10-CM

## 2021-03-17 DIAGNOSIS — I27.20 PULMONARY HYPERTENSION (H): Primary | ICD-10-CM

## 2021-03-17 DIAGNOSIS — I10 HYPERTENSION, UNSPECIFIED TYPE: ICD-10-CM

## 2021-03-17 DIAGNOSIS — I10 UNCONTROLLED HYPERTENSION: ICD-10-CM

## 2021-03-17 LAB
LABORATORY COMMENT REPORT: NORMAL
SARS-COV-2 RNA RESP QL NAA+PROBE: NEGATIVE
SPECIMEN SOURCE: NORMAL

## 2021-03-17 PROCEDURE — 99443 PR PHYSICIAN TELEPHONE EVALUATION 21-30 MIN: CPT | Mod: 95 | Performed by: INTERNAL MEDICINE

## 2021-03-17 RX ORDER — HYDRALAZINE HYDROCHLORIDE 50 MG/1
100 TABLET, FILM COATED ORAL 3 TIMES DAILY
Qty: 600 TABLET | Refills: 3 | Status: ON HOLD | OUTPATIENT
Start: 2021-03-17 | End: 2021-08-26

## 2021-03-17 NOTE — PROGRESS NOTES
"Alessio is a 85 year old who is being evaluated via a billable telephone visit.      What phone number would you like to be contacted at? [phone]  How would you like to obtain your AVS? MyChart    Vitals - Patient Reported  Weight (Patient Reported): 80.7 kg (178 lb)  Height (Patient Reported): 177.8 cm (5' 10\")  BMI (Based on Pt Reported Ht/Wt): 25.54  Pain Score: No Pain (0)  Pain Loc: Chest      The patient has been notified of following:     \"This phone visit will be conducted via a call between you and your physician/provider. We have found that certain health care needs can be provided without the need for an in-person physical exam.  This service lets us provide the care you need with a phone conversation.  If a prescription is necessary we can send it directly to your pharmacy.  If lab work is needed we can place an order for that and you can then stop by our lab to have the test done at a later time.    Phone visits are billed at different rates depending on your insurance coverage.  Please reach out to your insurance provider with any questions.    If during the course of the call the physician/provider feels a phone visit is not appropriate, you will not be charged for this service.\"    Patient has given verbal consent for phone visit? Yes    How would you like to obtain your AVS? mail  Duration of call:24min    Total duration of visit including calling Dr. Linder, charting and coordination of care >60 min    See dictation #[id number]      "

## 2021-03-17 NOTE — LETTER
"3/17/2021      RE: Farzad East  [address]  [facility] [address]       Alessio is a 85 year old who is being evaluated via a billable telephone visit.      What phone number would you like to be contacted at? [phone]  How would you like to obtain your AVS? Chante    Vitals - Patient Reported  Weight (Patient Reported): 80.7 kg (178 lb)  Height (Patient Reported): 177.8 cm (5' 10\")  BMI (Based on Pt Reported Ht/Wt): 25.54  Pain Score: No Pain (0)  Pain Loc: Chest      The patient has been notified of following:     \"This phone visit will be conducted via a call between you and your physician/provider. We have found that certain health care needs can be provided without the need for an in-person physical exam.  This service lets us provide the care you need with a phone conversation.  If a prescription is necessary we can send it directly to your pharmacy.  If lab work is needed we can place an order for that and you can then stop by our lab to have the test done at a later time.    Phone visits are billed at different rates depending on your insurance coverage.  Please reach out to your insurance provider with any questions.    If during the course of the call the physician/provider feels a phone visit is not appropriate, you will not be charged for this service.\"    Patient has given verbal consent for phone visit? Yes    How would you like to obtain your AVS? mail  Duration of call:24min    Total duration of visit including calling Dr. Linder, charting and coordination of care >60 min            Service Date: 2021      DOUG Green [address] [address]       RE:    Farzad East   MRN:  [id number]   :  1935      Dear Ms. Lantigua:        It was a pleasure participating in the care of your patient, Mr. Alessio East.  As you know, Alessio is an 85-year-old gentleman who I spoke to over the phone today regarding hypertension and " preoperative evaluation prior to surgery.      His past medical history is significant for the followin.  Type 2 diabetes.   2.  Hypertension.   3.  Chronic renal insufficiency with a baseline GFR of 25 mL/min as of 2021.   4.  Left hip pain secondary to hip replacement and subsequent surgery.   5.  Left femur fracture.   6.  Chronic venous insufficiency.      I last saw him 2020.  At that time he was doing adequately.  He presents today for hypertension and preoperative evaluation.      When he saw Araceli Chiang, our nurse practitioner, in 2020, his blood pressure was doing well and was running in the however, at that time he was on 50 mg of losartan.  He was hospitalized in 2020 for hyperkalemia with a potassium greater than 6 and at that time, losartan was discontinued.        Since that his blood pressures have been uncontrolled and running in the high 180-200 range despite increased doses of hydrochlorothiazide and hydralazine.  Amlodipine was discontinued due to lower extremity swelling in the context of his venous insufficiency.      The patient is planning to have some form of melanoma removal along with a lymph node biopsy which may take over an hour in length.  He is not sure if the procedure will be inpatient or outpatient procedure.        In terms of his activity level, the patient can basically only walk with a walker and I performed a Duke activity status index questionnaire on him today and he reached 15.5 points and supposedly is able to perform 4.6 metabolic equivalents of activity.  However, the survey did not include the details of when he walks on level ground, he has to use a walker and if he climbs a flight of stairs, he has to have a handrail.       He, however, denies zaki chest pain.  He will get mildly short of breath with heavy activity.  He denies other PND.  He has baseline lower extremity edema.  He denies other palpitations, syncope or near-syncope.      He  says his blood pressures at home have been running 170s to higher to about 200 lately.      REVIEW OF SYSTEMS:  Has been positive for increased stress lately with his wife going to [facility] 02/01 with increased blood pressures.      CURRENT MEDICATIONS:     1.  Aspirin 81 mg a day.   2.  Carvedilol 6.25 mg twice daily.   3.  Hydralazine 50 mg twice daily.   4.  Hydrochlorothiazide 25 mg a day.      PHYSICAL EXAMINATION:     VITAL SIGNS:  His blood pressures are in the 170s to 200 range without symptoms.   GENERAL:  His decision-making capabilities are intact.   PSYCHIATRIC:  Alert and oriented x 3.   RESPIRATORY:  He is breathing comfortably without gross cough.      The remainder of the comprehensive physical exam was deferred secondary to COVID-19 pandemic and secondary to telephone visit restrictions.      LABORATORY DATA:  On 03/16/2021, GFR is 25, potassium 4.5, hemoglobin 8.8.        His echocardiogram on 03/16/2021 reveals an ejection fraction 55%-60%.  His RV systolic pressure is plus right atrial pressure, increased since 2018 with RA pressure of 8, no significant valvular pathology identified.        His EKG 03/11/2021 reveals normal sinus rhythm at a rate of 70 beats per minute.  No gross ST changes.        IMPRESSION:      Alessio is an 85-year-old gentleman without a prior documented history of cardiac disease who presents with several active issues:     1.  Uncontrolled hypertension in the context of renal insufficiency and baseline venous insufficiency.     The patient's blood pressures last year were under improved control in the 130s on a regimen of Coreg 6.25 mg twice daily, hydrochlorothiazide 12.5 mg a day along with hydralazine 25 mg 3 times a day along with losartan.  Losartan 50 mg was also part of his regimen, but was discontinued in 09/2020 due to hyperkalemia (amlodipine has been stopped in the past due to lower extremity swelling in the context of his venous insufficiency).     His  hypertension is problematic as he is currently running in the 170s to the 200 range and it would likely be difficult to control given to the limited number of choices to combat this issue.  However, we will perform further changes in his medical regimen.     2.  Preoperative evaluation prior to melanoma removal and lymph node biopsy.     The patient relates that this may be a procedure that may take an hour or more, and considering the fact that his Duke activity score index classifies his activity at approximately 4 metabolic equivalents, as he cannot walk outside or up a hill without or up the stairs without a walker or grabbing or rails, along with his advanced age, uncontrolled hypertension and moderate pulmonary hypertension seen on echo (likely secondary to his severe renal insufficiency and fluid retention due to this), he obviously represents a complex overall medical picture with multiple comorbidities contributing to increased risk from having a surgical procedure.     This would have to be balanced with his overall risk of malignancy and how delaying his procedure might affect his overall mortality and quality of life.  This very complex picture will need to be discussed with his plastic surgeon, and I have called Dr. Linder at [phone] and discussed this today.    We have weighed the situation and in order to balance his multiple co-morbidities/increased risk of surgery with increased mortality from delaying removal of potential aggressive melanoma, the patient would like to attempt simple improved blood pressure control aggressively in the short term, without additional workup or treatment for pulmonary HTN or other concomitant comorbidities     PLAN:     1.  Increase hydralazine to 50 mg 3 times a day. Surgery rescheduled for March 26th.  If can achieve SBP<150mmHg by then, will proceed with surgery as is without further delay.  If not, will continue efforts at BP control and delay another 2  weeks.    2.  We can consider also increasing Coreg to 12.5 mg twice a day and perhaps even a small dose of amlodipine could also be reconsidered as well on a temporary basis if it helps his blood pressures and he can tolerate it from the skin breakdown standpoint with lower extremity wrapping in the short-term to control his blood pressures.        Further updates to follow.      Once again, it was a pleasure participating in the care of your patient, Ms. Jen Sims.  Please feel free to contact me anytime if any questions regarding his care in the future.      Sincerely,         Eddie Levi MD       cc:      Miguel Linder MD   [facility]   [id number]   [place] [address]                    D: 2021   T: 2021   MT: BIJAL      Name:     JEN CHAVES   MRN:      [id number]        Account:      [id number]   :      1935           Service Date: 2021      Document: [id number]

## 2021-03-17 NOTE — TELEPHONE ENCOUNTER
[facility] Call [facility]    Phone Message    May a detailed message be left on voicemail: yes     Reason for Call: Other: Adelita calling from [facility] Pre op to let Jaleesa Mcguire know that she needs to complete the pre op exam on the patient before surgery on 3/19/2021. Pt is set to have surgery early on Friday morning.      Action Taken: Message routed to:  [facility] NP clinic     Travel Screening: Not Applicable

## 2021-03-17 NOTE — TELEPHONE ENCOUNTER
Called and left  for Pt requesting a return call to clinic as a follow up from today's visit.  Clinic telephone provided.  Also noted MyChart would be sent.    Nishi Shelley LPN

## 2021-03-17 NOTE — PATIENT INSTRUCTIONS
Patient Instructions:  It was a pleasure to see you in the cardiology clinic today.      If you have any questions, you can reach my nurse, Nishi VASQUEZ LPN, at [phone].  Press Option #1 for the [facility], and then press Option #4 for nursing.    We are encouraging the use of MyChart to communicate with your HealthCare Provider    Medication Changes:  Increase hydralazine to 100 mg three times daily.    Recommendations: Dr Levi is reaching out to Dr. Linder to discuss options for surgery this Friday.  Currently this plan is still pending, but we will reach out with additional details.    Studies Ordered: None.    The results from today include: Echo and labs.    Please follow up: With Dr. Levi to be determined.    Sincerely,    Eddie Levi MD     If you have an urgent need after hours (8:00 am to 4:30 pm) please call [phone] and ask for the cardiology fellow on call.

## 2021-03-17 NOTE — LETTER
"3/17/2021      RE: Farzad East  [address]  [place] [address]       Dear Colleague,    Thank you for the opportunity to participate in the care of your patient, Farzad East, at the [facility] [place] [facility]. Please see a copy of my visit note below.    Alessio is a 85 year old who is being evaluated via a billable telephone visit.      What phone number would you like to be contacted at? [phone]  How would you like to obtain your AVS? Juaquinharkody    Vitals - Patient Reported  Weight (Patient Reported): 80.7 kg (178 lb)  Height (Patient Reported): 177.8 cm (5' 10\")  BMI (Based on Pt Reported Ht/Wt): 25.54  Pain Score: No Pain (0)  Pain Loc: Chest      The patient has been notified of following:     \"This phone visit will be conducted via a call between you and your physician/provider. We have found that certain health care needs can be provided without the need for an in-person physical exam.  This service lets us provide the care you need with a phone conversation.  If a prescription is necessary we can send it directly to your pharmacy.  If lab work is needed we can place an order for that and you can then stop by our lab to have the test done at a later time.    Phone visits are billed at different rates depending on your insurance coverage.  Please reach out to your insurance provider with any questions.    If during the course of the call the physician/provider feels a phone visit is not appropriate, you will not be charged for this service.\"    Patient has given verbal consent for phone visit? Yes    How would you like to obtain your AVS? mail  Duration of call:24min    Total duration of visit including calling Dr. Linder, charting and coordination of care >60 min    See dictation #[id number]          Please do not hesitate to contact me if you have any questions/concerns.     Sincerely,     Eddie Levi, " MD

## 2021-03-17 NOTE — PROGRESS NOTES
Service Date: 2021      [facility]   [address] [address]       RE:    Farzad East   MRN:  [id number]   :  1935      Dear Ms. Lantigua:        It was a pleasure participating in the care of your patient, Mr. Alessio East.  As you know, Alessio is an 85-year-old gentleman who I spoke to over the phone today regarding hypertension and preoperative evaluation prior to surgery.      His past medical history is significant for the followin.  Type 2 diabetes.   2.  Hypertension.   3.  Chronic renal insufficiency with a baseline GFR of 25 mL/min as of 2021.   4.  Left hip pain secondary to hip replacement and subsequent surgery.   5.  Left femur fracture.   6.  Chronic venous insufficiency.      I last saw him 2020.  At that time he was doing adequately.  He presents today for hypertension and preoperative evaluation.      When he saw Araceli Chiang, our nurse practitioner, in 2020, his blood pressure was doing well and was running in the however, at that time he was on 50 mg of losartan.  He was hospitalized in 2020 for hyperkalemia with a potassium greater than 6 and at that time, losartan was discontinued.        Since that his blood pressures have been uncontrolled and running in the high 180-200 range despite increased doses of hydrochlorothiazide and hydralazine.  Amlodipine was discontinued due to lower extremity swelling in the context of his venous insufficiency.      The patient is planning to have some form of melanoma removal along with a lymph node biopsy which may take over an hour in length.  He is not sure if the procedure will be inpatient or outpatient procedure.        In terms of his activity level, the patient can basically only walk with a walker and I performed a Duke activity status index questionnaire on him today and he reached 15.5 points and supposedly is able to perform 4.6 metabolic equivalents of activity.   However, the survey did not include the details of when he walks on level ground, he has to use a walker and if he climbs a flight of stairs, he has to have a handrail.       He, however, denies zaki chest pain.  He will get mildly short of breath with heavy activity.  He denies other PND.  He has baseline lower extremity edema.  He denies other palpitations, syncope or near-syncope.      He says his blood pressures at home have been running 170s to higher to about 200 lately.      REVIEW OF SYSTEMS:  Has been positive for increased stress lately with his wife going to [facility] 02/01 with increased blood pressures.      CURRENT MEDICATIONS:     1.  Aspirin 81 mg a day.   2.  Carvedilol 6.25 mg twice daily.   3.  Hydralazine 50 mg twice daily.   4.  Hydrochlorothiazide 25 mg a day.      PHYSICAL EXAMINATION:     VITAL SIGNS:  His blood pressures are in the 170s to 200 range without symptoms.   GENERAL:  His decision-making capabilities are intact.   PSYCHIATRIC:  Alert and oriented x 3.   RESPIRATORY:  He is breathing comfortably without gross cough.      The remainder of the comprehensive physical exam was deferred secondary to COVID-19 pandemic and secondary to telephone visit restrictions.      LABORATORY DATA:  On 03/16/2021, GFR is 25, potassium 4.5, hemoglobin 8.8.        His echocardiogram on 03/16/2021 reveals an ejection fraction 55%-60%.  His RV systolic pressure is plus right atrial pressure, increased since 2018 with RA pressure of 8, no significant valvular pathology identified.        His EKG 03/11/2021 reveals normal sinus rhythm at a rate of 70 beats per minute.  No gross ST changes.        IMPRESSION:      Alessio is an 85-year-old gentleman without a prior documented history of cardiac disease who presents with several active issues:     1.  Uncontrolled hypertension in the context of renal insufficiency and baseline venous insufficiency.     The patient's blood pressures last year were under  improved control in the 130s on a regimen of Coreg 6.25 mg twice daily, hydrochlorothiazide 12.5 mg a day along with hydralazine 25 mg 3 times a day along with losartan.  Losartan 50 mg was also part of his regimen, but was discontinued in 09/2020 due to hyperkalemia (amlodipine has been stopped in the past due to lower extremity swelling in the context of his venous insufficiency).     His hypertension is problematic as he is currently running in the 170s to the 200 range and it would likely be difficult to control given to the limited number of choices to combat this issue.  However, we will perform further changes in his medical regimen.     2.  Preoperative evaluation prior to melanoma removal and lymph node biopsy.     The patient relates that this may be a procedure that may take an hour or more, and considering the fact that his Duke activity score index classifies his activity at approximately 4 metabolic equivalents, as he cannot walk outside or up a hill without or up the stairs without a walker or grabbing or rails, along with his advanced age, uncontrolled hypertension and moderate pulmonary hypertension seen on echo (likely secondary to his severe renal insufficiency and fluid retention due to this), he obviously represents a complex overall medical picture with multiple comorbidities contributing to increased risk from having a surgical procedure.     This would have to be balanced with his overall risk of malignancy and how delaying his procedure might affect his overall mortality and quality of life.  This very complex picture will need to be discussed with his plastic surgeon, and I have called Dr. Linder at [phone] and discussed this today.    We have weighed the situation and in order to balance his multiple co-morbidities/increased risk of surgery with increased mortality from delaying removal of potential aggressive melanoma, the patient would like to attempt simple improved blood  pressure control aggressively in the short term, without additional workup or treatment for pulmonary HTN or other concomitant comorbidities     PLAN:     1.  Increase hydralazine to 50 mg 3 times a day. Surgery rescheduled for March 26th.  If can achieve SBP<150mmHg by then, will proceed with surgery as is without further delay.  If not, will continue efforts at BP control and delay another 2 weeks.    2.  We can consider also increasing Coreg to 12.5 mg twice a day and perhaps even a small dose of amlodipine could also be reconsidered as well on a temporary basis if it helps his blood pressures and he can tolerate it from the skin breakdown standpoint with lower extremity wrapping in the short-term to control his blood pressures.        Further updates to follow.      Once again, it was a pleasure participating in the care of your patient, Ms. Farzad Sims.  Please feel free to contact me anytime if any questions regarding his care in the future.      Sincerely,         Eddie Levi MD         Addendum 3/25/21:      Patient home BPs still running 180s systolic    Plan:    1.  Increase Carvedilol from 6.25mg to 12.5mg po BID    2.  Remind patient to take both BP and pulse readings several hours after morning meds, after resting quietly for 15 minutes    3.  Cancel surgical procedure tomorrow due to uncontrolled HTN, reschedule per Dr. Linder (delay another 2 weeks)    4.  Call patient next Mon for updated progress/BP/pulse check, and consider increasing carvedilol further to 25 BID, and possibly adding either amlodipine (understanding that swelling may occur) and/or clonidine        Addendum 3/30/21:    Patient BPs still running 170-180s systolic, pulse 60s    Plan:    1.  Increase Carvedilol to 25mg po BID    2.  Call patient Friday for BPs/pulse/progress and further instruction      Addendum 4/5/21:    Patient's home BPs in 140-150mmHg range, pulse high 50s low 60s    Patient approved for surgical procedure  tomorrow.  Patient and surgeon understand that patient has multiple complex co-morbidities that will increase his perioperative risk for adverse event, however in the context of the urgency and malignant nature of the mass to be removed, both are willing to undertake this additional risk in hope of providing improved longer term outcome.             cc:      Miguel Linder MD   [facility]   [address]   [place] [address]         PRAVEEN OHARA MD             D: 2021   T: 2021   MT: BIJAL      Name:     JEN CHAVES   MRN:      [id number]        Account:      [id number]   :      1935           Service Date: 2021      Document: [id number]

## 2021-03-18 DIAGNOSIS — Z11.59 ENCOUNTER FOR SCREENING FOR OTHER VIRAL DISEASES: ICD-10-CM

## 2021-03-18 NOTE — RESULT ENCOUNTER NOTE
Results discussed directly with patient during clinic visit. Any further details documented in the progress note.     Nishi Shelley LPN

## 2021-03-19 NOTE — PROCEDURE: MOHS SURGERY
Never Mart-1 - Negative Histology Text: MART-1 staining demonstrates a normal density and pattern of melanocytes along the dermal-epidermal junction. The surgical margins are negative for tumor cells.

## 2021-03-22 NOTE — PROGRESS NOTES
Outcome for 03/22/21 11:51 AM :Left Voicemail for patient to call back   Outcome for 03/23/21 10:54 AM :Left Voicemail for patient to call back   Outcome for 03/23/21 11:12 AM :Glucose data sent in [employer] Message      Alessio is a 85 year old who is being evaluated via a billable telephone visit.      What phone number would you like to be contacted at? [phone]  How would you like to obtain your AVS? Adrenaline Mobility  Phone call duration: minutes    No Answer 2:01, 2:05, 2:10 - LVM concern, reschedule.      [facility]  Endocrinology  Sandhya Mena PA-C, URBANO  03/23/2021      Chief Complaint:   Telephone     History of Present Illness:   Farzad East is a 85 year old male with a history of CKD stage 3, type 2 diabetes mellitus with nephropathy, and hypertension who presents for follow up of type 2 diabetes mellitus. The patient was diagnosed with diabetes in his 50s and has managed using Metformin, Glipizide, Lantus, and Januvia in the past. Lantus was discontinued toward the beginning of 2018.     Interval History:     The patient is planning to have some form of melanoma removal along with a lymph node biopsy which may take over an hour in length.    Had recent pre-op evalaution with cardiology and had uncontrolled BP.       - Recc: Consider Lantus 10 units daily to reach goal fasting  - 150.      It is my privilege to be involved in the care of the above patient.     Sandhya Mena PA-C, [facility]  [facility]  Diabetes, Endocrinology, and Metabolism  [phone] Appointments/Nurse  [phone] Fax  [phone] pager  [phone] nurse line          This patient was a no show for this scheduled appointment.

## 2021-03-23 ENCOUNTER — VIRTUAL VISIT (OUTPATIENT)
Dept: ENDOCRINOLOGY | Facility: CLINIC | Age: 86
End: 2021-03-23
Payer: MEDICARE

## 2021-03-23 ENCOUNTER — TELEPHONE (OUTPATIENT)
Dept: FAMILY MEDICINE | Facility: CLINIC | Age: 86
End: 2021-03-23

## 2021-03-23 ENCOUNTER — TELEPHONE (OUTPATIENT)
Dept: ENDOCRINOLOGY | Facility: CLINIC | Age: 86
End: 2021-03-23

## 2021-03-23 DIAGNOSIS — Z53.9 NO SHOW: Primary | ICD-10-CM

## 2021-03-23 DIAGNOSIS — Z11.59 ENCOUNTER FOR SCREENING FOR OTHER VIRAL DISEASES: ICD-10-CM

## 2021-03-23 LAB
SARS-COV-2 RNA RESP QL NAA+PROBE: NORMAL
SPECIMEN SOURCE: NORMAL

## 2021-03-23 PROCEDURE — U0003 INFECTIOUS AGENT DETECTION BY NUCLEIC ACID (DNA OR RNA); SEVERE ACUTE RESPIRATORY SYNDROME CORONAVIRUS 2 (SARS-COV-2) (CORONAVIRUS DISEASE [COVID-19]), AMPLIFIED PROBE TECHNIQUE, MAKING USE OF HIGH THROUGHPUT TECHNOLOGIES AS DESCRIBED BY CMS-2020-01-R: HCPCS | Performed by: PLASTIC SURGERY

## 2021-03-23 PROCEDURE — U0005 INFEC AGEN DETEC AMPLI PROBE: HCPCS | Performed by: PLASTIC SURGERY

## 2021-03-23 NOTE — TELEPHONE ENCOUNTER
[facility] Call Center    Phone Message    May a detailed message be left on voicemail: yes     Reason for Call:     Pt missed appt today with A Trina (phone issue), pt needs to see provider prior to this Friday, to make sure he is ok to have surgery this Friday 03/26/21 . Call back .     Action Taken: Message routed to:  [facility] endo    Travel Screening: Not Applicable

## 2021-03-23 NOTE — TELEPHONE ENCOUNTER
The appointment today was a 2 week follow up with Sandhya. He will reschedule.He doesn't think it was needed for suregry Friday Chrissie Medley RN on 3/23/2021 at 5:44 PM

## 2021-03-23 NOTE — LETTER
3/23/2021       RE: Farzad East  [address]  [facility] [address]     Dear Colleague,    Thank you for referring your patient, Farzad East, to the [facility] ENDOCRINOLOG[facility] [place] [facility]. Please see a copy of my visit note below.    Outcome for 03/22/21 11:51 AM :Left Voicemail for patient to call back   Outcome for 03/23/21 10:54 AM :Left Voicemail for patient to call back   Outcome for 03/23/21 11:12 AM :Glucose data sent in [employer] Message      Alessio is a 85 year old who is being evaluated via a billable telephone visit.      What phone number would you like to be contacted at? [phone]  How would you like to obtain your AVS? Soocial  Phone call duration: minutes    No Answer 2:01, 2:05, 2:10 - LVM concern, reschedule.      [facility]  Endocrinology  Sandhya Mena PA-C, URBANO  03/23/2021      Chief Complaint:   Telephone     History of Present Illness:   Farzad East is a 85 year old male with a history of CKD stage 3, type 2 diabetes mellitus with nephropathy, and hypertension who presents for follow up of type 2 diabetes mellitus. The patient was diagnosed with diabetes in his 50s and has managed using Metformin, Glipizide, Lantus, and Januvia in the past. Lantus was discontinued toward the beginning of 2018.     Interval History:     The patient is planning to have some form of melanoma removal along with a lymph node biopsy which may take over an hour in length.    Had recent pre-op evalaution with cardiology and had uncontrolled BP.       - Recc: Consider Lantus 10 units daily to reach goal fasting  - 150.      It is my privilege to be involved in the care of the above patient.     Sandhya Mena PA-C, NENAS  [facility]  Diabetes, Endocrinology, and Metabolism  [phone] Appointments/Nurse  [phone] Fax  [phone] pager  [phone] nurse line          This  patient was a no show for this scheduled appointment.      Again, thank you for allowing me to participate in the care of your patient.      Sincerely,    Sandhya Mena PA-C

## 2021-03-23 NOTE — LETTER
Date:March 27, 2021      Provider requested that no letter be sent. Do not send.       [facility]

## 2021-03-25 ENCOUNTER — TELEPHONE (OUTPATIENT)
Dept: CARDIOLOGY | Facility: CLINIC | Age: 86
End: 2021-03-25

## 2021-03-25 NOTE — PROCEDURE: MOHS SURGERY
s/p 1 day POV Lasik OU 3/24/2021-  discussed findings w/ patient -  patient doing well post sx-  continue post op gtts according to instruction sheet-  continue to monitor w/ 1 week or as previously scheduled; 's Notes: MR 10/26/2020DFE 10/26/2020 Rhombic Flap Text: In order to preserve normal anatomic and functional relationships, a rhombic transposition flap was deemed the most appropriate reconstructive procedure.  The beveled edges of the Mohs defect were excised with a #15 scalpel blade.    The flap was designed to fall along relaxed skin tension lines and/or free margins, incised, and elevated using blunt curved tissue scissors.  Hemostasis was achieved.  Interrupted buried vertical mattress sutures were employed to close the secondary defect first, then to transpose and secure the flap in place.  Redundant cutaneous columns defined by the flap's movement were removed to the adipose layer using the triangulation technique and repaired in similar fashion.  Final epidermal approximation along the length of the flap was achieved using epidermal sutures.  The wound was stressed in various directions to ensure the adequacy of the closure and of hemostasis.  The flap edges appeared pink and well perfused.  Reconstruction was considered complete.

## 2021-03-25 NOTE — PROGRESS NOTES
PTA medications updated by Medication Scribe prior to surgery via phone call with patient        Comments:    Medication history sources: Patient, Surescripts, H&P and Pharmacy ([facility])  Medication history source reliability: Moderate  Adherence assessment: N/A Not Observed    Significant changes made to the medication list:  Patient taking meds differently than prescribed; See PTA entries for:  Januvia- Pt. states  takes (0.5 X 50) 25 mg daily. RX states 50 mg daily per pharmacy.      Patient reports no longer taking the following meds (med scribe removed from PTA med list): Bisacodyl, Golytely      Additional medication history information:   Verified medications with Pharmacy : Hydralazine, Hydrochlorothiazide, Januvia        Prior to Admission medications    Medication Sig Last Dose Taking? Auth Provider   aspirin (ASA) 81 MG EC tablet Take 1 tablet by mouth daily  3/24/2021 at AM Yes Reported, Patient   carvedilol (COREG) 6.25 MG tablet TAKE 1 TABLET BY MOUTH TWICE A DAY WITH MEALS  Patient taking differently: Take 6.25 mg by mouth 2 times daily TAKE 1 TABLET BY MOUTH TWICE A DAY WITH MEALS  at AM Yes Eddie Levi MD   Cholecalciferol (VITAMIN D-3 PO) Take 1 Dose by mouth daily  3/25/2021 at AM Yes Reported, Patient   finasteride (PROSCAR) 5 MG tablet Take 1 tablet by mouth daily 3/25/2021 at AM Yes Reported, Patient   glipiZIDE (GLUCOTROL XL) 5 MG 24 hr tablet Take 1 tablet (5 mg) by mouth daily 3/25/2021 at AM Yes Jose Amin MD   hydrALAZINE (APRESOLINE) 50 MG tablet Take 2 tablets (100 mg) by mouth 3 times daily  at HS Yes Eddie Levi MD   hydrochlorothiazide (HYDRODIURIL) 12.5 MG tablet Take 12.5 mg by mouth every morning  3/25/2021 at AM Yes Renu Lantigua NP   multivitamin w/minerals (MULTI-VITAMIN) tablet Take 1 tablet by mouth daily 3/25/2021 at AM Yes Reported, Patient   sitagliptin (JANUVIA) 50 MG tablet Take 1 tablet (50 mg) by mouth daily  Patient taking differently: Take 25 mg  by mouth daily (0.5 X 50 mg) (RX states 50 mg daily) 3/25/2021 at AM Yes Jose Amin MD   tamsulosin (FLOMAX) 0.4 MG capsule Take 1 capsule (0.4 mg) by mouth daily Advise clinic if causes lightheadedness. 3/25/2021 at AM Yes Sandhya Mena PA-C   blood glucose (NO BRAND SPECIFIED) lancets standard Use to test blood sugar 1 times daily or as directed. Use brand compatible with patients insurance and device.   Jose Amin MD   blood glucose (ONETOUCH VERIO IQ) test strip Use to test blood sugar 1 time daily or as directed.   Jose Amin MD   blood glucose monitoring (ONETOUCH VERIO IQ SYSTEM) meter device kit Use to test blood sugar daily   Marcus Augustin MD   COMPRESSION STOCKINGS 1 each daily   Araceli Chiang, DOUG

## 2021-03-25 NOTE — TELEPHONE ENCOUNTER
Called and spoke with Sarah from Dr Owens's team and informed them Pt's scheduled surgery will need to be postponed to allow additional time to address blood pressures and we will try to have BPs reduced w/in 2 weeks.  Sarah will share this information with Dr Owens and contact clinic back with any further questions or concerns.    Nishi Shelley LPN

## 2021-03-26 ENCOUNTER — TELEPHONE (OUTPATIENT)
Dept: CARDIOLOGY | Facility: CLINIC | Age: 86
End: 2021-03-26

## 2021-03-26 NOTE — TELEPHONE ENCOUNTER
[facility] Call Center    Phone Message    May a detailed message be left on voicemail: no     Reason for Call: Other: Adonis called in regards to a mutual Pt being seen by Dr. Linder.  She reports Pt had a surgery that was postponed.  Adonis would like to know if he will be cleared for surgery by 04/06.  His next surgery is scheduled for 04/06.  Please give Adonis a call to discuss questions and concerns.     Action Taken: Message routed to:  [facility]  Cardiology    Travel Screening: Not Applicable

## 2021-03-26 NOTE — TELEPHONE ENCOUNTER
Called and left  for Ramoneua in regards to Pt upcoming surgery.  Requested return call to clinic.  Clinic telephone provided.    Nishi Shelley LPN

## 2021-03-28 ENCOUNTER — HEALTH MAINTENANCE LETTER (OUTPATIENT)
Age: 86
End: 2021-03-28

## 2021-03-29 ENCOUNTER — TELEPHONE (OUTPATIENT)
Dept: CARDIOLOGY | Facility: CLINIC | Age: 86
End: 2021-03-29

## 2021-03-29 DIAGNOSIS — I10 HYPERTENSION, UNSPECIFIED TYPE: ICD-10-CM

## 2021-03-29 NOTE — TELEPHONE ENCOUNTER
Called and spoke with Pt.  Pt provided home BP and P readings since carvedilol increase on 03/25.  Pt reported he just started recording home P on 03/28.  Informed him these would be shared with Dr Levi for review and he would be contacted with recommendations.    Pt also noted since his increase of hydralazine to 100 mg tid on 03/17 that his blood sugar levels have increased significantly.  He also noted the last week or so he has been fatigued and if he sits down he feels like he will fall asleep.    03/26 - 173/75  03/27 - 177/77  03/28 - 182/79, 60  03/29 - 182/81, 62    Above forwarded to Dr Levi for review.  Pt informed he will receive call with recommendations.    Nishi Shelley LPN

## 2021-03-29 NOTE — TELEPHONE ENCOUNTER
Called and left  for Pt requesting a return call to clinic so recent BPs and P readings can be provided from 03/26 to today.  Clinic telephone provided.    Nishi Shelley LPN

## 2021-03-30 RX ORDER — CARVEDILOL 25 MG/1
25 TABLET ORAL 2 TIMES DAILY WITH MEALS
Qty: 180 TABLET | Refills: 0
Start: 2021-03-30 | End: 2021-08-16

## 2021-03-30 NOTE — TELEPHONE ENCOUNTER
Date: 3/30/2021    Time of Call: 11:43 AM     Diagnosis:  HTN     [ TORB ] Ordering provider: Dr Eddie Levi  Order:   - Increase Carvedilol to 25 mg bid  - Continue to monitor BP and P.  Touch base with clinic on Friday to provide readings.    Order received by: Nishi Shelley LPN     Follow-up/additional notes: Per progress note addendum.  Called and left VM for Pt informing him of increase and recommendations.  Order placed.  Requested a return call to clinic to discuss further if needed.  Clinic telephone provided.

## 2021-04-01 ENCOUNTER — TELEPHONE (OUTPATIENT)
Dept: SURGERY | Facility: CLINIC | Age: 86
End: 2021-04-01

## 2021-04-01 NOTE — TELEPHONE ENCOUNTER
Spoke with patient because he asked to be called at the beginning of April to schedule his surgery with Dr. De La Cruz in May per patient request. Upon speaking with patient he stated that he is having a surgery for myeloma in a couple of weeks and didn't want to schedule anything in May with Dr. De La Cruz yet because he wanted to see how he handled that surgery first. Patient asked that we call him back at the beginning of May to see if he is ready to schedule at that time.

## 2021-04-05 ENCOUNTER — TELEPHONE (OUTPATIENT)
Dept: CARDIOLOGY | Facility: CLINIC | Age: 86
End: 2021-04-05

## 2021-04-05 NOTE — TELEPHONE ENCOUNTER
Called and spoke with Pt.  Collected home BPs and P from Pt.  He is scheduled for surgery tomorrow AM.    04/05 -  169/74, 58 (taken while on the phone as he did not write down earlier reading)  04/04 - 135/65, 59  04/03 - 156/75, 58  04/02 - 151/100, 64  04/01 - 158/78, 60  03/31 - 141/62, 65    Pt is on the below regimen:    Carvedilol 25 bid (increased 03/30)  Hydralazine 100 mg tid  Hydrochlorothiazide 12.5 mg daily    Pt informed above would be sent to Dr Levi for review and Pt would be contacted with recommendations.  Pt verbalized understanding, agreed to current plan and denied any further questions.    Nishi Shelley LPN

## 2021-04-05 NOTE — TELEPHONE ENCOUNTER
[facility] Call Center    Phone Message    May a detailed message be left on voicemail: no     Reason for Call: Other: Raquel from Pre-Anesthesia Screening called to inquire if Pt Alessio is cleared for surgery scheduled for 4/6/2021. She said per the notes on file he was not cleared due to hypertension. Dosage of Carvedilol was increased and B/P was being monitored and F/U was supposed to occur after 3/30. Please call Raquel at [phone] to clarify as surgery is scheduled for tomorrow 4/6/2021 at [facility].      Action Taken: Message routed to:  [facility] ([facility] Cardiology    Travel Screening: Not Applicable

## 2021-04-05 NOTE — TELEPHONE ENCOUNTER
Called and informed Pt of provider recommendations noted in previous progress note.  Pt verbalized understanding, agreed to current plan and denied any further questions.    Nishi Shelley LPN

## 2021-04-05 NOTE — TELEPHONE ENCOUNTER
Called and spoke with Aissatou with [facility] pre-anesthesia.  Informed her of  Progress note update clearing Pt for surgery from a cardiovascular perspective.  She noted notation would be made in Pt chart for tomorrow's surgery.    Nishi Shelley LPN

## 2021-04-06 ENCOUNTER — TRANSFERRED RECORDS (OUTPATIENT)
Dept: HEALTH INFORMATION MANAGEMENT | Facility: CLINIC | Age: 86
End: 2021-04-06

## 2021-04-14 DIAGNOSIS — I10 HYPERTENSION, UNSPECIFIED TYPE: ICD-10-CM

## 2021-04-16 NOTE — TELEPHONE ENCOUNTER
CARVEDILOL 6.25 MG TABLET      Requested: carvedilol (COREG) 6.25 MG tablet  Current med list: carvedilol (COREG) 25 MG tablet  Route: Take 1 tablet (25 mg) by mouth 2 times daily (with meals) - Oral  Class: No Print Out  Last Written Prescription Date:  3-30-21  Last Fill Quantity: 180,   # refills: 0  Last Office Visit : 3-17-21  Future Office visit:  None        Routing refill request to provider for review/approval because:  Last fill -no print out  multiple message for med - confirming dose

## 2021-04-19 ENCOUNTER — TRANSFERRED RECORDS (OUTPATIENT)
Dept: HEALTH INFORMATION MANAGEMENT | Facility: CLINIC | Age: 86
End: 2021-04-19

## 2021-04-21 RX ORDER — CARVEDILOL 6.25 MG/1
TABLET ORAL
Qty: 180 TABLET | Refills: 1 | Status: SHIPPED | OUTPATIENT
Start: 2021-04-21 | End: 2021-06-09

## 2021-04-22 DIAGNOSIS — I10 ESSENTIAL HYPERTENSION: Primary | ICD-10-CM

## 2021-04-26 ENCOUNTER — TRANSFERRED RECORDS (OUTPATIENT)
Dept: HEALTH INFORMATION MANAGEMENT | Facility: CLINIC | Age: 86
End: 2021-04-26

## 2021-04-26 RX ORDER — HYDRALAZINE HYDROCHLORIDE 25 MG/1
TABLET, FILM COATED ORAL
OUTPATIENT
Start: 2021-04-26

## 2021-04-26 NOTE — TELEPHONE ENCOUNTER
HYDRALAZINE 25 MG TABLET  hydrALAZINE (APRESOLINE) 25 MG tablet (Discontinued) 270 tablet 3 2/24/2020 1/28/2021 --   Sig - Route: Take 1 tablet (25 mg) by mouth 3 times daily - Oral   Sent to pharmacy as: hydrALAZINE (APRESOLINE) 25 MG tablet   Class: E-Prescribe   Reason for Discontinue: Dose adjustment   Order: [id number]   E-Prescribing Status: Receipt confirmed by pharmacy (2/24/2020  2:57 PM CST)   Printout Tracking    External Result Report   Pharmacy    [facility]/PH[facility]ACY #[id number] - [place], [place] - [address] [place] [address] AT [facility]   This Order Has Been Discontinued    Order Status Reason By On   Discontinued Dose adjustment Renu Lantigua NP 1/28/21 6721         Corrie Carrera RN  Central Triage Red Flags/Med Refills

## 2021-05-03 ENCOUNTER — TELEPHONE (OUTPATIENT)
Dept: SURGERY | Facility: CLINIC | Age: 86
End: 2021-05-03

## 2021-05-03 NOTE — TELEPHONE ENCOUNTER
Called patient to schedule procedure with Dr. De La Cruz as patient requested wanting a call back in the beginning of May, there was no answer.  Left message with my direct line [phone] and sent patient a nContact [employer] message as well. Zoila GOODMAN Nayla-Op Coordinator for General Surgery

## 2021-05-05 PROBLEM — K40.90 LEFT INGUINAL HERNIA: Status: ACTIVE | Noted: 2021-05-05

## 2021-05-11 NOTE — TELEPHONE ENCOUNTER
Called patient x2 to schedule procedure with Dr. De La Cruz, there was no answer.  Left message with my direct line [phone]. Zoila GOODMAN Nayla-Op Coordinator for General Surgery

## 2021-05-13 DIAGNOSIS — Z11.59 ENCOUNTER FOR SCREENING FOR OTHER VIRAL DISEASES: ICD-10-CM

## 2021-05-18 ENCOUNTER — TELEPHONE (OUTPATIENT)
Dept: GASTROENTEROLOGY | Facility: CLINIC | Age: 86
End: 2021-05-18

## 2021-05-18 DIAGNOSIS — K40.90 INGUINAL HERNIA: Primary | ICD-10-CM

## 2021-05-18 RX ORDER — BISACODYL 5 MG
TABLET, DELAYED RELEASE (ENTERIC COATED) ORAL
Qty: 4 TABLET | Refills: 0 | Status: CANCELLED | OUTPATIENT
Start: 2021-05-18

## 2021-05-18 NOTE — TELEPHONE ENCOUNTER
Attempted to contact patient regarding upcoming colonoscopy procedure on 5/27/21 for pre assessment questions. No answer.     Left message to return call to [phone] #2    Covid test? Need to verify that Covid test scheduled prior to procedure.     Patient with CKD stage 4. Needs Golytely prep. Golytely prep pended. [facility] does not have it in stock.     Golytely prep instructions sent via [email or web address].    Amisha Ham RN

## 2021-05-25 ENCOUNTER — TELEPHONE (OUTPATIENT)
Dept: GASTROENTEROLOGY | Facility: CLINIC | Age: 86
End: 2021-05-25

## 2021-05-25 NOTE — TELEPHONE ENCOUNTER
Caller: per staff message from Ileana Ham    Procedure: colonoscopy     Date of Procedure Cancelled: 5/27/2021    Ordering Provider:Grzegorz    Reason for cancel: pt would like to push it out to AUG.     Rescheduled: left message for pt to call back and reschedule his apt.      If rescheduled    Date:    Location:    Note any change or update to original order/sedation:

## 2021-06-01 NOTE — TELEPHONE ENCOUNTER
Sent patient an attempt to reach letter to schedule surgery with Dr. De La Cruz. Patient originally stated on 04/01/21 that he is having surgery at the end of May with a different provider and wanted to wait to schedule surgery with Dr. De La Cruz and that he would like a call at the beginning of May to schedule surgery for May or June with Dr. De La Cruz. Called patient multiple times beginning on 05/01 as well as sent patient a myEDmatch message and have not been successful in reaching patient to schedule. Will cease attempts to schedule at this time and will send message to Dr. De La Cruz RN's for further instruction. Zoila Chilel Nayla-Operative Coordinator for General and Urology Surgery

## 2021-06-02 ENCOUNTER — TRANSFERRED RECORDS (OUTPATIENT)
Dept: HEALTH INFORMATION MANAGEMENT | Facility: CLINIC | Age: 86
End: 2021-06-02

## 2021-06-02 RX ORDER — HYDRALAZINE HYDROCHLORIDE 25 MG/1
TABLET, FILM COATED ORAL
Qty: 270 TABLET | Refills: 1 | OUTPATIENT
Start: 2021-06-02

## 2021-06-02 NOTE — TELEPHONE ENCOUNTER
HYDRALAZINE 25 MG TABLET      TAKE 1 TABLET (25 MG) BY MOUTH 3 TIMES DAILY  Last Written Prescription Date:  Not on active med list  Discontinued Dose adjustment Renu Lantigua NP 1/28/21 2404     Last Office Visit : 3-17-21 ( [facility])  Future Office visit:  none    Routing refill request to provider for review/approval because:  Med discontinue by other provider/service

## 2021-06-09 ENCOUNTER — TELEPHONE (OUTPATIENT)
Dept: CARDIOLOGY | Facility: CLINIC | Age: 86
End: 2021-06-09

## 2021-06-09 DIAGNOSIS — I10 ESSENTIAL HYPERTENSION: ICD-10-CM

## 2021-06-09 RX ORDER — HYDROCHLOROTHIAZIDE 12.5 MG/1
12.5 TABLET ORAL EVERY MORNING
Qty: 90 TABLET | Refills: 1 | Status: ON HOLD | OUTPATIENT
Start: 2021-06-09 | End: 2021-09-01

## 2021-06-09 NOTE — TELEPHONE ENCOUNTER
Called Pt and inquired if prescription refills are needed as a refill request had been received, but is now no longer able to be located.  Pt stated he is fine on refills of carvedilol and hydralazine.  Discussed hydrochlorothiazide, but he stated he has not been taking the medication.  He remembers taking it, but does not recall when it was stopped.  He thinks that maybe he was in need of a refill and the renewal was never approved.  Noted this would be discussed with Dr Levi and Pt would be contacted with any recommendations.  Pt verbalized understanding, agreed to current plan and denied any further questions.    Nishi Shelley LPN

## 2021-06-09 NOTE — TELEPHONE ENCOUNTER
Date: 6/9/2021    Time of Call: 2:25 PM     Diagnosis:  HTN     [ TORB ] Ordering provider: Dr Eddie Levi  Order:   - Restart hydrochlorothiazide 12.5 mg every day  - BMP in 2 weeks  - Monitor and record home BPs and P.  Send readings to clinic in 2-3 weeks.      Order received by: Nishi Shelley LPN     Follow-up/additional notes: Per telephone call.  Called and spoke with Pt.  Discussed recommendations in detail.  Orders placed.  Pt verbalized understating, agreed to current plan and denied any further questions.

## 2021-06-15 ENCOUNTER — TELEPHONE (OUTPATIENT)
Dept: FAMILY MEDICINE | Facility: CLINIC | Age: 86
End: 2021-06-15

## 2021-06-15 NOTE — TELEPHONE ENCOUNTER
LIOM to schedule a follow up appointment with Renu Carrera.     Mable De Leon, FRANCISCO 3:03 PM  6/15/2021

## 2021-06-16 NOTE — TELEPHONE ENCOUNTER
Patient contacted me today today after receiving my attempt to reach scheduling letter. He let me know that he had some complications from a recent surgery and that he wanted to hold off on scheduling his hernia repair with Dr. De La Cruz until he gets his other health concerns addressed. Patient stated he would call me when he is ready to schedule. Message sent to EDUIN Bell with update.

## 2021-06-22 ENCOUNTER — OFFICE VISIT (OUTPATIENT)
Dept: FAMILY MEDICINE | Facility: CLINIC | Age: 86
End: 2021-06-22
Payer: MEDICARE

## 2021-06-22 ENCOUNTER — ANCILLARY PROCEDURE (OUTPATIENT)
Dept: ULTRASOUND IMAGING | Facility: CLINIC | Age: 86
End: 2021-06-22
Attending: NURSE PRACTITIONER
Payer: MEDICARE

## 2021-06-22 VITALS
TEMPERATURE: 97.8 F | OXYGEN SATURATION: 94 % | HEART RATE: 74 BPM | DIASTOLIC BLOOD PRESSURE: 76 MMHG | SYSTOLIC BLOOD PRESSURE: 199 MMHG

## 2021-06-22 DIAGNOSIS — E11.9 TYPE 2 DIABETES MELLITUS TREATED WITH INSULIN (H): ICD-10-CM

## 2021-06-22 DIAGNOSIS — I10 ESSENTIAL HYPERTENSION: ICD-10-CM

## 2021-06-22 DIAGNOSIS — Z79.4 TYPE 2 DIABETES MELLITUS TREATED WITH INSULIN (H): ICD-10-CM

## 2021-06-22 DIAGNOSIS — E04.1 THYROID NODULE: Primary | ICD-10-CM

## 2021-06-22 DIAGNOSIS — E04.1 THYROID NODULE: ICD-10-CM

## 2021-06-22 PROCEDURE — 99213 OFFICE O/P EST LOW 20 MIN: CPT | Performed by: NURSE PRACTITIONER

## 2021-06-22 PROCEDURE — 76536 US EXAM OF HEAD AND NECK: CPT | Mod: GC | Performed by: RADIOLOGY

## 2021-06-22 ASSESSMENT — PAIN SCALES - GENERAL: PAINLEVEL: NO PAIN (0)

## 2021-06-22 NOTE — PROGRESS NOTES
HPI       Farzad East is a 85 year old man who presents with a new thyroid nodule, HTN, Type 2 DM with Insulin.  No chief complaint on file.     Thyroid nodule: seen with other imaging, recommendation to obtain a Thyroid US.   Hypertension: BP remains high, nephrology working to bring that down. Feeling fair, has been a lot more tired.   Blood Sugars: Since stopping Metformin 180-190, was in the 130s while taking Metformin. This was stopped due to kidney disease.     Problem, Medication and Allergy Lists were reviewed and updated if needed.    Patient is an established patient of this clinic.         Review of Systems:   Review of Systems     Constitutional:  Positive for fatigue. Negative for fever and chills.               Physical Exam:     Vitals:    06/22/21 1346 06/22/21 1348   BP: (!) 180/71 (!) 199/76   Pulse: 74    Temp: 97.8  F (36.6  C)    SpO2: 94%    160/74 right arm manual   Home /70-80s  There is no height or weight on file to calculate BMI.  Vitals were reviewed and were normal.     Physical Exam  Vitals signs reviewed.   Constitutional:       Appearance: Normal appearance.   HENT:      Head: Normocephalic.   Cardiovascular:      Rate and Rhythm: Normal rate and regular rhythm.      Pulses: Normal pulses.      Heart sounds: Normal heart sounds.   Pulmonary:      Effort: Pulmonary effort is normal.      Breath sounds: Normal breath sounds.   Musculoskeletal: Normal range of motion.   Skin:     General: Skin is warm and dry.   Neurological:      General: No focal deficit present.      Mental Status: He is alert and oriented to person, place, and time.   Psychiatric:         Mood and Affect: Mood normal.         Behavior: Behavior normal.           Results:     Thyroid US   Assessment and Plan     1. Thyroid nodule    - US Thyroid; Future    2. Essential hypertension      3. Type 2 diabetes mellitus treated with insulin (H)    First, have the thyroid US. Next, will check with  endocrine on what else to take for blood sugars besides Metformin. Next, will follow up with patient on results and recommendations. Options for treatment and follow-up care were reviewed with the patient. Farzad East  engaged in the decision making process and verbalized understanding of the options discussed and agreed with the final plan.  ESTEPHANIE Green, CNP

## 2021-06-22 NOTE — NURSING NOTE
85 year old  Chief Complaint   Patient presents with     Recheck Medication     Follow up.       Blood pressure (!) 199/76, pulse 74, temperature 97.8  F (36.6  C), SpO2 94 %. There is no height or weight on file to calculate BMI.  BP completed using cuff size:      Mable De Leon, HARSHA  June 22, 2021 1:50 PM

## 2021-06-22 NOTE — PATIENT INSTRUCTIONS
[facility] Medication Refill Request Information:  * Please contact your pharmacy regarding ANY request for medication refills.  ** NP Clinic Prescription Fax = [phone]  * Please allow 3 business days for routine medication refills.  * Please allow 5 business days for controlled substance medication refills.     [facility] Test Result notification information:  *You will be notified with in 7-10 days of your appointment day regarding the results of your test.  If you are on MyChart you will be notified as soon as the provider has reviewed the results and signed off on them.    [facility]: [phone]     If you have questions regarding Covid-19 and the Covid-19 vaccine, please visit this website.    [email or web address]

## 2021-07-05 ASSESSMENT — ENCOUNTER SYMPTOMS
CHILLS: 0
FEVER: 0
FATIGUE: 1

## 2021-07-08 DIAGNOSIS — Z11.59 ENCOUNTER FOR SCREENING FOR OTHER VIRAL DISEASES: ICD-10-CM

## 2021-07-08 DIAGNOSIS — E11.9 TYPE 2 DIABETES MELLITUS TREATED WITHOUT INSULIN (H): Primary | ICD-10-CM

## 2021-07-16 ENCOUNTER — LAB (OUTPATIENT)
Dept: LAB | Facility: CLINIC | Age: 86
End: 2021-07-16
Payer: MEDICARE

## 2021-07-16 DIAGNOSIS — E11.9 TYPE 2 DIABETES MELLITUS TREATED WITHOUT INSULIN (H): ICD-10-CM

## 2021-07-16 DIAGNOSIS — Z11.59 ENCOUNTER FOR SCREENING FOR OTHER VIRAL DISEASES: ICD-10-CM

## 2021-07-16 LAB — HBA1C MFR BLD: 9.5 % (ref 0–5.6)

## 2021-07-16 PROCEDURE — U0003 INFECTIOUS AGENT DETECTION BY NUCLEIC ACID (DNA OR RNA); SEVERE ACUTE RESPIRATORY SYNDROME CORONAVIRUS 2 (SARS-COV-2) (CORONAVIRUS DISEASE [COVID-19]), AMPLIFIED PROBE TECHNIQUE, MAKING USE OF HIGH THROUGHPUT TECHNOLOGIES AS DESCRIBED BY CMS-2020-01-R: HCPCS | Performed by: NURSE PRACTITIONER

## 2021-07-16 PROCEDURE — 83036 HEMOGLOBIN GLYCOSYLATED A1C: CPT | Performed by: PATHOLOGY

## 2021-07-16 PROCEDURE — 36415 COLL VENOUS BLD VENIPUNCTURE: CPT | Performed by: PATHOLOGY

## 2021-07-17 LAB — SARS-COV-2 RNA RESP QL NAA+PROBE: NEGATIVE

## 2021-07-19 ENCOUNTER — ANCILLARY PROCEDURE (OUTPATIENT)
Dept: ULTRASOUND IMAGING | Facility: CLINIC | Age: 86
End: 2021-07-19
Attending: NURSE PRACTITIONER
Payer: MEDICARE

## 2021-07-19 DIAGNOSIS — E04.1 THYROID NODULE: ICD-10-CM

## 2021-07-19 PROCEDURE — 88173 CYTOPATH EVAL FNA REPORT: CPT | Mod: TC | Performed by: NURSE PRACTITIONER

## 2021-07-19 PROCEDURE — 10005 FNA BX W/US GDN 1ST LES: CPT | Mod: GC | Performed by: RADIOLOGY

## 2021-07-19 PROCEDURE — 88173 CYTOPATH EVAL FNA REPORT: CPT | Mod: 26 | Performed by: PATHOLOGY

## 2021-07-19 PROCEDURE — 88172 CYTP DX EVAL FNA 1ST EA SITE: CPT | Mod: 26 | Performed by: PATHOLOGY

## 2021-07-19 RX ORDER — LIDOCAINE HYDROCHLORIDE 10 MG/ML
5 INJECTION, SOLUTION INFILTRATION; PERINEURAL ONCE
Status: COMPLETED | OUTPATIENT
Start: 2021-07-19 | End: 2021-07-19

## 2021-07-19 RX ADMIN — LIDOCAINE HYDROCHLORIDE 3 ML: 10 INJECTION, SOLUTION INFILTRATION; PERINEURAL at 11:45

## 2021-07-20 DIAGNOSIS — I10 HYPERTENSION, UNSPECIFIED TYPE: ICD-10-CM

## 2021-07-21 LAB
PATH REPORT.COMMENTS IMP SPEC: NORMAL
PATH REPORT.COMMENTS IMP SPEC: NORMAL
PATH REPORT.FINAL DX SPEC: NORMAL
PATH REPORT.GROSS SPEC: NORMAL
PATH REPORT.RELEVANT HX SPEC: NORMAL

## 2021-07-22 DIAGNOSIS — I10 ESSENTIAL HYPERTENSION: ICD-10-CM

## 2021-07-23 NOTE — TELEPHONE ENCOUNTER
CARVEDILOL 12.5 MG TABLET      1 TABLET BY MOUTH TWICE A DAY WITH MEALS  Last Written Prescription Date:  Not on current med list  Discontinue - dosage change    Current Rx on med list: listed as NO PRINT OUT  carvedilol (COREG) 25 MG tablet 180 tablet 0 3/30/2021  No  Sig - Route: Take 1 tablet (25 mg) by mouth 2 times daily (with meals) - Oral  Class: No Print Out      Last Office Visit : 3-17-21  Future Office visit:  none    Routing refill request to provider for review/approval because:  Drug not active on patient's medication list  ? RF current med list dosage  FYI BP above protocol parameter    BP Readings from Last 3 Encounters:   06/22/21 (!) 199/76   03/11/21 (!) 200/80   02/18/21 (!) 183/84

## 2021-07-26 RX ORDER — HYDRALAZINE HYDROCHLORIDE 50 MG/1
100 TABLET, FILM COATED ORAL 3 TIMES DAILY
Qty: 540 TABLET | Refills: 4 | OUTPATIENT
Start: 2021-07-26

## 2021-07-26 NOTE — PROGRESS NOTES
Rosales Waldron CMA    Alessio is a 85 year old who is being evaluated via a billable telephone visit.      What phone number would you like to be contacted at? [phone] or if he having trouble hearing he want provider to call cell phone   How would you like to obtain your AV[place]? [place]  Phone call duration: 28 minutes    [facility]  Endocrinology  Sandhya Mena PA-C, MPAS  07/27/2021      Chief Complaint:   Telephone     History of Present Illness:   Farzad East is a 85 year old male with a history of CKD stage 3, type 2 diabetes mellitus with nephropathy, and hypertension who presents for follow up of type 2 diabetes mellitus. The patient was diagnosed with diabetes in his 50s and has managed using Metformin, Glipizide, Lantus, and Januvia in the past. Lantus was discontinued toward the beginning of 2018. Metformin was discontinued in 2020 due to lower GFR while hospitlalzed.      I last visited with Alessio in February.  At that time his most recent A1C was 8.4 and his glucometer checks concurred with higher BG which he felt was due to stress, poor sleep and erratic eating related to grieving having to move his wife into Memory Care.  He was taking Januvia 50 mg and Glipizide 5 mg daily and while we discussed resuming Lantus, he elected to work on sleep diet and stress management.     Interval History:   From March:  Had recent pre-op evalaution with cardiology and had uncontrolled BP.     - Recc: Consider Lantus 10 units daily to reach goal fasting  - 150.      Update:  Melanoma removed in April.    GFR 25 - 30 since last September. Cr 1.99 - 2.34.  BP has been 180 - 215/71 - 80 at recent visits.  Weight up 10# from 172 to 181 in the lat year.    Seeing Renu Lantigua in primary care.  Had thyroid nodule US and biopsy 7/22  Saw Cardiology, Dr Levi in March - noted high BP despite increased doses of hydrochlorothiazide and hydralazine.  Amlodipine was discontinued due to lower extremity swelling in  the context of his venous insufficiency. Hydralazine increased, advised consider increased  Coreg to 12.5 mg twice a day and perhaps even a small dose of amlodipine could also be reconsidered as well on a temporary basis if it helps his blood pressures and he can tolerate it from the skin breakdown standpoint with lower extremity wrapping in the short-term to control his blood pressures.     At home however BP were controlled per note 4/5/21 after Coreg increase.      He has not seen nephrology since November. Follow-up discussed but time frame not clear.     Today:     A1C 9.5 - Higher since stopped Metformin for his melanoma.  He has not been checking BG in evening at all.  He denies any symptoms of low BG, but notes that he is falling asleep a lot, though notes up at night to urinate.  He does note a sore on his fibula that hasn't         Recalls taking Lantus before, feels he could do again, but would rather not.          Current diabetes regimen Januvia 50 mg, and Glipizide 5 mg.          BG monitoring:  Week of__/__/__ Date  _7_/27__  Date  _7_/_26_ Date  7__/_25_ Date  _7_/24__ Date  _7_/23__ Date  _7_/22__ Date  _7_/21__    Time BG Time BG Time BG Time BG Time BG Time BG Time BG      246  141  217  210  206  209                                                        Week of__/__/__ Date  _7_/_20_  Date  _7_/_19_ Date  7__/_18_ Date  _7_/17__ Date  _7_/_12_ Date  _7_/_11_ Date  _7/10__    Time BG Time BG Time BG Time BG Time BG Time BG Time BG      239  186  251  304  172  211           256                                                 Review of Systems:   Pertinent items are noted in HPI, remainder of complete ROS is negative.       Active Medications:      Current Outpatient Medications   Medication     aspirin (ASA) 81 MG EC tablet     blood glucose (NO BRAND SPECIFIED) lancets standard     blood glucose (ONETOUCH VERIO IQ) test strip     blood glucose monitoring (ONETOUCH VERIO IQ SYSTEM)  "meter device kit     carvedilol (COREG) 25 MG tablet     Cholecalciferol (VITAMIN D-3 PO)     COMPRESSION STOCKINGS     COMPRESSION STOCKINGS     finasteride (PROSCAR) 5 MG tablet     glipiZIDE (GLUCOTROL XL) 5 MG 24 hr tablet     hydrALAZINE (APRESOLINE) 50 MG tablet     hydrochlorothiazide (HYDRODIURIL) 12.5 MG tablet     multivitamin w/minerals (MULTI-VITAMIN) tablet     sitagliptin (JANUVIA) 50 MG tablet     tamsulosin (FLOMAX) 0.4 MG capsule     No current facility-administered medications for this visit.     Allergies:   Sulfa drugs      Past Medical History:  Atrial flutter  Chronic kidney disease stage 3  Type 2 diabetes mellitus  Hypertension  Peripheral neuropathy  Rectal type adenocarcinoma  Femur fracture     Past Surgical History:  Cataract  ORIF femur  Left KIMBERLY     Family History:   Diabetes - father  Circulatory - father  Macular degeneration - father  Arthritis - mother     Social History:   Tobacco Use: none  Alcohol Use: 3 martinis/week - not currently.  Drug Use: none  PCP: Renu Lantigua      Has support of adult children.  Living at [facility] in Independent senior apartment.       Physical Exam:   There were no vitals taken for this visit.      PHONE VISIT     Farzad is alerted and oriented.  Mood is \"alright I suppose,\" affect is congruent.  Thoughtful form and content are fluid and coherent.  He does become rather emotional at times and apologizes for this.    No signs of distress are appreciated.          Data:    Recent Labs   Lab Test 07/16/21  1537 03/16/21  1420 03/11/21  1527 09/24/20  1413 09/23/20  1341 09/23/20  1323 11/05/19  1430 11/05/19  0000 12/04/18  1526 09/04/18  0000   A1C 9.5* 8.8*  --    < >  --  7.3*  --   --   --   --    HEMOGLOBINA1  --   --   --   --   --   --   --  8.0*  --  7.6*   TSH  --  4.70*  --   --   --  5.08*  --   --   --   --    T4  --  0.89  --   --   --  1.00  --   --   --   --    LDL  --  66  --   --   --  34  --   --   --   --    HDL  --  50  --   --   " --  45  --   --   --   --    TRIG  --  86  --   --   --  56  --   --   --   --    CR  --  2.32* 2.34*  --   --  2.42*  --   --   --   --    MICROL  --   --   --   --  475  --  882  --    < >  --     < > = values in this interval not displayed.                Lab Results   Component Value Date     A1C 8.4 02/03/2021     A1C 7.3 09/23/2020     A1C 7.7 07/22/2019     A1C 8.3 12/04/2018     A1C 7.9 12/12/2017            Recent data also reviewed above.     Assessment and Plan:  Type 2 diabetes mellitus  Recent A1C and several fasting BG are above goal.  Metformin and SGLT2-i are higher risk due to low GFR combined with poorer cardiac status and recently uncontrolled hypertension.      Alessio and son agree to resume Lantus 10 units qhs, and will increase by 1 unit every 3-4 days until fasting BG is <150.  Will also check BG in evening to assure not <120 in which case we will stop or decrease Glipizide.       Adjustment disorder/ Stress reaction:       Reports improved. Appears up at night urinating, likely in part due to high BG.      CKD stage 4  He is being followed by nephrology.       HTN: Following with cardiology, nephrology.     Osteoporosis, unspecified osteoporosis type, unspecified pathological fracture presence  Vitamin D deficiency    Thyroid nodules:  I am requesting next available follow-up with Dr Amin.     Skin ulceration of leg: Does not sound like infected.  Stable. Will see visiting podiatrist at his residence.  Defers urgent with Dr Duenas.       Follow-up: Next available re thyroid results, 3-6 weeks with myself f/u high BG.       It is my privilege to be involved in the care of the above patient.      Sandhya Mena PA-C, MPAS  [facility]  Diabetes, Endocrinology, and Metabolism  [phone] Appointments/Nurse  [phone] Fax  [phone] pager  [phone] nurse line           40 minutes in preparation for visit reviewing chart, labs and documentation, visiting with  patient gathering history and in exam, education and counseling, as well as coordination of care, further chart review and documentation following visit on this date of service and as alluded to documented above.

## 2021-07-27 ENCOUNTER — VIRTUAL VISIT (OUTPATIENT)
Dept: ENDOCRINOLOGY | Facility: CLINIC | Age: 86
End: 2021-07-27
Payer: MEDICARE

## 2021-07-27 DIAGNOSIS — N18.4 CKD (CHRONIC KIDNEY DISEASE) STAGE 4, GFR 15-29 ML/MIN (H): ICD-10-CM

## 2021-07-27 DIAGNOSIS — E11.65 TYPE 2 DIABETES MELLITUS WITH HYPERGLYCEMIA, UNSPECIFIED WHETHER LONG TERM INSULIN USE (H): Primary | ICD-10-CM

## 2021-07-27 DIAGNOSIS — L97.901 ULCER OF LOWER EXTREMITY, LIMITED TO BREAKDOWN OF SKIN, UNSPECIFIED LATERALITY (H): ICD-10-CM

## 2021-07-27 DIAGNOSIS — E04.1 THYROID NODULE: ICD-10-CM

## 2021-07-27 PROCEDURE — 99443 PR PHYSICIAN TELEPHONE EVALUATION 21-30 MIN: CPT | Mod: 95 | Performed by: PHYSICIAN ASSISTANT

## 2021-07-27 NOTE — LETTER
7/27/2021       RE: Farzad East  [address]  [facility] [address]     Dear Colleague,    Thank you for referring your patient, Farzad East, to the [facility] ENDOCRINOLOGY [facility] [place] [facility]. Please see a copy of my visit note below.    Rosales Waldron CMA    Alessio is a 85 year old who is being evaluated via a billable telephone visit.      What phone number would you like to be contacted at? [phone] or if he having trouble hearing he want provider to call cell phone   How would you like to obtain your AVS? [email or web address]  Phone call duration: 28 minutes    [facility]  Endocrinology  Sandhya Mena PA-C, MPAS  07/27/2021      Chief Complaint:   Telephone     History of Present Illness:   Farzad East is a 85 year old male with a history of CKD stage 3, type 2 diabetes mellitus with nephropathy, and hypertension who presents for follow up of type 2 diabetes mellitus. The patient was diagnosed with diabetes in his 50s and has managed using Metformin, Glipizide, Lantus, and Januvia in the past. Lantus was discontinued toward the beginning of 2018. Metformin was discontinued in 2020 due to lower GFR while hospitlalzed.      I last visited with Alessio in February.  At that time his most recent A1C was 8.4 and his glucometer checks concurred with higher BG which he felt was due to stress, poor sleep and erratic eating related to grieving having to move his wife into Memory Care.  He was taking Januvia 50 mg and Glipizide 5 mg daily and while we discussed resuming Lantus, he elected to work on sleep diet and stress management.     Interval History:   From March:  Had recent pre-op evalaution with cardiology and had uncontrolled BP.     - Recc: Consider Lantus 10 units daily to reach goal fasting  - 150.      Update:  Melanoma removed in April.    GFR 25 - 30 since last September. Cr 1.99 - 2.34.  BP has been 180 -  215/71 - 80 at recent visits.  Weight up 10# from 172 to 181 in the lat year.    Seeing Renu Lantigua in primary care.  Had thyroid nodule US and biopsy 7/22  Saw Cardiology, Dr Levi in March - noted high BP despite increased doses of hydrochlorothiazide and hydralazine.  Amlodipine was discontinued due to lower extremity swelling in the context of his venous insufficiency. Hydralazine increased, advised consider increased  Coreg to 12.5 mg twice a day and perhaps even a small dose of amlodipine could also be reconsidered as well on a temporary basis if it helps his blood pressures and he can tolerate it from the skin breakdown standpoint with lower extremity wrapping in the short-term to control his blood pressures.     At home however BP were controlled per note 4/5/21 after Coreg increase.      He has not seen nephrology since November. Follow-up discussed but time frame not clear.     Today:     A1C 9.5 - Higher since stopped Metformin for his melanoma.  He has not been checking BG in evening at all.  He denies any symptoms of low BG, but notes that he is falling asleep a lot, though notes up at night to urinate.  He does note a sore on his fibula that hasn't         Recalls taking Lantus before, feels he could do again, but would rather not.          Current diabetes regimen Januvia 50 mg, and Glipizide 5 mg.          BG monitoring:  Week of__/__/__ Date  _7_/27__  Date  _7_/_26_ Date  7__/_25_ Date  _7_/24__ Date  _7_/23__ Date  _7_/22__ Date  _7_/21__    Time BG Time BG Time BG Time BG Time BG Time BG Time BG      246  141  217  210  206  209                                                        Week of__/__/__ Date  _7_/_20_  Date  _7_/_19_ Date  7__/_18_ Date  _7_/17__ Date  _7_/_12_ Date  _7_/_11_ Date  _7/10__    Time BG Time BG Time BG Time BG Time BG Time BG Time BG      239  186  251  304  172  211           256                                                 Review of Systems:   Pertinent  "items are noted in HPI, remainder of complete ROS is negative.       Active Medications:      Current Outpatient Medications   Medication     aspirin (ASA) 81 MG EC tablet     blood glucose (NO BRAND SPECIFIED) lancets standard     blood glucose (ONETOUCH VERIO IQ) test strip     blood glucose monitoring (ONETOUCH VERIO IQ SYSTEM) meter device kit     carvedilol (COREG) 25 MG tablet     Cholecalciferol (VITAMIN D-3 PO)     COMPRESSION STOCKINGS     COMPRESSION STOCKINGS     finasteride (PROSCAR) 5 MG tablet     glipiZIDE (GLUCOTROL XL) 5 MG 24 hr tablet     hydrALAZINE (APRESOLINE) 50 MG tablet     hydrochlorothiazide (HYDRODIURIL) 12.5 MG tablet     multivitamin w/minerals (MULTI-VITAMIN) tablet     sitagliptin (JANUVIA) 50 MG tablet     tamsulosin (FLOMAX) 0.4 MG capsule     No current facility-administered medications for this visit.     Allergies:   Sulfa drugs      Past Medical History:  Atrial flutter  Chronic kidney disease stage 3  Type 2 diabetes mellitus  Hypertension  Peripheral neuropathy  Rectal type adenocarcinoma  Femur fracture     Past Surgical History:  Cataract  ORIF femur  Left KIMBERLY     Family History:   Diabetes - father  Circulatory - father  Macular degeneration - father  Arthritis - mother     Social History:   Tobacco Use: none  Alcohol Use: 3 martinis/week - not currently.  Drug Use: none  PCP: Renu Lantigua      Has support of adult children.  Living at [facility] in Independent senior apartment.       Physical Exam:   There were no vitals taken for this visit.      PHONE VISIT     Farzad is alerted and oriented.  Mood is \"alright I suppose,\" affect is congruent.  Thoughtful form and content are fluid and coherent.  He does become rather emotional at times and apologizes for this.    No signs of distress are appreciated.          Data:    Recent Labs   Lab Test 07/16/21  1537 03/16/21  1420 03/11/21  1527 09/24/20  1413 09/23/20  1341 09/23/20  1323 11/05/19  1430 11/05/19  0000 " 12/04/18  1526 09/04/18  0000   A1C 9.5* 8.8*  --    < >  --  7.3*  --   --   --   --    HEMOGLOBINA1  --   --   --   --   --   --   --  8.0*  --  7.6*   TSH  --  4.70*  --   --   --  5.08*  --   --   --   --    T4  --  0.89  --   --   --  1.00  --   --   --   --    LDL  --  66  --   --   --  34  --   --   --   --    HDL  --  50  --   --   --  45  --   --   --   --    TRIG  --  86  --   --   --  56  --   --   --   --    CR  --  2.32* 2.34*  --   --  2.42*  --   --   --   --    MICROL  --   --   --   --  475  --  882  --    < >  --     < > = values in this interval not displayed.                Lab Results   Component Value Date     A1C 8.4 02/03/2021     A1C 7.3 09/23/2020     A1C 7.7 07/22/2019     A1C 8.3 12/04/2018     A1C 7.9 12/12/2017            Recent data also reviewed above.     Assessment and Plan:  Type 2 diabetes mellitus  Recent A1C and several fasting BG are above goal.  Metformin and SGLT2-i are higher risk due to low GFR combined with poorer cardiac status and recently uncontrolled hypertension.      Alessio and son agree to resume Lantus 10 units qhs, and will increase by 1 unit every 3-4 days until fasting BG is <150.  Will also check BG in evening to assure not <120 in which case we will stop or decrease Glipizide.       Adjustment disorder/ Stress reaction:       Reports improved. Appears up at night urinating, likely in part due to high BG.      CKD stage 4  He is being followed by nephrology.       HTN: Following with cardiology, nephrology.     Osteoporosis, unspecified osteoporosis type, unspecified pathological fracture presence  Vitamin D deficiency    Thyroid nodules:  I am requesting next available follow-up with Dr Amin.     Skin ulceration of leg: Does not sound like infected.  Stable. Will see visiting podiatrist at his residence.  Defers urgent with Dr Duenas.       Follow-up: Next available re thyroid results, 3-6 weeks with myself f/u high BG.       It is my privilege to be  involved in the care of the above patient.      Sandhya Mena PA-C, MPAS  [facility]  Diabetes, Endocrinology, and Metabolism  [phone] Appointments/Nurse  [phone] Fax  [phone] pager  [phone] nurse line           40 minutes in preparation for visit reviewing chart, labs and documentation, visiting with patient gathering history and in exam, education and counseling, as well as coordination of care, further chart review and documentation following visit on this date of service and as alluded to documented above.

## 2021-07-27 NOTE — PATIENT INSTRUCTIONS
Dear Alessio,  Please start Lantus 10 units each evening.  Please add 1 unit every 3-4 days until your fasting blood sugar in the morning is <150 most of the time.    Check you blood sugar first thing in the morning and also before dinner or at bedtime.  If <120 at either of these times or feeling low blood sugar symptoms.  Please stop taking Glipizide and call my office.    Please put antibiotic ointment, non stick gauze and coban over your leg ulcer during the day and see your podiatrist.  If gets warm, hot more tender and seems infected call my or Ms[facility] office right away.    Please schedule an appointment with Dr Amin to follow up your thyroid results.  Our office should be calling soon about this.        My best wishes,    Sandhya Mena PA-C, MPAS  [facility]  Diabetes, Endocrinology, and Metabolism  [phone] Appointments/Nurse  [phone] Fax  [phone] URGENTafter hours/weekend Endocrinologist on call

## 2021-07-28 RX ORDER — CARVEDILOL 25 MG/1
25 TABLET ORAL 2 TIMES DAILY WITH MEALS
Qty: 180 TABLET | Refills: 3 | Status: ON HOLD | OUTPATIENT
Start: 2021-07-28 | End: 2021-09-23

## 2021-08-11 ENCOUNTER — LAB REQUISITION (OUTPATIENT)
Dept: LAB | Facility: CLINIC | Age: 86
End: 2021-08-11
Payer: MEDICARE

## 2021-08-11 DIAGNOSIS — U07.1 COVID-19: ICD-10-CM

## 2021-08-12 ENCOUNTER — TELEPHONE (OUTPATIENT)
Dept: ENDOCRINOLOGY | Facility: CLINIC | Age: 86
End: 2021-08-12

## 2021-08-12 PROCEDURE — U0003 INFECTIOUS AGENT DETECTION BY NUCLEIC ACID (DNA OR RNA); SEVERE ACUTE RESPIRATORY SYNDROME CORONAVIRUS 2 (SARS-COV-2) (CORONAVIRUS DISEASE [COVID-19]), AMPLIFIED PROBE TECHNIQUE, MAKING USE OF HIGH THROUGHPUT TECHNOLOGIES AS DESCRIBED BY CMS-2020-01-R: HCPCS | Mod: ORL | Performed by: FAMILY MEDICINE

## 2021-08-12 NOTE — TELEPHONE ENCOUNTER
----- Message from Sun Moreno sent at 8/12/2021  9:22 AM CDT -----  Regarding: FW: recent thyroid biopsy and US    ----- Message -----  From: Sandhya Mena PA-C  Sent: 8/10/2021  10:36 PM CDT  To: Sun Moreno  Subject: FW: recent thyroid biopsy and US                 Please schedule him next available with Dr. Amin to f/u thyroid.  Thank you!  ----- Message -----  From: Jose Amin MD  Sent: 7/27/2021  11:20 AM CDT  To: Sandhya Mena PA-C  Subject: RE: recent thyroid biopsy and US                 Yes, have him schedule with me. Would recommend repeating FNA in 3 months   thanks  ----- Message -----  From: Sandhya Mena PA-C  Sent: 7/27/2021  10:38 AM CDT  To: Jose Amin MD  Subject: recent thyroid biopsy and US                     HI Amir!    I hope that you are well.  I am seeing Alessio today for high BG and likely will resume Lantus.  He recently had thyroid nodule US and biopsy, but it is not clear what f/u is - per primary notes awaiting endo opinion.  I will have him scheduled back with you.  Anything more at this time?  Thanks,  Sandhya

## 2021-08-13 LAB — SARS-COV-2 RNA RESP QL NAA+PROBE: NEGATIVE

## 2021-08-16 ENCOUNTER — LAB (OUTPATIENT)
Dept: LAB | Facility: CLINIC | Age: 86
End: 2021-08-16
Payer: MEDICARE

## 2021-08-16 ENCOUNTER — ANCILLARY PROCEDURE (OUTPATIENT)
Dept: GENERAL RADIOLOGY | Facility: CLINIC | Age: 86
End: 2021-08-16
Attending: NURSE PRACTITIONER
Payer: MEDICARE

## 2021-08-16 ENCOUNTER — LAB REQUISITION (OUTPATIENT)
Dept: LAB | Facility: CLINIC | Age: 86
End: 2021-08-16
Payer: MEDICARE

## 2021-08-16 ENCOUNTER — OFFICE VISIT (OUTPATIENT)
Dept: FAMILY MEDICINE | Facility: CLINIC | Age: 86
End: 2021-08-16
Payer: MEDICARE

## 2021-08-16 VITALS
SYSTOLIC BLOOD PRESSURE: 191 MMHG | HEART RATE: 67 BPM | DIASTOLIC BLOOD PRESSURE: 82 MMHG | OXYGEN SATURATION: 96 % | TEMPERATURE: 98 F

## 2021-08-16 DIAGNOSIS — R53.83 OTHER FATIGUE: ICD-10-CM

## 2021-08-16 DIAGNOSIS — R53.83 OTHER FATIGUE: Primary | ICD-10-CM

## 2021-08-16 DIAGNOSIS — E11.65 UNCONTROLLED TYPE 2 DIABETES MELLITUS WITH HYPERGLYCEMIA (H): ICD-10-CM

## 2021-08-16 DIAGNOSIS — T14.8XXA OPEN WOUND: ICD-10-CM

## 2021-08-16 DIAGNOSIS — R06.02 SHORTNESS OF BREATH: ICD-10-CM

## 2021-08-16 DIAGNOSIS — I10 ESSENTIAL HYPERTENSION: ICD-10-CM

## 2021-08-16 DIAGNOSIS — U07.1 COVID-19: ICD-10-CM

## 2021-08-16 LAB
ALBUMIN SERPL-MCNC: 3.2 G/DL (ref 3.4–5)
ALP SERPL-CCNC: 86 U/L (ref 40–150)
ALT SERPL W P-5'-P-CCNC: 19 U/L (ref 0–70)
ANION GAP SERPL CALCULATED.3IONS-SCNC: 1 MMOL/L (ref 3–14)
AST SERPL W P-5'-P-CCNC: 4 U/L (ref 0–45)
BILIRUB SERPL-MCNC: 0.3 MG/DL (ref 0.2–1.3)
BUN SERPL-MCNC: 70 MG/DL (ref 7–30)
CALCIUM SERPL-MCNC: 9 MG/DL (ref 8.5–10.1)
CHLORIDE BLD-SCNC: 113 MMOL/L (ref 94–109)
CO2 SERPL-SCNC: 22 MMOL/L (ref 20–32)
CREAT SERPL-MCNC: 2.89 MG/DL (ref 0.52–1.25)
ERYTHROCYTE [DISTWIDTH] IN BLOOD BY AUTOMATED COUNT: 14.8 % (ref 10–15)
GFR SERPL CREATININE-BSD FRML MDRD: 19 ML/MIN/1.73M2
GLUCOSE BLD-MCNC: 259 MG/DL (ref 70–99)
HCT VFR BLD AUTO: 25.6 % (ref 35–53)
HGB BLD-MCNC: 7.8 G/DL (ref 11.7–17.7)
MCH RBC QN AUTO: 27.7 PG (ref 26.5–33)
MCHC RBC AUTO-ENTMCNC: 30.5 G/DL (ref 31.5–36.5)
MCV RBC AUTO: 91 FL (ref 78–100)
PLATELET # BLD AUTO: 253 10E3/UL (ref 150–450)
POTASSIUM BLD-SCNC: 5.7 MMOL/L (ref 3.4–5.3)
PROT SERPL-MCNC: 7.7 G/DL (ref 6.8–8.8)
RBC # BLD AUTO: 2.82 10E6/UL (ref 3.8–5.9)
SODIUM SERPL-SCNC: 136 MMOL/L (ref 133–144)
T4 FREE SERPL-MCNC: 1.03 NG/DL
TSH SERPL DL<=0.005 MIU/L-ACNC: 7.46 MU/L (ref 0.4–4)
WBC # BLD AUTO: 5.5 10E3/UL (ref 4–11)

## 2021-08-16 PROCEDURE — 99215 OFFICE O/P EST HI 40 MIN: CPT | Performed by: NURSE PRACTITIONER

## 2021-08-16 PROCEDURE — 85027 COMPLETE CBC AUTOMATED: CPT | Performed by: PATHOLOGY

## 2021-08-16 PROCEDURE — 84443 ASSAY THYROID STIM HORMONE: CPT | Performed by: PATHOLOGY

## 2021-08-16 PROCEDURE — 71046 X-RAY EXAM CHEST 2 VIEWS: CPT | Mod: GC | Performed by: RADIOLOGY

## 2021-08-16 PROCEDURE — 84439 ASSAY OF FREE THYROXINE: CPT | Performed by: PATHOLOGY

## 2021-08-16 PROCEDURE — 80053 COMPREHEN METABOLIC PANEL: CPT | Performed by: PATHOLOGY

## 2021-08-16 PROCEDURE — 36415 COLL VENOUS BLD VENIPUNCTURE: CPT | Performed by: PATHOLOGY

## 2021-08-16 ASSESSMENT — PAIN SCALES - GENERAL: PAINLEVEL: NO PAIN (0)

## 2021-08-16 NOTE — PATIENT INSTRUCTIONS
First, please complete your labs and chest x ray today. Next, please change Mepilex dressing every 3 days. Next, I will contact you later this week to discuss your results. Next, please call with any questions or concerns. Finally, I will talk with your care team, and share next steps at the same time we discuss your lab results. Thank you Renu Mccallum.  [facility] Medication Refill Request Information:  * Please contact your pharmacy regarding ANY request for medication refills.  ** NP Clinic Prescription Fax = [phone]  * Please allow 3 business days for routine medication refills.  * Please allow 5 business days for controlled substance medication refills.     [facility] Test Result notification information:  *You will be notified with in 7-10 days of your appointment day regarding the results of your test.  If you are on MyChart you will be notified as soon as the provider has reviewed the results and signed off on them.    [facility]: [phone]     If you have questions regarding Covid-19 and the Covid-19 vaccine, please visit this website.    [email or web address]

## 2021-08-16 NOTE — NURSING NOTE
86 year old  Chief Complaint   Patient presents with     Fatigue     Discuss excessive fatigue.        Blood pressure (!) 191/82, pulse 67, temperature 98  F (36.7  C), SpO2 96 %. There is no height or weight on file to calculate BMI.  BP completed using cuff size:      HARSHA Boland  August 16, 2021 4:47 PM

## 2021-08-16 NOTE — PROGRESS NOTES
HPI       Farzad East is a 86 year old man who presents for follow up on his overall status.   Chief Complaint   Patient presents with     Fatigue     Discuss excessive fatigue.    Fatigue/Shortness of breath: this started to be a problem a few months ago and has not improved. Alessio had MOHS surgery for melanoma in April 2021. He had a PET scan which did not show any evidence of metastases. He feels that his level of energy has gone down since having that procedure. He also reports progressive shortness of breath since having that surgery as well.   Type 2 DM: Blood sugars currently . He has been working closely with Thelma Mena to manage his diabetes.   HTN:this continues to be poorly managed on current medications. BP at home 170s/70-80s.    Open wound: Alessio reports a 1+ month history of an open wound on his right lower leg.     Alessio verbalizes frustration today with his son, Andi cloud. Alessio explains that he does not feel well, has not for a while and he just continues to see additional care providers without figuring out the cause or an action plan for symptom management. His son works for [employer], they have a Care Coordinator option and they want to add this person to Alessio's team for coordination of care. Her name is Dayan Cardoso and she is with a division called [facility].     Problem, Medication and Allergy Lists were reviewed and updated if needed.    Patient is an established patient of this clinic.         Review of Systems:   Review of Systems     Constitutional:  Positive for fatigue. Negative for fever and chills.   Respiratory:   Positive for shortness of breath.    Neurological:  Positive for weakness.               Physical Exam:     Vitals:    08/16/21 1643   BP: (!) 191/82   Pulse: 67   Temp: 98  F (36.7  C)   TempSrc: Oral   SpO2: 96%     There is no height or weight on file to calculate BMI.  Vitals were reviewed and were normal, BP elevated.      Physical  Exam  Vitals reviewed.   Constitutional:       Appearance: Normal appearance.   HENT:      Head: Normocephalic.   Cardiovascular:      Rate and Rhythm: Normal rate and regular rhythm.      Pulses: Normal pulses.      Heart sounds: Normal heart sounds.   Pulmonary:      Effort: Pulmonary effort is normal.      Breath sounds: Normal breath sounds.   Musculoskeletal:         General: Normal range of motion.   Skin:     General: Skin is warm and dry.      Findings: Lesion present.             Comments: Open area on right lower leg, dime sized. No drainage present.    Neurological:      General: No focal deficit present.      Mental Status: He is alert and oriented to person, place, and time.   Psychiatric:         Mood and Affect: Mood normal.         Behavior: Behavior normal.            Results:   Labs/Chest x ray pending.  Assessment and Plan     1. Other fatigue    - CBC with platelets; Future  - Comprehensive metabolic panel; Future  - TSH; Future  - T4 free; Future  - XR Chest 2 Views; Future    2. Shortness of breath      3. Type 2 diabetes mellitus with hyperglycemia (H)    - sitagliptin (JANUVIA) 50 MG tablet; Take 1 tablet (50 mg) by mouth daily  Dispense: 90 tablet; Refill: 0    4. Essential hypertension      5. Open wound  Patient instructions: First, please complete your labs and chest x ray today. Next, please change Mepilex dressing to right lower leg every 3 days. Next, I will contact you later this week to discuss your results and to develop a plan. Next, please call with any questions or concerns. Going forward, we will have you see internal medicine in the [facility] and wound care. Thank you Renu Mccallum. Options for treatment and follow-up care were reviewed with the patient. Farzad East  engaged in the decision making process and verbalized understanding of the options discussed and agreed with the final plan.  Addendum 08/17/2021 0950:  Component      Latest Ref Rng & Units 8/16/2021    Sodium      133 - 144 mmol/L 136   Potassium      3.4 - 5.3 mmol/L 5.7 (H)   Chloride      94 - 109 mmol/L 113 (H)   Carbon Dioxide      20 - 32 mmol/L 22   Anion Gap      3 - 14 mmol/L 1 (L)   Urea Nitrogen      7 - 30 mg/dL 70 (H)   Creatinine      0.52 - 1.25 mg/dL 2.89 (H)   Calcium      8.5 - 10.1 mg/dL 9.0   Glucose      70 - 99 mg/dL 259 (H)   Alkaline Phosphatase      40 - 150 U/L 86   AST      0 - 45 U/L 4   ALT      0 - 70 U/L 19   Protein Total      6.8 - 8.8 g/dL 7.7   Albumin      3.4 - 5.0 g/dL 3.2 (L)   Bilirubin Total      0.2 - 1.3 mg/dL 0.3   GFR Estimate      >60 mL/min/1.73m2 19 (L)   WBC      4.0 - 11.0 10e3/uL 5.5   RBC Count      3.80 - 5.90 10e6/uL 2.82 (L)   Hemoglobin      11.7 - 17.7 g/dL 7.8 (L)   Hematocrit      35.0 - 53.0 % 25.6 (L)   MCV      78 - 100 fL 91   MCH      26.5 - 33.0 pg 27.7   MCHC      31.5 - 36.5 g/dL 30.5 (L)   RDW      10.0 - 15.0 % 14.8   Platelet Count      150 - 450 10e3/uL 253   T4 Free      ng/dL 1.03   TSH      0.40 - 4.00 mU/L 7.46 (H)   Findings:   Upright, PA and lateral views of the chest. Midline trachea. Cardiac  silhouette is borderline enlarged. No focal airspace consolidation or  pneumothorax. Small bilateral pleural effusions. Osseous structures  appear demineralized. Upper abdomen is unremarkable.  Impression: Small bilateral pleural effusions. No focal airspace  Consolidation.  Called Alessio to discuss results in detail. Reported that upon discussion with Nephrology-the recommendation is for Alessio to present to the ER for evaluation of low HGB, elevated creatinine, BUN. Talked with daughter Jaleesa per Alessio's request, she will bring Alessio to the ER. I also called and spoke with ER physician regarding potential admission/presenting symptoms/recent history.   Renu Lantigua, APRN, CNP

## 2021-08-17 ENCOUNTER — TELEPHONE (OUTPATIENT)
Dept: ENDOCRINOLOGY | Facility: CLINIC | Age: 86
End: 2021-08-17

## 2021-08-17 ENCOUNTER — TELEPHONE (OUTPATIENT)
Dept: NEPHROLOGY | Facility: CLINIC | Age: 86
End: 2021-08-17

## 2021-08-17 ENCOUNTER — HOSPITAL ENCOUNTER (INPATIENT)
Facility: CLINIC | Age: 86
LOS: 16 days | Discharge: NURSING, REHAB OR LONG-TERM CARE | DRG: 683 | End: 2021-09-02
Attending: EMERGENCY MEDICINE | Admitting: INTERNAL MEDICINE
Payer: MEDICARE

## 2021-08-17 DIAGNOSIS — Z11.52 ENCOUNTER FOR SCREENING LABORATORY TESTING FOR COVID-19 VIRUS: ICD-10-CM

## 2021-08-17 DIAGNOSIS — E11.8 TYPE 2 DIABETES MELLITUS WITH COMPLICATION (H): Primary | ICD-10-CM

## 2021-08-17 DIAGNOSIS — I10 BENIGN ESSENTIAL HYPERTENSION: ICD-10-CM

## 2021-08-17 DIAGNOSIS — N18.4 CKD (CHRONIC KIDNEY DISEASE) STAGE 4, GFR 15-29 ML/MIN (H): ICD-10-CM

## 2021-08-17 DIAGNOSIS — E87.5 HYPERKALEMIA: ICD-10-CM

## 2021-08-17 DIAGNOSIS — I50.21 ACUTE SYSTOLIC CONGESTIVE HEART FAILURE (H): ICD-10-CM

## 2021-08-17 LAB
ALBUMIN SERPL-MCNC: 2.9 G/DL (ref 3.4–5)
ALP SERPL-CCNC: 82 U/L (ref 40–150)
ALT SERPL W P-5'-P-CCNC: 20 U/L (ref 0–70)
ANION GAP SERPL CALCULATED.3IONS-SCNC: 3 MMOL/L (ref 3–14)
AST SERPL W P-5'-P-CCNC: 10 U/L (ref 0–45)
BASOPHILS # BLD AUTO: 0 10E3/UL (ref 0–0.2)
BASOPHILS NFR BLD AUTO: 0 %
BILIRUB SERPL-MCNC: 0.3 MG/DL (ref 0.2–1.3)
BUN SERPL-MCNC: 75 MG/DL (ref 7–30)
CALCIUM SERPL-MCNC: 8.6 MG/DL (ref 8.5–10.1)
CHLORIDE BLD-SCNC: 112 MMOL/L (ref 94–109)
CO2 SERPL-SCNC: 22 MMOL/L (ref 20–32)
CREAT SERPL-MCNC: 2.99 MG/DL (ref 0.52–1.25)
EOSINOPHIL # BLD AUTO: 0.3 10E3/UL (ref 0–0.7)
EOSINOPHIL NFR BLD AUTO: 5 %
ERYTHROCYTE [DISTWIDTH] IN BLOOD BY AUTOMATED COUNT: 15 % (ref 10–15)
GFR SERPL CREATININE-BSD FRML MDRD: 18 ML/MIN/1.73M2
GLUCOSE BLD-MCNC: 109 MG/DL (ref 70–99)
GLUCOSE BLDC GLUCOMTR-MCNC: 142 MG/DL (ref 70–99)
HCT VFR BLD AUTO: 24.9 % (ref 35–53)
HGB BLD-MCNC: 7.5 G/DL (ref 11.7–17.7)
HOLD SPECIMEN: NORMAL
IMM GRANULOCYTES # BLD: 0 10E3/UL
IMM GRANULOCYTES NFR BLD: 1 %
LYMPHOCYTES # BLD AUTO: 0.6 10E3/UL (ref 0.8–5.3)
LYMPHOCYTES NFR BLD AUTO: 12 %
MCH RBC QN AUTO: 27.3 PG (ref 26.5–33)
MCHC RBC AUTO-ENTMCNC: 30.1 G/DL (ref 31.5–36.5)
MCV RBC AUTO: 91 FL (ref 78–100)
MONOCYTES # BLD AUTO: 0.6 10E3/UL (ref 0–1.3)
MONOCYTES NFR BLD AUTO: 11 %
NEUTROPHILS # BLD AUTO: 3.7 10E3/UL (ref 1.6–8.3)
NEUTROPHILS NFR BLD AUTO: 71 %
NRBC # BLD AUTO: 0 10E3/UL
NRBC BLD AUTO-RTO: 0 /100
NT-PROBNP SERPL-MCNC: ABNORMAL PG/ML (ref 0–1800)
PLATELET # BLD AUTO: 247 10E3/UL (ref 150–450)
POTASSIUM BLD-SCNC: 5.5 MMOL/L (ref 3.4–5.3)
PROT SERPL-MCNC: 7.6 G/DL (ref 6.8–8.8)
RBC # BLD AUTO: 2.75 10E6/UL (ref 3.8–5.9)
SARS-COV-2 RNA RESP QL NAA+PROBE: NEGATIVE
SODIUM SERPL-SCNC: 137 MMOL/L (ref 133–144)
TROPONIN I SERPL-MCNC: <0.015 UG/L (ref 0–0.04)
WBC # BLD AUTO: 5.1 10E3/UL (ref 4–11)

## 2021-08-17 PROCEDURE — 99223 1ST HOSP IP/OBS HIGH 75: CPT | Mod: AI | Performed by: INTERNAL MEDICINE

## 2021-08-17 PROCEDURE — 85004 AUTOMATED DIFF WBC COUNT: CPT | Performed by: EMERGENCY MEDICINE

## 2021-08-17 PROCEDURE — U0003 INFECTIOUS AGENT DETECTION BY NUCLEIC ACID (DNA OR RNA); SEVERE ACUTE RESPIRATORY SYNDROME CORONAVIRUS 2 (SARS-COV-2) (CORONAVIRUS DISEASE [COVID-19]), AMPLIFIED PROBE TECHNIQUE, MAKING USE OF HIGH THROUGHPUT TECHNOLOGIES AS DESCRIBED BY CMS-2020-01-R: HCPCS | Performed by: EMERGENCY MEDICINE

## 2021-08-17 PROCEDURE — 99285 EMERGENCY DEPT VISIT HI MDM: CPT | Mod: 25 | Performed by: EMERGENCY MEDICINE

## 2021-08-17 PROCEDURE — C9803 HOPD COVID-19 SPEC COLLECT: HCPCS | Performed by: EMERGENCY MEDICINE

## 2021-08-17 PROCEDURE — 84484 ASSAY OF TROPONIN QUANT: CPT | Performed by: EMERGENCY MEDICINE

## 2021-08-17 PROCEDURE — 36415 COLL VENOUS BLD VENIPUNCTURE: CPT | Performed by: EMERGENCY MEDICINE

## 2021-08-17 PROCEDURE — 120N000002 HC R&B MED SURG/OB UMMC

## 2021-08-17 PROCEDURE — 250N000011 HC RX IP 250 OP 636: Performed by: STUDENT IN AN ORGANIZED HEALTH CARE EDUCATION/TRAINING PROGRAM

## 2021-08-17 PROCEDURE — 82040 ASSAY OF SERUM ALBUMIN: CPT | Performed by: EMERGENCY MEDICINE

## 2021-08-17 PROCEDURE — 82374 ASSAY BLOOD CARBON DIOXIDE: CPT | Performed by: EMERGENCY MEDICINE

## 2021-08-17 PROCEDURE — 93005 ELECTROCARDIOGRAM TRACING: CPT | Performed by: EMERGENCY MEDICINE

## 2021-08-17 PROCEDURE — 96374 THER/PROPH/DIAG INJ IV PUSH: CPT | Performed by: EMERGENCY MEDICINE

## 2021-08-17 PROCEDURE — 83880 ASSAY OF NATRIURETIC PEPTIDE: CPT | Performed by: EMERGENCY MEDICINE

## 2021-08-17 PROCEDURE — 93010 ELECTROCARDIOGRAM REPORT: CPT | Performed by: EMERGENCY MEDICINE

## 2021-08-17 PROCEDURE — 84466 ASSAY OF TRANSFERRIN: CPT

## 2021-08-17 PROCEDURE — 250N000013 HC RX MED GY IP 250 OP 250 PS 637: Performed by: STUDENT IN AN ORGANIZED HEALTH CARE EDUCATION/TRAINING PROGRAM

## 2021-08-17 RX ORDER — FUROSEMIDE 10 MG/ML
40 INJECTION INTRAMUSCULAR; INTRAVENOUS ONCE
Status: COMPLETED | OUTPATIENT
Start: 2021-08-17 | End: 2021-08-17

## 2021-08-17 RX ORDER — CARVEDILOL 25 MG/1
25 TABLET ORAL 2 TIMES DAILY WITH MEALS
Status: DISCONTINUED | OUTPATIENT
Start: 2021-08-17 | End: 2021-08-25

## 2021-08-17 RX ORDER — HYDRALAZINE HYDROCHLORIDE 100 MG/1
100 TABLET, FILM COATED ORAL 3 TIMES DAILY
Status: DISCONTINUED | OUTPATIENT
Start: 2021-08-17 | End: 2021-08-25

## 2021-08-17 RX ADMIN — CARVEDILOL 25 MG: 25 TABLET, FILM COATED ORAL at 21:16

## 2021-08-17 RX ADMIN — FUROSEMIDE 40 MG: 10 INJECTION, SOLUTION INTRAVENOUS at 18:52

## 2021-08-17 RX ADMIN — HYDRALAZINE HYDROCHLORIDE 100 MG: 100 TABLET ORAL at 22:11

## 2021-08-17 ASSESSMENT — ENCOUNTER SYMPTOMS
FATIGUE: 1
SHORTNESS OF BREATH: 1
FEVER: 0
CHILLS: 0

## 2021-08-17 ASSESSMENT — MIFFLIN-ST. JEOR: SCORE: 1500.92

## 2021-08-17 NOTE — TELEPHONE ENCOUNTER
Writer spoke to Renu PRINCE NP. Renu saw patient yesterday and wanted to inform Dr. Kim of some of his abnormal labs and reports that he was not feeling too well, lost weight and feeling tired.      BP elevated 191/82 P 67  No N/VD    Tsh 7.46  Hgb 7.5    Results for SHANT CHAVES (MRN [id number]) as of 8/17/2021 09:03   Ref. Range 8/16/2021 17:54   Sodium Latest Ref Range: 133 - 144 mmol/L 136   Potassium Latest Ref Range: 3.4 - 5.3 mmol/L 5.7 (H)   Chloride Latest Ref Range: 94 - 109 mmol/L 113 (H)   Carbon Dioxide Latest Ref Range: 20 - 32 mmol/L 22   Urea Nitrogen Latest Ref Range: 7 - 30 mg/dL 70 (H)   Creatinine Latest Ref Range: 0.52 - 1.25 mg/dL 2.89 (H)   GFR Estimate Latest Ref Range: >60 mL/min/1.73m2 19 (L)   Calcium Latest Ref Range: 8.5 - 10.1 mg/dL 9.0   Anion Gap Latest Ref Range: 3 - 14 mmol/L 1 (L)   Albumin Latest Ref Range: 3.4 - 5.0 g/dL 3.2 (L)   Protein Total Latest Ref Range: 6.8 - 8.8 g/dL 7.7   Bilirubin Total Latest Ref Range: 0.2 - 1.3 mg/dL 0.3   Alkaline Phosphatase Latest Ref Range: 40 - 150 U/L 86   ALT Latest Ref Range: 0 - 70 U/L 19   AST Latest Ref Range: 0 - 45 U/L 4     Will route to Dr. Kim for further recommendations.  Jimena Potter, SAL  Nephrology  [phone]

## 2021-08-17 NOTE — TELEPHONE ENCOUNTER
----- Message from Jose Amin MD sent at 8/17/2021 12:59 PM CDT -----  Please offer patient an earlier appointment with me.  Can use [facility] spot if needed   ----- Message -----  From: Renu Lantigua NP  Sent: 8/17/2021   7:14 AM CDT  To: Jose Amin MD, Sandhya Mena, AMNA NealAlessio has not been feeling well, he is fatigued and short of breath.   Currently, his TSH is 7.46.   Please review, reach out to patient to schedule and respond back with recommendations as able. Thank you for your collaboration.   Renu Lantigua, ESTEPHANIE, CNP

## 2021-08-17 NOTE — ED PROVIDER NOTES
ED Provider Note  [facility] EMERGENCY DEPARTMENT ([facility])  August 17, 2021    History     Chief Complaint   Patient presents with     Abnormal Labs     HPI  Farzad East is a 86 year old adult with a past medical history including DM 2 stage IV CKD, atrial flutter, diabetic peripheral neuropathy, hyperkalemia who presents to the Emergency Department for evaluation of abnormal labs associated with weakness for the past 2 months. He states that he has been complaining of shortness of breath, and has a right lower leg wound that is being monitored that has been present for the past 2 months, however he has no other complaints. Daughter states that while he does not complain about weakness/general fatigue, has been weak and sleeping a lot over the past 2 months, stating that he spends most of the time sleeping now.     Pt is extremely hard of hearing, so history was obtained from his daughter while pt was able to respond to ROS and physical exam requests. He denied headache, dizziness, chest pain, and abdominal pain.    Per triage /60 on arrival, patient was referred to the ED by primary MD for abnormal labs after being seen in clinic for fatigue and mild shortness of breath yesterday.  Potassium of 5.7 (NP unsure if hemolysis), Cr 2.9 (baseline 2.32), and hemoglobin 7.8 (recent 8.8).  Per chart review patient had a chest x-ray yesterday, results below. Per chart review, he has a history of lower extremity edema, venous insufficiency, and skin breakdown. Amlodipine was stopped to potentially help this and wound care is following the skin breakdown.     XR CHEST 2 VW, 8/16/2021 6:16 PM  Impression:   Small bilateral pleural effusions. No focal airspace  consolidation.    Past Medical History  Past Medical History:   Diagnosis Date     Atrial flutter (H)      Choroidal nevus of left eye      CKD (chronic kidney disease) stage 3, GFR 30-59 ml/min       Diabetes mellitus (H)      Diabetic peripheral neuropathy (H)      Hypertension      Microalbuminuria      Overweight(278.02)      Rectal-type adenocarcinoma (H)      Retinopathies, diabetic      Vitreous detachment of both eyes      Past Surgical History:   Procedure Laterality Date     CATARACT IOL, RT/LT Bilateral 2005     left hip replacement       OPEN REDUCTION INTERNAL FIXATION FEMUR PROXIMAL  1/24/2014    Procedure: OPEN REDUCTION INTERNAL FIXATION FEMUR PROXIMAL;  Periprosthetic Fracture, ORIF Left Femur;  Surgeon: Andi Garcia MD;  Location: [facility] OR     aspirin (ASA) 81 MG EC tablet  blood glucose (NO BRAND SPECIFIED) lancets standard  blood glucose (ONETOUCH VERIO IQ) test strip  blood glucose monitoring (ONETOUCH VERIO IQ SYSTEM) meter device kit  carvedilol (COREG) 25 MG tablet  Cholecalciferol (VITAMIN D-3 PO)  COMPRESSION STOCKINGS  finasteride (PROSCAR) 5 MG tablet  glipiZIDE (GLUCOTROL XL) 5 MG 24 hr tablet  hydrALAZINE (APRESOLINE) 50 MG tablet  hydrochlorothiazide (HYDRODIURIL) 12.5 MG tablet  insulin glargine (LANTUS SOLOSTAR) 100 UNIT/ML pen  insulin pen needle (31G X 5 MM) 31G X 5 MM miscellaneous  multivitamin w/minerals (MULTI-VITAMIN) tablet  sitagliptin (JANUVIA) 50 MG tablet  tamsulosin (FLOMAX) 0.4 MG capsule      Allergies   Allergen Reactions     Sulfa Drugs      Unknown reaction. Occurred during childhood. Has not taken Sulfa medications since allergic response.      Social History   Social History     Tobacco Use     Smoking status: Never Smoker     Smokeless tobacco: Never Used   Substance Use Topics     Alcohol use: Yes     Alcohol/week: 2.5 - 3.3 standard drinks     Types: 3 - 4 Standard drinks or equivalent per week     Comment: 3 martinis per week     Drug use: No      Past medical history and social history were reviewed with the patient. Additional pertinent items: None      Review of Systems  A complete review of systems was performed with pertinent positives and  "negatives noted in the HPI, and all other systems negative.    Physical Exam   BP: (!) 161/60  Pulse: 54  Temp: 97.7  F (36.5  C)  Resp: 16  Height: 180.3 cm (5' 11\")  Weight: 79.4 kg (175 lb)  SpO2: 98 %  Physical Exam  HENT:      Head: Normocephalic and atraumatic.      Ears:      Comments: Severely hard of hearing     Nose: Nose normal.   Cardiovascular:      Rate and Rhythm: Regular rhythm.      Heart sounds: Normal heart sounds.   Pulmonary:      Effort: Pulmonary effort is normal.      Breath sounds: Normal breath sounds.   Abdominal:      General: Bowel sounds are normal.      Palpations: Abdomen is soft.      Tenderness: There is no abdominal tenderness.   Musculoskeletal:         General: No tenderness. Normal range of motion.      Cervical back: Normal range of motion. No tenderness.      Left lower leg: Edema (3+ pitting edema) present.   Skin:     General: Skin is warm and dry.      Findings: Lesion (Right shin lesion that is actively bleeding) present.   Neurological:      Mental Status: He is alert.   Psychiatric:         Mood and Affect: Mood normal.       ED Course      Procedures       The medical record was reviewed and interpreted.  Current labs reviewed and interpreted.  Previous labs reviewed and interpreted.       Comprehensive metabolic panel     Status: Abnormal   Result Value Ref Range    Sodium 137 133 - 144 mmol/L    Potassium 5.5 (H) 3.4 - 5.3 mmol/L    Chloride 112 (H) 94 - 109 mmol/L    Carbon Dioxide (CO2) 22 20 - 32 mmol/L    Anion Gap 3 3 - 14 mmol/L    Urea Nitrogen 75 (H) 7 - 30 mg/dL    Creatinine 2.99 (H) 0.52 - 1.25 mg/dL    Calcium 8.6 8.5 - 10.1 mg/dL    Glucose 109 (H) 70 - 99 mg/dL    Alkaline Phosphatase 82 40 - 150 U/L    AST 10 0 - 45 U/L    ALT 20 0 - 70 U/L    Protein Total 7.6 6.8 - 8.8 g/dL    Albumin 2.9 (L) 3.4 - 5.0 g/dL    Bilirubin Total 0.3 0.2 - 1.3 mg/dL    GFR Estimate 18 (L) >60 mL/min/1.73m2    Narrative    The sex of this patient cannot be reliably " determined based on discrepancies in demographics (legal sex, sex assigned at birth, gender identity).  Both male and female reference ranges are provided where applicable.  Careful evaluation of the patient s results as compared to the gender specific reference intervals is required in this setting.    Troponin I     Status: Normal   Result Value Ref Range    Troponin I <0.015 0.000 - 0.045 ug/L   Nt probnp inpatient (BNP)     Status: Abnormal   Result Value Ref Range    N terminal Pro BNP Inpatient 29,932 (H) 0-1,800 pg/mL   CBC with platelets and differential     Status: Abnormal   Result Value Ref Range    WBC Count 5.1 4.0 - 11.0 10e3/uL    RBC Count 2.75 (L) 3.80 - 5.90 10e6/uL    Hemoglobin 7.5 (L) 11.7 - 17.7 g/dL    Hematocrit 24.9 (L) 35.0 - 53.0 %    MCV 91 78 - 100 fL    MCH 27.3 26.5 - 33.0 pg    MCHC 30.1 (L) 31.5 - 36.5 g/dL    RDW 15.0 10.0 - 15.0 %    Platelet Count 247 150 - 450 10e3/uL    % Neutrophils 71 %    % Lymphocytes 12 %    % Monocytes 11 %    % Eosinophils 5 %    % Basophils 0 %    % Immature Granulocytes 1 %    NRBCs per 100 WBC 0 <1 /100    Absolute Neutrophils 3.7 1.6 - 8.3 10e3/uL    Absolute Lymphocytes 0.6 (L) 0.8 - 5.3 10e3/uL    Absolute Monocytes 0.6 0.0 - 1.3 10e3/uL    Absolute Eosinophils 0.3 0.0 - 0.7 10e3/uL    Absolute Basophils 0.0 0.0 - 0.2 10e3/uL    Absolute Immature Granulocytes 0.0 <=0.0 10e3/uL    Absolute NRBCs 0.0 10e3/uL    Narrative    The sex of this patient cannot be reliably determined based on discrepancies in demographics (legal sex, sex assigned at birth, gender identity).  Both male and female reference ranges are provided where applicable.  Careful evaluation of the patient s results as compared to the gender specific reference intervals is required in this setting.    EKG 12-lead, tracing only     Status: None (Preliminary result)   Result Value Ref Range    Systolic Blood Pressure  mmHg    Diastolic Blood Pressure  mmHg    Ventricular Rate 52 BPM     Atrial Rate 52 BPM    OR Interval 200 ms    QRS Duration 84 ms     ms    QTc 450 ms    P Axis 49 degrees    R AXIS -9 degrees    T Axis 6 degrees    Interpretation ECG Sinus bradycardia  Otherwise normal ECG        Medications - No data to display     Assessments & Plan (with Medical Decision Making)   86 year old adult with a past medical history including DM 2 stage IV CKD, atrial flutter, diabetic peripheral neuropathy, hyperkalemia who presents to the Emergency Department for evaluation of abnormal labs associated with weakness for the past 2 months. Differential includes heart failure, MI, GI bleed, pleural effusion, and other etiologies. Labs notable for creatinine of 2.99, BNP of 29,932, BUN of 75, and Hgb of 7.5. Pt will be admitted to the medicine service for further workup and treatment.     I have reviewed the nursing notes. I have reviewed the findings, diagnosis, plan and need for follow up with the patient.    New Prescriptions    No medications on file       Final diagnoses:   Acute systolic congestive heart failure (H)     --    ED Attending Physician Attestation    I Zander Bell MD, cared for this patient with the Medical Student. I performed, or re-performed, the physical exam and medical decision-making. I have verified the accuracy of all the medical student findings and documentation above, and have edited as necessary.    Summary of HPI, PE, ED Course   Patient is a 86 year old adult evaluated in the emergency department for fatigue. Exam notable for pedal edema and some rhonchi in his lung bases.  His oxygen saturations were 98% with a respiratory rate of 16.  ED course notable for continued abnormal labs with a potassium of 5.5 and creatinine of 2.99.  He had a normal EKG and troponin.  His BNP was 29,932.  Hemoglobin continued to trend down to 7.5. After the completion of care in the emergency department, the patient was admitted to inpatient.    Critical Care &  Procedures  Not applicable.    Medical Decision Making  The medical record was reviewed and interpreted.  Current labs reviewed and interpreted.  Previous labs reviewed and interpreted.  Past images reviewed and interpreted: I interpreted the chest x-ray performed yesterday and it shows small bilateral pleural effusions  EKG reviewed and interpreted: EKG was read by me.  It shows a sinus bradycardia with a rate of 52.  There are no acute ST-T changes.    Patient with shortness of breath with bilateral pleural effusions and an elevated BNP who would likely benefit from diuresis, however he has worsening renal function.  He will be admitted to the hospital for mild diuresis while following his creatinine closely.      Zander Bell MD  Emergency Medicine     --  Jorge Sales, MS4  [employer]  [facility] EMERGENCY DEPARTMENT  8/17/2021     Zander Bell MD  08/19/21 1224

## 2021-08-17 NOTE — ED NOTES
Bed: ED17  Expected date:   Expected time:   Means of arrival:   Comments:  Alessio East MRN [id number], 87 yo M CKD, DM,HTN, seen in clinic yesterday for worsening fatigue and SOB, labs drawn and seen this morning which reveal potassium of 5.7 (NP unsure if hemolysis), Cr 2.89 (baseline 2.32), and hemoglobin 7.8 (recent 8.8) with CXR yesterday showing small pleural effusions. Coming by car with daughter from [facility] [place]

## 2021-08-17 NOTE — PHARMACY-ADMISSION MEDICATION HISTORY
Admission Medication History Completed by Pharmacy    See [facility] Admission Navigator for allergy information, preferred outpatient pharmacy, prior to admission medications and immunization status.     Medication History Sources:     Patient    Patient's Son (Andi)    Dispense History    Changes made to PTA medication list (reason):    Added: None    Deleted: None    Changed: None    Additional Information:    Patient states that all daily medications were taken this morning, and he takes 10 units of insulin glargine 100unit/mL at bedtime.    Patient's son verified medications with med list at home but could not verify strength of vitamin D3.      Prior to Admission medications    Medication Sig Last Dose Taking? Auth Provider   aspirin (ASA) 81 MG EC tablet Take 1 tablet by mouth daily  8/17/2021 at am Yes Reported, Patient   carvedilol (COREG) 25 MG tablet Take 1 tablet (25 mg) by mouth 2 times daily (with meals) 8/17/2021 at am Yes Eddie Levi MD   Cholecalciferol (VITAMIN D-3 PO) Take 1 Dose by mouth daily  8/17/2021 at am Yes Reported, Patient   finasteride (PROSCAR) 5 MG tablet Take 1 tablet by mouth daily 8/17/2021 at am Yes Reported, Patient   glipiZIDE (GLUCOTROL XL) 5 MG 24 hr tablet Take 1 tablet (5 mg) by mouth daily 8/17/2021 at am Yes Jose Amin MD   hydrALAZINE (APRESOLINE) 50 MG tablet Take 2 tablets (100 mg) by mouth 3 times daily 8/17/2021 at am Yes Eddie Levi MD   hydrochlorothiazide (HYDRODIURIL) 12.5 MG tablet Take 1 tablet (12.5 mg) by mouth every morning 8/17/2021 at am Yes Eddie Levi MD   insulin glargine (LANTUS SOLOSTAR) 100 UNIT/ML pen 10 units at bedtime.  Increase by 1 unit every 3-5 days until fasting BG is <150 most mornings. 8/16/2021 at pm Yes Sandhya Mena PA-C   multivitamin w/minerals (MULTI-VITAMIN) tablet Take 1 tablet by mouth daily 8/17/2021 at am Yes Reported, Patient   sitagliptin (JANUVIA) 50 MG tablet Take 1 tablet (50 mg) by mouth  daily 8/17/2021 at am Yes Renu Lantigua NP   tamsulosin (FLOMAX) 0.4 MG capsule Take 1 capsule (0.4 mg) by mouth daily Advise clinic if causes lightheadedness. 8/17/2021 at am Yes Sandhya Mena PA-C   blood glucose (NO BRAND SPECIFIED) lancets standard Use to test blood sugar 1 times daily or as directed. Use brand compatible with patients insurance and device.   Jose Amin MD   blood glucose (ONETOUCH VERIO IQ) test strip Use to test blood sugar 1 time daily or as directed.   Jose Amin MD   blood glucose monitoring (ONETOUCH VERIO IQ SYSTEM) meter device kit Use to test blood sugar daily   Marcus Augustin MD   COMPRESSION STOCKINGS 1 each daily Measure and fit.  Style and color per patient preference.  Doff n Wilfrid per patient need.   Eddie Levi MD   insulin pen needle (31G X 5 MM) 31G X 5 MM miscellaneous Use1 pen needles daily or as directed.   Sandhya Mena PA-C       Date completed: 08/17/21    Medication history completed by: Celestina Mckeon

## 2021-08-17 NOTE — H&P
[facility], [place]     History and Physical - [place] [facility] Service        Date of Admission: 8/17/2021      Assessment & Plan  86M with history of T2DM, CKD4, and hypertension who presents with progressive fatigue over a few months and found to be volume overloaded concerning for progression of CKD vs CHF.    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 Difficult to assess orthopnea because patient never sleeps flat. CXR does not appear overtly edematous. Last echo in 3/2021 showed mild diastolic dysfunction but normal systolic function and EF. He does have severe renal insufficiency and his cardiologist's notes suggest concern that this is leading to difficult-to-control hypertension that could certainly have worsened cardiac function. Alternatively, his presentation could be related to progression of kidney dysfunction.  - 40 IV lasix x1 tonight, will reassess dosing based on response tomorrow  - TTE ordered for the AM  - Strict I/O, daily weights  - 2 g Na diet, 2 L fluid restriction  - Repeat BMP in AM    LE wound  Small area with red, friable base in the center of his right shin in the setting of severe stasis skin changes. Base appears clean and not currently with concern for infection.  - WOCN consult    Thyroid nodule  Subclinical hypothyroidism  He has already been referred to an endocrinologist to evaluate the nodule further. TSH is elevated at 7.46, though FT4 is wnl.  - Consider further workup inpatient vs close outpatient follow up    Type 2 DM  Poorly controlled, last A1c 9.5. Currently managed with januvia, glipizide, and recently-added bedtime glargine.  - Fingersticks, hypoglycemia protocol, sliding scale insulin    Hypertension  Has been difficult to manage as an outpatient, follows with cardiology for this.  - Continue outpatient regimen: hydralazine 100  mg TID, hydrochlorothiazide 12.5 mg daily, and coreg 25 mg BID  - Consider IV hydralazine rather than labetalol if he becomes acutely hypertensive overnight given his bradycardia    BPH  - Continue pta flomax and finasteride    Osteoporosis  - Continue pta vit D      Diet: Cardiac diet  DVT Prophylaxis: SubQ heparin  Code Status: DNR/DNI, discussed on admission with pt and 2 children, he has previously filled out medical directive paperwork stating this wish    Disposition:  Expected discharge: 4 - 7 days, recommended to prior arrangement vs TCU pending PT/OT evals once renal function improved and fluid status stablilized.      This patient was staffed with attending physician, Dr. Guallpa.    Dilma Doshi MD  Internal Medicine, PGY-3  [place] [facility] Service  Pager: [phone]  [facility]   Please see sign in/sign out for up to date coverage information    _____________________________________________________________________    Chief Complaint  Fatigue and abnormal labs    History of Present Illness  Farzad East is a 86-year-old man with history of T2DM, CKD4, and hypertension who presents at the recommendation of his PCP after he had evaluation of fatigue and was found to have several abnormal lab values.    Patient has been getting increasingly fatigued over the last several months. He notes dyspnea on exertion and complains of general malaise. His children also say that he has not been eating well and has had increasing lower extremity swelling over this time. He went to his PCP yesterday where he had labs checked and he was found to have a BNP of 30 K and hyperkalemia to 5.5 as well as worsening kidney function. He was recommended to present to the ED so he came in.    On assessment here the patient appears comfortable. He denies current complaints other than having to urinate frequently which is normal for him. He does note chronic diarrhea that is relatively unchanged  "from prior. He also notes being cold in the ED, but denies fevers prior. He does have a small wound on his right leg. He says he does not know how it happened just appeared 1 day. It does not hurt him though it did bleed a little today.      10-point ROS is negative except as mentioned in HPI.      Past Medical History  Atrial flutter  Chronic kidney disease stage 3  Type 2 diabetes mellitus  Hypertension  Peripheral neuropathy  Rectal type adenocarcinoma  Femur fracture    Social History  He currently lives alone in an independent senior living facility. His daughter says that facility does have the ability to increase her services.  He has 4 adult children who are overly involved in his care. One of them lives only a few blocks away.  His wife is currently in a memory care unit.    Family History  Family History   Problem Relation Age of Onset     Diabetes Father      Circulatory Father         PAD     Macular Degeneration Father      Arthritis Mother      Amblyopia No family hx of      Retinal detachment No family hx of      Glaucoma No family hx of       Past Surgical History  Past Surgical History:   Procedure Laterality Date     CATARACT IOL, RT/LT Bilateral 2005     left hip replacement       OPEN REDUCTION INTERNAL FIXATION FEMUR PROXIMAL  1/24/2014    Procedure: OPEN REDUCTION INTERNAL FIXATION FEMUR PROXIMAL;  Periprosthetic Fracture, ORIF Left Femur;  Surgeon: Andi Garcia MD;  Location: [facility] OR      Allergies  Allergies   Allergen Reactions     Sulfa Drugs      Unknown reaction. Occurred during childhood. Has not taken Sulfa medications since allergic response.        _____________________________________________________________________    Physical Exam  BP (!) 161/60   Pulse 54   Temp 97.7  F (36.5  C) (Oral)   Resp 16   Ht 1.803 m (5' 11\")   Wt 79.4 kg (175 lb)   SpO2 98%   BMI 24.41 kg/m    175 lbs 0 oz    Constitutional: awake, alert, cooperative, no acute distress, [place]  Eyes: anicteric, " EOMI  ENT: oropharynx clear, MMM  Respiratory: breathing comfortably on room air, clear to auscultation bilaterally, no crackles or wheezing  Cardiovascular:  Bradycardic, regular rhythm, normal S1 and S2, no murmur noted, JVP 9  GI: soft, non-distended, non-tender, BS+  Skin: Extensive venous stasis changes over bilateral lower extremities with significant flaking and dry skin, 4 cm x 4 cm round wound on the right lower extremity with red base and no surrounding erythema or warmth  Musculoskeletal:  3+ lower extremity pitting edema present bilaterally  Neurologic: alert and oriented x3, moving all extremities equally  Neuropsychiatric: calm, normal eye contact, affect normal      Labs and Imaging  All labs and imaging reviewed.

## 2021-08-17 NOTE — ED NOTES
[facility]   ED Nurse to Floor Handoff     Farzad East is a 86 year old adult who speaks English and lives alone,  in a home  They arrived in the ED by car from home    ED Chief Complaint: Abnormal Labs    ED Dx;   Final diagnoses:   Acute systolic congestive heart failure (H)         Needed?: No    Allergies:   Allergies   Allergen Reactions     Sulfa Drugs      Unknown reaction. Occurred during childhood. Has not taken Sulfa medications since allergic response.    .  Past Medical Hx:   Past Medical History:   Diagnosis Date     Atrial flutter (H)      Choroidal nevus of left eye      CKD (chronic kidney disease) stage 3, GFR 30-59 ml/min      Diabetes mellitus (H)      Diabetic peripheral neuropathy (H)      Hypertension      Microalbuminuria      Overweight(278.02)      Rectal-type adenocarcinoma (H)      Retinopathies, diabetic      Vitreous detachment of both eyes       Baseline Mental status: WDL  Current Mental Status changes: at basesline    Infection present or suspected this encounter: no  Sepsis suspected: Yes  Isolation type: No active isolations  Patient tested for COVID 19 prior to admission: YES     Activity level - Baseline/Home:  Independent  Activity Level - Current:   Stand with Assist    Bariatric equipment needed?: No    In the ED these meds were given: Medications - No data to display    Drips running?  No    Home pump  No    Current LDAs  ETT (adult) 8  (Active)   Number of days:        ETT (adult) (Active)   Number of days:        Wound 01/31/14 Left;Posterior Thigh Abrasion(s) (Active)   Number of days: 2755       Wound 09/24/20 Right Ear Other (comment) (Active)   Number of days: 327       Rash 02/08/14 0900 Left ankle (Active)   Number of days: 2747       Incision/Surgical Site 01/24/14 Left;Lateral;Midline Hip (Active)   Number of days: 2762       Labs results:   Labs Ordered and Resulted from Time of ED Arrival Up to the  Time of Departure from the ED   COMPREHENSIVE METABOLIC PANEL - Abnormal; Notable for the following components:       Result Value    Potassium 5.5 (*)     Chloride 112 (*)     Urea Nitrogen 75 (*)     Creatinine 2.99 (*)     Glucose 109 (*)     Albumin 2.9 (*)     GFR Estimate 18 (*)     All other components within normal limits    Narrative:     The sex of this patient cannot be reliably determined based on discrepancies in demographics (legal sex, sex assigned at birth, gender identity).  Both male and female reference ranges are provided where applicable.  Careful evaluation of the patient s results as compared to the gender specific reference intervals is required in this setting.    NT PROBNP INPATIENT - Abnormal; Notable for the following components:    N terminal Pro BNP Inpatient 29,932 (*)     All other components within normal limits   CBC WITH PLATELETS AND DIFFERENTIAL - Abnormal; Notable for the following components:    RBC Count 2.75 (*)     Hemoglobin 7.5 (*)     Hematocrit 24.9 (*)     MCHC 30.1 (*)     Absolute Lymphocytes 0.6 (*)     All other components within normal limits    Narrative:     The sex of this patient cannot be reliably determined based on discrepancies in demographics (legal sex, sex assigned at birth, gender identity).  Both male and female reference ranges are provided where applicable.  Careful evaluation of the patient s results as compared to the gender specific reference intervals is required in this setting.    TROPONIN I - Normal   CBC WITH PLATELETS & DIFFERENTIAL    Narrative:     The following orders were created for panel order CBC with platelets differential.  Procedure                               Abnormality         Status                     ---------                               -----------         ------                     CBC with platelets and d...[[id number]]  Abnormal            Final result                 Please view results for these tests on the  individual orders.   EXTRA BLUE TOP TUBE   EXTRA RED TOP TUBE   EXTRA PURPLE TOP TUBE   EXTRA GREEN TOP (LITHIUM HEPARIN) ON ICE   EXTRA SERUM SEPARATOR TUBE (SST)   EXTRA GREEN TOP (LITHIUM HEPARIN) TUBE   COVID-19 VIRUS (CORONAVIRUS) BY PCR   EXTRA TUBE    Narrative:     The following orders were created for panel order [place] Draw.  Procedure                               Abnormality         Status                     ---------                               -----------         ------                     Extra Blue Top Tube[[id number]]                              Final result               Extra Red Top Tube[[id number]]                               Final result               Extra Serum Separator Tu...[[id number]]                                                 Extra Green Top (Lithium...[[id number]]                                                 Extra Purple Top Tube[[id number]]                            Final result               Extra Green Top (Lithium...[[id number]]                      Final result                 Please view results for these tests on the individual orders.       Imaging Studies:   Recent Results (from the past 24 hour(s))   XR Chest 2 Views    Narrative    Exam: XR CHEST 2 VW, 8/16/2021 6:16 PM    Indication: increasing fatigue and shortness of breath; Other fatigue    Comparison: 1/24/2014 chest x-ray    Findings:   Upright, PA and lateral views of the chest. Midline trachea. Cardiac  silhouette is borderline enlarged. No focal airspace consolidation or  pneumothorax. Small bilateral pleural effusions. Osseous structures  appear demineralized. Upper abdomen is unremarkable.      Impression    Impression: Small bilateral pleural effusions. No focal airspace  consolidation.    I have personally reviewed the examination and initial interpretation  and I agree with the findings.    JAVIER CRUZ MD         SYSTEM ID:  [id number]       Recent vital signs:   BP (!) 161/60   Pulse 54   Temp  "97.7  F (36.5  C) (Oral)   Resp 16   Ht 1.803 m (5' 11\")   Wt 79.4 kg (175 lb)   SpO2 98%   BMI 24.41 kg/m      Guillerm[place] Coma Scale Score: 15 (08/17/21 1139)       Cardiac Rhythm: Normal Sinus  Pt needs tele? No  Skin/wound Issues: None    Code Status: Full Code    Pain control: pt had none    Nausea control: pt had none    Abnormal labs/tests/findings requiring intervention: None    Family present during ED course? Yes   Family Comments/Social Situation comments: None    Tasks needing completion: None    Edith Jones RN  [facility] -- [phone]  [phone]  "

## 2021-08-17 NOTE — ED TRIAGE NOTES
Pt referred by primary MD for abnormal labs after being seen in clinic for fatigue and mild shortness of breath. Potassium of 5.7 (NP unsure if hemolysis), Cr 2.89 (baseline 2.32), and hemoglobin 7.8 (recent 8.8) with CXR yesterday showing small pleural effusions.

## 2021-08-17 NOTE — TELEPHONE ENCOUNTER
[facility] Call Center    Phone Message    May a detailed message be left on voicemail: yes     Reason for Call: Other: Renu Lantigua NP calling with [facility] looking to speak with Dr. Kim and care team in regards to patient. Looking to discuss abnormal labs specifically elevated potassium and creatinine.     Please advise and call Renu Lantigua back at your earliest convenience     Action Taken: Other:  MEDICINE RENAL     Travel Screening: Not Applicable

## 2021-08-18 ENCOUNTER — APPOINTMENT (OUTPATIENT)
Dept: PHYSICAL THERAPY | Facility: CLINIC | Age: 86
DRG: 683 | End: 2021-08-18
Attending: STUDENT IN AN ORGANIZED HEALTH CARE EDUCATION/TRAINING PROGRAM
Payer: MEDICARE

## 2021-08-18 ENCOUNTER — APPOINTMENT (OUTPATIENT)
Dept: CARDIOLOGY | Facility: CLINIC | Age: 86
DRG: 683 | End: 2021-08-18
Attending: STUDENT IN AN ORGANIZED HEALTH CARE EDUCATION/TRAINING PROGRAM
Payer: MEDICARE

## 2021-08-18 ENCOUNTER — APPOINTMENT (OUTPATIENT)
Dept: OCCUPATIONAL THERAPY | Facility: CLINIC | Age: 86
DRG: 683 | End: 2021-08-18
Attending: STUDENT IN AN ORGANIZED HEALTH CARE EDUCATION/TRAINING PROGRAM
Payer: MEDICARE

## 2021-08-18 ENCOUNTER — APPOINTMENT (OUTPATIENT)
Dept: ULTRASOUND IMAGING | Facility: CLINIC | Age: 86
DRG: 683 | End: 2021-08-18
Payer: MEDICARE

## 2021-08-18 LAB
ALBUMIN UR-MCNC: 30 MG/DL
ANION GAP SERPL CALCULATED.3IONS-SCNC: 4 MMOL/L (ref 3–14)
ANION GAP SERPL CALCULATED.3IONS-SCNC: 5 MMOL/L (ref 3–14)
APPEARANCE UR: CLEAR
ATRIAL RATE - MUSE: 52 BPM
BASOPHILS # BLD AUTO: 0 10E3/UL (ref 0–0.2)
BASOPHILS NFR BLD AUTO: 0 %
BILIRUB UR QL STRIP: NEGATIVE
BUN SERPL-MCNC: 70 MG/DL (ref 7–30)
BUN SERPL-MCNC: 71 MG/DL (ref 7–30)
CALCIUM SERPL-MCNC: 8.4 MG/DL (ref 8.5–10.1)
CALCIUM SERPL-MCNC: 8.5 MG/DL (ref 8.5–10.1)
CHLORIDE BLD-SCNC: 109 MMOL/L (ref 94–109)
CHLORIDE BLD-SCNC: 112 MMOL/L (ref 94–109)
CO2 SERPL-SCNC: 23 MMOL/L (ref 20–32)
CO2 SERPL-SCNC: 24 MMOL/L (ref 20–32)
COLOR UR AUTO: ABNORMAL
CREAT SERPL-MCNC: 2.98 MG/DL (ref 0.52–1.25)
CREAT SERPL-MCNC: 2.99 MG/DL (ref 0.52–1.25)
CREAT UR-MCNC: 32 MG/DL
DIASTOLIC BLOOD PRESSURE - MUSE: NORMAL MMHG
EOSINOPHIL # BLD AUTO: 0.2 10E3/UL (ref 0–0.7)
EOSINOPHIL NFR BLD AUTO: 4 %
ERYTHROCYTE [DISTWIDTH] IN BLOOD BY AUTOMATED COUNT: 15.1 % (ref 10–15)
ERYTHROCYTE [DISTWIDTH] IN BLOOD BY AUTOMATED COUNT: 15.1 % (ref 10–15)
GFR SERPL CREATININE-BSD FRML MDRD: 18 ML/MIN/1.73M2
GFR SERPL CREATININE-BSD FRML MDRD: 18 ML/MIN/1.73M2
GLUCOSE BLD-MCNC: 127 MG/DL (ref 70–99)
GLUCOSE BLD-MCNC: 334 MG/DL (ref 70–99)
GLUCOSE BLDC GLUCOMTR-MCNC: 113 MG/DL (ref 70–99)
GLUCOSE BLDC GLUCOMTR-MCNC: 136 MG/DL (ref 70–99)
GLUCOSE BLDC GLUCOMTR-MCNC: 262 MG/DL (ref 70–99)
GLUCOSE BLDC GLUCOMTR-MCNC: 268 MG/DL (ref 70–99)
GLUCOSE BLDC GLUCOMTR-MCNC: 284 MG/DL (ref 70–99)
GLUCOSE UR STRIP-MCNC: NEGATIVE MG/DL
HCT VFR BLD AUTO: 24.1 % (ref 35–53)
HCT VFR BLD AUTO: 26.3 % (ref 35–53)
HGB BLD-MCNC: 7.1 G/DL (ref 11.7–17.7)
HGB BLD-MCNC: 7.8 G/DL (ref 11.7–17.7)
HGB UR QL STRIP: NEGATIVE
HOLD SPECIMEN: NORMAL
HYALINE CASTS: 1 /LPF
IMM GRANULOCYTES # BLD: 0 10E3/UL
IMM GRANULOCYTES NFR BLD: 0 %
INTERPRETATION ECG - MUSE: NORMAL
IRON SATN MFR SERPL: 9 % (ref 15–46)
IRON SERPL-MCNC: 27 UG/DL (ref 35–180)
KETONES UR STRIP-MCNC: NEGATIVE MG/DL
LDH SERPL L TO P-CCNC: 150 U/L (ref 81–234)
LEUKOCYTE ESTERASE UR QL STRIP: NEGATIVE
LVEF ECHO: NORMAL
LYMPHOCYTES # BLD AUTO: 0.4 10E3/UL (ref 0.8–5.3)
LYMPHOCYTES NFR BLD AUTO: 8 %
MAGNESIUM SERPL-MCNC: 2.1 MG/DL (ref 1.6–2.3)
MCH RBC QN AUTO: 26.9 PG (ref 26.5–33)
MCH RBC QN AUTO: 27.2 PG (ref 26.5–33)
MCHC RBC AUTO-ENTMCNC: 29.5 G/DL (ref 31.5–36.5)
MCHC RBC AUTO-ENTMCNC: 29.7 G/DL (ref 31.5–36.5)
MCV RBC AUTO: 91 FL (ref 78–100)
MCV RBC AUTO: 92 FL (ref 78–100)
MONOCYTES # BLD AUTO: 0.3 10E3/UL (ref 0–1.3)
MONOCYTES NFR BLD AUTO: 7 %
NEUTROPHILS # BLD AUTO: 3.8 10E3/UL (ref 1.6–8.3)
NEUTROPHILS NFR BLD AUTO: 81 %
NITRATE UR QL: NEGATIVE
NRBC # BLD AUTO: 0 10E3/UL
NRBC BLD AUTO-RTO: 0 /100
P AXIS - MUSE: 49 DEGREES
PH UR STRIP: 5.5 [PH] (ref 5–7)
PLATELET # BLD AUTO: 238 10E3/UL (ref 150–450)
PLATELET # BLD AUTO: 270 10E3/UL (ref 150–450)
POTASSIUM BLD-SCNC: 5 MMOL/L (ref 3.4–5.3)
POTASSIUM BLD-SCNC: 5.3 MMOL/L (ref 3.4–5.3)
PR INTERVAL - MUSE: 200 MS
PROT UR-MCNC: 0.59 G/L
PROT/CREAT 24H UR: 1.84 G/G CR (ref 0–0.2)
QRS DURATION - MUSE: 84 MS
QT - MUSE: 484 MS
QTC - MUSE: 450 MS
R AXIS - MUSE: -9 DEGREES
RBC # BLD AUTO: 2.61 10E6/UL (ref 3.8–5.9)
RBC # BLD AUTO: 2.9 10E6/UL (ref 3.8–5.9)
RBC URINE: <1 /HPF
RETICS # AUTO: 0.05 10E6/UL (ref 0.03–0.1)
RETICS/RBC NFR AUTO: 1.7 % (ref 0.5–2)
SODIUM SERPL-SCNC: 138 MMOL/L (ref 133–144)
SODIUM SERPL-SCNC: 139 MMOL/L (ref 133–144)
SP GR UR STRIP: 1.01 (ref 1–1.03)
SYSTOLIC BLOOD PRESSURE - MUSE: NORMAL MMHG
T AXIS - MUSE: 6 DEGREES
TIBC SERPL-MCNC: 285 UG/DL (ref 240–430)
TRANSFERRIN SERPL-MCNC: 234 MG/DL (ref 210–360)
UROBILINOGEN UR STRIP-MCNC: NORMAL MG/DL
VENTRICULAR RATE- MUSE: 52 BPM
WBC # BLD AUTO: 4.8 10E3/UL (ref 4–11)
WBC # BLD AUTO: 4.9 10E3/UL (ref 4–11)
WBC URINE: 1 /HPF

## 2021-08-18 PROCEDURE — 250N000012 HC RX MED GY IP 250 OP 636 PS 637

## 2021-08-18 PROCEDURE — 99207 PR CDG-MDM COMPONENT: MEETS MODERATE - UP CODED: CPT | Performed by: INTERNAL MEDICINE

## 2021-08-18 PROCEDURE — 36415 COLL VENOUS BLD VENIPUNCTURE: CPT

## 2021-08-18 PROCEDURE — 83735 ASSAY OF MAGNESIUM: CPT | Performed by: STUDENT IN AN ORGANIZED HEALTH CARE EDUCATION/TRAINING PROGRAM

## 2021-08-18 PROCEDURE — 97530 THERAPEUTIC ACTIVITIES: CPT | Mod: GO

## 2021-08-18 PROCEDURE — 93971 EXTREMITY STUDY: CPT | Mod: LT

## 2021-08-18 PROCEDURE — 93325 DOPPLER ECHO COLOR FLOW MAPG: CPT | Mod: 26 | Performed by: INTERNAL MEDICINE

## 2021-08-18 PROCEDURE — 97161 PT EVAL LOW COMPLEX 20 MIN: CPT | Mod: GP

## 2021-08-18 PROCEDURE — 83615 LACTATE (LD) (LDH) ENZYME: CPT

## 2021-08-18 PROCEDURE — 80048 BASIC METABOLIC PNL TOTAL CA: CPT | Performed by: STUDENT IN AN ORGANIZED HEALTH CARE EDUCATION/TRAINING PROGRAM

## 2021-08-18 PROCEDURE — 36415 COLL VENOUS BLD VENIPUNCTURE: CPT | Performed by: STUDENT IN AN ORGANIZED HEALTH CARE EDUCATION/TRAINING PROGRAM

## 2021-08-18 PROCEDURE — 85027 COMPLETE CBC AUTOMATED: CPT

## 2021-08-18 PROCEDURE — 250N000013 HC RX MED GY IP 250 OP 250 PS 637: Performed by: STUDENT IN AN ORGANIZED HEALTH CARE EDUCATION/TRAINING PROGRAM

## 2021-08-18 PROCEDURE — 250N000012 HC RX MED GY IP 250 OP 636 PS 637: Performed by: STUDENT IN AN ORGANIZED HEALTH CARE EDUCATION/TRAINING PROGRAM

## 2021-08-18 PROCEDURE — 120N000002 HC R&B MED SURG/OB UMMC

## 2021-08-18 PROCEDURE — 93971 EXTREMITY STUDY: CPT | Mod: 26 | Performed by: RADIOLOGY

## 2021-08-18 PROCEDURE — 93308 TTE F-UP OR LMTD: CPT | Mod: 26 | Performed by: INTERNAL MEDICINE

## 2021-08-18 PROCEDURE — 250N000011 HC RX IP 250 OP 636: Performed by: STUDENT IN AN ORGANIZED HEALTH CARE EDUCATION/TRAINING PROGRAM

## 2021-08-18 PROCEDURE — 97530 THERAPEUTIC ACTIVITIES: CPT | Mod: GP

## 2021-08-18 PROCEDURE — 85025 COMPLETE CBC W/AUTO DIFF WBC: CPT | Performed by: STUDENT IN AN ORGANIZED HEALTH CARE EDUCATION/TRAINING PROGRAM

## 2021-08-18 PROCEDURE — 81001 URINALYSIS AUTO W/SCOPE: CPT | Performed by: STUDENT IN AN ORGANIZED HEALTH CARE EDUCATION/TRAINING PROGRAM

## 2021-08-18 PROCEDURE — 93325 DOPPLER ECHO COLOR FLOW MAPG: CPT

## 2021-08-18 PROCEDURE — 84156 ASSAY OF PROTEIN URINE: CPT

## 2021-08-18 PROCEDURE — 97165 OT EVAL LOW COMPLEX 30 MIN: CPT | Mod: GO

## 2021-08-18 PROCEDURE — 85045 AUTOMATED RETICULOCYTE COUNT: CPT

## 2021-08-18 PROCEDURE — G0463 HOSPITAL OUTPT CLINIC VISIT: HCPCS

## 2021-08-18 PROCEDURE — 250N000011 HC RX IP 250 OP 636: Performed by: INTERNAL MEDICINE

## 2021-08-18 PROCEDURE — 99233 SBSQ HOSP IP/OBS HIGH 50: CPT | Mod: GC | Performed by: INTERNAL MEDICINE

## 2021-08-18 PROCEDURE — 93321 DOPPLER ECHO F-UP/LMTD STD: CPT | Mod: 26 | Performed by: INTERNAL MEDICINE

## 2021-08-18 PROCEDURE — 97116 GAIT TRAINING THERAPY: CPT | Mod: GP

## 2021-08-18 PROCEDURE — 250N000011 HC RX IP 250 OP 636

## 2021-08-18 PROCEDURE — 258N000003 HC RX IP 258 OP 636: Performed by: INTERNAL MEDICINE

## 2021-08-18 PROCEDURE — 83550 IRON BINDING TEST: CPT

## 2021-08-18 RX ORDER — LIDOCAINE 40 MG/G
CREAM TOPICAL
Status: DISCONTINUED | OUTPATIENT
Start: 2021-08-18 | End: 2021-09-02 | Stop reason: HOSPADM

## 2021-08-18 RX ORDER — HEPARIN SODIUM 5000 [USP'U]/.5ML
5000 INJECTION, SOLUTION INTRAVENOUS; SUBCUTANEOUS EVERY 12 HOURS
Status: DISCONTINUED | OUTPATIENT
Start: 2021-08-18 | End: 2021-09-02 | Stop reason: HOSPADM

## 2021-08-18 RX ORDER — MULTIPLE VITAMINS W/ MINERALS TAB 9MG-400MCG
1 TAB ORAL DAILY
Status: DISCONTINUED | OUTPATIENT
Start: 2021-08-18 | End: 2021-09-02 | Stop reason: HOSPADM

## 2021-08-18 RX ORDER — METHYLPREDNISOLONE SODIUM SUCCINATE 125 MG/2ML
125 INJECTION, POWDER, LYOPHILIZED, FOR SOLUTION INTRAMUSCULAR; INTRAVENOUS
Status: DISCONTINUED | OUTPATIENT
Start: 2021-08-18 | End: 2021-08-26

## 2021-08-18 RX ORDER — AMOXICILLIN 250 MG
1 CAPSULE ORAL 2 TIMES DAILY PRN
Status: DISCONTINUED | OUTPATIENT
Start: 2021-08-18 | End: 2021-09-02 | Stop reason: HOSPADM

## 2021-08-18 RX ORDER — PROCHLORPERAZINE MALEATE 5 MG
5 TABLET ORAL EVERY 6 HOURS PRN
Status: DISCONTINUED | OUTPATIENT
Start: 2021-08-18 | End: 2021-09-02 | Stop reason: HOSPADM

## 2021-08-18 RX ORDER — FUROSEMIDE 10 MG/ML
60 INJECTION INTRAMUSCULAR; INTRAVENOUS ONCE
Status: COMPLETED | OUTPATIENT
Start: 2021-08-18 | End: 2021-08-18

## 2021-08-18 RX ORDER — DEXTROSE MONOHYDRATE 25 G/50ML
25-50 INJECTION, SOLUTION INTRAVENOUS
Status: DISCONTINUED | OUTPATIENT
Start: 2021-08-18 | End: 2021-09-02 | Stop reason: HOSPADM

## 2021-08-18 RX ORDER — ASPIRIN 81 MG/1
81 TABLET ORAL DAILY
Status: DISCONTINUED | OUTPATIENT
Start: 2021-08-18 | End: 2021-09-02 | Stop reason: HOSPADM

## 2021-08-18 RX ORDER — ONDANSETRON 2 MG/ML
4 INJECTION INTRAMUSCULAR; INTRAVENOUS EVERY 6 HOURS PRN
Status: DISCONTINUED | OUTPATIENT
Start: 2021-08-18 | End: 2021-09-02 | Stop reason: HOSPADM

## 2021-08-18 RX ORDER — FINASTERIDE 5 MG/1
5 TABLET, FILM COATED ORAL DAILY
Status: DISCONTINUED | OUTPATIENT
Start: 2021-08-18 | End: 2021-09-02 | Stop reason: HOSPADM

## 2021-08-18 RX ORDER — VITAMIN B COMPLEX
25 TABLET ORAL DAILY
Status: DISCONTINUED | OUTPATIENT
Start: 2021-08-18 | End: 2021-09-02 | Stop reason: HOSPADM

## 2021-08-18 RX ORDER — NICOTINE POLACRILEX 4 MG
15-30 LOZENGE BUCCAL
Status: DISCONTINUED | OUTPATIENT
Start: 2021-08-18 | End: 2021-09-02 | Stop reason: HOSPADM

## 2021-08-18 RX ORDER — ONDANSETRON 4 MG/1
4 TABLET, ORALLY DISINTEGRATING ORAL EVERY 6 HOURS PRN
Status: DISCONTINUED | OUTPATIENT
Start: 2021-08-18 | End: 2021-09-02 | Stop reason: HOSPADM

## 2021-08-18 RX ORDER — POLYETHYLENE GLYCOL 3350 17 G/17G
17 POWDER, FOR SOLUTION ORAL DAILY PRN
Status: DISCONTINUED | OUTPATIENT
Start: 2021-08-18 | End: 2021-09-02 | Stop reason: HOSPADM

## 2021-08-18 RX ORDER — TAMSULOSIN HYDROCHLORIDE 0.4 MG/1
0.4 CAPSULE ORAL DAILY
Status: DISCONTINUED | OUTPATIENT
Start: 2021-08-18 | End: 2021-09-02 | Stop reason: HOSPADM

## 2021-08-18 RX ORDER — FUROSEMIDE 10 MG/ML
80 INJECTION INTRAMUSCULAR; INTRAVENOUS ONCE
Status: COMPLETED | OUTPATIENT
Start: 2021-08-18 | End: 2021-08-18

## 2021-08-18 RX ORDER — PROCHLORPERAZINE 25 MG
12.5 SUPPOSITORY, RECTAL RECTAL EVERY 12 HOURS PRN
Status: DISCONTINUED | OUTPATIENT
Start: 2021-08-18 | End: 2021-09-02 | Stop reason: HOSPADM

## 2021-08-18 RX ORDER — HYDROCHLOROTHIAZIDE 12.5 MG/1
12.5 TABLET ORAL EVERY MORNING
Status: DISCONTINUED | OUTPATIENT
Start: 2021-08-18 | End: 2021-08-23

## 2021-08-18 RX ORDER — AMOXICILLIN 250 MG
2 CAPSULE ORAL 2 TIMES DAILY PRN
Status: DISCONTINUED | OUTPATIENT
Start: 2021-08-18 | End: 2021-09-02 | Stop reason: HOSPADM

## 2021-08-18 RX ORDER — DIPHENHYDRAMINE HYDROCHLORIDE 50 MG/ML
50 INJECTION INTRAMUSCULAR; INTRAVENOUS
Status: DISCONTINUED | OUTPATIENT
Start: 2021-08-18 | End: 2021-08-26

## 2021-08-18 RX ADMIN — Medication 25 MCG: at 08:10

## 2021-08-18 RX ADMIN — HEPARIN SODIUM 5000 UNITS: 5000 INJECTION, SOLUTION INTRAVENOUS; SUBCUTANEOUS at 06:25

## 2021-08-18 RX ADMIN — FUROSEMIDE 80 MG: 10 INJECTION, SOLUTION INTRAVENOUS at 16:43

## 2021-08-18 RX ADMIN — HYDRALAZINE HYDROCHLORIDE 100 MG: 100 TABLET ORAL at 20:03

## 2021-08-18 RX ADMIN — HYDRALAZINE HYDROCHLORIDE 100 MG: 100 TABLET ORAL at 08:10

## 2021-08-18 RX ADMIN — FUROSEMIDE 60 MG: 10 INJECTION, SOLUTION INTRAVENOUS at 09:37

## 2021-08-18 RX ADMIN — IRON SUCROSE 300 MG: 20 INJECTION, SOLUTION INTRAVENOUS at 17:00

## 2021-08-18 RX ADMIN — INSULIN GLARGINE 10 UNITS: 100 INJECTION, SOLUTION SUBCUTANEOUS at 21:50

## 2021-08-18 RX ADMIN — FINASTERIDE 5 MG: 5 TABLET, FILM COATED ORAL at 08:10

## 2021-08-18 RX ADMIN — HYDRALAZINE HYDROCHLORIDE 100 MG: 100 TABLET ORAL at 15:00

## 2021-08-18 RX ADMIN — INSULIN ASPART 3 UNITS: 100 INJECTION, SOLUTION INTRAVENOUS; SUBCUTANEOUS at 13:09

## 2021-08-18 RX ADMIN — HYDROCHLOROTHIAZIDE 12.5 MG: 12.5 TABLET ORAL at 08:10

## 2021-08-18 RX ADMIN — CARVEDILOL 25 MG: 25 TABLET, FILM COATED ORAL at 18:30

## 2021-08-18 RX ADMIN — HEPARIN SODIUM 5000 UNITS: 5000 INJECTION, SOLUTION INTRAVENOUS; SUBCUTANEOUS at 18:30

## 2021-08-18 RX ADMIN — TAMSULOSIN HYDROCHLORIDE 0.4 MG: 0.4 CAPSULE ORAL at 08:10

## 2021-08-18 RX ADMIN — CARVEDILOL 25 MG: 25 TABLET, FILM COATED ORAL at 08:10

## 2021-08-18 RX ADMIN — Medication 1 TABLET: at 08:10

## 2021-08-18 RX ADMIN — INSULIN ASPART 3 UNITS: 100 INJECTION, SOLUTION INTRAVENOUS; SUBCUTANEOUS at 18:29

## 2021-08-18 ASSESSMENT — ACTIVITIES OF DAILY LIVING (ADL)
PREVIOUS_RESPONSIBILITIES: MEAL PREP;SHOPPING;MEDICATION MANAGEMENT;FINANCES;DRIVING
ADLS_ACUITY_SCORE: 21

## 2021-08-18 ASSESSMENT — MIFFLIN-ST. JEOR: SCORE: 1528.14

## 2021-08-18 NOTE — PLAN OF CARE
[id number]: Alessio is feeling weak and tired today.  and 284. Off of oral agents. UA sent. ECHO completed. Currently down for LLE ultrasound to r/o clot. Lasix given this am. Fair UOP- a few voids were unmeasured as he had a BM. K was 5.0 this am and 5.3 this afternoon. Lasix IV ordered to be repeated this afternoon. Pt's son Andi is here and MD talked with him. Pocket talker in room as pt is very [place].     [id number]: 80 mg IV Lasix given. Voiding adequate amts. U/S was negative for DVT. IV Iron infusing now and tolerating it well. Nephrology consult ordered. Up to BR with 1 assist, GB and walker.

## 2021-08-18 NOTE — UTILIZATION REVIEW
Admission Status; Secondary Review Determination     Under the authority of the Utilization Management Commitee, the utilization review process indicated a secondary review on the above patient. The review outcome is based on review of the medical records, discussions with staff, and applying clinical experience noted on the date of the review.     (x) Inpatient Status Appropriate - This patient's medical care is consistent with medical management for inpatient care and reasonable inpatient medical practice.     RATIONALE FOR DETERMINATION:  86M with history of T2DM, CKD4, and hypertension who presents with progressive fatigue over a few months and found to be volume overloaded concerning for progression of CKD vs CHF.  The patient is hemodynamically stable without hypoxia.  Labs notable for BNP near 30,000 and creatinine near 3.  The patient is receiving IV diuresis.  This is complicated by renal failure and diuretic dosing is being adjusted with close monitoring of renal function to avoid worsening renal failure.  IV lasix dose was increased from 40 mg to 60 mg today.      At the time of admission with the information available to the attending physician more than 2 nights Hospital complex care was anticipated, based on patient risk of adverse outcome if treated as outpatient and complex care required. Inpatient admission is appropriate based on the Medicare guidelines.    The information on this document is developed by the utilization review team in order for the business office to ensure compliance. This only denotes the appropriateness of proper admission status and does not reflect the quality of care rendered.   The definitions of Inpatient Status and Observation Status used in making the determination above are those provided in the CMS Coverage Manual, Chapter 1 and Chapter 6, section 70.4.     Sincerely,     Louie Hernandez MD  Utilization Review   Physician Advisor   [facility]

## 2021-08-18 NOTE — PROGRESS NOTES
Nursing Focus: Admission  D: Arrived at from Emergency departement via transport . Patient accompanied by transport. Admitted for Acute systolic heart failure Complains of no Pain, SOB, dizziness, headache, nausea, vomiting, numbness and tingling or vision changes .      I: Admission process began.  Patient oriented to room, enviroment, call light.  Md. notified of patients arrival on unit.     A: Vital signs stable, afebrile.  Patient stable at this time.  Complaining of no Pain, SOB, dizziness, headache, nausea, vomiting, numbness and tingling or vision changes.  Pt is on tele monitoring related to hyperkalemia     P: Implement plan of care when available. Continue to monitor patient. Nursing interventions as appropriate. Notify md with changes in pt status.

## 2021-08-18 NOTE — PLAN OF CARE
Pt is alert and oriented x4, hypertensive 162/55 does not meet parameter of notification. OVSS on RA, afebrile. Denied pain, nausea, vomiting, numbness and tingling. Pt is on tele monitoring on normal sinus rhythm. On sodium restriction diet and 2000ml fluids restriction. Needs UA to be collected. Will continue to monitor.

## 2021-08-18 NOTE — PROGRESS NOTES
08/18/21 1100   Quick Adds   Type of Visit Initial Occupational Therapy Evaluation   Living Environment   People in home alone   Current Living Arrangements apartment   Home Accessibility no concerns   Transportation Anticipated car, drives self;family or friend will provide   Living Environment Comments Pt lives alone in an independent living apartment, reports his wife is in memory care which is attached to the ILF.    Self-Care   Usual Activity Tolerance good   Current Activity Tolerance fair   Regular Exercise No   Equipment Currently Used at Home grab bar, tub/shower;walker, rolling;shower chair;grab bar, toilet   Activity/Exercise/Self-Care Comment Pt reports he does not exercise regularly but does try to stay active within the home and community.    Instrumental Activities of Daily Living (IADL)   Previous Responsibilities meal prep;shopping;medication management;finances;driving   IADL Comments Pt reports he has assist with cleaning and laundry from an outside service that comes to his apartment, was doing his shopping but was needing increased assist from family, states he still drives.    Disability/Function   Hearing Difficulty or Deaf yes   Wear Glasses or Blind yes   Vision Management glasses   Concentrating, Remembering or Making Decisions Difficulty no   Difficulty Communicating no   Difficulty Eating/Swallowing no   Walking or Climbing Stairs Difficulty yes   Walking or Climbing Stairs ambulation difficulty, requires equipment   Mobility Management Pt uses a 4ww for ambulation at baseline.    Dressing/Bathing Difficulty yes   Dressing/Bathing bathing difficulty, requires equipment   Dressing/Bathing Management Pt uses a shower chait and grab bars.    Toileting issues no   Doing Errands Independently Difficulty (such as shopping) no   Fall history within last six months yes   Number of times patient has fallen within last six months 1   Change in Functional Status Since Onset of Current  Illness/Injury yes   General Information   Onset of Illness/Injury or Date of Surgery 08/17/21   Referring Physician Dilma Doshi MD   Patient/Family Therapy Goal Statement (OT) Return home.   Additional Occupational Profile Info/Pertinent History of Current Problem Per chart: 86M with history of T2DM, CKD4, and hypertension who presents with progressive fatigue over a few months and found to be volume overloaded concerning for progression of CKD vs CHF.   Performance Patterns (Routines, Roles, Habits) Pt is normally mod-I with all functional mobility and ADLs at baseline.    Existing Precautions/Restrictions fall   Left Upper Extremity (Weight-bearing Status) full weight-bearing (FWB)   Right Upper Extremity (Weight-bearing Status) full weight-bearing (FWB)   Left Lower Extremity (Weight-bearing Status) full weight-bearing (FWB)   Right Lower Extremity (Weight-bearing Status) full weight-bearing (FWB)   Cognitive Status Examination   Orientation Status orientation to person, place and time   Affect/Mental Status (Cognitive) WNL   Follows Commands WNL   Safety Deficit minimal deficit   Cognitive Status Comments No cognition concerns noted, AxOx4, asking for newspaper for the day.    Visual Perception   Visual Impairment/Limitations corrective lenses for reading   Sensory   Sensory Quick Adds No deficits were identified   Pain Assessment   Patient Currently in Pain No   Integumentary/Edema   Integumentary/Edema other (describe)   Integumentary/Edema Comments BLE edema present, fluid overload, skin peeling, scaly and very flaky.    Posture   Posture forward head position;protracted shoulders   Range of Motion Comprehensive   General Range of Motion no range of motion deficits identified;bilateral upper extremity ROM WNL   Strength Comprehensive (MMT)   General Manual Muscle Testing (MMT) Assessment no strength deficits identified   Comment, General Manual Muscle Testing (MMT) Assessment BUE strength WFL     Coordination   Upper Extremity Coordination No deficits were identified   Gross Motor Coordination No deficits identified   Fine Motor Coordination Not tested.    Bed Mobility   Comment (Bed Mobility) Magan x1   Transfers   Transfer Comments Magan x1   Balance   Balance Comments Mildly unsteady on feet, poor eccentric control when descending into chair.    Clinical Impression   Criteria for Skilled Therapeutic Interventions Met (OT) yes;meets criteria;skilled treatment is necessary   OT Diagnosis Decreased ADL-I   OT Problem List-Impairments impacting ADL problems related to;activity tolerance impaired;balance;strength;mobility  (fatigue)   Assessment of Occupational Performance 3-5 Performance Deficits   Identified Performance Deficits ambulation, transferring, toileting, bathing, dressing, community mobility   Planned Therapy Interventions (OT) ADL retraining;IADL retraining;strengthening;progressive activity/exercise   Clinical Decision Making Complexity (OT) low complexity   Therapy Frequency (OT) 5x/week   Predicted Duration of Therapy 1 week   Risk & Benefits of therapy have been explained evaluation/treatment results reviewed;care plan/treatment goals reviewed;risks/benefits reviewed;current/potential barriers reviewed;participants voiced agreement with care plan;participants included;patient   OT Discharge Planning    OT Discharge Recommendation (DC Rec) Transitional Care Facility   OT Rationale for DC Rec Pt is below baseline level of function and lives alone in an apartment, pt would benefit from further therapy to progress functional independence with all mobility and ADLs/IADLs prior to return home. Will continue to monitor functional progress closely and update recs as needed.   OT Brief overview of current status  [place]agan x1 with fww    Total Evaluation Time (Minutes)   Total Evaluation Time (Minutes) 4

## 2021-08-18 NOTE — PROGRESS NOTES
08/18/21 1152   Quick Adds   Type of Visit Initial PT Evaluation   Living Environment   People in home alone   Current Living Arrangements apartment   Home Accessibility no concerns   Transportation Anticipated car, drives self;family or friend will provide   Living Environment Comments pt lives alond in independent living apartment, reports wife lives in memory care and children are very supportive    Self-Care   Usual Activity Tolerance good   Current Activity Tolerance fair   Regular Exercise No   Equipment Currently Used at Home grab bar, tub/shower;walker, rolling;shower chair;grab bar, toilet   Activity/Exercise/Self-Care Comment Pt lives independent, uses 4WW at baseline. Has light assistance but is typically ind, per son report, children offer support often although pt typically declines. Children do help with transportation/errands and heavy ADLs prn   Disability/Function   Hearing Difficulty or Deaf yes   Patient's preferred means of communication English speaker with hearing loss, no speech problems.   Describe hearing loss bilateral hearing loss   Use of hearing assistive devices bilateral hearing aids   Wear Glasses or Blind yes   Vision Management glasses   Concentrating, Remembering or Making Decisions Difficulty no   Difficulty Communicating no   Difficulty Eating/Swallowing no   Walking or Climbing Stairs Difficulty yes   Walking or Climbing Stairs ambulation difficulty, requires equipment   Mobility Management 4WW   Dressing/Bathing Difficulty yes   Dressing/Bathing bathing difficulty, requires equipment   Dressing/Bathing Management shower chair and grab bars   Toileting issues no   Doing Errands Independently Difficulty (such as shopping) yes   Errands Management A from children prn   Fall history within last six months yes   Number of times patient has fallen within last six months 1   Change in Functional Status Since Onset of Current Illness/Injury yes   General Information   Onset of  "Illness/Injury or Date of Surgery 08/17/21   Referring Physician Dilma Doshi MD   Patient/Family Therapy Goals Statement (PT) To increase energy and return home   Pertinent History of Current Problem (include personal factors and/or comorbidities that impact the POC) per medical chart \"86M with history of T2DM, CKD4, and hypertension who presents with progressive fatigue over a few months and found to be volume overloaded concerning for progression of CKD vs CHF\"   Existing Precautions/Restrictions fall   General Observations Activity: Up with assist   Cognition   Orientation Status (Cognition) oriented x 4   Affect/Mental Status (Cognition) WNL   Follows Commands (Cognition) follows two-step commands   Pain Assessment   Patient Currently in Pain No   Integumentary/Edema   Integumentary/Edema no deficits were identifed   Posture    Posture Forward head position;Protracted shoulders;Kyphosis   Range of Motion (ROM)   ROM Comment WFL B LE   Strength Comprehensive (MMT)   Comment, General Manual Muscle Testing (MMT) Assessment B LE strength demo >3/5, generally deconditioed   Bed Mobility   Comment (Bed Mobility) CGA with HOB elevated, min A flat bed   Transfers   Transfer Safety Comments min A from lower surfaces, 2WW   Gait/Stairs (Locomotion)   Comment (Gait/Stairs) CGA with 2WW, slow pace, fatigues quickely    Balance   Balance Comments steady with 2WW, requires CGA 2/2 LE weakness/quick fatigue   Sensory Examination   Sensory Perception patient reports no sensory changes   Clinical Impression   Criteria for Skilled Therapeutic Intervention yes, treatment indicated   PT Diagnosis (PT) impaired funcitonal mobility    Influenced by the following impairments weakness, fatigue, generalized deconditioning   Functional limitations due to impairments impaired gait, transfers, bed mobility, functional activity tolerance.   Clinical Presentation Stable/Uncomplicated   Clinical Presentation Rationale clinical judement "   Clinical Decision Making (Complexity) low complexity   Therapy Frequency (PT) 5x/week   Predicted Duration of Therapy Intervention (days/wks) 1 week   Planned Therapy Interventions (PT) balance training;bed mobility training;gait training;home exercise program;neuromuscular re-education;patient/family education;ROM (range of motion);strengthening;transfer training   Anticipated Equipment Needs at Discharge (PT) walker, rolling  (has 4WW)   Risk & Benefits of therapy have been explained evaluation/treatment results reviewed;care plan/treatment goals reviewed;risks/benefits reviewed;current/potential barriers reviewed;participants voiced agreement with care plan;participants included;patient;son   Clinical Impression Comments pt [place], declines aides   PT Discharge Planning    PT Discharge Recommendation (DC Rec) Transitional Care Facility   PT Rationale for DC Rec Pt below baseline function requiring Ax1 for transfers and gait and would benefit from ongoing skilled therapies to return to PLOF, pending LOS pt may improve for safe discharge home pending support and progress   PT Brief overview of current status  Ax1 with gait belt and WW. Encourage ambulation    Total Evaluation Time   Total Evaluation Time (Minutes) 8

## 2021-08-18 NOTE — PROGRESS NOTES
[facility]    Progress Note - [facility] 2 Service        Date of Admission:  8/17/2021  Date of Service: 08/18/21     Assessment & Plan   86M with history of T2DM, CKD4, and hypertension who presents with progressive fatigue over a few months and found to be volume overloaded concerning for progression of CKD vs CHF.    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 Difficult to assess orthopnea because patient never sleeps flat. CXR does not appear overtly edematous. Last echo in 3/2021 showed mild diastolic dysfunction but normal systolic function and EF, repeat pending. He does have severe venous insufficiency and his cardiologist's notes suggest concern that this is leading to difficult-to-control hypertension that could certainly have worsened cardiac function. Alternatively, his presentation could be related to progression of kidney dysfunction. Effective diuresis with IV lasix 40mg x1 yesterday, creatinine stable at 2.98. Will continue diuresis today with 60mg IV lasix and reevaluate for effect. Hyperkalemia improving with diuresis.   - 60mg IV lasix this AM  - F/u TTE   - Strict I/O, daily weights  - 2 g Na diet, 2 L fluid restriction  - Repeat BMP at 1400, AM BMP     Normocytic anemia  7.1 today, 7.5 on admission and baseline ~8-9. This is most likely related to advanced CKD. No signs of acute blood loss. Patient does have a history of rectal adenocarcinoma. Will evaluate iron panel and hemolysis labs.     RLE wound  Small area with red, friable base in the center of his right shin in the setting of severe stasis skin changes. Base appears clean and not currently with concern for infection.  - WOCN consult    LLE edema > RLE  Unchanged venous stasis skin findings, 3+ edema on left, 1+ on right, no associated pain. Will evaluate for DVT with LLE  ultrasound with doppler.      Thyroid nodule  Subclinical hypothyroidism  He has already been referred to an endocrinologist to evaluate the nodule further. TSH is elevated at 7.46, though FT4 is wnl.  - Consider further workup inpatient vs close outpatient follow up     Type 2 DM  Poorly controlled, last A1c 9.5. Currently managed with januvia, glipizide, and recently-added bedtime glargine. Inpatient -140.   - Fingersticks, hypoglycemia protocol, sliding scale insulin     Hypertension  Has been difficult to manage as an outpatient, follows with cardiology for this. Will continue to monitor BP with diuresis, may consider adding calcium channel blocker.   - Continue outpatient regimen: hydralazine 100 mg TID, hydrochlorothiazide 12.5 mg daily, and coreg 25 mg BID  - Consider IV hydralazine rather than labetalol if he becomes acutely hypertensive overnight given his bradycardia     BPH  - Continue pta flomax and finasteride     Osteoporosis  - Continue pta vit D           Diet: Fluid restriction 2000 ML FLUID  Combination Diet Low Saturated Fat Na <2400mg Diet, No Caffeine Diet    DVT Prophylaxis: Heparin SQ  High Catheter: Not present  Fluids: None  Central Lines: None  Code Status: No CPR- Do NOT Intubate  discussed on admission with pt and 2 children, he has previously filled out medical directive paperwork stating this wish    Disposition Plan   Expected discharge: 3 - 7 days, recommended to prior arrangement vs TCU pending PT/OT evals once renal function improved and fluid status stablilized.     The patient's care was discussed with Dr. Daly.       Paula Arciniega MD  Internal Medicine PGY1  [phone]  [facility] 2 Service      ______________________________________________________________________    Interval History   Patient denies shortness of breath, chest pain, fevers/chills and LE pain. He reports he does not lay flat at night for years because he gets short of breath. RLE wound is chronic per patient  and tends to get better and worse with LE swelling. Effective diuresis with IV lasix yesterday.    4 point review of systems otherwise negative.       Physical Exam   Vital Signs: Temp: 97.1  F (36.2  C) Temp src: Axillary BP: (!) 163/54 Pulse: 62   Resp: 20 SpO2: 96 % O2 Device: None (Room air)    Weight: 181 lbs 0 oz      Constitutional: awake, alert, cooperative, NAD  HEENT: normocephalic, anicteric sclerae, MMM   Pulmonary: no increased work of breathing on room air, lungs clear to auscultation bilaterally without wheezes or rales   Cardiovascular: JVP elevated angle of the jaw with HOB at 45%, RRR, normal S1 and S2, no murmur appreciated   GI: soft, non-tender, non-distended  Skin: warm, dry, venous stasis over LE with flaking and dry skin, 2 x ~2cm RLE shin wounds with scabbing, without surrounding erythema, warmth or drainage  MSK: 3+ pitting edema to shin on L, less (~1+) on RLE   Neuro: AAOx3, no gross focal neurologic deficits      Data   All labs and imaging reviewed.

## 2021-08-19 LAB
ABO/RH(D): NORMAL
ANION GAP SERPL CALCULATED.3IONS-SCNC: 6 MMOL/L (ref 3–14)
ANION GAP SERPL CALCULATED.3IONS-SCNC: 6 MMOL/L (ref 3–14)
ANTIBODY SCREEN: NEGATIVE
BLD PROD TYP BPU: NORMAL
BLOOD COMPONENT TYPE: NORMAL
BUN SERPL-MCNC: 66 MG/DL (ref 7–30)
BUN SERPL-MCNC: 68 MG/DL (ref 7–30)
CALCIUM SERPL-MCNC: 7.8 MG/DL (ref 8.5–10.1)
CALCIUM SERPL-MCNC: 8 MG/DL (ref 8.5–10.1)
CHLORIDE BLD-SCNC: 107 MMOL/L (ref 94–109)
CHLORIDE BLD-SCNC: 110 MMOL/L (ref 94–109)
CO2 SERPL-SCNC: 23 MMOL/L (ref 20–32)
CO2 SERPL-SCNC: 24 MMOL/L (ref 20–32)
CODING SYSTEM: NORMAL
CREAT SERPL-MCNC: 3.05 MG/DL (ref 0.52–1.25)
CREAT SERPL-MCNC: 3.09 MG/DL (ref 0.52–1.25)
CROSSMATCH: NORMAL
ERYTHROCYTE [DISTWIDTH] IN BLOOD BY AUTOMATED COUNT: 15.1 % (ref 10–15)
GFR SERPL CREATININE-BSD FRML MDRD: 17 ML/MIN/1.73M2
GFR SERPL CREATININE-BSD FRML MDRD: 18 ML/MIN/1.73M2
GLUCOSE BLD-MCNC: 136 MG/DL (ref 70–99)
GLUCOSE BLD-MCNC: 201 MG/DL (ref 70–99)
GLUCOSE BLDC GLUCOMTR-MCNC: 116 MG/DL (ref 70–99)
GLUCOSE BLDC GLUCOMTR-MCNC: 168 MG/DL (ref 70–99)
GLUCOSE BLDC GLUCOMTR-MCNC: 193 MG/DL (ref 70–99)
GLUCOSE BLDC GLUCOMTR-MCNC: 198 MG/DL (ref 70–99)
GLUCOSE BLDC GLUCOMTR-MCNC: 218 MG/DL (ref 70–99)
HCT VFR BLD AUTO: 23 % (ref 35–53)
HGB BLD-MCNC: 6.8 G/DL (ref 11.7–17.7)
HGB BLD-MCNC: 7.8 G/DL (ref 11.7–17.7)
ISSUE DATE AND TIME: NORMAL
MCH RBC QN AUTO: 26.7 PG (ref 26.5–33)
MCHC RBC AUTO-ENTMCNC: 29.6 G/DL (ref 31.5–36.5)
MCV RBC AUTO: 90 FL (ref 78–100)
PATH REPORT.COMMENTS IMP SPEC: NORMAL
PATH REPORT.FINAL DX SPEC: NORMAL
PATH REPORT.MICROSCOPIC SPEC OTHER STN: NORMAL
PATH REPORT.MICROSCOPIC SPEC OTHER STN: NORMAL
PATH REPORT.RELEVANT HX SPEC: NORMAL
PLATELET # BLD AUTO: 233 10E3/UL (ref 150–450)
POTASSIUM BLD-SCNC: 4.3 MMOL/L (ref 3.4–5.3)
POTASSIUM BLD-SCNC: 4.5 MMOL/L (ref 3.4–5.3)
RBC # BLD AUTO: 2.55 10E6/UL (ref 3.8–5.9)
SODIUM SERPL-SCNC: 137 MMOL/L (ref 133–144)
SODIUM SERPL-SCNC: 139 MMOL/L (ref 133–144)
SPECIMEN EXPIRATION DATE: NORMAL
UNIT ABO/RH: NORMAL
UNIT NUMBER: NORMAL
UNIT STATUS: NORMAL
UNIT TYPE ISBT: 5100
WBC # BLD AUTO: 5.1 10E3/UL (ref 4–11)

## 2021-08-19 PROCEDURE — 36415 COLL VENOUS BLD VENIPUNCTURE: CPT

## 2021-08-19 PROCEDURE — 250N000011 HC RX IP 250 OP 636: Performed by: STUDENT IN AN ORGANIZED HEALTH CARE EDUCATION/TRAINING PROGRAM

## 2021-08-19 PROCEDURE — 85027 COMPLETE CBC AUTOMATED: CPT

## 2021-08-19 PROCEDURE — 999N000128 HC STATISTIC PERIPHERAL IV START W/O US GUIDANCE

## 2021-08-19 PROCEDURE — 86900 BLOOD TYPING SEROLOGIC ABO: CPT

## 2021-08-19 PROCEDURE — 80048 BASIC METABOLIC PNL TOTAL CA: CPT

## 2021-08-19 PROCEDURE — 250N000011 HC RX IP 250 OP 636

## 2021-08-19 PROCEDURE — 99233 SBSQ HOSP IP/OBS HIGH 50: CPT | Mod: GC | Performed by: INTERNAL MEDICINE

## 2021-08-19 PROCEDURE — 99207 PR CDG-MDM COMPONENT: MEETS MODERATE - UP CODED: CPT | Performed by: INTERNAL MEDICINE

## 2021-08-19 PROCEDURE — P9016 RBC LEUKOCYTES REDUCED: HCPCS

## 2021-08-19 PROCEDURE — 120N000002 HC R&B MED SURG/OB UMMC

## 2021-08-19 PROCEDURE — 99222 1ST HOSP IP/OBS MODERATE 55: CPT | Mod: GC | Performed by: INTERNAL MEDICINE

## 2021-08-19 PROCEDURE — 250N000013 HC RX MED GY IP 250 OP 250 PS 637

## 2021-08-19 PROCEDURE — 85060 BLOOD SMEAR INTERPRETATION: CPT | Performed by: STUDENT IN AN ORGANIZED HEALTH CARE EDUCATION/TRAINING PROGRAM

## 2021-08-19 PROCEDURE — 86923 COMPATIBILITY TEST ELECTRIC: CPT

## 2021-08-19 PROCEDURE — 85018 HEMOGLOBIN: CPT

## 2021-08-19 PROCEDURE — 250N000013 HC RX MED GY IP 250 OP 250 PS 637: Performed by: STUDENT IN AN ORGANIZED HEALTH CARE EDUCATION/TRAINING PROGRAM

## 2021-08-19 RX ORDER — AMLODIPINE BESYLATE 5 MG/1
5 TABLET ORAL DAILY
Status: DISCONTINUED | OUTPATIENT
Start: 2021-08-19 | End: 2021-08-20

## 2021-08-19 RX ORDER — FUROSEMIDE 10 MG/ML
80 INJECTION INTRAMUSCULAR; INTRAVENOUS ONCE
Status: COMPLETED | OUTPATIENT
Start: 2021-08-19 | End: 2021-08-19

## 2021-08-19 RX ORDER — FUROSEMIDE 40 MG
80 TABLET ORAL
Status: DISCONTINUED | OUTPATIENT
Start: 2021-08-19 | End: 2021-08-19

## 2021-08-19 RX ADMIN — Medication 1 TABLET: at 08:47

## 2021-08-19 RX ADMIN — FUROSEMIDE 80 MG: 40 TABLET ORAL at 11:31

## 2021-08-19 RX ADMIN — HEPARIN SODIUM 5000 UNITS: 5000 INJECTION, SOLUTION INTRAVENOUS; SUBCUTANEOUS at 06:02

## 2021-08-19 RX ADMIN — TAMSULOSIN HYDROCHLORIDE 0.4 MG: 0.4 CAPSULE ORAL at 08:47

## 2021-08-19 RX ADMIN — Medication 25 MCG: at 08:47

## 2021-08-19 RX ADMIN — HYDRALAZINE HYDROCHLORIDE 100 MG: 100 TABLET ORAL at 14:46

## 2021-08-19 RX ADMIN — CARVEDILOL 25 MG: 25 TABLET, FILM COATED ORAL at 18:17

## 2021-08-19 RX ADMIN — HYDROCHLOROTHIAZIDE 12.5 MG: 12.5 TABLET ORAL at 08:46

## 2021-08-19 RX ADMIN — HYDRALAZINE HYDROCHLORIDE 100 MG: 100 TABLET ORAL at 08:46

## 2021-08-19 RX ADMIN — INSULIN GLARGINE 10 UNITS: 100 INJECTION, SOLUTION SUBCUTANEOUS at 23:07

## 2021-08-19 RX ADMIN — HEPARIN SODIUM 5000 UNITS: 5000 INJECTION, SOLUTION INTRAVENOUS; SUBCUTANEOUS at 18:16

## 2021-08-19 RX ADMIN — AMLODIPINE BESYLATE 5 MG: 5 TABLET ORAL at 12:09

## 2021-08-19 RX ADMIN — ASPIRIN 81 MG: 81 TABLET, DELAYED RELEASE ORAL at 08:47

## 2021-08-19 RX ADMIN — FINASTERIDE 5 MG: 5 TABLET, FILM COATED ORAL at 08:45

## 2021-08-19 RX ADMIN — FUROSEMIDE 80 MG: 10 INJECTION, SOLUTION INTRAVENOUS at 16:23

## 2021-08-19 RX ADMIN — INSULIN ASPART 2 UNITS: 100 INJECTION, SOLUTION INTRAVENOUS; SUBCUTANEOUS at 13:46

## 2021-08-19 RX ADMIN — HYDRALAZINE HYDROCHLORIDE 100 MG: 100 TABLET ORAL at 20:43

## 2021-08-19 RX ADMIN — CARVEDILOL 25 MG: 25 TABLET, FILM COATED ORAL at 08:47

## 2021-08-19 ASSESSMENT — ACTIVITIES OF DAILY LIVING (ADL)
ADLS_ACUITY_SCORE: 21

## 2021-08-19 ASSESSMENT — MIFFLIN-ST. JEOR: SCORE: 1508.18

## 2021-08-19 ASSESSMENT — ENCOUNTER SYMPTOMS: WEAKNESS: 1

## 2021-08-19 NOTE — CONSULTS
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 Renal US was ordered in 2020, but was not completed. Presented with creatinine of 2.9 which has stayed relatively stable during his hospitalization. Concern for HFpEF vs worsening of his CKD, but he is certainly volume overloaded regardless. UA unremarkable, moderate non-nephrotic range proteinuria. He has responded well to diuretics thus far without worsening creatinine and is hypervolemic on exam - would recommend increasing diureses. Creatinine may be lagging, favor cardiorenal at this time.   - Avoid nephrotoxic agents and renally dose medications  - Strict I/O's, daily weights  - Continue to diurese, 60mg IV furosemide BID  - Renal ultrasound with doppler (ordered for you, NPO at midnight)    Recommendations were communicated to primary team via note and page.    Seen and discussed with Dr. Garcia.     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 Patient with dyspnea on exertion and orthopnea for several weeks. Concern for HFpEF vs worsening of his CKD. Per chart review, the patient's baseline creatinine had been 1.8-2.0 as of 2020, but recent baseline closer to  2.1-2.3. Renal US was ordered in 2020, but was not completed.     At the time of presentation, creatinine was noted to be 2.9 and his UA was bland other than moderate non-nephrotic range proteinuria. He was given 40mg IV lasix on 8/17 and 60mg on 8/18 with adequate diuresis. His creatinine has remained fairly stable throughout presentation and is 3.1 as of 8/19 (today). Consulted for RONY on CKD.     PAST MEDICAL HISTORY:  Past Medical History:   Diagnosis Date     Atrial flutter (H)      Choroidal nevus of left eye      CKD (chronic kidney disease) stage 3, GFR 30-59 ml/min      Diabetes mellitus (H)      Diabetic peripheral neuropathy (H)      Hypertension      Microalbuminuria      Overweight(278.02)      Rectal-type adenocarcinoma (H)      Retinopathies, diabetic      Vitreous detachment of both eyes        Past Surgical History:   Procedure Laterality Date     CATARACT IOL, RT/LT Bilateral 2005     left hip replacement       OPEN REDUCTION INTERNAL FIXATION FEMUR PROXIMAL  1/24/2014    Procedure: OPEN REDUCTION INTERNAL FIXATION FEMUR PROXIMAL;  Periprosthetic Fracture, ORIF Left Femur;  Surgeon: Andi Garcia MD;  Location: [facility] OR      MEDICATIONS:  PTA Meds  Prior to Admission medications    Medication Sig Last Dose Taking? Auth Provider   aspirin (ASA) 81 MG EC tablet Take 1 tablet by mouth daily  8/17/2021 at am Yes Reported, Patient   carvedilol (COREG) 25 MG tablet Take 1 tablet (25 mg) by mouth 2 times daily (with meals) 8/17/2021 at am Yes Eddie Levi MD   Cholecalciferol (VITAMIN D-3 PO) Take 1 Dose by mouth daily  8/17/2021 at am Yes Reported, Patient   finasteride (PROSCAR) 5 MG tablet Take 1 tablet by mouth daily 8/17/2021 at am Yes Reported, Patient   glipiZIDE (GLUCOTROL XL) 5 MG 24 hr tablet Take 1 tablet (5 mg) by mouth daily 8/17/2021 at am Yes Jose Amin MD   hydrALAZINE (APRESOLINE) 50 MG tablet Take 2 tablets (100 mg) by mouth 3 times daily 8/17/2021 at am Yes Gurmeet  Eddie Ngo MD   hydrochlorothiazide (HYDRODIURIL) 12.5 MG tablet Take 1 tablet (12.5 mg) by mouth every morning 8/17/2021 at am Yes Eddie Levi MD   insulin glargine (LANTUS SOLOSTAR) 100 UNIT/ML pen 10 units at bedtime.  Increase by 1 unit every 3-5 days until fasting BG is <150 most mornings. 8/16/2021 at pm Yes Sandhya Mena PA-C   multivitamin w/minerals (MULTI-VITAMIN) tablet Take 1 tablet by mouth daily 8/17/2021 at am Yes Reported, Patient   sitagliptin (JANUVIA) 50 MG tablet Take 1 tablet (50 mg) by mouth daily 8/17/2021 at am Yes Renu Lantigua NP   tamsulosin (FLOMAX) 0.4 MG capsule Take 1 capsule (0.4 mg) by mouth daily Advise clinic if causes lightheadedness. 8/17/2021 at am Yes Sandhya Mena PA-C   blood glucose (NO BRAND SPECIFIED) lancets standard Use to test blood sugar 1 times daily or as directed. Use brand compatible with patients insurance and device.   Jose Amin MD   blood glucose (ONETOUCH VERIO IQ) test strip Use to test blood sugar 1 time daily or as directed.   Jose Amin MD   blood glucose monitoring (ONETOUCH VERIO IQ SYSTEM) meter device kit Use to test blood sugar daily   Marcus Augustin MD   COMPRESSION STOCKINGS 1 each daily Measure and fit.  Style and color per patient preference.  Dojuliane n Wilfrid per patient need.   Eddie Levi MD   insulin pen needle (31G X 5 MM) 31G X 5 MM miscellaneous Use1 pen needles daily or as directed.   Sandhya Mena PA-C      Current Meds    amLODIPine  5 mg Oral Daily     aspirin  81 mg Oral Daily     carvedilol  25 mg Oral BID w/meals     finasteride  5 mg Oral Daily     furosemide  80 mg Oral BID     heparin ANTICOAGULANT  5,000 Units Subcutaneous Q12H     hydrALAZINE  100 mg Oral TID     hydrochlorothiazide  12.5 mg Oral QAM     insulin aspart  1-7 Units Subcutaneous TID AC     insulin aspart  1-5 Units Subcutaneous At Bedtime     insulin glargine  10 Units Subcutaneous At Bedtime     iron sucrose  (VENOFER) intermittent infusion  300 mg Intravenous Q72H     multivitamin w/minerals  1 tablet Oral Daily     sodium chloride (PF)  3 mL Intracatheter Q8H     tamsulosin  0.4 mg Oral Daily     Vitamin D3  25 mcg Oral Daily       ALLERGIES:    Allergies   Allergen Reactions     Sulfa Drugs      Unknown reaction. Occurred during childhood. Has not taken Sulfa medications since allergic response.      REVIEW OF SYSTEMS:  A comprehensive of systems was negative except as noted above.    SOCIAL HISTORY:   Social History     Socioeconomic History     Marital status:      Spouse name: Not on file     Number of children: Not on file     Years of education: Not on file     Highest education level: Not on file   Occupational History     Occupation:      Employer: [employer]   Tobacco Use     Smoking status: Never Smoker     Smokeless tobacco: Never Used   Substance and Sexual Activity     Alcohol use: Yes     Alcohol/week: 2.5 - 3.3 standard drinks     Types: 3 - 4 Standard drinks or equivalent per week     Comment: 3 martinis per week     Drug use: No     Sexual activity: Not on file   Other Topics Concern     Parent/sibling w/ CABG, MI or angioplasty before 65F 55M? No   Social History Narrative     Not on file     Social Determinants of Health     Financial Resource Strain:      Difficulty of Paying Living Expenses:    Food Insecurity:      Worried About Running Out of Food in the Last Year:      Ran Out of Food in the Last Year:    Transportation Needs:      Lack of Transportation (Medical):      Lack of Transportation (Non-Medical):    Physical Activity:      Days of Exercise per Week:      Minutes of Exercise per Session:    Stress:      Feeling of Stress :    Social Connections:      Frequency of Communication with Friends and Family:      Frequency of Social Gatherings with Friends and Family:      Attends [employer]oration[employer] Services:      Active Member of Clubs or Organizations:      Attends Club  "or Organization Meetings:      Marital Status:    Intimate Partner Violence:      Fear of Current or Ex-Partner:      Emotionally Abused:      Physically Abused:      Sexually Abused:      FAMILY MEDICAL HISTORY:   Family History   Problem Relation Age of Onset     Diabetes Father      Circulatory Father         PAD     Macular Degeneration Father      Arthritis Mother      Amblyopia No family hx of      Retinal detachment No family hx of      Glaucoma No family hx of      PHYSICAL EXAM:   Temp  Av.2  F (36.2  C)  Min: 96.2  F (35.7  C)  Max: 98.6  F (37  C)      Pulse  Av.8  Min: 54  Max: 67 Resp  Av.9  Min: 13  Max: 22  SpO2  Av.9 %  Min: 70 %  Max: 99 %       BP (!) 140/65 (BP Location: Right arm)   Pulse 61   Temp 97  F (36.1  C) (Oral)   Resp 13   Ht 1.803 m (5' 11\")   Wt 80.1 kg (176 lb 9.6 oz)   SpO2 94%   BMI 24.63 kg/m     Date 21 0700 - 21 0659   Shift 9487-3171 4719-0995 0512-2652 24 Hour Total   INTAKE   Shift Total(mL/kg)       OUTPUT   Urine 200   200   Shift Total(mL/kg) 200(2.5)   200(2.5)   Weight (kg) 80.1 80.1 80.1 80.1      Admit Weight: 79.4 kg (175 lb)     General: sitting in bed, hard of hearing, no acute distress  CV: RRR, no murmur appreciated, well perfused, 2+ edema of LE  Resp: no increased work of breathing, CTAB  Abd: soft, non-tender, non-distended  Neuro: alert and oriented, no focal deficits     LABS:   Recent Labs   Lab 21  1149 21  0845 21  0623 21  0211 21  1351 21  0651 21  1353 21  1754 21  1754   NA  --   --  139  --  138 139 137  --  136   POTASSIUM  --   --  4.5  --  5.3 5.0 5.5*  --  5.7*   CHLORIDE  --   --  110*  --  109 112* 112*  --  113*   CO2  --   --  23  --  24 23 22  --  22   ANIONGAP  --   --  6  --  5 4 3  --  1*   * 116* 136* 193* 334* 127* 109*   < > 259*   BUN  --   --  68*  --  70* 71* 75*  --  70*   CR  --   --  3.09*  --  2.99* 2.98* 2.99*  --  2.89* "   GFRESTIMATED  --   --  17*  --  18* 18* 18*  --  19*   DUSTIN  --   --  8.0*  --  8.5 8.4* 8.6  --  9.0   MAG  --   --   --   --   --  2.1  --   --   --    PROTTOTAL  --   --   --   --   --   --  7.6  --  7.7   ALBUMIN  --   --   --   --   --   --  2.9*  --  3.2*   BILITOTAL  --   --   --   --   --   --  0.3  --  0.3   ALKPHOS  --   --   --   --   --   --  82  --  86   AST  --   --   --   --   --   --  10  --  4   ALT  --   --   --   --   --   --  20  --  19    < > = values in this interval not displayed.     CBC  Recent Labs   Lab 08/19/21  0623 08/18/21  1351 08/18/21  0651 08/17/21  1354   HGB 6.8* 7.8* 7.1* 7.5*   WBC 5.1 4.8 4.9 5.1   RBC 2.55* 2.90* 2.61* 2.75*   HCT 23.0* 26.3* 24.1* 24.9*   MCV 90 91 92 91   MCH 26.7 26.9 27.2 27.3   MCHC 29.6* 29.7* 29.5* 30.1*   RDW 15.1* 15.1* 15.1* 15.0    270 238 247     INRNo lab results found in last 7 days.  ABGNo lab results found in last 7 days.   URINE STUDIES  Recent Labs   Lab Test 08/18/21  0946 10/26/20  1339 01/26/14  2250 01/24/14  2245   COLOR Straw Yellow Yellow Yellow   APPEARANCE Clear Clear Cloudy Cloudy   URINEGLC Negative Negative 30* Negative   URINEBILI Negative Negative Negative Negative   URINEKETONE Negative Negative Negative 10*   SG 1.010 1.020 1.016 1.016   UBLD Negative Negative Large* Large*   URINEPH 5.5 5.5 5.5 5.0   PROTEIN 30 * >=300* 30* 30*   UROBILINOGEN  --  0.2  --   --    NITRITE Negative Negative Negative Negative   LEUKEST Negative Negative Small* Small*   RBCU <1 2-5* >182* >182*   WBCU 1 0 - 5 9* 25*     Recent Labs   Lab Test 08/18/21  0946 10/26/20  1340   UTPG 1.84* 1.49*     PTH  Recent Labs   Lab Test 10/26/20  1319 11/05/19  1411   PTHI 105* 64     IRON STUDIES  Recent Labs   Lab Test 08/18/21  0651 09/24/20  1413 12/12/17  1701 05/05/16  0825 02/25/14  1224   IRON 27* 64 28*  --   --     303 301  --   --    IRONSAT 9* 21 9*  --   --    ROSEMARIE  --  109 207 111 162       IMAGING:  Echo 8/18  Global and regional  left ventricular function is normal with an EF of 55-60%.  Right ventricular function, chamber size, wall motion, and thickness are  normal.  Right ventricular systolic pressure is 51mmHg above the right atrial pressure.  IVC diameter >2.1 cm collapsing <50% with sniff suggests a high RA pressure  estimated at 15 mmHg or greater.  No pericardial effusion is present.    Thelma Mcintosh MD

## 2021-08-19 NOTE — PLAN OF CARE
2211-8812:     Pt desats to 79%. On 2L Oxymask and sating 99%. Pt is [place]. Denies N/V/SOB/pain. Using urinal in bed with adequate UOP. No BM this shift. BG was 193 @0200. Continue POC.

## 2021-08-19 NOTE — PROGRESS NOTES
[facility]    Progress Note - [facility] 2 Service        Date of Admission:  8/17/2021  Date of Service: 08/19/21     Assessment & Plan   86M with history of T2DM, CKD4, and hypertension who presents with progressive fatigue over a few months and found to be volume overloaded concerning for progression of CKD vs CHF.    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 Difficult to assess orthopnea because patient never sleeps flat. CXR does not appear overtly edematous. TTE 8/18 with EF 55-60% without systolic or diastolic dysfunction. He does have severe venous insufficiency and his cardiologist's notes suggest concern that this is leading to difficult-to-control hypertension that could certainly have worsened cardiac function. Alternatively, his presentation could be related to progression of kidney dysfunction. Appears volume overloaded on exam after 2 days of effective diuresis, will continue IV diuresis today. Hyperkalemia improving with diuresis.   - Strict I/O, daily weights  - 2 g Na diet, 2 L fluid restriction  - BMP at 1700  - AM BMP  - discontinue telemetry with resolution of hyperkalemia      Normocytic anemia  6.8 today, required pRBC x1, repeat Hb pending. 7.5 on admission and baseline ~8-9. This is most likely related to advanced CKD, will evaluate epo level. No signs of acute blood loss. Iron deficient on labs, started IV iron. Hemolysis unlikely with normal LDH, low reticulocytes, haptoglobin pending.  - Continue iron sucrose 300 mg q72 hr x3 doses    - Hb recheck at 1700  - CBC daily   - Transfuse to >7    RLE wound  Small area with red, friable base in the center of his right shin in the setting of severe stasis skin changes. Base appears clean and not currently with concern  for infection.  - WOCN consult    LLE edema > RLE  Unchanged venous stasis skin findings, 3+ edema on left, 1+ on right, no associated pain. No DVT on LLE ultrasound 8/18.      Thyroid nodule  Subclinical hypothyroidism  He has already been referred to an endocrinologist to evaluate the nodule further. TSH is elevated at 7.46, though FT4 is wnl.  - Consider further workup inpatient vs close outpatient follow up     Type 2 DM  Poorly controlled, last A1c 9.5. Currently managed with januvia, glipizide, and recently-added bedtime glargine. Inpatient -140. Started glargine 10 at night with improvement in sugars.   - Fingersticks, hypoglycemia protocol, sliding scale insulin, glargine 10u at bedtime     Hypertension  Has been difficult to manage as an outpatient, follows with cardiology for this. Will continue to monitor BP with diuresis, may consider adding calcium channel blocker.   - Continue outpatient regimen: hydralazine 100 mg TID, hydrochlorothiazide 12.5 mg daily, and coreg 25 mg BID  - Start amlodipine 5mg daily today   - Consider IV hydralazine rather than labetalol if he becomes acutely hypertensive overnight given his bradycardia       BPH  - Continue pta flomax and finasteride     Osteoporosis  - Continue pta vit D           Diet: Fluid restriction 2000 ML FLUID  Combination Diet Low Saturated Fat Na <2400mg Diet, No Caffeine Diet  NPO for Medical/Clinical Reasons Except for: Meds    DVT Prophylaxis: Heparin SQ  High Catheter: Not present  Fluids: None  Central Lines: None  Code Status: No CPR- Do NOT Intubate  discussed on admission with pt and 2 children, he has previously filled out medical directive paperwork stating this wish    Disposition Plan   Expected discharge: >2 nights, recommended to TCU per PT/OT evaluations, once fluid status improved and outpatient diuresis plan established.     The patient's care was discussed with Dr. Daly.       Paula Arciniega MD  Internal Medicine  PGY1  [phone]  [facility] 2 Service      ______________________________________________________________________    Interval History   Patient with new oxygen requirement overnight and today (up to 4L) patient feeling very fatigued with some SOB. Required blood transfusion x1. BP overnight persistently 150-160s systolic. Denies fevers or chills, chest pain, abdominal pain.     4 point review of systems otherwise negative.       Physical Exam   Vital Signs: Temp: 97  F (36.1  C) Temp src: Oral BP: (!) 148/61 Pulse: 65   Resp: 16 SpO2: 92 % O2 Device: None (Room air) Oxygen Delivery: 4 LPM  Weight: 176 lbs 9.6 oz  Constitutional: awake, alert, cooperative, NAD  HEENT: normocephalic, anicteric sclerae, MMM   Pulmonary: no increased work of breathing on room air, lungs clear to auscultation bilaterally   Cardiovascular: RRR, normal S1 and S2, no murmur appreciated   GI: soft, non-tender, non-distended  Skin: warm, dry, venous stasis over LE with flaking and dry skin  MSK: 2+ bilateral edema on LE   Neuro: AAOx3, no gross focal neurologic deficits      Data   All labs and imaging reviewed.

## 2021-08-19 NOTE — PROGRESS NOTES
Care Management Progress Note    Length of Stay (days): 2    Expected Discharge Date: 08/21/2021     Concerns to be Addressed:  Discharge planning       Patient plan of care discussed at interdisciplinary rounds: Yes    Anticipated Discharge Disposition:  TCU     Additional Information:     attempted to visit with patient and family to discuss discharge planning. Patient asleep and did not awake to voice.      will attempt to revisit with patient 8/20/21.    NETTA Lewis  7D Hematology/Oncology Social Worker  Phone: [phone]  Pager: [phone]  [email or web address]

## 2021-08-19 NOTE — CONSULTS
[facility] Nurse Inpatient Wound Assessment   Reason for consultation: Evaluate and treat  R shin wounds    Assessment  R shin wounds due to Trauma and Venous Ulcer  Status: initial assessment    Treatment Plan  R shin wounds: Daily  cleanse with microKlenz and dry, apply sween cream (pink top lotion) to all intact skin on BLE, avoid toes. Cut to fit vaseline gauze and place over open areas, then cover with ABD pad and secure with kerlix.      Orders Written    WO Nurse follow-up plan:weekly  Nursing to notify the Provider(s) and re-consult the WO Nurse if wound(s) deteriorates or new skin concern.    Patient History  According to provider note(s):  86M with history of T2DM, CKD4, and hypertension who presents with progressive fatigue over a few months and found to be volume overloaded concerning for progression of CKD vs CHF.    Objective Data  Containment of urine/stool: Incontinence Protocol    Active Diet Order  Orders Placed This Encounter      Combination Diet Low Saturated Fat Na <2400mg Diet, No Caffeine Diet      Output:   I/O last 3 completed shifts:  In: 325 [P.O.:325]  Out: 2575 [Urine:2575]    Risk Assessment:   Sensory Perception: 4-->no impairment  Moisture: 4-->rarely moist  Activity: 3-->walks occasionally  Mobility: 3-->slightly limited  Nutrition: 3-->adequate  Friction and Shear: 2-->potential problem  Fortunato Score: 19                          Labs: Recent Labs   Lab 08/18/21  1351 08/17/21  1353   ALBUMIN  --  2.9*   HGB 7.8*  --    WBC 4.8  --        Physical Exam  Areas of skin assessed: focused BLE    Wound Location:  R shin   Date of last photo 8/18  Wound History: venous stasis to BLE, R shin wound opens and closes per pt    Wound Base: 100 % dermis     Palpation of the wound bed: normal      Drainage: small     Description of drainage: serosanguinous     Measurements (length x width x depth, in cm) two open areas each measuring about 1.5cm x 2cm x 0.1cm     Tunneling N/A     Undermining  N/A  Periwound skin: edematous and erythema- blanchable      Color: pink      Temperature: normal   Odor: none  Pain: mild, tender      Interventions  Visual inspection and assessment completed  Wound Care Rationale Provide protection   Wound Care: done per plan of care  Supplies: floor stock  Current off-loading measures: Pillows  Current support surface: Standard  Low air loss mattress  Education provided to: plan of care  Discussed plan of care with Patient and Nurse    Lauren Medrano RN, CWOCN

## 2021-08-19 NOTE — PLAN OF CARE
0975-5485: VSS. Oxygen weaned this AM and pt is now at 92% on RA. Oral lasix given at 0800 with little output since. K+ 4.5. PIV replaced d/t leaking. Received 1 unit of blood for hgb 6.8, tolerated well.  & 218. 2 units novolog given with lunch per sliding scale. Large loose, brown stool. Pt is fatigued today, slept most of the shift. Reports he is not feeling well. Edema improved to BLE. Renal ultrasound tomorrow. NPO at midnight.    8056-2577: HGB re-check was 7.8. Pt reports feeling so tired today and has been sleeping all day. Has only had 200 ml of urine out after IV Lasix.

## 2021-08-19 NOTE — PROGRESS NOTES
CLINICAL NUTRITION SERVICES - ASSESSMENT NOTE     Nutrition Prescription    RECOMMENDATIONS FOR MDs/PROVIDERS TO ORDER:  - Recommend checking PO4 and Zinc with next BMP draw due to risk for depletion secondary to chronic diarrhea and eating poorly PTA  - If pt to remain hospitalized > 72 hrs, recommend obtaining calorie counts to help quantify po adequacy  - Would consider trial of appetite stimulant if po intakes low (< 50% of meals)    Malnutrition Status:    - Unable to determine at this time    Recommendations already ordered by Registered Dietitian (RD):  - Enter prn order for ONS (Glucerna d/t h/o diabetes) per pt's request    Future/Additional Recommendations:  - Monitor wt trends with h/o diarrhea and poor po intakes  - Monitor po tolerance/adequacy of intakes (per calorie counts if ordered), need for diet education on tips for improving appetite/po intakes.       REASON FOR ASSESSMENT  Farzad East is a/an 86 year old adult assessed by the dietitian for positive Admission Nutrition Risk Screen for wt loss of 2 - 13 pounds and eating poorly because of a decreased appetite.    CLINICAL HISTORY  Per H&P, pt presents with history of T2DM, CKD4, and hypertension who presents with progressive fatigue over a few months and found to be volume overloaded concerning for progression of CKD vs CHF.     NUTRITION HISTORY  Unable to obtain from pt secondary to sleeping. Per chart review, reported that pt hasn't been eating well PTA and also with h/o chronic diarrhea.       CURRENT NUTRITION ORDERS  Diet: Low Saturated Fat/2400 mg Sodium and 2000 mL Fluid Restriction   Intake/Tolerance: Consuming betw 50 to 100% of meals since admit.    LABS  K+ WNL today and Mg++ WNL yesterday, no PO4 ordered yet this admission. Pt at risk for depletion d/t h/o diarrhea PTA  BUN/Cr 68/3.09 (high) today  Calcium 8.0 (low) today  (-) Ketones per UA on 8/18  Hemoglobin A1C 9.5 (high) on 7/16     MEDICATIONS  Thera-Vit-M   Vitamin D3  "  Lasix  Novolog TID (medium resistance) with meals and bedtime    GI:   Stool x one on 8/17 and 8/18.    ANTHROPOMETRICS  Height: 180.3 cm (5' 11\")  Most Recent Weight: 80.1 kg (176 lb 9.6 oz) - today's, Admit wt = 79.4 kg (175 lbs) on 8/17 (lowest wt this admission)  IBW: 78 kg  BMI: Normal BMI  Weight History: Unable to obtain from pt. Per review of EMR, noted wt loss of 6 lbs (3.3 % loss) x past 4 mos  82.1 kg (181 lb) 04/06/2021      Dosing Weight: 79 kg (based on lowest wt of 79.4 kg on 8/17)    ASSESSED NUTRITION NEEDS  Estimated Energy Needs: 9390-4134 kcals/day (25 - 30 kcals/kg)  Justification: Maintenance  Estimated Protein Needs: 79-95 grams protein/day (1 - 1.2 grams of pro/kg)  Justification: Maintenance  Estimated Fluid Needs: 2000 mL/day   Justification: Per provider     PHYSICAL FINDINGS  See malnutrition section below.  WOC consult for LE wound. Per assessment on 8/18, R shin wound d/t trauma and venous ulcer.      MALNUTRITION  % Intake: Unable to assess  % Weight Loss: Weight loss does not meet criteria  Subcutaneous Fat Loss: Unable to assess  Muscle Loss: Unable to assess  Fluid Accumulation/Edema: Moderate  Malnutrition Diagnosis: Unable to determine due to unable to complete all parameters of nutrition assessment.    NUTRITION DIAGNOSIS  Untended wt loss related to question inadequate nutritional intakes secondary to poor appetite vs h/o chronic diarrhea as evidenced by wt loss of 6 lbs (3.3 % loss) x past 4 mos and currently consuming betw 50 to 100% of his meals since admit.      INTERVENTIONS  Implementation  Nutrition Education: No education needs assessed at this time   Medical food supplement therapy     Goals  1. Patient to consume % of nutritionally adequate meal trays TID, or the equivalent with supplements/snacks.   2. Wt not to decline < 79 kg    Monitoring/Evaluation  Progress toward goals will be monitored and evaluated per protocol.  Vanessa Witt RD,LD  7D pager " [phone]

## 2021-08-19 NOTE — PROGRESS NOTES
Assumed cares of pt from 7219-3803. Pt is AO, continues to be tachycardic and given nightly hydralazine. Pt is [place] but able to communicate without pocket talker.  Denies pain or nausea. Using urinal in bed, adequate UOP. No BM this shift. HS BG was 268, sliding scale insulin given and Lantus started tonight as well. Continue POC.

## 2021-08-20 ENCOUNTER — APPOINTMENT (OUTPATIENT)
Dept: ULTRASOUND IMAGING | Facility: CLINIC | Age: 86
DRG: 683 | End: 2021-08-20
Payer: MEDICARE

## 2021-08-20 ENCOUNTER — APPOINTMENT (OUTPATIENT)
Dept: OCCUPATIONAL THERAPY | Facility: CLINIC | Age: 86
DRG: 683 | End: 2021-08-20
Payer: MEDICARE

## 2021-08-20 LAB
ANION GAP SERPL CALCULATED.3IONS-SCNC: 5 MMOL/L (ref 3–14)
BUN SERPL-MCNC: 65 MG/DL (ref 7–30)
CALCIUM SERPL-MCNC: 8.2 MG/DL (ref 8.5–10.1)
CHLORIDE BLD-SCNC: 107 MMOL/L (ref 94–109)
CO2 SERPL-SCNC: 26 MMOL/L (ref 20–32)
CREAT SERPL-MCNC: 3.21 MG/DL (ref 0.52–1.25)
ERYTHROCYTE [DISTWIDTH] IN BLOOD BY AUTOMATED COUNT: 15.1 % (ref 10–15)
GFR SERPL CREATININE-BSD FRML MDRD: 17 ML/MIN/1.73M2
GLUCOSE BLD-MCNC: 92 MG/DL (ref 70–99)
GLUCOSE BLDC GLUCOMTR-MCNC: 230 MG/DL (ref 70–99)
GLUCOSE BLDC GLUCOMTR-MCNC: 282 MG/DL (ref 70–99)
GLUCOSE BLDC GLUCOMTR-MCNC: 305 MG/DL (ref 70–99)
GLUCOSE BLDC GLUCOMTR-MCNC: 92 MG/DL (ref 70–99)
HCT VFR BLD AUTO: 25.7 % (ref 35–53)
HGB BLD-MCNC: 7.9 G/DL (ref 11.7–17.7)
MAGNESIUM SERPL-MCNC: 1.9 MG/DL (ref 1.6–2.3)
MCH RBC QN AUTO: 27.4 PG (ref 26.5–33)
MCHC RBC AUTO-ENTMCNC: 30.7 G/DL (ref 31.5–36.5)
MCV RBC AUTO: 89 FL (ref 78–100)
PHOSPHATE SERPL-MCNC: 5 MG/DL (ref 2.5–4.5)
PLATELET # BLD AUTO: 221 10E3/UL (ref 150–450)
POTASSIUM BLD-SCNC: 4.3 MMOL/L (ref 3.4–5.3)
RBC # BLD AUTO: 2.88 10E6/UL (ref 3.8–5.9)
SODIUM SERPL-SCNC: 138 MMOL/L (ref 133–144)
WBC # BLD AUTO: 5.1 10E3/UL (ref 4–11)

## 2021-08-20 PROCEDURE — 97530 THERAPEUTIC ACTIVITIES: CPT | Mod: GO | Performed by: OCCUPATIONAL THERAPIST

## 2021-08-20 PROCEDURE — 250N000013 HC RX MED GY IP 250 OP 250 PS 637: Performed by: STUDENT IN AN ORGANIZED HEALTH CARE EDUCATION/TRAINING PROGRAM

## 2021-08-20 PROCEDURE — 85027 COMPLETE CBC AUTOMATED: CPT

## 2021-08-20 PROCEDURE — 84100 ASSAY OF PHOSPHORUS: CPT

## 2021-08-20 PROCEDURE — 99232 SBSQ HOSP IP/OBS MODERATE 35: CPT | Mod: GC | Performed by: INTERNAL MEDICINE

## 2021-08-20 PROCEDURE — 83735 ASSAY OF MAGNESIUM: CPT

## 2021-08-20 PROCEDURE — 250N000013 HC RX MED GY IP 250 OP 250 PS 637

## 2021-08-20 PROCEDURE — 93975 VASCULAR STUDY: CPT | Mod: 26 | Performed by: RADIOLOGY

## 2021-08-20 PROCEDURE — 80048 BASIC METABOLIC PNL TOTAL CA: CPT

## 2021-08-20 PROCEDURE — 36415 COLL VENOUS BLD VENIPUNCTURE: CPT

## 2021-08-20 PROCEDURE — 250N000011 HC RX IP 250 OP 636

## 2021-08-20 PROCEDURE — 250N000011 HC RX IP 250 OP 636: Performed by: STUDENT IN AN ORGANIZED HEALTH CARE EDUCATION/TRAINING PROGRAM

## 2021-08-20 PROCEDURE — 93975 VASCULAR STUDY: CPT

## 2021-08-20 PROCEDURE — 120N000002 HC R&B MED SURG/OB UMMC

## 2021-08-20 PROCEDURE — 76770 US EXAM ABDO BACK WALL COMP: CPT

## 2021-08-20 RX ORDER — FUROSEMIDE 10 MG/ML
80 INJECTION INTRAMUSCULAR; INTRAVENOUS ONCE
Status: COMPLETED | OUTPATIENT
Start: 2021-08-20 | End: 2021-08-20

## 2021-08-20 RX ORDER — NIFEDIPINE 30 MG/1
30 TABLET, EXTENDED RELEASE ORAL DAILY
Status: DISCONTINUED | OUTPATIENT
Start: 2021-08-21 | End: 2021-08-29

## 2021-08-20 RX ADMIN — INSULIN GLARGINE 10 UNITS: 100 INJECTION, SOLUTION SUBCUTANEOUS at 21:40

## 2021-08-20 RX ADMIN — Medication 1 TABLET: at 09:13

## 2021-08-20 RX ADMIN — HYDROCHLOROTHIAZIDE 12.5 MG: 12.5 TABLET ORAL at 09:14

## 2021-08-20 RX ADMIN — TAMSULOSIN HYDROCHLORIDE 0.4 MG: 0.4 CAPSULE ORAL at 09:13

## 2021-08-20 RX ADMIN — INSULIN ASPART 4 UNITS: 100 INJECTION, SOLUTION INTRAVENOUS; SUBCUTANEOUS at 18:04

## 2021-08-20 RX ADMIN — HYDRALAZINE HYDROCHLORIDE 100 MG: 100 TABLET ORAL at 21:40

## 2021-08-20 RX ADMIN — FUROSEMIDE 80 MG: 10 INJECTION, SOLUTION INTRAVENOUS at 18:06

## 2021-08-20 RX ADMIN — AMLODIPINE BESYLATE 5 MG: 5 TABLET ORAL at 09:14

## 2021-08-20 RX ADMIN — HYDRALAZINE HYDROCHLORIDE 100 MG: 100 TABLET ORAL at 09:15

## 2021-08-20 RX ADMIN — FUROSEMIDE 80 MG: 10 INJECTION, SOLUTION INTRAVENOUS at 10:40

## 2021-08-20 RX ADMIN — INSULIN GLARGINE 10 UNITS: 100 INJECTION, SOLUTION SUBCUTANEOUS at 21:41

## 2021-08-20 RX ADMIN — HEPARIN SODIUM 5000 UNITS: 5000 INJECTION, SOLUTION INTRAVENOUS; SUBCUTANEOUS at 21:40

## 2021-08-20 RX ADMIN — CARVEDILOL 25 MG: 25 TABLET, FILM COATED ORAL at 09:13

## 2021-08-20 RX ADMIN — INSULIN ASPART 2 UNITS: 100 INJECTION, SOLUTION INTRAVENOUS; SUBCUTANEOUS at 13:21

## 2021-08-20 RX ADMIN — HEPARIN SODIUM 5000 UNITS: 5000 INJECTION, SOLUTION INTRAVENOUS; SUBCUTANEOUS at 09:14

## 2021-08-20 RX ADMIN — FINASTERIDE 5 MG: 5 TABLET, FILM COATED ORAL at 09:15

## 2021-08-20 RX ADMIN — CARVEDILOL 25 MG: 25 TABLET, FILM COATED ORAL at 18:06

## 2021-08-20 RX ADMIN — ASPIRIN 81 MG: 81 TABLET, DELAYED RELEASE ORAL at 09:13

## 2021-08-20 RX ADMIN — HYDRALAZINE HYDROCHLORIDE 100 MG: 100 TABLET ORAL at 13:21

## 2021-08-20 RX ADMIN — Medication 25 MCG: at 09:13

## 2021-08-20 ASSESSMENT — ACTIVITIES OF DAILY LIVING (ADL)
ADLS_ACUITY_SCORE: 21

## 2021-08-20 ASSESSMENT — MIFFLIN-ST. JEOR: SCORE: 1491.85

## 2021-08-20 NOTE — PLAN OF CARE
"Alert and oriented X 4. AVSS. Denies pain or SOB. C/o fatigue and 'lack of stamina'. \"I feel worse than when I came in.\" He reports joint aches but insists that it's not pain and that he doesn't need pain medication. Has sonorous wheeze on expiration. Cough and deep breathing encouraged. Lower extremities remain edematous. Sleeping in between cares.   "

## 2021-08-20 NOTE — PROGRESS NOTES
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 Renal US was ordered in 2020, but was not completed. Presented with creatinine of 2.9 which has stayed relatively stable during his hospitalization. Concern for HFpEF vs worsening of his CKD, but he is certainly volume overloaded regardless. UA unremarkable, moderate non-nephrotic range proteinuria. He has responded well to diuretics thus far without worsening creatinine and is hypervolemic on exam - would recommend increasing diureses. Creatinine may be lagging, favor cardiorenal at this time.   - Avoid nephrotoxic agents and renally dose medications  - Strict I/O's, daily weights  - Continue to diurese, 80mg IV furosemide BID  - Renal ultrasound with doppler shows medical renal disease     Recommendations were communicated to primary team via note and page.     Seen and discussed with Dr. Garcia.     Humza Trinidad MD   [phone]    Interval History :   Nursing and provider notes from last 24 hours reviewed.    Feels okay this morning, no major change from yesterday per him.    Review of Systems:   4 point review of systems performed and negative except as above.    Physical Exam:   I/O last 3 completed shifts:  In: 750 [P.O.:400]  Out: 2225 [Urine:2225]   BP (!) 141/54 (BP Location: Right arm)   Pulse 63   Temp (!) 96.5  F (35.8  C) (Oral)   Resp 22   Ht 1.803 m (5' 11\")   Wt 78.5 kg (173 lb)   SpO2 94%   BMI 24.13 kg/m       General: sitting in bed, hard of hearing, no acute distress  CV: RRR, no murmur appreciated, well perfused, 2+ edema of LE  Resp: no increased work of " breathing, CTAB  Abd: soft, non-tender, non-distended  Neuro: alert and oriented, no focal deficits     Labs:   All labs reviewed by me  Electrolytes/Renal - Recent Labs   Lab Test 08/20/21  1217 08/20/21  0750 08/20/21  0548 08/19/21  1655 08/19/21  0623 08/18/21  0839 08/18/21  0651 08/16/21  1754 03/16/21  1420 02/03/21  1527 10/26/20  1319 01/30/14  0527 01/29/14  0547   NA  --   --  138 137 139   < > 139  --  136   < > 138  --  138   POTASSIUM  --   --  4.3 4.3 4.5   < > 5.0  --  4.5   < > 5.3  --  4.5   CHLORIDE  --   --  107 107 110*   < > 112*  --  105   < > 109  --  108   CO2  --   --  26 24 23   < > 23  --  24   < > 26  --  21   BUN  --   --  65* 66* 68*   < > 71*  --  58*   < > 60*  --  34*   CR  --   --  3.21* 3.05* 3.09*   < > 2.98*  --  2.32*   < > 2.10*  --  1.70*   * 92 92 201* 136*   < > 127*   < > 373*   < > 221*  --  213*   DUSTIN  --   --  8.2* 7.8* 8.0*   < > 8.4*  --  8.5   < > 8.9  --  8.5   MAG  --   --  1.9  --   --   --  2.1  --   --   --   --   --  1.7   PHOS  --   --  5.0*  --   --   --   --   --  3.9  --  4.5   < >  --     < > = values in this interval not displayed.       CBC -   Recent Labs   Lab Test 08/20/21  0548 08/19/21  1655 08/19/21  0623 08/18/21  1351   WBC 5.1  --  5.1 4.8   HGB 7.9* 7.8* 6.8* 7.8*     --  233 270       LFTs -   Recent Labs   Lab Test 08/17/21  1353 08/16/21  1754 03/16/21  1420 03/11/21  1527   ALKPHOS 82 86  --  88   BILITOTAL 0.3 0.3  --  0.2   ALT 20 19  --  13   AST 10 4  --  5   PROTTOTAL 7.6 7.7  --  7.1   ALBUMIN 2.9* 3.2* 3.0* 3.0*       Iron Panel -   Recent Labs   Lab Test 08/18/21  0651 09/24/20  1413 12/12/17  1701   IRON 27* 64 28*   IRONSAT 9* 21 9*   ROSEMARIE  --  109 207         Imaging:  Reviewed.    Current Medications:    aspirin  81 mg Oral Daily     carvedilol  25 mg Oral BID w/meals     finasteride  5 mg Oral Daily     heparin ANTICOAGULANT  5,000 Units Subcutaneous Q12H     hydrALAZINE  100 mg Oral TID     hydrochlorothiazide   12.5 mg Oral QAM     insulin aspart  1-7 Units Subcutaneous TID AC     insulin aspart  1-5 Units Subcutaneous At Bedtime     insulin glargine  10 Units Subcutaneous At Bedtime     iron sucrose (VENOFER) intermittent infusion  300 mg Intravenous Q72H     multivitamin w/minerals  1 tablet Oral Daily     [START ON 8/21/2021] NIFEdipine ER OSMOTIC  30 mg Oral Daily     sodium chloride (PF)  3 mL Intracatheter Q8H     tamsulosin  0.4 mg Oral Daily     Vitamin D3  25 mcg Oral Daily       Humza Trinidad MD

## 2021-08-20 NOTE — PROGRESS NOTES
[facility]    Progress Note - [facility] Service        Date of Admission:  8/17/2021  Date of Service: 08/20/21     Assessment & Plan   86M with history of T2DM, CKD4, and hypertension who presents with progressive fatigue over a few months and found to be volume overloaded concerning for progression of CKD vs CHF.    Today:  - Continuing IV diuresis   - Switch amlodipine to nifedipine tomorrow AM     Volume overload  RONY on CKD4  Hyperkalemia- resolved  Presented with dyspnea on exertion, increased LE edema, and generalized fatigue. CXR does not appear overtly edematous. TTE 8/18 with EF 55-60% without systolic or diastolic dysfunction. He does have severe venous insufficiency and his cardiologist's notes suggest concern that this is leading to difficult-to-control hypertension that could certainly have worsened cardiac function. Alternatively, his presentation could be related to progression of kidney dysfunction. Appears volume overloaded on exam after 2 days of effective diuresis, will continue IV diuresis today. Hyperkalemia improving with diuresis.   - Strict I/O, daily weights  - 2 g Na diet, 2 L fluid restriction  - Daily BMP  - 80mg IV lasix x2 today       Normocytic anemia  Stabilized to 7.9 today, with appropriate response to 1x pRBC 8/19 (hb 6.8). Hemoglobin 7.5 on admission and baseline ~8-9. This is most likely related to advanced CKD, will evaluate epo level. No signs of acute blood loss. Iron deficient on labs, started IV iron. Hemolysis unlikely with normal LDH, low reticulocytes, haptoglobin pending.  - Continue iron sucrose 300 mg q72 hr x3 doses    - Epo level pending   - CBC daily   - Transfuse to >7    RLE wound  Small area with red, friable base in the center of his right shin in the setting of severe stasis skin changes. Base appears clean and not currently with concern for infection.  - WOCN consult    LLE edema > RLE- resolved  No DVT on LLE  ultrasound 8/18.      Thyroid nodule  Subclinical hypothyroidism  He has already been referred to an endocrinologist to evaluate the nodule further. TSH is elevated at 7.46, though FT4 is wnl.  - Consider further workup inpatient vs close outpatient follow up     Type 2 DM  Poorly controlled, last A1c 9.5. Currently managed with januvia, glipizide, and recently-added bedtime glargine. Inpatient -140. Started glargine 10 at night with improvement in sugars.   - Fingersticks, hypoglycemia protocol, sliding scale insulin, glargine 10u at bedtime     Hypertension  Has been difficult to manage as an outpatient, follows with cardiology for this. Will continue to monitor BP with diuresis, may consider adding calcium channel blocker.   - Continue outpatient regimen: hydralazine 100 mg TID, hydrochlorothiazide 12.5 mg daily, and coreg 25 mg BID  - Switch amlodipine to nifedipine 30mg tomorrow  - Consider IV hydralazine rather than labetalol if he becomes acutely hypertensive overnight given his bradycardia       BPH  - Continue pta flomax and finasteride     Osteoporosis  - Continue pta vit D           Diet: Fluid restriction 2000 ML FLUID  Room Service  Combination Diet Low Saturated Fat Na <2400mg Diet, No Caffeine Diet    DVT Prophylaxis: Heparin SQ  High Catheter: Not present  Fluids: None  Central Lines: None  Code Status: No CPR- Do NOT Intubate  discussed on admission with pt and 2 children, he has previously filled out medical directive paperwork stating this wish    Disposition Plan   Expected discharge: >2 nights, recommended to TCU per PT/OT evaluations, once fluid status improved and outpatient diuresis plan established.     The patient's care was discussed with Dr. Daly.       Paula Arciniega MD  Internal Medicine PGY1  [phone]  [facility] Service      ______________________________________________________________________    Interval History   No acute overnight events. Diuresis yesterday effective with  PM 80mg IV lasix. Patient back to room air since 8/19 afternoon, but continues to feel very tired. Reporting feeling worsened leg pain/swelling today bilaterally. Denies chest pain, shortness of breath, no signs of bleeding, having brown bowel movements.     4 point review of systems otherwise negative.       Physical Exam   Vital Signs: Temp: (!) 96.5  F (35.8  C) Temp src: Oral BP: (!) 141/54 Pulse: 63   Resp: 22 SpO2: 94 % O2 Device: None (Room air)    Weight: 173 lbs 0 oz  Constitutional: awake, alert, cooperative, NAD  HEENT: normocephalic, anicteric sclerae, MMM   Pulmonary: no increased work of breathing on room air, lungs clear to auscultation bilaterally   Cardiovascular: RRR, normal S1 and S2, no murmur appreciated   GI: soft, non-tender, non-distended  Skin: warm, dry, venous stasis over LE with flaking and dry skin  MSK: 2+ bilateral edema on LE   Neuro: AAOx3, no gross focal neurologic deficits      Data   All labs and imaging reviewed.     Renal US shows medical renal disease without obstruction.

## 2021-08-20 NOTE — PROGRESS NOTES
Patient was up in chair for some hours after the kidney U/S, and was seen by different teams.  Patient is alert and oriented, denies pain, his concern is having dry skin especially on the legs and the team explained to him that it is due to the fact that they increased his diuresis.  IV lasix was given and has been making good urine.  Had small BM this morning, right leg dressing wound done, noted small drainage in the old dressing.  Up with assist of one with walker.  Appetite is fair, BS was 92 and 230. Creatinine was 3.21 and nurse will continue to monitor.

## 2021-08-20 NOTE — CONSULTS
Care Management Initial Consult    General Information  Assessment completed with: Patient, Children, VM-chart review  Type of CM/SW Visit: Initial Assessment  Primary Care Provider verified and updated as needed: Yes   Readmission within the last 30 days: no previous admission in last 30 days   Reason for Consult: discharge planning  Advance Care Planning Reviewed: education/resources on health care directives provided        Communication Assessment  Patient's communication style: spoken language (English or Bilingual)    Hearing Difficulty or Deaf: yes   Wear Glasses or Blind: yes    Cognitive  Cognitive/Neuro/Behavioral: WDL                      Living Environment:   People in home: alone     Current living Arrangements: apartment      Able to return to prior arrangements: yes     Family/Social Support:  Care provided by: self  Provides care for: no one  Marital Status:   Description of Support System: Supportive, Involved - Children, 2 sons   Support Assessment: Adequate family and caregiver support, Adequate social supports    Current Resources:   Patient receiving home care services: No  Community Resources: None  Equipment currently used at home: grab bar, toilet, grab bar, tub/shower, walker, rolling  Supplies currently used at home: None    Employment/Financial:  Employment Status: retired     Financial Concerns: No concerns identified   Referral to Financial Counselor: No       Lifestyle & Psychosocial Needs:  Social Determinants of Health     Tobacco Use: Low Risk      Smoking Tobacco Use: Never Smoker     Smokeless Tobacco Use: Never Used   Alcohol Use:      Frequency of Alcohol Consumption:      Average Number of Drinks:      Frequency of Binge Drinking:    Financial Resource Strain:      Difficulty of Paying Living Expenses:    Food Insecurity:      Worried About Running Out of Food in the Last Year:      Ran Out of Food in the Last Year:    Transportation Needs:      Lack of Transportation  (Medical):      Lack of Transportation (Non-Medical):    Physical Activity:      Days of Exercise per Week:      Minutes of Exercise per Session:    Stress:      Feeling of Stress :    Social Connections:      Frequency of Communication with Friends and Family:      Frequency of Social Gatherings with Friends and Family:      Attends [employer] Services:      Active Member of Clubs or Organizations:      Attends Club or Organization Meetings:      Marital Status:    Intimate Partner Violence:      Fear of Current or Ex-Partner:      Emotionally Abused:      Physically Abused:      Sexually Abused:    Depression: Not at risk     PHQ-2 Score: 0   Housing Stability:      Unable to Pay for Housing in the Last Year:      Number of Places Lived in the Last Year:      Unstable Housing in the Last Year:        Mental Health Status:  Mental Health Status: No Current Concerns       Chemical Dependency Status:  Chemical Dependency Status: No Current Concerns           Values/Beliefs:  Spiritual, Cultural Beliefs, [employer]an Practices, Values that affect care: no               Additional Information:    Patient is a 86M with history of T2DM, CKD4, and hypertension who presents with progressive fatigue over a few months and found to be volume overloaded concerning for progression of CKD vs CHF.    PT/OT recommending TCU placement at time of discharge.      met with patient and patient's son in their hospital room; introduced self and explained role. Discussed PT/OT recommendations of SNF placement at time of discharge; patient and son confirmed recommendations.       discussed the SNF referral process. Provided the Medicare Compare list for SNF, with associated star ratings to assist with choice for referrals/discharge planning.  provided education regarding the star ratings and that they are updated/maintained by Medicare and can be reviewed by visiting www.medicare.gov. Patient stated that  he has been to two different facilities in the past and would be open to returning to them if needed. Patient and son requested initial referrals be submitted to the following:    [facility]  [address] [place]  [address]  P: [phone]  F: [phone]    [facility]  [address] [place] [address]  [address]  P: [phone]  F: [phone]     Patient confirmed that he has received both COVID vaccines.     No further questions or concerns reported at this time.  provided contact information and encouraged patient to call if any further needs arise.    NETTA Lewis Hematology/Oncology Social Worker  Phone: [phone]  Pager: [phone]  [email or web address]

## 2021-08-20 NOTE — PLAN OF CARE
1900 - 2300  VS stable, afebrile. Pt denied pain/SOB/n/v. BG at bedtime 198, lantus given as ordered. Tired and rested between nursing cares. Good UOP. Pt benefits from pocket talker. No significant events, continue POC.

## 2021-08-21 LAB
ALBUMIN UR-MCNC: 30 MG/DL
ANION GAP SERPL CALCULATED.3IONS-SCNC: 6 MMOL/L (ref 3–14)
APPEARANCE UR: CLEAR
BILIRUB UR QL STRIP: NEGATIVE
BUN SERPL-MCNC: 72 MG/DL (ref 7–30)
CALCIUM SERPL-MCNC: 8.2 MG/DL (ref 8.5–10.1)
CHLORIDE BLD-SCNC: 106 MMOL/L (ref 94–109)
CO2 SERPL-SCNC: 26 MMOL/L (ref 20–32)
COLOR UR AUTO: ABNORMAL
CREAT SERPL-MCNC: 3.47 MG/DL (ref 0.52–1.25)
ERYTHROCYTE [DISTWIDTH] IN BLOOD BY AUTOMATED COUNT: 15.1 % (ref 10–15)
GFR SERPL CREATININE-BSD FRML MDRD: 15 ML/MIN/1.73M2
GLUCOSE BLD-MCNC: 213 MG/DL (ref 70–99)
GLUCOSE BLDC GLUCOMTR-MCNC: 190 MG/DL (ref 70–99)
GLUCOSE BLDC GLUCOMTR-MCNC: 241 MG/DL (ref 70–99)
GLUCOSE BLDC GLUCOMTR-MCNC: 283 MG/DL (ref 70–99)
GLUCOSE BLDC GLUCOMTR-MCNC: 300 MG/DL (ref 70–99)
GLUCOSE BLDC GLUCOMTR-MCNC: 341 MG/DL (ref 70–99)
GLUCOSE UR STRIP-MCNC: 30 MG/DL
HCT VFR BLD AUTO: 25.6 % (ref 35–53)
HGB BLD-MCNC: 7.9 G/DL (ref 11.7–17.7)
HGB UR QL STRIP: NEGATIVE
HYALINE CASTS: 4 /LPF
KETONES UR STRIP-MCNC: NEGATIVE MG/DL
LEUKOCYTE ESTERASE UR QL STRIP: NEGATIVE
MCH RBC QN AUTO: 27.2 PG (ref 26.5–33)
MCHC RBC AUTO-ENTMCNC: 30.9 G/DL (ref 31.5–36.5)
MCV RBC AUTO: 88 FL (ref 78–100)
MUCOUS THREADS #/AREA URNS LPF: PRESENT /LPF
NITRATE UR QL: NEGATIVE
PH UR STRIP: 5 [PH] (ref 5–7)
PLATELET # BLD AUTO: 222 10E3/UL (ref 150–450)
POTASSIUM BLD-SCNC: 4.4 MMOL/L (ref 3.4–5.3)
RBC # BLD AUTO: 2.9 10E6/UL (ref 3.8–5.9)
RBC URINE: 13 /HPF
SODIUM SERPL-SCNC: 138 MMOL/L (ref 133–144)
SODIUM UR-SCNC: 94 MMOL/L
SP GR UR STRIP: 1.01 (ref 1–1.03)
UROBILINOGEN UR STRIP-MCNC: NORMAL MG/DL
WBC # BLD AUTO: 6.2 10E3/UL (ref 4–11)
WBC URINE: 1 /HPF

## 2021-08-21 PROCEDURE — 85027 COMPLETE CBC AUTOMATED: CPT

## 2021-08-21 PROCEDURE — 80048 BASIC METABOLIC PNL TOTAL CA: CPT

## 2021-08-21 PROCEDURE — 99232 SBSQ HOSP IP/OBS MODERATE 35: CPT | Mod: GC | Performed by: INTERNAL MEDICINE

## 2021-08-21 PROCEDURE — 84300 ASSAY OF URINE SODIUM: CPT | Performed by: STUDENT IN AN ORGANIZED HEALTH CARE EDUCATION/TRAINING PROGRAM

## 2021-08-21 PROCEDURE — 120N000002 HC R&B MED SURG/OB UMMC

## 2021-08-21 PROCEDURE — 250N000013 HC RX MED GY IP 250 OP 250 PS 637: Performed by: STUDENT IN AN ORGANIZED HEALTH CARE EDUCATION/TRAINING PROGRAM

## 2021-08-21 PROCEDURE — 250N000013 HC RX MED GY IP 250 OP 250 PS 637

## 2021-08-21 PROCEDURE — 250N000011 HC RX IP 250 OP 636: Performed by: STUDENT IN AN ORGANIZED HEALTH CARE EDUCATION/TRAINING PROGRAM

## 2021-08-21 PROCEDURE — 258N000003 HC RX IP 258 OP 636: Performed by: INTERNAL MEDICINE

## 2021-08-21 PROCEDURE — 36415 COLL VENOUS BLD VENIPUNCTURE: CPT

## 2021-08-21 PROCEDURE — 250N000011 HC RX IP 250 OP 636: Performed by: INTERNAL MEDICINE

## 2021-08-21 PROCEDURE — 81001 URINALYSIS AUTO W/SCOPE: CPT | Performed by: STUDENT IN AN ORGANIZED HEALTH CARE EDUCATION/TRAINING PROGRAM

## 2021-08-21 RX ORDER — FUROSEMIDE 10 MG/ML
80 INJECTION INTRAMUSCULAR; INTRAVENOUS ONCE
Status: DISCONTINUED | OUTPATIENT
Start: 2021-08-21 | End: 2021-08-21

## 2021-08-21 RX ORDER — FUROSEMIDE 10 MG/ML
80 INJECTION INTRAMUSCULAR; INTRAVENOUS ONCE
Status: COMPLETED | OUTPATIENT
Start: 2021-08-21 | End: 2021-08-21

## 2021-08-21 RX ADMIN — HYDRALAZINE HYDROCHLORIDE 100 MG: 100 TABLET ORAL at 08:41

## 2021-08-21 RX ADMIN — INSULIN ASPART 2 UNITS: 100 INJECTION, SOLUTION INTRAVENOUS; SUBCUTANEOUS at 08:41

## 2021-08-21 RX ADMIN — HEPARIN SODIUM 5000 UNITS: 5000 INJECTION, SOLUTION INTRAVENOUS; SUBCUTANEOUS at 08:41

## 2021-08-21 RX ADMIN — Medication 1 TABLET: at 08:40

## 2021-08-21 RX ADMIN — FUROSEMIDE 80 MG: 10 INJECTION, SOLUTION INTRAVENOUS at 12:47

## 2021-08-21 RX ADMIN — INSULIN ASPART 5 UNITS: 100 INJECTION, SOLUTION INTRAVENOUS; SUBCUTANEOUS at 12:47

## 2021-08-21 RX ADMIN — IRON SUCROSE 300 MG: 20 INJECTION, SOLUTION INTRAVENOUS at 16:59

## 2021-08-21 RX ADMIN — Medication 25 MCG: at 08:40

## 2021-08-21 RX ADMIN — CARVEDILOL 25 MG: 25 TABLET, FILM COATED ORAL at 19:07

## 2021-08-21 RX ADMIN — HYDRALAZINE HYDROCHLORIDE 100 MG: 100 TABLET ORAL at 23:11

## 2021-08-21 RX ADMIN — FINASTERIDE 5 MG: 5 TABLET, FILM COATED ORAL at 08:40

## 2021-08-21 RX ADMIN — HYDRALAZINE HYDROCHLORIDE 100 MG: 100 TABLET ORAL at 13:51

## 2021-08-21 RX ADMIN — INSULIN GLARGINE 10 UNITS: 100 INJECTION, SOLUTION SUBCUTANEOUS at 23:10

## 2021-08-21 RX ADMIN — INSULIN ASPART 3 UNITS: 100 INJECTION, SOLUTION INTRAVENOUS; SUBCUTANEOUS at 17:00

## 2021-08-21 RX ADMIN — HYDROCHLOROTHIAZIDE 12.5 MG: 12.5 TABLET ORAL at 08:41

## 2021-08-21 RX ADMIN — NIFEDIPINE 30 MG: 30 TABLET, FILM COATED, EXTENDED RELEASE ORAL at 08:40

## 2021-08-21 RX ADMIN — TAMSULOSIN HYDROCHLORIDE 0.4 MG: 0.4 CAPSULE ORAL at 08:40

## 2021-08-21 RX ADMIN — CARVEDILOL 25 MG: 25 TABLET, FILM COATED ORAL at 08:40

## 2021-08-21 RX ADMIN — HEPARIN SODIUM 5000 UNITS: 5000 INJECTION, SOLUTION INTRAVENOUS; SUBCUTANEOUS at 23:09

## 2021-08-21 ASSESSMENT — ACTIVITIES OF DAILY LIVING (ADL)
ADLS_ACUITY_SCORE: 21

## 2021-08-21 NOTE — PROGRESS NOTES
[facility]    Progress Note - Violetta 2 Service        Date of Admission:  8/17/2021  Date of Service: 08/21/21     Assessment & Plan   86M with history of T2DM, CKD4, and hypertension who presents with progressive fatigue over a few months and found to be volume overloaded concerning for progression of CKD vs CHF.    Today:  - Continuing IV diuresis   - Switch amlodipine to nifedipine    Volume overload  RONY on CKD4  Hyperkalemia- resolved  Presented with dyspnea on exertion, increased LE edema, and generalized fatigue. CXR does not appear overtly edematous. TTE 8/18 with EF 55-60% without systolic or diastolic dysfunction. He does have severe venous insufficiency and his cardiologist's notes suggest concern that this is leading to difficult-to-control hypertension that could certainly have worsened cardiac function. Alternatively, his presentation could be related to progression of kidney dysfunction. Appears volume overloaded on exam after 2 days of effective diuresis, will continue IV diuresis today. Hyperkalemia improving with diuresis.   - Nephrology is following, appreciate recs  - Strict I/O, daily weights  - 2 g Na diet, 2 L fluid restriction  - Daily BMP  - 80mg IV lasix x2 today       Normocytic anemia  Stabilized to 7.9 today, with appropriate response to 1x pRBC 8/19 (hb 6.8). Hemoglobin 7.5 on admission and baseline ~8-9. This is most likely related to advanced CKD, will evaluate epo level. No signs of acute blood loss. Iron deficient on labs, started IV iron. Hemolysis unlikely with normal LDH, low reticulocytes, haptoglobin pending.  - Continue iron sucrose 300 mg q72 hr x3 doses    - Epo level pending   - CBC daily   - Transfuse to >7    RLE wound  Small area with red, friable base in the center of his right shin in the setting of severe stasis skin changes. Base appears clean and not currently with concern for infection.  - WOCN consult    LLE edema  > RLE- resolved  No DVT on LLE ultrasound 8/18.      Thyroid nodule  Subclinical hypothyroidism  He has already been referred to an endocrinologist to evaluate the nodule further. TSH is elevated at 7.46, though FT4 is wnl.  - Consider further workup inpatient vs close outpatient follow up     Type 2 DM  Poorly controlled, last A1c 9.5. Currently managed with januvia, glipizide, and recently-added bedtime glargine. Inpatient -140.  - Fingersticks, hypoglycemia protocol, sliding scale insulin, glargine 10u at bedtime     Hypertension  Has been difficult to manage as an outpatient, follows with cardiology for this. BP unchanged with diuresis, and we added amlodipine --> nifedipine.   - Continue outpatient regimen: hydralazine 100 mg TID, hydrochlorothiazide 12.5 mg daily, and coreg 25 mg BID  - Switch amlodipine to nifedipine 30mg today        BPH  - Continue pta flomax and finasteride     Osteoporosis  - Continue pta vit D           Diet: Fluid restriction 2000 ML FLUID  Room Service  Combination Diet Low Saturated Fat Na <2400mg Diet, No Caffeine Diet    DVT Prophylaxis: Heparin SQ  High Catheter: Not present  Fluids: None  Central Lines: None  Code Status: No CPR- Do NOT Intubate  discussed on admission with pt and 2 children, he has previously filled out medical directive paperwork stating this wish    Disposition Plan   Expected discharge: >2 nights, recommended to TCU per PT/OT evaluations, once fluid status improved and outpatient diuresis plan established.     The patient's care was discussed with Dr. Daly.       Paula Arciniega MD  Internal Medicine PGY1  [phone]  Violetta 2 Service      ______________________________________________________________________    Interval History   No acute overnight events. Diuresis yesterday effective with PM 80mg IV lasix. Patient back intermittently requiring 1L supplemental O2. Continues to feel very tired, and leg swelling feels unchanged. Denies chest pain,  shortness of breath, no signs of bleeding, having brown bowel movements.     4 point review of systems otherwise negative.       Physical Exam   Vital Signs: Temp: 96.9  F (36.1  C) Temp src: Axillary BP: (!) 147/59 Pulse: 62   Resp: 18 SpO2: 98 % O2 Device: Nasal cannula Oxygen Delivery: 1 LPM  Weight: 173 lbs 0 oz  Constitutional: awake, alert, cooperative, NAD  HEENT: normocephalic, anicteric sclerae, MMM   Pulmonary: no increased work of breathing on room air, bibasilar crackles on auscultation  Cardiovascular: RRR, normal S1 and S2, no murmur appreciated   GI: soft, non-tender, non-distended  Skin: warm, dry, venous stasis over LE with flaking and dry skin  MSK: 2+ bilateral edema on LE   Neuro: AAOx3, no gross focal neurologic deficits      Data   All labs and imaging reviewed.

## 2021-08-21 NOTE — PLAN OF CARE
[id number]:      Pt VSS. Sating 97%. Denies N/V/SOB/pain. Pt is [place]. Using urinal in bed with adequate UOP. No BM this shift. BG was 241 @0200. Assist x2, continue POC.

## 2021-08-21 NOTE — PROGRESS NOTES
BP is elevated 147/59,  but getting better, started patient on new medication of Nifedipine.  Alert and oriented,  and 341, ate late breakfast and did not eat lunch even though he ordered lunch.  Right shin wound dressing was changed, Urine specimen sent for UA and sodium count.  Gave IV lasix of 80 Mg and has been making good urine but no BM today, was up in chair in the morning and slept at early afternoon.  R PIV is saline locked. [place], continue with POC

## 2021-08-21 NOTE — PROGRESS NOTES
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 Renal US was ordered in 2020, but was not completed. Presented with creatinine of 2.9 which has stayed relatively stable during his hospitalization. Concern for HFpEF vs worsening of his CKD, but he is certainly volume overloaded regardless. UA unremarkable, moderate non-nephrotic range proteinuria.    Also likely has a component of ATN, given nearly isothenuric specific gravity on UA.    - Avoid nephrotoxic agents and renally dose medications  - Strict I/O's, daily weights  - Continue to diurese, 80mg IV furosemide qday  - Renal ultrasound with doppler shows medical renal disease     Recommendations were communicated to primary team via note and page.     Seen and discussed with Dr. Garcia.     Humza Trinidad MD   [phone]    Interval History :   Nursing and provider notes from last 24 hours reviewed.    Very tired this AM, but awakes and states no questions. Daughter at bedside also with few questions.    Review of Systems:   4 point review of systems performed and negative except as above.    Physical Exam:   I/O last 3 completed shifts:  In: 120 [P.O.:120]  Out: 1850 [Urine:1850]   BP (!) 147/59 (BP Location: Right arm)   Pulse 62   Temp 96.9  F (36.1  C) (Axillary)   Resp 18   Ht 1.803 m (5' 11\")   Wt 78.5 kg (173 lb)   SpO2 98%   BMI 24.13 kg/m       General: sitting in bed, hard of hearing, no acute distress  CV: RRR, no murmur appreciated, well perfused, 1+ edema of LE  Resp: no increased work of breathing, CTAB  Abd: soft, non-tender, non-distended  Neuro: alert and " oriented, no focal deficits     Labs:   All labs reviewed by me  Electrolytes/Renal -   Recent Labs   Lab Test 08/21/21  1245 08/21/21  0752 08/21/21  0544 08/20/21  0548 08/19/21  1655 08/18/21  0839 08/18/21  0651 08/16/21  1754 03/16/21  1420 02/03/21  1527 10/26/20  1319 01/30/14  0527 01/29/14  0547   NA  --   --  138 138 137   < > 139  --  136   < > 138  --  138   POTASSIUM  --   --  4.4 4.3 4.3   < > 5.0  --  4.5   < > 5.3  --  4.5   CHLORIDE  --   --  106 107 107   < > 112*  --  105   < > 109  --  108   CO2  --   --  26 26 24   < > 23  --  24   < > 26  --  21   BUN  --   --  72* 65* 66*   < > 71*  --  58*   < > 60*  --  34*   CR  --   --  3.47* 3.21* 3.05*   < > 2.98*  --  2.32*   < > 2.10*  --  1.70*   * 190* 213* 92 201*   < > 127*   < > 373*   < > 221*  --  213*   DUSTIN  --   --  8.2* 8.2* 7.8*   < > 8.4*  --  8.5   < > 8.9  --  8.5   MAG  --   --   --  1.9  --   --  2.1  --   --   --   --   --  1.7   PHOS  --   --   --  5.0*  --   --   --   --  3.9  --  4.5   < >  --     < > = values in this interval not displayed.       CBC -   Recent Labs   Lab Test 08/21/21  0544 08/20/21  0548 08/19/21  1655 08/19/21  0623   WBC 6.2 5.1  --  5.1   HGB 7.9* 7.9* 7.8* 6.8*    221  --  233       LFTs -   Recent Labs   Lab Test 08/17/21  1353 08/16/21  1754 03/16/21  1420 03/11/21  1527   ALKPHOS 82 86  --  88   BILITOTAL 0.3 0.3  --  0.2   ALT 20 19  --  13   AST 10 4  --  5   PROTTOTAL 7.6 7.7  --  7.1   ALBUMIN 2.9* 3.2* 3.0* 3.0*       Iron Panel -   Recent Labs   Lab Test 08/18/21  0651 09/24/20  1413 12/12/17  1701   IRON 27* 64 28*   IRONSAT 9* 21 9*   ROSEMARIE  --  109 207         Imaging:  Reviewed.    Current Medications:    aspirin  81 mg Oral Daily     carvedilol  25 mg Oral BID w/meals     finasteride  5 mg Oral Daily     heparin ANTICOAGULANT  5,000 Units Subcutaneous Q12H     hydrALAZINE  100 mg Oral TID     hydrochlorothiazide  12.5 mg Oral QAM     insulin aspart  1-7 Units Subcutaneous TID AC      insulin aspart  1-5 Units Subcutaneous At Bedtime     insulin glargine  10 Units Subcutaneous At Bedtime     iron sucrose (VENOFER) intermittent infusion  300 mg Intravenous Q72H     multivitamin w/minerals  1 tablet Oral Daily     NIFEdipine ER OSMOTIC  30 mg Oral Daily     sodium chloride (PF)  3 mL Intracatheter Q8H     tamsulosin  0.4 mg Oral Daily     Vitamin D3  25 mcg Oral Daily       Humza Trinidad MD

## 2021-08-22 LAB
ANION GAP SERPL CALCULATED.3IONS-SCNC: 6 MMOL/L (ref 3–14)
BUN SERPL-MCNC: 78 MG/DL (ref 7–30)
CALCIUM SERPL-MCNC: 8 MG/DL (ref 8.5–10.1)
CHLORIDE BLD-SCNC: 105 MMOL/L (ref 94–109)
CO2 SERPL-SCNC: 27 MMOL/L (ref 20–32)
CREAT SERPL-MCNC: 3.98 MG/DL (ref 0.52–1.25)
ERYTHROCYTE [DISTWIDTH] IN BLOOD BY AUTOMATED COUNT: 15.1 % (ref 10–15)
GFR SERPL CREATININE-BSD FRML MDRD: 13 ML/MIN/1.73M2
GLUCOSE BLD-MCNC: 185 MG/DL (ref 70–99)
GLUCOSE BLDC GLUCOMTR-MCNC: 191 MG/DL (ref 70–99)
GLUCOSE BLDC GLUCOMTR-MCNC: 217 MG/DL (ref 70–99)
GLUCOSE BLDC GLUCOMTR-MCNC: 246 MG/DL (ref 70–99)
GLUCOSE BLDC GLUCOMTR-MCNC: 297 MG/DL (ref 70–99)
HCT VFR BLD AUTO: 23.7 % (ref 35–53)
HGB BLD-MCNC: 7.4 G/DL (ref 11.7–17.7)
MCH RBC QN AUTO: 27.1 PG (ref 26.5–33)
MCHC RBC AUTO-ENTMCNC: 31.2 G/DL (ref 31.5–36.5)
MCV RBC AUTO: 87 FL (ref 78–100)
PLATELET # BLD AUTO: 200 10E3/UL (ref 150–450)
POTASSIUM BLD-SCNC: 4.4 MMOL/L (ref 3.4–5.3)
RBC # BLD AUTO: 2.73 10E6/UL (ref 3.8–5.9)
SODIUM SERPL-SCNC: 138 MMOL/L (ref 133–144)
WBC # BLD AUTO: 5.1 10E3/UL (ref 4–11)

## 2021-08-22 PROCEDURE — 250N000011 HC RX IP 250 OP 636: Performed by: STUDENT IN AN ORGANIZED HEALTH CARE EDUCATION/TRAINING PROGRAM

## 2021-08-22 PROCEDURE — 250N000012 HC RX MED GY IP 250 OP 636 PS 637: Performed by: STUDENT IN AN ORGANIZED HEALTH CARE EDUCATION/TRAINING PROGRAM

## 2021-08-22 PROCEDURE — 120N000002 HC R&B MED SURG/OB UMMC

## 2021-08-22 PROCEDURE — 80048 BASIC METABOLIC PNL TOTAL CA: CPT

## 2021-08-22 PROCEDURE — 250N000013 HC RX MED GY IP 250 OP 250 PS 637

## 2021-08-22 PROCEDURE — 36415 COLL VENOUS BLD VENIPUNCTURE: CPT

## 2021-08-22 PROCEDURE — 999N000127 HC STATISTIC PERIPHERAL IV START W US GUIDANCE

## 2021-08-22 PROCEDURE — 99232 SBSQ HOSP IP/OBS MODERATE 35: CPT | Mod: GC | Performed by: INTERNAL MEDICINE

## 2021-08-22 PROCEDURE — 85027 COMPLETE CBC AUTOMATED: CPT

## 2021-08-22 PROCEDURE — 99233 SBSQ HOSP IP/OBS HIGH 50: CPT | Mod: GC | Performed by: INTERNAL MEDICINE

## 2021-08-22 PROCEDURE — 250N000013 HC RX MED GY IP 250 OP 250 PS 637: Performed by: STUDENT IN AN ORGANIZED HEALTH CARE EDUCATION/TRAINING PROGRAM

## 2021-08-22 RX ADMIN — Medication 1 TABLET: at 08:18

## 2021-08-22 RX ADMIN — ASPIRIN 81 MG: 81 TABLET, DELAYED RELEASE ORAL at 08:17

## 2021-08-22 RX ADMIN — INSULIN ASPART 3 UNITS: 100 INJECTION, SOLUTION INTRAVENOUS; SUBCUTANEOUS at 13:21

## 2021-08-22 RX ADMIN — INSULIN ASPART 1 UNITS: 100 INJECTION, SOLUTION INTRAVENOUS; SUBCUTANEOUS at 08:12

## 2021-08-22 RX ADMIN — CARVEDILOL 25 MG: 25 TABLET, FILM COATED ORAL at 08:17

## 2021-08-22 RX ADMIN — INSULIN GLARGINE 16 UNITS: 100 INJECTION, SOLUTION SUBCUTANEOUS at 22:51

## 2021-08-22 RX ADMIN — TAMSULOSIN HYDROCHLORIDE 0.4 MG: 0.4 CAPSULE ORAL at 08:18

## 2021-08-22 RX ADMIN — HEPARIN SODIUM 5000 UNITS: 5000 INJECTION, SOLUTION INTRAVENOUS; SUBCUTANEOUS at 08:16

## 2021-08-22 RX ADMIN — FINASTERIDE 5 MG: 5 TABLET, FILM COATED ORAL at 08:16

## 2021-08-22 RX ADMIN — NIFEDIPINE 30 MG: 30 TABLET, FILM COATED, EXTENDED RELEASE ORAL at 08:17

## 2021-08-22 RX ADMIN — HYDROCHLOROTHIAZIDE 12.5 MG: 12.5 TABLET ORAL at 08:17

## 2021-08-22 RX ADMIN — Medication 25 MCG: at 08:18

## 2021-08-22 RX ADMIN — CARVEDILOL 25 MG: 25 TABLET, FILM COATED ORAL at 18:02

## 2021-08-22 RX ADMIN — HYDRALAZINE HYDROCHLORIDE 100 MG: 100 TABLET ORAL at 08:17

## 2021-08-22 RX ADMIN — HEPARIN SODIUM 5000 UNITS: 5000 INJECTION, SOLUTION INTRAVENOUS; SUBCUTANEOUS at 20:13

## 2021-08-22 ASSESSMENT — ACTIVITIES OF DAILY LIVING (ADL)
ADLS_ACUITY_SCORE: 25
ADLS_ACUITY_SCORE: 25
ADLS_ACUITY_SCORE: 21
ADLS_ACUITY_SCORE: 25

## 2021-08-22 NOTE — PROGRESS NOTES
VSS except O2 sats can drop when sleeping deeply. Increased O2 per NC to 2 Lpm for sleep. Second Venofir dose given without side effects. R FA PIV infiltrated at 2230 and removed. Pressure dsg with coban applied for 3 min. Pressure applied to Heparin injection site for 5 min due to bleeding. Insulin ranged from 282-300 this shift. Given Insulin aspart at 1700 and 2200 as well as Lantus at 2200. Lower legs wrapped in dressings and edematous. Continue POC.

## 2021-08-22 NOTE — PLAN OF CARE
[id number]:      Pt VSS. Pt is on 2L of nasal canula sating at 96% . No change over night. Denies N/V/SOB/pain. Pt is [place]. Using urinal in bed with adequate UOP. PIV on right arm saline locked. No BM over night. Sleep between cares.  @0230. Assist x1, continue with POC.

## 2021-08-22 NOTE — PROGRESS NOTES
[facility]    Progress Note - Violetta 2 Service        Date of Admission:  8/17/2021  Date of Service: 08/21/21     Assessment & Plan   86M with history of T2DM, CKD4, and hypertension who presents with progressive fatigue over a few months and found to be volume overloaded concerning for progression of CKD vs CHF.    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 CXR does not appear overtly edematous. TTE 8/18 with EF 55-60% without systolic or diastolic dysfunction. He does have severe venous insufficiency and his cardiologist's notes suggest concern that this is leading to difficult-to-control hypertension that could certainly have worsened cardiac function. Alternatively, his presentation could be related to progression of kidney dysfunction. Appears volume overloaded on exam after 2 days of effective diuresis, will continue IV diuresis today. Hyperkalemia improving with diuresis.   - Nephrology is following, appreciate recs  - Strict I/O, daily weights  - 2 g Na diet, 2 L fluid restriction  - Daily BMP  - Hold on further diuresis  - Repeat UA to follow up on new hematuria, further workup TBD     Acute on chronic normocytic anemia  This is most likely related to advanced CKD, will evaluate epo level. Did require 1 unit pRBCs on 8/19. No signs of acute blood loss. Iron deficient on labs, started IV iron. Hemolysis unlikely with no evidence on smear.  - Continue iron sucrose 300 mg q72 hr x3 doses    - Epo level pending   - CBC daily   - Transfuse to >7    Type 2 DM  Poorly controlled, last A1c 9.5. Currently managed with januvia, glipizide, and recently-added bedtime glargine. Inpatient -140.  - Increase glargine 10 --> 20 units at  bedtime  - Fingersticks, hypoglycemia protocol  - Increase to high sliding scale insulin    RLE wound  Small area with red, friable base in the center of his right shin in the setting of severe stasis skin changes. Base appears clean and not currently with concern for infection.  - WOCN consult    LLE edema > RLE- resolved  No DVT on LLE ultrasound 8/18.      Thyroid nodule  Subclinical hypothyroidism  He has already been referred to an endocrinologist to evaluate the nodule further. TSH is elevated at 7.46, though FT4 is wnl.  - Consider further workup inpatient vs close outpatient follow up      Hypertension  Has been difficult to manage as an outpatient, follows with cardiology for this.  - Continue outpatient regimen: hydralazine 100 mg TID, hydrochlorothiazide 12.5 mg daily, and coreg 25 mg BID  - Added nifedipine 30mg this admission with improvement    BPH  - Continue pta flomax and finasteride     Osteoporosis  - Continue pta vit D           Diet: Fluid restriction 2000 ML FLUID  Room Service  Combination Diet Low Saturated Fat Na <2400mg Diet, No Caffeine Diet    DVT Prophylaxis: Heparin SQ  High Catheter: Not present  Fluids: None  Central Lines: None  Code Status: No CPR- Do NOT Intubate  discussed on admission with pt and 2 children, he has previously filled out medical directive paperwork stating this wish    Disposition Plan   Expected discharge: >2 nights, recommended to TCU per PT/OT evaluations, once fluid status improved and outpatient diuresis plan established.    Patient was staffed with attending Dr. Daly.    Dilma Doshi MD  Internal Medicine, PGY-2    ______________________________________________________________________    Interval History   No acute overnight events. Patient is feeling better today. Denies pain, shortness of breath. Does say that his appetite is pretty low. Not moving enough to say if there is dyspnea on exertion.    4 point review of systems otherwise negative.        Physical Exam   Vital Signs: Temp: 98.1  F (36.7  C) Temp src: Oral BP: 121/62 Pulse: 56   Resp: 18 SpO2: 97 % O2 Device: (P) Nasal cannula Oxygen Delivery: (P) 2 LPM  Weight: 173 lbs 0 oz  Constitutional: awake, alert, cooperative, NAD  HEENT: normocephalic, anicteric sclerae, MMM   Pulmonary: no increased work of breathing on room air  Cardiovascular: bradycardic, regular  GI: soft, non-tender, non-distended  Skin: warm, dry, venous stasis over LE with flaking and dry skin  MSK: 0-1+ bilateral edema on LE, much improved  Neuro: AAOx3, no gross focal neurologic deficits      Data   All labs and imaging reviewed.

## 2021-08-22 NOTE — PROGRESS NOTES
86M with history of T2DM, CKD4, and hypertension who presents with progressive fatigue over a few months and found to be volume overloaded concerning for progression of CKD vs CHF.    Daughter/son at his bedside for the morning.  Have left.    incont of loose stool and urine.  Does void in urinal during the day  Followed by nephrology    Was not able to obtain UA - please try later    BG were in the 200's.  Coverage sliding scale    PIV SL    Good breakfast - poor lunch appetite.  Check to make sure he gets what he orders as he likes only certain foods (Picky per his daughter)    Cont with I/O and encourage PO.

## 2021-08-22 NOTE — PROGRESS NOTES
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 Renal US was ordered in 2020, but was not completed. Presented with creatinine of 2.9 which has stayed relatively stable during his hospitalization. Concern for HFpEF vs worsening of his CKD, but he is certainly volume overloaded regardless. UA with blood, will investigate GN causes if this is recurrent.    Also likely has a component of ATN, given nearly isothenuric specific gravity on UA.    - Avoid nephrotoxic agents and renally dose medications  - Strict I/O's, daily weights  - Discontinue diuresis  - Renal ultrasound with doppler shows medical renal disease  - Pending repeat blood on UA will order:  C3-C4   MYA   SPEP with immunofixation   FLC ratio   ANCA   Cryoglobulins   HIV, hep B, hep C serology   [id number] Ab      Recommendations were communicated to primary team via note and verbally.     Seen and discussed with Dr. Garcia.     Humza Trinidad MD   [phone]    Interval History :   Nursing and provider notes from last 24 hours reviewed.    No concerns for us this morning. Very hard of hearing.    Review of Systems:   4 point review of systems performed and negative except as above.    Physical Exam:   I/O last 3 completed shifts:  In: 465 [P.O.:180; I.V.:285]  Out: 200 [Urine:200]   /46 (BP Location: Right arm)   Pulse 53   Temp (!) 96.6  F (35.9  C) (Oral)   Resp 18   Ht 1.803 m (5' 11\")   Wt 78.5 kg (173 lb)   SpO2 93%   BMI 24.13 kg/m       General: sitting in bed, hard of hearing, no acute distress  CV: RRR, no murmur appreciated, well perfused, 1+ edema of " LE  Resp: no increased work of breathing, CTAB  Abd: soft, non-tender, non-distended  Neuro: alert and oriented, no focal deficits     Labs:   All labs reviewed by me  Electrolytes/Renal -   Recent Labs   Lab Test 08/22/21  1143 08/22/21  0610 08/22/21  0230 08/21/21  0544 08/20/21  0548 08/18/21  0839 08/18/21  0651 08/16/21  1754 03/16/21  1420 02/03/21  1527 10/26/20  1319 01/30/14  0527 01/29/14  0547   NA  --  138  --  138 138   < > 139  --  136   < > 138  --  138   POTASSIUM  --  4.4  --  4.4 4.3   < > 5.0  --  4.5   < > 5.3  --  4.5   CHLORIDE  --  105  --  106 107   < > 112*  --  105   < > 109  --  108   CO2  --  27  --  26 26   < > 23  --  24   < > 26  --  21   BUN  --  78*  --  72* 65*   < > 71*  --  58*   < > 60*  --  34*   CR  --  3.98*  --  3.47* 3.21*   < > 2.98*  --  2.32*   < > 2.10*  --  1.70*   * 185* 191* 213* 92   < > 127*   < > 373*   < > 221*  --  213*   DUSTIN  --  8.0*  --  8.2* 8.2*   < > 8.4*  --  8.5   < > 8.9  --  8.5   MAG  --   --   --   --  1.9  --  2.1  --   --   --   --   --  1.7   PHOS  --   --   --   --  5.0*  --   --   --  3.9  --  4.5   < >  --     < > = values in this interval not displayed.       CBC -   Recent Labs   Lab Test 08/22/21  0610 08/21/21  0544 08/20/21  0548   WBC 5.1 6.2 5.1   HGB 7.4* 7.9* 7.9*    222 221       LFTs -   Recent Labs   Lab Test 08/17/21  1353 08/16/21  1754 03/16/21  1420 03/11/21  1527   ALKPHOS 82 86  --  88   BILITOTAL 0.3 0.3  --  0.2   ALT 20 19  --  13   AST 10 4  --  5   PROTTOTAL 7.6 7.7  --  7.1   ALBUMIN 2.9* 3.2* 3.0* 3.0*       Iron Panel -   Recent Labs   Lab Test 08/18/21  0651 09/24/20  1413 12/12/17  1701   IRON 27* 64 28*   IRONSAT 9* 21 9*   ROSEMARIE  --  109 207         Imaging:  Reviewed.    Current Medications:    aspirin  81 mg Oral Daily     carvedilol  25 mg Oral BID w/meals     finasteride  5 mg Oral Daily     heparin ANTICOAGULANT  5,000 Units Subcutaneous Q12H     hydrALAZINE  100 mg Oral TID     hydrochlorothiazide   12.5 mg Oral QAM     insulin aspart  1-10 Units Subcutaneous TID AC     insulin aspart  1-7 Units Subcutaneous At Bedtime     insulin glargine  16 Units Subcutaneous At Bedtime     iron sucrose (VENOFER) intermittent infusion  300 mg Intravenous Q72H     multivitamin w/minerals  1 tablet Oral Daily     NIFEdipine ER OSMOTIC  30 mg Oral Daily     sodium chloride (PF)  3 mL Intracatheter Q8H     tamsulosin  0.4 mg Oral Daily     Vitamin D3  25 mcg Oral Daily       Humza Trinidad MD

## 2021-08-23 ENCOUNTER — APPOINTMENT (OUTPATIENT)
Dept: OCCUPATIONAL THERAPY | Facility: CLINIC | Age: 86
DRG: 683 | End: 2021-08-23
Payer: MEDICARE

## 2021-08-23 LAB
ALBUMIN UR-MCNC: 70 MG/DL
ANION GAP SERPL CALCULATED.3IONS-SCNC: 9 MMOL/L (ref 3–14)
APPEARANCE UR: CLEAR
BILIRUB UR QL STRIP: NEGATIVE
BUN SERPL-MCNC: 81 MG/DL (ref 7–30)
CALCIUM SERPL-MCNC: 8.1 MG/DL (ref 8.5–10.1)
CHLORIDE BLD-SCNC: 103 MMOL/L (ref 94–109)
CO2 SERPL-SCNC: 25 MMOL/L (ref 20–32)
COLOR UR AUTO: YELLOW
CREAT SERPL-MCNC: 4.67 MG/DL (ref 0.52–1.25)
ERYTHROCYTE [DISTWIDTH] IN BLOOD BY AUTOMATED COUNT: 15.1 % (ref 10–15)
GFR SERPL CREATININE-BSD FRML MDRD: 11 ML/MIN/1.73M2
GLUCOSE BLD-MCNC: 75 MG/DL (ref 70–99)
GLUCOSE BLDC GLUCOMTR-MCNC: 188 MG/DL (ref 70–99)
GLUCOSE BLDC GLUCOMTR-MCNC: 232 MG/DL (ref 70–99)
GLUCOSE BLDC GLUCOMTR-MCNC: 379 MG/DL (ref 70–99)
GLUCOSE UR STRIP-MCNC: NEGATIVE MG/DL
HCT VFR BLD AUTO: 24.4 % (ref 35–53)
HGB BLD-MCNC: 7.6 G/DL (ref 11.7–17.7)
HGB UR QL STRIP: NEGATIVE
HYALINE CASTS: 3 /LPF
KETONES UR STRIP-MCNC: NEGATIVE MG/DL
LEUKOCYTE ESTERASE UR QL STRIP: NEGATIVE
MCH RBC QN AUTO: 27.5 PG (ref 26.5–33)
MCHC RBC AUTO-ENTMCNC: 31.1 G/DL (ref 31.5–36.5)
MCV RBC AUTO: 88 FL (ref 78–100)
NITRATE UR QL: NEGATIVE
PH UR STRIP: 5 [PH] (ref 5–7)
PLATELET # BLD AUTO: 225 10E3/UL (ref 150–450)
POTASSIUM BLD-SCNC: 4.3 MMOL/L (ref 3.4–5.3)
RBC # BLD AUTO: 2.76 10E6/UL (ref 3.8–5.9)
RBC URINE: <1 /HPF
SODIUM SERPL-SCNC: 137 MMOL/L (ref 133–144)
SP GR UR STRIP: 1.01 (ref 1–1.03)
SQUAMOUS EPITHELIAL: <1 /HPF
UROBILINOGEN UR STRIP-MCNC: NORMAL MG/DL
WBC # BLD AUTO: 5.8 10E3/UL (ref 4–11)
WBC URINE: 2 /HPF

## 2021-08-23 PROCEDURE — 250N000011 HC RX IP 250 OP 636: Performed by: STUDENT IN AN ORGANIZED HEALTH CARE EDUCATION/TRAINING PROGRAM

## 2021-08-23 PROCEDURE — 97530 THERAPEUTIC ACTIVITIES: CPT | Mod: GO

## 2021-08-23 PROCEDURE — 97535 SELF CARE MNGMENT TRAINING: CPT | Mod: GO

## 2021-08-23 PROCEDURE — 36415 COLL VENOUS BLD VENIPUNCTURE: CPT

## 2021-08-23 PROCEDURE — 81001 URINALYSIS AUTO W/SCOPE: CPT | Performed by: STUDENT IN AN ORGANIZED HEALTH CARE EDUCATION/TRAINING PROGRAM

## 2021-08-23 PROCEDURE — 250N000013 HC RX MED GY IP 250 OP 250 PS 637: Performed by: STUDENT IN AN ORGANIZED HEALTH CARE EDUCATION/TRAINING PROGRAM

## 2021-08-23 PROCEDURE — 120N000002 HC R&B MED SURG/OB UMMC

## 2021-08-23 PROCEDURE — 99232 SBSQ HOSP IP/OBS MODERATE 35: CPT | Mod: GC | Performed by: INTERNAL MEDICINE

## 2021-08-23 PROCEDURE — 250N000011 HC RX IP 250 OP 636

## 2021-08-23 PROCEDURE — 80048 BASIC METABOLIC PNL TOTAL CA: CPT

## 2021-08-23 PROCEDURE — 99233 SBSQ HOSP IP/OBS HIGH 50: CPT | Mod: GC | Performed by: INTERNAL MEDICINE

## 2021-08-23 PROCEDURE — 85027 COMPLETE CBC AUTOMATED: CPT

## 2021-08-23 PROCEDURE — 250N000013 HC RX MED GY IP 250 OP 250 PS 637

## 2021-08-23 RX ORDER — FUROSEMIDE 10 MG/ML
80 INJECTION INTRAMUSCULAR; INTRAVENOUS ONCE
Status: COMPLETED | OUTPATIENT
Start: 2021-08-23 | End: 2021-08-23

## 2021-08-23 RX ADMIN — Medication 25 MCG: at 09:10

## 2021-08-23 RX ADMIN — HEPARIN SODIUM 5000 UNITS: 5000 INJECTION, SOLUTION INTRAVENOUS; SUBCUTANEOUS at 21:46

## 2021-08-23 RX ADMIN — CARVEDILOL 25 MG: 25 TABLET, FILM COATED ORAL at 09:09

## 2021-08-23 RX ADMIN — NIFEDIPINE 30 MG: 30 TABLET, FILM COATED, EXTENDED RELEASE ORAL at 09:10

## 2021-08-23 RX ADMIN — INSULIN GLARGINE 16 UNITS: 100 INJECTION, SOLUTION SUBCUTANEOUS at 22:40

## 2021-08-23 RX ADMIN — FINASTERIDE 5 MG: 5 TABLET, FILM COATED ORAL at 09:10

## 2021-08-23 RX ADMIN — TAMSULOSIN HYDROCHLORIDE 0.4 MG: 0.4 CAPSULE ORAL at 09:09

## 2021-08-23 RX ADMIN — CARVEDILOL 25 MG: 25 TABLET, FILM COATED ORAL at 18:50

## 2021-08-23 RX ADMIN — FUROSEMIDE 80 MG: 10 INJECTION, SOLUTION INTRAVENOUS at 21:46

## 2021-08-23 RX ADMIN — Medication 1 TABLET: at 09:09

## 2021-08-23 RX ADMIN — ASPIRIN 81 MG: 81 TABLET, DELAYED RELEASE ORAL at 09:10

## 2021-08-23 RX ADMIN — FUROSEMIDE 80 MG: 10 INJECTION, SOLUTION INTRAVENOUS at 14:51

## 2021-08-23 RX ADMIN — HEPARIN SODIUM 5000 UNITS: 5000 INJECTION, SOLUTION INTRAVENOUS; SUBCUTANEOUS at 09:10

## 2021-08-23 ASSESSMENT — ACTIVITIES OF DAILY LIVING (ADL)
ADLS_ACUITY_SCORE: 25
ADLS_ACUITY_SCORE: 21
ADLS_ACUITY_SCORE: 25
ADLS_ACUITY_SCORE: 21
ADLS_ACUITY_SCORE: 21
ADLS_ACUITY_SCORE: 25

## 2021-08-23 ASSESSMENT — MIFFLIN-ST. JEOR: SCORE: 1493.21

## 2021-08-23 NOTE — PROGRESS NOTES
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 Renal US was ordered in 2020, but was not completed. Presented with creatinine of 2.9 which has stayed relatively stable during his hospitalization. Concern for HFpEF vs worsening of his CKD, but he is certainly volume overloaded regardless. UA with blood, although not present on repeat.    Also likely has a component of ATN, given nearly isothenuric specific gravity on UA.    - Avoid nephrotoxic agents and renally dose medications  - Strict I/O's, daily weights  - Renal ultrasound with doppler shows medical renal disease     Recommendations were communicated to primary team via note and verbally.     Seen and discussed with Dr. Will.     Humza Trinidad MD   [phone]    Interval History :   Nursing and provider notes from last 24 hours reviewed.    Reports that his breathing is about the same as before and that it would be \"hard to say\" that he was better now than when he came in.    Review of Systems:   4 point review of systems performed and negative except as above.    Physical Exam:   I/O last 3 completed shifts:  In: 480 [P.O.:480]  Out: 150 [Urine:150]   /56 (BP Location: Right arm)   Pulse 64   Temp 97.2  F (36.2  C) (Oral)   Resp 20   Ht 1.803 m (5' 11\")   Wt 78.6 kg (173 lb 4.8 oz)   SpO2 90%   BMI 24.17 kg/m       General: sitting in bed, hard of hearing, no acute distress  CV: RRR, no murmur appreciated, well perfused, 1-2+ edema of LE  Resp: no increased work of breathing, CTAB  Abd: soft, non-tender, non-distended  Neuro: alert " and oriented, no focal deficits     Labs:   All labs reviewed by me  Electrolytes/Renal -   Recent Labs   Lab Test 08/23/21  0643 08/23/21  0224 08/22/21  2203 08/22/21  0610 08/21/21  0544 08/20/21  0548 08/18/21  0839 08/18/21  0651 08/16/21  1754 03/16/21  1420 02/03/21  1527 10/26/20  1319 01/30/14  0527 01/29/14  0547     --   --  138 138 138   < > 139  --  136   < > 138  --  138   POTASSIUM 4.3  --   --  4.4 4.4 4.3   < > 5.0  --  4.5   < > 5.3  --  4.5   CHLORIDE 103  --   --  105 106 107   < > 112*  --  105   < > 109  --  108   CO2 25  --   --  27 26 26   < > 23  --  24   < > 26  --  21   BUN 81*  --   --  78* 72* 65*   < > 71*  --  58*   < > 60*  --  34*   CR 4.67*  --   --  3.98* 3.47* 3.21*   < > 2.98*  --  2.32*   < > 2.10*  --  1.70*   GLC 75 188* 217* 185* 213* 92   < > 127*   < > 373*   < > 221*  --  213*   DUSTIN 8.1*  --   --  8.0* 8.2* 8.2*   < > 8.4*  --  8.5   < > 8.9  --  8.5   MAG  --   --   --   --   --  1.9  --  2.1  --   --   --   --   --  1.7   PHOS  --   --   --   --   --  5.0*  --   --   --  3.9  --  4.5   < >  --     < > = values in this interval not displayed.       CBC -   Recent Labs   Lab Test 08/23/21  0643 08/22/21  0610 08/21/21  0544   WBC 5.8 5.1 6.2   HGB 7.6* 7.4* 7.9*    200 222       LFTs -   Recent Labs   Lab Test 08/17/21  1353 08/16/21  1754 03/16/21  1420 03/11/21  1527   ALKPHOS 82 86  --  88   BILITOTAL 0.3 0.3  --  0.2   ALT 20 19  --  13   AST 10 4  --  5   PROTTOTAL 7.6 7.7  --  7.1   ALBUMIN 2.9* 3.2* 3.0* 3.0*       Iron Panel -   Recent Labs   Lab Test 08/18/21  0651 09/24/20  1413 12/12/17  1701   IRON 27* 64 28*   IRONSAT 9* 21 9*   ROSEMARIE  --  109 207         Imaging:  Reviewed.    Current Medications:    aspirin  81 mg Oral Daily     carvedilol  25 mg Oral BID w/meals     finasteride  5 mg Oral Daily     furosemide  80 mg Intravenous Once     heparin ANTICOAGULANT  5,000 Units Subcutaneous Q12H     hydrALAZINE  100 mg Oral TID     insulin aspart  1-10  Units Subcutaneous TID AC     insulin aspart  1-7 Units Subcutaneous At Bedtime     insulin glargine  16 Units Subcutaneous At Bedtime     iron sucrose (VENOFER) intermittent infusion  300 mg Intravenous Q72H     multivitamin w/minerals  1 tablet Oral Daily     NIFEdipine ER OSMOTIC  30 mg Oral Daily     sodium chloride (PF)  3 mL Intracatheter Q8H     tamsulosin  0.4 mg Oral Daily     Vitamin D3  25 mcg Oral Daily       Humza Trinidad MD     I was present with the fellow during the history and exam.  I discussed the case with the fellow and agree with the findings as documented in the assessment and plan.  Pallavi Will

## 2021-08-23 NOTE — PROVIDER NOTIFICATION
Provider paged regarding hydralazine. Writer also asked if parameter can be in place for BP.    Provider response was to hold hydralazine and provider placed parameter for the BP.

## 2021-08-23 NOTE — PLAN OF CARE
0700 pm - 0730 am     Pt is alert and oriented x4, AVSS, scheduled hydralazine was held related to low /51. Oliguric over the 12 hrs periods output was 150 ml, has an order to straight cath pt if is retaining above 300ml. UA was collected. Up with assist of 1 and walker. Pocket talker at bedsides. On low sodium diet and fat diet. BG around 02 am was 188 ml. Will continue to monitor.

## 2021-08-23 NOTE — PLAN OF CARE
RN assumed cares at 1500, Pt alert and oriented, VS stable this afternoon.  Pt up with one assist and walker, did not get out of bed this shift.  Pt needs UA, clean urinal at the bedside and patient educated to let RN know when sample available.  Dressing on LLE CDI this afternoon.  Pt appetite fair, eating about 50% of dinner.  Pt denies pain this shift.  PIV saline locked and patent.  No acute incidents this shift.  Continue to monitor and notify MD of any changes.

## 2021-08-23 NOTE — PLAN OF CARE
Vss, afebrile. Hydralazine not meeting parameters, thus no hydralazine given today. 80mg Lasix given at 1500, med was not available from pharmacy earlier. Ate a good breakfast. C/o poor sleep overnight. Took a good nap today. Bed alarm on after lasix was given, so pt wouldn't rush out of bed from lasix. Family at bedside part of the day.

## 2021-08-23 NOTE — PROGRESS NOTES
[facility]    Progress Note - [facility] 2 Service        Date of Admission:  8/17/2021  Date of Service: 08/23/21     Assessment & Plan   86M with history of T2DM, CKD4, and hypertension who presents with progressive fatigue over a few months and found to be volume overloaded concerning for progression of CKD vs CHF.    Today:  - 80mg IV lasix x1 today   - Increase evening glargine and sliding scale intensity    Volume overload  RONY on CKD4  Hyperkalemia- resolved  Presented with dyspnea on exertion, increased LE edema, and generalized fatigue. CXR does not appear overtly edematous. TTE 8/18 with EF 55-60% without systolic or diastolic dysfunction. He does have severe venous insufficiency and his cardiologist's notes suggest concern that this is leading to difficult-to-control hypertension that could certainly have worsened cardiac function. Alternatively, his presentation could be related to progression of kidney dysfunction. Initial UA with blood, but repeat 8/22 without RBC making GN less likely. Appears volume overloaded on exam after 2 days of effective diuresis and IVC >2cm with <50% collapsibility suggesting hypervolemic status, will continue IV diuresis today. Hyperkalemia improving with diuresis.   - Nephrology is following, appreciate recs  - Strict I/O, daily weights  - 2 g Na diet, 2 L fluid restriction  - Daily BMP  - Diuresis 80mg IV lasix x1  - Repeat UA to follow up on new hematuria, further workup TBD     Acute on chronic normocytic anemia  This is most likely related to advanced CKD, will evaluate epo level. Did require 1 unit pRBCs on 8/19. No signs of acute blood loss. Iron deficient on labs, started IV iron. Hemolysis unlikely with no evidence on smear.  - Continue iron sucrose 300 mg q72 hr x3 doses    - Epo level pending   - CBC daily   - Transfuse to >7    Type 2 DM  Poorly controlled, last A1c 9.5. Currently managed with januvia, glipizide, and  recently-added bedtime glargine. Inpatient -140.  - Increase glargine 10 --> 20 units at bedtime  - Fingersticks, hypoglycemia protocol  - Increase to high sliding scale insulin    RLE wound  Small area with red, friable base in the center of his right shin in the setting of severe stasis skin changes. Base appears clean and not currently with concern for infection.  - WOCN consult    LLE edema > RLE- resolved  No DVT on LLE ultrasound 8/18.      Thyroid nodule  Subclinical hypothyroidism  He has already been referred to an endocrinologist to evaluate the nodule further. TSH is elevated at 7.46, though FT4 is wnl.  - Consider further workup inpatient vs close outpatient follow up      Hypertension  Has been difficult to manage as an outpatient, follows with cardiology for this. Improvement in BP with nifedipine added to regimen this admission.  - Continue outpatient regimen: hydralazine 100 mg TID,  and coreg 25 mg BID  - Will hold PTA hydrochlorothiazide 12.5 mg daily as this likely is not very effective with decreased kidney function  - Continue nifedipine 30mg    BPH  - Continue pta flomax and finasteride     Osteoporosis  - Continue pta vit D           Diet: Fluid restriction 2000 ML FLUID  Room Service  Combination Diet Low Saturated Fat Na <2400mg Diet, No Caffeine Diet    DVT Prophylaxis: Heparin SQ  High Catheter: Not present  Fluids: None  Central Lines: None  Code Status: No CPR- Do NOT Intubate  discussed on admission with pt and 2 children, he has previously filled out medical directive paperwork stating this wish    Disposition Plan   Expected discharge: >2 nights, recommended to TCU per PT/OT evaluations, once fluid status improved and outpatient diuresis plan established vs pending further dialysis discussion.    Patient was staffed with attending Dr. Daly.    Paula Arciniega MD  Internal Medicine PGY1  [phone]  [place] [id number]  Service        ______________________________________________________________________    Interval History   No acute overnight events. Patient is feeling okay today. Denies pain, shortness of breath. Does say that his appetite is pretty low. Says his leg discomfort is a little better.    4 point review of systems otherwise negative.       Physical Exam   Vital Signs: Temp: 97.2  F (36.2  C) Temp src: Oral BP: 135/56 Pulse: 64   Resp: 20 SpO2: 90 % O2 Device: None (Room air)    Weight: 173 lbs 4.8 oz  Constitutional: awake, alert, cooperative, NAD  HEENT: normocephalic, anicteric sclerae, MMM   Pulmonary: no increased work of breathing on room air  Cardiovascular: bradycardic, regular  GI: soft, non-tender, non-distended  Skin: warm, dry, venous stasis over LE with flaking and dry skin  MSK: 0-1+ bilateral edema on LE, much improved  Neuro: AAOx3, no gross focal neurologic deficits      Data   All labs and imaging reviewed.     Bedside US shows IVC >2cm with <50% collapsibility

## 2021-08-23 NOTE — PLAN OF CARE
[id number]: HR 60. Okay to give scheduled Coreg per MD. New parameters entered to hold if HR <55. Pre dinner BG of 232. Waiting on dinner to arrive before covering. 275 mL UOP after 80 mg PO Lasix. To receive next dose of Lasix at 2100. Bed alarm on for safety. Continue with POC.

## 2021-08-24 LAB
ANION GAP SERPL CALCULATED.3IONS-SCNC: 8 MMOL/L (ref 3–14)
BUN SERPL-MCNC: 92 MG/DL (ref 7–30)
CALCIUM SERPL-MCNC: 8.1 MG/DL (ref 8.5–10.1)
CHLORIDE BLD-SCNC: 103 MMOL/L (ref 94–109)
CO2 SERPL-SCNC: 25 MMOL/L (ref 20–32)
CREAT SERPL-MCNC: 5.1 MG/DL (ref 0.52–1.25)
ERYTHROCYTE [DISTWIDTH] IN BLOOD BY AUTOMATED COUNT: 15.2 % (ref 10–15)
GFR SERPL CREATININE-BSD FRML MDRD: 10 ML/MIN/1.73M2
GLUCOSE BLD-MCNC: 61 MG/DL (ref 70–99)
GLUCOSE BLDC GLUCOMTR-MCNC: 101 MG/DL (ref 70–99)
GLUCOSE BLDC GLUCOMTR-MCNC: 119 MG/DL (ref 70–99)
GLUCOSE BLDC GLUCOMTR-MCNC: 122 MG/DL (ref 70–99)
GLUCOSE BLDC GLUCOMTR-MCNC: 137 MG/DL (ref 70–99)
GLUCOSE BLDC GLUCOMTR-MCNC: 149 MG/DL (ref 70–99)
GLUCOSE BLDC GLUCOMTR-MCNC: 168 MG/DL (ref 70–99)
GLUCOSE BLDC GLUCOMTR-MCNC: 57 MG/DL (ref 70–99)
GLUCOSE BLDC GLUCOMTR-MCNC: 76 MG/DL (ref 70–99)
HCT VFR BLD AUTO: 25.5 % (ref 35–53)
HGB BLD-MCNC: 7.9 G/DL (ref 11.7–17.7)
MCH RBC QN AUTO: 27.4 PG (ref 26.5–33)
MCHC RBC AUTO-ENTMCNC: 31 G/DL (ref 31.5–36.5)
MCV RBC AUTO: 89 FL (ref 78–100)
PHOSPHATE SERPL-MCNC: 5.3 MG/DL (ref 2.5–4.5)
PLATELET # BLD AUTO: 215 10E3/UL (ref 150–450)
POTASSIUM BLD-SCNC: 5 MMOL/L (ref 3.4–5.3)
RBC # BLD AUTO: 2.88 10E6/UL (ref 3.8–5.9)
SODIUM SERPL-SCNC: 136 MMOL/L (ref 133–144)
WBC # BLD AUTO: 5.6 10E3/UL (ref 4–11)

## 2021-08-24 PROCEDURE — 86431 RHEUMATOID FACTOR QUANT: CPT | Performed by: STUDENT IN AN ORGANIZED HEALTH CARE EDUCATION/TRAINING PROGRAM

## 2021-08-24 PROCEDURE — 85027 COMPLETE CBC AUTOMATED: CPT

## 2021-08-24 PROCEDURE — 250N000013 HC RX MED GY IP 250 OP 250 PS 637

## 2021-08-24 PROCEDURE — 250N000011 HC RX IP 250 OP 636: Performed by: STUDENT IN AN ORGANIZED HEALTH CARE EDUCATION/TRAINING PROGRAM

## 2021-08-24 PROCEDURE — 36415 COLL VENOUS BLD VENIPUNCTURE: CPT

## 2021-08-24 PROCEDURE — 86160 COMPLEMENT ANTIGEN: CPT | Performed by: STUDENT IN AN ORGANIZED HEALTH CARE EDUCATION/TRAINING PROGRAM

## 2021-08-24 PROCEDURE — 84100 ASSAY OF PHOSPHORUS: CPT | Performed by: STUDENT IN AN ORGANIZED HEALTH CARE EDUCATION/TRAINING PROGRAM

## 2021-08-24 PROCEDURE — 86255 FLUORESCENT ANTIBODY SCREEN: CPT | Performed by: STUDENT IN AN ORGANIZED HEALTH CARE EDUCATION/TRAINING PROGRAM

## 2021-08-24 PROCEDURE — 99233 SBSQ HOSP IP/OBS HIGH 50: CPT | Mod: GC | Performed by: INTERNAL MEDICINE

## 2021-08-24 PROCEDURE — 120N000002 HC R&B MED SURG/OB UMMC

## 2021-08-24 PROCEDURE — 258N000003 HC RX IP 258 OP 636: Performed by: INTERNAL MEDICINE

## 2021-08-24 PROCEDURE — 250N000013 HC RX MED GY IP 250 OP 250 PS 637: Performed by: STUDENT IN AN ORGANIZED HEALTH CARE EDUCATION/TRAINING PROGRAM

## 2021-08-24 PROCEDURE — 86038 ANTINUCLEAR ANTIBODIES: CPT | Performed by: STUDENT IN AN ORGANIZED HEALTH CARE EDUCATION/TRAINING PROGRAM

## 2021-08-24 PROCEDURE — 36415 COLL VENOUS BLD VENIPUNCTURE: CPT | Performed by: STUDENT IN AN ORGANIZED HEALTH CARE EDUCATION/TRAINING PROGRAM

## 2021-08-24 PROCEDURE — 80048 BASIC METABOLIC PNL TOTAL CA: CPT

## 2021-08-24 PROCEDURE — 250N000011 HC RX IP 250 OP 636: Performed by: HOSPITALIST

## 2021-08-24 PROCEDURE — 999N000127 HC STATISTIC PERIPHERAL IV START W US GUIDANCE

## 2021-08-24 PROCEDURE — 250N000011 HC RX IP 250 OP 636: Performed by: INTERNAL MEDICINE

## 2021-08-24 RX ORDER — FUROSEMIDE 10 MG/ML
80 INJECTION INTRAMUSCULAR; INTRAVENOUS ONCE
Status: COMPLETED | OUTPATIENT
Start: 2021-08-24 | End: 2021-08-24

## 2021-08-24 RX ADMIN — FUROSEMIDE 80 MG: 10 INJECTION, SOLUTION INTRAVENOUS at 21:59

## 2021-08-24 RX ADMIN — Medication 25 MCG: at 09:16

## 2021-08-24 RX ADMIN — IRON SUCROSE 300 MG: 20 INJECTION, SOLUTION INTRAVENOUS at 16:26

## 2021-08-24 RX ADMIN — FUROSEMIDE 80 MG: 10 INJECTION, SOLUTION INTRAVENOUS at 14:05

## 2021-08-24 RX ADMIN — HEPARIN SODIUM 5000 UNITS: 5000 INJECTION, SOLUTION INTRAVENOUS; SUBCUTANEOUS at 09:15

## 2021-08-24 RX ADMIN — CARVEDILOL 25 MG: 25 TABLET, FILM COATED ORAL at 19:07

## 2021-08-24 RX ADMIN — HEPARIN SODIUM 5000 UNITS: 5000 INJECTION, SOLUTION INTRAVENOUS; SUBCUTANEOUS at 20:55

## 2021-08-24 RX ADMIN — HYDRALAZINE HYDROCHLORIDE 100 MG: 100 TABLET ORAL at 20:55

## 2021-08-24 RX ADMIN — TAMSULOSIN HYDROCHLORIDE 0.4 MG: 0.4 CAPSULE ORAL at 09:16

## 2021-08-24 RX ADMIN — NIFEDIPINE 30 MG: 30 TABLET, FILM COATED, EXTENDED RELEASE ORAL at 09:16

## 2021-08-24 RX ADMIN — FINASTERIDE 5 MG: 5 TABLET, FILM COATED ORAL at 09:17

## 2021-08-24 RX ADMIN — CARVEDILOL 25 MG: 25 TABLET, FILM COATED ORAL at 09:16

## 2021-08-24 RX ADMIN — Medication 1 TABLET: at 09:15

## 2021-08-24 RX ADMIN — ASPIRIN 81 MG: 81 TABLET, DELAYED RELEASE ORAL at 09:16

## 2021-08-24 ASSESSMENT — ACTIVITIES OF DAILY LIVING (ADL)
ADLS_ACUITY_SCORE: 21

## 2021-08-24 NOTE — PROGRESS NOTES
[facility]    Progress Note - [facility] 2 Service        Date of Admission:  8/17/2021  Date of Service: 08/24/21     Assessment & Plan   86M with history of T2DM, CKD4, and hypertension who presents with progressive fatigue over a few months and found to be volume overloaded concerning for progression of CKD vs CHF.    Today:  - 80mg IV lasix x2 today   - Decrease evening glargine to 12 units from 16 units given hypoglycemia this am     Volume overload  RONY on CKD4  Hyperkalemia, resolved with diuresis   Presented with dyspnea on exertion, increased LE edema, and generalized fatigue. CXR does not appear overtly edematous. TTE 8/18 with EF 55-60% without systolic or diastolic dysfunction. He does have severe venous insufficiency and his cardiologist's notes suggest concern that this is leading to difficult-to-control hypertension that could certainly have worsened cardiac function. Alternatively, his presentation could be related to progression of kidney dysfunction. Initial UA with blood, but repeat 8/22 without RBC making GN less likely. Appears hypervolemic on exam. Hyperkalemia improving with diuresis.   - Nephrology is following, appreciate recs  - Strict I/O, daily weights  - 2 g Na diet, 2 L fluid restriction  - Daily BMP  - Diuresis 80mg IV lasix x2 today   - Repeat UA to follow up on new hematuria, further workup TBD     Acute on chronic normocytic anemia  This is most likely related to advanced CKD, will evaluate epo level. Did require 1 unit pRBCs on 8/19. No signs of acute blood loss. Iron deficient on labs, started IV iron. Hemolysis unlikely with no evidence on smear.  - Continue iron sucrose 300 mg q72 hr x3 doses    - Epo level pending   - CBC daily   - Transfuse to >7    Type 2 DM  Poorly controlled, last A1c 9.5. Currently managed with januvia, glipizide, and recently-added bedtime glargine. Inpatient -140.  - Insulin glargine decreased to 12 units  on 8/24 given am fasting BG in 50s   - Fingersticks, hypoglycemia protocol  - high dose sliding scale insulin    RLE wound  Small area with red, friable base in the center of his right shin in the setting of severe stasis skin changes. Base appears clean and not currently with concern for infection.  - WOCN consult    LLE edema > RLE- resolved  No DVT on LLE ultrasound 8/18.      Thyroid nodule  Subclinical hypothyroidism  He has already been referred to an endocrinologist to evaluate the nodule further. TSH is elevated at 7.46, though FT4 is wnl.  - Consider further workup inpatient vs close outpatient follow up      Hypertension  Has been difficult to manage as an outpatient, follows with cardiology for this. Improvement in BP with nifedipine added to regimen this admission.  - Continue outpatient regimen: hydralazine 100 mg TID,  and coreg 25 mg BID  - Will hold PTA hydrochlorothiazide 12.5 mg daily as this likely is not very effective with decreased kidney function  - Continue nifedipine 30mg    BPH  - Continue pta flomax and finasteride     Osteoporosis  - Continue pta vit D           Diet: Fluid restriction 2000 ML FLUID  Room Service  Combination Diet Low Saturated Fat Na <2400mg Diet, No Caffeine Diet    DVT Prophylaxis: Heparin SQ  High Catheter: Not present  Fluids: None  Central Lines: None  Code Status: No CPR- Do NOT Intubate  discussed on admission with pt and 2 children, he has previously filled out medical directive paperwork stating this wish    Disposition Plan   Expected discharge: >2 nights, recommended to TCU per PT/OT evaluations, once fluid status improved and outpatient diuresis plan established vs pending further dialysis discussion.    Patient was staffed with attending Dr. Fajardo.    Denise Acuña MD  Internal Medicine PGY1  [phone]  [facility]        ______________________________________________________________________    Interval History   No acute overnight events.  Patient is feeling okay today. Denies chest pain, shortness of breath. Appetite improved. Still having leg pain.    4 point review of systems otherwise negative.       Physical Exam   Vital Signs: Temp: 97.4  F (36.3  C) Temp src: Oral BP: 132/52 Pulse: 58   Resp: 20 SpO2: 94 % O2 Device: None (Room air)    Weight: 173 lbs 4.8 oz  Constitutional: awake, alert, cooperative, NAD  HEENT: normocephalic, anicteric sclerae, MMM   Pulmonary: no increased work of breathing on room air  Cardiovascular: bradycardic, regular  GI: soft, non-tender, non-distended  Skin: warm, dry, venous stasis over LE with flaking and dry skin  MSK: 0-1+ bilateral edema on LE, much improved  Neuro: AAOx3, no gross focal neurologic deficits      Data   All labs and imaging reviewed.

## 2021-08-24 NOTE — PLAN OF CARE
[id number]: VSS, pt afebrile on RA. Pt A&Ox4, makes needs known. Pt reports NUNEZ. Denies pain. AM BG 61, rechecked while pt eating and was 57.15 g of orange juice given. 15 min recheck 76; additional juice given to patient and pt . BG at lunch 119 and 122 with dinner; no insulin given. Pt very fatigued this shift rests between cares. Refused PT/OT. Encouraged pt to ambulate. Pt up with AstX1. K 5.0; MD notified pt on diuresis. Lasix restarted BID. Pt on strict I/O. Pt having adequate UOP. Pt had two unmeasured urine occurences with stooling. Pt experienced two loose stools this shift MD aware; will just monitor for now. Pt needs a UA. Pt having fair appetite, ate 75%. Son at bedside; very supportive with cares. Continue to monitor.

## 2021-08-24 NOTE — PLAN OF CARE
[id number]    Pt refusing q4VS this shift, see flowsheet. VSS on RA, hydralazine held per parameter orders. Pt BS at dinner in the evening was 232, 4u coverage given. BS at 2200 was 379, provider notified, and 7u given per MAR orders as well as Lantus pen. 2am BS corrected to 149. Lasix given in the evening, pt had 425ml output this shift, over 12 hours. Continue with POC.

## 2021-08-24 NOTE — PROGRESS NOTES
BG 57 at 0825. Pt is diabetic. Pt beginning to eat breakfast. 15min post check up to 76 at 0846 and patient continuing to eat breakfast. Attempted to recheck BG at 0900 (next 15min check due) but pt had explosive diarrhea. Recheck ended up being 30min post 0846 check. Recheck was 101. Pt asx and resting comfortably. Will recheck BG prior to lunch. MD updated.

## 2021-08-24 NOTE — PROGRESS NOTES
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 Renal US was ordered in 2020, but was not completed. Presented with creatinine of 2.9 which has stayed relatively stable during his hospitalization. Concern for HFpEF vs worsening of his CKD, but he is certainly volume overloaded regardless. UA with blood, although not present on repeat.    Also likely has a component of ATN, given nearly isothenuric specific gravity on UA.    - Avoid nephrotoxic agents and renally dose medications  - Strict I/O's, daily weights  - Renal ultrasound with doppler shows medical renal disease     Recommendations were communicated to primary team via note and verbally.     Seen and discussed with Dr. Will.     Humza Trinidad MD   [phone]    Interval History :   Nursing and provider notes from last 24 hours reviewed.    Breathing is about the same today. We discussed that his kidney function continues to worsen and that we are not really sure at this point exactly why. He was appropriately somber about the prospect of worsening kidney function.    Today he reports diffuse joint pains.    Review of Systems:   4 point review of systems performed and negative except as above.    Physical Exam:   I/O last 3 completed shifts:  In: 480 [P.O.:480]  Out: 900 [Urine:900]   /52 (BP Location: Right arm)   Pulse 58   Temp 97.4  F (36.3  C) (Oral)   Resp 20   Ht 1.803 m (5' 11\")   Wt 78.6 kg (173 lb 4.8 oz)   SpO2 94%   BMI 24.17 kg/m       General: sitting in bed, hard of hearing, no acute " distress  CV: RRR, no murmur appreciated, well perfused, 1-2+ edema of LE  Resp: no increased work of breathing, CTAB  Abd: soft, non-tender, non-distended  Neuro: alert and oriented, no focal deficits     Labs:   All labs reviewed by me  Electrolytes/Renal -   Recent Labs   Lab Test 08/24/21  1301 08/24/21  1038 08/24/21  0912 08/24/21  0714 08/23/21  0643 08/22/21  0610 08/20/21  0750 08/20/21  0548 08/18/21  0839 08/18/21  0651 08/16/21  1754 03/16/21  1420 01/30/14  0527 01/29/14  0547   NA  --   --   --  136 137 138   < > 138   < > 139  --  136   < > 138   POTASSIUM  --   --   --  5.0 4.3 4.4   < > 4.3   < > 5.0  --  4.5   < > 4.5   CHLORIDE  --   --   --  103 103 105   < > 107   < > 112*  --  105   < > 108   CO2  --   --   --  25 25 27   < > 26   < > 23  --  24   < > 21   BUN  --   --   --  92* 81* 78*   < > 65*   < > 71*  --  58*   < > 34*   CR  --   --   --  5.10* 4.67* 3.98*   < > 3.21*   < > 2.98*  --  2.32*   < > 1.70*   * 137* 101* 61* 75 185*   < > 92   < > 127*   < > 373*   < > 213*   DUSTIN  --   --   --  8.1* 8.1* 8.0*   < > 8.2*   < > 8.4*  --  8.5   < > 8.5   MAG  --   --   --   --   --   --   --  1.9  --  2.1  --   --   --  1.7   PHOS  --   --   --  5.3*  --   --   --  5.0*  --   --   --  3.9   < >  --     < > = values in this interval not displayed.       CBC -   Recent Labs   Lab Test 08/24/21  0714 08/23/21  0643 08/22/21  0610   WBC 5.6 5.8 5.1   HGB 7.9* 7.6* 7.4*    225 200       LFTs -   Recent Labs   Lab Test 08/17/21  1353 08/16/21  1754 03/16/21  1420 03/11/21  1527   ALKPHOS 82 86  --  88   BILITOTAL 0.3 0.3  --  0.2   ALT 20 19  --  13   AST 10 4  --  5   PROTTOTAL 7.6 7.7  --  7.1   ALBUMIN 2.9* 3.2* 3.0* 3.0*       Iron Panel -   Recent Labs   Lab Test 08/18/21  0651 09/24/20  1413 12/12/17  1701   IRON 27* 64 28*   IRONSAT 9* 21 9*   ROSEMARIE  --  109 207         Imaging:  Reviewed.    Current Medications:    aspirin  81 mg Oral Daily     carvedilol  25 mg Oral BID w/meals      finasteride  5 mg Oral Daily     furosemide  80 mg Intravenous Once     heparin ANTICOAGULANT  5,000 Units Subcutaneous Q12H     hydrALAZINE  100 mg Oral TID     insulin aspart  1-10 Units Subcutaneous TID AC     insulin aspart  1-7 Units Subcutaneous At Bedtime     insulin glargine  12 Units Subcutaneous At Bedtime     iron sucrose (VENOFER) intermittent infusion  300 mg Intravenous Q72H     multivitamin w/minerals  1 tablet Oral Daily     NIFEdipine ER OSMOTIC  30 mg Oral Daily     sodium chloride (PF)  3 mL Intracatheter Q8H     tamsulosin  0.4 mg Oral Daily     Vitamin D3  25 mcg Oral Daily       Humza Trinidad MD     I was present with the fellow during the history and exam.  I discussed the case with the fellow and agree with the findings as documented in the assessment and plan.  Pallavi Will

## 2021-08-25 ENCOUNTER — LAB REQUISITION (OUTPATIENT)
Dept: LAB | Facility: CLINIC | Age: 86
End: 2021-08-25
Payer: MEDICARE

## 2021-08-25 ENCOUNTER — APPOINTMENT (OUTPATIENT)
Dept: PHYSICAL THERAPY | Facility: CLINIC | Age: 86
DRG: 683 | End: 2021-08-25
Payer: MEDICARE

## 2021-08-25 DIAGNOSIS — U07.1 COVID-19: ICD-10-CM

## 2021-08-25 LAB
ANCA AB PATTERN SER IF-IMP: NORMAL
ANION GAP SERPL CALCULATED.3IONS-SCNC: 8 MMOL/L (ref 3–14)
BUN SERPL-MCNC: 94 MG/DL (ref 7–30)
C-ANCA TITR SER IF: NORMAL {TITER}
C3 SERPL-MCNC: 107 MG/DL (ref 81–157)
C4 SERPL-MCNC: 21 MG/DL (ref 13–39)
CALCIUM SERPL-MCNC: 7.7 MG/DL (ref 8.5–10.1)
CHLORIDE BLD-SCNC: 102 MMOL/L (ref 94–109)
CO2 SERPL-SCNC: 24 MMOL/L (ref 20–32)
CREAT SERPL-MCNC: 5.59 MG/DL (ref 0.52–1.25)
CREAT UR-MCNC: 53 MG/DL
ERYTHROCYTE [DISTWIDTH] IN BLOOD BY AUTOMATED COUNT: 15.6 % (ref 10–15)
GFR SERPL CREATININE-BSD FRML MDRD: 9 ML/MIN/1.73M2
GLUCOSE BLD-MCNC: 159 MG/DL (ref 70–99)
GLUCOSE BLDC GLUCOMTR-MCNC: 206 MG/DL (ref 70–99)
GLUCOSE BLDC GLUCOMTR-MCNC: 252 MG/DL (ref 70–99)
GLUCOSE BLDC GLUCOMTR-MCNC: 338 MG/DL (ref 70–99)
HCT VFR BLD AUTO: 25.5 % (ref 35–53)
HGB BLD-MCNC: 7.8 G/DL (ref 11.7–17.7)
MAGNESIUM SERPL-MCNC: 2.3 MG/DL (ref 1.6–2.3)
MCH RBC QN AUTO: 27.4 PG (ref 26.5–33)
MCHC RBC AUTO-ENTMCNC: 30.6 G/DL (ref 31.5–36.5)
MCV RBC AUTO: 90 FL (ref 78–100)
PHOSPHATE SERPL-MCNC: 5.2 MG/DL (ref 2.5–4.5)
PLATELET # BLD AUTO: 215 10E3/UL (ref 150–450)
POTASSIUM BLD-SCNC: 4.8 MMOL/L (ref 3.4–5.3)
PROT UR-MCNC: 0.32 G/L
PROT/CREAT 24H UR: 0.6 G/G CR (ref 0–0.2)
RBC # BLD AUTO: 2.85 10E6/UL (ref 3.8–5.9)
RHEUMATOID FACT SER NEPH-ACNC: <7 IU/ML
SARS-COV-2 RNA RESP QL NAA+PROBE: NEGATIVE
SODIUM SERPL-SCNC: 134 MMOL/L (ref 133–144)
WBC # BLD AUTO: 5.2 10E3/UL (ref 4–11)

## 2021-08-25 PROCEDURE — 86225 DNA ANTIBODY NATIVE: CPT | Performed by: STUDENT IN AN ORGANIZED HEALTH CARE EDUCATION/TRAINING PROGRAM

## 2021-08-25 PROCEDURE — G0463 HOSPITAL OUTPT CLINIC VISIT: HCPCS

## 2021-08-25 PROCEDURE — U0003 INFECTIOUS AGENT DETECTION BY NUCLEIC ACID (DNA OR RNA); SEVERE ACUTE RESPIRATORY SYNDROME CORONAVIRUS 2 (SARS-COV-2) (CORONAVIRUS DISEASE [COVID-19]), AMPLIFIED PROBE TECHNIQUE, MAKING USE OF HIGH THROUGHPUT TECHNOLOGIES AS DESCRIBED BY CMS-2020-01-R: HCPCS | Performed by: HOSPITALIST

## 2021-08-25 PROCEDURE — 84100 ASSAY OF PHOSPHORUS: CPT | Performed by: STUDENT IN AN ORGANIZED HEALTH CARE EDUCATION/TRAINING PROGRAM

## 2021-08-25 PROCEDURE — 80048 BASIC METABOLIC PNL TOTAL CA: CPT

## 2021-08-25 PROCEDURE — 97530 THERAPEUTIC ACTIVITIES: CPT | Mod: GP | Performed by: PHYSICAL THERAPIST

## 2021-08-25 PROCEDURE — 83735 ASSAY OF MAGNESIUM: CPT | Performed by: STUDENT IN AN ORGANIZED HEALTH CARE EDUCATION/TRAINING PROGRAM

## 2021-08-25 PROCEDURE — 250N000011 HC RX IP 250 OP 636: Performed by: STUDENT IN AN ORGANIZED HEALTH CARE EDUCATION/TRAINING PROGRAM

## 2021-08-25 PROCEDURE — 250N000013 HC RX MED GY IP 250 OP 250 PS 637

## 2021-08-25 PROCEDURE — 120N000002 HC R&B MED SURG/OB UMMC

## 2021-08-25 PROCEDURE — 97116 GAIT TRAINING THERAPY: CPT | Mod: GP | Performed by: PHYSICAL THERAPIST

## 2021-08-25 PROCEDURE — 250N000013 HC RX MED GY IP 250 OP 250 PS 637: Performed by: STUDENT IN AN ORGANIZED HEALTH CARE EDUCATION/TRAINING PROGRAM

## 2021-08-25 PROCEDURE — 36415 COLL VENOUS BLD VENIPUNCTURE: CPT

## 2021-08-25 PROCEDURE — 85048 AUTOMATED LEUKOCYTE COUNT: CPT

## 2021-08-25 PROCEDURE — 84156 ASSAY OF PROTEIN URINE: CPT | Performed by: STUDENT IN AN ORGANIZED HEALTH CARE EDUCATION/TRAINING PROGRAM

## 2021-08-25 PROCEDURE — 99233 SBSQ HOSP IP/OBS HIGH 50: CPT | Mod: GC | Performed by: INTERNAL MEDICINE

## 2021-08-25 RX ORDER — CARVEDILOL 6.25 MG/1
12.5 TABLET ORAL 2 TIMES DAILY WITH MEALS
Status: DISCONTINUED | OUTPATIENT
Start: 2021-08-25 | End: 2021-08-28

## 2021-08-25 RX ADMIN — FINASTERIDE 5 MG: 5 TABLET, FILM COATED ORAL at 08:05

## 2021-08-25 RX ADMIN — Medication 25 MCG: at 08:06

## 2021-08-25 RX ADMIN — CARVEDILOL 25 MG: 25 TABLET, FILM COATED ORAL at 08:06

## 2021-08-25 RX ADMIN — HEPARIN SODIUM 5000 UNITS: 5000 INJECTION, SOLUTION INTRAVENOUS; SUBCUTANEOUS at 19:41

## 2021-08-25 RX ADMIN — HEPARIN SODIUM 5000 UNITS: 5000 INJECTION, SOLUTION INTRAVENOUS; SUBCUTANEOUS at 08:05

## 2021-08-25 RX ADMIN — CARVEDILOL 12.5 MG: 6.25 TABLET, FILM COATED ORAL at 17:43

## 2021-08-25 RX ADMIN — Medication 1 TABLET: at 08:06

## 2021-08-25 RX ADMIN — NIFEDIPINE 30 MG: 30 TABLET, FILM COATED, EXTENDED RELEASE ORAL at 08:06

## 2021-08-25 RX ADMIN — TAMSULOSIN HYDROCHLORIDE 0.4 MG: 0.4 CAPSULE ORAL at 08:06

## 2021-08-25 ASSESSMENT — ACTIVITIES OF DAILY LIVING (ADL)
ADLS_ACUITY_SCORE: 21

## 2021-08-25 ASSESSMENT — MIFFLIN-ST. JEOR: SCORE: 1497.3

## 2021-08-25 NOTE — PROGRESS NOTES
[facility] Nurse Inpatient Wound Assessment   Reason for consultation: Evaluate and treat  R shin wounds    Assessment  R shin wounds due to Trauma and Venous Ulcer  Status: improving    Treatment Plan  R shin wounds: Daily  cleanse with microKlenz and dry, apply sween cream (pink top lotion) to all intact skin on BLE, avoid toes. Cut to fit vaseline gauze and place over open areas, then cover with ABD pad and secure with kerlix.      Orders Written    WO Nurse follow-up plan:weekly  Nursing to notify the Provider(s) and re-consult the WO Nurse if wound(s) deteriorates or new skin concern.    Patient History  According to provider note(s):  86M with history of T2DM, CKD4, and hypertension who presents with progressive fatigue over a few months and found to be volume overloaded concerning for progression of CKD vs CHF.    Objective Data  Containment of urine/stool: Incontinence Protocol    Active Diet Order  Orders Placed This Encounter      Combination Diet Low Saturated Fat Na <2400mg Diet, No Caffeine Diet      Output:   I/O last 3 completed shifts:  In: 75 [P.O.:75]  Out: 871 [Urine:870; Other:1]    Risk Assessment:   Sensory Perception: 3-->slightly limited  Moisture: 3-->occasionally moist  Activity: 3-->walks occasionally  Mobility: 3-->slightly limited  Nutrition: 3-->adequate  Friction and Shear: 2-->potential problem  Fortunato Score: 17                          Labs:   Recent Labs   Lab 08/25/21  0531   HGB 7.8*   WBC 5.2       Physical Exam  Areas of skin assessed: focused BLE    Wound Location:  R shin   Wound History: venous stasis to BLE, R shin wound opens and closes per pt    Wound Base: 100 % dermis     Palpation of the wound bed: normal      Drainage: small     Description of drainage: serosanguinous     Measurements (length x width x depth, in cm) two open areas lateral: 1.3cm x 1cm x 0.1cm and medial: 1cm x 1cm x 0.1cm      Tunneling N/A     Undermining N/A  Periwound skin: edematous and erythema-  blanchable      Color: pink      Temperature: normal   Odor: none  Pain: mild, tender      Interventions  Visual inspection and assessment completed  Wound Care Rationale Provide protection   Wound Care: done per plan of care  Supplies: floor stock  Current off-loading measures: Pillows  Current support surface: Standard  Atmos Air mattress  Education provided to: plan of care  Discussed plan of care with Patient and Nurse    Lauren Medrano RN, CWOCN

## 2021-08-25 NOTE — PROGRESS NOTES
Care from 15:00 - 19:00     Pt alert, oriented x 4. VSS on RA. Pt sitting up in chair this afternoon, ordered dinner on his own. Unable to bladder scan as pt wanted to continue sitting up. , insulin administered as ordered. Routine COVID test negative. No acute changes, few requests. Continue plan of care.

## 2021-08-25 NOTE — PLAN OF CARE
[id number]    VSS, afebrile and on RA. A&Ox4. Very hard of hearing, pocket talker at bedside. Denies pain/nausea/SOB. C/o being woken up during night and that it is difficult to get sleep. Held morning hydralazine d/t DBP of 56. Wound on right shin cleansed and redressed per WOC orders. Uses bedside urinal, with minimal UOP. Attempted to bladder scan, bladder scanner was unable to turn on. Incontinent of stool x1. Good appetite for both breakfast & lunch. Continue with carb counts, 2L fluid restriction, and combination diet. Assist x1. Continue to monitor & w/ POC.

## 2021-08-25 NOTE — PROGRESS NOTES
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 Renal US was ordered in 2020, but was not completed. Presented with creatinine of 2.9 which has stayed relatively stable during his hospitalization. Concern for HFpEF vs worsening of his CKD, but he is certainly volume overloaded regardless. UA with blood, although not present on repeat.    Also likely has a component of ATN, given nearly isothenuric specific gravity on UA.    Possibly underperfusing in the setting of decreased BP relative to his prior set point.    - Avoid nephrotoxic agents and renally dose medications  - Strict I/O's, daily weights  - Renal ultrasound with doppler shows medical renal disease     Recommendations were communicated to primary team via note and verbally.     Seen and discussed with Dr. Will.     Humza Trinidad MD   [phone]    Interval History :   Nursing and provider notes from last 24 hours reviewed.    He feels about the same today. No new symptoms or questions.    On chart review, it appears that his rise in creatinine coincided with a decrease in SBP from the 160's to the 120's.    Review of Systems:   4 point review of systems performed and negative except as above.    Physical Exam:   I/O last 3 completed shifts:  In: 75 [P.O.:75]  Out: 871 [Urine:870; Other:1]   /49 (BP Location: Right arm)   Pulse 58   Temp (!) 96.6  F (35.9  C) (Oral)   Resp 18   Ht 1.803 m (5' 11\")   Wt 79 kg (174 lb 3.2 oz)   SpO2 93%   " BMI 24.30 kg/m       General: sitting in bed, hard of hearing, no acute distress  CV: RRR, no murmur appreciated, well perfused, 1-2+ edema of LE  Resp: no increased work of breathing, CTAB  Abd: soft, non-tender, non-distended  Neuro: alert and oriented, no focal deficits     Labs:   All labs reviewed by me  Electrolytes/Renal -   Recent Labs   Lab Test 08/25/21  1211 08/25/21  0531 08/24/21  2158 08/24/21  0714 08/23/21  0643 08/20/21  0750 08/20/21  0548 08/18/21  0839 08/18/21  0651   NA  --  134  --  136 137   < > 138   < > 139   POTASSIUM  --  4.8  --  5.0 4.3   < > 4.3   < > 5.0   CHLORIDE  --  102  --  103 103   < > 107   < > 112*   CO2  --  24  --  25 25   < > 26   < > 23   BUN  --  94*  --  92* 81*   < > 65*   < > 71*   CR  --  5.59*  --  5.10* 4.67*   < > 3.21*   < > 2.98*   * 159* 168* 61* 75   < > 92   < > 127*   DUSTIN  --  7.7*  --  8.1* 8.1*   < > 8.2*   < > 8.4*   MAG  --  2.3  --   --   --   --  1.9  --  2.1   PHOS  --  5.2*  --  5.3*  --   --  5.0*  --   --     < > = values in this interval not displayed.       CBC -   Recent Labs   Lab Test 08/25/21  0531 08/24/21  0714 08/23/21  0643   WBC 5.2 5.6 5.8   HGB 7.8* 7.9* 7.6*    215 225       LFTs -   Recent Labs   Lab Test 08/17/21  1353 08/16/21  1754 03/16/21  1420 03/11/21  1527   ALKPHOS 82 86  --  88   BILITOTAL 0.3 0.3  --  0.2   ALT 20 19  --  13   AST 10 4  --  5   PROTTOTAL 7.6 7.7  --  7.1   ALBUMIN 2.9* 3.2* 3.0* 3.0*       Iron Panel -   Recent Labs   Lab Test 08/18/21  0651 09/24/20  1413 12/12/17  1701   IRON 27* 64 28*   IRONSAT 9* 21 9*   ROSEMARIE  --  109 207         Imaging:  Reviewed.    Current Medications:    aspirin  81 mg Oral Daily     carvedilol  12.5 mg Oral BID w/meals     finasteride  5 mg Oral Daily     heparin ANTICOAGULANT  5,000 Units Subcutaneous Q12H     insulin aspart  1-10 Units Subcutaneous TID AC     insulin glargine  8 Units Subcutaneous At Bedtime     multivitamin w/minerals  1 tablet Oral Daily      [Held by provider] NIFEdipine ER OSMOTIC  30 mg Oral Daily     sodium chloride (PF)  3 mL Intracatheter Q8H     tamsulosin  0.4 mg Oral Daily     Vitamin D3  25 mcg Oral Daily       Humza Trinidad MD   I was present with the fellow during the history and exam.  I discussed the case with the fellow and agree with the findings as documented in the assessment and plan. Renal function continues to worsen. Will allow blood pressure to come up and monitor closely. Discussed with patient and his son the possibility of kidney biopsy if no improvement, and ?need for dialysis    Pallavi Ricky Will

## 2021-08-25 NOTE — PLAN OF CARE
Assumed care for pt [id number]  Pt AOX4 VS and assesmentas documented. Denies pain. UA obtained and sent to lab. Voiding in urinal at bedside. Needs met, call light within reach, safety maintained. Will continue plan of care.

## 2021-08-25 NOTE — PROGRESS NOTES
[facility]    Progress Note - [facility] Service        Date of Admission:  8/17/2021  Date of Service: 08/25/21     Assessment & Plan   86 year old male with history of T2DM, CKD4, and hypertension who presents with months of progressive fatigue and volume overload concerning for progression of CKD vs CHF.    Today:  - Creatinine continues to increase   - No diuresis today   - Decrease antihypertensives due to concern for possible decreased renal perfusion   - Hold nifedipine    - Decrease carvedilol from 25 mg BID to 12.5 mg BID    - Discontinue hydralazine     #RONY on CKD 3/4  #Uremia, asymptomatic   Presented with months of dyspnea, leg swelling, and fatigue. Found to be hypervolemic on exam and based on US evaluation of IVC concerning for CHF vs worsening CKD. Creatinine 2.9 on initial presentation. Echo with EF of 55-60% with normal global ventricular function. UA initially with RBCs but negative on repeat so less concern for glomerulonephritis. Urine protein:creatinine ratio inconsistent with nephrotic syndrome. Renal artery US suggestive of medical renal disease. Creatinine persistently increased despite diuresis; creatinine is 5.59 on 8/25. Possibly some component of ATN. C3, C4, RF, ANCA wnl, MYA pending. 8/25 decreased antihypertensives due to concern for possible decreased renal perfusion in the setting of tighter BP control. Will continue to monitor. Nephrology following throughout hospitalization.   - Nephrology is following, appreciate recs  - Decrease antihypertensives today 8/25:   - Hold nifedipine (started this admission)   - Decrease carvedilol from 25 mg BID to 12.5 mg BID    - Discontinue hydralazine   - Pending labs: MYA, repeat UA  - Strict I/O, daily weights  - 2 g Na diet, 2 L fluid restriction  - Daily BMP     #Acute on chronic normocytic anemia  Hgb 6.8 on 8/19 requiring 1 unit pRBCs. No signs of acute blood loss. Absolute retic count low  indicating marrow hypoproliferation. Labs consistent with iron deficiency. No evidence of hemolysis based on labs, blood smear. Hgb >7 since transfusion on 8/19. Likely epo deficiency given history of CKD, epo level pending. Possibly could be slow GI bleed, pt's chart indicates remote history of rectal cancer?   - Iron sucrose 300 mg q72 hr for a total of 3 doses    - Epo level pending   - CBC daily   - Transfuse for Hgb <7  - Follow up screening colonoscopy as outpatient (last in 2015, recommended follow up in 5 years)    #Type 2 DM  Last A1c 9.5 on 7/16/2021. Currently managed with sitagliptin, glipizide, glargine at bedtime (10 units PTA). On 8/22, glargine increased to 16 units and bedtime high dose sliding scale were added; hypoglycemic the following morning so glargine dose reduced to 8 units and bedtime sliding scale were discontinued.  - Continue insulin glargine 8 units at bedtime   - High dose sliding scale mealtime insulin   - Fingersticks, hypoglycemia protocol    #R shin wound  Small area with red, friable base in the center of his right shin in the setting of severe stasis skin changes. Base appears clean and not currently with concern for infection. Wound care nurse following  - Continue wound cares per wound care nurse     #Hypertension  Has been difficult to manage as an outpatient, follows with cardiology for this. Improvement in BP with nifedipine added to regimen this admission though concern for renal underperfusion so discontinued on 8/25.  - Changes on 8/25 due to concern for possible decreased renal perfusion:   - Hold nifedipine (started this admission)    - Decrease carvedilol from 25 mg BID to 12.5 mg BID    - Discontinue hydralazine   -PTA hydrochlorothiazide 12.5 mg daily held on admission as this likely is not very effective with decreased kidney function    Resolved hospital issues:  #Hyperkalemia, resolved with diuresis     #Lower extremity edema, L>R  No DVT on LLE ultrasound  8/18    Chronic medical conditions:  #BPH  - Continue pta flomax and finasteride     #Osteoporosis  - Continue pta vit D    #Thyroid nodule  He has already been referred to an endocrinologist to evaluate the nodule further  - Continue plan for outpatient follow up     #Subclinical hypothyroidism   TSH is elevated at 7.46, though FT4 is wnl.  - Outpatient follow up        Diet: Fluid restriction 2000 ML FLUID  Room Service  Combination Diet Low Saturated Fat Na <2400mg Diet, No Caffeine Diet    DVT Prophylaxis: Heparin SQ  High Catheter: Not present  Fluids: None  Central Lines: None  Code Status: No CPR- Do NOT Intubate  discussed on admission with pt and 2 children, he has previously filled out medical directive paperwork stating this wish    Disposition Plan   Expected discharge: >2 nights, recommended to TCU per PT/OT evaluations, once fluid status improved and outpatient diuresis plan established vs pending further dialysis discussion.    Patient was staffed with attending Dr. Fajardo.    Denise Acuña MD  Internal Medicine PGY1  [phone]  [facility] Service    ______________________________________________________________________    Interval History   No acute overnight events. Patient is feeling okay today though continues to feel tired and have occasional joint pain in wrists and shoulders. 2 episodes of loose stool yesterday, 1 this morning. No associated abdominal pain, nausea, or vomiting. No itching. Denies chest pain, shortness of breath. Son has noticed a little more confusion compared to normal.     4 point review of systems otherwise negative.       Physical Exam   Vital Signs: Temp: (!) 96.6  F (35.9  C) Temp src: Oral BP: 125/49 Pulse: 58   Resp: 18 SpO2: 93 % O2 Device: None (Room air)    Weight: 174 lbs 3.2 oz  Constitutional: sleepy but arousable and interactive, no acute distress  HEENT: normocephalic, anicteric sclerae, MMM   Pulmonary: no increased work of breathing on room  air  Cardiovascular: bradycardic, regular  GI: soft, non-tender, non-distended  Skin: warm, dry, venous stasis over LE with flaking and dry skin  MSK: no lower extremity edema   Neuro: AAOx3, no gross focal neurologic deficits      Data   All labs and imaging reviewed and notable for the following:    Cr 5.59 from 5.1  BUN 94, increasing     Hgb 7.8, stable

## 2021-08-26 ENCOUNTER — APPOINTMENT (OUTPATIENT)
Dept: ULTRASOUND IMAGING | Facility: CLINIC | Age: 86
DRG: 683 | End: 2021-08-26
Attending: HOSPITALIST
Payer: MEDICARE

## 2021-08-26 LAB
ANA PAT SER IF-IMP: ABNORMAL
ANA SER QL IF: POSITIVE
ANA TITR SER IF: ABNORMAL {TITER}
ANION GAP SERPL CALCULATED.3IONS-SCNC: 10 MMOL/L (ref 3–14)
BUN SERPL-MCNC: 97 MG/DL (ref 7–30)
CALCIUM SERPL-MCNC: 8.1 MG/DL (ref 8.5–10.1)
CHLORIDE BLD-SCNC: 102 MMOL/L (ref 94–109)
CO2 SERPL-SCNC: 22 MMOL/L (ref 20–32)
CREAT SERPL-MCNC: 5.77 MG/DL (ref 0.52–1.25)
ERYTHROCYTE [DISTWIDTH] IN BLOOD BY AUTOMATED COUNT: 15.8 % (ref 10–15)
GFR SERPL CREATININE-BSD FRML MDRD: 8 ML/MIN/1.73M2
GLUCOSE BLD-MCNC: 224 MG/DL (ref 70–99)
GLUCOSE BLDC GLUCOMTR-MCNC: 132 MG/DL (ref 70–99)
GLUCOSE BLDC GLUCOMTR-MCNC: 147 MG/DL (ref 70–99)
GLUCOSE BLDC GLUCOMTR-MCNC: 235 MG/DL (ref 70–99)
GLUCOSE BLDC GLUCOMTR-MCNC: 254 MG/DL (ref 70–99)
GLUCOSE BLDC GLUCOMTR-MCNC: 262 MG/DL (ref 70–99)
HCT VFR BLD AUTO: 28.2 % (ref 35–53)
HGB BLD-MCNC: 8.5 G/DL (ref 11.7–17.7)
MCH RBC QN AUTO: 27.2 PG (ref 26.5–33)
MCHC RBC AUTO-ENTMCNC: 30.1 G/DL (ref 31.5–36.5)
MCV RBC AUTO: 90 FL (ref 78–100)
PLATELET # BLD AUTO: 255 10E3/UL (ref 150–450)
POTASSIUM BLD-SCNC: 4.8 MMOL/L (ref 3.4–5.3)
RBC # BLD AUTO: 3.13 10E6/UL (ref 3.8–5.9)
SODIUM SERPL-SCNC: 134 MMOL/L (ref 133–144)
WBC # BLD AUTO: 7.2 10E3/UL (ref 4–11)

## 2021-08-26 PROCEDURE — 85027 COMPLETE CBC AUTOMATED: CPT | Performed by: HOSPITALIST

## 2021-08-26 PROCEDURE — 36415 COLL VENOUS BLD VENIPUNCTURE: CPT | Performed by: HOSPITALIST

## 2021-08-26 PROCEDURE — 83516 IMMUNOASSAY NONANTIBODY: CPT | Performed by: HOSPITALIST

## 2021-08-26 PROCEDURE — 99233 SBSQ HOSP IP/OBS HIGH 50: CPT | Mod: GC | Performed by: INTERNAL MEDICINE

## 2021-08-26 PROCEDURE — 250N000009 HC RX 250: Performed by: HOSPITALIST

## 2021-08-26 PROCEDURE — 250N000013 HC RX MED GY IP 250 OP 250 PS 637: Performed by: STUDENT IN AN ORGANIZED HEALTH CARE EDUCATION/TRAINING PROGRAM

## 2021-08-26 PROCEDURE — 93970 EXTREMITY STUDY: CPT | Mod: 26 | Performed by: RADIOLOGY

## 2021-08-26 PROCEDURE — 250N000009 HC RX 250

## 2021-08-26 PROCEDURE — 250N000011 HC RX IP 250 OP 636: Performed by: STUDENT IN AN ORGANIZED HEALTH CARE EDUCATION/TRAINING PROGRAM

## 2021-08-26 PROCEDURE — 120N000002 HC R&B MED SURG/OB UMMC

## 2021-08-26 PROCEDURE — 80048 BASIC METABOLIC PNL TOTAL CA: CPT | Performed by: HOSPITALIST

## 2021-08-26 PROCEDURE — 93970 EXTREMITY STUDY: CPT

## 2021-08-26 RX ORDER — ACETAMINOPHEN 325 MG/1
325 TABLET ORAL EVERY 6 HOURS PRN
Status: DISCONTINUED | OUTPATIENT
Start: 2021-08-26 | End: 2021-09-02 | Stop reason: HOSPADM

## 2021-08-26 RX ORDER — LIDOCAINE HYDROCHLORIDE 20 MG/ML
JELLY TOPICAL EVERY 4 HOURS PRN
Status: DISCONTINUED | OUTPATIENT
Start: 2021-08-26 | End: 2021-09-02 | Stop reason: HOSPADM

## 2021-08-26 RX ORDER — LIDOCAINE HYDROCHLORIDE 20 MG/ML
JELLY TOPICAL ONCE
Status: DISCONTINUED | OUTPATIENT
Start: 2021-08-26 | End: 2021-09-02 | Stop reason: HOSPADM

## 2021-08-26 RX ADMIN — HEPARIN SODIUM 5000 UNITS: 5000 INJECTION, SOLUTION INTRAVENOUS; SUBCUTANEOUS at 20:39

## 2021-08-26 RX ADMIN — FINASTERIDE 5 MG: 5 TABLET, FILM COATED ORAL at 09:16

## 2021-08-26 RX ADMIN — CARVEDILOL 12.5 MG: 6.25 TABLET, FILM COATED ORAL at 09:16

## 2021-08-26 RX ADMIN — Medication 1 TABLET: at 09:16

## 2021-08-26 RX ADMIN — CARVEDILOL 12.5 MG: 6.25 TABLET, FILM COATED ORAL at 17:38

## 2021-08-26 RX ADMIN — Medication 25 MCG: at 09:16

## 2021-08-26 RX ADMIN — LIDOCAINE HYDROCHLORIDE: 20 JELLY TOPICAL at 13:19

## 2021-08-26 RX ADMIN — HEPARIN SODIUM 5000 UNITS: 5000 INJECTION, SOLUTION INTRAVENOUS; SUBCUTANEOUS at 09:16

## 2021-08-26 RX ADMIN — TAMSULOSIN HYDROCHLORIDE 0.4 MG: 0.4 CAPSULE ORAL at 09:16

## 2021-08-26 ASSESSMENT — ACTIVITIES OF DAILY LIVING (ADL)
ADLS_ACUITY_SCORE: 21

## 2021-08-26 NOTE — PROVIDER NOTIFICATION
"Pgd Dr. Acuña [phone] at 1247    \"Pt OK with lab draws from leg, called lab, they would like comment from provider since it is 3rd attempt today. Thanks\"   "

## 2021-08-26 NOTE — PROGRESS NOTES
[facility]    Progress Note - [facility] Service        Date of Admission:  8/17/2021  Date of Service: 08/26/21     Assessment & Plan   86 year old male with history of T2DM, CKD4, and hypertension who presents with months of progressive fatigue and volume overload concerning for progression of CKD vs CHF.    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 Found to be hypervolemic on exam and based on US evaluation of IVC concerning for CHF vs worsening CKD. Creatinine 2.9 on initial presentation. Echo with EF of 55-60% with normal global ventricular function. UA initially with RBCs but negative on repeat so less concern for glomerulonephritis. Urine protein:creatinine ratio inconsistent with nephrotic syndrome. Renal artery US suggestive of medical renal disease. Creatinine persistently increased despite diuresis to 5.59 on 8/25. 8/25 decreased antihypertensives due to concern for possible decreased renal perfusion in the setting of tighter BP control. 8/26 MYA resulted positive. C3, C4, RF, ANCA all negative. Anti-histone Ab obtained and pending to evaluate for possible drug induced lupus 2/2 hydralazine. Will continue to monitor. Nephrology following throughout hospitalization.   - Nephrology is following, appreciate recs  - MYA positive on 8/26 so anti-histone Ab ordered by nephrology to evaluate for drug induced lupus 2/2 hydralazine   - Continue carvedilol 12.5 mg BID (decreased from 25 mg BID on 8/25 d/t concern for renal hypoperfusion)   - Nifedipine, hydralazine discontinued on 8/25 d/t concern for renal hypoperfusion  -  Hydralazine added to allergies given concern for DIL  - Follow up anti-histone Ab  - 2 g Na diet, 2 L fluid restriction  - Daily BMP    #Concern for superficial thrombophlebitis   Developed R forearm pain, swelling, redness on 8/26 concerning for possible blood clot 2/2 peripheral IV placement. Pt also mentions he is starting to develop similar pain in the L forearm. Has been on DVT prophylaxis this admission.   - Obtain bilateral UE doppler US to evaluate for clot  - Diclofenac gel, lidocaine gel, and cold compresses for pain relief  - Acetaminophen prn for pain      #Acute on chronic normocytic anemia  Hgb 6.8 on 8/19 requiring 1 unit pRBCs. No signs of acute blood loss. Absolute retic count low indicating marrow hypoproliferation. Labs consistent with iron deficiency. No evidence of hemolysis based on labs, blood smear. Hgb >7 since transfusion on 8/19. Likely epo deficiency given history of CKD, epo level pending. No signs or symptoms of GI bleeding   - Iron sucrose 300 mg q72 hr for a total of 3 doses    - Epo level pending   - CBC daily   - Transfuse for Hgb <7  - Follow up screening colonoscopy as outpatient (last in 2015, recommended follow up in 5 years)    #Type 2 DM  Last A1c 9.5 on 7/16/2021. Currently managed with sitagliptin, glipizide, glargine at bedtime (10 units PTA). On 8/22, glargine increased to 16 units and bedtime high dose sliding scale were added; hypoglycemic the following morning so glargine dose reduced to 8 units and bedtime sliding scale were discontinued.  - Continue insulin glargine 8 units at bedtime   - High dose sliding scale mealtime insulin   - Fingersticks, hypoglycemia protocol    #R shin wound  Small area with red, friable base in the center of his right shin in the setting of severe stasis skin changes. Base appears clean and not currently with concern for infection. Wound care nurse following  - Continue wound cares per wound care nurse     #Hypertension  Has been difficult  to manage as an outpatient, follows with cardiology for this. Improvement in BP with nifedipine added to regimen this admission though concern for renal underperfusion so discontinued on 8/25.  - Changes on 8/25 due to concern for possible decreased renal perfusion:   - Hold nifedipine (started this admission)    - Decrease carvedilol from 25 mg BID to 12.5 mg BID    - Discontinue hydralazine   -PTA hydrochlorothiazide 12.5 mg daily held on admission as this likely is not very effective with decreased kidney function    Resolved hospital issues:  #Hyperkalemia, resolved with diuresis     #Lower extremity edema, L>R  No DVT on LLE ultrasound 8/18    Chronic medical conditions:  #BPH  - Continue pta flomax and finasteride     #Osteoporosis  - Continue pta vit D    #Thyroid nodule  He has already been referred to an endocrinologist to evaluate the nodule further  - Continue plan for outpatient follow up     #Subclinical hypothyroidism   TSH is elevated at 7.46, though FT4 is wnl.  - Outpatient follow up        Diet: Fluid restriction 2000 ML FLUID  Room Service  Combination Diet Low Saturated Fat Na <2400mg Diet, No Caffeine Diet    DVT Prophylaxis: Heparin SQ  High Catheter: Not present  Fluids: None  Central Lines: None  Code Status: No CPR- Do NOT Intubate  discussed on admission with pt and 2 children, he has previously filled out medical directive paperwork stating this wish    Disposition Plan   Expected discharge: >2 nights, recommended to TCU per PT/OT evaluations, once fluid status improved and outpatient diuresis plan established vs pending further dialysis discussion.    Patient was staffed with attending Dr. Fajardo.    Denise Acuña MD  Internal Medicine PGY1  [phone]  [facility] 2 Service    ______________________________________________________________________    Interval History   No acute overnight events. This morning he developed R forearm pain, swelling and redness. He feels like he is  starting to develop similar symptoms in the left forearm as well. He continues to feel fatigued. Eating and drinking is going okay though still doesn't have much of an appetite. No chest pain, shortness of breath, abdominal pain.     4 point review of systems otherwise negative.       Physical Exam   Vital Signs: Temp: 97.6  F (36.4  C) Temp src: Temporal BP: (!) 148/52 Pulse: 60   Resp: 16 SpO2: 96 % O2 Device: None (Room air)    Weight: 174 lbs 3.2 oz  Constitutional: awake, alert, and interactive, no acute distress  HEENT: normocephalic, anicteric sclerae, MMM   Pulmonary: no increased work of breathing on room air, lungs clear to auscultation bilaterally  Cardiovascular: bradycardic, regular  GI: soft, non-tender, non-distended  Skin: +R forearm with redness, tenderness to palpation, and swelling. Venous stasis over LE with flaking and dry skin  MSK: no lower extremity edema   Neuro: AAOx3, no gross focal neurologic deficits      Data   All labs and imaging reviewed.

## 2021-08-26 NOTE — PROVIDER NOTIFICATION
"Pgd Dr. Acuña [phone] at 1208    \"Pt complaining of pain/soreness in arms. Could we get a PRN pain med to give? Thanks\"       "

## 2021-08-26 NOTE — PLAN OF CARE
"7-11pm    Pt is alert and oriented x4, AVSS, afebrile on RA, last BG was 338. pt is oliguric when asked to void he stated that \" He did not feel like he had to go\". Pt has an order to straight cath if PVR is greater 300ml. Pt was bladder scan for 324 ml.393ml bladder scan before attempting straight catherizarion pt was asked to void but stated that \" if he needs to go, he will go. He can not force it.\" writer educated pt on urinary retention, pt sill need reinforcement. Pt is C/O of pain during straight catherization and became anxious during the process.   Procedure stopped.Provider paged regarding lidocaine gel before re attempting straight again. On fluid restriction 2000ml per day, last bm was on 08/25/2021. will continue to monitor       23pm-07am. Pt remains vitally stable overnight no acute event.  Pt was incontinent x1, large amount bed linen changed and voided 50 ml bladder scan after that for 261cc. BG recheck was around  262.Pt is up with assist of 1 and walker. Urinal at bedsides. Will continue to monitor.      Pt declined lab this morning.       "

## 2021-08-26 NOTE — PROGRESS NOTES
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 Renal US was ordered in 2020, but was not completed. Presented with creatinine of 2.9 which has stayed relatively stable during his hospitalization. Concern for HFpEF vs worsening of his CKD, but he is certainly volume overloaded regardless. UA with blood, although not present on repeat.    Also likely has a component of ATN, given nearly isothenuric specific gravity on UA.    Possibly underperfusing in the setting of decreased BP relative to his prior set point. 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    - Avoid nephrotoxic agents and renally dose medications  - Strict I/O's, daily weights  - Renal ultrasound with doppler shows medical renal disease     Recommendations were communicated to primary team via note and verbally.     Seen and discussed with Dr. Will.     Humza Trinidad MD   [phone]    Interval History :   Nursing and provider notes from last 24 hours reviewed.    Quite frustrated when seen today, irritated about his lab draws. Requested that I leave him alone, but agreed to have labs drawn.    Review of Systems:   4 point review of systems performed and negative except as above.    Physical Exam:   I/O last 3 completed shifts:  In: 360 [P.O.:360]  Out: 600 [Urine:600]   BP (!) 158/61 (BP Location: Left arm)   Pulse 64   Temp 97.4  F (36.3  C) (Oral)   Resp 18   Ht 1.803 m (5' 11\")   Wt 79 kg (174 lb 3.2 oz)   SpO2 96%   BMI 24.30 kg/m   "     General: sitting in bed, hard of hearing, no acute distress  CV: RRR, no murmur appreciated, well perfused, 1-2+ edema of LE  Resp: no increased work of breathing, CTAB  Abd: soft, non-tender, non-distended  Neuro: alert and oriented, no focal deficits     Labs:   All labs reviewed by me  Electrolytes/Renal -   Recent Labs   Lab Test 08/26/21  1341 08/26/21  1128 08/26/21  0903 08/25/21  0531 08/24/21  0714 08/20/21  0750 08/20/21  0548 08/18/21  0839 08/18/21  0651     --   --  134 136   < > 138   < > 139   POTASSIUM 4.8  --   --  4.8 5.0   < > 4.3   < > 5.0   CHLORIDE 102  --   --  102 103   < > 107   < > 112*   CO2 22  --   --  24 25   < > 26   < > 23   BUN 97*  --   --  94* 92*   < > 65*   < > 71*   CR 5.77*  --   --  5.59* 5.10*   < > 3.21*   < > 2.98*   * 132* 147* 159* 61*   < > 92   < > 127*   DUSTIN 8.1*  --   --  7.7* 8.1*   < > 8.2*   < > 8.4*   MAG  --   --   --  2.3  --   --  1.9  --  2.1   PHOS  --   --   --  5.2* 5.3*  --  5.0*  --   --     < > = values in this interval not displayed.       CBC -   Recent Labs   Lab Test 08/26/21  1341 08/25/21  0531 08/24/21  0714   WBC 7.2 5.2 5.6   HGB 8.5* 7.8* 7.9*    215 215       LFTs -   Recent Labs   Lab Test 08/17/21  1353 08/16/21  1754 03/16/21  1420 03/11/21  1527   ALKPHOS 82 86  --  88   BILITOTAL 0.3 0.3  --  0.2   ALT 20 19  --  13   AST 10 4  --  5   PROTTOTAL 7.6 7.7  --  7.1   ALBUMIN 2.9* 3.2* 3.0* 3.0*       Iron Panel -   Recent Labs   Lab Test 08/18/21  0651 09/24/20  1413 12/12/17  1701   IRON 27* 64 28*   IRONSAT 9* 21 9*   ROSEMARIE  --  109 207         Imaging:  Reviewed.    Current Medications:    aspirin  81 mg Oral Daily     carvedilol  12.5 mg Oral BID w/meals     finasteride  5 mg Oral Daily     heparin ANTICOAGULANT  5,000 Units Subcutaneous Q12H     insulin aspart  1-10 Units Subcutaneous TID AC     insulin glargine  8 Units Subcutaneous At Bedtime     lidocaine   Urethral Once     multivitamin w/minerals  1 tablet  Oral Daily     [Held by provider] NIFEdipine ER OSMOTIC  30 mg Oral Daily     sodium chloride (PF)  3 mL Intracatheter Q8H     tamsulosin  0.4 mg Oral Daily     Vitamin D3  25 mcg Oral Daily       Humza Trinidad MD     I was present with the fellow during the history and exam.  I discussed the case with the fellow and agree with the findings as documented in the assessment and plan.  Pallavi Will

## 2021-08-26 NOTE — PROGRESS NOTES
CLINICAL NUTRITION SERVICES - REASSESSMENT NOTE     Nutrition Prescription    RECOMMENDATIONS FOR MDs/PROVIDERS TO ORDER:  - Would recommend trial of appetite stimulant if pt continues to c/o lack of appetite.     Malnutrition Status:    - Unable to determine at this time    Recommendations already ordered by Registered Dietitian (RD):  - None at this time    Future/Additional Recommendations:  - Continue to encourage good po intakes  - Monitor lytes and need to switch to Renal diet     EVALUATION OF THE PROGRESS TOWARD GOALS   Diet: Low Saturated Fat/2400 mg Sodium and 2000 mL Fluid Restriction     Intake: Per RN/flowsheets (pt sleeping at time of visit), reported that pt has been consuming 100% of his meals x past 3 days. Over the past week, pt consuming > 75% of his meals per RN/flowsheets.  - Per RN report, informed that pt ate a late breakfast, in part d/t pt needing meal set up from nursing, but did eat 100% of his meal.    NEW FINDINGS   Weight: 79 kg (8/25) - last recorded wt, 80.1 kg (8/19), 79.4 kg (on 8/17  - admit); Wt overall stable over the past week.    Labs:  BUN/Cr 97/5.77 (high) - Nephrology following  K+ WNL today, PO4 > normal on 8/25, 8/24 and 8/20.  No Zinc lab draw at this time    MALNUTRITION  % Intake: Decreased intake does not meet criteria  % Weight Loss: None noted over the past week  Subcutaneous Fat Loss: Unable to assess  Muscle Loss: Unable to assess  Fluid Accumulation/Edema: Does not meet criteria per chart review  Malnutrition Diagnosis: Unable to determine due to unable to complete all parameters of nutritional assessment.    Previous Goals   1. Patient to consume % of nutritionally adequate meal trays TID, or the equivalent with supplements/snacks.    Evaluation: Met     2. Wt not to decline < 79 kg    Evaluation: Met    Previous Nutrition Diagnosis  Untended wt loss related to question inadequate nutritional intakes secondary to poor appetite vs h/o chronic diarrhea as  evidenced by wt loss of 6 lbs (3.3 % loss) x past 4 mos and currently consuming betw 50 to 100% of his meals since admit.      Evaluation: No longer applicable, nutrition diagnosis changed below    CURRENT NUTRITION DIAGNOSIS  Predicted inadequate nutrient intake (calorie-protein) related to tolerating po with good intakes, but at risk for po to falter d/t appetite fluctuations.      INTERVENTIONS  Implementation  Collaboration with RN regarding pt's po intakes.    Goals  Patient to consume % of nutritionally adequate meal trays TID, or the equivalent with supplements/snacks.    Monitoring/Evaluation  Progress toward goals will be monitored and evaluated per protocol.      Vanessa Witt RD,LD  7D pager [phone]

## 2021-08-26 NOTE — PLAN OF CARE
"[id number]    VSS, afebrile and on RA. A&Ox4. Very hard of hearing, pocket talker at bedside. Denied SOB/nausea. C/o bilateral arm pain, pt stated it was related to \"being poked so many times.\" Pgd provider for pain management, received orders for PRN tylenol, lidocaine gel and volteren. Pt refused tylenol, lidocaine gel applied to arms bilaterally. Had US of UE to determine possible cause of swelling. Refused AM labs x2, provider notified. Pt approved to have labs drawn from legs, labs obtained around 1345. Morning blood sugar 147, covered with 1 unit, lunch , no coverage needed. Adequate UOP with one episode of incontinence. Had one BM during shift. Continue to monitor & w/ POC.   "

## 2021-08-27 LAB
ANION GAP SERPL CALCULATED.3IONS-SCNC: 6 MMOL/L (ref 3–14)
BUN SERPL-MCNC: 94 MG/DL (ref 7–30)
CALCIUM SERPL-MCNC: 8 MG/DL (ref 8.5–10.1)
CHLORIDE BLD-SCNC: 105 MMOL/L (ref 94–109)
CO2 SERPL-SCNC: 26 MMOL/L (ref 20–32)
CREAT SERPL-MCNC: 5.53 MG/DL (ref 0.52–1.25)
ERYTHROCYTE [DISTWIDTH] IN BLOOD BY AUTOMATED COUNT: 15.7 % (ref 10–15)
GFR SERPL CREATININE-BSD FRML MDRD: 9 ML/MIN/1.73M2
GLUCOSE BLD-MCNC: 177 MG/DL (ref 70–99)
GLUCOSE BLDC GLUCOMTR-MCNC: 175 MG/DL (ref 70–99)
GLUCOSE BLDC GLUCOMTR-MCNC: 213 MG/DL (ref 70–99)
GLUCOSE BLDC GLUCOMTR-MCNC: 244 MG/DL (ref 70–99)
HCT VFR BLD AUTO: 26.3 % (ref 35–53)
HGB BLD-MCNC: 8.3 G/DL (ref 11.7–17.7)
MCH RBC QN AUTO: 27.9 PG (ref 26.5–33)
MCHC RBC AUTO-ENTMCNC: 31.6 G/DL (ref 31.5–36.5)
MCV RBC AUTO: 89 FL (ref 78–100)
PLATELET # BLD AUTO: 240 10E3/UL (ref 150–450)
POTASSIUM BLD-SCNC: 4.8 MMOL/L (ref 3.4–5.3)
RBC # BLD AUTO: 2.97 10E6/UL (ref 3.8–5.9)
SODIUM SERPL-SCNC: 137 MMOL/L (ref 133–144)
WBC # BLD AUTO: 6 10E3/UL (ref 4–11)

## 2021-08-27 PROCEDURE — 250N000013 HC RX MED GY IP 250 OP 250 PS 637: Performed by: STUDENT IN AN ORGANIZED HEALTH CARE EDUCATION/TRAINING PROGRAM

## 2021-08-27 PROCEDURE — 250N000011 HC RX IP 250 OP 636: Performed by: STUDENT IN AN ORGANIZED HEALTH CARE EDUCATION/TRAINING PROGRAM

## 2021-08-27 PROCEDURE — 85014 HEMATOCRIT: CPT | Performed by: HOSPITALIST

## 2021-08-27 PROCEDURE — 120N000002 HC R&B MED SURG/OB UMMC

## 2021-08-27 PROCEDURE — 99233 SBSQ HOSP IP/OBS HIGH 50: CPT | Mod: GC | Performed by: INTERNAL MEDICINE

## 2021-08-27 PROCEDURE — 80048 BASIC METABOLIC PNL TOTAL CA: CPT | Performed by: HOSPITALIST

## 2021-08-27 PROCEDURE — 36415 COLL VENOUS BLD VENIPUNCTURE: CPT | Performed by: HOSPITALIST

## 2021-08-27 RX ORDER — HYDRALAZINE HYDROCHLORIDE 50 MG/1
100 TABLET, FILM COATED ORAL 3 TIMES DAILY
Status: ON HOLD | COMMUNITY
End: 2021-09-01

## 2021-08-27 RX ADMIN — Medication 25 MCG: at 09:16

## 2021-08-27 RX ADMIN — ASPIRIN 81 MG: 81 TABLET, DELAYED RELEASE ORAL at 09:16

## 2021-08-27 RX ADMIN — Medication 1 TABLET: at 09:16

## 2021-08-27 RX ADMIN — HEPARIN SODIUM 5000 UNITS: 5000 INJECTION, SOLUTION INTRAVENOUS; SUBCUTANEOUS at 09:17

## 2021-08-27 RX ADMIN — CARVEDILOL 12.5 MG: 6.25 TABLET, FILM COATED ORAL at 09:16

## 2021-08-27 RX ADMIN — CARVEDILOL 12.5 MG: 6.25 TABLET, FILM COATED ORAL at 17:59

## 2021-08-27 RX ADMIN — FINASTERIDE 5 MG: 5 TABLET, FILM COATED ORAL at 09:19

## 2021-08-27 RX ADMIN — HEPARIN SODIUM 5000 UNITS: 5000 INJECTION, SOLUTION INTRAVENOUS; SUBCUTANEOUS at 20:48

## 2021-08-27 RX ADMIN — TAMSULOSIN HYDROCHLORIDE 0.4 MG: 0.4 CAPSULE ORAL at 09:16

## 2021-08-27 ASSESSMENT — ACTIVITIES OF DAILY LIVING (ADL)
ADLS_ACUITY_SCORE: 21

## 2021-08-27 ASSESSMENT — MIFFLIN-ST. JEOR: SCORE: 1486.13

## 2021-08-27 NOTE — PROGRESS NOTES
[facility]    Progress Note - [facility] 2 Service        Date of Admission:  8/17/2021  Date of Service: 08/27/21     Assessment & Plan   86 year old male with history of T2DM, CKD4, and hypertension who presents with months of progressive fatigue and volume overload concerning for progression of CKD vs CHF.    #RONY on CKD vs progression of CKD  Presented with months of dyspnea, leg swelling, and fatigue. Found to be hypervolemic on exam and based on US evaluation of IVC concerning for CHF vs worsening CKD. Creatinine 2.9 on initial presentation. Echo with EF of 55-60% with normal global ventricular function. UA initially with RBCs but negative on repeat so less concern for glomerulonephritis. Urine protein:creatinine ratio inconsistent with nephrotic syndrome. Renal artery US suggestive of medical renal disease. Creatinine persistently increased despite diuresis to 5.59 on 8/25. 8/25 decreased antihypertensives due to concern for possible decreased renal perfusion in the setting of tighter BP control. 8/26 MYA resulted positive. C3, C4, RF, ANCA all negative. Anti-histone Ab obtained and pending to evaluate for possible drug induced lupus 2/2 hydralazine. Will continue to monitor. Nephrology following throughout hospitalization.   - Nephrology is following, appreciate recs  - MYA positive on 8/26 so anti-histone Ab ordered by nephrology to evaluate for drug induced lupus 2/2 hydralazine   - Continue carvedilol 12.5 mg BID (decreased from 25 mg BID on 8/25 d/t concern for renal hypoperfusion)   - Nifedipine, hydralazine discontinued on 8/25 d/t concern for renal hypoperfusion  - Hydralazine added to allergies given concern for DIL  - Follow up anti-histone Ab  - 2 g Na diet, 2 L fluid restriction  - Daily BMP    #Superficial thrombophlebitis   - Elevate arms as able  - Diclofenac gel, lidocaine gel, and warm/cold compresses for pain relief  - Acetaminophen prn for pain       #Acute on chronic normocytic anemia  Hgb 6.8 on 8/19 requiring 1 unit pRBCs. No signs of acute blood loss. Absolute retic count low indicating marrow hypoproliferation. Labs consistent with iron deficiency. No evidence of hemolysis based on labs, blood smear. Hgb >7 since transfusion on 8/19. Likely epo deficiency given history of CKD, epo level pending. No signs or symptoms of GI bleeding   - S/p iron sucrose 300 mg q72 hr for a total of 3 doses    - Epo level pending   - CBC daily   - Transfuse for Hgb <7  - Follow up screening colonoscopy as outpatient (last in 2015, recommended follow up in 5 years)    #Type 2 DM  Last A1c 9.5 on 7/16/2021. Currently managed with sitagliptin, glipizide, glargine at bedtime (10 units PTA).   - Increase insulin glargine to 10 units at bedtime   - High dose sliding scale mealtime insulin   - Fingersticks, hypoglycemia protocol    #Hypertension  Has been difficult to manage as an outpatient, follows with cardiology for this. Improvement in BP with nifedipine added to regimen this admission though concern for renal underperfusion so discontinued on 8/25.  - Changes on 8/25 due to concern for possible decreased renal perfusion:   - Hold nifedipine (started this admission)    - Decrease carvedilol from 25 mg BID to 12.5 mg BID    - Discontinue hydralazine   -PTA hydrochlorothiazide 12.5 mg daily held on admission as this likely is not very effective with decreased kidney function    Resolved hospital issues:  #Hyperkalemia, resolved with diuresis     #Lower extremity edema, L>R  No DVT on LLE ultrasound 8/18    Chronic medical conditions:  #BPH  - Continue pta flomax and finasteride     #Osteoporosis  - Continue pta vit D    #Thyroid nodule  He has already been referred to an endocrinologist to evaluate the nodule further  - Continue plan for outpatient follow up     #Subclinical hypothyroidism   TSH is elevated at 7.46, though FT4 is wnl.  - Outpatient follow up     Diet: Fluid  restriction 2000 ML FLUID  Room Service  Combination Diet Low Saturated Fat Na <2400mg Diet, No Caffeine Diet    DVT Prophylaxis: Heparin SQ  High Catheter: Not present  Fluids: None  Central Lines: None  Code Status: No CPR- Do NOT Intubate  discussed on admission with pt and 2 children, he has previously filled out medical directive paperwork stating this wish    Disposition Plan   Expected discharge: >2 nights, recommended to TCU per PT/OT evaluations, once fluid status improved and outpatient diuresis plan established vs pending further dialysis discussion.    Patient was staffed with attending Dr. Fajardo.    Palmira Coreas MD  Internal Medicine, PGY-3  [phone]  ____________________________________________________________    Interval History   No acute overnight events. Minimally participatory in interview this morning. Denied pain.     4 point review of systems otherwise negative.     Physical Exam   Vital Signs: Temp: 98.2  F (36.8  C) Temp src: Oral BP: (!) 155/52 Pulse: 55   Resp: 20 SpO2: 93 % O2 Device: None (Room air)    Weight: 171 lbs 11.81 oz  Constitutional: awake, alert, and interactive, no acute distress  HEENT: normocephalic, anicteric sclerae, MMM   Pulmonary: no increased work of breathing on room air, lungs clear to auscultation bilaterally  Cardiovascular: bradycardic, regular  GI: soft, non-tender, non-distended  Skin: +R forearm with redness, tenderness to palpation, and swelling. Venous stasis over LE with flaking and dry skin  MSK: no lower extremity edema   Neuro: AAOx3, no gross focal neurologic deficits    Data   All labs and imaging reviewed.

## 2021-08-27 NOTE — PLAN OF CARE
Pt is alert and oriented x4, OVSS, hypertensive max /58 recked this morning was 169/72. On RA. pt does not meet parameter of notification. right arm remains swollen. Left peripheral IV is not working. US doppler was completed yesterday regarding this issue. Last bm was yesterday 08/26/2021. Up with assist of 1, pocket talker available to enhance communication. Will continue to monitor.

## 2021-08-27 NOTE — PLAN OF CARE
[id number]  BP max 168/62, afebrile, alert and oriented x 4, denies pian/nausea/sob. Had of hearing, has pocket talky on the table.  On strict I/O, with 2 L fluid restriction, crab counts. Ate good supper, 100%, drink 460 ml for the shift. Up with 1 assist with walker/gaitbelt, voiding adequately, no BM for the shift. Pt refuse to change dressing, little frustrated.  BG @ 1700 is 235, sliding scale given, BG @ 2200 is 254, no sliding scale schedule, bedtime Lantus given.   Continue to monitor care..

## 2021-08-27 NOTE — PROGRESS NOTES
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 Renal US was ordered in 2020, but was not completed. Presented with creatinine of 2.9 which has stayed relatively stable during his hospitalization. Concern for HFpEF vs worsening of his CKD, but he is certainly volume overloaded regardless. UA with blood, although not present on repeat.    Also likely has a component of ATN, given nearly isothenuric specific gravity on UA.    Possibly underperfusing in the setting of decreased BP relative to his prior set point. 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    - Avoid nephrotoxic agents and renally dose medications  - Strict I/O's, daily weights  - Renal ultrasound with doppler shows medical renal disease     Recommendations were communicated to primary team via note and verbally.     Seen and discussed with Dr. Will.     Humza Trinidad MD   [phone]    Interval History :   Nursing and provider notes from last 24 hours reviewed.    Seemed quite pained today, specifically his R arm. Glad his creatinine has not continued to rise.    Review of Systems:   4 point review of systems performed and negative except as above.    Physical Exam:   I/O last 3 completed shifts:  In: 700 [P.O.:700]  Out: 1225 [Urine:1225]   BP (!) 155/52 (BP Location: Left arm)   Pulse 55   Temp 98.2  F (36.8  C) (Oral)   Resp 20   Ht 1.803 m (5' 11\")   Wt 77.9 kg (171 lb 11.8 oz)   SpO2 93%   BMI 23.95 kg/m       General: sitting in bed, hard of hearing, " no acute distress  CV: RRR, no murmur appreciated, well perfused, 1-2+ edema of LE  Resp: no increased work of breathing, CTAB  Abd: soft, non-tender, non-distended  Neuro: alert and oriented, no focal deficits     Labs:   All labs reviewed by me  Electrolytes/Renal -   Recent Labs   Lab Test 08/27/21  1300 08/27/21  0741 08/27/21  0205 08/26/21  1341 08/25/21  0531 08/24/21  0714 08/20/21  0750 08/20/21  0548 08/18/21  0839 08/18/21  0651   NA  --  137  --  134 134 136   < > 138   < > 139   POTASSIUM  --  4.8  --  4.8 4.8 5.0   < > 4.3   < > 5.0   CHLORIDE  --  105  --  102 102 103   < > 107   < > 112*   CO2  --  26  --  22 24 25   < > 26   < > 23   BUN  --  94*  --  97* 94* 92*   < > 65*   < > 71*   CR  --  5.53*  --  5.77* 5.59* 5.10*   < > 3.21*   < > 2.98*   * 177* 213* 224* 159* 61*   < > 92   < > 127*   DUSTIN  --  8.0*  --  8.1* 7.7* 8.1*   < > 8.2*   < > 8.4*   MAG  --   --   --   --  2.3  --   --  1.9  --  2.1   PHOS  --   --   --   --  5.2* 5.3*  --  5.0*  --   --     < > = values in this interval not displayed.       CBC -   Recent Labs   Lab Test 08/27/21  0741 08/26/21  1341 08/25/21  0531   WBC 6.0 7.2 5.2   HGB 8.3* 8.5* 7.8*    255 215       LFTs -   Recent Labs   Lab Test 08/17/21  1353 08/16/21  1754 03/16/21  1420 03/11/21  1527   ALKPHOS 82 86  --  88   BILITOTAL 0.3 0.3  --  0.2   ALT 20 19  --  13   AST 10 4  --  5   PROTTOTAL 7.6 7.7  --  7.1   ALBUMIN 2.9* 3.2* 3.0* 3.0*       Iron Panel -   Recent Labs   Lab Test 08/18/21  0651 09/24/20  1413 12/12/17  1701   IRON 27* 64 28*   IRONSAT 9* 21 9*   ROSEMARIE  --  109 207         Imaging:  Reviewed.    Current Medications:    aspirin  81 mg Oral Daily     carvedilol  12.5 mg Oral BID w/meals     finasteride  5 mg Oral Daily     heparin ANTICOAGULANT  5,000 Units Subcutaneous Q12H     insulin aspart  1-10 Units Subcutaneous TID AC     insulin glargine  8 Units Subcutaneous At Bedtime     lidocaine   Urethral Once     multivitamin w/minerals   1 tablet Oral Daily     [Held by provider] NIFEdipine ER OSMOTIC  30 mg Oral Daily     sodium chloride (PF)  3 mL Intracatheter Q8H     tamsulosin  0.4 mg Oral Daily     Vitamin D3  25 mcg Oral Daily       Humza Trinidad MD     I was present with the fellow during the history and exam.  I discussed the case with the fellow and agree with the findings as documented in the assessment and plan.  Pallavi Will     no

## 2021-08-28 LAB
ANION GAP SERPL CALCULATED.3IONS-SCNC: 6 MMOL/L (ref 3–14)
BUN SERPL-MCNC: 86 MG/DL (ref 7–30)
CALCIUM SERPL-MCNC: 8 MG/DL (ref 8.5–10.1)
CHLORIDE BLD-SCNC: 106 MMOL/L (ref 94–109)
CO2 SERPL-SCNC: 26 MMOL/L (ref 20–32)
CREAT SERPL-MCNC: 5.18 MG/DL (ref 0.52–1.25)
ERYTHROCYTE [DISTWIDTH] IN BLOOD BY AUTOMATED COUNT: 15.8 % (ref 10–15)
GFR SERPL CREATININE-BSD FRML MDRD: 9 ML/MIN/1.73M2
GLUCOSE BLD-MCNC: 87 MG/DL (ref 70–99)
GLUCOSE BLDC GLUCOMTR-MCNC: 139 MG/DL (ref 70–99)
GLUCOSE BLDC GLUCOMTR-MCNC: 145 MG/DL (ref 70–99)
GLUCOSE BLDC GLUCOMTR-MCNC: 356 MG/DL (ref 70–99)
GLUCOSE BLDC GLUCOMTR-MCNC: 88 MG/DL (ref 70–99)
HCT VFR BLD AUTO: 28.3 % (ref 35–53)
HGB BLD-MCNC: 9 G/DL (ref 11.7–17.7)
HOLD SPECIMEN: NORMAL
MCH RBC QN AUTO: 27.9 PG (ref 26.5–33)
MCHC RBC AUTO-ENTMCNC: 31.8 G/DL (ref 31.5–36.5)
MCV RBC AUTO: 88 FL (ref 78–100)
PLATELET # BLD AUTO: 263 10E3/UL (ref 150–450)
POTASSIUM BLD-SCNC: 4.8 MMOL/L (ref 3.4–5.3)
RBC # BLD AUTO: 3.23 10E6/UL (ref 3.8–5.9)
SODIUM SERPL-SCNC: 138 MMOL/L (ref 133–144)
WBC # BLD AUTO: 6.1 10E3/UL (ref 4–11)

## 2021-08-28 PROCEDURE — 250N000013 HC RX MED GY IP 250 OP 250 PS 637: Performed by: STUDENT IN AN ORGANIZED HEALTH CARE EDUCATION/TRAINING PROGRAM

## 2021-08-28 PROCEDURE — 36415 COLL VENOUS BLD VENIPUNCTURE: CPT | Performed by: STUDENT IN AN ORGANIZED HEALTH CARE EDUCATION/TRAINING PROGRAM

## 2021-08-28 PROCEDURE — 85027 COMPLETE CBC AUTOMATED: CPT | Performed by: STUDENT IN AN ORGANIZED HEALTH CARE EDUCATION/TRAINING PROGRAM

## 2021-08-28 PROCEDURE — 250N000011 HC RX IP 250 OP 636: Performed by: STUDENT IN AN ORGANIZED HEALTH CARE EDUCATION/TRAINING PROGRAM

## 2021-08-28 PROCEDURE — 250N000013 HC RX MED GY IP 250 OP 250 PS 637: Performed by: HOSPITALIST

## 2021-08-28 PROCEDURE — 99233 SBSQ HOSP IP/OBS HIGH 50: CPT | Performed by: INTERNAL MEDICINE

## 2021-08-28 PROCEDURE — 80048 BASIC METABOLIC PNL TOTAL CA: CPT | Performed by: STUDENT IN AN ORGANIZED HEALTH CARE EDUCATION/TRAINING PROGRAM

## 2021-08-28 PROCEDURE — 120N000002 HC R&B MED SURG/OB UMMC

## 2021-08-28 RX ORDER — CARVEDILOL 25 MG/1
25 TABLET ORAL 2 TIMES DAILY WITH MEALS
Status: DISCONTINUED | OUTPATIENT
Start: 2021-08-29 | End: 2021-08-28

## 2021-08-28 RX ORDER — CARVEDILOL 25 MG/1
25 TABLET ORAL 2 TIMES DAILY WITH MEALS
Status: DISCONTINUED | OUTPATIENT
Start: 2021-08-28 | End: 2021-09-02 | Stop reason: HOSPADM

## 2021-08-28 RX ADMIN — CARVEDILOL 12.5 MG: 6.25 TABLET, FILM COATED ORAL at 08:34

## 2021-08-28 RX ADMIN — CARVEDILOL 25 MG: 25 TABLET, FILM COATED ORAL at 19:10

## 2021-08-28 RX ADMIN — Medication 25 MCG: at 08:34

## 2021-08-28 RX ADMIN — TAMSULOSIN HYDROCHLORIDE 0.4 MG: 0.4 CAPSULE ORAL at 08:34

## 2021-08-28 RX ADMIN — ASPIRIN 81 MG: 81 TABLET, DELAYED RELEASE ORAL at 08:34

## 2021-08-28 RX ADMIN — HEPARIN SODIUM 5000 UNITS: 5000 INJECTION, SOLUTION INTRAVENOUS; SUBCUTANEOUS at 08:35

## 2021-08-28 RX ADMIN — FINASTERIDE 5 MG: 5 TABLET, FILM COATED ORAL at 08:35

## 2021-08-28 RX ADMIN — Medication 1 TABLET: at 08:34

## 2021-08-28 RX ADMIN — HEPARIN SODIUM 5000 UNITS: 5000 INJECTION, SOLUTION INTRAVENOUS; SUBCUTANEOUS at 20:57

## 2021-08-28 ASSESSMENT — ACTIVITIES OF DAILY LIVING (ADL)
ADLS_ACUITY_SCORE: 21

## 2021-08-28 ASSESSMENT — MIFFLIN-ST. JEOR: SCORE: 1498.13

## 2021-08-28 NOTE — PROGRESS NOTES
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     - Modest improvement in creatinine, continue to monitor  - Continue permissive hypertension, can increase carvedilol, goal -150 for now if possible.     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 Renal US was ordered in 2020, but was not completed. Presented with creatinine of 2.9 which had stayed relatively stable during his hospitalization until the last few days, when it went up steadily and peaked at 5.7. Concern for HFpEF vs worsening of his CKD, but concern for hypoperfusion, thus BP medications decreased and higher BP target allowed.  - also has + MYA, concern for ?drug induced lupus due to hydralazine , further serology including histone pending;  One UA with blood, although not present on repeat.    Also likely has a component of ATN, given nearly isothenuric specific gravity on UA.    Possibly underperfusing in the setting of decreased BP relative to his prior set point. 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    - Avoid nephrotoxic agents and renally dose medications  - Strict I/O's, daily weights  - Renal ultrasound with doppler shows medical renal disease     Recommendations were communicated to primary team via note and verbally.       Pallavi Will MD   [phone]    Interval History :   Nursing and provider notes from last 24 hours reviewed.    Stable overall, sleepy today but nods to questions appropriately.  Kidney function is a little more improved today    Review of Systems:   4 point review of systems performed and negative except as above.    Physical Exam:   I/O last 3 completed  "shifts:  In: 110 [P.O.:100; I.V.:10]  Out: 1000 [Urine:1000]   BP (!) 152/49 (BP Location: Left arm)   Pulse 61   Temp 97.5  F (36.4  C) (Oral)   Resp 20   Ht 1.803 m (5' 11\")   Wt 79.1 kg (174 lb 6.1 oz)   SpO2 95%   BMI 24.32 kg/m       General: sitting in bed, hard of hearing, no acute distress  CV: RRR, no murmur appreciated, well perfused, 1-2+ edema of LE  Resp: no increased work of breathing, CTAB  Abd: soft, non-tender, non-distended  Neuro: alert and oriented, no focal deficits     Labs:   All labs reviewed by me  Electrolytes/Renal -   Recent Labs   Lab Test 08/28/21  0951 08/28/21  0630 08/28/21  0233 08/27/21  0741 08/26/21  1341 08/25/21  0531 08/24/21  0714 08/20/21  0750 08/20/21  0548 08/18/21  0839 08/18/21  0651   NA  --  138  --  137 134 134 136   < > 138   < > 139   POTASSIUM  --  4.8  --  4.8 4.8 4.8 5.0   < > 4.3   < > 5.0   CHLORIDE  --  106  --  105 102 102 103   < > 107   < > 112*   CO2  --  26  --  26 22 24 25   < > 26   < > 23   BUN  --  86*  --  94* 97* 94* 92*   < > 65*   < > 71*   CR  --  5.18*  --  5.53* 5.77* 5.59* 5.10*   < > 3.21*   < > 2.98*   GLC 88 87 139* 177* 224* 159* 61*   < > 92   < > 127*   DUSTIN  --  8.0*  --  8.0* 8.1* 7.7* 8.1*   < > 8.2*   < > 8.4*   MAG  --   --   --   --   --  2.3  --   --  1.9  --  2.1   PHOS  --   --   --   --   --  5.2* 5.3*  --  5.0*  --   --     < > = values in this interval not displayed.       CBC -   Recent Labs   Lab Test 08/28/21  0836 08/27/21  0741 08/26/21  1341   WBC 6.1 6.0 7.2   HGB 9.0* 8.3* 8.5*    240 255       LFTs -   Recent Labs   Lab Test 08/17/21  1353 08/16/21  1754 03/16/21  1420 03/11/21  1527   ALKPHOS 82 86  --  88   BILITOTAL 0.3 0.3  --  0.2   ALT 20 19  --  13   AST 10 4  --  5   PROTTOTAL 7.6 7.7  --  7.1   ALBUMIN 2.9* 3.2* 3.0* 3.0*       Iron Panel -   Recent Labs   Lab Test 08/18/21  0651 09/24/20  1413 12/12/17  1701   IRON 27* 64 28*   IRONSAT 9* 21 9*   ROSEMARIE  --  109 207 "         Imaging:  Reviewed.    Current Medications:    aspirin  81 mg Oral Daily     carvedilol  12.5 mg Oral BID w/meals     finasteride  5 mg Oral Daily     heparin ANTICOAGULANT  5,000 Units Subcutaneous Q12H     insulin aspart  1-10 Units Subcutaneous TID AC     insulin glargine  10 Units Subcutaneous At Bedtime     lidocaine   Urethral Once     multivitamin w/minerals  1 tablet Oral Daily     [Held by provider] NIFEdipine ER OSMOTIC  30 mg Oral Daily     sodium chloride (PF)  3 mL Intracatheter Q8H     tamsulosin  0.4 mg Oral Daily     Vitamin D3  25 mcg Oral Daily       Pallavi Ricky Will MD

## 2021-08-28 NOTE — PLAN OF CARE
[id number]: Pt hypertensive, not meeting order parameters to page provider. Coreg increased this evening. OVSS, on RA. Pt A&Ox4. Pt denies nausea, dyspnea or pain. Pt feeling fatigued and weak, stating he doesn't want to get out of bed. Encouraged patient to sit in chair or go for a walk, but declines. Pt having fair appetite ate 75%. , sliding scale coverage given. Pt rested between cares. Pt voiding spontaneously with adequate UOP. Pt up with astX1. Bed alarm on for safety. Continue to monitor.

## 2021-08-28 NOTE — PLAN OF CARE
[id number]    Patient hypertensive within parameters, OVSS. Denies pain and nausea. Patient has elevated creatinine levels, nephrology is following. 2L fluid restriction and 2g sodium restriction. Patient had a shower and sat up in his chair for most of the morning. Assist of 1 with walker. Wound on L shin, dressing changed.

## 2021-08-28 NOTE — PLAN OF CARE
Pt is [place], pocket talker at bedside. A/Ox4 VSS exc baseline HTN. Pt has some HTN meds and his diuretics being held d/t poor renal function (Cr continues to inc, 5.77 today). On reg diet with fair appetite. 2L FR. Up with A1 +WGB to toilet. B, today. RLE leg wound care done this layne. Plan is to continue to monitor pts renal function and continue diuresis when appropriate.

## 2021-08-28 NOTE — PLAN OF CARE
Time: 8842-1884    Afebrile, min HR of 57bpm, max BP of 170/64, parameters to notify provider not met. OVSS on RA. 0200 BG check was 143. Slept well. Repositioned. Adequate yellow UO. PIV flushes, no blood return noted. LBM was 8/26. 2L fluid restriction, no caffeine and 2g Na+ restriction. Ax1 with walker. [place], pocket talked not effective this shift. Take BP on left arm due to superficial clot on right arm. Wound on right leg to be changed today per WOC note.

## 2021-08-28 NOTE — PROGRESS NOTES
[facility]    Progress Note - Violetta 2 Service        Date of Admission:  8/17/2021  Date of Service: 08/28/21     Assessment & Plan   86 year old male with history of T2DM, CKD4, and hypertension who presents with months of progressive fatigue and volume overload concerning for progression of CKD vs CHF.    Today:  - Dispo to TCU possibly Monday pending creatinine continues to improve     #RONY on CKD vs progression of CKD  Presented with months of dyspnea, leg swelling, and fatigue. Found to be hypervolemic on exam and based on US evaluation of IVC concerning for CHF vs worsening CKD. Creatinine 2.9 on initial presentation. Echo with EF of 55-60% with normal global ventricular function. UA initially with RBCs but negative on repeat so less concern for glomerulonephritis. Urine protein:creatinine ratio inconsistent with nephrotic syndrome. Renal artery US suggestive of medical renal disease. Creatinine persistently increased despite diuresis to 5.59 on 8/25. 8/25 decreased antihypertensives due to concern for possible decreased renal perfusion in the setting of tighter BP control. 8/26 MYA resulted positive. C3, C4, RF, ANCA all negative. Anti-histone Ab obtained and pending to evaluate for possible drug induced lupus 2/2 hydralazine. Will continue to monitor. Nephrology following throughout hospitalization.   - Nephrology is following, appreciate recs  - MYA positive on 8/26 so anti-histone Ab ordered by nephrology to evaluate for drug induced lupus 2/2 hydralazine   - Continue carvedilol 12.5 mg BID (decreased from 25 mg BID on 8/25 d/t concern for renal hypoperfusion)   - Nifedipine, hydralazine discontinued on 8/25 d/t concern for renal hypoperfusion  - Hydralazine added to allergies given concern for DIL  - Follow up anti-histone Ab  - 2 g Na diet, 2 L fluid restriction  - Daily BMP    #Superficial thrombophlebitis   - Elevate arms as able  - Diclofenac gel,  lidocaine gel, and warm/cold compresses for pain relief  - Acetaminophen prn for pain      #Acute on chronic normocytic anemia  Hgb 6.8 on 8/19 requiring 1 unit pRBCs. No signs of acute blood loss. Absolute retic count low indicating marrow hypoproliferation. Labs consistent with iron deficiency. No evidence of hemolysis based on labs, blood smear. Hgb >7 since transfusion on 8/19. Likely epo deficiency given history of CKD, epo level pending. No signs or symptoms of GI bleeding   - S/p iron sucrose 300 mg q72 hr for a total of 3 doses    - Epo level pending   - CBC daily   - Transfuse for Hgb <7  - Follow up screening colonoscopy as outpatient (last in 2015, recommended follow up in 5 years)    #Type 2 DM  Last A1c 9.5 on 7/16/2021. Currently managed with sitagliptin, glipizide, glargine at bedtime (10 units PTA).   - Increase insulin glargine to 10 units at bedtime   - High dose sliding scale mealtime insulin   - Fingersticks, hypoglycemia protocol    #Hypertension  Has been difficult to manage as an outpatient, follows with cardiology for this. Improvement in BP with nifedipine added to regimen this admission though concern for renal underperfusion so discontinued on 8/25.  - Changes on 8/25 due to concern for possible decreased renal perfusion:   - Hold nifedipine (started this admission)    - Decrease carvedilol from 25 mg BID to 12.5 mg BID    - Discontinue hydralazine   -PTA hydrochlorothiazide 12.5 mg daily held on admission as this likely is not very effective with decreased kidney function    Resolved hospital issues:  #Hyperkalemia, resolved with diuresis     #Lower extremity edema, L>R  No DVT on LLE ultrasound 8/18    Chronic medical conditions:  #BPH  - Continue pta flomax and finasteride     #Osteoporosis  - Continue pta vit D    #Thyroid nodule  He has already been referred to an endocrinologist to evaluate the nodule further  - Continue plan for outpatient follow up     #Subclinical hypothyroidism    TSH is elevated at 7.46, though FT4 is wnl.  - Outpatient follow up     Diet: Fluid restriction 2000 ML FLUID  Room Service  Combination Diet Low Saturated Fat Na <2400mg Diet, No Caffeine Diet    DVT Prophylaxis: Heparin SQ  High Catheter: Not present  Fluids: None  Central Lines: None  Code Status: No CPR- Do NOT Intubate  discussed on admission with pt and 2 children, he has previously filled out medical directive paperwork stating this wish    Disposition Plan   Expected discharge: >2 nights, recommended to TCU per PT/OT evaluations, once fluid status improved and outpatient diuresis plan established vs pending further dialysis discussion.    Patient was staffed with attending Dr. Fajardo.    Denise Acuña MD  Internal Medicine - PGY1  [phone]    ____________________________________________________________    Interval History   No acute overnight events. Sleeping on evaluation but arousable and conversational. Denies no symptoms. Continues to feel tired. No pain.     4 point review of systems otherwise negative.     Physical Exam   Vital Signs: Temp: 97.8  F (36.6  C) Temp src: Oral BP: (!) 163/56 Pulse: 57   Resp: 16 SpO2: 97 % O2 Device: None (Room air)    Weight: 174 lbs 6.14 oz  Constitutional: awake, alert, and interactive, no acute distress  HEENT: normocephalic, anicteric sclerae, MMM   Pulmonary: no increased work of breathing on room air, lungs clear to auscultation bilaterally  Cardiovascular: bradycardic, regular  GI: soft, non-tender, non-distended  Skin: +R forearm with redness, tenderness to palpation, and swelling. Venous stasis over LE with flaking and dry skin  MSK: no lower extremity edema   Neuro: AAOx3, no gross focal neurologic deficits    Data   All labs and imaging reviewed.

## 2021-08-29 LAB
ANION GAP SERPL CALCULATED.3IONS-SCNC: 6 MMOL/L (ref 3–14)
BUN SERPL-MCNC: 87 MG/DL (ref 7–30)
CALCIUM SERPL-MCNC: 8.3 MG/DL (ref 8.5–10.1)
CHLORIDE BLD-SCNC: 107 MMOL/L (ref 94–109)
CO2 SERPL-SCNC: 25 MMOL/L (ref 20–32)
CREAT SERPL-MCNC: 4.84 MG/DL (ref 0.52–1.25)
ERYTHROCYTE [DISTWIDTH] IN BLOOD BY AUTOMATED COUNT: 15.9 % (ref 10–15)
GFR SERPL CREATININE-BSD FRML MDRD: 10 ML/MIN/1.73M2
GLUCOSE BLD-MCNC: 155 MG/DL (ref 70–99)
GLUCOSE BLDC GLUCOMTR-MCNC: 135 MG/DL (ref 70–99)
GLUCOSE BLDC GLUCOMTR-MCNC: 140 MG/DL (ref 70–99)
GLUCOSE BLDC GLUCOMTR-MCNC: 156 MG/DL (ref 70–99)
GLUCOSE BLDC GLUCOMTR-MCNC: 269 MG/DL (ref 70–99)
GLUCOSE BLDC GLUCOMTR-MCNC: 290 MG/DL (ref 70–99)
HCT VFR BLD AUTO: 26.8 % (ref 35–53)
HGB BLD-MCNC: 8.2 G/DL (ref 11.7–17.7)
HISTONE IGG SER IA-ACNC: 3.3 UNITS
MCH RBC QN AUTO: 27.4 PG (ref 26.5–33)
MCHC RBC AUTO-ENTMCNC: 30.6 G/DL (ref 31.5–36.5)
MCV RBC AUTO: 90 FL (ref 78–100)
PLATELET # BLD AUTO: 246 10E3/UL (ref 150–450)
POTASSIUM BLD-SCNC: 4.7 MMOL/L (ref 3.4–5.3)
RBC # BLD AUTO: 2.99 10E6/UL (ref 3.8–5.9)
SODIUM SERPL-SCNC: 138 MMOL/L (ref 133–144)
WBC # BLD AUTO: 5.4 10E3/UL (ref 4–11)

## 2021-08-29 PROCEDURE — 99233 SBSQ HOSP IP/OBS HIGH 50: CPT | Performed by: INTERNAL MEDICINE

## 2021-08-29 PROCEDURE — 36415 COLL VENOUS BLD VENIPUNCTURE: CPT | Performed by: HOSPITALIST

## 2021-08-29 PROCEDURE — 85014 HEMATOCRIT: CPT | Performed by: HOSPITALIST

## 2021-08-29 PROCEDURE — 120N000002 HC R&B MED SURG/OB UMMC

## 2021-08-29 PROCEDURE — 250N000013 HC RX MED GY IP 250 OP 250 PS 637: Performed by: STUDENT IN AN ORGANIZED HEALTH CARE EDUCATION/TRAINING PROGRAM

## 2021-08-29 PROCEDURE — 80048 BASIC METABOLIC PNL TOTAL CA: CPT | Performed by: HOSPITALIST

## 2021-08-29 PROCEDURE — 250N000013 HC RX MED GY IP 250 OP 250 PS 637: Performed by: HOSPITALIST

## 2021-08-29 PROCEDURE — 250N000011 HC RX IP 250 OP 636: Performed by: STUDENT IN AN ORGANIZED HEALTH CARE EDUCATION/TRAINING PROGRAM

## 2021-08-29 RX ORDER — NIFEDIPINE 30 MG/1
30 TABLET, EXTENDED RELEASE ORAL DAILY
Status: DISCONTINUED | OUTPATIENT
Start: 2021-08-29 | End: 2021-09-02 | Stop reason: HOSPADM

## 2021-08-29 RX ADMIN — CARVEDILOL 25 MG: 25 TABLET, FILM COATED ORAL at 08:28

## 2021-08-29 RX ADMIN — FINASTERIDE 5 MG: 5 TABLET, FILM COATED ORAL at 08:28

## 2021-08-29 RX ADMIN — HEPARIN SODIUM 5000 UNITS: 5000 INJECTION, SOLUTION INTRAVENOUS; SUBCUTANEOUS at 20:48

## 2021-08-29 RX ADMIN — NIFEDIPINE 30 MG: 30 TABLET, FILM COATED, EXTENDED RELEASE ORAL at 14:52

## 2021-08-29 RX ADMIN — TAMSULOSIN HYDROCHLORIDE 0.4 MG: 0.4 CAPSULE ORAL at 08:28

## 2021-08-29 RX ADMIN — CARVEDILOL 25 MG: 25 TABLET, FILM COATED ORAL at 20:48

## 2021-08-29 RX ADMIN — HEPARIN SODIUM 5000 UNITS: 5000 INJECTION, SOLUTION INTRAVENOUS; SUBCUTANEOUS at 08:27

## 2021-08-29 RX ADMIN — Medication 25 MCG: at 08:28

## 2021-08-29 RX ADMIN — Medication 1 TABLET: at 08:28

## 2021-08-29 ASSESSMENT — ACTIVITIES OF DAILY LIVING (ADL)
ADLS_ACUITY_SCORE: 21

## 2021-08-29 ASSESSMENT — MIFFLIN-ST. JEOR: SCORE: 1471.13

## 2021-08-29 NOTE — PLAN OF CARE
Time: 2325-4652    Afebrile with a max BP of 177/54, OVSS on RA. Denies pain/nausea/SOB. Wound cares on right shin done. To be changed every day. BG at [facility] was 356 and scheduled lantus given. 1 episode of urinary incontinence, missed the bedside urinal. LBM was 8/28. Declined hallway walk offer, reports feeling tired.     Time: 9533-8896    Slept in chair for 3-4 hours per pt preference. Ax1 with walker. Cares clustered per recommendation for 6 hours of uninterrupted rest overnight. BG at 0200 was 269. 150ccs of apple juice and ice taken. Continue to monitor creatinine. [place]. [place].

## 2021-08-29 NOTE — PROGRESS NOTES
[facility]    Progress Note - Marjimi 2 Service        Date of Admission:  8/17/2021    Assessment & Plan   86 year old male with history of T2DM, CKD4, and hypertension who presents with months of progressive fatigue and volume overload concerning for progression of CKD vs CHF.    Today:  - Restart high dose sliding scale insulin at bedtime  - Creatinine improved today   - Likely medically ready for discharge Monday     #RONY on CKD vs progression of CKD  Presented with months of dyspnea, leg swelling, and fatigue. Found to be hypervolemic on exam and based on US evaluation of IVC concerning for CHF vs worsening CKD. Creatinine 2.9 on initial presentation. Echo with EF of 55-60% with normal global ventricular function. UA initially with RBCs but negative on repeat so less concern for glomerulonephritis. Urine protein:creatinine ratio inconsistent with nephrotic syndrome. Renal artery US suggestive of medical renal disease. Creatinine persistently increased despite diuresis to 5.59 on 8/25. 8/25 decreased antihypertensives due to concern for possible decreased renal perfusion in the setting of tighter BP control. 8/26 MYA resulted positive. C3, C4, RF, ANCA all negative. Anti-histone Ab obtained and pending to evaluate for possible drug induced lupus 2/2 hydralazine. Will continue to monitor. Nephrology following throughout hospitalization.   - Nephrology is following, appreciate recs  - MYA positive on 8/26 so anti-histone Ab ordered by nephrology to evaluate for drug induced lupus 2/2 hydralazine   - carvedilol 25 mg BID (increased 8/29 d/t HTN)   - Goal -150 per renal   - Nifedipine, hydralazine discontinued on 8/25 d/t concern for renal hypoperfusion  - Hydralazine added to allergies given concern for DIL  - Follow up anti-histone Ab  - 2 g Na diet, 2 L fluid restriction  - Daily BMP    #Superficial thrombophlebitis   - Elevate arms as able  - Diclofenac gel,  lidocaine gel, and warm/cold compresses for pain relief  - Acetaminophen prn for pain      #Acute on chronic normocytic anemia  Hgb 6.8 on 8/19 requiring 1 unit pRBCs. No signs of acute blood loss. Absolute retic count low indicating marrow hypoproliferation. Labs consistent with iron deficiency. No evidence of hemolysis based on labs, blood smear. Hgb >7 since transfusion on 8/19. Likely epo deficiency given history of CKD, epo level pending. Pt does mention dark black stools and diarrhea over past 3 months though is also on iron supplement. Discussed importance of outpatient colonoscopy  - S/p iron sucrose 300 mg q72 hr for a total of 3 doses    - Epo level pending   - CBC daily   - Transfuse for Hgb <7  - Follow up screening colonoscopy as outpatient (last in 2015, recommended follow up in 5 years)    #Type 2 DM  Last A1c 9.5 on 7/16/2021. Currently managed with sitagliptin, glipizide, glargine at bedtime (10 units PTA).   - Increase insulin glargine to 10 units at bedtime   - High dose sliding scale mealtime insulin & and at bedtime   - Fingersticks, hypoglycemia protocol    #Hypertension  Has been difficult to manage as an outpatient, follows with cardiology for this. Improvement in BP with nifedipine added to regimen this admission though concern for renal underperfusion so discontinued on 8/25.  - Changes on 8/25 due to concern for possible decreased renal perfusion:   - Hold nifedipine (started this admission)    - Carvedilol 25 mg BID increased from 12.5 mg BID on 8/29 d/t SBP 170s   - Discontinue hydralazine   -PTA hydrochlorothiazide 12.5 mg daily held on admission as this likely is not very effective with decreased kidney function    Resolved hospital issues:  #Hyperkalemia, resolved with diuresis     #Lower extremity edema, L>R  No DVT on LLE ultrasound 8/18    Chronic medical conditions:  #BPH  - Continue pta flomax and finasteride     #Osteoporosis  - Continue pta vit D    #Thyroid nodule  He has  already been referred to an endocrinologist to evaluate the nodule further  - Continue plan for outpatient follow up     #Subclinical hypothyroidism   TSH is elevated at 7.46, though FT4 is wnl.  - Outpatient follow up     Diet: Fluid restriction 2000 ML FLUID  Room Service  Combination Diet Low Saturated Fat Na <2400mg Diet, No Caffeine Diet    DVT Prophylaxis: Heparin SQ  High Catheter: Not present  Fluids: None  Central Lines: None  Code Status: No CPR- Do NOT Intubate  discussed on admission with pt and 2 children, he has previously filled out medical directive paperwork stating this wish    Disposition Plan   Expected discharge: pending creatinine improvement in 1-2 days     Patient was staffed with attending Dr. Fajardo.    Denise Acuña MD  Internal Medicine - PGY1  [phone]    ____________________________________________________________    Interval History   No acute overnight events. Fatigue has improved. Discussed importance of outpatient follow up colonoscopy given pt has history of rectal cancer and last colonoscopy was 6 years ago with follow up recommended in 5 years. Discussed increasing insulin at night for elevated blood sugars. No other concerns today.     4 point review of systems otherwise negative.     Physical Exam   Vital Signs: Temp: 98.2  F (36.8  C) Temp src: Oral BP: (!) 175/56 Pulse: 58   Resp: 16 SpO2: 95 % O2 Device: None (Room air)    Weight: 168 lbs 6.9 oz  Constitutional: awake, alert, and interactive, no acute distress  HEENT: normocephalic, anicteric sclerae, MMM   Pulmonary: no increased work of breathing on room air, lungs clear to auscultation bilaterally  Cardiovascular: bradycardic, regular  GI: soft, non-tender, non-distended  Skin: +R forearm with redness, tenderness to palpation, and swelling. Venous stasis over LE with flaking and dry skin   MSK: no lower extremity edema   Neuro: AAOx3, no gross focal neurologic deficits    Data   All labs and imaging reviewed.

## 2021-08-29 NOTE — PROVIDER NOTIFICATION
"Web-based messaging to [phone]    \"7D - 7515-1 - L.C.  Just an FYI, HOS BG was 356 and no novolog ordered at this time. Lantus given afterward. Will recheck at 0200.   Margaret, [id number]\"  "

## 2021-08-29 NOTE — PROGRESS NOTES
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     - Modest improvement in creatinine, continue to monitor  - Continue permissive hypertension, can increase carvedilol, goal -150 for now if possible.     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 Renal US was ordered in 2020, but was not completed. Presented with creatinine of 2.9 which had stayed relatively stable during his hospitalization until the last few days, when it went up steadily and peaked at 5.7. Concern for HFpEF vs worsening of his CKD, but concern for hypoperfusion, thus BP medications decreased and higher BP target allowed.  - also has + MYA, concern for ?drug induced lupus due to hydralazine , further serology including histone pending  One UA with blood, although not present on repeat.    Also likely has a component of ATN, given nearly isothenuric specific gravity on UA.    Possibly underperfusing in the setting of decreased BP relative to his prior set point. 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    - Avoid nephrotoxic agents and renally dose medications  - Strict I/O's, daily weights  - Renal ultrasound with doppler shows medical renal disease     Hypertension- ok to restart nifedipine 30, titrate up to 60 then 90mg and if BP still higher than 150-155, add furosemide 40mg daily and titrate if needed  - long term goal -150 range.    Recommendations were communicated to primary team via note and verbally.       Pallavi Will MD   [phone]    Interval History :   Nursing and provider notes from last 24 hours reviewed.    Stable overall.  Kidney function is a little more more  "improved today. BP 170s    Review of Systems:   4 point review of systems performed and negative except as above.    Physical Exam:   I/O last 3 completed shifts:  In: 510 [P.O.:510]  Out: 1247 [Urine:1247]   BP (!) 180/68 (BP Location: Right arm)   Pulse 55   Temp 98.1  F (36.7  C) (Oral)   Resp 18   Ht 1.803 m (5' 11\")   Wt 76.4 kg (168 lb 6.9 oz)   SpO2 95%   BMI 23.49 kg/m       General: sitting in bed, hard of hearing, no acute distress  CV: RRR, no murmur appreciated, well perfused, 1-2+ edema of LE  Resp: no increased work of breathing, CTAB  Abd: soft, non-tender, non-distended  Neuro: alert and oriented, no focal deficits     Labs:   All labs reviewed by me  Electrolytes/Renal -   Recent Labs   Lab Test 08/29/21  1317 08/29/21  0922 08/29/21  0809 08/28/21  0630 08/27/21  0741 08/25/21  1211 08/25/21  0531 08/24/21  0714 08/20/21  0750 08/20/21  0548 08/18/21  0839 08/18/21  0651   NA  --   --  138 138 137   < > 134 136   < > 138   < > 139   POTASSIUM  --   --  4.7 4.8 4.8   < > 4.8 5.0   < > 4.3   < > 5.0   CHLORIDE  --   --  107 106 105   < > 102 103   < > 107   < > 112*   CO2  --   --  25 26 26   < > 24 25   < > 26   < > 23   BUN  --   --  87* 86* 94*   < > 94* 92*   < > 65*   < > 71*   CR  --   --  4.84* 5.18* 5.53*   < > 5.59* 5.10*   < > 3.21*   < > 2.98*   * 135* 155* 87 177*   < > 159* 61*   < > 92   < > 127*   DUSTIN  --   --  8.3* 8.0* 8.0*   < > 7.7* 8.1*   < > 8.2*   < > 8.4*   MAG  --   --   --   --   --   --  2.3  --   --  1.9  --  2.1   PHOS  --   --   --   --   --   --  5.2* 5.3*  --  5.0*  --   --     < > = values in this interval not displayed.       CBC -   Recent Labs   Lab Test 08/29/21  0809 08/28/21  0836 08/27/21  0741   WBC 5.4 6.1 6.0   HGB 8.2* 9.0* 8.3*    263 240       LFTs -   Recent Labs   Lab Test 08/17/21  1353 08/16/21  1754 03/16/21  1420 03/11/21  1527   ALKPHOS 82 86  --  88   BILITOTAL 0.3 0.3  --  0.2   ALT 20 19  --  13   AST 10 4  --  5   PROTTOTAL " 7.6 7.7  --  7.1   ALBUMIN 2.9* 3.2* 3.0* 3.0*       Iron Panel -   Recent Labs   Lab Test 08/18/21  0651 09/24/20  1413 12/12/17  1701   IRON 27* 64 28*   IRONSAT 9* 21 9*   ROSEMARIE  --  109 207         Imaging:  Reviewed.    Current Medications:    aspirin  81 mg Oral Daily     carvedilol  25 mg Oral BID w/meals     finasteride  5 mg Oral Daily     heparin ANTICOAGULANT  5,000 Units Subcutaneous Q12H     insulin aspart  1-10 Units Subcutaneous TID AC     insulin aspart  1-7 Units Subcutaneous At Bedtime     insulin glargine  10 Units Subcutaneous At Bedtime     lidocaine   Urethral Once     multivitamin w/minerals  1 tablet Oral Daily     [Held by provider] NIFEdipine ER OSMOTIC  30 mg Oral Daily     sodium chloride (PF)  3 mL Intracatheter Q8H     tamsulosin  0.4 mg Oral Daily     Vitamin D3  25 mcg Oral Daily       Pallavi Ricky Will MD

## 2021-08-29 NOTE — PLAN OF CARE
VSS. High SBP with max 151/57. Asymptomatic. Provider aware. Patient hard of hearing. Patient denies pain, nausea or vomiting. Patient on 2g Na diet and 2L fluid restriction. Blood sugar checks 4x daily and at night with sliding scale coverage. Potential discharge to TCU pending bed availability. Patient sat in chair for beginning half of shift. Patient resting comfortably. Up with walker and gait belt and assistance of 1.

## 2021-08-29 NOTE — PLAN OF CARE
[id number]    Patient hypertensive with max 180/68. Provider notified pending reply. Nephrology planning gentle diuresis. OVSS on room air. Denies pain or nausea. Creatinine 4.84 today, possible discharge to TCU tomorrow. 2L fluid restriction and 2g sodium restriction, has a good appetite. Assist of 1 with walker. Patient has wound on L shin, dressing changed. Alarms on bed and recliner. Patient had family visit today and seems to be less fatigued.

## 2021-08-30 ENCOUNTER — APPOINTMENT (OUTPATIENT)
Dept: PHYSICAL THERAPY | Facility: CLINIC | Age: 86
DRG: 683 | End: 2021-08-30
Payer: MEDICARE

## 2021-08-30 LAB
ANION GAP SERPL CALCULATED.3IONS-SCNC: 7 MMOL/L (ref 3–14)
BUN SERPL-MCNC: 82 MG/DL (ref 7–30)
CALCIUM SERPL-MCNC: 8.6 MG/DL (ref 8.5–10.1)
CHLORIDE BLD-SCNC: 109 MMOL/L (ref 94–109)
CO2 SERPL-SCNC: 23 MMOL/L (ref 20–32)
CREAT SERPL-MCNC: 4.75 MG/DL (ref 0.52–1.25)
DSDNA AB SER-ACNC: 2 IU/ML
GFR SERPL CREATININE-BSD FRML MDRD: 10 ML/MIN/1.73M2
GLUCOSE BLD-MCNC: 64 MG/DL (ref 70–99)
GLUCOSE BLDC GLUCOMTR-MCNC: 114 MG/DL (ref 70–99)
GLUCOSE BLDC GLUCOMTR-MCNC: 167 MG/DL (ref 70–99)
GLUCOSE BLDC GLUCOMTR-MCNC: 250 MG/DL (ref 70–99)
GLUCOSE BLDC GLUCOMTR-MCNC: 345 MG/DL (ref 70–99)
GLUCOSE BLDC GLUCOMTR-MCNC: 55 MG/DL (ref 70–99)
GLUCOSE BLDC GLUCOMTR-MCNC: 80 MG/DL (ref 70–99)
POTASSIUM BLD-SCNC: 4.7 MMOL/L (ref 3.4–5.3)
SODIUM SERPL-SCNC: 139 MMOL/L (ref 133–144)

## 2021-08-30 PROCEDURE — 250N000013 HC RX MED GY IP 250 OP 250 PS 637: Performed by: HOSPITALIST

## 2021-08-30 PROCEDURE — 99233 SBSQ HOSP IP/OBS HIGH 50: CPT | Mod: GC | Performed by: INTERNAL MEDICINE

## 2021-08-30 PROCEDURE — 250N000011 HC RX IP 250 OP 636: Performed by: STUDENT IN AN ORGANIZED HEALTH CARE EDUCATION/TRAINING PROGRAM

## 2021-08-30 PROCEDURE — 36415 COLL VENOUS BLD VENIPUNCTURE: CPT | Performed by: STUDENT IN AN ORGANIZED HEALTH CARE EDUCATION/TRAINING PROGRAM

## 2021-08-30 PROCEDURE — 250N000013 HC RX MED GY IP 250 OP 250 PS 637: Performed by: STUDENT IN AN ORGANIZED HEALTH CARE EDUCATION/TRAINING PROGRAM

## 2021-08-30 PROCEDURE — 97116 GAIT TRAINING THERAPY: CPT | Mod: GP

## 2021-08-30 PROCEDURE — 80048 BASIC METABOLIC PNL TOTAL CA: CPT | Performed by: STUDENT IN AN ORGANIZED HEALTH CARE EDUCATION/TRAINING PROGRAM

## 2021-08-30 PROCEDURE — 120N000002 HC R&B MED SURG/OB UMMC

## 2021-08-30 PROCEDURE — 97530 THERAPEUTIC ACTIVITIES: CPT | Mod: GP

## 2021-08-30 RX ADMIN — Medication 25 MCG: at 08:37

## 2021-08-30 RX ADMIN — CARVEDILOL 25 MG: 25 TABLET, FILM COATED ORAL at 08:37

## 2021-08-30 RX ADMIN — CARVEDILOL 25 MG: 25 TABLET, FILM COATED ORAL at 17:33

## 2021-08-30 RX ADMIN — Medication 1 TABLET: at 08:37

## 2021-08-30 RX ADMIN — NIFEDIPINE 30 MG: 30 TABLET, FILM COATED, EXTENDED RELEASE ORAL at 08:37

## 2021-08-30 RX ADMIN — TAMSULOSIN HYDROCHLORIDE 0.4 MG: 0.4 CAPSULE ORAL at 08:37

## 2021-08-30 RX ADMIN — FINASTERIDE 5 MG: 5 TABLET, FILM COATED ORAL at 08:37

## 2021-08-30 RX ADMIN — HEPARIN SODIUM 5000 UNITS: 5000 INJECTION, SOLUTION INTRAVENOUS; SUBCUTANEOUS at 08:37

## 2021-08-30 RX ADMIN — HEPARIN SODIUM 5000 UNITS: 5000 INJECTION, SOLUTION INTRAVENOUS; SUBCUTANEOUS at 20:43

## 2021-08-30 ASSESSMENT — ACTIVITIES OF DAILY LIVING (ADL)
ADLS_ACUITY_SCORE: 21
ADLS_ACUITY_SCORE: 23
ADLS_ACUITY_SCORE: 21

## 2021-08-30 ASSESSMENT — MIFFLIN-ST. JEOR: SCORE: 1466.91

## 2021-08-30 NOTE — PROGRESS NOTES
Care Management Follow Up    Length of Stay (days): 13    Expected Discharge Date: 09/01/2021     Concerns to be Addressed: discharge planning       Patient plan of care discussed at interdisciplinary rounds: Yes    Anticipated Discharge Disposition: Transitional Care    [facility]  [address] [place]  [address]  P: [phone]  F: [phone]     Anticipated Discharge Services: None    Anticipated Discharge DME: None    Patient/family educated on Medicare website which has current facility and service quality ratings: yes    Education Provided on the Discharge Plan:  Yes    Patient/Family in Agreement with the Plan: yes    Referrals Placed by CM/SW: Post Acute Facilities    Private pay costs discussed: Not applicable    Additional Information:    Report received from team that patient is medically ready and awaiting TCU placement. Patient was previously accepted at [facility] TCU.     called [facility] admissions liaison (P: [phone]). Discussed patient's medical status and offered to fax updated records; admissions liaison confirmed access to Epic and declined need for records to be sent. Admissions liaison stated that they would review patient and update this writer prior to end of day but informed this writer that they do not anticipate any bed availability until Wednesday, 9/1/21.     NETTA Lewis Hematology/Oncology Social Worker  Phone: [phone]  Pager: [phone]  [email or web address]

## 2021-08-30 NOTE — PROGRESS NOTES
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 Found to have RONY on CKD with initial creatinine of 2.9 compared to his baseline of 2.1-2.3. Creatinine progressively increased despite initial diuresis with peak creatinine 5.77 on 8/26/21. Nephrology followed pt throughout admission. Initial etiology of worsening creatinine was unclear. Glomerular workup was pursued and ultimately notable for positive MYA and anti-histone Ab (ordered given pt was endorsing joint pains and was on hydralazine PTA) consistent with drug induced lupus. C3, C4, RF, and ANCA were all negative. In addition, renal service liberalized pt's blood pressure goals to allow permissive hypertension (SBP goal ~140) given concern for hypoperfusion contributing to RONY. After discontinuing hydralazine on 8/25/21 and adjusting pt's BP meds, creatinine progressively improved and is 4.75 as of 8/30/21. Hydralazine added to pt's allergy list and discontinued as outpatient medication.   - Continue carvedilol 25 mg BID & nifedipine 30 mg daily with goal -150 per nephrology recommendations   - 2 g Na diet, 2 L fluid restriction  - Daily BMP    #Hypertension   - Continue carvedilol 25 mg BID & nifedipine 30 mg daily with goal -150 per nephrology recommendations   - PTA hydrochlorothiazide 12.5 mg daily held on admission as this likely is not  very effective with decreased kidney function, will hold at discharge  - PTA hydralazine discontinued d/t drug induced lupus, added to allergy list     #Superficial thrombophlebitis, improving  - Elevate arms as able  - Diclofenac gel, lidocaine gel, and warm/cold compresses for pain relief  - Acetaminophen prn for pain      #Acute on chronic normocytic anemia, stable  #History of rectal cancer    Hgb 6.8 on 8/19 requiring 1 unit pRBCs. No signs of acute blood loss. Absolute retic count low indicating marrow hypoproliferation. No evidence of hemolysis based on labs, blood smear. Hgb >7 since transfusion on 8/19. Labs consistent with iron deficiency. Likely a component of epo deficiency as well given history of CKD. Of note, pt does mention dark black stools and diarrhea over past 3 months. Discussed importance of outpatient colonoscopy follow up at discharge.  - S/p iron sucrose 300 mg q72 hr for a total of 3 doses    - Epo level pending   - Follow up screening colonoscopy as outpatient (last in 2015, recommended follow up in 5 years)    #Type 2 DM  Last A1c 9.5 on 7/16/2021. Currently managed with sitagliptin, glipizide, glargine at bedtime (10 units PTA).   - Continue insulin glargine 10 units at bedtime   - Medium dose sliding scale mealtime insulin & and at bedtime   - At discharge, plan to discontinue glipizide (due to RONY, hypoglycemia risk) and hold sitagliptin. Ideally, pt should be on oral medications with low risk of hypoglycemia and minimal impact on kidney function such as GLP-1 and SGLT2 (follow up with PCP after discharge to discuss this further)   - Fingersticks, hypoglycemia protocol    Resolved hospital issues:  #Hyperkalemia, resolved with diuresis     #Lower extremity edema, L>R  No DVT on LLE ultrasound 8/18    Chronic medical conditions:  #BPH  - Continue pta flomax and finasteride     #Osteoporosis  - Continue pta vit D    #Thyroid nodule  He has already been referred to an endocrinologist for  workup of thyroid nodule. Biopsy attempted July 2021 and was unsuccessful, repeat FNA was recommended 10/2021  - Continue with current plan for FNA 10/2021 with Dr. Amin, endocrinology     #Subclinical hypothyroidism  TSH elevated this admission to 7.46 with normal T4. Has history of subclinical hypothyroidism with last TSH 4.70 03/2021.   - Follow up with PCP for repeat TSH and T4        Diet: Fluid restriction 2000 ML FLUID  Room Service  Combination Diet Low Saturated Fat Na <2400mg Diet, No Caffeine Diet    DVT Prophylaxis: Heparin SQ  High Catheter: Not present  Fluids: None  Central Lines: None  Code Status: No CPR- Do NOT Intubate  discussed on admission with pt and 2 children, he has previously filled out medical directive paperwork stating this wish    Disposition Plan   Expected discharge: medically stable for discharge to TCU     Patient was staffed with attending Dr. Fajardo.    Denise Acuña MD  Internal Medicine - PGY1  [phone]    ____________________________________________________________    Interval History   No acute overnight events. Son at bedside. Answered all questions about blood pressure management, current diagnosis, diabetes. Pt still feels slightly fatigued but this is overall improved. No other concerns or complaints today.     4 point review of systems otherwise negative.     Physical Exam   Vital Signs: Temp: 98.3  F (36.8  C) Temp src: Oral BP: (!) 138/30 Pulse: 58   Resp: 16 SpO2: 94 % O2 Device: None (Room air)    Weight: 167 lbs 8 oz  Constitutional: awake, alert, and interactive, no acute distress  HEENT: normocephalic, anicteric sclerae, MMM   Pulmonary: no increased work of breathing on room air, lungs clear to auscultation bilaterally  Cardiovascular: bradycardic, regular  GI: soft, non-tender, non-distended  Skin: Venous stasis over LE with flaking and dry skin. No lower extremity edema   MSK: no lower extremity edema   Neuro: AAOx3, no gross focal neurologic  deficits    Data   All labs and imaging reviewed.

## 2021-08-30 NOTE — PLAN OF CARE
Alert and oriented x 4. Denies pain and nausea. Refused blood glucose check at 02 and again around 04 when tried again. Voiding in the urinal. Hard of hearing, device on the table. PIV saline locked. Call light in reach.

## 2021-08-31 LAB
ANION GAP SERPL CALCULATED.3IONS-SCNC: 6 MMOL/L (ref 3–14)
BUN SERPL-MCNC: 80 MG/DL (ref 7–30)
CALCIUM SERPL-MCNC: 8.1 MG/DL (ref 8.5–10.1)
CHLORIDE BLD-SCNC: 106 MMOL/L (ref 94–109)
CO2 SERPL-SCNC: 24 MMOL/L (ref 20–32)
CREAT SERPL-MCNC: 4.85 MG/DL (ref 0.52–1.25)
ERYTHROCYTE [DISTWIDTH] IN BLOOD BY AUTOMATED COUNT: 16 % (ref 10–15)
GFR SERPL CREATININE-BSD FRML MDRD: 10 ML/MIN/1.73M2
GLUCOSE BLD-MCNC: 174 MG/DL (ref 70–99)
GLUCOSE BLDC GLUCOMTR-MCNC: 116 MG/DL (ref 70–99)
GLUCOSE BLDC GLUCOMTR-MCNC: 134 MG/DL (ref 70–99)
GLUCOSE BLDC GLUCOMTR-MCNC: 163 MG/DL (ref 70–99)
GLUCOSE BLDC GLUCOMTR-MCNC: 165 MG/DL (ref 70–99)
GLUCOSE BLDC GLUCOMTR-MCNC: 247 MG/DL (ref 70–99)
GLUCOSE BLDC GLUCOMTR-MCNC: 276 MG/DL (ref 70–99)
HCT VFR BLD AUTO: 25.4 % (ref 35–53)
HGB BLD-MCNC: 7.9 G/DL (ref 11.7–17.7)
MCH RBC QN AUTO: 27.6 PG (ref 26.5–33)
MCHC RBC AUTO-ENTMCNC: 31.1 G/DL (ref 31.5–36.5)
MCV RBC AUTO: 89 FL (ref 78–100)
PLATELET # BLD AUTO: 206 10E3/UL (ref 150–450)
POTASSIUM BLD-SCNC: 5 MMOL/L (ref 3.4–5.3)
RBC # BLD AUTO: 2.86 10E6/UL (ref 3.8–5.9)
SODIUM SERPL-SCNC: 136 MMOL/L (ref 133–144)
WBC # BLD AUTO: 5.2 10E3/UL (ref 4–11)

## 2021-08-31 PROCEDURE — 99233 SBSQ HOSP IP/OBS HIGH 50: CPT | Mod: GC | Performed by: INTERNAL MEDICINE

## 2021-08-31 PROCEDURE — 250N000013 HC RX MED GY IP 250 OP 250 PS 637: Performed by: STUDENT IN AN ORGANIZED HEALTH CARE EDUCATION/TRAINING PROGRAM

## 2021-08-31 PROCEDURE — 250N000011 HC RX IP 250 OP 636: Performed by: STUDENT IN AN ORGANIZED HEALTH CARE EDUCATION/TRAINING PROGRAM

## 2021-08-31 PROCEDURE — 250N000013 HC RX MED GY IP 250 OP 250 PS 637: Performed by: HOSPITALIST

## 2021-08-31 PROCEDURE — 80048 BASIC METABOLIC PNL TOTAL CA: CPT | Performed by: HOSPITALIST

## 2021-08-31 PROCEDURE — 120N000002 HC R&B MED SURG/OB UMMC

## 2021-08-31 PROCEDURE — 85027 COMPLETE CBC AUTOMATED: CPT | Performed by: HOSPITALIST

## 2021-08-31 PROCEDURE — 36415 COLL VENOUS BLD VENIPUNCTURE: CPT | Performed by: HOSPITALIST

## 2021-08-31 RX ADMIN — FINASTERIDE 5 MG: 5 TABLET, FILM COATED ORAL at 08:44

## 2021-08-31 RX ADMIN — CARVEDILOL 25 MG: 25 TABLET, FILM COATED ORAL at 08:44

## 2021-08-31 RX ADMIN — NIFEDIPINE 30 MG: 30 TABLET, FILM COATED, EXTENDED RELEASE ORAL at 08:44

## 2021-08-31 RX ADMIN — TAMSULOSIN HYDROCHLORIDE 0.4 MG: 0.4 CAPSULE ORAL at 08:44

## 2021-08-31 RX ADMIN — HEPARIN SODIUM 5000 UNITS: 5000 INJECTION, SOLUTION INTRAVENOUS; SUBCUTANEOUS at 20:37

## 2021-08-31 RX ADMIN — Medication 25 MCG: at 08:44

## 2021-08-31 RX ADMIN — Medication 1 TABLET: at 08:44

## 2021-08-31 RX ADMIN — HEPARIN SODIUM 5000 UNITS: 5000 INJECTION, SOLUTION INTRAVENOUS; SUBCUTANEOUS at 08:44

## 2021-08-31 RX ADMIN — CARVEDILOL 25 MG: 25 TABLET, FILM COATED ORAL at 18:19

## 2021-08-31 ASSESSMENT — MIFFLIN-ST. JEOR: SCORE: 1455.11

## 2021-08-31 ASSESSMENT — ACTIVITIES OF DAILY LIVING (ADL)
ADLS_ACUITY_SCORE: 21

## 2021-08-31 NOTE — PLAN OF CARE
[id number]:     Pt VSS. Denies N/V/SOB/pain. Patient is on 2g Na diet and 2L fluid restriction. BS was 258 at 1700 and 345 @ 2237 with insulin coveage of both Novolog and Glargine. Pt is [place]. Using urinal in bed with adequate UOP. No BM this shift. Assist x1 with the walker. , continue POC.

## 2021-08-31 NOTE — PROGRESS NOTES
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 Renal US was ordered in 2020, but was not completed. Presented with creatinine of 2.9 which had stayed relatively stable during his hospitalization until the last few days, when it went up steadily and peaked at 5.7. Concern for HFpEF vs worsening of his CKD, but concern for hypoperfusion, thus BP medications decreased and higher BP target allowed. Cr now trending down slightly with improved BP. If pt truly had drug induced lupus secondary to hydralazine, we would not expect to see improvement in Cr solely from stopping the medication. Suspect RONY was predominantly hemodynamically mediated. Anticipate slow renal recovery due to baseline low renal reserve.  - Continue permissive HTN, goal -150 mmHg  - Avoid nephrotoxic agents and renally dose medications  - Strict I/O's, daily weights  - Renal ultrasound with doppler shows medical renal disease     Hypertension- ok to restart nifedipine 30, titrate up to 60 then 90mg and if BP still higher than 150-155, add furosemide 40mg daily and titrate if needed  - long term goal -150 range.    Recommendations were communicated to primary team via note       Fern Philip MD   [phone]    Interval History :   Nursing and provider notes from last 24 hours reviewed.    No events. Pt feels well, but had low blood sugars in the AM. No SOB, CP, fevers or chills.    Review of Systems:   4 point review of systems performed and negative except as above.    Physical Exam:   I/O last 3 completed shifts:  In: 480 [P.O.:480]  Out: 1375 [Urine:1375]   BP  "(!) 144/56 (BP Location: Left arm)   Pulse 63   Temp 98.2  F (36.8  C) (Oral)   Resp 16   Ht 1.803 m (5' 11\")   Wt 76 kg (167 lb 8 oz)   SpO2 97%   BMI 23.36 kg/m       General: sitting in bed, hard of hearing, no acute distress  CV: RRR, no murmur appreciated, well perfused, 1-2+ edema of LE  Resp: no increased work of breathing, CTAB  Abd: soft, non-tender, non-distended  Neuro: alert and oriented, no focal deficits     Labs:   All labs reviewed by me  Electrolytes/Renal -   Recent Labs   Lab Test 08/30/21  1753 08/30/21  1334 08/30/21  0907 08/30/21  0658 08/29/21  0809 08/28/21  0630 08/25/21  1211 08/25/21  0531 08/24/21  0714 08/20/21  0750 08/20/21  0548 08/18/21  0839 08/18/21  0651   NA  --   --   --  139 138 138   < > 134 136   < > 138   < > 139   POTASSIUM  --   --   --  4.7 4.7 4.8   < > 4.8 5.0   < > 4.3   < > 5.0   CHLORIDE  --   --   --  109 107 106   < > 102 103   < > 107   < > 112*   CO2  --   --   --  23 25 26   < > 24 25   < > 26   < > 23   BUN  --   --   --  82* 87* 86*   < > 94* 92*   < > 65*   < > 71*   CR  --   --   --  4.75* 4.84* 5.18*   < > 5.59* 5.10*   < > 3.21*   < > 2.98*   * 167* 114* 64* 155* 87   < > 159* 61*   < > 92   < > 127*   DUSTIN  --   --   --  8.6 8.3* 8.0*   < > 7.7* 8.1*   < > 8.2*   < > 8.4*   MAG  --   --   --   --   --   --   --  2.3  --   --  1.9  --  2.1   PHOS  --   --   --   --   --   --   --  5.2* 5.3*  --  5.0*  --   --     < > = values in this interval not displayed.       CBC -   Recent Labs   Lab Test 08/29/21  0809 08/28/21  0836 08/27/21  0741   WBC 5.4 6.1 6.0   HGB 8.2* 9.0* 8.3*    263 240       LFTs -   Recent Labs   Lab Test 08/17/21  1353 08/16/21  1754 03/16/21  1420 03/11/21  1527   ALKPHOS 82 86  --  88   BILITOTAL 0.3 0.3  --  0.2   ALT 20 19  --  13   AST 10 4  --  5   PROTTOTAL 7.6 7.7  --  7.1   ALBUMIN 2.9* 3.2* 3.0* 3.0*       Iron Panel -   Recent Labs   Lab Test 08/18/21  0651 09/24/20  1413 12/12/17  1701   IRON 27* 64 28* "   JUSTIN 9* 21 9*   ROSEMARIE  --  109 207         Imaging:  Reviewed.    Current Medications:    aspirin  81 mg Oral Daily     carvedilol  25 mg Oral BID w/meals     finasteride  5 mg Oral Daily     heparin ANTICOAGULANT  5,000 Units Subcutaneous Q12H     insulin aspart  1-7 Units Subcutaneous TID AC     insulin aspart  1-5 Units Subcutaneous At Bedtime     insulin glargine  10 Units Subcutaneous At Bedtime     lidocaine   Urethral Once     multivitamin w/minerals  1 tablet Oral Daily     NIFEdipine ER OSMOTIC  30 mg Oral Daily     sodium chloride (PF)  3 mL Intracatheter Q8H     tamsulosin  0.4 mg Oral Daily     Vitamin D3  25 mcg Oral Daily       eFrn Philip MD

## 2021-08-31 NOTE — PROGRESS NOTES
[facility]    Progress Note - [place] 2 Service        Date of Admission:  8/17/2021    Assessment & Plan   86 year old male with history of T2DM, CKD4, and hypertension who presents with RONY on CKD.     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 Found to have RONY on CKD with initial creatinine of 2.9 compared to his baseline of closer to 2. Creatinine progressively increased despite initial diuresis with peak creatinine 5.77 on 8/26/21. Nephrology following pt throughout admission. Initial etiology of worsening creatinine was unclear. There was some concern for hypoperfusion, so renal service liberalized pt's blood pressure goals to allow permissive hypertension. Glomerular workup was also pursued and ultimately notable for positive MYA and anti-histone Ab (ordered given pt was endorsing joint pains and was on hydralazine PTA) which could be consistent with drug induced lupus. C3, C4, RF, and ANCA were all negative. After discontinuing hydralazine on 8/25/21 and adjusting pt's BP meds to allow for permissive hypertension, creatinine progressively improved. Per renal, would not expect to see such improvement in Cr solely from stopping hydralazine if RONY was in fact 2/2 drug induced lupus. Thus, renal suspects RONY was predominantly hemodynamically mediated.  - Goal -150 per nephrology recommendations   - Continue carvedilol 25 mg BID but hold nifedipine 30 mg daily (although already received today's dose)  - 2 g Na diet, 2 L fluid restriction  - Daily BMP    #Hypertension   - Goal -150 per nephrology recommendations   - Continue carvedilol 25 mg BID but hold nifedipine 30 mg daily (although already received today's dose)  - PTA hydrochlorothiazide 12.5 mg daily held on admission as this likely is not very effective with decreased kidney function, will  hold at discharge  - PTA hydralazine discontinued, added to allergy list (d/t concern for drug induced lupus)    #Superficial thrombophlebitis, improving  - Elevate arms as able  - Diclofenac gel, lidocaine gel, and warm/cold compresses for pain relief  - Acetaminophen prn for pain      #Acute on chronic normocytic anemia, stable  #History of rectal cancer    Hgb 6.8 on 8/19 requiring 1 unit pRBCs. No signs of acute blood loss. Absolute retic count low indicating marrow hypoproliferation. No evidence of hemolysis based on labs, blood smear. Hgb >7 since transfusion on 8/19. Labs consistent with iron deficiency. Likely a component of epo deficiency as well given history of CKD. Of note, pt does mention dark black stools and diarrhea over past 3 months. Discussed importance of outpatient colonoscopy follow up at discharge.  - CTM hgb  - S/p iron sucrose 300 mg q72 hr for a total of 3 doses    - Epo level pending   - Follow up screening colonoscopy as outpatient (last in 2015, recommended follow up in 5 years)    #Type 2 DM  Last A1c 9.5 on 7/16/2021. Currently managed with sitagliptin, glipizide, glargine at bedtime (10 units PTA).   - Continue insulin glargine 10 units at bedtime   - Medium dose sliding scale mealtime insulin & and at bedtime   - At discharge, plan to discontinue glipizide (due to RONY, hypoglycemia risk) and hold sitagliptin. Ideally, pt should be on oral medications with low risk of hypoglycemia and minimal impact on kidney function such as GLP-1 and SGLT2 (follow up with PCP after discharge to discuss this further)   - Fingersticks, hypoglycemia protocol    Resolved hospital issues:  #Hyperkalemia, resolved with diuresis     #Lower extremity edema, L>R  No DVT on LLE ultrasound 8/18    Chronic medical conditions:  #BPH  - Continue pta flomax and finasteride     #Osteoporosis  - Continue pta vit D    #Thyroid nodule  He has already been referred to an endocrinologist for workup of thyroid nodule.  Biopsy attempted July 2021 and was unsuccessful, repeat FNA was recommended 10/2021  - Continue with current plan for FNA 10/2021 with Dr. Amin, endocrinology     #Subclinical hypothyroidism  TSH elevated this admission to 7.46 with normal T4. Has history of subclinical hypothyroidism with last TSH 4.70 03/2021.   - Follow up with PCP for repeat TSH and T4        Diet: Fluid restriction 2000 ML FLUID  Room Service  Combination Diet Low Saturated Fat Na <2400mg Diet, No Caffeine Diet    DVT Prophylaxis: Heparin SQ  High Catheter: Not present  Fluids: None  Central Lines: None  Code Status: No CPR- Do NOT Intubate  discussed on admission with pt and 2 children, he has previously filled out medical directive paperwork stating this wish    Disposition Plan   Expected discharge: medically stable for discharge to TCU     Patient was staffed with attending Dr. Rodriguez.    Palmira Coreas MD  Internal Medicine, PGY-3  [phone]  ____________________________________________________________    Interval History   No acute overnight events. Pt still feels slightly fatigued but this is overall improved. No other concerns or complaints today. Awaiting placement to TCU.    4 point review of systems otherwise negative.     Physical Exam   Vital Signs: Temp: 98.2  F (36.8  C) Temp src: Oral BP: (!) 144/56 Pulse: 63   Resp: 16 SpO2: 97 % O2 Device: None (Room air)    Weight: 167 lbs 8 oz  Constitutional: awake, alert, and interactive, no acute distress  HEENT: normocephalic, anicteric sclerae, MMM   Pulmonary: no increased work of breathing on room air, lungs clear to auscultation bilaterally  Cardiovascular: bradycardic, regular  GI: soft, non-tender, non-distended  Skin: Venous stasis over LE with flaking and dry skin. No lower extremity edema   MSK: no lower extremity edema   Neuro: AAOx3, no gross focal neurologic deficits    Data   All labs and imaging reviewed.

## 2021-08-31 NOTE — PLAN OF CARE
[id number]: /61. Within goal for patient. OVSS on RA. Denies pain, nausea, and SOB. BG of 247 at 0200. Adequate UOP. Voiding into urinal at bedside. Waiting on TCU placement.

## 2021-08-31 NOTE — PLAN OF CARE
Neuro: A&Ox4. [place]. PERRLA. Other neuros grossly intact  Cardiac: RRR. VSS.   Respiratory: Sating >92% on RA.Crackles BUL  GI/: Adequate urine output. BM X1  Diet/appetite: Tolerating 2g NA/2L FR diet. Fair appetite.  Activity:  Ax1, up to chair.  Pain: At acceptable level on current regimen.   Skin: No new deficits noted.  LDA's: 1x PIV LUE SL    Plan: Continue with POC. Notify primary team with changes.

## 2021-08-31 NOTE — PROGRESS NOTES
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 Renal US was ordered in 2020, but was not completed. Presented with creatinine of 2.9 which had stayed relatively stable during his hospitalization until the last few days, when it went up steadily and peaked at 5.7. Concern for HFpEF vs worsening of his CKD, but concern for hypoperfusion, thus BP medications decreased and higher BP target allowed.  Cr slightly improved with increased BP but not consistently trending down. Low suspicion for drug-induced lupus secondary to hydralazine. We completed a manual urine microscopy today which was unremarkable. No change in management today.  - Continue permissive HTN, goal -150 mmHg  - Would discontinue topical diclofenac --> pt was not receiving, but if given a small amount can be absorbed and given his degree of renal impairment it could worsen renal function  - Avoid nephrotoxic agents and renally dose medications  - Strict I/O's, daily weights     Hypertension- ok to restart nifedipine 30, titrate up to 60 then 90mg and if BP still higher than 150-155, add furosemide 40mg daily and titrate if needed  - long term goal -150 range.    Recommendations were communicated to primary team via note       Fern Philip MD   [phone]    Interval History :   Nursing and provider notes from last 24 hours reviewed.  Pt feels worse today than yesterday, more fatigued and has slept much of the day. He described his sleep as \"fair\" overnight. He ate breakfast but not lunch. He would be interested in having Ensure or Boost available throughout the day " "as he drinks these at home. No SOB, no CP, no f/c.    Review of Systems:   4 point review of systems performed and negative except as above.    Physical Exam:   I/O last 3 completed shifts:  In: 970 [P.O.:960; I.V.:10]  Out: 875 [Urine:875]   BP (!) 145/58 (BP Location: Left arm)   Pulse 58   Temp (!) 95.9  F (35.5  C) (Oral)   Resp 18   Ht 1.803 m (5' 11\")   Wt 74.8 kg (164 lb 14.4 oz)   SpO2 94%   BMI 23.00 kg/m       General: sitting in bed, hard of hearing, no acute distress  CV: RRR, no murmur appreciated, well perfused, 1-2+ edema of LE  Resp: no increased work of breathing, CTAB  Abd: soft, non-tender, non-distended  Neuro: alert and oriented, no focal deficits     Labs:   All labs reviewed by me  Electrolytes/Renal -   Recent Labs   Lab Test 08/31/21  1707 08/31/21  1302 08/31/21  1241 08/31/21  0606 08/30/21  0658 08/29/21  0809 08/25/21  1211 08/25/21  0531 08/24/21  0714 08/20/21  0750 08/20/21  0548 08/18/21  0839 08/18/21  0651   NA  --   --   --  136 139 138   < > 134 136   < > 138   < > 139   POTASSIUM  --   --   --  5.0 4.7 4.7   < > 4.8 5.0   < > 4.3   < > 5.0   CHLORIDE  --   --   --  106 109 107   < > 102 103   < > 107   < > 112*   CO2  --   --   --  24 23 25   < > 24 25   < > 26   < > 23   BUN  --   --   --  80* 82* 87*   < > 94* 92*   < > 65*   < > 71*   CR  --   --   --  4.85* 4.75* 4.84*   < > 5.59* 5.10*   < > 3.21*   < > 2.98*   * 163* 165* 174* 64* 155*   < > 159* 61*   < > 92   < > 127*   DUSTIN  --   --   --  8.1* 8.6 8.3*   < > 7.7* 8.1*   < > 8.2*   < > 8.4*   MAG  --   --   --   --   --   --   --  2.3  --   --  1.9  --  2.1   PHOS  --   --   --   --   --   --   --  5.2* 5.3*  --  5.0*  --   --     < > = values in this interval not displayed.       CBC -   Recent Labs   Lab Test 08/31/21  0606 08/29/21  0809 08/28/21  0836   WBC 5.2 5.4 6.1   HGB 7.9* 8.2* 9.0*    246 263       LFTs -   Recent Labs   Lab Test 08/17/21  1353 08/16/21  1754 03/16/21  1420 03/11/21  1527 "   ALKPHOS 82 86  --  88   BILITOTAL 0.3 0.3  --  0.2   ALT 20 19  --  13   AST 10 4  --  5   PROTTOTAL 7.6 7.7  --  7.1   ALBUMIN 2.9* 3.2* 3.0* 3.0*       Iron Panel -   Recent Labs   Lab Test 08/18/21  0651 09/24/20  1413 12/12/17  1701   IRON 27* 64 28*   IRONSAT 9* 21 9*   ROSEMARIE  --  109 207         Imaging:  Reviewed.    Current Medications:    aspirin  81 mg Oral Daily     carvedilol  25 mg Oral BID w/meals     finasteride  5 mg Oral Daily     heparin ANTICOAGULANT  5,000 Units Subcutaneous Q12H     insulin aspart  1-7 Units Subcutaneous TID AC     insulin aspart  1-5 Units Subcutaneous At Bedtime     insulin glargine  10 Units Subcutaneous At Bedtime     lidocaine   Urethral Once     multivitamin w/minerals  1 tablet Oral Daily     [Held by provider] NIFEdipine ER OSMOTIC  30 mg Oral Daily     sodium chloride (PF)  3 mL Intracatheter Q8H     tamsulosin  0.4 mg Oral Daily     Vitamin D3  25 mcg Oral Daily       Fern Philip MD

## 2021-08-31 NOTE — PLAN OF CARE
[id number] VSS. No c/o pain or nausea. Dressing to RLE changed.  & 165. Pt slept through lunch. No insulin given. Nifedipine held. No acute changes. Continue with POC.

## 2021-09-01 LAB
ANION GAP SERPL CALCULATED.3IONS-SCNC: 6 MMOL/L (ref 3–14)
BUN SERPL-MCNC: 85 MG/DL (ref 7–30)
CALCIUM SERPL-MCNC: 8.6 MG/DL (ref 8.5–10.1)
CHLORIDE BLD-SCNC: 108 MMOL/L (ref 94–109)
CO2 SERPL-SCNC: 23 MMOL/L (ref 20–32)
CREAT SERPL-MCNC: 4.65 MG/DL (ref 0.52–1.25)
ERYTHROCYTE [DISTWIDTH] IN BLOOD BY AUTOMATED COUNT: 16.4 % (ref 10–15)
GFR SERPL CREATININE-BSD FRML MDRD: 11 ML/MIN/1.73M2
GLUCOSE BLD-MCNC: 195 MG/DL (ref 70–99)
GLUCOSE BLDC GLUCOMTR-MCNC: 139 MG/DL (ref 70–99)
GLUCOSE BLDC GLUCOMTR-MCNC: 148 MG/DL (ref 70–99)
GLUCOSE BLDC GLUCOMTR-MCNC: 210 MG/DL (ref 70–99)
GLUCOSE BLDC GLUCOMTR-MCNC: 261 MG/DL (ref 70–99)
HCT VFR BLD AUTO: 27.2 % (ref 35–53)
HGB BLD-MCNC: 8.3 G/DL (ref 11.7–17.7)
MCH RBC QN AUTO: 27.7 PG (ref 26.5–33)
MCHC RBC AUTO-ENTMCNC: 30.5 G/DL (ref 31.5–36.5)
MCV RBC AUTO: 91 FL (ref 78–100)
PHOSPHATE SERPL-MCNC: 4.9 MG/DL (ref 2.5–4.5)
PLATELET # BLD AUTO: 240 10E3/UL (ref 150–450)
POTASSIUM BLD-SCNC: 5.3 MMOL/L (ref 3.4–5.3)
RBC # BLD AUTO: 3 10E6/UL (ref 3.8–5.9)
SODIUM SERPL-SCNC: 137 MMOL/L (ref 133–144)
WBC # BLD AUTO: 4.9 10E3/UL (ref 4–11)

## 2021-09-01 PROCEDURE — 99233 SBSQ HOSP IP/OBS HIGH 50: CPT | Mod: GC | Performed by: INTERNAL MEDICINE

## 2021-09-01 PROCEDURE — 84100 ASSAY OF PHOSPHORUS: CPT | Performed by: INTERNAL MEDICINE

## 2021-09-01 PROCEDURE — G0463 HOSPITAL OUTPT CLINIC VISIT: HCPCS

## 2021-09-01 PROCEDURE — 250N000011 HC RX IP 250 OP 636: Performed by: STUDENT IN AN ORGANIZED HEALTH CARE EDUCATION/TRAINING PROGRAM

## 2021-09-01 PROCEDURE — 85027 COMPLETE CBC AUTOMATED: CPT | Performed by: STUDENT IN AN ORGANIZED HEALTH CARE EDUCATION/TRAINING PROGRAM

## 2021-09-01 PROCEDURE — 250N000013 HC RX MED GY IP 250 OP 250 PS 637: Performed by: STUDENT IN AN ORGANIZED HEALTH CARE EDUCATION/TRAINING PROGRAM

## 2021-09-01 PROCEDURE — 120N000002 HC R&B MED SURG/OB UMMC

## 2021-09-01 PROCEDURE — 80048 BASIC METABOLIC PNL TOTAL CA: CPT | Performed by: STUDENT IN AN ORGANIZED HEALTH CARE EDUCATION/TRAINING PROGRAM

## 2021-09-01 PROCEDURE — 999N000128 HC STATISTIC PERIPHERAL IV START W/O US GUIDANCE

## 2021-09-01 PROCEDURE — 250N000013 HC RX MED GY IP 250 OP 250 PS 637: Performed by: HOSPITALIST

## 2021-09-01 PROCEDURE — 36415 COLL VENOUS BLD VENIPUNCTURE: CPT | Performed by: STUDENT IN AN ORGANIZED HEALTH CARE EDUCATION/TRAINING PROGRAM

## 2021-09-01 RX ORDER — NIFEDIPINE 30 MG
30 TABLET, EXTENDED RELEASE ORAL DAILY
Qty: 30 TABLET | Refills: 0 | DISCHARGE
Start: 2021-09-02 | End: 2021-09-11

## 2021-09-01 RX ADMIN — FINASTERIDE 5 MG: 5 TABLET, FILM COATED ORAL at 09:13

## 2021-09-01 RX ADMIN — CARVEDILOL 25 MG: 25 TABLET, FILM COATED ORAL at 17:31

## 2021-09-01 RX ADMIN — TAMSULOSIN HYDROCHLORIDE 0.4 MG: 0.4 CAPSULE ORAL at 09:13

## 2021-09-01 RX ADMIN — HEPARIN SODIUM 5000 UNITS: 5000 INJECTION, SOLUTION INTRAVENOUS; SUBCUTANEOUS at 09:13

## 2021-09-01 RX ADMIN — HEPARIN SODIUM 5000 UNITS: 5000 INJECTION, SOLUTION INTRAVENOUS; SUBCUTANEOUS at 20:43

## 2021-09-01 RX ADMIN — Medication 25 MCG: at 09:12

## 2021-09-01 RX ADMIN — CARVEDILOL 25 MG: 25 TABLET, FILM COATED ORAL at 09:13

## 2021-09-01 RX ADMIN — NIFEDIPINE 30 MG: 30 TABLET, FILM COATED, EXTENDED RELEASE ORAL at 11:32

## 2021-09-01 RX ADMIN — Medication 1 TABLET: at 09:13

## 2021-09-01 ASSESSMENT — ACTIVITIES OF DAILY LIVING (ADL)
ADLS_ACUITY_SCORE: 23
ADLS_ACUITY_SCORE: 21
ADLS_ACUITY_SCORE: 21
ADLS_ACUITY_SCORE: 23
ADLS_ACUITY_SCORE: 21
ADLS_ACUITY_SCORE: 23

## 2021-09-01 ASSESSMENT — MIFFLIN-ST. JEOR: SCORE: 1465.54

## 2021-09-01 NOTE — PLAN OF CARE
Pt is alert and oriented x4, OVSS, hypertensive /63. On RA. Afebrile. pt does not meet parameter of notification.Last bm was yesterday 08/31/2021 per patient statement . On low saturated Fat Na diet, on fluid restriction 2000ml per day. Had adequate output overnight.  Urinal at bedsides. Up with assist of 1, pocket talker available to enhance communication. Will continue to monitor.

## 2021-09-01 NOTE — PROGRESS NOTES
[facility] Nurse Inpatient Wound Assessment   Reason for consultation: Evaluate and treat  R shin wounds    Assessment  R shin wounds due to Trauma and Venous Ulcer  Status: improving, although not significantly, switched to polyMem    Treatment Plan  R shin wounds: Every other day and PRN if saturated cleanse with microKlenz and dry, apply sween cream (pink top lotion) to all intact skin on BLE, avoid toes. Cut to fit PolyMem (577323) and place over open areas, then cover with ABD pad and secure with kerlix.      Orders Written    WO Nurse follow-up plan:weekly  Nursing to notify the Provider(s) and re-consult the WO Nurse if wound(s) deteriorates or new skin concern.    Patient History  According to provider note(s):  86M with history of T2DM, CKD4, and hypertension who presents with progressive fatigue over a few months and found to be volume overloaded concerning for progression of CKD vs CHF.    Objective Data  Containment of urine/stool: Incontinence Protocol    Active Diet Order  Orders Placed This Encounter      Combination Diet Low Saturated Fat Na <2400mg Diet, No Caffeine Diet      Output:   I/O last 3 completed shifts:  In: 1000 [P.O.:980; I.V.:20]  Out: 975 [Urine:975]    Risk Assessment:   Sensory Perception: 4-->no impairment  Moisture: 3-->occasionally moist  Activity: 3-->walks occasionally  Mobility: 3-->slightly limited  Nutrition: 2-->probably inadequate  Friction and Shear: 2-->potential problem  Fortunato Score: 17                          Labs:   Recent Labs   Lab 09/01/21  0555   HGB 8.3*   WBC 4.9       Physical Exam  Areas of skin assessed: focused BLE    Wound Location:  R shin   Wound History: venous stasis to BLE, R shin wound opens and closes per pt        Wound Base: 100 % dermis     Palpation of the wound bed: normal      Drainage: small     Description of drainage: serosanguinous     Measurements (length x width x depth, in cm) two open areas lateral: 2cm x 1.5cm x 0.1cm and medial: 0.5cm x  0.5cm x 0.1cm      Tunneling N/A     Undermining N/A  Periwound skin: edematous and erythema- blanchable      Color: pink      Temperature: normal   Odor: none  Pain: mild, tender      Interventions  Visual inspection and assessment completed  Wound Care Rationale Provide protection   Wound Care: done per plan of care  Supplies: floor stock  Current off-loading measures: Pillows  Current support surface: Standard  Atmos Air mattress  Education provided to: plan of care  Discussed plan of care with Patient and Nurse    Lauren Medrano RN, CWOCN

## 2021-09-01 NOTE — PROGRESS NOTES
Care Management Follow Up    Length of Stay (days): 15    Expected Discharge Date: 09/02/2021     Concerns to be Addressed: discharge planning       Patient plan of care discussed at interdisciplinary rounds: Yes    Anticipated Discharge Disposition: Transitional Care    [facility]  [address] [place]  [address]  P: [phone]  F: [phone]     Anticipated Discharge Services: None    Anticipated Discharge DME: None    Patient/family educated on Medicare website which has current facility and service quality ratings: yes    Education Provided on the Discharge Plan:  Yes    Patient/Family in Agreement with the Plan: yes    Referrals Placed by CM/SW: Post Acute Facilities    Private pay costs discussed: Not applicable    Additional Information:    Per care team, patient is medically ready for discharge.  contacted [facility] admissions (P: [phone]) as they had previously reported that patient could not admit until 9/2/21 due to bed availability. Admissions confirmed that they are able to accept patient 9/2/21 between [id number].     met with patient and called patient's son, Alvaro. Confirmed ability to discharge to [facility] 9/2/21. Discussed possible transportation options: private vehicle vs wheelchair accessible van. Alvaro recommended wheelchair transport be arranged at discharge;  confirmed request.    [facility] ([phone]) wheelchair transport arranged for 9/2/21 at 0930 as transport agency had no later times available.    [facility] 2 care team paged of early discharge time to TCU.    NETTA Lewis  7D Hematology/Oncology Social Worker  Phone: [phone]  Pager: [phone]  [email or web address]

## 2021-09-01 NOTE — PLAN OF CARE
0202-0545:     Pt status remain unchanged. OVSS with Bp of 150/59 and temp of 96.7 afebrile. No C/o N/V/SOB/pain. BS was 116 @ 1700 with no insulin coverage. BS was 276 at 2200 with insulin coveage of both Novolog and Glargine. Patient is on 2g Na diet and 2L fluid restriction. Pt is Kiana. Using urinal in bed with adequate UOP. Pt had BM x1. Assist x1 with the walker. Continue with POC.

## 2021-09-01 NOTE — PROVIDER NOTIFICATION
"Dr Acuña pager with \"Pt voided 250 ml and PVR scan was 315.  If you want str please order\"    Dr Wilson called back and will str cath if PVR over 400 cc  "

## 2021-09-01 NOTE — PROGRESS NOTES
Resident/Fellow Attestation   I, Denise Acuña, was present with the medical/[employer] student who participated in the service and in the documentation of the note.  I have verified the history and personally performed the physical exam and medical decision making.  I agree with the assessment and plan of care as documented in the note.      Key findings/today's changes:  - Planning to continue with nifedipine 30 mg daily     Denise Acuña MD  PGY1  Date of Service (when I saw the patient): 09/01/21    [facility]    Progress Note - [place] 2 Service        Date of Admission:  8/17/2021    Assessment & Plan           Farzad East is a 86 year old adult admitted on 8/17/2021. He has a history of CKD stage 3/4. HTN, and T2DM and is admitted for an RONY in the setting of his CKD.     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 RONY diagnosed in the setting of his CKD with an initial creatinine 2.9, baseline has been approx 2 before admission. Peak creatinine of 5.77 on 8/26/21 despite initial diuresis. Nephrology has been consulted and following this patient and has been evaluating drug-induced lupus due to hydralazine vs hypoperfusion.  Hydralazine was discontinued on 8/25 and adjustments were made to his blood pressure regiment to allow for permissive hypertension with a goal of systolic readings between 140-150. Since then, creatinine has slowly been improving, with a reading of 4.65 today. Nephro stated that creatinine would most likely not improve like this with removal of a medication for drug-induced lupus, so they believe that the RONY was due to hypoperfusion. At this time, nephrology is considering if Mr. East should receive dialysis or not.    - Goal -150 per nephrology recommendations   - Continue carvedilol 25  mg BID, restart nifedipine 30 mg daily after being held yesterday  - 2 g Na diet, 2 L fluid restriction  - Daily BMP, including phosphorous. Mildly elevated today at 4.9    #Hypertension     - Goal -150 per nephrology recommendations   - Continue carvedilol 25 mg BID, restart nifedipine 30 mg daily after being held yesterday  - PTA hydrochlorothiazide 12.5 mg daily held on admission as this likely is not very effective with decreased kidney function, will hold at discharge  - PTA hydralazine discontinued, added to allergy list (d/t concern for drug induced lupus)    #Superficial thrombophlebitis, improving    - Elevate arms as able  - Per nephrology, discontinue diclofenac gel due to renal function (although he has not been receiving this)  - Acetaminophen prn for pain     #Acute on chronic normocytic anemia, stable  #History of rectal cancer      Hgb 6.8 on 8/19 requiring 1 unit pRBCs. No signs of acute blood loss. Absolute retic count low indicating marrow hypoproliferation. No evidence of hemolysis based on labs, blood smear. Hgb >7 since transfusion on 8/19, 8.3 today. Labs consistent with iron deficiency. Likely a component of epo deficiency as well given history of CKD. Of note, pt does mention dark black stools and diarrhea over past 3 months. Discussed importance of outpatient colonoscopy follow up at discharge.  - CTM hgb  - S/p iron sucrose 300 mg q72 hr for a total of 3 doses    - Epo level pending   - Follow up screening colonoscopy as outpatient (last in 2015, recommended follow up in 5 years)    #Type 2 DM    Last A1c 9.5 on 7/16/2021. Currently managed with sitagliptin, glipizide, glargine at bedtime (10 units PTA).   - Continue insulin glargine 10 units at bedtime   - Medium dose sliding scale mealtime insulin & and at bedtime   - At discharge, plan to discontinue glipizide (due to RONY, hypoglycemia risk) and hold sitagliptin. Ideally, pt should be on oral medications with low risk of  hypoglycemia and minimal impact on kidney function such as GLP-1 and SGLT2 (follow up with PCP after discharge to discuss this further)   - Fingersticks, hypoglycemia protocol    Resolved hospital issues:  #Hyperkalemia, resolved with diuresis      #Lower extremity edema, L>R  No DVT on LLE ultrasound 8/18     Chronic medical conditions:  #BPH  - Continue pta flomax and finasteride     #Osteoporosis  - Continue pta vit D     #Thyroid nodule  He has already been referred to an endocrinologist for workup of thyroid nodule. Biopsy attempted July 2021 and was unsuccessful, repeat FNA was recommended 10/2021  - Continue with current plan for FNA 10/2021 with Dr. Amin, endocrinology      #Subclinical hypothyroidism  TSH elevated this admission to 7.46 with normal T4. Has history of subclinical hypothyroidism with last TSH 4.70 03/2021.   - Follow up with PCP for repeat TSH and T4     Diet: Fluid restriction 2000 ML FLUID  Room Service  Combination Diet Low Saturated Fat Na <2400mg Diet, No Caffeine Diet  Snacks/Supplements Adult: Ensure Enlive; Between Meals    DVT Prophylaxis: Heparin SQ  High Catheter: Not present  Fluids: None  Central Lines: None  Code Status: No CPR- Do NOT Intubate      Disposition Plan   Expected discharge: 09/02/2021   recommended to transitional care unit once blood pressure within 140-150 systolic and renal function improved (creatinine continuing to trend down).     The patient's care was discussed with the Attending Physician, Dr. Rodriguez.    MAICO RIDDLE  Medical Student  [address]  Securely message with the Vocera Web Console (learn more here)  Text page via [employer] [facility] Paging/Directory  Please see sign in/sign out for up to date coverage information    Clinically Significant Risk Factors Present on Admission                ______________________________________________________________________    Interval History      No acute  "overnight events. Patient sleeping on initial encounter. Did not voice any concerns or complaints. \"Not sure how (he) feels\" this morning since he just woke up. Notified that his creatinine has improved slightly since yesterday. Awaiting placement at a TCU.     4 point ROS otherwise negative.    Data reviewed today: I reviewed all medications, new labs and imaging results over the last 24 hours. I personally reviewed no images or EKG's today.    Physical Exam   Vital Signs: Temp: 98.2  F (36.8  C) Temp src: Oral BP: (!) 163/63 Pulse: 59   Resp: 16 SpO2: 95 % O2 Device: None (Room air)    Weight: 164 lbs 14.4 oz  Constitutional: Sleeping comfortably when entering room, cooperative and no apparent distress  Respiratory: No increased work of breathing, good air exchange, clear to auscultation bilaterally, no crackles or wheezing  Cardiovascular: Normal apical impulse, regular rate and rhythm, normal S1 and S2, no S3 or S4, and no murmur noted  GI: No tenderness of palpation, bowel sounds present, no distention    Data   Recent Labs   Lab 09/01/21  0555 09/01/21  0209 08/31/21  2211 08/31/21  0606 08/30/21  0658 08/29/21  0809   WBC 4.9  --   --  5.2  --  5.4   HGB 8.3*  --   --  7.9*  --  8.2*   MCV 91  --   --  89  --  90     --   --  206  --  246     --   --  136 139 138   POTASSIUM 5.3  --   --  5.0 4.7 4.7   CHLORIDE 108  --   --  106 109 107   CO2 23  --   --  24 23 25   BUN 85*  --   --  80* 82* 87*   CR 4.65*  --   --  4.85* 4.75* 4.84*   ANIONGAP 6  --   --  6 7 6   DUSTIN 8.6  --   --  8.1* 8.6 8.3*   * 261* 276* 174* 64* 155*     Medications       aspirin  81 mg Oral Daily     carvedilol  25 mg Oral BID w/meals     finasteride  5 mg Oral Daily     heparin ANTICOAGULANT  5,000 Units Subcutaneous Q12H     insulin aspart  1-7 Units Subcutaneous TID AC     insulin aspart  1-5 Units Subcutaneous At Bedtime     insulin glargine  10 Units Subcutaneous At Bedtime     lidocaine   Urethral Once     " multivitamin w/minerals  1 tablet Oral Daily     NIFEdipine ER OSMOTIC  30 mg Oral Daily     sodium chloride (PF)  3 mL Intracatheter Q8H     tamsulosin  0.4 mg Oral Daily     Vitamin D3  25 mcg Oral Daily

## 2021-09-01 NOTE — PROGRESS NOTES
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 Presented with creatinine of 2.9 which had stayed relatively stable during his hospitalization until the last few days, when it went up steadily and peaked at 5.7. Concern for HFpEF vs worsening of his CKD, but concern for hypoperfusion, thus BP medications decreased and higher BP target allowed.  Cr slightly improved with increased BP but not consistently trending down. Low suspicion for drug-induced lupus secondary to hydralazine. We completed a manual urine microscopy today which was unremarkable. Anticipate that CKD 5 is his new baseline - no acute indication to start dialysis and kidney function has been stable enough to follow as an outpatient. Will arrange for outpatient nephrology follow up in next 1-2 weeks. He already has an appt with his primary nephrologist scheduled for 9/29/21.   - Ok to discharge to TCU tomorrow from a Nephrology perspective  - Will try to have additional conversations with pt & his son tomorrow about overall kidney trajectory so they can think about whether he would want dialysis in the future should his kidneys fail (briefly discussed with pt today, but was unable to reach pt's son by phone)  - Continue permissive HTN, goal -150 mmHg  - Avoid nephrotoxic agents and renally dose medications  - Strict I/O's, daily weights     Hypertension- ok to restart nifedipine 30, titrate up to 60 then 90mg and if BP still higher than 150-155, add furosemide 40mg daily and titrate if needed  - long term goal -150 range.    Recommendations were communicated to primary  "team via note       Fern Philip MD   [phone]    Interval History :   Nursing and provider notes from last 24 hours reviewed.  Pt feeling better today. Seen reading the paper and had eaten breakfast. Sick of being in the hospital. No SOB, appetite is unchanged, no fevers or chills.     Review of Systems:   4 point review of systems performed and negative except as above.    Physical Exam:   I/O last 3 completed shifts:  In: 1160 [P.O.:1150; I.V.:10]  Out: 825 [Urine:825]   BP (!) 156/59   Pulse 59   Temp 97.6  F (36.4  C) (Temporal)   Resp 20   Ht 1.803 m (5' 11\")   Wt 75.8 kg (167 lb 3.2 oz)   SpO2 95%   BMI 23.32 kg/m       General: sitting in bed, hard of hearing, no acute distress  CV: RRR, no murmur appreciated, well perfused, 1-2+ edema of LE  Resp: no increased work of breathing, CTAB  Abd: soft, non-tender, non-distended  Neuro: alert and oriented, no focal deficits     Labs:   All labs reviewed by me  Electrolytes/Renal -   Recent Labs   Lab Test 09/01/21  1619 09/01/21  1209 09/01/21  0555 08/31/21  0606 08/30/21  0658 08/25/21  1211 08/25/21  0531 08/24/21  0714 08/20/21  0750 08/20/21  0548 08/18/21  0839 08/18/21  0651   NA  --   --  137 136 139   < > 134 136   < > 138   < > 139   POTASSIUM  --   --  5.3 5.0 4.7   < > 4.8 5.0   < > 4.3   < > 5.0   CHLORIDE  --   --  108 106 109   < > 102 103   < > 107   < > 112*   CO2  --   --  23 24 23   < > 24 25   < > 26   < > 23   BUN  --   --  85* 80* 82*   < > 94* 92*   < > 65*   < > 71*   CR  --   --  4.65* 4.85* 4.75*   < > 5.59* 5.10*   < > 3.21*   < > 2.98*   * 148* 195* 174* 64*   < > 159* 61*   < > 92   < > 127*   DUSTIN  --   --  8.6 8.1* 8.6   < > 7.7* 8.1*   < > 8.2*   < > 8.4*   MAG  --   --   --   --   --   --  2.3  --   --  1.9  --  2.1   PHOS  --   --  4.9*  --   --   --  5.2* 5.3*  --  5.0*   < >  --     < > = values in this interval not displayed.       CBC -   Recent Labs   Lab Test 09/01/21  0555 08/31/21  0606 08/29/21  0809 "   WBC 4.9 5.2 5.4   HGB 8.3* 7.9* 8.2*    206 246       LFTs -   Recent Labs   Lab Test 08/17/21  1353 08/16/21  1754 03/16/21  1420 03/11/21  1527   ALKPHOS 82 86  --  88   BILITOTAL 0.3 0.3  --  0.2   ALT 20 19  --  13   AST 10 4  --  5   PROTTOTAL 7.6 7.7  --  7.1   ALBUMIN 2.9* 3.2* 3.0* 3.0*       Iron Panel -   Recent Labs   Lab Test 08/18/21  0651 09/24/20  1413 12/12/17  1701   IRON 27* 64 28*   IRONSAT 9* 21 9*   ROSEMARIE  --  109 207         Imaging:  Reviewed.    Current Medications:    aspirin  81 mg Oral Daily     carvedilol  25 mg Oral BID w/meals     finasteride  5 mg Oral Daily     heparin ANTICOAGULANT  5,000 Units Subcutaneous Q12H     insulin aspart  1-7 Units Subcutaneous TID AC     insulin aspart  1-5 Units Subcutaneous At Bedtime     insulin glargine  10 Units Subcutaneous At Bedtime     lidocaine   Urethral Once     multivitamin w/minerals  1 tablet Oral Daily     NIFEdipine ER OSMOTIC  30 mg Oral Daily     sodium chloride (PF)  3 mL Intracatheter Q8H     tamsulosin  0.4 mg Oral Daily     Vitamin D3  25 mcg Oral Daily       Fern Philip MD

## 2021-09-01 NOTE — PLAN OF CARE
Alessio has a K+ of 5.3  and CR of 4.65.  Dr Acuña aware of lab valves and will monitor for now.  CR is improving.  Nifedipine restarted this am.  HR in 50's.  Up with assist of one and in chair.  Bg 195 and 148 - covered by sliding scale.  WOC here and changed POC for right shin.

## 2021-09-02 ENCOUNTER — LAB REQUISITION (OUTPATIENT)
Dept: LAB | Facility: CLINIC | Age: 86
End: 2021-09-02
Payer: MEDICARE

## 2021-09-02 ENCOUNTER — TRANSITIONAL CARE UNIT VISIT (OUTPATIENT)
Dept: GERIATRICS | Facility: CLINIC | Age: 86
End: 2021-09-02
Payer: MEDICARE

## 2021-09-02 VITALS
SYSTOLIC BLOOD PRESSURE: 166 MMHG | OXYGEN SATURATION: 94 % | DIASTOLIC BLOOD PRESSURE: 68 MMHG | TEMPERATURE: 97.3 F | RESPIRATION RATE: 18 BRPM | HEART RATE: 50 BPM

## 2021-09-02 VITALS
TEMPERATURE: 97.6 F | OXYGEN SATURATION: 91 % | DIASTOLIC BLOOD PRESSURE: 59 MMHG | RESPIRATION RATE: 20 BRPM | SYSTOLIC BLOOD PRESSURE: 159 MMHG | HEIGHT: 71 IN | HEART RATE: 59 BPM | WEIGHT: 167.2 LBS | BODY MASS INDEX: 23.41 KG/M2

## 2021-09-02 DIAGNOSIS — E11.8 TYPE 2 DIABETES MELLITUS WITH COMPLICATION (H): ICD-10-CM

## 2021-09-02 DIAGNOSIS — I10 BENIGN ESSENTIAL HYPERTENSION: ICD-10-CM

## 2021-09-02 DIAGNOSIS — R53.81 PHYSICAL DECONDITIONING: ICD-10-CM

## 2021-09-02 DIAGNOSIS — D64.9 ACUTE ON CHRONIC ANEMIA: ICD-10-CM

## 2021-09-02 DIAGNOSIS — I50.9 HEART FAILURE, UNSPECIFIED (H): ICD-10-CM

## 2021-09-02 DIAGNOSIS — N17.9 ACUTE RENAL FAILURE SUPERIMPOSED ON STAGE 3 CHRONIC KIDNEY DISEASE, UNSPECIFIED ACUTE RENAL FAILURE TYPE, UNSPECIFIED WHETHER STAGE 3A OR 3B CKD (H): Primary | ICD-10-CM

## 2021-09-02 DIAGNOSIS — N17.9 ACUTE KIDNEY FAILURE, UNSPECIFIED (H): ICD-10-CM

## 2021-09-02 DIAGNOSIS — N18.30 ACUTE RENAL FAILURE SUPERIMPOSED ON STAGE 3 CHRONIC KIDNEY DISEASE, UNSPECIFIED ACUTE RENAL FAILURE TYPE, UNSPECIFIED WHETHER STAGE 3A OR 3B CKD (H): Primary | ICD-10-CM

## 2021-09-02 DIAGNOSIS — N40.0 BENIGN PROSTATIC HYPERPLASIA, UNSPECIFIED WHETHER LOWER URINARY TRACT SYMPTOMS PRESENT: ICD-10-CM

## 2021-09-02 DIAGNOSIS — I80.9 SUPERFICIAL THROMBOPHLEBITIS, INVOLVING UNSPECIFIED SITE: ICD-10-CM

## 2021-09-02 DIAGNOSIS — R76.12 NONSPECIFIC REACTION TO CELL MEDIATED IMMUNITY MEASUREMENT OF GAMMA INTERFERON ANTIGEN RESPONSE WITHOUT ACTIVE TUBERCULOSIS: ICD-10-CM

## 2021-09-02 LAB
ANION GAP SERPL CALCULATED.3IONS-SCNC: 7 MMOL/L (ref 3–14)
BUN SERPL-MCNC: 82 MG/DL (ref 7–30)
CALCIUM SERPL-MCNC: 8.7 MG/DL (ref 8.5–10.1)
CHLORIDE BLD-SCNC: 108 MMOL/L (ref 94–109)
CO2 SERPL-SCNC: 22 MMOL/L (ref 20–32)
CREAT SERPL-MCNC: 4.58 MG/DL (ref 0.52–1.25)
ERYTHROCYTE [DISTWIDTH] IN BLOOD BY AUTOMATED COUNT: 16.5 % (ref 10–15)
GFR SERPL CREATININE-BSD FRML MDRD: 11 ML/MIN/1.73M2
GLUCOSE BLD-MCNC: 152 MG/DL (ref 70–99)
GLUCOSE BLDC GLUCOMTR-MCNC: 167 MG/DL (ref 70–99)
HCT VFR BLD AUTO: 26.9 % (ref 35–53)
HGB BLD-MCNC: 8.3 G/DL (ref 11.7–17.7)
MCH RBC QN AUTO: 27.4 PG (ref 26.5–33)
MCHC RBC AUTO-ENTMCNC: 30.9 G/DL (ref 31.5–36.5)
MCV RBC AUTO: 89 FL (ref 78–100)
PHOSPHATE SERPL-MCNC: 4.7 MG/DL (ref 2.5–4.5)
PLATELET # BLD AUTO: 238 10E3/UL (ref 150–450)
POTASSIUM BLD-SCNC: 5.4 MMOL/L (ref 3.4–5.3)
RBC # BLD AUTO: 3.03 10E6/UL (ref 3.8–5.9)
SODIUM SERPL-SCNC: 137 MMOL/L (ref 133–144)
WBC # BLD AUTO: 5.3 10E3/UL (ref 4–11)

## 2021-09-02 PROCEDURE — 36415 COLL VENOUS BLD VENIPUNCTURE: CPT | Performed by: INTERNAL MEDICINE

## 2021-09-02 PROCEDURE — 99239 HOSP IP/OBS DSCHRG MGMT >30: CPT | Performed by: INTERNAL MEDICINE

## 2021-09-02 PROCEDURE — 85027 COMPLETE CBC AUTOMATED: CPT | Performed by: INTERNAL MEDICINE

## 2021-09-02 PROCEDURE — 99310 SBSQ NF CARE HIGH MDM 45: CPT | Performed by: NURSE PRACTITIONER

## 2021-09-02 PROCEDURE — 250N000011 HC RX IP 250 OP 636: Performed by: STUDENT IN AN ORGANIZED HEALTH CARE EDUCATION/TRAINING PROGRAM

## 2021-09-02 PROCEDURE — 80048 BASIC METABOLIC PNL TOTAL CA: CPT | Performed by: INTERNAL MEDICINE

## 2021-09-02 PROCEDURE — 250N000013 HC RX MED GY IP 250 OP 250 PS 637: Performed by: STUDENT IN AN ORGANIZED HEALTH CARE EDUCATION/TRAINING PROGRAM

## 2021-09-02 PROCEDURE — 250N000013 HC RX MED GY IP 250 OP 250 PS 637: Performed by: HOSPITALIST

## 2021-09-02 PROCEDURE — 84100 ASSAY OF PHOSPHORUS: CPT | Performed by: INTERNAL MEDICINE

## 2021-09-02 RX ORDER — FUROSEMIDE 40 MG
60 TABLET ORAL DAILY
DISCHARGE
Start: 2021-09-02

## 2021-09-02 RX ADMIN — HEPARIN SODIUM 5000 UNITS: 5000 INJECTION, SOLUTION INTRAVENOUS; SUBCUTANEOUS at 08:23

## 2021-09-02 RX ADMIN — CARVEDILOL 25 MG: 25 TABLET, FILM COATED ORAL at 08:23

## 2021-09-02 RX ADMIN — Medication 1 TABLET: at 08:23

## 2021-09-02 RX ADMIN — FINASTERIDE 5 MG: 5 TABLET, FILM COATED ORAL at 08:23

## 2021-09-02 RX ADMIN — Medication 25 MCG: at 08:23

## 2021-09-02 RX ADMIN — TAMSULOSIN HYDROCHLORIDE 0.4 MG: 0.4 CAPSULE ORAL at 08:23

## 2021-09-02 RX ADMIN — NIFEDIPINE 30 MG: 30 TABLET, FILM COATED, EXTENDED RELEASE ORAL at 08:23

## 2021-09-02 ASSESSMENT — ACTIVITIES OF DAILY LIVING (ADL)
ADLS_ACUITY_SCORE: 22
ADLS_ACUITY_SCORE: 23
ADLS_ACUITY_SCORE: 22

## 2021-09-02 NOTE — PLAN OF CARE
Occupational Therapy Discharge Summary    Reason for therapy discharge:    Discharged to transitional care facility.    Progress towards therapy goal(s). See goals on Care Plan in [facility] electronic health record for goal details.  Goals not met.  Barriers to achieving goals:   discharge from facility.    Therapy recommendation(s):    Continued therapy is recommended.  Rationale/Recommendations:  Pt would benefit from further therapy to progress functional independence with mobility and ADLs prior to return home.

## 2021-09-02 NOTE — LETTER
"    9/2/2021        RE: Farazd East  [address]  [facility] [address]        [place] [facility] GERIATRIC SERVICES  PRIMARY CARE PROVIDER AND CLINIC:  Renu Lantigua, DOUG, [facility] NP CLINIC [facility] [address] [facility], [address] / [place], [place]  Chief Complaint   Patient presents with     Hospital F/U      [facility] Medical Record Number:  [id number]  Place of Service where encounter took place:  [facility] (TCU) [[id number]]    Farzad East  is a 86 year old  (1935), admitted to the above facility from  [facility]. Hospital stay 8/17/21 through 9/2/21.     HPI:      This is an 86-year-old male, with a past medical history significant for hypertension, anemia, rectal cancer, type 2 diabetes mellitus, lower extremity edema, benign prostatic hypertrophy and thyroid nodule, who was admitted to the [facility] 8/17/21 through 9/2/21 for progressive shortness of breath, increasing lower extremity edema and fatigue. Labs revealed Potassium 5.7, Creatinine 2.89 and TSH 7.46. Nephrology was consulted and suspected acute kidney injury due to hypoperfusion in the setting of tightly controlled blood pressures versus drug induced lupus due secondary to Hydralazine. Hydralazine was discontinued on 8/25/21. Permissive blood pressures goal of systolic 140-150. Initiated on Furosemide and Nifedipine. Hemoglobin 6.8 on 8/19/21 and required 1 U of PRBCs. 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 Found to have \".. Superficial clot noted in the cephalic vein within both forearms\" on 8/26/21 ultrasound. Recommended to elevate arms. Blood sugars elevated. Glipizide discontinued due to RONY and hypoglycemia risk. Sitagliptin held. A TCU stay was recommended for ongoing physical rehabilitation.     Today, patient is ambulating form the bathroom. Is hard of hearing and requires a pocket " talker. When asked about hospitalization, states he was there for awhile. When asked if his weakness has improved, reports it has maybe a little. Still needs to get stronger. Has been urinating some, states at one point during his hospital stay, was not urinating. Lives at an independent living apartment. No stairs. Goal is to get stronger and return home.    CODE STATUS/ADVANCE DIRECTIVES DISCUSSION:  No CPR- Do NOT Intubate   ALLERGIES:   Allergies   Allergen Reactions     Hydralazine Nephrotoxicity     Sulfa Drugs      Unknown reaction. Occurred during childhood. Has not taken Sulfa medications since allergic response.       PAST MEDICAL HISTORY:   Past Medical History:   Diagnosis Date     Atrial flutter (H)      Choroidal nevus of left eye      CKD (chronic kidney disease) stage 3, GFR 30-59 ml/min      Diabetes mellitus (H)      Diabetic peripheral neuropathy (H)      Hypertension      Microalbuminuria      Overweight(278.02)      Rectal-type adenocarcinoma (H)      Retinopathies, diabetic      Vitreous detachment of both eyes       PAST SURGICAL HISTORY:   has a past surgical history that includes left hip replacement; Open reduction internal fixation femur proximal (1/24/2014); and cataract iol, rt/lt (Bilateral, 2005).  FAMILY HISTORY: family history includes Arthritis in his mother; Circulatory in his father; Diabetes in his father; Macular Degeneration in his father.  SOCIAL HISTORY:   reports that he has never smoked. He has never used smokeless tobacco. He reports current alcohol use of about 2.5 - 3.3 standard drinks of alcohol per week. He reports that he does not use drugs.  Patient's living condition: lives alone    Post Discharge Medication Reconciliation Status: discharge medications reconciled and changed, per note/orders  Current Outpatient Medications   Medication Sig     aspirin (ASA) 81 MG EC tablet Take 1 tablet by mouth daily      blood glucose (NO BRAND SPECIFIED) lancets standard Use to  test blood sugar 1 times daily or as directed. Use brand compatible with patients insurance and device.     blood glucose (ONETOUCH VERIO IQ) test strip Use to test blood sugar 1 time daily or as directed.     blood glucose monitoring (ONETOUCH VERIO IQ SYSTEM) meter device kit Use to test blood sugar daily     carvedilol (COREG) 25 MG tablet Take 1 tablet (25 mg) by mouth 2 times daily (with meals)     Cholecalciferol (VITAMIN D-3 PO) Take 1 Dose by mouth daily      COMPRESSION STOCKINGS 1 each daily Measure and fit.  Style and color per patient preference.  Doff n Wilfrid per patient need.     finasteride (PROSCAR) 5 MG tablet Take 1 tablet by mouth daily     furosemide (LASIX) 40 MG tablet Take 1.5 tablets (60 mg) by mouth daily     insulin aspart (NOVOLOG PEN) 100 UNIT/ML pen Inject 1-7 Units Subcutaneous 3 times daily (before meals) Correction Scale - MEDIUM INSULIN RESISTANCE DOSING     Do Not give Correction Insulin if Pre-Meal BG less than 140.   For Pre-Meal  - 189 give 1 unit.   For Pre-Meal  - 239 give 2 units.   For Pre-Meal  - 289 give 3 units.   For Pre-Meal  - 339 give 4 units.   For Pre-Meal - 399 give 5 units.   For Pre-Meal -449 give 6 units  For Pre-Meal BG greater than or equal to 450 give 7 units.   To be given with prandial insulin, and based on pre-meal blood glucose.    Notify provider if glucose greater than or equal to 350 mg/dL after administration of correction dose. If given at mealtime, administer within 30 minutes of start of meal     insulin aspart (NOVOLOG PEN) 100 UNIT/ML pen Inject 1-5 Units Subcutaneous At Bedtime MEDIUM INSULIN RESISTANCE DOSING    Do Not give Bedtime Correction Insulin if BG less than  200.   For  - 249 give 1 units.   For  - 299 give 2 units.   For  - 349 give 3 units.   For  -399 give 4 units.   For BG greater than or equal to 400 give 5 units.  Notify provider if glucose greater than or equal to 350  mg/dL after administration of correction dose. If given at mealtime, administer within 30 minutes of start of meal     insulin glargine (LANTUS SOLOSTAR) 100 UNIT/ML pen 10 units at bedtime.  Increase by 1 unit every 3-5 days until fasting BG is <150 most mornings.     insulin pen needle (31G X 5 MM) 31G X 5 MM miscellaneous Use1 pen needles daily or as directed.     multivitamin w/minerals (MULTI-VITAMIN) tablet Take 1 tablet by mouth daily     NIFEdipine ER OSMOTIC (ADALAT CC) 30 MG 24 hr tablet Take 1 tablet (30 mg) by mouth daily     tamsulosin (FLOMAX) 0.4 MG capsule Take 1 capsule (0.4 mg) by mouth daily Advise clinic if causes lightheadedness.     No current facility-administered medications for this visit.     ROS:  4 point ROS including Respiratory, CV, GI and , other than that noted in the HPI,  is negative    Vitals:  BP (!) 166/68   Pulse 50   Temp 97.3  F (36.3  C)   Resp 18   SpO2 94%   Exam:  GENERAL APPEARANCE:  Alert, in no distress  ENT:  Mouth and posterior oropharynx normal, moist mucous membranes  EYES:  EOM, conjunctivae, lids, pupils and irises normal  RESP:  respiratory effort and palpation of chest normal, lungs clear to auscultation , no respiratory distress  CV:  Palpation and auscultation of heart done , regular rate and rhythm, no murmur, rub, or gallop  ABDOMEN:  normal bowel sounds, soft, nontender, no hepatosplenomegaly or other masses  M/S:   Active movement of bilateral upper and lower extremities.   SKIN:  Inspection of skin and subcutaneous tissue baseline, Palpation of skin and subcutaneous tissue baseline  NEURO:   Cranial nerves 2-12 are normal tested and grossly at patient's baseline  PSYCH:  affect and mood normal    Lab/Diagnostic data:  Labs done in SNF are in [facility]. Please refer to them using [email or web address] Everywhere.    ASSESSMENT/PLAN:  Acute on Chronic Kidney Disease Stage III. Possibly due to hypoperfusion in the setting of tightly controlled blood pressures  versus drug induced lupus secondary to Hydralazine. Follow-up with Nephrology on 9/17/21 as scheduled. Creatinine improved when Hydralazine was discontinued on 8/25/21 and permissive hypertension with a goal of systolic readings between 140-150. 2 g Sodium diet with 2 L fluid restriction. Repeat BMP on 9/3/21 to ensure stability. Monitor urine output and encouraged patient to update staff if low-to-no urine output is noted.    Hypertension. Hydralazine discontinued as noted above. Permissive systolic blood pressures 140-150 recommended as noted above. Started on Nifedipine. PTA Hydrochlorothiazide discontinued. Monitor blood pressure daily. Continue Carvedilol as ordered.    Acute on Chronic Anemia with Recent Dark Stools and History of Rectal Cancer. Received 1 U of PRBCs and Iron Sucrose during hospitalization. Labs consistent with iron deficiency and thought to be a component of Erythropoietin deficiency with history of chronic kidney disease. Has been having dark black stools and diarrhea over the past 3 months. Colonoscopy recommended outpatient. Baseline Hemoglobin ~ 8-9. Last Hemoglobin 8.3 on 9/2/21. Repeat CBC on 9/3/21 to ensure stability.     Type 2 Diabetes Mellitus. Last A1C 9.5 on 7/16/21. PTA Glipizide and Sitagliptin discontinued. Discharged on Glargine and sliding scale insulin. Monitor blood sugars prior to meals and at bedtime. Adjust treatment accordingly. PCP to discuss GLP-1 and SGLT2 at TCU discharge.    Superficial Thrombophlebitis. Superficial clot noted in the cephalic vein within both forearms on 8/26/21 ultrasound. Encourage elevation of arms. Acetaminophen available for pain.     Lower Extremity Edema. Ultrasound negative on 8/18/21. Takes Furosemide.    Thyroid Nodule. Follow-up with Endocrinology on 9/14/21 as scheduled. Repeat FNA recommended for October 2021.    Subclinical Hypothyroidism. TSH 7.46 and Free T4 1.03 on 8/16/21. TSH acute phase reactant so may be cause for elevation.  Follow-up outpatient as indicated.    Benign Prostatic Hypertrophy. Continue Tamsulosin and Finasteride as ordered.    Physical Deconditioning. Secondary to recent hospitalization and co-morbidities. Physical and Occupational Therapy ordered.    Orders:  SHAHAB, TREE on 9/3/21    Total time spent with patient visit at the skilled nursing facility was 35 min including patient visit and review of past records. Greater than 50% of total time spent with counseling and coordinating care due to  Review of hospital records, review of labs, discussion regarding code status, discussion regarding anticipated TCU stay, review of previous living situation, clarification of hospital orders with staff and order entered into facility EHR.     Electronically signed by:  ESTEPHANIE Enciso CNP                   Sincerely,        ESTEPHANIE Enciso CNP

## 2021-09-02 NOTE — PROGRESS NOTES
"[facility] GERIATRIC SERVICES  PRIMARY CARE PROVIDER AND CLINIC:  Renu Lantigua, DOUG, [facility] NP CLINIC  [facility], FLO[address] 5 / [place], [place]  Chief Complaint   Patient presents with     Hospital F/U      [facility] Medical Record Number:  [id number]  Place of Service where encounter took place:  [facility] (TCU) [[id number]]    Farzad East  is a 86 year old  (1935), admitted to the above facility from  [facility]. Hospital stay 8/17/21 through 9/2/21.     HPI:      This is an 86-year-old male, with a past medical history significant for hypertension, anemia, rectal cancer, type 2 diabetes mellitus, lower extremity edema, benign prostatic hypertrophy and thyroid nodule, who was admitted to the [facility] 8/17/21 through 9/2/21 for progressive shortness of breath, increasing lower extremity edema and fatigue. Labs revealed Potassium 5.7, Creatinine 2.89 and TSH 7.46. Nephrology was consulted and suspected acute kidney injury due to hypoperfusion in the setting of tightly controlled blood pressures versus drug induced lupus due secondary to Hydralazine. Hydralazine was discontinued on 8/25/21. Permissive blood pressures goal of systolic 140-150. Initiated on Furosemide and Nifedipine. Hemoglobin 6.8 on 8/19/21 and required 1 U of PRBCs. 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 Found to have \".. Superficial clot noted in the cephalic vein within both forearms\" on 8/26/21 ultrasound. Recommended to elevate arms. Blood sugars elevated. Glipizide discontinued due to RONY and hypoglycemia risk. Sitagliptin held. A TCU stay was recommended for ongoing physical rehabilitation.     Today, patient is ambulating form the bathroom. Is hard of hearing and requires a pocket talker. When asked about hospitalization, states he was there for awhile. When asked if his weakness " has improved, reports it has maybe a little. Still needs to get stronger. Has been urinating some, states at one point during his hospital stay, was not urinating. Lives at an independent living apartment. No stairs. Goal is to get stronger and return home.    CODE STATUS/ADVANCE DIRECTIVES DISCUSSION:  No CPR- Do NOT Intubate   ALLERGIES:   Allergies   Allergen Reactions     Hydralazine Nephrotoxicity     Sulfa Drugs      Unknown reaction. Occurred during childhood. Has not taken Sulfa medications since allergic response.       PAST MEDICAL HISTORY:   Past Medical History:   Diagnosis Date     Atrial flutter (H)      Choroidal nevus of left eye      CKD (chronic kidney disease) stage 3, GFR 30-59 ml/min      Diabetes mellitus (H)      Diabetic peripheral neuropathy (H)      Hypertension      Microalbuminuria      Overweight(278.02)      Rectal-type adenocarcinoma (H)      Retinopathies, diabetic      Vitreous detachment of both eyes       PAST SURGICAL HISTORY:   has a past surgical history that includes left hip replacement; Open reduction internal fixation femur proximal (1/24/2014); and cataract iol, rt/lt (Bilateral, 2005).  FAMILY HISTORY: family history includes Arthritis in his mother; Circulatory in his father; Diabetes in his father; Macular Degeneration in his father.  SOCIAL HISTORY:   reports that he has never smoked. He has never used smokeless tobacco. He reports current alcohol use of about 2.5 - 3.3 standard drinks of alcohol per week. He reports that he does not use drugs.  Patient's living condition: lives alone    Post Discharge Medication Reconciliation Status: discharge medications reconciled and changed, per note/orders  Current Outpatient Medications   Medication Sig     aspirin (ASA) 81 MG EC tablet Take 1 tablet by mouth daily      blood glucose (NO BRAND SPECIFIED) lancets standard Use to test blood sugar 1 times daily or as directed. Use brand compatible with patients insurance and  device.     blood glucose (ONETOUCH VERIO IQ) test strip Use to test blood sugar 1 time daily or as directed.     blood glucose monitoring (ONETOUCH VERIO IQ SYSTEM) meter device kit Use to test blood sugar daily     carvedilol (COREG) 25 MG tablet Take 1 tablet (25 mg) by mouth 2 times daily (with meals)     Cholecalciferol (VITAMIN D-3 PO) Take 1 Dose by mouth daily      COMPRESSION STOCKINGS 1 each daily Measure and fit.  Style and color per patient preference.  Doff n Wilfrid per patient need.     finasteride (PROSCAR) 5 MG tablet Take 1 tablet by mouth daily     furosemide (LASIX) 40 MG tablet Take 1.5 tablets (60 mg) by mouth daily     insulin aspart (NOVOLOG PEN) 100 UNIT/ML pen Inject 1-7 Units Subcutaneous 3 times daily (before meals) Correction Scale - MEDIUM INSULIN RESISTANCE DOSING     Do Not give Correction Insulin if Pre-Meal BG less than 140.   For Pre-Meal  - 189 give 1 unit.   For Pre-Meal  - 239 give 2 units.   For Pre-Meal  - 289 give 3 units.   For Pre-Meal  - 339 give 4 units.   For Pre-Meal - 399 give 5 units.   For Pre-Meal -449 give 6 units  For Pre-Meal BG greater than or equal to 450 give 7 units.   To be given with prandial insulin, and based on pre-meal blood glucose.    Notify provider if glucose greater than or equal to 350 mg/dL after administration of correction dose. If given at mealtime, administer within 30 minutes of start of meal     insulin aspart (NOVOLOG PEN) 100 UNIT/ML pen Inject 1-5 Units Subcutaneous At Bedtime MEDIUM INSULIN RESISTANCE DOSING    Do Not give Bedtime Correction Insulin if BG less than  200.   For  - 249 give 1 units.   For  - 299 give 2 units.   For  - 349 give 3 units.   For  -399 give 4 units.   For BG greater than or equal to 400 give 5 units.  Notify provider if glucose greater than or equal to 350 mg/dL after administration of correction dose. If given at mealtime, administer within 30 minutes  of start of meal     insulin glargine (LANTUS SOLOSTAR) 100 UNIT/ML pen 10 units at bedtime.  Increase by 1 unit every 3-5 days until fasting BG is <150 most mornings.     insulin pen needle (31G X 5 MM) 31G X 5 MM miscellaneous Use1 pen needles daily or as directed.     multivitamin w/minerals (MULTI-VITAMIN) tablet Take 1 tablet by mouth daily     NIFEdipine ER OSMOTIC (ADALAT CC) 30 MG 24 hr tablet Take 1 tablet (30 mg) by mouth daily     tamsulosin (FLOMAX) 0.4 MG capsule Take 1 capsule (0.4 mg) by mouth daily Advise clinic if causes lightheadedness.     No current facility-administered medications for this visit.     ROS:  4 point ROS including Respiratory, CV, GI and , other than that noted in the HPI,  is negative    Vitals:  BP (!) 166/68   Pulse 50   Temp 97.3  F (36.3  C)   Resp 18   SpO2 94%   Exam:  GENERAL APPEARANCE:  Alert, in no distress  ENT:  Mouth and posterior oropharynx normal, moist mucous membranes  EYES:  EOM, conjunctivae, lids, pupils and irises normal  RESP:  respiratory effort and palpation of chest normal, lungs clear to auscultation , no respiratory distress  CV:  Palpation and auscultation of heart done , regular rate and rhythm, no murmur, rub, or gallop  ABDOMEN:  normal bowel sounds, soft, nontender, no hepatosplenomegaly or other masses  M/S:   Active movement of bilateral upper and lower extremities.   SKIN:  Inspection of skin and subcutaneous tissue baseline, Palpation of skin and subcutaneous tissue baseline  NEURO:   Cranial nerves 2-12 are normal tested and grossly at patient's baseline  PSYCH:  affect and mood normal    Lab/Diagnostic data:  Labs done in SNF are in [facility]. Please refer to them using [email or web address] Everywhere.    ASSESSMENT/PLAN:  Acute on Chronic Kidney Disease Stage III. Possibly due to hypoperfusion in the setting of tightly controlled blood pressures versus drug induced lupus secondary to Hydralazine. Follow-up with Nephrology on 9/17/21 as  scheduled. Creatinine improved when Hydralazine was discontinued on 8/25/21 and permissive hypertension with a goal of systolic readings between 140-150. 2 g Sodium diet with 2 L fluid restriction. Repeat BMP on 9/3/21 to ensure stability. Monitor urine output and encouraged patient to update staff if low-to-no urine output is noted.    Hypertension. Hydralazine discontinued as noted above. Permissive systolic blood pressures 140-150 recommended as noted above. Started on Nifedipine. PTA Hydrochlorothiazide discontinued. Monitor blood pressure daily. Continue Carvedilol as ordered.    Acute on Chronic Anemia with Recent Dark Stools and History of Rectal Cancer. Received 1 U of PRBCs and Iron Sucrose during hospitalization. Labs consistent with iron deficiency and thought to be a component of Erythropoietin deficiency with history of chronic kidney disease. Has been having dark black stools and diarrhea over the past 3 months. Colonoscopy recommended outpatient. Baseline Hemoglobin ~ 8-9. Last Hemoglobin 8.3 on 9/2/21. Repeat CBC on 9/3/21 to ensure stability.     Type 2 Diabetes Mellitus. Last A1C 9.5 on 7/16/21. PTA Glipizide and Sitagliptin discontinued. Discharged on Glargine and sliding scale insulin. Monitor blood sugars prior to meals and at bedtime. Adjust treatment accordingly. PCP to discuss GLP-1 and SGLT2 at TCU discharge.    Superficial Thrombophlebitis. Superficial clot noted in the cephalic vein within both forearms on 8/26/21 ultrasound. Encourage elevation of arms. Acetaminophen available for pain.     Lower Extremity Edema. Ultrasound negative on 8/18/21. Takes Furosemide.    Thyroid Nodule. Follow-up with Endocrinology on 9/14/21 as scheduled. Repeat FNA recommended for October 2021.    Subclinical Hypothyroidism. TSH 7.46 and Free T4 1.03 on 8/16/21. TSH acute phase reactant so may be cause for elevation. Follow-up outpatient as indicated.    Benign Prostatic Hypertrophy. Continue Tamsulosin and  Finasteride as ordered.    Physical Deconditioning. Secondary to recent hospitalization and co-morbidities. Physical and Occupational Therapy ordered.    Orders:  CBC, BMP on 9/3/21    Total time spent with patient visit at the skilled nursing facility was 35 min including patient visit and review of past records. Greater than 50% of total time spent with counseling and coordinating care due to  Review of hospital records, review of labs, discussion regarding code status, discussion regarding anticipated TCU stay, review of previous living situation, clarification of hospital orders with staff and order entered into facility EHR.     Electronically signed by:  ESTEPHANIE Enciso CNP

## 2021-09-02 NOTE — PROVIDER NOTIFICATION
FYI.  Pt voided 150 @ 1700. Just Bladder Scan pt the amount is 340. Any recommendation?    FYI. Just Bladder Scan pt for the second time @ 2154 the amount is 367. Any recommendation?      MD Aware. Attempted to call back. Order placed for intermittent straight catheterization     Zoila Calhoun MD   Internal Medicine PGY-1

## 2021-09-02 NOTE — PLAN OF CARE
[id number]: Tmax 99.5. Hypertensive to 166/60. Parameters to notify not met. OVSS on RA. Denies pain, nausea, and SOB. 0200 BG of 167. Orders to straight cath put in d/t PVR of 367 at end of previous shift. Pt was able to void 150 mL and PVR of 80 so straight cath was not required. Voided 200 mL this morning with PVR of 120. No BM. Bed alarm on for safety. Plan to dischage in morning. [facility] wheelchair transport arranged @ 0930.

## 2021-09-02 NOTE — PROGRESS NOTES
Care Management Discharge Note    Discharge Date: 09/02/2021     Discharge Disposition: Transitional Care    [facility]  [address] [place]  [address]  P: [phone]  F: [phone]    Discharge Services: None    Discharge DME: None    Discharge Transportation: [facility] ([phone]) wheelchair transport at 0930. SonAlvaro, aware of potential cost.     Private pay costs discussed: transportation costs    PAS Confirmation Code: [id number]    Patient/family educated on Medicare website which has current facility and service quality ratings: yes    Education Provided on the Discharge Plan:  Yes    Persons Notified of Discharge Plans: Patient; patient's sonAlvaro; Violetta Samayoa MD; charge nurse; bedside nurse; accepting facility    Patient/Family in Agreement with the Plan: yes    Handoff Referral Completed: No - External    NETTA Lewis Hematology/Oncology Social Worker  Phone: [phone]  Pager: [phone]  [email or web address]

## 2021-09-02 NOTE — PLAN OF CARE
[id number]:     Pt status remain unchanged. Pt is hypertensive 153/54. Asymptomatic.  and 210. Pt voided the total of 300cc, BS amount was 367. Patient is on 2g Na diet and 2L fluid restriction. Pt is [place]. Using urinal in bed. Plan is to discharged at the TCU tomorrow. Continue with POC.

## 2021-09-02 NOTE — DISCHARGE SUMMARY
[facility]  Discharge Summary - Medicine & Pediatrics       Date of Admission:  8/17/2021  Date of Discharge:  9/2/2021  9:30 AM  Discharging Provider: Drew Rodriguez MD  Discharge Service: Maroon 2    Discharge Diagnoses     Please see Hosptial course below     Follow-ups Needed After Discharge   Follow-up Appointments     Follow Up and recommended labs and tests      Follow up with TCU doctor on arrival to TCU. Continued monitoring of BMP   every 2-3 days for potassium.     Follow up with kidney doctor in 1-2 weeks (Will be scheduled and called).               Unresulted Labs Ordered in the Past 30 Days of this Admission     Date and Time Order Name Status Description    8/19/2021  9:04 AM Erythropoietin In process       These results will be followed up by Ordering physician     Discharge Disposition   Discharged to nursing home  Condition at discharge: Stable      Hospital Course   Farzad East was admitted on 8/17/2021 for RONY on CKD.  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 RONY diagnosed in the setting of his CKD with an initial creatinine 2.9, baseline has been approx 2 before admission and Cr peaked around 5.7. Nephrology consulted and followed patient. Suspicion is RONY due to hypoperfusion in the setting of tightly controlled BP vs drug induced lupus 2/2 hydralazine. Hydralazine was discontinued on 8/25 and adjustments were made to his blood pressure regiment to allow for permissive hypertension with a goal of systolic readings between 140-150. Since then, creatinine improved/stabilized with daily monitoring. No acute need for HD and likely may need in the near future. Plan was to continue to monitor Cr in clinic and engage patient and  family regarding HD. He was discharged on 60 PO Lasix as recommended by Nephrology and follow up.     - Goal -150 per nephrology recommendations   - Continue carvedilol 25 mg BID, restart nifedipine 30 mg daily after being held yesterday  - 2 g Na diet, 2 L fluid restriction  - Monitor BMP at TCU     #Hypertension   - Goal -150 per nephrology recommendations   - Continue carvedilol 25 mg BID, restart nifedipine 30 mg daily   - PTA hydrochlorothiazide 12.5 mg daily held on admission as this likely is not very effective with decreased kidney function, will hold at discharge  - PTA hydralazine discontinued, added to allergy list (d/t concern for drug induced lupus)     #Superficial thrombophlebitis, improving  - Elevate arms as able  - Per nephrology, discontinue diclofenac gel due to renal function (although he has not been receiving this)  - Acetaminophen prn for pain      #Acute on chronic normocytic anemia, stable  #History of rectal cancer    Hgb 6.8 on 8/19 requiring 1 unit pRBCs. No signs of acute blood loss. Absolute retic count low indicating marrow hypoproliferation. No evidence of hemolysis based on labs, blood smear. Hgb >7 since transfusion on 8/19, 8.3 today. Labs consistent with iron deficiency. Likely a component of epo deficiency as well given history of CKD. Of note, pt does mention dark black stools and diarrhea over past 3 months. Discussed importance of outpatient colonoscopy follow up at discharge.  - CTM hgb  - S/p iron sucrose 300 mg q72 hr for a total of 3 doses    - Epo level pending   - Follow up screening colonoscopy as outpatient (last in 2015, recommended follow up in 5 years)     #Type 2 DM  Last A1c 9.5 on 7/16/2021. Currently managed with sitagliptin, glipizide, glargine at bedtime (10 units PTA).   - Continue insulin glargine 10 units at bedtime   - Medium dose sliding scale mealtime insulin & and at bedtime   - At discharge, plan to discontinue glipizide (due to RONY,  hypoglycemia risk) and hold sitagliptin. Ideally, pt should be on oral medications with low risk of hypoglycemia and minimal impact on kidney function such as GLP-1 and SGLT2 (follow up with PCP after discharge to discuss this further)   - Fingersticks, hypoglycemia protocol     Resolved hospital issues:  #Hyperkalemia, resolved with diuresis      #Lower extremity edema, L>R  No DVT on LLE ultrasound 8/18     Chronic medical conditions:  #BPH  - Continue pta flomax and finasteride     #Osteoporosis  - Continue pta vit D     #Thyroid nodule  He has already been referred to an endocrinologist for workup of thyroid nodule. Biopsy attempted July 2021 and was unsuccessful, repeat FNA was recommended 10/2021  - Continue with current plan for FNA 10/2021 with Dr. Amin, endocrinology      #Subclinical hypothyroidism  TSH elevated this admission to 7.46 with normal T4. Has history of subclinical hypothyroidism with last TSH 4.70 03/2021.   - Follow up with PCP for repeat TSH and T4    Consultations This Hospital Stay   WOUND OSTOMY CONTINENCE NURSE  IP CONSULT  PHYSICAL THERAPY ADULT IP CONSULT  OCCUPATIONAL THERAPY ADULT IP CONSULT  WOUND OSTOMY CONTINENCE NURSE  IP CONSULT  NEPHROLOGY GENERAL ADULT IP CONSULT  VASCULAR ACCESS CARE ADULT IP CONSULT  CARE MANAGEMENT / SOCIAL WORK IP CONSULT  VASCULAR ACCESS CARE ADULT IP CONSULT  VASCULAR ACCESS CARE ADULT IP CONSULT  VASCULAR ACCESS CARE ADULT IP CONSULT  PHYSICAL THERAPY ADULT IP CONSULT  OCCUPATIONAL THERAPY ADULT IP CONSULT  VASCULAR ACCESS CARE ADULT IP CONSULT  NEPHROLOGY GENERAL ADULT IP CONSULT    Code Status   No CPR- Do NOT Intubate         Drew Rodriguez MD  [address] [place] [id number]  Phone: [phone]  ______________________________________________________________________    Physical Exam   Vital Signs: Temp: 97.6  F (36.4  C) Temp src: Temporal BP: (!) 159/59 Pulse: 59   Resp: 20 SpO2: 91  % O2 Device: None (Room air)    Weight: 167 lbs 3.2 oz    Constitutional: Awake, alert, cooperative, no apparent distress  Respiratory: Clear to auscultation bilaterally, no crackles or wheezing  Cardiovascular: Regular rate and rhythm, normal S1 and S2, and no murmur noted, no edema  GI: Normal bowel sounds, soft, non-distended, non-tender  Skin/Integumen: No rashes, no cyanosis        Primary Care Physician   Renu Lantigua    Discharge Orders      Nephrology Adult Referral      General info for SNF    Length of Stay Estimate: Short Term Care: Estimated # of Days <30  Condition at Discharge: Stable  Level of care:skilled   Rehabilitation Potential: Fair  Admission H&P remains valid and up-to-date: Yes  Recent Chemotherapy: N/A  Use Nursing Home Standing Orders: Yes     Reason for your hospital stay    You were seen for RONY on CKD (worsened kidney function). The kidney doctors adjusted your blood pressure medications.     Intake and output    Every shift     Glucose monitor nursing POCT    Before meals and at bedtime     Daily weights    Call Provider for weight gain of more than 2 pounds per day or 5 pounds per week.     Additional Discharge Instructions    Follow up with TCU doctor on arrival to TCU. Continued monitoring of CBC/BMP daily. Follow up with kidney doctor in 1-2 weeks (they will schedule).     Activity - Up ad ashley     Follow Up and recommended labs and tests    Follow up with TCU doctor on arrival to TCU. Continued monitoring of BMP every 2-3 days for potassium.     Follow up with kidney doctor in 1-2 weeks (Will be scheduled and called).     No CPR- Do NOT Intubate     Fall precautions     Advance Diet as Tolerated    Follow this diet upon discharge: 2g Na restriction, 2L fluid restriction       Significant Results and Procedures   Most Recent 3 CBC's:Recent Labs   Lab Test 09/02/21  0533 09/01/21  0555 08/31/21  0606   WBC 5.3 4.9 5.2   HGB 8.3* 8.3* 7.9*   MCV 89 91 89    240 206     Most  Recent 3 BMP's:Recent Labs   Lab Test 09/02/21  0533 09/02/21  0237 09/01/21  2140 09/01/21  0555 08/31/21  0606     --   --  137 136   POTASSIUM 5.4*  --   --  5.3 5.0   CHLORIDE 108  --   --  108 106   CO2 22  --   --  23 24   BUN 82*  --   --  85* 80*   CR 4.58*  --   --  4.65* 4.85*   ANIONGAP 7  --   --  6 6   DUSTIN 8.7  --   --  8.6 8.1*   * 167* 210* 195* 174*   ,   Results for orders placed or performed during the hospital encounter of 08/17/21   US Lower Extremity Venous Duplex Left    Narrative    EXAMINATION: US LOWER EXTREMITY VENOUS DUPLEX LEFT, 8/18/2021 2:35 PM     COMPARISON: 1/3/2020    HISTORY: LLE asymmetric swelling, r/o DVT    TECHNIQUE:  Gray-scale evaluation with compression, spectral flow, and  color Doppler assessment of the deep venous system of the left leg  from groin to knee, and then at the ankle.    FINDINGS:  In the left lower extremity, the common femoral, femoral, popliteal  and posterior tibial veins demonstrate normal compressibility and  blood flow.        Impression    IMPRESSION:  No evidence of left lower extremity deep venous thrombosis.    SOLANGE DUARTE DO         SYSTEM ID:  [id number]   US Renal Complete w Duplex Complete    Narrative    RENAL COMPLETE WITH DUPLEX ULTRASOUND 8/20/2021 8:28 AM    CLINICAL HISTORY: RONY on CKD; c/f cardiorenal.      COMPARISONS: None available.    ORDERING PROVIDER: POLLY SAAVEDRA    TECHNIQUE: B-mode (grayscale) and duplex Doppler evaluation of the  abdominal aorta and renal arteries performed. Velocity measurements  obtained with angle correction at or less than 60 degrees.    Limited visualization due to patient body habitus and overlying gas.  The aorta and bilateral renal artery origins and mid segments were  visualized. Limited visualization of the kidneys.     Findings:    AORTA: Not visualized.    RIGHT KIDNEY:       Renal artery:            Origin: Not visualized            Mid: Not visualized            Hilum: 34/7  cm/s         Renal artery - aortic ratio: Cannot be calculated.         Renal vein: 18 cm/s         Length: 10.3 cm         Cortical thickness: 1.1 cm         Arcuate artery resistive index (superior / mid / inferior): 0.79  / 0.81 / 0.77         Parenchyma: Normal. No stone, mass, or hydronephrosis.    LEFT KIDNEY:       Renal artery:            Origin: Not visualized            Mid: Not visualized            Hilum: 28/5 cm/s         Renal artery - aortic ratio: Cannot be calculated         Renal vein: 11 cm/s         Length: 11.2 cm         Cortical thickness: 1.5 cm         Arcuate artery resistive index (superior / mid / inferior): 0.75  / 0.73 / 0.71         Parenchyma: Normal. No stone, mass, or hydronephrosis.    Bladder: Distended.      Impression    IMPRESSION:   1. Limited visualization. Renal artery stenosis cannot be evaluated.    2. Elevated arcuate artery resistive indices suggest medical renal  disease.    Guidelines:  Diagnostic criteria suggestive of > 60% diameter stenosis  Renal artery:       Peak systolic velocity > 180 cm/s       RAR > 3.5       Turbulent flow    Arcuate artery Resistive Index Normal < 0.7    BLESSING ABREU MD         SYSTEM ID:  [id number]   US Upper Extremity Venous Duplex Bilateral Port    Narrative    EXAMINATION: DOPPLER VENOUS ULTRASOUND OF BILATERAL UPPER EXTREMTIES,  8/26/2021 11:16 AM     COMPARISON: None.    HISTORY: Forearm swelling    TECHNIQUE:  Gray-scale evaluation with compression, spectral flow, and  color Doppler assessment of the deep venous system of both upper  extremities.    FINDINGS:  Normal blood flow and waveforms are demonstrated in the internal  jugular, innominate, subclavian, and axillary veins bilaterally. There  is normal compressibility of the brachial, basilic and cephalic veins  bilaterally.    There is superficial clot in cephalic vein within both forearms      Impression    IMPRESSION:  1.  No evidence of deep venous thrombosis in either upper  extremity.   2.  Superficial clot noted in the cephalic vein within both forearms.    DAGO JOHNSTON MD         SYSTEM ID:  [id number]   Echo Limited     Value    LVEF  55-60%    Narrative    [id number]  [id number]  [id number]  [id number]^DENEEN^LUISITO     [facility]  Echocardiography Laboratory  [address] [place] [address]     Name: JEN CHAVES  MRN: [id number]  : 1935  Study Date: 2021 10:44 AM  Age: 86 yrs  Gender: Male  Patient Location: [id number]  Reason For Study: CHF  Ordering Physician: LUISITO BROTHERS  Performed By: JASMEET Ireland     BSA: 2.0 m2  Height: 71 in  Weight: 175 lb  HR: 68  BP: 162/55 mmHg  ______________________________________________________________________________  Procedure  Limited Portable Echo Adult.  ______________________________________________________________________________  Interpretation Summary  Global and regional left ventricular function is normal with an EF of 55-60%.  Right ventricular function, chamber size, wall motion, and thickness are  normal.  Right ventricular systolic pressure is 51mmHg above the right atrial pressure.  IVC diameter >2.1 cm collapsing <50% with sniff suggests a high RA pressure  estimated at 15 mmHg or greater.  No pericardial effusion is present.  ______________________________________________________________________________  Left Ventricle  Global and regional left ventricular function is normal with an EF of 55-60%.     Right Ventricle  Right ventricular function, chamber size, wall motion, and thickness are  normal.     Tricuspid Valve  Mild tricuspid insufficiency is present. Right ventricular systolic pressure  is 51mmHg above the right atrial pressure.     Vessels  IVC diameter >2.1 cm collapsing <50% with sniff suggests a high RA pressure  estimated at 15 mmHg or greater.     Pericardium  No pericardial effusion is  present.  ______________________________________________________________________________  MMode/2D Measurements & Calculations  IVSd: 0.87 cm  LVIDd: 4.9 cm  LVIDs: 3.4 cm  LVPWd: 1.0 cm  FS: 29.6 %  LV mass(C)d: 165.8 grams  LV mass(C)dI: 83.2 grams/m2  RWT: 0.43     Doppler Measurements & Calculations  TR max gilda: 355.9 cm/sec  TR max P.7 mmHg     ______________________________________________________________________________  Report approved by: Alex Reynolds 2021 11:32 AM               Discharge Medications   Discharge Medication List as of 2021  8:59 AM      START taking these medications    Details   furosemide (LASIX) 40 MG tablet Take 1.5 tablets (60 mg) by mouth daily, Transitional      !! insulin aspart (NOVOLOG PEN) 100 UNIT/ML pen Inject 1-7 Units Subcutaneous 3 times daily (before meals) Correction Scale - MEDIUM INSULIN RESISTANCE DOSING     Do Not give Correction Insulin if Pre-Meal BG less than 140.   For Pre-Meal  - 189 give 1 unit.   For Pre-Meal  - 239 give 2 un its.   For Pre-Meal  - 289 give 3 units.   For Pre-Meal  - 339 give 4 units.   For Pre-Meal - 399 give 5 units.   For Pre-Meal -449 give 6 units  For Pre-Meal BG greater than or equal to 450 give 7 units.   To be given with prandi al insulin, and based on pre-meal blood glucose.    Notify provider if glucose greater than or equal to 350 mg/dL after administration of correction dose. If given at mealtime, administer within 30 minutes of start of meal, Disp-20 mL, R-0, Transitional      !! insulin aspart (NOVOLOG PEN) 100 UNIT/ML pen Inject 1-5 Units Subcutaneous At Bedtime MEDIUM INSULIN RESISTANCE DOSING    Do Not give Bedtime Correction Insulin if BG less than  200.   For  - 249 give 1 units.   For  - 299 give 2 units.   For  - 349 give 3 units.   For  -399  give 4 units.   For BG greater than or equal to 400 give 5 units.  Notify provider if glucose greater than  or equal to 350 mg/dL after administration of correction dose. If given at mealtime, administer within 30 minutes of start of meal, Disp-5 mL, R-0 , Transitional      NIFEdipine ER OSMOTIC (ADALAT CC) 30 MG 24 hr tablet Take 1 tablet (30 mg) by mouth daily, Disp-30 tablet, R-0, Transitional       !! - Potential duplicate medications found. Please discuss with provider.      CONTINUE these medications which have NOT CHANGED    Details   aspirin (ASA) 81 MG EC tablet Take 1 tablet by mouth daily , Historical      carvedilol (COREG) 25 MG tablet Take 1 tablet (25 mg) by mouth 2 times daily (with meals), Disp-180 tablet, R-3, E-PrescribeDose increase from 12.5 bid to 25 bid      Cholecalciferol (VITAMIN D-3 PO) Take 1 Dose by mouth daily , Historical      finasteride (PROSCAR) 5 MG tablet Take 1 tablet by mouth daily, R-3, Historical      insulin glargine (LANTUS SOLOSTAR) 100 UNIT/ML pen 10 units at bedtime.  Increase by 1 unit every 3-5 days until fasting BG is <150 most mornings., Disp-15 mL, R-1, E-PrescribeIf Lantus is not covered by insurance, may substitute Basaglar at same dose and frequency.        multivitamin w/minerals (MULTI-VITAMIN) tablet Take 1 tablet by mouth daily, Historical      tamsulosin (FLOMAX) 0.4 MG capsule Take 1 capsule (0.4 mg) by mouth daily Advise clinic if causes lightheadedness., Disp-90 capsule, R-3, E-Prescribe      blood glucose (NO BRAND SPECIFIED) lancets standard Use to test blood sugar 1 times daily or as directed. Use brand compatible with patients insurance and device.Disp-100 each, [id number]      blood glucose (ONETOUCH VERIO IQ) test strip Use to test blood sugar 1 time daily or as directed., Disp-100 each, R-3, E-Prescribe      blood glucose monitoring (ONETOUCH VERIO IQ SYSTEM) meter device kit Use to test blood sugar dailyDisp-1 kit, [id number]      COMPRESSION STOCKINGS 1 each daily Measure and fit.  Style and color per patient preference.  Rebeca Yuen per  patient need., Disp-6 each, R-1, Local PrintLower Extremity: Knee High;  bilateral;  20-30 mm Hg      insulin pen needle (31G X 5 MM) 31G X 5 MM miscellaneous Use1 pen needles daily or as directed.Disp-90 each, R-3, DAWE-PrescribePlease dispense as Insulin Pen Needle 31G X 5MM MISC         STOP taking these medications       glipiZIDE (GLUCOTROL XL) 5 MG 24 hr tablet Comments:   Reason for Stopping:         hydrALAZINE (APRESOLINE) 50 MG tablet Comments:   Reason for Stopping:         hydrochlorothiazide (HYDRODIURIL) 12.5 MG tablet Comments:   Reason for Stopping:         sitagliptin (JANUVIA) 50 MG tablet Comments:   Reason for Stopping:             Allergies   Allergies   Allergen Reactions     Hydralazine Nephrotoxicity     Sulfa Drugs      Unknown reaction. Occurred during childhood. Has not taken Sulfa medications since allergic response.

## 2021-09-02 NOTE — PLAN OF CARE
PT: cancel- to TCU at 9:30.    Physical Therapy Discharge Summary    Reason for therapy discharge:    Discharged to transitional care facility.    Progress towards therapy goal(s). See goals on Care Plan in [facility] electronic health record for goal details.  Goals partially met.  Barriers to achieving goals:   discharge from facility.    Therapy recommendation(s):    Continued therapy is recommended.  Rationale/Recommendations:  Continue to progress functional mobility in TCU setting.

## 2021-09-03 LAB
ANION GAP SERPL CALCULATED.3IONS-SCNC: 8 MMOL/L (ref 5–18)
BUN SERPL-MCNC: 80 MG/DL (ref 8–28)
CALCIUM SERPL-MCNC: 8.8 MG/DL (ref 8.5–10.5)
CHLORIDE BLD-SCNC: 107 MMOL/L (ref 98–107)
CO2 SERPL-SCNC: 23 MMOL/L (ref 22–31)
CREAT SERPL-MCNC: 4.86 MG/DL (ref 0.7–1.3)
ERYTHROCYTE [DISTWIDTH] IN BLOOD BY AUTOMATED COUNT: 16.2 % (ref 10–15)
GFR SERPL CREATININE-BSD FRML MDRD: 10 ML/MIN/1.73M2
GLUCOSE BLD-MCNC: 60 MG/DL (ref 70–125)
HCT VFR BLD AUTO: 26.9 % (ref 40–53)
HGB BLD-MCNC: 8.2 G/DL (ref 13.3–17.7)
MCH RBC QN AUTO: 27.5 PG (ref 26.5–33)
MCHC RBC AUTO-ENTMCNC: 30.5 G/DL (ref 31.5–36.5)
MCV RBC AUTO: 90 FL (ref 78–100)
PLATELET # BLD AUTO: 245 10E3/UL (ref 150–450)
POTASSIUM BLD-SCNC: 5.2 MMOL/L (ref 3.5–5)
RBC # BLD AUTO: 2.98 10E6/UL (ref 4.4–5.9)
SODIUM SERPL-SCNC: 138 MMOL/L (ref 136–145)
WBC # BLD AUTO: 4.9 10E3/UL (ref 4–11)

## 2021-09-03 PROCEDURE — 86481 TB AG RESPONSE T-CELL SUSP: CPT | Performed by: INTERNAL MEDICINE

## 2021-09-03 PROCEDURE — 85027 COMPLETE CBC AUTOMATED: CPT | Performed by: INTERNAL MEDICINE

## 2021-09-03 PROCEDURE — P9604 ONE-WAY ALLOW PRORATED TRIP: HCPCS | Performed by: INTERNAL MEDICINE

## 2021-09-03 PROCEDURE — 80048 BASIC METABOLIC PNL TOTAL CA: CPT | Performed by: INTERNAL MEDICINE

## 2021-09-03 PROCEDURE — 36415 COLL VENOUS BLD VENIPUNCTURE: CPT | Performed by: INTERNAL MEDICINE

## 2021-09-06 ENCOUNTER — LAB REQUISITION (OUTPATIENT)
Dept: LAB | Facility: CLINIC | Age: 86
End: 2021-09-06
Payer: MEDICARE

## 2021-09-06 DIAGNOSIS — N17.9 ACUTE KIDNEY FAILURE, UNSPECIFIED (H): ICD-10-CM

## 2021-09-06 LAB
GAMMA INTERFERON BACKGROUND BLD IA-ACNC: 0.07 IU/ML
M TB IFN-G BLD-IMP: NEGATIVE
M TB IFN-G CD4+ BCKGRND COR BLD-ACNC: 4.37 IU/ML
MITOGEN IGNF BCKGRD COR BLD-ACNC: -0.01 IU/ML
MITOGEN IGNF BCKGRD COR BLD-ACNC: 0.02 IU/ML
QUANTIFERON MITOGEN: 4.44 IU/ML
QUANTIFERON NIL TUBE: 0.07 IU/ML
QUANTIFERON TB1 TUBE: 0.09 IU/ML
QUANTIFERON TB2 TUBE: 0.06

## 2021-09-07 ENCOUNTER — TRANSFERRED RECORDS (OUTPATIENT)
Dept: HEALTH INFORMATION MANAGEMENT | Facility: CLINIC | Age: 86
End: 2021-09-07

## 2021-09-07 ENCOUNTER — TRANSITIONAL CARE UNIT VISIT (OUTPATIENT)
Dept: GERIATRICS | Facility: CLINIC | Age: 86
End: 2021-09-07
Payer: MEDICARE

## 2021-09-07 VITALS
RESPIRATION RATE: 18 BRPM | BODY MASS INDEX: 22.59 KG/M2 | HEART RATE: 50 BPM | OXYGEN SATURATION: 92 % | DIASTOLIC BLOOD PRESSURE: 71 MMHG | WEIGHT: 162 LBS | TEMPERATURE: 97.6 F | SYSTOLIC BLOOD PRESSURE: 131 MMHG

## 2021-09-07 DIAGNOSIS — N18.30 ACUTE RENAL FAILURE SUPERIMPOSED ON STAGE 3 CHRONIC KIDNEY DISEASE, UNSPECIFIED ACUTE RENAL FAILURE TYPE, UNSPECIFIED WHETHER STAGE 3A OR 3B CKD (H): Primary | ICD-10-CM

## 2021-09-07 DIAGNOSIS — I10 BENIGN ESSENTIAL HYPERTENSION: ICD-10-CM

## 2021-09-07 DIAGNOSIS — N17.9 ACUTE RENAL FAILURE SUPERIMPOSED ON STAGE 3 CHRONIC KIDNEY DISEASE, UNSPECIFIED ACUTE RENAL FAILURE TYPE, UNSPECIFIED WHETHER STAGE 3A OR 3B CKD (H): Primary | ICD-10-CM

## 2021-09-07 DIAGNOSIS — E11.8 TYPE 2 DIABETES MELLITUS WITH COMPLICATION (H): ICD-10-CM

## 2021-09-07 LAB
ANION GAP SERPL CALCULATED.3IONS-SCNC: 11 MMOL/L (ref 5–18)
BUN SERPL-MCNC: 79 MG/DL (ref 8–28)
CALCIUM SERPL-MCNC: 9.3 MG/DL (ref 8.5–10.5)
CHLORIDE BLD-SCNC: 107 MMOL/L (ref 98–107)
CO2 SERPL-SCNC: 22 MMOL/L (ref 22–31)
CREAT SERPL-MCNC: 4.81 MG/DL (ref 0.7–1.3)
ERYTHROCYTE [DISTWIDTH] IN BLOOD BY AUTOMATED COUNT: 17 % (ref 10–15)
GFR SERPL CREATININE-BSD FRML MDRD: 10 ML/MIN/1.73M2
GLUCOSE BLD-MCNC: 156 MG/DL (ref 70–125)
HCT VFR BLD AUTO: 30.3 % (ref 40–53)
HGB BLD-MCNC: 9.2 G/DL (ref 13.3–17.7)
MCH RBC QN AUTO: 27.4 PG (ref 26.5–33)
MCHC RBC AUTO-ENTMCNC: 30.4 G/DL (ref 31.5–36.5)
MCV RBC AUTO: 90 FL (ref 78–100)
PLATELET # BLD AUTO: 262 10E3/UL (ref 150–450)
POTASSIUM BLD-SCNC: 4.9 MMOL/L (ref 3.5–5)
RBC # BLD AUTO: 3.36 10E6/UL (ref 4.4–5.9)
SODIUM SERPL-SCNC: 140 MMOL/L (ref 136–145)
WBC # BLD AUTO: 6 10E3/UL (ref 4–11)

## 2021-09-07 PROCEDURE — P9603 ONE-WAY ALLOW PRORATED MILES: HCPCS | Performed by: NURSE PRACTITIONER

## 2021-09-07 PROCEDURE — 80048 BASIC METABOLIC PNL TOTAL CA: CPT | Performed by: NURSE PRACTITIONER

## 2021-09-07 PROCEDURE — 85048 AUTOMATED LEUKOCYTE COUNT: CPT | Performed by: NURSE PRACTITIONER

## 2021-09-07 PROCEDURE — 36415 COLL VENOUS BLD VENIPUNCTURE: CPT | Performed by: NURSE PRACTITIONER

## 2021-09-07 PROCEDURE — 99309 SBSQ NF CARE MODERATE MDM 30: CPT | Performed by: NURSE PRACTITIONER

## 2021-09-07 NOTE — PROGRESS NOTES
"[facility] GERIATRIC SERVICES    Chief Complaint   Patient presents with     RECHECK     HPI:  Farzad East is a 86 year old  (1935), who is being seen today for an episodic care visit at: [facility] ([facility]) [[id number]].     Background:    This is an 86-year-old male, with a past medical history significant for hypertension, anemia, rectal cancer, type 2 diabetes mellitus, lower extremity edema, benign prostatic hypertrophy and thyroid nodule, who was admitted to the [facility] 8/17/21 through 9/2/21 for progressive shortness of breath, increasing lower extremity edema and fatigue. Labs revealed Potassium 5.7, Creatinine 2.89 and TSH 7.46. Nephrology was consulted and suspected acute kidney injury due to hypoperfusion in the setting of tightly controlled blood pressures versus drug induced lupus due secondary to Hydralazine. Hydralazine was discontinued on 8/25/21. Permissive blood pressures goal of systolic 140-150. Initiated on Furosemide and Nifedipine. Hemoglobin 6.8 on 8/19/21 and required 1 U of PRBCs. 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 Found to have \".. Superficial clot noted in the cephalic vein within both forearms\" on 8/26/21 ultrasound. Recommended to elevate arms. Blood sugars elevated. Glipizide discontinued due to RONY and hypoglycemia risk. Sitagliptin held. A TCU stay was recommended for ongoing physical rehabilitation.      Today's concern is:     Today, patient is sitting in his wheelchair in his room. Has no concerns. During today's visit, lab comes in the room to draw labs.    Acute on Chronic Kidney Disease Stage III. Today, Creatinine 4.81. Previous Creatinine 4.86 on 9/3/21.    Hypertension. Upon review of blood pressures over the past 5 days, systolic range from 124-187. Diastolic range from 58-74.    Type 2 Diabetes Mellitus. Upon review of blood sugars over the " past 5 days, range is as follows:    Breakfast:   Lunch: 179-253  Dinner: 168-363  Bedtime: 208-318    Allergies, and PMH/PSH reviewed in EPIC today.  REVIEW OF SYSTEMS:  4 point ROS including Respiratory, CV, GI and , other than that noted in the HPI,  is negative    Objective:   /71   Pulse 50   Temp 97.6  F (36.4  C)   Resp 18   Wt 73.5 kg (162 lb)   SpO2 92%   BMI 22.59 kg/m    GENERAL APPEARANCE:  Alert, in no distress  ENT:  Mouth and posterior oropharynx normal, moist mucous membranes  EYES:  EOM, conjunctivae, lids, pupils and irises normal  RESP:  Respiratory effort and palpation of chest normal, lungs clear to auscultation , no respiratory distress  CV:  Palpation and auscultation of heart done , regular rate and rhythm, no murmur, rub, or gallop  ABDOMEN:  normal bowel sounds, soft, nontender, no hepatosplenomegaly or other masses  M/S:   Active movement of bilateral upper and lower extremities. Trace edema in BLE.  SKIN:  Inspection of skin and subcutaneous tissue baseline, Palpation of skin and subcutaneous tissue baseline  NEURO:   Cranial nerves 2-12 are normal tested and grossly at patient's baseline  PSYCH:  affect and mood normal    Labs done [facility] SNF [facility]. Please refer to them using [employer]/Care Everywhere.    Assessment/Plan:  Acute on Chronic Kidney Disease Stage III. Possibly due to hypoperfusion in the setting of tightly controlled blood pressures versus drug induced lupus secondary to Hydralazine. Follow-up with Nephrology on 9/17/21 as scheduled. Creatinine improved when Hydralazine was discontinued on 8/25/21 and permissive hypertension with a goal of systolic readings between 140-150. 2 g Sodium diet with 2 L fluid restriction. Monitor urine output.     Hypertension. Hydralazine discontinued as noted above. Permissive systolic blood pressures 140-150 recommended as noted above. Started on Nifedipine. PTA Hydrochlorothiazide discontinued. Blood pressures  labile. Continue Carvedilol as ordered for now. May need to make adjustments at next visit.     Acute on Chronic Anemia with Recent Dark Stools and History of Rectal Cancer. Received 1 U of PRBCs and Iron Sucrose during hospitalization. Labs consistent with iron deficiency and thought to be a component of Erythropoietin deficiency with history of chronic kidney disease. Had been having dark black stools and diarrhea over the past 3 months. Colonoscopy recommended outpatient. Baseline Hemoglobin ~ 8-9. Today, Hemoglobin 9.2. Monitor periodically.     Type 2 Diabetes Mellitus. Last A1C 9.5 on 7/16/21. PTA Glipizide and Sitagliptin discontinued. Discharged on Glargine and sliding scale insulin. Blood sugars trend up later in the day. Monitor closely. PCP to consider initiation of GLP-1 and SGLT2.     Superficial Thrombophlebitis. Superficial clot noted in the cephalic vein within both forearms on 8/26/21 ultrasound. Encourage elevation of arms. Acetaminophen available for pain.      Lower Extremity Edema. Ultrasound negative on 8/18/21. Takes Furosemide.     Thyroid Nodule. Follow-up with Endocrinology on 9/14/21 as scheduled. Repeat FNA recommended for October 2021.     Subclinical Hypothyroidism. TSH 7.46 and Free T4 1.03 on 8/16/21. TSH acute phase reactant so may be cause for elevation. Follow-up outpatient as indicated.     Benign Prostatic Hypertrophy. Continue Tamsulosin and Finasteride as ordered.     Physical Deconditioning. Secondary to recent hospitalization and co-morbidities. Physical and Occupational Therapy ordered.    Orders:  None    Electronically signed by: ESTEPHANIE Enciso CNP

## 2021-09-07 NOTE — LETTER
"    9/7/2021        RE: Farzad East  [address]  [facility] [address]        [facility] GERIATRIC SERVICES    Chief Complaint   Patient presents with     RECHECK     HPI:  Farzad East is a 86 year old  (1935), who is being seen today for an episodic care visit at: [facility] ([facility]) [[id number]].     Background:    This is an 86-year-old male, with a past medical history significant for hypertension, anemia, rectal cancer, type 2 diabetes mellitus, lower extremity edema, benign prostatic hypertrophy and thyroid nodule, who was admitted to the [facility] 8/17/21 through 9/2/21 for progressive shortness of breath, increasing lower extremity edema and fatigue. Labs revealed Potassium 5.7, Creatinine 2.89 and TSH 7.46. Nephrology was consulted and suspected acute kidney injury due to hypoperfusion in the setting of tightly controlled blood pressures versus drug induced lupus due secondary to Hydralazine. Hydralazine was discontinued on 8/25/21. Permissive blood pressures goal of systolic 140-150. Initiated on Furosemide and Nifedipine. Hemoglobin 6.8 on 8/19/21 and required 1 U of PRBCs. 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 Found to have \".. Superficial clot noted in the cephalic vein within both forearms\" on 8/26/21 ultrasound. Recommended to elevate arms. Blood sugars elevated. Glipizide discontinued due to RONY and hypoglycemia risk. Sitagliptin held. A U stay was recommended for ongoing physical rehabilitation.      Today's concern is:     Today, patient is sitting in his wheelchair in his room. Has no concerns. During today's visit, lab comes in the room to draw labs.    Acute on Chronic Kidney Disease Stage III. Today, Creatinine 4.81. Previous Creatinine 4.86 on 9/3/21.    Hypertension. Upon review of blood pressures over the past 5 days, systolic range from " 124-187. Diastolic range from 58-74.    Type 2 Diabetes Mellitus. Upon review of blood sugars over the past 5 days, range is as follows:    Breakfast:   Lunch: 179-253  Dinner: 168-363  Bedtime: 208-318    Allergies, and PMH/PSH reviewed in EPIC today.  REVIEW OF SYSTEMS:  4 point ROS including Respiratory, CV, GI and , other than that noted in the HPI,  is negative    Objective:   /71   Pulse 50   Temp 97.6  F (36.4  C)   Resp 18   Wt 73.5 kg (162 lb)   SpO2 92%   BMI 22.59 kg/m    GENERAL APPEARANCE:  Alert, in no distress  ENT:  Mouth and posterior oropharynx normal, moist mucous membranes  EYES:  EOM, conjunctivae, lids, pupils and irises normal  RESP:  Respiratory effort and palpation of chest normal, lungs clear to auscultation , no respiratory distress  CV:  Palpation and auscultation of heart done , regular rate and rhythm, no murmur, rub, or gallop  ABDOMEN:  normal bowel sounds, soft, nontender, no hepatosplenomegaly or other masses  M/S:   Active movement of bilateral upper and lower extremities. Trace edema in BLE.  SKIN:  Inspection of skin and subcutaneous tissue baseline, Palpation of skin and subcutaneous tissue baseline  NEURO:   Cranial nerves 2-12 are normal tested and grossly at patient's baseline  PSYCH:  affect and mood normal    Labs done in SNF are in [facility]. Please refer to them using [employer]Care Everywhere.    Assessment/Plan:  Acute on Chronic Kidney Disease Stage III. Possibly due to hypoperfusion in the setting of tightly controlled blood pressures versus drug induced lupus secondary to Hydralazine. Follow-up with Nephrology on 9/17/21 as scheduled. Creatinine improved when Hydralazine was discontinued on 8/25/21 and permissive hypertension with a goal of systolic readings between 140-150. 2 g Sodium diet with 2 L fluid restriction. Monitor urine output.     Hypertension. Hydralazine discontinued as noted above. Permissive systolic blood pressures 140-150  recommended as noted above. Started on Nifedipine. PTA Hydrochlorothiazide discontinued. Blood pressures labile. Continue Carvedilol as ordered for now. May need to make adjustments at next visit.     Acute on Chronic Anemia with Recent Dark Stools and History of Rectal Cancer. Received 1 U of PRBCs and Iron Sucrose during hospitalization. Labs consistent with iron deficiency and thought to be a component of Erythropoietin deficiency with history of chronic kidney disease. Had been having dark black stools and diarrhea over the past 3 months. Colonoscopy recommended outpatient. Baseline Hemoglobin ~ 8-9. Today, Hemoglobin 9.2. Monitor periodically.     Type 2 Diabetes Mellitus. Last A1C 9.5 on 7/16/21. PTA Glipizide and Sitagliptin discontinued. Discharged on Glargine and sliding scale insulin. Blood sugars trend up later in the day. Monitor closely. PCP to consider initiation of GLP-1 and SGLT2.     Superficial Thrombophlebitis. Superficial clot noted in the cephalic vein within both forearms on 8/26/21 ultrasound. Encourage elevation of arms. Acetaminophen available for pain.      Lower Extremity Edema. Ultrasound negative on 8/18/21. Takes Furosemide.     Thyroid Nodule. Follow-up with Endocrinology on 9/14/21 as scheduled. Repeat FNA recommended for October 2021.     Subclinical Hypothyroidism. TSH 7.46 and Free T4 1.03 on 8/16/21. TSH acute phase reactant so may be cause for elevation. Follow-up outpatient as indicated.     Benign Prostatic Hypertrophy. Continue Tamsulosin and Finasteride as ordered.     Physical Deconditioning. Secondary to recent hospitalization and co-morbidities. Physical and Occupational Therapy ordered.    Orders:  None    Electronically signed by: ESTEPHANIE Enciso CNP             Sincerely,        ESTEPHANIE Enciso CNP

## 2021-09-08 ENCOUNTER — TELEPHONE (OUTPATIENT)
Dept: NEPHROLOGY | Facility: CLINIC | Age: 86
End: 2021-09-08

## 2021-09-08 NOTE — TELEPHONE ENCOUNTER
Health Call Center    Phone Message    May a detailed message be left on voicemail: yes     Reason for Call: Order(s): Other:   Reason for requested: Lab Orders  Date needed: ASAP  Provider name: Earline Crystal    Please call Hortensia at [phone] to discuss patient appointment on 9/17/21. Patient is currently in Rehab at [facility]. Please call Hortensia if patient really need in person appointment or should reschedule or do video appointment.    Please fax lab orders to [phone].      Action Taken: Message routed to:  [facility] ([facility]): CONG Melendez    Travel Screening: Not Applicable

## 2021-09-09 ENCOUNTER — LAB REQUISITION (OUTPATIENT)
Dept: LAB | Facility: CLINIC | Age: 86
End: 2021-09-09
Payer: MEDICARE

## 2021-09-09 ENCOUNTER — TRANSITIONAL CARE UNIT VISIT (OUTPATIENT)
Dept: GERIATRICS | Facility: CLINIC | Age: 86
End: 2021-09-09
Payer: MEDICARE

## 2021-09-09 VITALS
WEIGHT: 162 LBS | OXYGEN SATURATION: 94 % | SYSTOLIC BLOOD PRESSURE: 145 MMHG | HEART RATE: 60 BPM | BODY MASS INDEX: 22.59 KG/M2 | DIASTOLIC BLOOD PRESSURE: 70 MMHG | TEMPERATURE: 98.5 F | RESPIRATION RATE: 16 BRPM

## 2021-09-09 DIAGNOSIS — I10 BENIGN ESSENTIAL HYPERTENSION: ICD-10-CM

## 2021-09-09 DIAGNOSIS — I10 ESSENTIAL (PRIMARY) HYPERTENSION: ICD-10-CM

## 2021-09-09 DIAGNOSIS — I50.9 HEART FAILURE, UNSPECIFIED (H): ICD-10-CM

## 2021-09-09 DIAGNOSIS — N17.9 ACUTE RENAL FAILURE SUPERIMPOSED ON STAGE 3 CHRONIC KIDNEY DISEASE, UNSPECIFIED ACUTE RENAL FAILURE TYPE, UNSPECIFIED WHETHER STAGE 3A OR 3B CKD (H): Primary | ICD-10-CM

## 2021-09-09 DIAGNOSIS — N18.30 ACUTE RENAL FAILURE SUPERIMPOSED ON STAGE 3 CHRONIC KIDNEY DISEASE, UNSPECIFIED ACUTE RENAL FAILURE TYPE, UNSPECIFIED WHETHER STAGE 3A OR 3B CKD (H): Primary | ICD-10-CM

## 2021-09-09 DIAGNOSIS — R53.81 PHYSICAL DECONDITIONING: ICD-10-CM

## 2021-09-09 PROCEDURE — 99309 SBSQ NF CARE MODERATE MDM 30: CPT | Performed by: NURSE PRACTITIONER

## 2021-09-09 NOTE — LETTER
"    9/9/2021        RE: Farzad East  [address]  [facility] [id number]        [facility] GERIATRIC SERVICES    Chief Complaint   Patient presents with     RECHECK     HPI:  Farzad East is a 86 year old  (1935), who is being seen today for an episodic care visit at: [facility] ([facility]) [[id number]].     Background:    This is an 86-year-old male, with a past medical history significant for hypertension, anemia, rectal cancer, type 2 diabetes mellitus, lower extremity edema, benign prostatic hypertrophy and thyroid nodule, who was admitted to the [facility] 8/17/21 through 9/2/21 for progressive shortness of breath, increasing lower extremity edema and fatigue. Labs revealed Potassium 5.7, Creatinine 2.89 and TSH 7.46. Nephrology was consulted and suspected acute kidney injury due to hypoperfusion in the setting of tightly controlled blood pressures versus drug induced lupus due secondary to Hydralazine. Hydralazine was discontinued on 8/25/21. Permissive blood pressures goal of systolic 140-150. Initiated on Furosemide and Nifedipine. Hemoglobin 6.8 on 8/19/21 and required 1 U of PRBCs. 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 Found to have \".. Superficial clot noted in the cephalic vein within both forearms\" on 8/26/21 ultrasound. Recommended to elevate arms. Blood sugars elevated. Glipizide discontinued due to RONY and hypoglycemia risk. Sitagliptin held. A TCU stay was recommended for ongoing physical rehabilitation.     Today's concern is:     Patient is laying in bed with his blankets up to his chin. States he feels weak. Worked with therapy today. Denies cough or shortness of breath.    Physical Deconditioning. Working with Physical and Occupational Therapy. Requires assistance x 1. CGA needed with transfers. Ambulating 20 feet with FWW and CGA. Set-up needed with " upper body dressing. Minimal assistance needed with lower body dressing. Able to stand 12 minutes. SLUMS Score 24/30.    Hypertension. Upon review of blood pressures since previous visit 9/7/21, systolic range from 114-190. Diastolic range from 45-71.    Allergies, and PMH/PSH reviewed in EPIC today.  REVIEW OF SYSTEMS:  4 point ROS including Respiratory, CV, GI and , other than that noted in the HPI,  is negative    Objective:   BP (!) 145/70   Pulse 60   Temp 98.5  F (36.9  C)   Resp 16   Wt 73.5 kg (162 lb)   SpO2 94%   BMI 22.59 kg/m    GENERAL APPEARANCE:  Alert, in no distress  ENT:  Mouth and posterior oropharynx normal, moist mucous membranes  EYES:  EOM, conjunctivae, lids, pupils and irises normal  RESP:  Respiratory effort and palpation of chest normal, lungs clear to auscultation , no respiratory distress  CV:  Palpation and auscultation of heart done , regular rate and rhythm, no murmur, rub, or gallop  ABDOMEN:  normal bowel sounds, soft, nontender, no hepatosplenomegaly or other masses  M/S:   Active movement of bilateral upper and lower extremities. Trace edema in BLE.  SKIN:  Inspection of skin and subcutaneous tissue baseline, Palpation of skin and subcutaneous tissue baseline  NEURO:   Cranial nerves 2-12 are normal tested and grossly at patient's baseline  PSYCH:  affect and mood normal    [facility]F [facility]. Please refer to them using [employer]Care Everywhere.    Assessment/Plan:  Acute on Chronic Kidney Disease Stage III. Possibly due to hypoperfusion in the setting of tightly controlled blood pressures versus drug induced lupus secondary to Hydralazine. Follow-up with Nephrology on 9/17/21 as scheduled. Creatinine improved when Hydralazine was discontinued on 8/25/21 and permissive hypertension with a goal of systolic readings between 140-150. 2 g Sodium diet with 2 L fluid restriction. Monitor urine output. Will repeat BMP on 9/10/21.     Hypertension. Hydralazine  discontinued as noted above. Permissive systolic blood pressures 140-150 recommended as noted above. Started on Nifedipine. Given more consistently elevated blood pressure readings with systolic readings > 140, will increase to Nifedipine to 60 mg. PTA Hydrochlorothiazide discontinued. Continue Carvedilol as ordered.    Acute on Chronic Anemia with Recent Dark Stools and History of Rectal Cancer. Received 1 U of PRBCs and Iron Sucrose during hospitalization. Labs consistent with iron deficiency and thought to be a component of Erythropoietin deficiency with history of chronic kidney disease. Had been having dark black stools and diarrhea over the past 3 months. Colonoscopy recommended outpatient. Baseline Hemoglobin ~ 8-9. Last Hemoglobin 9.2 on 9/7/21. Will repeat CBC with differential on 9/10/21 due to reports of weakness although staff have no concerns.     Type 2 Diabetes Mellitus. Last A1C 9.5 on 7/16/21. PTA Glipizide and Sitagliptin discontinued. Discharged on Glargine and sliding scale insulin. Blood sugars trend up later in the day. Monitor closely. PCP to consider initiation of GLP-1 and SGLT2.     Superficial Thrombophlebitis. Superficial clot noted in the cephalic vein within both forearms on 8/26/21 ultrasound. Encourage elevation of arms. Acetaminophen available for pain.      Lower Extremity Edema. Ultrasound negative on 8/18/21. Takes Furosemide.     Thyroid Nodule. Follow-up with Endocrinology on 9/14/21 as scheduled. Repeat FNA recommended for October 2021.     Subclinical Hypothyroidism. TSH 7.46 and Free T4 1.03 on 8/16/21. TSH acute phase reactant so may be cause for elevation. Follow-up outpatient as indicated.     Benign Prostatic Hypertrophy. Continue Tamsulosin and Finasteride as ordered.     Physical Deconditioning. Secondary to recent hospitalization and co-morbidities. Physical and Occupational Therapy ordered.    Orders:  Increase Nifidipine to 60 mg PO every day   BMP on  9/10/21    Electronically signed by: ESTEPHANIE Enciso CNP            Sincerely,        ESTEPHANIE Enciso CNP

## 2021-09-09 NOTE — PROGRESS NOTES
"[facility] GERIATRIC SERVICES    Chief Complaint   Patient presents with     RECHECK     HPI:  Farzad East is a 86 year old  (1935), who is being seen today for an episodic care visit at: [facility] ([facility]) [[id number]].     Background:    This is an 86-year-old male, with a past medical history significant for hypertension, anemia, rectal cancer, type 2 diabetes mellitus, lower extremity edema, benign prostatic hypertrophy and thyroid nodule, who was admitted to the [facility] 8/17/21 through 9/2/21 for progressive shortness of breath, increasing lower extremity edema and fatigue. Labs revealed Potassium 5.7, Creatinine 2.89 and TSH 7.46. Nephrology was consulted and suspected acute kidney injury due to hypoperfusion in the setting of tightly controlled blood pressures versus drug induced lupus due secondary to Hydralazine. Hydralazine was discontinued on 8/25/21. Permissive blood pressures goal of systolic 140-150. Initiated on Furosemide and Nifedipine. Hemoglobin 6.8 on 8/19/21 and required 1 U of PRBCs. 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 Found to have \".. Superficial clot noted in the cephalic vein within both forearms\" on 8/26/21 ultrasound. Recommended to elevate arms. Blood sugars elevated. Glipizide discontinued due to RONY and hypoglycemia risk. Sitagliptin held. A TCU stay was recommended for ongoing physical rehabilitation.     Today's concern is:     Patient is laying in bed with his blankets up to his chin. States he feels weak. Worked with therapy today. Denies cough or shortness of breath.    Physical Deconditioning. Working with Physical and Occupational Therapy. Requires assistance x 1. CGA needed with transfers. Ambulating 20 feet with FWW and CGA. Set-up needed with upper body dressing. Minimal assistance needed with lower body dressing. Able to stand 12 minutes. SLUMS " Score 24/30.    Hypertension. Upon review of blood pressures since previous visit 9/7/21, systolic range from 114-190. Diastolic range from 45-71.    Allergies, and PMH/PSH reviewed in EPIC today.  REVIEW OF SYSTEMS:  4 point ROS including Respiratory, CV, GI and , other than that noted in the HPI,  is negative    Objective:   BP (!) 145/70   Pulse 60   Temp 98.5  F (36.9  C)   Resp 16   Wt 73.5 kg (162 lb)   SpO2 94%   BMI 22.59 kg/m    GENERAL APPEARANCE:  Alert, in no distress  ENT:  Mouth and posterior oropharynx normal, moist mucous membranes  EYES:  EOM, conjunctivae, lids, pupils and irises normal  RESP:  Respiratory effort and palpation of chest normal, lungs clear to auscultation , no respiratory distress  CV:  Palpation and auscultation of heart done , regular rate and rhythm, no murmur, rub, or gallop  ABDOMEN:  normal bowel sounds, soft, nontender, no hepatosplenomegaly or other masses  M/S:   Active movement of bilateral upper and lower extremities. Trace edema in BLE.  SKIN:  Inspection of skin and subcutaneous tissue baseline, Palpation of skin and subcutaneous tissue baseline  NEURO:   Cranial nerves 2-12 are normal tested and grossly at patient's baseline  PSYCH:  affect and mood normal    Labs done in SNF are in [facility]. Please refer to them using [employer] Everywhere.    Assessment/Plan:  Acute on Chronic Kidney Disease Stage III. Possibly due to hypoperfusion in the setting of tightly controlled blood pressures versus drug induced lupus secondary to Hydralazine. Follow-up with Nephrology on 9/17/21 as scheduled. Creatinine improved when Hydralazine was discontinued on 8/25/21 and permissive hypertension with a goal of systolic readings between 140-150. 2 g Sodium diet with 2 L fluid restriction. Monitor urine output. Will repeat BMP on 9/10/21.     Hypertension. Hydralazine discontinued as noted above. Permissive systolic blood pressures 140-150 recommended as noted above. Started  on Nifedipine. Given more consistently elevated blood pressure readings with systolic readings > 140, will increase to Nifedipine to 60 mg. PTA Hydrochlorothiazide discontinued. Continue Carvedilol as ordered.    Acute on Chronic Anemia with Recent Dark Stools and History of Rectal Cancer. Received 1 U of PRBCs and Iron Sucrose during hospitalization. Labs consistent with iron deficiency and thought to be a component of Erythropoietin deficiency with history of chronic kidney disease. Had been having dark black stools and diarrhea over the past 3 months. Colonoscopy recommended outpatient. Baseline Hemoglobin ~ 8-9. Last Hemoglobin 9.2 on 9/7/21. Will repeat CBC with differential on 9/10/21 due to reports of weakness although staff have no concerns.     Type 2 Diabetes Mellitus. Last A1C 9.5 on 7/16/21. PTA Glipizide and Sitagliptin discontinued. Discharged on Glargine and sliding scale insulin. Blood sugars trend up later in the day. Monitor closely. PCP to consider initiation of GLP-1 and SGLT2.     Superficial Thrombophlebitis. Superficial clot noted in the cephalic vein within both forearms on 8/26/21 ultrasound. Encourage elevation of arms. Acetaminophen available for pain.      Lower Extremity Edema. Ultrasound negative on 8/18/21. Takes Furosemide.     Thyroid Nodule. Follow-up with Endocrinology on 9/14/21 as scheduled. Repeat FNA recommended for October 2021.     Subclinical Hypothyroidism. TSH 7.46 and Free T4 1.03 on 8/16/21. TSH acute phase reactant so may be cause for elevation. Follow-up outpatient as indicated.     Benign Prostatic Hypertrophy. Continue Tamsulosin and Finasteride as ordered.     Physical Deconditioning. Secondary to recent hospitalization and co-morbidities. Physical and Occupational Therapy ordered.    Orders:  Increase Nifidipine to 60 mg PO every day   BMP on 9/10/21    Electronically signed by: ESTEPHANIE Enciso CNP

## 2021-09-10 ENCOUNTER — TELEPHONE (OUTPATIENT)
Dept: NEPHROLOGY | Facility: CLINIC | Age: 86
End: 2021-09-10

## 2021-09-10 ENCOUNTER — TELEPHONE (OUTPATIENT)
Dept: GERIATRICS | Facility: CLINIC | Age: 86
End: 2021-09-10

## 2021-09-10 ENCOUNTER — TRANSFERRED RECORDS (OUTPATIENT)
Dept: HEALTH INFORMATION MANAGEMENT | Facility: CLINIC | Age: 86
End: 2021-09-10

## 2021-09-10 LAB
ANION GAP SERPL CALCULATED.3IONS-SCNC: 12 MMOL/L (ref 5–18)
BUN SERPL-MCNC: 89 MG/DL (ref 8–28)
CALCIUM SERPL-MCNC: 8.7 MG/DL (ref 8.5–10.5)
CHLORIDE BLD-SCNC: 104 MMOL/L (ref 98–107)
CO2 SERPL-SCNC: 21 MMOL/L (ref 22–31)
CREAT SERPL-MCNC: 5.21 MG/DL (ref 0.7–1.3)
ERYTHROCYTE [DISTWIDTH] IN BLOOD BY AUTOMATED COUNT: 17.1 % (ref 10–15)
GFR SERPL CREATININE-BSD FRML MDRD: 9 ML/MIN/1.73M2
GLUCOSE BLD-MCNC: 156 MG/DL (ref 70–125)
HCT VFR BLD AUTO: 29.6 % (ref 40–53)
HGB BLD-MCNC: 9.4 G/DL (ref 13.3–17.7)
MCH RBC QN AUTO: 28.1 PG (ref 26.5–33)
MCHC RBC AUTO-ENTMCNC: 31.8 G/DL (ref 31.5–36.5)
MCV RBC AUTO: 88 FL (ref 78–100)
PLATELET # BLD AUTO: 233 10E3/UL (ref 150–450)
POTASSIUM BLD-SCNC: 5 MMOL/L (ref 3.5–5)
RBC # BLD AUTO: 3.35 10E6/UL (ref 4.4–5.9)
SODIUM SERPL-SCNC: 137 MMOL/L (ref 136–145)
WBC # BLD AUTO: 5.4 10E3/UL (ref 4–11)

## 2021-09-10 PROCEDURE — P9603 ONE-WAY ALLOW PRORATED MILES: HCPCS | Performed by: NURSE PRACTITIONER

## 2021-09-10 PROCEDURE — 36415 COLL VENOUS BLD VENIPUNCTURE: CPT | Performed by: NURSE PRACTITIONER

## 2021-09-10 PROCEDURE — 85027 COMPLETE CBC AUTOMATED: CPT | Performed by: NURSE PRACTITIONER

## 2021-09-10 PROCEDURE — 80048 BASIC METABOLIC PNL TOTAL CA: CPT | Performed by: NURSE PRACTITIONER

## 2021-09-11 ENCOUNTER — TELEPHONE (OUTPATIENT)
Dept: GERIATRICS | Facility: CLINIC | Age: 86
End: 2021-09-11

## 2021-09-11 RX ORDER — NIFEDIPINE 60 MG/1
60 TABLET, EXTENDED RELEASE ORAL EVERY EVENING
COMMUNITY

## 2021-09-11 NOTE — TELEPHONE ENCOUNTER
[facility] TELEPHONE ENCOUNTER    Chief Complaint   Patient presents with     Lab Result Notice     Farzad East is a 86 year old  (1935). Reviewed labs ordered for today. Creatinine 5.21 on 9/10/21, BUN 89, Potassium 5.0 and Hemoglobin 9.4. Contacted son, Andi, and reviewed lab results. Reviewed increase in Nifedipine due to elevated blood pressures. Reached out to staff at facility and requested Nephrology be updated regarding lab results. Further plans pending their recommendations.    Electronically signed by:   ESTEPHANIE Enciso CNP

## 2021-09-12 ENCOUNTER — HEALTH MAINTENANCE LETTER (OUTPATIENT)
Age: 86
End: 2021-09-12

## 2021-09-12 NOTE — TELEPHONE ENCOUNTER
Staff called due to low pulse prior to receiving Coreg.    Pulse first 42 and then second check later 47.  B/P is in the 120's systolic  Staff asking for parameters    Orders:  Hold Coreg if P <55    Electronically signed by Gabriella Marquez RN, CNP

## 2021-09-13 ENCOUNTER — TRANSITIONAL CARE UNIT VISIT (OUTPATIENT)
Dept: GERIATRICS | Facility: CLINIC | Age: 86
End: 2021-09-13
Payer: MEDICARE

## 2021-09-13 VITALS
HEART RATE: 65 BPM | DIASTOLIC BLOOD PRESSURE: 64 MMHG | WEIGHT: 163.4 LBS | SYSTOLIC BLOOD PRESSURE: 122 MMHG | OXYGEN SATURATION: 92 % | BODY MASS INDEX: 22.79 KG/M2 | TEMPERATURE: 97.4 F | RESPIRATION RATE: 18 BRPM

## 2021-09-13 DIAGNOSIS — E11.65 TYPE 2 DIABETES MELLITUS WITH HYPERGLYCEMIA, UNSPECIFIED WHETHER LONG TERM INSULIN USE (H): Primary | ICD-10-CM

## 2021-09-13 DIAGNOSIS — R00.1 BRADYCARDIA: ICD-10-CM

## 2021-09-13 DIAGNOSIS — N18.4 CKD (CHRONIC KIDNEY DISEASE) STAGE 4, GFR 15-29 ML/MIN (H): ICD-10-CM

## 2021-09-13 DIAGNOSIS — I10 BENIGN ESSENTIAL HYPERTENSION: ICD-10-CM

## 2021-09-13 PROCEDURE — 99309 SBSQ NF CARE MODERATE MDM 30: CPT | Performed by: NURSE PRACTITIONER

## 2021-09-13 NOTE — PROGRESS NOTES
"[facility] GERIATRIC SERVICES    Chief Complaint   Patient presents with     RECHECK     HPI:  Farzad East is a 86 year old  (1935), who is being seen today for an episodic care visit at: [facility] ([facility]) [[id number]].     Background:     This is an 86-year-old male, with a past medical history significant for hypertension, anemia, rectal cancer, type 2 diabetes mellitus, lower extremity edema, benign prostatic hypertrophy and thyroid nodule, who was admitted to the [facility] 8/17/21 through 9/2/21 for progressive shortness of breath, increasing lower extremity edema and fatigue. Labs revealed Potassium 5.7, Creatinine 2.89 and TSH 7.46. Nephrology was consulted and suspected acute kidney injury due to hypoperfusion in the setting of tightly controlled blood pressures versus drug induced lupus due secondary to Hydralazine. Hydralazine was discontinued on 8/25/21. Permissive blood pressures goal of systolic 140-150. Initiated on Furosemide and Nifedipine. Hemoglobin 6.8 on 8/19/21 and required 1 U of PRBCs. 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 Found to have \".. Superficial clot noted in the cephalic vein within both forearms\" on 8/26/21 ultrasound. Recommended to elevate arms. Blood sugars elevated. Glipizide discontinued due to RONY and hypoglycemia risk. Sitagliptin held. A TCU stay was recommended for ongoing physical rehabilitation.     Today's concern is:     Patient is sitting up in chair eating lunch. Questions how his GFR and what the plans are for his kidneys. Is uncertain who will be transporting him to the appointment.     Bradycardia. Over the weekend, on-call updated as patient's heart rate was 42 then 47. Systolic blood pressures in the 120s. Order given to hold Carvedilol for pulse < 55. Today, patient reports he did not feel when his heart rate was low. Upon review of " heart rate over the past 5 days, range 42-74, most 50-60s.     Hypertension. Upon review of blood pressures since previous visit 9/9/21, systolic range from , most < 150. Diastolic range from 45-72.     Type 2 Diabetes Mellitus. Upon review of blood sugars over the past 5 days, range is as follows:    Breakfast:   Lunch: 157-253  Dinner: 166-210  Bedtime: 184-276    Allergies, and PMH/PSH reviewed in EPIC today.  REVIEW OF SYSTEMS:  4 point ROS including Respiratory, CV, GI and , other than that noted in the HPI,  is negative    Objective:   /64   Pulse 65   Temp 97.4  F (36.3  C)   Resp 18   Wt 74.1 kg (163 lb 6.4 oz)   SpO2 92%   BMI 22.79 kg/m    GENERAL APPEARANCE:  Alert, in no distress  ENT:  Mouth and posterior oropharynx normal, moist mucous membranes  EYES:  EOM, conjunctivae, lids, pupils and irises normal  RESP:  Respiratory effort and palpation of chest normal, lungs clear to auscultation , no respiratory distress  CV:  Palpation and auscultation of heart done , regular rate and rhythm, no murmur, rub, or gallop  ABDOMEN:  normal bowel sounds, soft, nontender, no hepatosplenomegaly or other masses  M/S:   Active movement of bilateral upper and lower extremities. Trace edema in BLE.  SKIN:  Inspection of skin and subcutaneous tissue baseline, Palpation of skin and subcutaneous tissue baseline  NEURO:   Cranial nerves 2-12 are normal tested and grossly at patient's baseline  PSYCH:  affect and mood normal    [facility]. Please refer to them using [employer] Everywhere.    Assessment/Plan:  Acute on Chronic Kidney Disease Stage III. Possibly due to hypoperfusion in the setting of tightly controlled blood pressures versus drug induced lupus secondary to Hydralazine. Follow-up with Nephrology on 9/17/21 as scheduled. Creatinine improved when Hydralazine was discontinued on 8/25/21 and permissive hypertension with a goal of systolic readings between 140-150.  2 g Sodium diet with 2 L fluid restriction. Monitor urine output. Creatinine trended up to 5.21 and GFR 9 on 9/10/21. Nephrology updated. Awaiting recommendations.    Bradycardia. Has occurred intermittently since TCU admission, more so frequently. Parameters placed on Carvedilol to hold for hr < 55. Will monitor closely to determine if Carvedilol should be discontinued.     Hypertension. Hydralazine discontinued as noted above. Permissive systolic blood pressures 140-150 recommended as noted above. Started on Nifedipine during hospitalization with increase on 9/9/21. PTA Hydrochlorothiazide discontinued. Continue Carvedilol as ordered for now.     Acute on Chronic Anemia with Recent Dark Stools and History of Rectal Cancer. Received 1 U of PRBCs and Iron Sucrose during hospitalization. Labs consistent with iron deficiency and thought to be a component of Erythropoietin deficiency with history of chronic kidney disease. Had been having dark black stools and diarrhea over the past 3 months. Colonoscopy recommended outpatient. Baseline Hemoglobin ~ 8-9. Last Hemoglobin 9.4 on 9/10/21.     Type 2 Diabetes Mellitus. Last A1C 9.5 on 7/16/21. PTA Glipizide and Sitagliptin discontinued. Discharged on Glargine and sliding scale insulin. Blood sugars trend up later in the evening, but low normal in the morning. Will discontinue sliding scale insulin at bedtime due to low normal blood sugars in the am. Monitor closely. PCP to consider initiation of GLP-1 and SGLT2.     Superficial Thrombophlebitis. Superficial clot noted in the cephalic vein within both forearms on 8/26/21 ultrasound. Encourage elevation of arms. Acetaminophen available for pain.      Lower Extremity Edema. Ultrasound negative on 8/18/21. Takes Furosemide.     Thyroid Nodule. Follow-up with Endocrinology on 9/14/21 as scheduled. Repeat FNA recommended for October 2021.     Subclinical Hypothyroidism. TSH 7.46 and Free T4 1.03 on 8/16/21. TSH acute phase  reactant so may be cause for elevation. Follow-up outpatient as indicated.     Benign Prostatic Hypertrophy. Continue Tamsulosin and Finasteride as ordered.     Physical Deconditioning. Secondary to recent hospitalization and co-morbidities. Physical and Occupational Therapy ordered.    Orders:  TREE on 9/15/21  Discontinue bedtime sliding scale insulin     Electronically signed by: ESTEPHANIE Enciso CNP

## 2021-09-13 NOTE — LETTER
"    9/13/2021        RE: Farzad East  [address]  [facility] [address]         [facility]ERIATR[facility]    Chief Complaint   Patient presents with     RECHECK     HPI:  Farzad East is a 86 year old  (1935), who is being seen today for an episodic care visit at: [facility] ([facility]) [[id number]].     Background:     This is an 86-year-old male, with a past medical history significant for hypertension, anemia, rectal cancer, type 2 diabetes mellitus, lower extremity edema, benign prostatic hypertrophy and thyroid nodule, who was admitted to the [facility] 8/17/21 through 9/2/21 for progressive shortness of breath, increasing lower extremity edema and fatigue. Labs revealed Potassium 5.7, Creatinine 2.89 and TSH 7.46. Nephrology was consulted and suspected acute kidney injury due to hypoperfusion in the setting of tightly controlled blood pressures versus drug induced lupus due secondary to Hydralazine. Hydralazine was discontinued on 8/25/21. Permissive blood pressures goal of systolic 140-150. Initiated on Furosemide and Nifedipine. Hemoglobin 6.8 on 8/19/21 and required 1 U of PRBCs. 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 Found to have \".. Superficial clot noted in the cephalic vein within both forearms\" on 8/26/21 ultrasound. Recommended to elevate arms. Blood sugars elevated. Glipizide discontinued due to RONY and hypoglycemia risk. Sitagliptin held. A TCU stay was recommended for ongoing physical rehabilitation.     Today's concern is:     Patient is sitting up in chair eating lunch. Questions how his GFR and what the plans are for his kidneys. Is uncertain who will be transporting him to the appointment.     Bradycardia. Over the weekend, on-call updated as patient's heart rate was 42 then 47. Systolic blood pressures in the 120s. Order given to hold Carvedilol " for pulse < 55. Today, patient reports he did not feel when his heart rate was low. Upon review of heart rate over the past 5 days, range 42-74, most 50-60s.     Hypertension. Upon review of blood pressures since previous visit 9/9/21, systolic range from , most < 150. Diastolic range from 45-72.     Type 2 Diabetes Mellitus. Upon review of blood sugars over the past 5 days, range is as follows:    Breakfast:   Lunch: 157-253  Dinner: 166-210  Bedtime: 184-276    Allergies, and PMH/PSH reviewed in EPIC today.  REVIEW OF SYSTEMS:  4 point ROS including Respiratory, CV, GI and , other than that noted in the HPI,  is negative    Objective:   /64   Pulse 65   Temp 97.4  F (36.3  C)   Resp 18   Wt 74.1 kg (163 lb 6.4 oz)   SpO2 92%   BMI 22.79 kg/m    GENERAL APPEARANCE:  Alert, in no distress  ENT:  Mouth and posterior oropharynx normal, moist mucous membranes  EYES:  EOM, conjunctivae, lids, pupils and irises normal  RESP:  Respiratory effort and palpation of chest normal, lungs clear to auscultation , no respiratory distress  CV:  Palpation and auscultation of heart done , regular rate and rhythm, no murmur, rub, or gallop  ABDOMEN:  normal bowel sounds, soft, nontender, no hepatosplenomegaly or other masses  M/S:   Active movement of bilateral upper and lower extremities. Trace edema in BLE.  SKIN:  Inspection of skin and subcutaneous tissue baseline, Palpation of skin and subcutaneous tissue baseline  NEURO:   Cranial nerves 2-12 are normal tested and grossly at patient's baseline  PSYCH:  affect and mood normal    Labs done in SNF are in [facility]. Please refer to them using [employer]Care Everywhere.    Assessment/Plan:  Acute on Chronic Kidney Disease Stage III. Possibly due to hypoperfusion in the setting of tightly controlled blood pressures versus drug induced lupus secondary to Hydralazine. Follow-up with Nephrology on 9/17/21 as scheduled. Creatinine improved when Hydralazine was  discontinued on 8/25/21 and permissive hypertension with a goal of systolic readings between 140-150. 2 g Sodium diet with 2 L fluid restriction. Monitor urine output. Creatinine trended up to 5.21 and GFR 9 on 9/10/21. Nephrology updated. Awaiting recommendations.    Bradycardia. Has occurred intermittently since TCU admission, more so frequently. Parameters placed on Carvedilol to hold for hr < 55. Will monitor closely to determine if Carvedilol should be discontinued.     Hypertension. Hydralazine discontinued as noted above. Permissive systolic blood pressures 140-150 recommended as noted above. Started on Nifedipine during hospitalization with increase on 9/9/21. PTA Hydrochlorothiazide discontinued. Continue Carvedilol as ordered for now.     Acute on Chronic Anemia with Recent Dark Stools and History of Rectal Cancer. Received 1 U of PRBCs and Iron Sucrose during hospitalization. Labs consistent with iron deficiency and thought to be a component of Erythropoietin deficiency with history of chronic kidney disease. Had been having dark black stools and diarrhea over the past 3 months. Colonoscopy recommended outpatient. Baseline Hemoglobin ~ 8-9. Last Hemoglobin 9.4 on 9/10/21.     Type 2 Diabetes Mellitus. Last A1C 9.5 on 7/16/21. PTA Glipizide and Sitagliptin discontinued. Discharged on Glargine and sliding scale insulin. Blood sugars trend up later in the evening, but low normal in the morning. Will discontinue sliding scale insulin at bedtime due to low normal blood sugars in the am. Monitor closely. PCP to consider initiation of GLP-1 and SGLT2.     Superficial Thrombophlebitis. Superficial clot noted in the cephalic vein within both forearms on 8/26/21 ultrasound. Encourage elevation of arms. Acetaminophen available for pain.      Lower Extremity Edema. Ultrasound negative on 8/18/21. Takes Furosemide.     Thyroid Nodule. Follow-up with Endocrinology on 9/14/21 as scheduled. Repeat FNA recommended for  October 2021.     Subclinical Hypothyroidism. TSH 7.46 and Free T4 1.03 on 8/16/21. TSH acute phase reactant so may be cause for elevation. Follow-up outpatient as indicated.     Benign Prostatic Hypertrophy. Continue Tamsulosin and Finasteride as ordered.     Physical Deconditioning. Secondary to recent hospitalization and co-morbidities. Physical and Occupational Therapy ordered.    Orders:  TREE on 9/15/21  Discontinue bedtime sliding scale insulin     Electronically signed by: ESTEPHANIE Enciso CNP             Sincerely,        ESTEPHANIE Enciso CNP

## 2021-09-14 ENCOUNTER — LAB REQUISITION (OUTPATIENT)
Dept: LAB | Facility: CLINIC | Age: 86
End: 2021-09-14
Payer: MEDICARE

## 2021-09-14 ENCOUNTER — MEDICAL CORRESPONDENCE (OUTPATIENT)
Dept: HEALTH INFORMATION MANAGEMENT | Facility: CLINIC | Age: 86
End: 2021-09-14

## 2021-09-14 ENCOUNTER — TELEPHONE (OUTPATIENT)
Dept: ENDOCRINOLOGY | Facility: CLINIC | Age: 86
End: 2021-09-14

## 2021-09-14 ENCOUNTER — VIRTUAL VISIT (OUTPATIENT)
Dept: ENDOCRINOLOGY | Facility: CLINIC | Age: 86
End: 2021-09-14
Payer: MEDICARE

## 2021-09-14 DIAGNOSIS — E03.9 HYPOTHYROIDISM, UNSPECIFIED TYPE: ICD-10-CM

## 2021-09-14 DIAGNOSIS — N18.4 CKD (CHRONIC KIDNEY DISEASE) STAGE 4, GFR 15-29 ML/MIN (H): ICD-10-CM

## 2021-09-14 DIAGNOSIS — I12.0 TYPE 2 DM WITH CKD STAGE 5 AND HYPERTENSION (H): Primary | ICD-10-CM

## 2021-09-14 DIAGNOSIS — E11.22 TYPE 2 DM WITH CKD STAGE 5 AND HYPERTENSION (H): Primary | ICD-10-CM

## 2021-09-14 DIAGNOSIS — N18.5 TYPE 2 DM WITH CKD STAGE 5 AND HYPERTENSION (H): Primary | ICD-10-CM

## 2021-09-14 DIAGNOSIS — N17.9 ACUTE KIDNEY FAILURE, UNSPECIFIED (H): ICD-10-CM

## 2021-09-14 DIAGNOSIS — E04.1 THYROID NODULE: ICD-10-CM

## 2021-09-14 DIAGNOSIS — E11.65 TYPE 2 DIABETES MELLITUS WITH HYPERGLYCEMIA, UNSPECIFIED WHETHER LONG TERM INSULIN USE (H): ICD-10-CM

## 2021-09-14 PROCEDURE — 99443 PR PHYSICIAN TELEPHONE EVALUATION 21-30 MIN: CPT | Mod: 95 | Performed by: PHYSICIAN ASSISTANT

## 2021-09-14 RX ORDER — LEVOTHYROXINE SODIUM 25 MCG
25 TABLET ORAL DAILY
Qty: 90 TABLET | Refills: 0 | Status: SHIPPED | OUTPATIENT
Start: 2021-09-14 | End: 2021-10-31

## 2021-09-14 NOTE — PROGRESS NOTES
"Alessio is a 86 year old who is being evaluated via a billable video visit.        Phone call duration: 32 minutes    [facility]  Endocrinology  Sandhya Mena PA-C, [facility]  09/14/2021      Chief Complaint:   Follow Up     History of Present Illness:   Farzad East is a 86 year old male with a history of CKD stage 4, type 2 diabetes mellitus with nephropathy, and hypertension who presents for follow up of type 2 diabetes mellitus. The patient was diagnosed with diabetes in his 50s and has managed using Metformin, Glipizide, Lantus, and Januvia in the past. Lantus was discontinued toward the beginning of 2018. Metformin was discontinued in 2020 due to lower GFR while hospitlalzed.      February 2021:.  At that time his most recent A1C was 8.4 and his glucometer checks concurred with higher BG which he felt was due to stress, poor sleep and erratic eating related to grieving having to move his wife into [facility].  He was taking Januvia 50 mg and Glipizide 5 mg daily and while we discussed resuming Lantus, he elected to work on sleep diet and stress management.     July 2021:Alessio and son agree to resume Lantus 10 units qhs, and will increase by 1 unit every 3-4 days until fasting BG is <150.  Will also check BG in evening to assure not <120 in which case we will stop or decrease Glipizide.     Interval History:     Alessio was admitted to the [facility] 8/17/21 through 9/2/21 for progressive shortness of breath, increasing lower extremity edema and fatigue. Labs revealed Potassium 5.7, Creatinine 2.89 and TSH 7.46. Nephrology was consulted and suspected acute kidney injury due to hypoperfusion in the setting of tightly controlled blood pressures versus drug induced lupus due secondary to Hydralazine.     Found to have \".. Superficial clot noted in the cephalic vein within both forearms\" on 8/26/21 ultrasound. Recommended to elevate arms. Blood sugars elevated. Glipizide discontinued " due to RONY and hypoglycemia risk. Sitagliptin held.    He was discharged on Lantus and sliding scale insulin and BG neat goal fasting, but above goal pp up to 318 on 21.    He followed up with Terra Coyne from Geriatrics on ,  and yesterday.  He has been bradycardic, with systolic HTN, BG coming into range.  Terra discontinued evening sliding scale due to BG 89 - 121 fasting.  Scale is 1:50 >140 tid ac.  He continues Lantus 10 units qhs. Qhs sliding scale insulin  was 1:50 >200.    He has f/u with nephrology scheduled tomorrow and .      Today:    His son Andi is with him.  They cannot see My Chart notes so feel a bit more lost as to what is going on at TCU at [facility].    Questions TSH level as Dad is always cold and sleeping all of time.  It has been borderline elevated for some time and now more so though could be related to other stresses of hospitalization.  All children take supplementation and they would like to try some for Dad.    His appetite is not great.  Not feeling hungry, but he is eating.  Would consider GLP-1agonist.  And watch appetite.  They still have not figured out thyroid nodules.  Biopsy was inconclusive.  No family history of thyroid cancers, no pancreatitis or pancreatic CA personal or FH, no FH MENS.              Current diabetes regimen:   Lantus 10 units qhs with tid ac  sliding scale insulin 1:50 >140 tid ac.         BG monitoring:    F: 111 113 89 99 102 121  (lower BG are later after 9 am.  Noonish: 171 196 209 157 187 211 253  Evenin 210 166 178 200  at bedtime: 239 273 184 199 206 276        Review of Systems:   Pertinent items are noted in HPI, remainder of complete ROS is negative.       Active Medications:      Current Outpatient Medications   Medication     aspirin (ASA) 81 MG EC tablet     blood glucose (NO BRAND SPECIFIED) lancets standard     blood glucose (ONETOUCH VERIO IQ) test strip     blood glucose monitoring (ONETOUCH VERIO IQ SYSTEM)  "meter device kit     carvedilol (COREG) 25 MG tablet     Cholecalciferol (VITAMIN D-3 PO)     COMPRESSION STOCKINGS     finasteride (PROSCAR) 5 MG tablet     furosemide (LASIX) 40 MG tablet     insulin aspart (NOVOLOG PEN) 100 UNIT/ML pen     insulin glargine (LANTUS SOLOSTAR) 100 UNIT/ML pen     insulin pen needle (31G X 5 MM) 31G X 5 MM miscellaneous     multivitamin w/minerals (MULTI-VITAMIN) tablet     NIFEdipine ER OSMOTIC (NIFEDICAL XL) 60 MG 24 hr tablet     tamsulosin (FLOMAX) 0.4 MG capsule     No current facility-administered medications for this visit.     Allergies:   Sulfa drugs      Past Medical History:  Atrial flutter  Chronic kidney disease stage 3  Type 2 diabetes mellitus  Hypertension  Peripheral neuropathy  Rectal type adenocarcinoma  Femur fracture     Past Surgical History:  Cataract  ORIF femur  Left KIMBERLY     Family History:   Diabetes - father  Circulatory - father  Macular degeneration - father  Arthritis - mother     Social History:   Tobacco Use: none  Alcohol Use: 3 martinis/week - not currently.  Drug Use: none  PCP: Renu Lantigua      Has support of adult children.  Living at [facility] in Independent senior apartment.       Physical Exam:   There were no vitals taken for this visit.      PHONE VISIT     Farzad is alerted and oriented.  Mood is \"alright I suppose,\" affect is congruent.  Thoughtful form and content are fluid and coherent.  He does become rather emotional at times and apologizes for this.    No signs of distress are appreciated.          Data:    Recent Labs   Lab Test 09/10/21  0947 09/07/21  1023 08/16/21  1754 07/16/21  1537 03/16/21  1420 09/24/20  1413 09/23/20  1341 09/23/20  1323 11/05/19  1430 11/05/19  0000 12/04/18  1526 09/04/18  0000   A1C  --   --   --  9.5* 8.8*   < >  --  7.3*  --   --   --   --    HEMOGLOBINA1  --   --   --   --   --   --   --   --   --  8.0*  --  7.6*   TSH  --   --  7.46*  --  4.70*  --   --  5.08*  --   --   --   --    T4  --   --  " 1.03  --  0.89  --   --  1.00  --   --   --   --    LDL  --   --   --   --  66  --   --  34  --   --   --   --    HDL  --   --   --   --  50  --   --  45  --   --   --   --    TRIG  --   --   --   --  86  --   --  56  --   --   --   --    CR 5.21* 4.81* 2.89*  --  2.32*   < >  --  2.42*  --   --   --   --    MICROL  --   --   --   --   --   --  475  --  882  --    < >  --     < > = values in this interval not displayed.                Lab Results   Component Value Date     A1C 8.4 02/03/2021     A1C 7.3 09/23/2020     A1C 7.7 07/22/2019     A1C 8.3 12/04/2018     A1C 7.9 12/12/2017     GFR Estimate   Date Value Ref Range Status   09/10/2021 9 (L) >60 mL/min/1.73m2 Final     Comment:     As of July 11, 2021, eGFR is calculated by the CKD-EPI creatinine equation, without race adjustment. eGFR can be influenced by muscle mass, exercise, and diet. The reported eGFR is an estimation only and is only applicable if the renal function is stable.   03/16/2021 25 (L) >60 mL/min/[1.73_m2] Final     Comment:     Non  GFR Calc  Starting 12/18/2018, serum creatinine based estimated GFR (eGFR) will be   calculated using the Chronic Kidney Disease Epidemiology Collaboration   (CKD-EPI) equation.          Recent data also reviewed above.     Assessment and Plan:  Type 2 diabetes mellitus  BG are near goal.  Rise throughout day.    Advise:  Decrease Lantus to 8 units daily.  Ideally give qam  Give 1 unit plus sliding scale tid ac.    Consider GLP-1 agonist once determination made re thyroid nodules.          CKD stage 4  He is being followed by nephrology.       HTN: Following with cardiology, nephrology.     Osteoporosis, unspecified osteoporosis type, unspecified pathological fracture presence  Vitamin D deficiency    Thyroid nodules:  He will see DR Amin in December    Elevated TSH with symptoms consistent with hypothyroidism.  Will trial low dose Synthroid and follow.       Follow-up: TSHR in 8 weeks.  See   Eloisa as scheduled in December.       It is my privilege to be involved in the care of the above patient.      Sandhya Mena PA-C, MPAS  [facility]  Diabetes, Endocrinology, and Metabolism  [phone] Appointments/Nurse  [phone] Fax  [phone] pager  [phone] nurse line           43 minutes in preparation for visit reviewing chart, labs and documentation, visiting with patient gathering history and in exam, education and counseling, as well as coordination of care, further chart review and documentation following visit on this date of service and as alluded to documented above.

## 2021-09-14 NOTE — TELEPHONE ENCOUNTER
[facility] Call Center    Phone Message    May a detailed message be left on voicemail: no     Reason for Call: Order(s): Other:   Reason for requested: Nurse from facility where patient is staying would like and update and any new orders from today's appoinment to be faxed to [phone]      Action Taken: Other: Endo    Travel Screening: Not Applicable

## 2021-09-14 NOTE — LETTER
9/14/2021       RE: Farzad East  [address]  [facility] [address]     Dear Colleague,    Thank you for referring your patient, Farzad East, to the [facility] [place] at [facility]. Please see a copy of my visit note below.    Alessio is a 86 year old who is being evaluated via a billable video visit.        Phone call duration: 32 minutes    [facility]  Endocrinology  Sandhya Mena PA-C, NENAS  09/14/2021      Chief Complaint:   Follow Up     History of Present Illness:   Farzad East is a 86 year old male with a history of CKD stage 4, type 2 diabetes mellitus with nephropathy, and hypertension who presents for follow up of type 2 diabetes mellitus. The patient was diagnosed with diabetes in his 50s and has managed using Metformin, Glipizide, Lantus, and Januvia in the past. Lantus was discontinued toward the beginning of 2018. Metformin was discontinued in 2020 due to lower GFR while hospitlalzed.      February 2021:.  At that time his most recent A1C was 8.4 and his glucometer checks concurred with higher BG which he felt was due to stress, poor sleep and erratic eating related to grieving having to move his wife into [facility].  He was taking Januvia 50 mg and Glipizide 5 mg daily and while we discussed resuming Lantus, he elected to work on sleep diet and stress management.     July 2021:Alessio and son agree to resume Lantus 10 units qhs, and will increase by 1 unit every 3-4 days until fasting BG is <150.  Will also check BG in evening to assure not <120 in which case we will stop or decrease Glipizide.     Interval History:     Alessio was admitted to the [facility] 8/17/21 through 9/2/21 for progressive shortness of breath, increasing lower extremity edema and fatigue. Labs revealed Potassium 5.7, Creatinine 2.89 and TSH 7.46. Nephrology was consulted and suspected  "acute kidney injury due to hypoperfusion in the setting of tightly controlled blood pressures versus drug induced lupus due secondary to Hydralazine.     Found to have \".. Superficial clot noted in the cephalic vein within both forearms\" on 21 ultrasound. Recommended to elevate arms. Blood sugars elevated. Glipizide discontinued due to RONY and hypoglycemia risk. Sitagliptin held.    He was discharged on Lantus and sliding scale insulin and BG neat goal fasting, but above goal pp up to 318 on 21.    He followed up with Terra Coyne from [facility] on ,  and yesterday.  He has been bradycardic, with systolic HTN, BG coming into range.  Terra discontinued evening sliding scale due to BG 89 - 121 fasting.  Scale is 1:50 >140 tid ac.  He continues Lantus 10 units qhs. Qhs sliding scale insulin  was 1:50 >200.    He has f/u with nephrology scheduled tomorrow and .      Today:    His son Andi is with him.  They cannot see My Chart notes so feel a bit more lost as to what is going on at TCU at [facility].    Questions TSH level as Dad is always cold and sleeping all of time.  It has been borderline elevated for some time and now more so though could be related to other stresses of hospitalization.  All children take supplementation and they would like to try some for Dad.    His appetite is not great.  Not feeling hungry, but he is eating.  Would consider GLP-1agonist.  And watch appetite.  They still have not figured out thyroid nodules.  Biopsy was inconclusive.  No family history of thyroid cancers, no pancreatitis or pancreatic CA personal or FH, no FH MENS.              Current diabetes regimen:   Lantus 10 units qhs with tid ac  sliding scale insulin 1:50 >140 tid ac.         BG monitoring:    F: 111 113 89 99 102 121  (lower BG are later after 9 am.  Noonish: 171 196 209 157 187 211 253  Evenin 210 166 178 200  at bedtime: 239 273 184 199 206 276        Review of Systems: " "  Pertinent items are noted in HPI, remainder of complete ROS is negative.       Active Medications:      Current Outpatient Medications   Medication     aspirin (ASA) 81 MG EC tablet     blood glucose (NO BRAND SPECIFIED) lancets standard     blood glucose (ONETOUCH VERIO IQ) test strip     blood glucose monitoring (ONETOUCH VERIO IQ SYSTEM) meter device kit     carvedilol (COREG) 25 MG tablet     Cholecalciferol (VITAMIN D-3 PO)     COMPRESSION STOCKINGS     finasteride (PROSCAR) 5 MG tablet     furosemide (LASIX) 40 MG tablet     insulin aspart (NOVOLOG PEN) 100 UNIT/ML pen     insulin glargine (LANTUS SOLOSTAR) 100 UNIT/ML pen     insulin pen needle (31G X 5 MM) 31G X 5 MM miscellaneous     multivitamin w/minerals (MULTI-VITAMIN) tablet     NIFEdipine ER OSMOTIC (NIFEDICAL XL) 60 MG 24 hr tablet     tamsulosin (FLOMAX) 0.4 MG capsule     No current facility-administered medications for this visit.     Allergies:   Sulfa drugs      Past Medical History:  Atrial flutter  Chronic kidney disease stage 3  Type 2 diabetes mellitus  Hypertension  Peripheral neuropathy  Rectal type adenocarcinoma  Femur fracture     Past Surgical History:  Cataract  ORIF femur  Left KIMBERLY     Family History:   Diabetes - father  Circulatory - father  Macular degeneration - father  Arthritis - mother     Social History:   Tobacco Use: none  Alcohol Use: 3 martinis/week - not currently.  Drug Use: none  PCP: Renu Lantigua      Has support of adult children.  Living at [facility] in [facility] senior apartment.       Physical Exam:   There were no vitals taken for this visit.      PHONE VISIT     Farzad is alerted and oriented.  Mood is \"alright I suppose,\" affect is congruent.  Thoughtful form and content are fluid and coherent.  He does become rather emotional at times and apologizes for this.    No signs of distress are appreciated.          Data:    Recent Labs   Lab Test 09/10/21  0947 09/07/21  1023 08/16/21  1754 07/16/21  1537 " 03/16/21  1420 09/24/20  1413 09/23/20  1341 09/23/20  1323 11/05/19  1430 11/05/19  0000 12/04/18  1526 09/04/18  0000   A1C  --   --   --  9.5* 8.8*   < >  --  7.3*  --   --   --   --    HEMOGLOBINA1  --   --   --   --   --   --   --   --   --  8.0*  --  7.6*   TSH  --   --  7.46*  --  4.70*  --   --  5.08*  --   --   --   --    T4  --   --  1.03  --  0.89  --   --  1.00  --   --   --   --    LDL  --   --   --   --  66  --   --  34  --   --   --   --    HDL  --   --   --   --  50  --   --  45  --   --   --   --    TRIG  --   --   --   --  86  --   --  56  --   --   --   --    CR 5.21* 4.81* 2.89*  --  2.32*   < >  --  2.42*  --   --   --   --    MICROL  --   --   --   --   --   --  475  --  882  --    < >  --     < > = values in this interval not displayed.                Lab Results   Component Value Date     A1C 8.4 02/03/2021     A1C 7.3 09/23/2020     A1C 7.7 07/22/2019     A1C 8.3 12/04/2018     A1C 7.9 12/12/2017     GFR Estimate   Date Value Ref Range Status   09/10/2021 9 (L) >60 mL/min/1.73m2 Final     Comment:     As of July 11, 2021, eGFR is calculated by the CKD-EPI creatinine equation, without race adjustment. eGFR can be influenced by muscle mass, exercise, and diet. The reported eGFR is an estimation only and is only applicable if the renal function is stable.   03/16/2021 25 (L) >60 mL/min/[1.73_m2] Final     Comment:     Non  GFR Calc  Starting 12/18/2018, serum creatinine based estimated GFR (eGFR) will be   calculated using the Chronic Kidney Disease Epidemiology Collaboration   (CKD-EPI) equation.          Recent data also reviewed above.     Assessment and Plan:  Type 2 diabetes mellitus  BG are near goal.  Rise throughout day.    Advise:  Decrease Lantus to 8 units daily.  Ideally give qam  Give 1 unit plus sliding scale tid ac.    Consider GLP-1 agonist once determination made re thyroid nodules.          CKD stage 4  He is being followed by nephrology.       HTN: Following  with cardiology, nephrology.     Osteoporosis, unspecified osteoporosis type, unspecified pathological fracture presence  Vitamin D deficiency    Thyroid nodules:  He will see DR Amin in December    Elevated TSH with symptoms consistent with hypothyroidism.  Will trial low dose Synthroid and follow.       Follow-up: TSHR in 8 weeks.  See Dr Amin as scheduled in December.       It is my privilege to be involved in the care of the above patient.      Sandhya Mena PA-C, MPAS  [facility]  Diabetes, Endocrinology, and Metabolism  [phone] Appointments/Nurse  [phone] Fax  [phone] pager  [phone] nurse line     43 minutes in preparation for visit reviewing chart, labs and documentation, visiting with patient gathering history and in exam, education and counseling, as well as coordination of care, further chart review and documentation following visit on this date of service and as alluded to documented above.

## 2021-09-14 NOTE — TELEPHONE ENCOUNTER
Faxed progress note from today to [phone]. The AVS didn't have changes on it for medications. Chrissie Medley RN on 9/14/2021 at 5:47 PM

## 2021-09-14 NOTE — PATIENT INSTRUCTIONS
We appreciate your assistance in coordinating your healthcare.     Please upload your insulin pump, blood sugar meter and/or continuous glucose monitor at home 1-2 days before your next diabetes-related appointment.   This will allow your provider to review your  data before your scheduled virtual visit.    To ask a question to your Endocrine care team, please send them a [employer] message, or reach them by phone at [phone]     To expedite your medication refill(s), please contact your pharmacy and have them   fax a refill request to: [phone].  *Please allow 3 business days for routine medication refills.  *Please allow 5 business days for controlled substance medication refills.    For after-hours urgent Endocrine issues, that do not require [phone], please dial [phone], and ask to speak with the Endocrinologist On-Call

## 2021-09-15 ENCOUNTER — LAB REQUISITION (OUTPATIENT)
Dept: LAB | Facility: CLINIC | Age: 86
End: 2021-09-15
Payer: MEDICARE

## 2021-09-15 DIAGNOSIS — N18.30 CHRONIC KIDNEY DISEASE, STAGE 3 UNSPECIFIED (H): ICD-10-CM

## 2021-09-15 LAB
ANION GAP SERPL CALCULATED.3IONS-SCNC: 13 MMOL/L (ref 5–18)
BUN SERPL-MCNC: 98 MG/DL (ref 8–28)
CALCIUM SERPL-MCNC: 8.6 MG/DL (ref 8.5–10.5)
CHLORIDE BLD-SCNC: 106 MMOL/L (ref 98–107)
CO2 SERPL-SCNC: 18 MMOL/L (ref 22–31)
CREAT SERPL-MCNC: 5.81 MG/DL (ref 0.7–1.3)
GFR SERPL CREATININE-BSD FRML MDRD: 8 ML/MIN/1.73M2
GLUCOSE BLD-MCNC: 104 MG/DL (ref 70–125)
POTASSIUM BLD-SCNC: 5.3 MMOL/L (ref 3.5–5)
SODIUM SERPL-SCNC: 137 MMOL/L (ref 136–145)

## 2021-09-15 PROCEDURE — P9604 ONE-WAY ALLOW PRORATED TRIP: HCPCS | Performed by: NURSE PRACTITIONER

## 2021-09-15 PROCEDURE — 36415 COLL VENOUS BLD VENIPUNCTURE: CPT | Performed by: NURSE PRACTITIONER

## 2021-09-15 PROCEDURE — 80048 BASIC METABOLIC PNL TOTAL CA: CPT | Performed by: NURSE PRACTITIONER

## 2021-09-16 ENCOUNTER — APPOINTMENT (OUTPATIENT)
Dept: CT IMAGING | Facility: CLINIC | Age: 86
DRG: 391 | End: 2021-09-16
Attending: EMERGENCY MEDICINE
Payer: MEDICARE

## 2021-09-16 ENCOUNTER — MYC MEDICAL ADVICE (OUTPATIENT)
Dept: FAMILY MEDICINE | Facility: CLINIC | Age: 86
End: 2021-09-16

## 2021-09-16 ENCOUNTER — TRANSITIONAL CARE UNIT VISIT (OUTPATIENT)
Dept: GERIATRICS | Facility: CLINIC | Age: 86
End: 2021-09-16
Payer: MEDICARE

## 2021-09-16 ENCOUNTER — HOSPITAL ENCOUNTER (INPATIENT)
Facility: CLINIC | Age: 86
LOS: 7 days | Discharge: NURSING, REHAB OR LONG-TERM CARE | DRG: 391 | End: 2021-09-23
Attending: EMERGENCY MEDICINE | Admitting: INTERNAL MEDICINE
Payer: MEDICARE

## 2021-09-16 VITALS
SYSTOLIC BLOOD PRESSURE: 135 MMHG | BODY MASS INDEX: 22.32 KG/M2 | RESPIRATION RATE: 16 BRPM | WEIGHT: 160 LBS | TEMPERATURE: 98.1 F | OXYGEN SATURATION: 97 % | DIASTOLIC BLOOD PRESSURE: 66 MMHG | HEART RATE: 51 BPM

## 2021-09-16 DIAGNOSIS — I10 HYPERTENSION, UNSPECIFIED TYPE: ICD-10-CM

## 2021-09-16 DIAGNOSIS — R00.1 BRADYCARDIA: ICD-10-CM

## 2021-09-16 DIAGNOSIS — K56.609 SBO (SMALL BOWEL OBSTRUCTION) (H): ICD-10-CM

## 2021-09-16 DIAGNOSIS — K86.3 CYST AND PSEUDOCYST OF PANCREAS: ICD-10-CM

## 2021-09-16 DIAGNOSIS — R53.83 OTHER FATIGUE: ICD-10-CM

## 2021-09-16 DIAGNOSIS — K80.20 CALCULUS OF GALLBLADDER WITHOUT CHOLECYSTITIS WITHOUT OBSTRUCTION: ICD-10-CM

## 2021-09-16 DIAGNOSIS — N17.9 ACUTE KIDNEY INJURY (H): Primary | ICD-10-CM

## 2021-09-16 DIAGNOSIS — K57.32 DIVERTICULITIS OF COLON (WITHOUT MENTION OF HEMORRHAGE)(562.11): ICD-10-CM

## 2021-09-16 DIAGNOSIS — R53.83 LETHARGY: ICD-10-CM

## 2021-09-16 DIAGNOSIS — K86.2 CYST AND PSEUDOCYST OF PANCREAS: ICD-10-CM

## 2021-09-16 DIAGNOSIS — K57.32 DIVERTICULITIS OF COLON: Primary | ICD-10-CM

## 2021-09-16 DIAGNOSIS — K40.30 UNILATERAL INGUINAL HERNIA WITH OBSTRUCTION AND WITHOUT GANGRENE, RECURRENCE NOT SPECIFIED: ICD-10-CM

## 2021-09-16 DIAGNOSIS — R19.7 DIARRHEA, UNSPECIFIED TYPE: ICD-10-CM

## 2021-09-16 DIAGNOSIS — K85.90 ACUTE PANCREATITIS, UNSPECIFIED COMPLICATION STATUS, UNSPECIFIED PANCREATITIS TYPE: ICD-10-CM

## 2021-09-16 DIAGNOSIS — R11.0 NAUSEA: ICD-10-CM

## 2021-09-16 LAB
ALBUMIN SERPL-MCNC: 2.8 G/DL (ref 3.4–5)
ALBUMIN UR-MCNC: 50 MG/DL
ALP SERPL-CCNC: 74 U/L (ref 40–150)
ALT SERPL W P-5'-P-CCNC: 20 U/L (ref 0–70)
ANION GAP SERPL CALCULATED.3IONS-SCNC: 12 MMOL/L (ref 5–18)
ANION GAP SERPL CALCULATED.3IONS-SCNC: 9 MMOL/L (ref 3–14)
APPEARANCE UR: CLEAR
AST SERPL W P-5'-P-CCNC: 9 U/L (ref 0–45)
ATRIAL RATE - MUSE: 62 BPM
BASOPHILS # BLD AUTO: 0 10E3/UL (ref 0–0.2)
BASOPHILS NFR BLD AUTO: 1 %
BILIRUB SERPL-MCNC: 0.3 MG/DL (ref 0.2–1.3)
BILIRUB UR QL STRIP: NEGATIVE
BUN SERPL-MCNC: 103 MG/DL (ref 7–30)
BUN SERPL-MCNC: 94 MG/DL (ref 8–28)
CALCIUM SERPL-MCNC: 8.5 MG/DL (ref 8.5–10.1)
CALCIUM SERPL-MCNC: 8.5 MG/DL (ref 8.5–10.5)
CHLORIDE BLD-SCNC: 105 MMOL/L (ref 98–107)
CHLORIDE BLD-SCNC: 107 MMOL/L (ref 94–109)
CO2 SERPL-SCNC: 19 MMOL/L (ref 22–31)
CO2 SERPL-SCNC: 20 MMOL/L (ref 20–32)
COLOR UR AUTO: ABNORMAL
CREAT SERPL-MCNC: 5.57 MG/DL (ref 0.7–1.3)
CREAT SERPL-MCNC: 5.72 MG/DL (ref 0.52–1.25)
DIASTOLIC BLOOD PRESSURE - MUSE: NORMAL MMHG
EOSINOPHIL # BLD AUTO: 0.5 10E3/UL (ref 0–0.7)
EOSINOPHIL NFR BLD AUTO: 8 %
ERYTHROCYTE [DISTWIDTH] IN BLOOD BY AUTOMATED COUNT: 17.2 % (ref 10–15)
GFR SERPL CREATININE-BSD FRML MDRD: 8 ML/MIN/1.73M2
GFR SERPL CREATININE-BSD FRML MDRD: 9 ML/MIN/1.73M2
GLUCOSE BLD-MCNC: 132 MG/DL (ref 70–125)
GLUCOSE BLD-MCNC: 158 MG/DL (ref 70–99)
GLUCOSE BLDC GLUCOMTR-MCNC: 152 MG/DL (ref 70–99)
GLUCOSE UR STRIP-MCNC: 50 MG/DL
HCT VFR BLD AUTO: 28.4 % (ref 35–53)
HGB BLD-MCNC: 9.1 G/DL (ref 11.7–17.7)
HGB UR QL STRIP: NEGATIVE
HOLD SPECIMEN: NORMAL
IMM GRANULOCYTES # BLD: 0 10E3/UL
IMM GRANULOCYTES NFR BLD: 0 %
INTERPRETATION ECG - MUSE: NORMAL
KETONES UR STRIP-MCNC: NEGATIVE MG/DL
LEUKOCYTE ESTERASE UR QL STRIP: NEGATIVE
LIPASE SERPL-CCNC: 983 U/L (ref 73–393)
LYMPHOCYTES # BLD AUTO: 0.9 10E3/UL (ref 0.8–5.3)
LYMPHOCYTES NFR BLD AUTO: 16 %
MAGNESIUM SERPL-MCNC: 1.7 MG/DL (ref 1.6–2.3)
MCH RBC QN AUTO: 27.9 PG (ref 26.5–33)
MCHC RBC AUTO-ENTMCNC: 32 G/DL (ref 31.5–36.5)
MCV RBC AUTO: 87 FL (ref 78–100)
MONOCYTES # BLD AUTO: 0.5 10E3/UL (ref 0–1.3)
MONOCYTES NFR BLD AUTO: 9 %
NEUTROPHILS # BLD AUTO: 3.8 10E3/UL (ref 1.6–8.3)
NEUTROPHILS NFR BLD AUTO: 66 %
NITRATE UR QL: NEGATIVE
NRBC # BLD AUTO: 0 10E3/UL
NRBC BLD AUTO-RTO: 0 /100
P AXIS - MUSE: 38 DEGREES
PH UR STRIP: 5.5 [PH] (ref 5–7)
PLATELET # BLD AUTO: 217 10E3/UL (ref 150–450)
POTASSIUM BLD-SCNC: 4.8 MMOL/L (ref 3.4–5.3)
POTASSIUM BLD-SCNC: 5 MMOL/L (ref 3.5–5)
PR INTERVAL - MUSE: 200 MS
PROT SERPL-MCNC: 7.4 G/DL (ref 6.8–8.8)
QRS DURATION - MUSE: 100 MS
QT - MUSE: 436 MS
QTC - MUSE: 442 MS
R AXIS - MUSE: -11 DEGREES
RADIOLOGIST FLAGS: NORMAL
RBC # BLD AUTO: 3.26 10E6/UL (ref 3.8–5.9)
RBC URINE: <1 /HPF
SODIUM SERPL-SCNC: 136 MMOL/L (ref 133–144)
SODIUM SERPL-SCNC: 136 MMOL/L (ref 136–145)
SP GR UR STRIP: 1.01 (ref 1–1.03)
SQUAMOUS EPITHELIAL: <1 /HPF
SYSTOLIC BLOOD PRESSURE - MUSE: NORMAL MMHG
T AXIS - MUSE: 22 DEGREES
TROPONIN I SERPL-MCNC: <0.015 UG/L (ref 0–0.04)
UROBILINOGEN UR STRIP-MCNC: NORMAL MG/DL
VENTRICULAR RATE- MUSE: 62 BPM
WBC # BLD AUTO: 5.8 10E3/UL (ref 4–11)
WBC URINE: <1 /HPF

## 2021-09-16 PROCEDURE — 96376 TX/PRO/DX INJ SAME DRUG ADON: CPT | Performed by: EMERGENCY MEDICINE

## 2021-09-16 PROCEDURE — 81001 URINALYSIS AUTO W/SCOPE: CPT | Performed by: EMERGENCY MEDICINE

## 2021-09-16 PROCEDURE — 96361 HYDRATE IV INFUSION ADD-ON: CPT | Performed by: EMERGENCY MEDICINE

## 2021-09-16 PROCEDURE — 96365 THER/PROPH/DIAG IV INF INIT: CPT | Performed by: EMERGENCY MEDICINE

## 2021-09-16 PROCEDURE — 80048 BASIC METABOLIC PNL TOTAL CA: CPT | Performed by: NURSE PRACTITIONER

## 2021-09-16 PROCEDURE — 93005 ELECTROCARDIOGRAM TRACING: CPT | Performed by: EMERGENCY MEDICINE

## 2021-09-16 PROCEDURE — G1004 CDSM NDSC: HCPCS | Performed by: RADIOLOGY

## 2021-09-16 PROCEDURE — 99309 SBSQ NF CARE MODERATE MDM 30: CPT | Performed by: NURSE PRACTITIONER

## 2021-09-16 PROCEDURE — 84484 ASSAY OF TROPONIN QUANT: CPT | Performed by: EMERGENCY MEDICINE

## 2021-09-16 PROCEDURE — 80053 COMPREHEN METABOLIC PANEL: CPT | Performed by: EMERGENCY MEDICINE

## 2021-09-16 PROCEDURE — 93010 ELECTROCARDIOGRAM REPORT: CPT | Performed by: EMERGENCY MEDICINE

## 2021-09-16 PROCEDURE — 74176 CT ABD & PELVIS W/O CONTRAST: CPT | Mod: MG

## 2021-09-16 PROCEDURE — 36415 COLL VENOUS BLD VENIPUNCTURE: CPT | Performed by: NURSE PRACTITIONER

## 2021-09-16 PROCEDURE — C9803 HOPD COVID-19 SPEC COLLECT: HCPCS | Performed by: EMERGENCY MEDICINE

## 2021-09-16 PROCEDURE — 96366 THER/PROPH/DIAG IV INF ADDON: CPT | Performed by: EMERGENCY MEDICINE

## 2021-09-16 PROCEDURE — 74176 CT ABD & PELVIS W/O CONTRAST: CPT | Mod: 26 | Performed by: RADIOLOGY

## 2021-09-16 PROCEDURE — 96367 TX/PROPH/DG ADDL SEQ IV INF: CPT | Performed by: EMERGENCY MEDICINE

## 2021-09-16 PROCEDURE — 83690 ASSAY OF LIPASE: CPT | Performed by: EMERGENCY MEDICINE

## 2021-09-16 PROCEDURE — 36415 COLL VENOUS BLD VENIPUNCTURE: CPT | Performed by: EMERGENCY MEDICINE

## 2021-09-16 PROCEDURE — 99285 EMERGENCY DEPT VISIT HI MDM: CPT | Mod: 25 | Performed by: EMERGENCY MEDICINE

## 2021-09-16 PROCEDURE — 83735 ASSAY OF MAGNESIUM: CPT | Performed by: EMERGENCY MEDICINE

## 2021-09-16 PROCEDURE — P9603 ONE-WAY ALLOW PRORATED MILES: HCPCS | Performed by: NURSE PRACTITIONER

## 2021-09-16 PROCEDURE — 85025 COMPLETE CBC W/AUTO DIFF WBC: CPT | Performed by: EMERGENCY MEDICINE

## 2021-09-16 PROCEDURE — 120N000002 HC R&B MED SURG/OB UMMC

## 2021-09-16 RX ORDER — SENNOSIDES 8.6 MG
2 TABLET ORAL
COMMUNITY

## 2021-09-16 NOTE — ED PROVIDER NOTES
"  History     Chief Complaint   Patient presents with     Fatigue     Nausea     Diarrhea     HPI  Farzad East is a 86 year old adult with a past medical history of atrial fibrillation, CKD, diabetes, CHF, anemia, rectal cancer, hypertension who presents to the emergency department with a chief complaint of fatigue associated with nausea and diarrhea.  The patient reports he was recently admitted to the hospital for some blood pressure, kidney, and blood sugar issues.  He states that some medications were changed while he was hospitalized, but he is somewhat confused about his medication management plan as some medications that were added were later discontinued because they conflicted with some of his other medications.  He manages all his own medications and lives in an independent living facility.  Over the past 2 to 3 days he has felt increasingly fatigued.  He states that over this timeframe, his blood sugars have been \"up and down\".  The patient denies any chest pain or shortness of breath.  He denies abdominal pain.  He states he has been urinating normally.  A nurse at his living facility suggested he come back to the emergency department for further evaluation.  The patient reports he was supposed to have an appointment with his doctor in the clinic tomorrow, which he did call to cancel.    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  It appears that his blood pressure medications were optimized during that hospitalization.  His carvedilol was continued, nifedipine was restarted, hydrochlorothiazide was discontinued (due to decreased kidney function) and hydralazine was discontinued (due to concerns for drug-induced lupus).  He was also treated for superficial thrombophlebitis during that hospital stay and was given a blood transfusion for acute on chronic normocytic anemia.  He " was also treated for hyperkalemia that resolved with diuresis.  It appears the patient is currently prescribed 60 mg of Lasix daily, 25 mg of Coreg twice daily, and nifedipine 60 mg daily.    I have reviewed the Medications, Allergies, Past Medical and Surgical History, and Social History in the [employer] system.    Past Medical History:   Diagnosis Date     Atrial flutter (H)      Choroidal nevus of left eye      CKD (chronic kidney disease) stage 3, GFR 30-59 ml/min      Diabetes mellitus (H)      Diabetic peripheral neuropathy (H)      Hypertension      Microalbuminuria      Overweight(278.02)      Rectal-type adenocarcinoma (H)      Retinopathies, diabetic      Vitreous detachment of both eyes      Past Surgical History:   Procedure Laterality Date     CATARACT IOL, RT/LT Bilateral 2005     left hip replacement       OPEN REDUCTION INTERNAL FIXATION FEMUR PROXIMAL  1/24/2014    Procedure: OPEN REDUCTION INTERNAL FIXATION FEMUR PROXIMAL;  Periprosthetic Fracture, ORIF Left Femur;  Surgeon: Andi Garcia MD;  Location: [facility] OR     No current facility-administered medications for this encounter.     Current Outpatient Medications   Medication     aspirin (ASA) 81 MG EC tablet     blood glucose (NO BRAND SPECIFIED) lancets standard     blood glucose (ONETOUCH VERIO IQ) test strip     blood glucose monitoring (ONETOUCH VERIO IQ SYSTEM) meter device kit     carvedilol (COREG) 25 MG tablet     Cholecalciferol (VITAMIN D-3 PO)     COMPRESSION STOCKINGS     finasteride (PROSCAR) 5 MG tablet     furosemide (LASIX) 40 MG tablet     insulin aspart (NOVOLOG PEN) 100 UNIT/ML pen     insulin aspart (NOVOLOG PEN) 100 UNIT/ML pen     insulin glargine (LANTUS SOLOSTAR) 100 UNIT/ML pen     insulin pen needle (31G X 5 MM) 31G X 5 MM miscellaneous     multivitamin w/minerals (MULTI-VITAMIN) tablet     NIFEdipine ER OSMOTIC (NIFEDICAL XL) 60 MG 24 hr tablet     SYNTHROID 25 MCG tablet     tamsulosin (FLOMAX) 0.4 MG capsule      Allergies   Allergen Reactions     Hydralazine Nephrotoxicity     Sulfa Drugs      Unknown reaction. Occurred during childhood. Has not taken Sulfa medications since allergic response.      Past medical history, past surgical history, medications, and allergies were reviewed with the patient. Additional pertinent items: None    Social History     Socioeconomic History     Marital status:      Spouse name: Not on file     Number of children: Not on file     Years of education: Not on file     Highest education level: Not on file   Occupational History     Occupation:      Employer: [employer]   Tobacco Use     Smoking status: Never Smoker     Smokeless tobacco: Never Used   Substance and Sexual Activity     Alcohol use: Yes     Alcohol/week: 2.5 - 3.3 standard drinks     Types: 3 - 4 Standard drinks or equivalent per week     Comment: 3 martinis per week     Drug use: No     Sexual activity: Not on file   Other Topics Concern     Parent/sibling w/ CABG, MI or angioplasty before 65F 55M? No   Social History Narrative     Not on file     Social Determinants of Health     Financial Resource Strain:      Difficulty of Paying Living Expenses:    Food Insecurity:      Worried About Running Out of Food in the Last Year:      Ran Out of Food in the Last Year:    Transportation Needs:      Lack of Transportation (Medical):      Lack of Transportation (Non-Medical):    Physical Activity:      Days of Exercise per Week:      Minutes of Exercise per Session:    Stress:      Feeling of Stress :    Social Connections:      Frequency of Communication with Friends and Family:      Frequency of Social Gatherings with Friends and Family:      Attends [employer] Services:      Active Member of Clubs or Organizations:      Attends Club or Organization Meetings:      Marital Status:    Intimate Partner Violence:      Fear of Current or Ex-Partner:      Emotionally Abused:      Physically Abused:       Sexually Abused:      Social history was reviewed with the patient. Additional pertinent items: None    Review of Systems  General: No fevers or chills, positive for fatigue  Skin: No rash or diaphoresis  Eyes: No eye redness or discharge  Ears/Nose/Throat: No rhinorrhea or nasal congestion, positive for hard of hearing  Respiratory: No cough or SOB  Cardiovascular: No chest pain or palpitations  Gastrointestinal: See HPI  Genitourinary: No urinary frequency, hematuria, or dysuria  Musculoskeletal: No arthralgias or myalgias  Neurologic: No numbness or weakness  Hematologic/Lymphatic/Immunologic: No leg swelling, no easy bruising/bleeding  Endocrine: No polyuria/polydypsia    A complete review of systems was performed with pertinent positives and negatives noted in the HPI, and all other systems negative.    Physical Exam   BP: (!) 147/65  Pulse: 58  Temp: 98  F (36.7  C)  Resp: 16  Weight: 71.7 kg (158 lb)  SpO2: 99 %      General: Well nourished, well developed, NAD  HEENT: EOMI, anicteric. NCAT, very hard of hearing  Neck: no jugular venous distension, supple, nl ROM  Cardiac: Regular rate and rhythm. No murmurs, rubs, or gallops. Normal S1, S2.  Intact peripheral pulses  Pulm: CTAB, no stridor, wheezes, rales, rhonchi  Abd: Soft, mild diffuse tenderness to palpation without rebound or guarding, nondistended.  No masses palpated.    Skin: Warm and dry to the touch.  No rash  Extremities: No LE edema, no cyanosis, w/w/p  Neuro: A&Ox3, no gross focal deficits    ED Course        Procedures               EKG Interpretation:      Interpreted by Ofelia Rivera MD  Time reviewed: 1816   Symptoms at time of EKG: fatigue   Rhythm: normal sinus   Rate: normal 62 bpm  Axis: NORMAL  Ectopy: none  Conduction: normal  ST Segments/ T Waves: No ST-T wave changes  Q Waves: none  Comparison to prior: Unchanged from August 17, 2021 and March 11, 2021    Clinical Impression: normal EKG                Labs Ordered and Resulted  from Time of ED Arrival Up to the Time of Departure from the ED   COMPREHENSIVE METABOLIC PANEL - Abnormal; Notable for the following components:       Result Value    Urea Nitrogen 103 (*)     Creatinine 5.72 (*)     Glucose 158 (*)     Albumin 2.8 (*)     GFR Estimate 8 (*)     All other components within normal limits    Narrative:     The sex of this patient cannot be reliably determined based on discrepancies in demographics (legal sex, sex assigned at birth, gender identity).  Both male and female reference ranges are provided where applicable.  Careful evaluation of the patient s results as compared to the gender specific reference intervals is required in this setting.    LIPASE - Abnormal; Notable for the following components:    Lipase 983 (*)     All other components within normal limits   ROUTINE UA WITH MICROSCOPIC REFLEX TO CULTURE - Abnormal; Notable for the following components:    Glucose Urine 50  (*)     Protein Albumin Urine 50  (*)     All other components within normal limits    Narrative:     Urine Culture not indicated   CBC WITH PLATELETS AND DIFFERENTIAL - Abnormal; Notable for the following components:    RBC Count 3.26 (*)     Hemoglobin 9.1 (*)     Hematocrit 28.4 (*)     RDW 17.2 (*)     All other components within normal limits    Narrative:     The sex of this patient cannot be reliably determined based on discrepancies in demographics (legal sex, sex assigned at birth, gender identity).  Both male and female reference ranges are provided where applicable.  Careful evaluation of the patient s results as compared to the gender specific reference intervals is required in this setting.    GLUCOSE BY METER - Abnormal; Notable for the following components:    GLUCOSE BY METER POCT 152 (*)     All other components within normal limits   TROPONIN I - Normal   MAGNESIUM - Normal   EXTRA RED TOP TUBE   EXTRA GREEN TOP (LITHIUM HEPARIN) TUBE   EXTRA PURPLE TOP TUBE   GLUCOSE MONITOR NURSING  POCT   PERIPHERAL IV CATHETER   CLOSTRIDIUM DIFFICILE TOXIN B   ENTERIC BACTERIA AND VIRUS PANEL BY GEOFFREY STOOL   EXTRA TUBE    Narrative:     The following orders were created for panel order [place] Draw.  Procedure                               Abnormality         Status                     ---------                               -----------         ------                     Extra Red Top Tube[[id number]]                               Final result               Extra Green Top (Lithium...[[id number]]                      Final result               Extra Purple Top Tube[[id number]]                            Final result                 Please view results for these tests on the individual orders.   CBC WITH PLATELETS & DIFFERENTIAL    Narrative:     The following orders were created for panel order CBC with platelets differential.  Procedure                               Abnormality         Status                     ---------                               -----------         ------                     CBC with platelets and d...[[id number]]  Abnormal            Final result                 Please view results for these tests on the individual orders.            Results for orders placed or performed during the hospital encounter of 09/16/21 (from the past 24 hour(s))   EKG 12-lead, tracing only   Result Value Ref Range    Systolic Blood Pressure  mmHg    Diastolic Blood Pressure  mmHg    Ventricular Rate 62 BPM    Atrial Rate 62 BPM    HI Interval 200 ms    QRS Duration 100 ms     ms    QTc 442 ms    P Axis 38 degrees    R AXIS -11 degrees    T Axis 22 degrees    Interpretation ECG       Sinus rhythm  Normal ECG  Unconfirmed report - interpretation of this ECG is computer generated - see medical record for final interpretation  Confirmed by - EMERGENCY ROOM, PHYSICIAN ([id number]),  MERYL RILEY ([id number]) on 9/16/2021 10:24:02 PM     UA with Microscopic reflex to Culture    Specimen: Urine, Midstream   Result  Value Ref Range    Color Urine Light Yellow Colorless, Straw, Light Yellow, Yellow    Appearance Urine Clear Clear    Glucose Urine 50  (A) Negative mg/dL    Bilirubin Urine Negative Negative    Ketones Urine Negative Negative mg/dL    Specific Gravity Urine 1.011 1.003 - 1.035    Blood Urine Negative Negative    pH Urine 5.5 5.0 - 7.0    Protein Albumin Urine 50  (A) Negative mg/dL    Urobilinogen Urine Normal Normal, 2.0 mg/dL    Nitrite Urine Negative Negative    Leukocyte Esterase Urine Negative Negative    RBC Urine <1 <=2 /HPF    WBC Urine <1 <=5 /HPF    Squamous Epithelials Urine <1 <=1 /HPF    Narrative    Urine Culture not indicated   [place] Draw    Narrative    The following orders were created for panel order [place] Draw.  Procedure                               Abnormality         Status                     ---------                               -----------         ------                     Extra Red Top Tube[[id number]]                               Final result               Extra Green Top (Lithium...[[id number]]                      Final result               Extra Purple Top Tube[[id number]]                            Final result                 Please view results for these tests on the individual orders.   CBC with platelets differential    Narrative    The following orders were created for panel order CBC with platelets differential.  Procedure                               Abnormality         Status                     ---------                               -----------         ------                     CBC with platelets and d...[[id number]]  Abnormal            Final result                 Please view results for these tests on the individual orders.   Comprehensive metabolic panel   Result Value Ref Range    Sodium 136 133 - 144 mmol/L    Potassium 4.8 3.4 - 5.3 mmol/L    Chloride 107 94 - 109 mmol/L    Carbon Dioxide (CO2) 20 20 - 32 mmol/L    Anion Gap 9 3 - 14 mmol/L    Urea Nitrogen 103  (H) 7 - 30 mg/dL    Creatinine 5.72 (H) 0.52 - 1.25 mg/dL    Calcium 8.5 8.5 - 10.1 mg/dL    Glucose 158 (H) 70 - 99 mg/dL    Alkaline Phosphatase 74 40 - 150 U/L    AST 9 0 - 45 U/L    ALT 20 0 - 70 U/L    Protein Total 7.4 6.8 - 8.8 g/dL    Albumin 2.8 (L) 3.4 - 5.0 g/dL    Bilirubin Total 0.3 0.2 - 1.3 mg/dL    GFR Estimate 8 (L) >60 mL/min/1.73m2    Narrative    The sex of this patient cannot be reliably determined based on discrepancies in demographics (legal sex, sex assigned at birth, gender identity).  Both male and female reference ranges are provided where applicable.  Careful evaluation of the patient s results as compared to the gender specific reference intervals is required in this setting.    Lipase   Result Value Ref Range    Lipase 983 (H) 73 - 393 U/L   Troponin I   Result Value Ref Range    Troponin I <0.015 0.000 - 0.045 ug/L   Magnesium   Result Value Ref Range    Magnesium 1.7 1.6 - 2.3 mg/dL   Extra Red Top Tube   Result Value Ref Range    Hold Specimen JIC    Extra Green Top (Lithium Heparin) Tube   Result Value Ref Range    Hold Specimen JIC    Extra Purple Top Tube   Result Value Ref Range    Hold Specimen JIC    CBC with platelets and differential   Result Value Ref Range    WBC Count 5.8 4.0 - 11.0 10e3/uL    RBC Count 3.26 (L) 3.80 - 5.90 10e6/uL    Hemoglobin 9.1 (L) 11.7 - 17.7 g/dL    Hematocrit 28.4 (L) 35.0 - 53.0 %    MCV 87 78 - 100 fL    MCH 27.9 26.5 - 33.0 pg    MCHC 32.0 31.5 - 36.5 g/dL    RDW 17.2 (H) 10.0 - 15.0 %    Platelet Count 217 150 - 450 10e3/uL    % Neutrophils 66 %    % Lymphocytes 16 %    % Monocytes 9 %    % Eosinophils 8 %    % Basophils 1 %    % Immature Granulocytes 0 %    NRBCs per 100 WBC 0 <1 /100    Absolute Neutrophils 3.8 1.6 - 8.3 10e3/uL    Absolute Lymphocytes 0.9 0.8 - 5.3 10e3/uL    Absolute Monocytes 0.5 0.0 - 1.3 10e3/uL    Absolute Eosinophils 0.5 0.0 - 0.7 10e3/uL    Absolute Basophils 0.0 0.0 - 0.2 10e3/uL    Absolute Immature Granulocytes 0.0  <=0.0 10e3/uL    Absolute NRBCs 0.0 10e3/uL    Narrative    The sex of this patient cannot be reliably determined based on discrepancies in demographics (legal sex, sex assigned at birth, gender identity).  Both male and female reference ranges are provided where applicable.  Careful evaluation of the patient s results as compared to the gender specific reference intervals is required in this setting.    Glucose by meter   Result Value Ref Range    GLUCOSE BY METER POCT 152 (H) 70 - 99 mg/dL   CT Abdomen Pelvis w/o Contrast   Result Value Ref Range    Radiologist flags Pancreatic cyst     Narrative    EXAM: CT ABDOMEN PELVIS W/O CONTRAST  LOCATION: [facility]  DATE/TIME: 9/16/2021 10:19 PM    INDICATION: Epigastric abdominal pain  COMPARISON: None.  TECHNIQUE: CT scan of the abdomen and pelvis was performed without IV contrast. Multiplanar reformats were obtained. Dose reduction techniques were used.  CONTRAST: None.    FINDINGS:   LOWER CHEST: Minimal bilateral pleural fluid collections. Atelectasis involving both lung bases. Calcified left hilar lymph nodes. Mild cardiac enlargement. Marked coronary artery calcification.    HEPATOBILIARY: Cholelithiasis. Noncontrast liver unremarkable. No significant biliary dilatation.    PANCREAS: Well-defined low-attenuation lesion anterior aspect of the pancreatic head may represent a cystic lesion but remains ultimately indeterminate measuring 1.4 x 2 x 1.3 cm (series 5, image 166 and series 3, image 38). No pancreatic ductal   dilatation or acute surrounding inflammatory change.    SPLEEN: Normal size spleen. Splenic granulomas.    ADRENAL GLANDS: Normal.    KIDNEYS/BLADDER: No urinary collecting system dilatation or calculi allowing for the streak artifact in the pelvis.    BOWEL: Herniation of a short segment of small bowel into the left inguinal canal with mild dilatation and minimal adjacent fluid. Although there is no  evidence for significant dilatation of the proximal loops of small bowel, they are fluid-filled   throughout their course down to the level of the hernia. Extensive colonic diverticulosis. Small focus of acute diverticulitis at the junction of the descending colon and proximal sigmoid colon. No perforation or abscess formation. Appendectomy.    LYMPH NODES: No lymphadenopathy.    VASCULATURE: Normal caliber aorta. Moderate aortic calcification.    PELVIC ORGANS: Marked prostate enlargement.    MUSCULOSKELETAL: Left KIMBERLY. Moderate degenerative osteoarthrosis right hip. Degenerative changes throughout the spine.      Impression    IMPRESSION:   1.  Herniation of a short segment of small bowel into the left inguinal canal with mild air-filled and fluid-filled loops in the inguinal canal with minimal adjacent fluid and edema. The proximal small bowel up to the level of the herniation is   moderately fluid-filled although not overtly dilated. Findings may be indicative of developing obstruction related to a small bowel containing left inguinal hernia.    2.  Evidence for acute uncomplicated diverticulitis involving the proximal sigmoid colon and descending colonic junction.    3.  Marked prostate enlargement.    4.  Low-attenuation indeterminant probable cystic lesion at the pancreatic head measuring 1.4 x 2 x 1.3 cm. Recommend nonemergent contrast-enhanced MRI of the pancreas for further evaluation.    5.  Cholelithiasis without evidence for biliary dilatation.      [Recommend Follow Up: Pancreatic cyst]    This report will be copied to the [facility] to ensure a provider acknowledges the finding.   1.           Labs, vital signs, and imaging studies were reviewed by me.    Medications   sodium chloride 0.9% infusion (has no administration in time range)       Assessments & Plan (with Medical Decision Making)   Farzad East is a 86 year old adult who presents with feet fatigue, nausea, and diarrhea.  Differential diagnosis includes RONY, electrolyte abnormality, ACS, gastritis/gastroenteritis, hypoglycemia, labile blood pressure. Labs, urinalysis, EKG ordered to further evaluate the patient.    EKG shows NSR.    Laboratory work-up is remarkable for elevated lipase.  CT abdomen pelvis ordered. Urinalysis does not appear consistent with UTI.    CT of the abdomen pelvis shows pancreatic cystic structure, uncomplicated diverticulitis, cholelithiasis without biliary dilatation, prostatic enlargement, and findings concerning for developing small bowel obstruction related to left inguinal hernia.  General surgery was consulted and will come see the patient in the emergency department.    I have reviewed the nursing notes.    I have reviewed the findings, diagnosis, plan and need for follow up with the patient.    Patient to be admitted to internal medicine service for further management. Plan was discussed with patient who understands and agrees with plan.     New Prescriptions    No medications on file       Final diagnoses:   Other fatigue   Nausea   Diarrhea, unspecified type   Acute pancreatitis, unspecified complication status, unspecified pancreatitis type   Diverticulitis of colon   SBO (small bowel obstruction) (H)     Ofelia Rivera MD  9/16/2021   [facility] EMERGENCY DEPARTMENT     Ofelia Rivera MD  09/17/21 0009

## 2021-09-16 NOTE — TELEPHONE ENCOUNTER
Spoke to [facility] (nurseing home). Patient having diarrhea, more fatigued and nauseated. NP, Terra [facility] today. Will cancel appointment for tomorrow and update Earline Potter LPN  Nephrology  [phone]

## 2021-09-16 NOTE — LETTER
"    9/16/2021        RE: Farzad East  [address]  [facility] [address]         HEALTH GERIATRIC SERVICES    Chief Complaint   Patient presents with     RECHECK     HPI:  Farzad East is a 86 year old  (1935), who is being seen today for an episodic care visit at: [facility] ([facility]) [[id number]].     Background:     This is an 86-year-old male, with a past medical history significant for hypertension, anemia, rectal cancer, type 2 diabetes mellitus, lower extremity edema, benign prostatic hypertrophy and thyroid nodule, who was admitted to the [facility] 8/17/21 through 9/2/21 for progressive shortness of breath, increasing lower extremity edema and fatigue. Labs revealed Potassium 5.7, Creatinine 2.89 and TSH 7.46. Nephrology was consulted and suspected acute kidney injury due to hypoperfusion in the setting of tightly controlled blood pressures versus drug induced lupus due secondary to Hydralazine. Hydralazine was discontinued on 8/25/21. Permissive blood pressures goal of systolic 140-150. Initiated on Furosemide and Nifedipine. Hemoglobin 6.8 on 8/19/21 and required 1 U of PRBCs. 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 Found to have \".. Superficial clot noted in the cephalic vein within both forearms\" on 8/26/21 ultrasound. Recommended to elevate arms. Blood sugars elevated. Glipizide discontinued due to RONY and hypoglycemia risk. Sitagliptin held. A TCU stay was recommended for ongoing physical rehabilitation.      Today's concern is:     Lethargy and Fatigue. Today, patient reports he does not feel right. This morning, was unable to sit up in bed without falling back. Has been declining to work with therapy and when he does, is not making as much progress as earlier this week. Today, patient reports he feels something is wrong. Can't eat. Feels cold. Had loose " stools starting yesterday morning. Has been sleeping more. Was previously independent with transfers and now requiring assistance. States is he urinating. No shortness of breath. Per staff report, Has been declining since earlier this week. Patient is incontinent of urine at times so difficult to measure urine output.    Hyperkalemia. Potassium 5.3 on 9/15/21. Kayexalate 15 g x 1 administered in the evening on 9/15/21.    Bradycardia. Upon review of heart rate over the past 48 hours, range 48-60.    Allergies, and PMH/PSH reviewed in EPIC today.  REVIEW OF SYSTEMS:  4 point ROS including Respiratory, CV, GI and , other than that noted in the HPI,  is negative    Objective:   /66   Pulse 51   Temp 98.1  F (36.7  C)   Resp 16   Wt 72.6 kg (160 lb)   SpO2 97%   BMI 22.32 kg/m    GENERAL APPEARANCE: in no distress  ENT:  Mouth and posterior oropharynx normal, moist mucous membranes  EYES:  EOM, conjunctivae, lids, pupils and irises normal  RESP:  Respiratory effort and palpation of chest normal, lungs clear to auscultation , no respiratory distress  CV:  Palpation and auscultation of heart done , regular rate and rhythm, no murmur, rub, or gallop  ABDOMEN:  normal bowel sounds, soft, nontender, no hepatosplenomegaly or other masses  M/S:   Active movement of bilateral upper and lower extremities.1+ edema in BLE.  SKIN:  Inspection of skin and subcutaneous tissue baseline, Palpation of skin and subcutaneous tissue baseline  NEURO:   Cranial nerves 2-12 are normal tested and grossly at patient's baseline  PSYCH:  affect and mood normal    Labs done in SNF are in [facility]. Please refer to them using [employer]/Care Everywhere.    Assessment/Plan:  Lethargy, Fatigue and Bradycardia. Given generalized weakness, increased lethargy, lack of appetite and chills, with known RONY, discussed with patient and son, Andi, further evaluation in the ED given worsening clinical symptoms and further discussion regarding  dialysis. Son and patient in agreement.     Hyperkalemia with Acute on Chronic Kidney Disease Stage III. Treated with Kayexalate x 1 in the evening on 9/15/21. Today, Potassium 5.0 and Creatinine 5.57. Will send to ED for further evaluation as noted above given clinical symptoms.    Orders:  Send to [facility] ED for further evaluation    Electronically signed by: ESTEPHANIE Enciso CNP             Sincerely,        ESTEPHANIE Enciso CNP

## 2021-09-16 NOTE — PROGRESS NOTES
"[facility] GERIATRIC SERVICES    Chief Complaint   Patient presents with     RECHECK     HPI:  Farzad East is a 86 year old  (1935), who is being seen today for an episodic care visit at: [facility] ([facility]) [[id number]].     Background:     This is an 86-year-old male, with a past medical history significant for hypertension, anemia, rectal cancer, type 2 diabetes mellitus, lower extremity edema, benign prostatic hypertrophy and thyroid nodule, who was admitted to the [facility] 8/17/21 through 9/2/21 for progressive shortness of breath, increasing lower extremity edema and fatigue. Labs revealed Potassium 5.7, Creatinine 2.89, with peak at 5.77 on 8/26/21, and TSH 7.46. Nephrology was consulted and suspected acute kidney injury due to hypoperfusion in the setting of tightly controlled blood pressures versus drug induced lupus due secondary to Hydralazine. Hydralazine was discontinued on 8/25/21. Permissive blood pressures goal of systolic 140-150. Initiated on Furosemide and Nifedipine. Hemoglobin 6.8 on 8/19/21 and required 1 U of PRBCs. 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 Found to have \".. Superficial clot noted in the cephalic vein within both forearms\" on 8/26/21 ultrasound. Recommended to elevate arms. Blood sugars elevated. Glipizide discontinued due to RONY and hypoglycemia risk. Sitagliptin held. A TCU stay was recommended for ongoing physical rehabilitation.      Today's concern is:     Lethargy and Fatigue. Today, patient reports he does not feel right. This morning, was unable to sit up in bed without falling back. Has been declining to work with therapy and when he does, is not making as much progress as earlier this week. Today, patient reports he feels something is wrong. Can't eat. Feels cold. Had loose stools starting yesterday morning. Has been sleeping more. Was " previously independent with transfers and now requiring assistance. States is he urinating. No shortness of breath. Per staff report, Has been declining since earlier this week. Patient is incontinent of urine at times so difficult to measure urine output. Blood sugar 123 this morning.    Hyperkalemia. Potassium 5.3 on 9/15/21. Kayexalate 15 g x 1 administered at 1714 on 9/15/21.    Bradycardia. Upon review of heart rate over the past 48 hours, range 48-60.    Allergies, and PMH/PSH reviewed in EPIC today.  REVIEW OF SYSTEMS:  4 point ROS including Respiratory, CV, GI and , other than that noted in the HPI,  is negative    Objective:   /66   Pulse 51   Temp 98.1  F (36.7  C)   Resp 16   Wt 72.6 kg (160 lb)   SpO2 97%   BMI 22.32 kg/m    GENERAL APPEARANCE: in no distress  ENT:  Mouth and posterior oropharynx normal, moist mucous membranes  EYES:  EOM, conjunctivae, lids, pupils and irises normal  RESP:  Respiratory effort and palpation of chest normal, lungs clear to auscultation , no respiratory distress  CV:  Palpation and auscultation of heart done , regular rate and rhythm, no murmur, rub, or gallop  ABDOMEN:  normal bowel sounds, soft, nontender, no hepatosplenomegaly or other masses  M/S:   Active movement of bilateral upper and lower extremities.1+ edema in BLE.  SKIN:  Inspection of skin and subcutaneous tissue baseline, Palpation of skin and subcutaneous tissue baseline  NEURO:   Cranial nerves 2-12 are normal tested and grossly at patient's baseline  PSYCH:  affect and mood normal    Labs done in SNF are in [facility]. Please refer to them using [employer]/Care Everywhere.    Assessment/Plan:  Lethargy, Fatigue and Bradycardia. Given generalized weakness, increased lethargy, lack of appetite and chills, with known RONY, discussed with patient and son, Andi, further evaluation in the ED given worsening clinical symptoms and further discussion regarding dialysis initiation if indicated. Son and  patient in agreement.     Hyperkalemia with Acute on Chronic Kidney Disease Stage III. Treated with Kayexalate x 1 at 1714 on 9/15/21. Have attempted to reach out to Nephrology review labs on 2 separate occasions with no response since 9/10/21. Today, Potassium 5.0 and Creatinine 5.57. Will send to ED for further evaluation as noted above given clinical symptoms.    [facility]    Electronically signed by: ESTEPHANIE Enciso CNP

## 2021-09-16 NOTE — DISCHARGE INSTRUCTIONS
TODAY'S VISIT:  You were seen today for nausea, diarrhea  -   - If you had any labs or imaging/radiology tests performed today, you should also discuss these tests with your usual provider.     FOLLOW-UP:  Please make an appointment to follow up with:  - Your Primary Care Provider. If you do not have a PCP, please call the Primary Care Center (phone: [phone] for an appointment    - Have your provider review the results from today's visit with you again to make sure no further follow-up or additional testing is needed based on those results.     RETURN TO THE EMERGENCY DEPARTMENT  Return to the Emergency Department at any time for any new or worsening symptoms or any concerns.

## 2021-09-17 LAB
ANION GAP SERPL CALCULATED.3IONS-SCNC: 7 MMOL/L (ref 3–14)
BUN SERPL-MCNC: 93 MG/DL (ref 7–30)
CALCIUM SERPL-MCNC: 8.5 MG/DL (ref 8.5–10.1)
CHLORIDE BLD-SCNC: 110 MMOL/L (ref 94–109)
CO2 SERPL-SCNC: 20 MMOL/L (ref 20–32)
CREAT SERPL-MCNC: 5.13 MG/DL (ref 0.52–1.25)
ERYTHROCYTE [DISTWIDTH] IN BLOOD BY AUTOMATED COUNT: 17.4 % (ref 10–15)
GFR SERPL CREATININE-BSD FRML MDRD: 9 ML/MIN/1.73M2
GLUCOSE BLD-MCNC: 115 MG/DL (ref 70–99)
GLUCOSE BLDC GLUCOMTR-MCNC: 108 MG/DL (ref 70–99)
GLUCOSE BLDC GLUCOMTR-MCNC: 111 MG/DL (ref 70–99)
GLUCOSE BLDC GLUCOMTR-MCNC: 141 MG/DL (ref 70–99)
GLUCOSE BLDC GLUCOMTR-MCNC: 86 MG/DL (ref 70–99)
GLUCOSE BLDC GLUCOMTR-MCNC: 93 MG/DL (ref 70–99)
HCT VFR BLD AUTO: 29.1 % (ref 35–53)
HGB BLD-MCNC: 9.2 G/DL (ref 11.7–17.7)
MCH RBC QN AUTO: 27.8 PG (ref 26.5–33)
MCHC RBC AUTO-ENTMCNC: 31.6 G/DL (ref 31.5–36.5)
MCV RBC AUTO: 88 FL (ref 78–100)
PLATELET # BLD AUTO: 222 10E3/UL (ref 150–450)
POTASSIUM BLD-SCNC: 4.5 MMOL/L (ref 3.4–5.3)
RBC # BLD AUTO: 3.31 10E6/UL (ref 3.8–5.9)
SARS-COV-2 RNA RESP QL NAA+PROBE: NEGATIVE
SODIUM SERPL-SCNC: 137 MMOL/L (ref 133–144)
WBC # BLD AUTO: 5.7 10E3/UL (ref 4–11)

## 2021-09-17 PROCEDURE — 85027 COMPLETE CBC AUTOMATED: CPT

## 2021-09-17 PROCEDURE — 250N000011 HC RX IP 250 OP 636: Performed by: STUDENT IN AN ORGANIZED HEALTH CARE EDUCATION/TRAINING PROGRAM

## 2021-09-17 PROCEDURE — 250N000013 HC RX MED GY IP 250 OP 250 PS 637

## 2021-09-17 PROCEDURE — 999N000147 HC STATISTIC PT IP EVAL DEFER

## 2021-09-17 PROCEDURE — U0003 INFECTIOUS AGENT DETECTION BY NUCLEIC ACID (DNA OR RNA); SEVERE ACUTE RESPIRATORY SYNDROME CORONAVIRUS 2 (SARS-COV-2) (CORONAVIRUS DISEASE [COVID-19]), AMPLIFIED PROBE TECHNIQUE, MAKING USE OF HIGH THROUGHPUT TECHNOLOGIES AS DESCRIBED BY CMS-2020-01-R: HCPCS | Performed by: EMERGENCY MEDICINE

## 2021-09-17 PROCEDURE — 99223 1ST HOSP IP/OBS HIGH 75: CPT | Performed by: INTERNAL MEDICINE

## 2021-09-17 PROCEDURE — 99223 1ST HOSP IP/OBS HIGH 75: CPT | Mod: AI | Performed by: INTERNAL MEDICINE

## 2021-09-17 PROCEDURE — 80048 BASIC METABOLIC PNL TOTAL CA: CPT

## 2021-09-17 PROCEDURE — 258N000003 HC RX IP 258 OP 636: Performed by: EMERGENCY MEDICINE

## 2021-09-17 PROCEDURE — 250N000011 HC RX IP 250 OP 636: Performed by: EMERGENCY MEDICINE

## 2021-09-17 PROCEDURE — 250N000013 HC RX MED GY IP 250 OP 250 PS 637: Performed by: STUDENT IN AN ORGANIZED HEALTH CARE EDUCATION/TRAINING PROGRAM

## 2021-09-17 PROCEDURE — 120N000002 HC R&B MED SURG/OB UMMC

## 2021-09-17 PROCEDURE — 36415 COLL VENOUS BLD VENIPUNCTURE: CPT

## 2021-09-17 PROCEDURE — 258N000003 HC RX IP 258 OP 636: Performed by: INTERNAL MEDICINE

## 2021-09-17 RX ORDER — SODIUM CHLORIDE 9 MG/ML
INJECTION, SOLUTION INTRAVENOUS CONTINUOUS
Status: ACTIVE | OUTPATIENT
Start: 2021-09-17 | End: 2021-09-18

## 2021-09-17 RX ORDER — CARVEDILOL 25 MG/1
25 TABLET ORAL 2 TIMES DAILY WITH MEALS
Status: DISCONTINUED | OUTPATIENT
Start: 2021-09-17 | End: 2021-09-17

## 2021-09-17 RX ORDER — AMOXICILLIN 250 MG
1 CAPSULE ORAL 2 TIMES DAILY PRN
Status: DISCONTINUED | OUTPATIENT
Start: 2021-09-17 | End: 2021-09-23 | Stop reason: HOSPADM

## 2021-09-17 RX ORDER — ONDANSETRON 2 MG/ML
4 INJECTION INTRAMUSCULAR; INTRAVENOUS EVERY 6 HOURS PRN
Status: DISCONTINUED | OUTPATIENT
Start: 2021-09-17 | End: 2021-09-23 | Stop reason: HOSPADM

## 2021-09-17 RX ORDER — AMOXICILLIN 250 MG
2 CAPSULE ORAL 2 TIMES DAILY PRN
Status: DISCONTINUED | OUTPATIENT
Start: 2021-09-17 | End: 2021-09-23 | Stop reason: HOSPADM

## 2021-09-17 RX ORDER — CARVEDILOL 25 MG/1
25 TABLET ORAL 2 TIMES DAILY WITH MEALS
Status: DISCONTINUED | OUTPATIENT
Start: 2021-09-17 | End: 2021-09-18

## 2021-09-17 RX ORDER — CARVEDILOL 25 MG/1
25 TABLET ORAL ONCE
Status: COMPLETED | OUTPATIENT
Start: 2021-09-17 | End: 2021-09-17

## 2021-09-17 RX ORDER — LIDOCAINE 40 MG/G
CREAM TOPICAL
Status: DISCONTINUED | OUTPATIENT
Start: 2021-09-17 | End: 2021-09-23 | Stop reason: HOSPADM

## 2021-09-17 RX ORDER — ONDANSETRON 4 MG/1
4 TABLET, ORALLY DISINTEGRATING ORAL EVERY 6 HOURS PRN
Status: DISCONTINUED | OUTPATIENT
Start: 2021-09-17 | End: 2021-09-23 | Stop reason: HOSPADM

## 2021-09-17 RX ORDER — NICOTINE POLACRILEX 4 MG
15-30 LOZENGE BUCCAL
Status: DISCONTINUED | OUTPATIENT
Start: 2021-09-17 | End: 2021-09-23 | Stop reason: HOSPADM

## 2021-09-17 RX ORDER — CIPROFLOXACIN 2 MG/ML
400 INJECTION, SOLUTION INTRAVENOUS ONCE
Status: COMPLETED | OUTPATIENT
Start: 2021-09-17 | End: 2021-09-17

## 2021-09-17 RX ORDER — FINASTERIDE 5 MG/1
5 TABLET, FILM COATED ORAL DAILY
Status: DISCONTINUED | OUTPATIENT
Start: 2021-09-17 | End: 2021-09-23 | Stop reason: HOSPADM

## 2021-09-17 RX ORDER — ASPIRIN 81 MG/1
81 TABLET ORAL DAILY
Status: DISCONTINUED | OUTPATIENT
Start: 2021-09-17 | End: 2021-09-23 | Stop reason: HOSPADM

## 2021-09-17 RX ORDER — CEFTRIAXONE 1 G/1
1 INJECTION, POWDER, FOR SOLUTION INTRAMUSCULAR; INTRAVENOUS EVERY 24 HOURS
Status: DISCONTINUED | OUTPATIENT
Start: 2021-09-17 | End: 2021-09-20

## 2021-09-17 RX ORDER — MULTIPLE VITAMINS W/ MINERALS TAB 9MG-400MCG
1 TAB ORAL DAILY
Status: DISCONTINUED | OUTPATIENT
Start: 2021-09-17 | End: 2021-09-23 | Stop reason: HOSPADM

## 2021-09-17 RX ORDER — VITAMIN B COMPLEX
25 TABLET ORAL DAILY
Status: DISCONTINUED | OUTPATIENT
Start: 2021-09-17 | End: 2021-09-23 | Stop reason: HOSPADM

## 2021-09-17 RX ORDER — LEVOTHYROXINE SODIUM 25 UG/1
25 TABLET ORAL
Status: DISCONTINUED | OUTPATIENT
Start: 2021-09-17 | End: 2021-09-23 | Stop reason: HOSPADM

## 2021-09-17 RX ORDER — DEXTROSE MONOHYDRATE 25 G/50ML
25-50 INJECTION, SOLUTION INTRAVENOUS
Status: DISCONTINUED | OUTPATIENT
Start: 2021-09-17 | End: 2021-09-23 | Stop reason: HOSPADM

## 2021-09-17 RX ADMIN — NIFEDIPINE 60 MG: 60 TABLET, EXTENDED RELEASE ORAL at 06:20

## 2021-09-17 RX ADMIN — SODIUM CHLORIDE: 900 INJECTION, SOLUTION INTRAVENOUS at 08:17

## 2021-09-17 RX ADMIN — FINASTERIDE 5 MG: 5 TABLET, FILM COATED ORAL at 08:03

## 2021-09-17 RX ADMIN — SODIUM CHLORIDE: 900 INJECTION, SOLUTION INTRAVENOUS at 00:58

## 2021-09-17 RX ADMIN — CARVEDILOL 25 MG: 25 TABLET, FILM COATED ORAL at 06:19

## 2021-09-17 RX ADMIN — CIPROFLOXACIN 400 MG: 2 INJECTION, SOLUTION INTRAVENOUS at 02:08

## 2021-09-17 RX ADMIN — LEVOTHYROXINE SODIUM 25 MCG: 0.03 TABLET ORAL at 08:03

## 2021-09-17 RX ADMIN — Medication 1 TABLET: at 08:03

## 2021-09-17 RX ADMIN — CEFTRIAXONE SODIUM 1 G: 1 INJECTION, POWDER, FOR SOLUTION INTRAMUSCULAR; INTRAVENOUS at 11:26

## 2021-09-17 RX ADMIN — METRONIDAZOLE 500 MG: 500 INJECTION, SOLUTION INTRAVENOUS at 23:25

## 2021-09-17 RX ADMIN — METRONIDAZOLE 500 MG: 500 INJECTION, SOLUTION INTRAVENOUS at 01:55

## 2021-09-17 RX ADMIN — Medication 25 MCG: at 08:03

## 2021-09-17 RX ADMIN — METRONIDAZOLE 500 MG: 500 INJECTION, SOLUTION INTRAVENOUS at 12:16

## 2021-09-17 RX ADMIN — SODIUM CHLORIDE: 900 INJECTION, SOLUTION INTRAVENOUS at 20:38

## 2021-09-17 NOTE — PROGRESS NOTES
Physician Attestation   I, Paul Grier, was present with the medical/[employer] student who participated in the service and in the documentation of the note.  I have verified the history and personally performed the physical exam and medical decision making.  I agree with the assessment and plan of care as documented in the note.      I personally reviewed vital signs, medications and labs.    Starting clear liquid diet. Holding carvedilol. Continue MIVF for now. Will reconsider in am.     Paul Grier MD  Date of Service (when I saw the patient): 09/17/21    [facility]    Progress Note - [facility] 5 Service        Date of Admission:  9/16/2021    Assessment & Plan   Farzad East is a 86 year old man with PMHx of CKD, Type 2 DM, hx rectal cancer, subclinical hypothyroidism with thyroid nodule who presents with fatigue, weakness and diarrhea and was found to have diverticulitis, possible bowel obstruction (although asymptomatic) and Cr of 5.13 consistent with RONY with likely component of worsening CKD.      Changes Today:   -Begin clear liquid diet   -Hold carvedilol in setting of bradycardia   -Nephrology consulted   -Continue mIVF until tomorrow AM       #Diverticulitis  #Diarrhea  Patient reports diarrhea and anorexia for the past few days. Denies hematochezia or melena. Lipase elevated at 983 (<3 ULN), but no epigastric pain, and no imaging evidence of pancreatitis. CTAP in ED indicative of uncomplicated diverticulitis. Started on Cipro, flagyl in the ED but switched to ceftriaxone and flagyl. Also ordered C. Diff to r/o given patient's recent hospitalization.   - s/p Cipro, Flagyl in ED  - Continuing with ceftriaxone, flagyl   - C. Diff ordered  - Antiemetics PRN  - Clear liquid diet   - Continue mIVF until tomorrow AM      #Fatigue   #Weakness  #Bradycardia   Likely multifactorial including infection such as diverticulitis, worsening CKD, deconditioning,  and poor PO intake the past few days with fluid losses. Furthermore, here patient has been bradycardic which could be contributing to fatigue. Expect it will improve with fluids, treating his diverticulitis, and working with therapies.   - PT/OT consulted, appreciate recs   - Nutrition consulted, appreciate recs  - Treatment of diverticulitis, as above  - Hold PTA carvedilol      #C/f bowel obstruction on CT, however, patient has been asymptomatic and per surgery there is nothing to do unless patient becomes symptomatic   #Inguinal Hernia  CT in ED concerning for herniation of a short segment of small bowel into the left inguinal canal with mild air-filled and and fluid-filled loops in the inguinal canal with minimal adjacent fluid and edema. Further, the small bowel proximal to the hernia is moderately fluid-filled (but not overly dilated) which may represent developing obstruction. Patient's history less consistent with current bowel obstruction (no N/V, no abdominal pain, last BM 9/16 AM). General Surgery was consulted in the ED and signed off, continue to monitor for developing symptoms.  - General Surgery consulted. Signed off.   - Clear liquid diet   - hold PTA ASA 81 mg  - mIVF, as above   - continue to monitor for pain      #RONY on CKD likely prerenal 2/2 poor PO intake and increased output given diarrhea, but component of worsening progression of CKD likely   Has had progressively worsening kidney disease and was recently admitted for RONY on CKD. On presentation at last hospitalization, his Cr was 2.89 and increased to 4.58 by discharge. Nephrology was involved during this hospitalization and felt the RONY was 2/2 hypoperfusion in the setting of tightly controlled blood pressures vs drug-induced lupus 2/2 hydralazine (discontinued on 8/25/21). On 9/16, Cr 5.72. Expect that this is an RONY on CKD, likely related to poor PO intake & increased output given diarrhea. Although likely multifactorial with underlying  progression of his CKD.   - Nephrology consulted, appreciate recs   - permissive HTN with SBP goal of 140-150   - Hold PTA Lasix in setting of RONY   - Strict I/Os  - Daily weights   - Avoid nephrotoxins   - Fluids as above     #Pancreatic cyst on ED Scan  CT in ED noted well-defined low-attenuation 1.4 x 2x 1.3 cm lesion on the anterior aspect of the pancreatic head that may represent a cystic lesion but remains indeterminate. No surrounding pancreatic ductal dilatation or inflammation.   - Outpatient follow-up recommended      #HTN  - Continue PTA nifedipine   - Hold PTA carvedilol given bradycardia here   - Goal -150 per Nephrology recommendations.   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 Currently at baseline. On last admission, iron panel done c/f BEVERLY with possible EPO deficiency given CKD. Patient denies recent melena or hematochezia, although per last admission discharge he endorsed dark black stools and diarrhea x 3 months. Last colonoscopy 2015 with recommended 5 year f/u.   - Follow-up screening colonoscopy recommended outpatient     #Type 2 DM  Last A1c 9.5% on 7/16/21. Pt reports recent lability of BGs between low 100s and into the 200s. Recently had his regimen changed with glipizide discontinued d/t RONY and hypoglycemia risk and sitagliptin was held on discharge with plan to use Lantus 10u at bedtime. Planned to discuss as outpatient with his PCP. Pt notes he is confused about his regimen changing and also states he is not on glargine, although his records report that is what he is using. BGs 100s in ED, will continue to monitor with sliding scale insulin and add on glargine as needed.   - moderate sliding scale insulin   - holding PTA glargine   - glucose checks  - hypoglycemia protocol      #BPH  - Continue PTA finasteride      #Osteoporosis  - Continue PTA Vitamin  D     #Subclinical Hypothyroidism  #Thyroid nodule  Last TSH 7.46 with free T4 1.03 on 8/16/2021. Follows with Endocrinology with Dr. Amin. Had unsuccessful thyroid biopsy attempt in 7/2021, plan for repeat 10/2021.   -Continue PTA levothyroxine      #Chart History of Afib  On chart review, appears patient had an isolated episode of Afib and was on warfarin, but this appears to have been stopped about a decade ago. No more recent notes reporting Afib. EKG here showed sinus rhythm.      Diet: Clear liquids   DVT Prophylaxis: Pneumatic Compression Devices  High Catheter: Not present  Fluids: mIVF 100 ml/hr NaCl   Central Lines: None  Code Status: No CPR- Do NOT Intubate      Disposition Plan   Expected discharge: 2+ days pending improvement on abx for diverticulitis.   \    The patient's care was discussed with the Attending Physician, Dr. Grier.    Marjan Lopez  Medical Student  [address]  Securely message with the Vocera Web Console (learn more here)  Text page via [employer] Paging/Directory  Please see sign in/sign out for up to date coverage information    Clinically Significant Risk Factors Present on Admission              # Platelet Defect: home medication list includes an antiplatelet medication      ______________________________________________________________________    Interval History   No acute events overnight. Nursing notes reviewed. On ceftriaxone and flagyl. Patient started on clear liquid diet. No abdominal pain. No nausea and no vomiting. No shortness of breath.     4 Point ROS negative except for that noted above     Data reviewed today: I reviewed all medications, new labs and imaging results over the last 24 hours.     Physical Exam   Vital Signs: Temp: 98  F (36.7  C) Temp src: Oral BP: (!) 147/65 Pulse: 58   Resp: 16 SpO2: 99 %      Weight: 158 lbs 0 oz     General Appearance: Frail appearing man, NAD. Resting comfortably.    Respiratory: CTAB, no wheezes or crackles   Cardiovascular: RRR, no m/r/g appreciated. Minimal LE edema.    GI: Soft, non-tender, non-distended. No rebound tenderness or guarding.   Skin: No acute rashes. Warm and dry.       Data   Recent Labs   Lab 09/17/21  0613 09/17/21  0546 09/16/21  2115 09/16/21  2110 09/16/21  0550 09/16/21  0550 09/15/21  0510 09/10/21  0947   WBC  --  5.7  --  5.8  --   --   --  5.4   HGB  --  9.2*  --  9.1*  --   --   --  9.4*   MCV  --  88  --  87  --   --   --  88   PLT  --  222  --  217  --   --   --  233   NA  --  137  --  136  --  136   < > 137   POTASSIUM  --  4.5  --  4.8  --  5.0   < > 5.0   CHLORIDE  --  110*  --  107  --  105   < > 104   CO2  --  20  --  20  --  19*   < > 21*   BUN  --  93*  --  103*  --  94*   < > 89*   CR  --  5.13*  --  5.72*  --  5.57*   < > 5.21*   ANIONGAP  --  7  --  9  --  12   < > 12   DUSTIN  --  8.5  --  8.5  --  8.5   < > 8.7   GLC 93 115* 152* 158*   < > 132*   < > 156*   ALBUMIN  --   --   --  2.8*  --   --   --   --    PROTTOTAL  --   --   --  7.4  --   --   --   --    BILITOTAL  --   --   --  0.3  --   --   --   --    ALKPHOS  --   --   --  74  --   --   --   --    ALT  --   --   --  20  --   --   --   --    AST  --   --   --  9  --   --   --   --    LIPASE  --   --   --  983*  --   --   --   --    TROPONIN  --   --   --  <0.015  --   --   --   --     < > = values in this interval not displayed.     Recent Results (from the past 24 hour(s))   CT Abdomen Pelvis w/o Contrast   Result Value    Radiologist flags Pancreatic cyst    Narrative    EXAM: CT ABDOMEN PELVIS W/O CONTRAST  LOCATION: [facility]  DATE/TIME: 9/16/2021 10:19 PM    INDICATION: Epigastric abdominal pain  COMPARISON: None.  TECHNIQUE: CT scan of the abdomen and pelvis was performed without IV contrast. Multiplanar reformats were obtained. Dose reduction techniques were used.  CONTRAST: None.    FINDINGS:   LOWER CHEST: Minimal bilateral  pleural fluid collections. Atelectasis involving both lung bases. Calcified left hilar lymph nodes. Mild cardiac enlargement. Marked coronary artery calcification.    HEPATOBILIARY: Cholelithiasis. Noncontrast liver unremarkable. No significant biliary dilatation.    PANCREAS: Well-defined low-attenuation lesion anterior aspect of the pancreatic head may represent a cystic lesion but remains ultimately indeterminate measuring 1.4 x 2 x 1.3 cm (series 5, image 166 and series 3, image 38). No pancreatic ductal   dilatation or acute surrounding inflammatory change.    SPLEEN: Normal size spleen. Splenic granulomas.    ADRENAL GLANDS: Normal.    KIDNEYS/BLADDER: No urinary collecting system dilatation or calculi allowing for the streak artifact in the pelvis.    BOWEL: Herniation of a short segment of small bowel into the left inguinal canal with mild dilatation and minimal adjacent fluid. Although there is no evidence for significant dilatation of the proximal loops of small bowel, they are fluid-filled   throughout their course down to the level of the hernia. Extensive colonic diverticulosis. Small focus of acute diverticulitis at the junction of the descending colon and proximal sigmoid colon. No perforation or abscess formation. Appendectomy.    LYMPH NODES: No lymphadenopathy.    VASCULATURE: Normal caliber aorta. Moderate aortic calcification.    PELVIC ORGANS: Marked prostate enlargement.    MUSCULOSKELETAL: Left KIMBERLY. Moderate degenerative osteoarthrosis right hip. Degenerative changes throughout the spine.      Impression    IMPRESSION:   1.  Herniation of a short segment of small bowel into the left inguinal canal with mild air-filled and fluid-filled loops in the inguinal canal with minimal adjacent fluid and edema. The proximal small bowel up to the level of the herniation is   moderately fluid-filled although not overtly dilated. Findings may be indicative of developing obstruction related to a small bowel  containing left inguinal hernia.    2.  Evidence for acute uncomplicated diverticulitis involving the proximal sigmoid colon and descending colonic junction.    3.  Marked prostate enlargement.    4.  Low-attenuation indeterminant probable cystic lesion at the pancreatic head measuring 1.4 x 2 x 1.3 cm. Recommend nonemergent contrast-enhanced MRI of the pancreas for further evaluation.    5.  Cholelithiasis without evidence for biliary dilatation.      [Recommend Follow Up: Pancreatic cyst]    This report will be copied to the [facility] to ensure a provider acknowledges the finding.   1.

## 2021-09-17 NOTE — CONSULTS
Nephrology Initial Consult  September 17, 2021      Farzad East MRN:[id number] YOB: 1935  Date of Admission:9/16/2021  Primary care provider: Renu Lantigua  Requesting physician: Paul Grier MD    ASSESSMENT AND RECOMMENDATIONS:   Alessio East is a 85 y/o male with PMH of CKD, DMII, rectal cancer, and subclinical hypothyroidism who presented 9/16 with fatigue, weakness, diarrhea, rising Cr, and concern for impending SBO. Nephrology was consulted for worsening CKD with hyperkalemia.    RONY on CKD 3/4  Patient's Cr up significantly from baseline of 2.1-2.3, and has risen further from his recent discharge value of 4.58. Suspect prerenal injury from intravascular depletion on top of worsening CKD. Patient reports limiting fluid intake since last discharge, has had recent diarrhea, and has continued to take his furosemide. BP on admission 135/66, slightly softer than systolic goal of 150s. Cr appears to have peaked at 5.72 yesterday, improved today following volume repletion by primary team. While no current indication, likelihood is high that pt will require dialysis in the coming months. Per brief discussion with pt and son, pt doesn't currently feel like this is something he would pursue.  - Agree with judicious mIVF while pt is NPO   - consider stopping afterwards given recent admission for hypervolemia  - Agree with holding diuretics  - Strict I/Os, daily weights  - Avoid nephrotoxins, renally dose medications  - Goal -150 mmHg  - Would recommend IP vs OP Palliative care consult to facilitate GoC conversations    Thank you for the consult. Nephrology will sign off. Please contact with any further questions.    Recommendations were communicated to primary team via phone.    Seen and discussed with Dr. Renae.    Aris Gonzales, MS4    NEPHROLOGY STAFF  I was present with the medical student who participated in the service and in the documentation of the note. I have verified  the history and personally performed the physical exam and medical decision making. I agree with the assessment and plan of care as documented in the note. RONY on CKD. Etiology of CKD presumably recurrent RONY, DM, cardiac failure. RONY due to prerenal factors. Improvement with iv hydration. Overall he has very poor kidney function and Stage 5 CKD and even if he recovers some kidney function this will likely recur soon.. Discussed dialysis with patient and son and they are pondering. Agree with palliative care consult and discussion of goals of care. If dialysis is something they want to pursue then next steps would be to see where current kidney function settles out at and based on degree of recovery make plans for tunneled dialysis catheter and dialysis initiation. No current indications for dialysis.    Andi Renae MD  Nephrology Staff  [phone]      REASON FOR CONSULT: Worsening CKD with hyperkalemia    HISTORY OF PRESENT ILLNESS:  Admitting provider and nursing notes reviewed  Farzad East is a 86 year old man with history of CKD, DMII, rectal cancer, and subclinical hypothyroidism who presented 9/16 with fatigue, weakness, diarrhea, and rising Cr with imaging findings concerning for impending SBO. Nephrology was consulted for worsening CKD with hyperkalemia.    The patient was recently hospitalized 8/17-9/2 progressive dyspnea thought to be 2/2 hypervolemia. Nephrology was consulted for RONY during this admission, with etiology thought to be relative hypotension +/- HFpEF +/- worsening CKD. The patient's Cr peaked at 5.8 during the admission, improving to 4.58 on discharge following liberalization of systolic BP goals to 140s-150s. Patient discharged on 2L fluid restriction, nifedipine 30mg daily, carvedilol 25mg BID, and furosemide 60mg daily.    Since pt's discharge, Cr has been trending up 4.86 -> 5.81. Patient reports reducing his fluid intake, only having fluids during meals per his son. He also  reports diarrhea following every meal for the last few days.    PAST MEDICAL HISTORY:  Reviewed  Past Medical History:   Diagnosis Date     Atrial flutter (H)      Choroidal nevus of left eye      CKD (chronic kidney disease) stage 3, GFR 30-59 ml/min      Diabetes mellitus (H)      Diabetic peripheral neuropathy (H)      Hypertension      Microalbuminuria      Overweight(278.02)      Rectal-type adenocarcinoma (H)      Retinopathies, diabetic      Vitreous detachment of both eyes        Past Surgical History:   Procedure Laterality Date     CATARACT IOL, RT/LT Bilateral 2005     left hip replacement       OPEN REDUCTION INTERNAL FIXATION FEMUR PROXIMAL  1/24/2014    Procedure: OPEN REDUCTION INTERNAL FIXATION FEMUR PROXIMAL;  Periprosthetic Fracture, ORIF Left Femur;  Surgeon: Andi Garcia MD;  Location: [facility] OR        MEDICATIONS:  PTA Meds  Prior to Admission medications    Medication Sig Last Dose Taking? Auth Provider   aspirin (ASA) 81 MG EC tablet Take 1 tablet by mouth daily    Reported, Patient   blood glucose (NO BRAND SPECIFIED) lancets standard Use to test blood sugar 1 times daily or as directed. Use brand compatible with patients insurance and device.   Jose Amin MD   blood glucose (ONETOUCH VERIO IQ) test strip Use to test blood sugar 1 time daily or as directed.   Jose Amin MD   blood glucose monitoring (ONETOUCH VERIO IQ SYSTEM) meter device kit Use to test blood sugar daily   Marcus Augustin MD   carvedilol (COREG) 25 MG tablet Take 1 tablet (25 mg) by mouth 2 times daily (with meals)   Eddie Levi MD   Cholecalciferol (VITAMIN D-3 PO) Take 1 Dose by mouth daily    Reported, Patient   COMPRESSION STOCKINGS 1 each daily Measure and fit.  Style and color per patient preference.  Doff n Wilfrid per patient need.   Eddie Levi MD   finasteride (PROSCAR) 5 MG tablet Take 1 tablet by mouth daily   Reported, Patient   furosemide (LASIX) 40 MG tablet Take 1.5  tablets (60 mg) by mouth daily   Drew Rodriguez MD   insulin aspart (NOVOLOG PEN) 100 UNIT/ML pen Inject 1 Units Subcutaneous 3 times daily (with meals) With lunch and dinner in addition to any needed sliding scale   Sandhya Mena PA-C   insulin aspart (NOVOLOG PEN) 100 UNIT/ML pen Inject 1-7 Units Subcutaneous 3 times daily (before meals) Correction Scale - MEDIUM INSULIN RESISTANCE DOSING     Do Not give Correction Insulin if Pre-Meal BG less than 140.   For Pre-Meal  - 189 give 1 unit.   For Pre-Meal  - 239 give 2 units.   For Pre-Meal  - 289 give 3 units.   For Pre-Meal  - 339 give 4 units.   For Pre-Meal - 399 give 5 units.   For Pre-Meal -449 give 6 units  For Pre-Meal BG greater than or equal to 450 give 7 units.   To be given with prandial insulin, and based on pre-meal blood glucose.    Notify provider if glucose greater than or equal to 350 mg/dL after administration of correction dose. If given at mealtime, administer within 30 minutes of start of meal   Drew Rodriguez MD   insulin glargine (LANTUS SOLOSTAR) 100 UNIT/ML pen Inject 8 Units Subcutaneous every morning   Sandhya Mena PA-C   insulin pen needle (31G X 5 MM) 31G X 5 MM miscellaneous Use1 pen needles daily or as directed.   Sandhya Mena PA-C   multivitamin w/minerals (MULTI-VITAMIN) tablet Take 1 tablet by mouth daily   Reported, Patient   NIFEdipine ER OSMOTIC (NIFEDICAL XL) 60 MG 24 hr tablet Take 60 mg by mouth daily   Reported, Patient   sennosides (SENOKOT) 8.6 MG tablet Take 2 tablets by mouth nightly as needed for constipation   Reported, Patient   SYNTHROID 25 MCG tablet Take 1 tablet (25 mcg) by mouth daily Check TSH in 8 weeks.   Sandhya Mena PA-C   tamsulosin (FLOMAX) 0.4 MG capsule Take 1 capsule (0.4 mg) by mouth daily Advise clinic if causes lightheadedness.   Sandhya Mena PA-C      Current Meds    [Held by provider] aspirin  81 mg Oral Daily     carvedilol  25 mg Oral  BID w/meals     cefTRIAXone  1 g Intravenous Q24H     finasteride  5 mg Oral Daily     [Held by provider] furosemide  60 mg Oral Daily     insulin aspart  1-6 Units Subcutaneous Q4H     levothyroxine  25 mcg Oral QAM AC     metroNIDAZOLE  500 mg Intravenous Q12H     multivitamin w/minerals  1 tablet Oral Daily     [START ON 9/18/2021] NIFEdipine ER OSMOTIC  60 mg Oral Daily     sodium chloride (PF)  3 mL Intracatheter Q8H     cholecalciferol  25 mcg Oral Daily     Infusion Meds    sodium chloride 100 mL/hr at 09/17/21 0817       ALLERGIES:    Allergies   Allergen Reactions     Hydralazine Nephrotoxicity     Sulfa Drugs      Unknown reaction. Occurred during childhood. Has not taken Sulfa medications since allergic response.        REVIEW OF SYSTEMS:  A comprehensive of systems was negative except as noted above.    SOCIAL HISTORY:   Social History     Socioeconomic History     Marital status:      Spouse name: Not on file     Number of children: Not on file     Years of education: Not on file     Highest education level: Not on file   Occupational History     Occupation:      Employer: [employer]   Tobacco Use     Smoking status: Never Smoker     Smokeless tobacco: Never Used   Substance and Sexual Activity     Alcohol use: Yes     Alcohol/week: 2.5 - 3.3 standard drinks     Types: 3 - 4 Standard drinks or equivalent per week     Comment: 3 martinis per week     Drug use: No     Sexual activity: Not on file   Other Topics Concern     Parent/sibling w/ CABG, MI or angioplasty before 65F 55M? No   Social History Narrative     Not on file     Social Determinants of Health     Financial Resource Strain:      Difficulty of Paying Living Expenses:    Food Insecurity:      Worried About Running Out of Food in the Last Year:      Ran Out of Food in the Last Year:    Transportation Needs:      Lack of Transportation (Medical):      Lack of Transportation (Non-Medical):    Physical Activity:       Days of Exercise per Week:      Minutes of Exercise per Session:    Stress:      Feeling of Stress :    Social Connections:      Frequency of Communication with Friends and Family:      Frequency of Social Gatherings with Friends and Family:      Attends [employer] Services:      Active Member of Clubs or Organizations:      Attends Club or Organization Meetings:      Marital Status:    Intimate Partner Violence:      Fear of Current or Ex-Partner:      Emotionally Abused:      Physically Abused:      Sexually Abused:      Reviewed  Patient's son accompanies Farzad East in hospital room    FAMILY MEDICAL HISTORY:   Family History   Problem Relation Age of Onset     Diabetes Father      Circulatory Father         PAD     Macular Degeneration Father      Arthritis Mother      Amblyopia No family hx of      Retinal detachment No family hx of      Glaucoma No family hx of      Reviewed with patient     PHYSICAL EXAM:   Temp  Av.1  F (36.7  C)  Min: 98  F (36.7  C)  Max: 98.1  F (36.7  C)      Pulse  Av.3  Min: 51  Max: 60 Resp  Av  Min: 16  Max: 16  SpO2  Av.7 %  Min: 97 %  Max: 99 %       /58   Pulse 52   Temp 98  F (36.7  C) (Oral)   Resp 16   Wt 71.7 kg (158 lb)   SpO2 97%   BMI 22.04 kg/m     Date 21 0700 - 21 0659   Shift 0026-5651 0241-2529 0857-0351 24 Hour Total   INTAKE   Shift Total(mL/kg)       OUTPUT   Urine 200   200   Shift Total(mL/kg) 200(2.79)   200(2.79)   Weight (kg) 71.67 71.67 71.67 71.67      Admit Weight: 71.7 kg (158 lb)     GENERAL APPEARANCE: lying on [facility], no acute distress, awake and alert  EYES: no scleral icterus, pupils equal  Pulmonary: lungs clear to auscultation anteriorly with equal breath sounds bilaterally  CV: regular rhythm, normal rate, no rub   - No edema in b/l LE  GI: soft, nontender, normal bowel sounds  MS: no evidence of inflammation in joints, no muscle tenderness  : no nick  SKIN: no rash, warm, dry, no  cyanosis  NEURO: face symmetric, moves limbs equally    LABS:   CMP  Recent Labs   Lab 09/17/21  0941 09/17/21  0613 09/17/21  0546 09/16/21 2115 09/16/21 2110 09/16/21  0550 09/16/21  0550 09/15/21  0510 09/15/21  0510   NA  --   --  137  --  136  --  136  --  137   POTASSIUM  --   --  4.5  --  4.8  --  5.0  --  5.3*   CHLORIDE  --   --  110*  --  107  --  105  --  106   CO2  --   --  20  --  20  --  19*  --  18*   ANIONGAP  --   --  7  --  9  --  12  --  13   * 93 115* 152* 158*   < > 132*   < > 104   BUN  --   --  93*  --  103*  --  94*  --  98*   CR  --   --  5.13*  --  5.72*  --  5.57*  --  5.81*   GFRESTIMATED  --   --  9*  --  8*  --  9*  --  8*   DUSTIN  --   --  8.5  --  8.5  --  8.5  --  8.6   MAG  --   --   --   --  1.7  --   --   --   --    PROTTOTAL  --   --   --   --  7.4  --   --   --   --    ALBUMIN  --   --   --   --  2.8*  --   --   --   --    BILITOTAL  --   --   --   --  0.3  --   --   --   --    ALKPHOS  --   --   --   --  74  --   --   --   --    AST  --   --   --   --  9  --   --   --   --    ALT  --   --   --   --  20  --   --   --   --     < > = values in this interval not displayed.     CBC  Recent Labs   Lab 09/17/21 0546 09/16/21 2110   HGB 9.2* 9.1*   WBC 5.7 5.8   RBC 3.31* 3.26*   HCT 29.1* 28.4*   MCV 88 87   MCH 27.8 27.9   MCHC 31.6 32.0   RDW 17.4* 17.2*    217     INRNo lab results found in last 7 days.  ABGNo lab results found in last 7 days.   URINE STUDIES  Recent Labs   Lab Test 09/16/21  2100 08/23/21  0027 08/21/21  1421 08/18/21  0946 10/26/20  1339   COLOR Light Yellow Yellow Light Yellow Straw Yellow   APPEARANCE Clear Clear Clear Clear Clear   URINEGLC 50 * Negative 30 * Negative Negative   URINEBILI Negative Negative Negative Negative Negative   URINEKETONE Negative Negative Negative Negative Negative   SG 1.011 1.013 1.009 1.010 1.020   UBLD Negative Negative Negative Negative Negative   URINEPH 5.5 5.0 5.0 5.5 5.5   PROTEIN 50 * 70 * 30 * 30 * >=300*    UROBILINOGEN  --   --   --   --  0.2   NITRITE Negative Negative Negative Negative Negative   LEUKEST Negative Negative Negative Negative Negative   RBCU <1 <1 13* <1 2-5*   WBCU <1 2 1 1 0 - 5     Recent Labs   Lab Test 08/25/21  0132 08/18/21  0946 10/26/20  1340   UTPG 0.60* 1.84* 1.49*     PTH  Recent Labs   Lab Test 10/26/20  1319 11/05/19  1411   PTHI 105* 64     IRON STUDIES  Recent Labs   Lab Test 08/18/21  0651 09/24/20  1413 12/12/17  1701 05/05/16  0825 02/25/14  1224   IRON 27* 64 28*  --   --     303 301  --   --    IRONSAT 9* 21 9*  --   --    ROSEMARIE  --  109 207 111 162       IMAGING:  All imaging studies reviewed by me.     Aris Gonzales

## 2021-09-17 NOTE — H&P
[facility]    History and Physical - [facility] Night Service        Date of Admission:  9/16/2021    Assessment & Plan      Farzad East is a 86 year old man with PMHx of CKD, Type 2 DM, hx rectal cancer, subclinical hypothyroidism with thyroid nodule who presents with fatigue, weakness, diarrhea, and worsening Cr and K who was found to have diverticulitis and a possible impending bowel obstruction (currently asymptomatic).    Diverticulitis  Diarrhea  Complains of diarrhea and anorexia for the past few days, last episode of diarrhea this AM. Denies hematochezia or melena. Lipase elevated at 983 (<3 ULN), but no epigastric pain, and no imaging evidence of pancreatitis. CTAP in ED indicative of uncomplicated diverticulitis.  Started on Cipro, flagyl in the ED but will switch to ceftriaxone during inpatient given patient's age. Also ordered C. Diff to r/o given patient's recent hospitalization.   - s/p Cipro, Flagyl in ED  - Continuing with ceftriaxone, flagyl   - C. Diff ordered  - antiemetics PRN  - NPO  - mIVF     Fatigue   Weakness  Likely multifactorial including infection such as diverticulitis, worsening CKD, deconditioning, and poor PO intake the past few days with fluid losses. Expect it will improve with fluids, treating his diverticulitis, and working with therapies.   - PT/OT consulted, appreciate recs   - Nutrition consulted, appreciate recs  - treatment of diverticulitis, as above  - continue to monitor    C/f impending bowel obstruction   Inguinal Hernia  CT in ED concerning for herniation of a short segment of small bowel into the left inguinal canal with mild air-filled and and fluid-filled loops in the inguinal canal with minimal adjacent fluid and edema. Further, the small bowel proximal to the hernia is moderately fluid-filled (but not overly dilated) which may represent developing obstruction. Patient's history less consistent with current bowel  obstruction (no N/V, no abdominal pain, last BM 9/16 AM), however as noted in the read it's is still developing. General Surgery was consulted in the ED.  - General Surgery consulted, appreciate recs  - NPO  - hold PTA ASA 81 mg  - mIVF, as above   - continue to monitor for pain     RONY on CKD  Recently has had progressively worsening kidney disease and he was even recently admitted for RONY on CKD. On presentation at last hospitalization, his Cr was 2.89 and increased to 4.58 by discharge. Nephrology was involved during this hospitalization and felt the RONY was 2/2 hypoperfusion in the setting of tightly controlled blood pressures vs drug-induced lupus 2/2 hydralazine (discontinued on 8/25/21). On 9/16, Cr 5.72. Expect that this is an RONY on CKD, likely related to poor PO intake & increased output given diarrhea. Although given hyperkalemia, likely multifactorial with underlying progression of his CKD. Will consult Nephrology and appreciate any recommendations.   - Nephrology consulted, appreciate recs   - permissive HTN with SBP goal of 140-150   - Hold PTA Lasix in setting of RONY   - Strict I/Os  - Fluids as above    Pancreatic cyst on ED Scan  CT in ED noted well-defined low-attenuation 1.4 x 2x 1.3 cm lesion on the anterior aspect of the pancreatic head that may represent a cystic lesion but remains indeterminate. No surrounding pancreatic ductal dilatation or inflammation.   - Outpatient follow-up recommended     HTN  - PTA carvedilol, nifedipine   - Goal -150 per Nephrology recommendations.   - Avoid IV hydralazine if PRN IV hypertensives needed given hx of possible drug-induced lupus     Anemia  History of Rectal Cancer  Recent baseline Hgb 7-9s. Currently at baseline. On last admission, iron panel done c/f BEVERLY with possible EPO deficiency given CKD. Patient denies recent melena or hematochezia, although per last admission discharge he endorsed dark black stools and diarrhea x 3 months. Last  colonoscopy 2015 with recommended 5 year f/u.   - Follow-up screening colonoscopy recommended outpatient    Type 2 DM  Last A1c 9.5% on 7/16/21. Pt reports recent lability of BGs between low 100s and into the 200s. Recently had his regimen changed with glipizide discontinued d/t RONY and hypoglycemia risk and sitagliptin was held on discharge with plan to use Lantus 10u at bedtime. Planned to discuss as outpatient with his PCP. Pt notes he is confused about his regimen changing and also state he is not on glargine, although his records report that is what he is using. BGs 100s in ED, will continue to monitor with sliding scale insulin and add on glargine as needed.   - moderate sliding scale insulin   - holding PTA glargine   - glucose checks  - hypoglycemia protocol     BPH  - PTA Flomax, finasteride     Osteoporosis  - PTA Vitamin D    Subclinical Hypothyroidism  Thyroid nodule  Last TSH 7.46 with free T4 1.03 on 8/16/2021. Follows with Endocrinology with Dr. Amin. Had unsuccessful thyroid biopsy attempt in 7/2021, plan for repeat 10/2021.     Chart History of Afib  On chart review, appears patient had an isolated episode of Afib and was on warfarin, but this appears to have been stopped about a decade ago. No more recent notes reporting Afib.      Diet: NPO for Medical/Clinical Reasons Except for: Meds    DVT Prophylaxis: Pneumatic Compression Devices  High Catheter: Not present  Fluids: mIVF  Central Lines: None  Code Status:  DNR/DNI    The patient's care was discussed with the patient and will be formally staffed on 9/17 AM.     Le Adams MD  [facility]  Securely message with the Vocera Web Console (learn more here)  Text page via [employer] Paging/Directory  Please see sign in/sign out for up to date coverage information  ______________________________________________________________________    Chief Complaint   Fatigue, weakness, diarrhea x  2-3 days    History is obtained from the patient    History of Present Illness   Farazd East is a 86 year old man with PMHx of CKD, Type 2 DM, hx rectal cancer, subclinical hypothyroidism with thyroid nodule who presents with fatigue, weakness, diarrhea, and worsening Cr and K. He was sent from his rehab facility ([facility]) for the above complains after assessed by a provider there on 9/16.     Starting a few days ago patient felt unwell, feeling progressively more fatigued and overall weak. Reports anorexia, diarrhea, chills. Per staff reports at his rehab center, he has been declining since earlier this week, declining to participate in therapies. Additionally, his Cr and K has looked worse this week, requiring Kayexalate. Due to his worsening labs and symptoms, the provider sent Alessio to the ED for further evaluation.    He states that everything he eats immediately goes right through him. Denies abdominal pain, nausea, or vomiting. His last BM was this AM and was diarrhea. Denies melena or hematochezia. Last ate this AM. In ED, states he is mildly hungry.    Of note, he was recently admitted from 8/17-9/2/2021 for RONY on CKD with some concern for possible drug-induced lupus 2/2 hydralazine (although ultimately there was lower concern for this d/t rapid improvement). His hospitalization was c/b treated for superficial thrombophlebitis in [facility].     Prior to the recent hospitalizations, he lived in independent living.     ED Course:  - EKG with NSR  - Elevated lipase at 983  - CTAP with c/f pancreatic cyst, possible diverticulitis, possible impending bowel obstruction related to a hernia  - started on mIVF    Review of Systems    10-point ROS was completed and was negative outside of the pertinent ROS included in the HPI.     Past Medical History    Past Medical History:   Diagnosis Date     Atrial flutter (H)      Choroidal nevus of left eye      CKD (chronic kidney disease) stage 3, GFR 30-59  ml/min      Diabetes mellitus (H)      Diabetic peripheral neuropathy (H)      Hypertension      Microalbuminuria      Overweight(278.02)      Rectal-type adenocarcinoma (H)      Retinopathies, diabetic      Vitreous detachment of both eyes        Past Surgical History   Past Surgical History:   Procedure Laterality Date     CATARACT IOL, RT/LT Bilateral      left hip replacement       OPEN REDUCTION INTERNAL FIXATION FEMUR PROXIMAL  2014    Procedure: OPEN REDUCTION INTERNAL FIXATION FEMUR PROXIMAL;  Periprosthetic Fracture, ORIF Left Femur;  Surgeon: Andi Garcia MD;  Location:  OR         Social History   Social History     Tobacco Use     Smoking status: Never Smoker     Smokeless tobacco: Never Used   Substance Use Topics     Alcohol use: Yes     Alcohol/week: 2.5 - 3.3 standard drinks     Types: 3 - 4 Standard drinks or equivalent per week     Comment: 3 martinis per week     Drug use: No         Family History   I have reviewed this patient's family history and updated it with pertinent information if needed.  Family History   Problem Relation Age of Onset     Diabetes Father      Circulatory Father         PAD     Macular Degeneration Father      Arthritis Mother      Amblyopia No family hx of      Retinal detachment No family hx of      Glaucoma No family hx of        Prior to Admission Medications   Prior to Admission Medications   Prescriptions Last Dose Informant Patient Reported? Taking?   COMPRESSION STOCKINGS   No No   Si each daily Measure and fit.  Style and color per patient preference.  Dojuliane n Wilfrid per patient need.   Cholecalciferol (VITAMIN D-3 PO)  Self Yes No   Sig: Take 1 Dose by mouth daily    NIFEdipine ER OSMOTIC (NIFEDICAL XL) 60 MG 24 hr tablet   Yes No   Sig: Take 60 mg by mouth daily   SYNTHROID 25 MCG tablet   No No   Sig: Take 1 tablet (25 mcg) by mouth daily Check TSH in 8 weeks.   aspirin (ASA) 81 MG EC tablet  Self Yes No   Sig: Take 1 tablet by mouth  daily    blood glucose (NO BRAND SPECIFIED) lancets standard  Self No No   Sig: Use to test blood sugar 1 times daily or as directed. Use brand compatible with patients insurance and device.   blood glucose (ONETOUCH VERIO IQ) test strip  Self No No   Sig: Use to test blood sugar 1 time daily or as directed.   blood glucose monitoring (ONETOUCH VERIO IQ SYSTEM) meter device kit  Self No No   Sig: Use to test blood sugar daily   carvedilol (COREG) 25 MG tablet   No No   Sig: Take 1 tablet (25 mg) by mouth 2 times daily (with meals)   finasteride (PROSCAR) 5 MG tablet  Self Yes No   Sig: Take 1 tablet by mouth daily   furosemide (LASIX) 40 MG tablet   No No   Sig: Take 1.5 tablets (60 mg) by mouth daily   insulin aspart (NOVOLOG PEN) 100 UNIT/ML pen   No No   Sig: Inject 1-7 Units Subcutaneous 3 times daily (before meals) Correction Scale - MEDIUM INSULIN RESISTANCE DOSING     Do Not give Correction Insulin if Pre-Meal BG less than 140.   For Pre-Meal  - 189 give 1 unit.   For Pre-Meal  - 239 give 2 units.   For Pre-Meal  - 289 give 3 units.   For Pre-Meal  - 339 give 4 units.   For Pre-Meal - 399 give 5 units.   For Pre-Meal -449 give 6 units  For Pre-Meal BG greater than or equal to 450 give 7 units.   To be given with prandial insulin, and based on pre-meal blood glucose.    Notify provider if glucose greater than or equal to 350 mg/dL after administration of correction dose. If given at mealtime, administer within 30 minutes of start of meal   insulin aspart (NOVOLOG PEN) 100 UNIT/ML pen   No No   Sig: Inject 1 Units Subcutaneous 3 times daily (with meals) With lunch and dinner in addition to any needed sliding scale   insulin glargine (LANTUS SOLOSTAR) 100 UNIT/ML pen   No No   Sig: Inject 8 Units Subcutaneous every morning   insulin pen needle (31G X 5 MM) 31G X 5 MM miscellaneous   No No   Sig: Use1 pen needles daily or as directed.   multivitamin w/minerals (MULTI-VITAMIN)  tablet  Self Yes No   Sig: Take 1 tablet by mouth daily   sennosides (SENOKOT) 8.6 MG tablet   Yes No   Sig: Take 2 tablets by mouth nightly as needed for constipation   tamsulosin (FLOMAX) 0.4 MG capsule  Self No No   Sig: Take 1 capsule (0.4 mg) by mouth daily Advise clinic if causes lightheadedness.      Facility-Administered Medications: None     Allergies   Allergies   Allergen Reactions     Hydralazine Nephrotoxicity     Sulfa Drugs      Unknown reaction. Occurred during childhood. Has not taken Sulfa medications since allergic response.        Physical Exam   Vital Signs: Temp: 98  F (36.7  C) Temp src: Oral BP: (!) 147/65 Pulse: 58   Resp: 16 SpO2: 99 %      Weight: 158 lbs 0 oz    General: Frail appearing man in NAD. Resting comfortably.  HEENT: NCAT, EOMI. Hard of hearing.   CV: RRR, no m/r/g appreciated  Pulm: CTAB, no wheezes, crackles  Abdominal: Soft, non-tender, nondistended. No rebound tenderness or guarding.   Extremities: Minimal LE edema, chronic RLE > LLE swelling, per pt.   Neuro: A&O. Moving all 4 limbs spontaneously.   Skin: No acute rashes.     Data   Data reviewed today: I reviewed all medications, new labs and imaging results over the last 24 hours.    Recent Labs   Lab 09/16/21 2115 09/16/21 2110 09/16/21  0550 09/15/21  0510 09/15/21  0510 09/10/21  0947 09/10/21  0947   WBC  --  5.8  --   --   --   --  5.4   HGB  --  9.1*  --   --   --   --  9.4*   MCV  --  87  --   --   --   --  88   PLT  --  217  --   --   --   --  233   NA  --  136 136  --  137   < > 137   POTASSIUM  --  4.8 5.0  --  5.3*   < > 5.0   CHLORIDE  --  107 105  --  106   < > 104   CO2  --  20 19*  --  18*   < > 21*   BUN  --  103* 94*  --  98*   < > 89*   CR  --  5.72* 5.57*  --  5.81*   < > 5.21*   ANIONGAP  --  9 12  --  13   < > 12   DUSTIN  --  8.5 8.5  --  8.6   < > 8.7   * 158* 132*   < > 104   < > 156*   ALBUMIN  --  2.8*  --   --   --   --   --    PROTTOTAL  --  7.4  --   --   --   --   --    BILITOTAL  --   0.3  --   --   --   --   --    ALKPHOS  --  74  --   --   --   --   --    ALT  --  20  --   --   --   --   --    AST  --  9  --   --   --   --   --    LIPASE  --  983*  --   --   --   --   --    TROPONIN  --  <0.015  --   --   --   --   --     < > = values in this interval not displayed.     Recent Results (from the past 24 hour(s))   CT Abdomen Pelvis w/o Contrast   Result Value    Radiologist flags Pancreatic cyst    Narrative    EXAM: CT ABDOMEN PELVIS W/O CONTRAST  LOCATION: [facility]  DATE/TIME: 9/16/2021 10:19 PM    INDICATION: Epigastric abdominal pain  COMPARISON: None.  TECHNIQUE: CT scan of the abdomen and pelvis was performed without IV contrast. Multiplanar reformats were obtained. Dose reduction techniques were used.  CONTRAST: None.    FINDINGS:   LOWER CHEST: Minimal bilateral pleural fluid collections. Atelectasis involving both lung bases. Calcified left hilar lymph nodes. Mild cardiac enlargement. Marked coronary artery calcification.    HEPATOBILIARY: Cholelithiasis. Noncontrast liver unremarkable. No significant biliary dilatation.    PANCREAS: Well-defined low-attenuation lesion anterior aspect of the pancreatic head may represent a cystic lesion but remains ultimately indeterminate measuring 1.4 x 2 x 1.3 cm (series 5, image 166 and series 3, image 38). No pancreatic ductal   dilatation or acute surrounding inflammatory change.    SPLEEN: Normal size spleen. Splenic granulomas.    ADRENAL GLANDS: Normal.    KIDNEYS/BLADDER: No urinary collecting system dilatation or calculi allowing for the streak artifact in the pelvis.    BOWEL: Herniation of a short segment of small bowel into the left inguinal canal with mild dilatation and minimal adjacent fluid. Although there is no evidence for significant dilatation of the proximal loops of small bowel, they are fluid-filled   throughout their course down to the level of the hernia. Extensive colonic  diverticulosis. Small focus of acute diverticulitis at the junction of the descending colon and proximal sigmoid colon. No perforation or abscess formation. Appendectomy.    LYMPH NODES: No lymphadenopathy.    VASCULATURE: Normal caliber aorta. Moderate aortic calcification.    PELVIC ORGANS: Marked prostate enlargement.    MUSCULOSKELETAL: Left KIMBERLY. Moderate degenerative osteoarthrosis right hip. Degenerative changes throughout the spine.      Impression    IMPRESSION:   1.  Herniation of a short segment of small bowel into the left inguinal canal with mild air-filled and fluid-filled loops in the inguinal canal with minimal adjacent fluid and edema. The proximal small bowel up to the level of the herniation is   moderately fluid-filled although not overtly dilated. Findings may be indicative of developing obstruction related to a small bowel containing left inguinal hernia.    2.  Evidence for acute uncomplicated diverticulitis involving the proximal sigmoid colon and descending colonic junction.    3.  Marked prostate enlargement.    4.  Low-attenuation indeterminant probable cystic lesion at the pancreatic head measuring 1.4 x 2 x 1.3 cm. Recommend nonemergent contrast-enhanced MRI of the pancreas for further evaluation.    5.  Cholelithiasis without evidence for biliary dilatation.      [Recommend Follow Up: Pancreatic cyst]    This report will be copied to the [facility] to ensure a provider acknowledges the finding.   1.

## 2021-09-17 NOTE — ED NOTES
[facility]   ED Nurse to Floor Handoff     Farzad East is a 86 year old adult who speaks English and lives unknown,  in a nursing home  They arrived in the ED by ambulance from [facility]     ED Chief Complaint: Fatigue, Nausea, and Diarrhea    ED Dx;   Final diagnoses:   Other fatigue   Nausea   Diarrhea, unspecified type   Acute pancreatitis, unspecified complication status, unspecified pancreatitis type   Diverticulitis of colon   SBO (small bowel obstruction) (H)         Needed?: No    Allergies:   Allergies   Allergen Reactions     Hydralazine Nephrotoxicity     Sulfa Drugs      Unknown reaction. Occurred during childhood. Has not taken Sulfa medications since allergic response.    .  Past Medical Hx:   Past Medical History:   Diagnosis Date     Atrial flutter (H)      Choroidal nevus of left eye      CKD (chronic kidney disease) stage 3, GFR 30-59 ml/min      Diabetes mellitus (H)      Diabetic peripheral neuropathy (H)      Hypertension      Microalbuminuria      Overweight(278.02)      Rectal-type adenocarcinoma (H)      Retinopathies, diabetic      Vitreous detachment of both eyes       Baseline Mental status: WDL  Current Mental Status changes: at basesline    Infection present or suspected this encounter: yes enteric  Sepsis suspected: No  Isolation type: No active isolations  Patient tested for COVID 19 prior to admission: NO     Activity level - Baseline/Home:  Stand with assist x2, Cane and Wheelchair  Activity Level - Current:   Stand with assist x2, Cane and Walker    Bariatric equipment needed?: No    In the ED these meds were given: Medications - No data to display    Drips running?  No    Home pump  No    Current LDAs  ETT (adult) 8  (Active)   Number of days:        ETT (adult) (Active)   Number of days:        Wound 01/31/14 Left;Posterior Thigh Abrasion(s) (Active)   Number of days: 2785       Wound 09/24/20 Right  Ear Other (comment) (Active)   Number of days: 357       Wound Leg Skin tear (Active)   Number of days: 28       Rash 02/08/14 0900 Left ankle (Active)   Number of days: 2777       Incision/Surgical Site 01/24/14 Left;Lateral;Midline Hip (Active)   Number of days: 2792       Labs results:   Labs Ordered and Resulted from Time of ED Arrival Up to the Time of Departure from the ED   COMPREHENSIVE METABOLIC PANEL - Abnormal; Notable for the following components:       Result Value    Urea Nitrogen 103 (*)     Creatinine 5.72 (*)     Glucose 158 (*)     Albumin 2.8 (*)     GFR Estimate 8 (*)     All other components within normal limits    Narrative:     The sex of this patient cannot be reliably determined based on discrepancies in demographics (legal sex, sex assigned at birth, gender identity).  Both male and female reference ranges are provided where applicable.  Careful evaluation of the patient s results as compared to the gender specific reference intervals is required in this setting.    LIPASE - Abnormal; Notable for the following components:    Lipase 983 (*)     All other components within normal limits   ROUTINE UA WITH MICROSCOPIC REFLEX TO CULTURE - Abnormal; Notable for the following components:    Glucose Urine 50  (*)     Protein Albumin Urine 50  (*)     All other components within normal limits    Narrative:     Urine Culture not indicated   CBC WITH PLATELETS AND DIFFERENTIAL - Abnormal; Notable for the following components:    RBC Count 3.26 (*)     Hemoglobin 9.1 (*)     Hematocrit 28.4 (*)     RDW 17.2 (*)     All other components within normal limits    Narrative:     The sex of this patient cannot be reliably determined based on discrepancies in demographics (legal sex, sex assigned at birth, gender identity).  Both male and female reference ranges are provided where applicable.  Careful evaluation of the patient s results as compared to the gender specific reference intervals is required in  this setting.    GLUCOSE BY METER - Abnormal; Notable for the following components:    GLUCOSE BY METER POCT 152 (*)     All other components within normal limits   TROPONIN I - Normal   MAGNESIUM - Normal   EXTRA RED TOP TUBE   EXTRA GREEN TOP (LITHIUM HEPARIN) TUBE   EXTRA PURPLE TOP TUBE   GLUCOSE MONITOR NURSING POCT   PERIPHERAL IV CATHETER   CLOSTRIDIUM DIFFICILE TOXIN B   ENTERIC BACTERIA AND VIRUS PANEL BY GEOFFREY STOOL   EXTRA TUBE    Narrative:     The following orders were created for panel order [place] Draw.  Procedure                               Abnormality         Status                     ---------                               -----------         ------                     Extra Red Top Tube[[id number]]                               Final result               Extra Green Top (Lithium...[[id number]]                      Final result               Extra Purple Top Tube[[id number]]                            Final result                 Please view results for these tests on the individual orders.   CBC WITH PLATELETS & DIFFERENTIAL    Narrative:     The following orders were created for panel order CBC with platelets differential.  Procedure                               Abnormality         Status                     ---------                               -----------         ------                     CBC with platelets and d...[[id number]]  Abnormal            Final result                 Please view results for these tests on the individual orders.       Imaging Studies:   Recent Results (from the past 24 hour(s))   CT Abdomen Pelvis w/o Contrast   Result Value    Radiologist flags Pancreatic cyst    Narrative    EXAM: CT ABDOMEN PELVIS W/O CONTRAST  LOCATION: [facility]  DATE/TIME: 9/16/2021 10:19 PM    INDICATION: Epigastric abdominal pain  COMPARISON: None.  TECHNIQUE: CT scan of the abdomen and pelvis was performed without IV contrast. Multiplanar  reformats were obtained. Dose reduction techniques were used.  CONTRAST: None.    FINDINGS:   LOWER CHEST: Minimal bilateral pleural fluid collections. Atelectasis involving both lung bases. Calcified left hilar lymph nodes. Mild cardiac enlargement. Marked coronary artery calcification.    HEPATOBILIARY: Cholelithiasis. Noncontrast liver unremarkable. No significant biliary dilatation.    PANCREAS: Well-defined low-attenuation lesion anterior aspect of the pancreatic head may represent a cystic lesion but remains ultimately indeterminate measuring 1.4 x 2 x 1.3 cm (series 5, image 166 and series 3, image 38). No pancreatic ductal   dilatation or acute surrounding inflammatory change.    SPLEEN: Normal size spleen. Splenic granulomas.    ADRENAL GLANDS: Normal.    KIDNEYS/BLADDER: No urinary collecting system dilatation or calculi allowing for the streak artifact in the pelvis.    BOWEL: Herniation of a short segment of small bowel into the left inguinal canal with mild dilatation and minimal adjacent fluid. Although there is no evidence for significant dilatation of the proximal loops of small bowel, they are fluid-filled   throughout their course down to the level of the hernia. Extensive colonic diverticulosis. Small focus of acute diverticulitis at the junction of the descending colon and proximal sigmoid colon. No perforation or abscess formation. Appendectomy.    LYMPH NODES: No lymphadenopathy.    VASCULATURE: Normal caliber aorta. Moderate aortic calcification.    PELVIC ORGANS: Marked prostate enlargement.    MUSCULOSKELETAL: Left KIMBERLY. Moderate degenerative osteoarthrosis right hip. Degenerative changes throughout the spine.      Impression    IMPRESSION:   1.  Herniation of a short segment of small bowel into the left inguinal canal with mild air-filled and fluid-filled loops in the inguinal canal with minimal adjacent fluid and edema. The proximal small bowel up to the level of the herniation is    moderately fluid-filled although not overtly dilated. Findings may be indicative of developing obstruction related to a small bowel containing left inguinal hernia.    2.  Evidence for acute uncomplicated diverticulitis involving the proximal sigmoid colon and descending colonic junction.    3.  Marked prostate enlargement.    4.  Low-attenuation indeterminant probable cystic lesion at the pancreatic head measuring 1.4 x 2 x 1.3 cm. Recommend nonemergent contrast-enhanced MRI of the pancreas for further evaluation.    5.  Cholelithiasis without evidence for biliary dilatation.      [Recommend Follow Up: Pancreatic cyst]    This report will be copied to the [facility] to ensure a provider acknowledges the finding.   1.           Recent vital signs:   BP (!) 147/65   Pulse 58   Temp 98  F (36.7  C) (Oral)   Resp 16   Wt 71.7 kg (158 lb)   SpO2 99%   BMI 22.04 kg/m      Guillermina Coma Scale Score: 15 (09/16/21 1729)       Cardiac Rhythm: Normal Sinus  Pt needs tele? No  Skin/wound Issues: None    Code Status: DNR / DNI    Pain control: good    Nausea control: good    Abnormal labs/tests/findings requiring intervention: See results    Family present during ED course? Yes   Family Comments/Social Situation comments: none    Tasks needing completion: Pt very hard of hearing    Marcie Mcclain, RN  [facility] --   [phone] [place] [facility]  [phone] [facility]

## 2021-09-17 NOTE — CONSULTS
General Surgery Consultation    Farzad East MRN# [id number]   Age: 86 year old YOB: 1935     Date of Admission:  9/16/2021    Date of Consult:   9/16/21    Reason for consult: Inguinal hernia       Requesting service: Emergency medicine; requesting provider: Dr. Rivera                   Assessment and Plan:   Assessment:  87 yo male with h/o Afib, CKD, T2DM, CHF, chronic anemia, now with acute on chronic renal failure in the setting of uncomplicated acute diverticulitis, incidental pancreatic head cyst, and known left inguinal hernia. No clinical signs of obstruction. Hernia is reducible.    Plan:  - Patient does not wish to have the hernia repaired. It is not causing discomfort, is easily reducible, and he is not obstructed  - Recommend medical management for uncomplicated diverticulitis and acute on chronic renal failure  - Recommend outpatient MRI or endoscopic ultrasound for further characterization of pancreatic head cyst  - General surgery will sign off. Please call with questions  - Recommendations were communicated to the ED staff    Discussed with chief, Dr. Frausto, and staff, Dr. Mercedes, who agree with the above plan.    Jimmie Jones MD  Surgery resident  [phone]           Chief Complaint:   Fatigue, nausea, diarrhea         History of Present Illness:   Alessio East is an 87 yo male with h/o Afib, CKD, T2DM, CHF, anemia, rectal cancer s/p endoscopic resection, hypertension, chronic left inguinal hernia, and distant h/o open appendectomy who presents to the ED with several days of nausea, fatigue, and a week of diarrhea. No fever, chills, or vomiting. Reports decreased appetite over the past week but he is unsure if weight loss. Diarrhea for the last week, last BM this morning. He has a left inguinal hernia diagnosed last December. He has been asymptomatic from this, denies inguinal or abdominal pain, bloating, or constipation. He was scheduled for elective hernia repair with Dr. 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 Of note, previous rectal cancer was endoscopically resected, repeat colonoscopy negative, last was 2015.  In the ED today he is hypertensive with worsening renal function, (Cr 5.72), no leukocytosis, mildly elevated lipase (~1,000) and stable hemoglobin. CT scan demonstrates the known left inguinal hernia, minimally-dilated loops of fluid-filled small bowel, extensive diverticulitis, and very small focus of acute diverticulitis at the descending colon.               Past Medical History:     Past Medical History:   Diagnosis Date     Atrial flutter (H)      Choroidal nevus of left eye      CKD (chronic kidney disease) stage 3, GFR 30-59 ml/min      Diabetes mellitus (H)      Diabetic peripheral neuropathy (H)      Hypertension      Microalbuminuria      Overweight(278.02)      Rectal-type adenocarcinoma (H)      Retinopathies, diabetic      Vitreous detachment of both eyes              Past Surgical History:     Past Surgical History:   Procedure Laterality Date     CATARACT IOL, RT/LT Bilateral 2005     left hip replacement       OPEN REDUCTION INTERNAL FIXATION FEMUR PROXIMAL  1/24/2014    Procedure: OPEN REDUCTION INTERNAL FIXATION FEMUR PROXIMAL;  Periprosthetic Fracture, ORIF Left Femur;  Surgeon: Andi Garcia MD;  Location: [facility] OR             Social History:     Social History     Tobacco Use     Smoking status: Never Smoker     Smokeless tobacco: Never Used   Substance Use Topics     Alcohol use: Yes     Alcohol/week: 2.5 - 3.3 standard drinks     Types: 3 - 4 Standard drinks or equivalent per week     Comment: 3 martinis per week             Family History:     Family History   Problem Relation Age of Onset     Diabetes Father      Circulatory Father         PAD     Macular Degeneration Father      Arthritis Mother       Amblyopia No family hx of      Retinal detachment No family hx of      Glaucoma No family hx of                 Allergies:     Allergies   Allergen Reactions     Hydralazine Nephrotoxicity     Sulfa Drugs      Unknown reaction. Occurred during childhood. Has not taken Sulfa medications since allergic response.              Medications:     No current facility-administered medications for this encounter.     Current Outpatient Medications   Medication Sig     aspirin (ASA) 81 MG EC tablet Take 1 tablet by mouth daily      blood glucose (NO BRAND SPECIFIED) lancets standard Use to test blood sugar 1 times daily or as directed. Use brand compatible with patients insurance and device.     blood glucose (ONETOUCH VERIO IQ) test strip Use to test blood sugar 1 time daily or as directed.     blood glucose monitoring (ONETOUCH VERIO IQ SYSTEM) meter device kit Use to test blood sugar daily     carvedilol (COREG) 25 MG tablet Take 1 tablet (25 mg) by mouth 2 times daily (with meals)     Cholecalciferol (VITAMIN D-3 PO) Take 1 Dose by mouth daily      COMPRESSION STOCKINGS 1 each daily Measure and fit.  Style and color per patient preference.  Doff n Wilfrid per patient need.     finasteride (PROSCAR) 5 MG tablet Take 1 tablet by mouth daily     furosemide (LASIX) 40 MG tablet Take 1.5 tablets (60 mg) by mouth daily     insulin aspart (NOVOLOG PEN) 100 UNIT/ML pen Inject 1 Units Subcutaneous 3 times daily (with meals) With lunch and dinner in addition to any needed sliding scale     insulin aspart (NOVOLOG PEN) 100 UNIT/ML pen Inject 1-7 Units Subcutaneous 3 times daily (before meals) Correction Scale - MEDIUM INSULIN RESISTANCE DOSING     Do Not give Correction Insulin if Pre-Meal BG less than 140.   For Pre-Meal  - 189 give 1 unit.   For Pre-Meal  - 239 give 2 units.   For Pre-Meal  - 289 give 3 units.   For Pre-Meal  - 339 give 4 units.   For Pre-Meal - 399 give 5 units.   For Pre-Meal BG  400-449 give 6 units  For Pre-Meal BG greater than or equal to 450 give 7 units.   To be given with prandial insulin, and based on pre-meal blood glucose.    Notify provider if glucose greater than or equal to 350 mg/dL after administration of correction dose. If given at mealtime, administer within 30 minutes of start of meal     insulin glargine (LANTUS SOLOSTAR) 100 UNIT/ML pen Inject 8 Units Subcutaneous every morning     insulin pen needle (31G X 5 MM) 31G X 5 MM miscellaneous Use1 pen needles daily or as directed.     multivitamin w/minerals (MULTI-VITAMIN) tablet Take 1 tablet by mouth daily     NIFEdipine ER OSMOTIC (NIFEDICAL XL) 60 MG 24 hr tablet Take 60 mg by mouth daily     sennosides (SENOKOT) 8.6 MG tablet Take 2 tablets by mouth nightly as needed for constipation     SYNTHROID 25 MCG tablet Take 1 tablet (25 mcg) by mouth daily Check TSH in 8 weeks.     tamsulosin (FLOMAX) 0.4 MG capsule Take 1 capsule (0.4 mg) by mouth daily Advise clinic if causes lightheadedness.               Review of Systems:   A 10 point review of systems was conducted and found to be negative unless otherwise noted in the above HPI.          Physical Exam:   BP (!) 147/65   Pulse 58   Temp 98  F (36.7  C) (Oral)   Resp 16   Wt 71.7 kg (158 lb)   SpO2 99%   BMI 22.04 kg/m      No intake or output data in the 24 hours ending 09/16/21 1838    GEN: A&Ox3, no acute distress  NEURO: CN II-XII grossly intact, although very hard of hearing  RESP: Nonlabored breathing on room air, no cough  CV: RRR by radial pulse, noncyanotic  ABD: soft, non-tender, minimally distended. No guarding or rebound tenderness.  Soft, nontender left groin mass, easily reducible. No overlaying skin changes  MSK: Moves all extremities independently. Normal active range of motion.  PSYCH: cooperative           Data:   All laboratory data reviewed    Results:  BMP  Recent Labs   Lab 09/16/21 2115 09/16/21  2110 09/16/21  0550 09/15/21  0510  09/10/21  0947 09/10/21  0947   NA  --  136 136 137  --  137   POTASSIUM  --  4.8 5.0 5.3*  --  5.0   CHLORIDE  --  107 105 106  --  104   CO2  --  20 19* 18*  --  21*   BUN  --  103* 94* 98*  --  89*   CR  --  5.72* 5.57* 5.81*  --  5.21*   * 158* 132* 104   < > 156*    < > = values in this interval not displayed.     CBC  Recent Labs   Lab 09/16/21  2110 09/10/21  0947   WBC 5.8 5.4   HGB 9.1* 9.4*    233     LFT  Recent Labs   Lab 09/16/21 2110   AST 9   ALT 20   ALKPHOS 74   BILITOTAL 0.3   ALBUMIN 2.8*     Recent Labs   Lab 09/16/21 2115 09/16/21 2110 09/16/21  0550 09/15/21  0510 09/10/21  0947   * 158* 132* 104 156*       Imaging:  Results for orders placed or performed during the hospital encounter of 09/16/21   CT Abdomen Pelvis w/o Contrast   Result Value Ref Range    Radiologist flags Pancreatic cyst     Impression    IMPRESSION:   1.  Herniation of a short segment of small bowel into the left inguinal canal with mild air-filled and fluid-filled loops in the inguinal canal with minimal adjacent fluid and edema. The proximal small bowel up to the level of the herniation is   moderately fluid-filled although not overtly dilated. Findings may be indicative of developing obstruction related to a small bowel containing left inguinal hernia.    2.  Evidence for acute uncomplicated diverticulitis involving the proximal sigmoid colon and descending colonic junction.    3.  Marked prostate enlargement.    4.  Low-attenuation indeterminant probable cystic lesion at the pancreatic head measuring 1.4 x 2 x 1.3 cm. Recommend nonemergent contrast-enhanced MRI of the pancreas for further evaluation.    5.  Cholelithiasis without evidence for biliary dilatation.      [Recommend Follow Up: Pancreatic cyst]    This report will be copied to the [facility] to ensure a provider acknowledges the finding.   1.

## 2021-09-17 NOTE — PROGRESS NOTES
"CLINICAL NUTRITION SERVICES - ASSESSMENT NOTE     Nutrition Prescription    RECOMMENDATIONS FOR MDs/PROVIDERS TO ORDER:  Diet advancement as medically appropriate     Malnutrition Status:    Severe malnutrition in the context of acute on chronic illness    Recommendations already ordered by Registered Dietitian (RD):  - PRN Ensure Clear   - Ordered zinc lab     Future/Additional Recommendations:  - Monitor for diet advancement   - Monitor for results of C. Diff   - Monitor adequacy of PO intake once diet advances. If documentation indicates that pt is consuming <50% nutritionally adequate meals TID, recommend:    Provide additional nutrition education on strategies to increase PO intake    Adjust supplement schedule per pt preference    Calorie Counts     REASON FOR ASSESSMENT  Farzad East is a/an 86 year old adult assessed by the dietitian for Provider Order - malnutrition    Clinial History: CKD, Type 2 DM, hx rectal cancer, subclinical hypothyroidism with thyroid nodule who presents with fatigue, weakness, diarrhea, and worsening Cr and K who was found to have diverticulitis and a possible impending bowel obstruction (currently asymptomatic).    NUTRITION HISTORY  Pt reports that at the TCU he was on a regular diet and mostly eating foods like soup. Pt with poor PO intake and diarrhea over the past week.   Pt was recently seen by RD on 8/26. At that time the pt's appetite was improving and RD noted pt with a history of chronic diarrhea.     CURRENT NUTRITION ORDERS  Diet: Clear liquids   Intake/Tolerance: Pt had lemonade and cranberry juice so far today.     LABS  Labs reviewed  Cr 5.13 (H)    MEDICATIONS  Medications reviewed  Novolog  Thera-vit-m  Vit D3  Sodium chloride 0.9% at 100 mL/hr    ANTHROPOMETRICS  Height: 5' 11\"   Most Recent Weight: 71.7 kg (158 lb)    IBW: 78.2 kg (92%)   BMI: Normal BMI  Weight History: Weight loss of 4.1 kg (5.4%) over the past ~2.5 weeks.   Wt Readings from Last 10 " Encounters:   09/16/21 71.7 kg (158 lb)   09/16/21 72.6 kg (160 lb)   09/13/21 74.1 kg (163 lb 6.4 oz)   09/09/21 73.5 kg (162 lb)   09/07/21 73.5 kg (162 lb)   09/01/21 75.8 kg (167 lb 3.2 oz)   03/11/21 82.1 kg (181 lb)   02/18/21 83.1 kg (183 lb 1.6 oz)   11/04/20 78 kg (172 lb)   09/29/20 78 kg (172 lb)     Dosing Weight: 72 kg (admit weight)     ASSESSED NUTRITION NEEDS  Estimated Energy Needs: 5690-6246 kcals/day (30 - 35 kcals/kg )  Justification: Repletion  Estimated Protein Needs:  grams protein/day (1.2 - 1.5 grams of pro/kg)  Justification: Repletion  Estimated Fluid Needs: 8453-9045 mL/day (25 - 30 mL/kg)   Justification: Maintenance    PHYSICAL FINDINGS  See malnutrition section below.  - Visual assessment only (short visit due to pt requesting to speak with RN)    MALNUTRITION  % Intake: < 75% for > 7 days (non-severe)  % Weight Loss: > 5% in 1 month (severe)  Subcutaneous Fat Loss: Facial region: mild- visual assessment   Muscle Loss: Temporal and Thoracic region (clavicle, acromium bone, deltoid, trapezius, pectoral): Mild- visual assessment   Fluid Accumulation/Edema: None noted  Malnutrition Diagnosis: Severe malnutrition in the context of acute on chronic illness    NUTRITION DIAGNOSIS  Inadequate oral intake related to decreased appetite 2/2 diarrhea as evidenced by pt report and weight loss of 5.4% over the past 2.5 weeks.       INTERVENTIONS  Implementation  Medical food supplement therapy: Ensure Clear PRN      Goals  Diet adv v nutrition support within 2-3 days.     Monitoring/Evaluation  Progress toward goals will be monitored and evaluated per protocol.    Kusum Martínez, RD, LD  6D RD pager [phone]

## 2021-09-18 LAB
ANION GAP SERPL CALCULATED.3IONS-SCNC: 8 MMOL/L (ref 3–14)
BUN SERPL-MCNC: 84 MG/DL (ref 7–30)
CALCIUM SERPL-MCNC: 8.2 MG/DL (ref 8.5–10.1)
CHLORIDE BLD-SCNC: 114 MMOL/L (ref 94–109)
CO2 SERPL-SCNC: 18 MMOL/L (ref 20–32)
CREAT SERPL-MCNC: 4.76 MG/DL (ref 0.52–1.25)
ERYTHROCYTE [DISTWIDTH] IN BLOOD BY AUTOMATED COUNT: 17.2 % (ref 10–15)
GFR SERPL CREATININE-BSD FRML MDRD: 10 ML/MIN/1.73M2
GLUCOSE BLD-MCNC: 90 MG/DL (ref 70–99)
GLUCOSE BLDC GLUCOMTR-MCNC: 126 MG/DL (ref 70–99)
GLUCOSE BLDC GLUCOMTR-MCNC: 149 MG/DL (ref 70–99)
GLUCOSE BLDC GLUCOMTR-MCNC: 174 MG/DL (ref 70–99)
GLUCOSE BLDC GLUCOMTR-MCNC: 84 MG/DL (ref 70–99)
GLUCOSE BLDC GLUCOMTR-MCNC: 92 MG/DL (ref 70–99)
HCT VFR BLD AUTO: 29.2 % (ref 35–53)
HGB BLD-MCNC: 9.2 G/DL (ref 11.7–17.7)
MCH RBC QN AUTO: 27.7 PG (ref 26.5–33)
MCHC RBC AUTO-ENTMCNC: 31.5 G/DL (ref 31.5–36.5)
MCV RBC AUTO: 88 FL (ref 78–100)
PLATELET # BLD AUTO: 207 10E3/UL (ref 150–450)
POTASSIUM BLD-SCNC: 4.7 MMOL/L (ref 3.4–5.3)
RBC # BLD AUTO: 3.32 10E6/UL (ref 3.8–5.9)
SODIUM SERPL-SCNC: 140 MMOL/L (ref 133–144)
WBC # BLD AUTO: 5.7 10E3/UL (ref 4–11)

## 2021-09-18 PROCEDURE — 80048 BASIC METABOLIC PNL TOTAL CA: CPT | Performed by: INTERNAL MEDICINE

## 2021-09-18 PROCEDURE — 36415 COLL VENOUS BLD VENIPUNCTURE: CPT | Performed by: INTERNAL MEDICINE

## 2021-09-18 PROCEDURE — 36415 COLL VENOUS BLD VENIPUNCTURE: CPT | Performed by: STUDENT IN AN ORGANIZED HEALTH CARE EDUCATION/TRAINING PROGRAM

## 2021-09-18 PROCEDURE — 250N000011 HC RX IP 250 OP 636: Performed by: STUDENT IN AN ORGANIZED HEALTH CARE EDUCATION/TRAINING PROGRAM

## 2021-09-18 PROCEDURE — 250N000013 HC RX MED GY IP 250 OP 250 PS 637

## 2021-09-18 PROCEDURE — 99232 SBSQ HOSP IP/OBS MODERATE 35: CPT | Performed by: INTERNAL MEDICINE

## 2021-09-18 PROCEDURE — 258N000003 HC RX IP 258 OP 636: Performed by: INTERNAL MEDICINE

## 2021-09-18 PROCEDURE — 85027 COMPLETE CBC AUTOMATED: CPT | Performed by: INTERNAL MEDICINE

## 2021-09-18 PROCEDURE — 84630 ASSAY OF ZINC: CPT | Performed by: STUDENT IN AN ORGANIZED HEALTH CARE EDUCATION/TRAINING PROGRAM

## 2021-09-18 PROCEDURE — 250N000013 HC RX MED GY IP 250 OP 250 PS 637: Performed by: INTERNAL MEDICINE

## 2021-09-18 PROCEDURE — 120N000002 HC R&B MED SURG/OB UMMC

## 2021-09-18 PROCEDURE — 250N000013 HC RX MED GY IP 250 OP 250 PS 637: Performed by: STUDENT IN AN ORGANIZED HEALTH CARE EDUCATION/TRAINING PROGRAM

## 2021-09-18 RX ORDER — CARVEDILOL 25 MG/1
25 TABLET ORAL 2 TIMES DAILY WITH MEALS
Status: DISCONTINUED | OUTPATIENT
Start: 2021-09-18 | End: 2021-09-22

## 2021-09-18 RX ADMIN — SODIUM CHLORIDE, POTASSIUM CHLORIDE, SODIUM LACTATE AND CALCIUM CHLORIDE 1000 ML: 600; 310; 30; 20 INJECTION, SOLUTION INTRAVENOUS at 12:16

## 2021-09-18 RX ADMIN — FINASTERIDE 5 MG: 5 TABLET, FILM COATED ORAL at 08:20

## 2021-09-18 RX ADMIN — CEFTRIAXONE SODIUM 1 G: 1 INJECTION, POWDER, FOR SOLUTION INTRAMUSCULAR; INTRAVENOUS at 10:01

## 2021-09-18 RX ADMIN — NIFEDIPINE 60 MG: 60 TABLET, EXTENDED RELEASE ORAL at 08:20

## 2021-09-18 RX ADMIN — Medication 1 TABLET: at 08:20

## 2021-09-18 RX ADMIN — CARVEDILOL 25 MG: 25 TABLET, FILM COATED ORAL at 17:34

## 2021-09-18 RX ADMIN — LEVOTHYROXINE SODIUM 25 MCG: 0.03 TABLET ORAL at 08:20

## 2021-09-18 RX ADMIN — Medication 25 MCG: at 08:20

## 2021-09-18 RX ADMIN — CARVEDILOL 25 MG: 25 TABLET, FILM COATED ORAL at 08:20

## 2021-09-18 RX ADMIN — METRONIDAZOLE 500 MG: 500 INJECTION, SOLUTION INTRAVENOUS at 12:19

## 2021-09-18 ASSESSMENT — ACTIVITIES OF DAILY LIVING (ADL)
ADLS_ACUITY_SCORE: 14
ADLS_ACUITY_SCORE: 16
ADLS_ACUITY_SCORE: 16
ADLS_ACUITY_SCORE: 14

## 2021-09-18 NOTE — PROGRESS NOTES
Pt slept majority of shift, left PIV pulled out when caught in the sheets. New PIV placed in the right, fluids running. Urinary frequency, keep urinal at bedside. Family would like to be notified when patient is transferred to unit. Patient has not stooled, Cdiff pending. Pt denied pain, small sips of water. Continue to monitor.

## 2021-09-18 NOTE — PROGRESS NOTES
[facility]    Progress Note - Violetta Dangelo Service        Date of Admission:  9/16/2021    Assessment & Plan   Farzad East is a 86 year old man with PMHx of CKD, Type 2 DM, hx rectal cancer, subclinical hypothyroidism with thyroid nodule who presents with fatigue, weakness and diarrhea and was found to have diverticulitis, possible bowel obstruction (although asymptomatic) and Cr of 5.13 consistent with RONY with likely component of worsening CKD.      Changes Today:   -continue antibiotics  -continue CLD  -LR bolus  -restart carvedilol      #Acute Diverticulitis  #Diarrhea  Patient reported diarrhea and anorexia for the past few days PTA. Denied hematochezia or melena. Lipase elevated at 983 (<3 ULN), but no epigastric pain, and no imaging evidence of pancreatitis. CTAP in ED indicative of uncomplicated diverticulitis. Started on Cipro, flagyl in the ED but switched to ceftriaxone and flagyl. Also ordered C. Diff to r/o given patient's recent hospitalization.   - Continue ceftriaxone, flagyl   - C. Diff ordered, has not stooled  - Antiemetics PRN  - Continue clear liquid diet. Likely advance diet 9/19    #RONY on CKD  Likely prerenal 2/2 poor PO intake and increased output given diarrhea, but component of worsening progression of CKD likely. Has had progressively worsening kidney disease and was recently admitted for RONY on CKD. On presentation at last hospitalization, his Cr was 2.89 and increased to 5.77 (8/26) before trending down to and plateauing around 4.7 by discharge. Nephrology was involved during previous hospitalization and felt the RONY was 2/2 hypoperfusion in the setting of tightly controlled blood pressures vs drug-induced lupus 2/2 hydralazine (discontinued on 8/25/21).  Cr 5.72 on this admission. Trending down again with hydration. POCUS exam c/w with continued hypovolemia.  - Nephrology consulted, appreciate recs   - permissive HTN with SBP goal of  140-150   - Hold PTA Lasix in setting of RONY   - Gentle hydration: 1 L over 10 hours 9/18  - Strict I/Os  - Daily weights   - Avoid nephrotoxins      #Fatigue   #Weakness  #Bradycardia   Likely multifactorial including infection such as diverticulitis, worsening CKD/uremia, deconditioning, and poor PO intake the past few days with fluid losses. Improving with IVF and improved renal function but still fatigued--c/w resent baseline  - PT/OT consulted, appreciate recs   - Nutrition consulted, appreciate recs  - Treatment of diverticulitis, as above     #C/f bowel obstruction on CT, however, patient has been asymptomatic and per surgery there is nothing to do unless patient becomes symptomatic   #Inguinal Hernia  CT in ED concerning for herniation of a short segment of small bowel into the left inguinal canal with mild air-filled and and fluid-filled loops in the inguinal canal with minimal adjacent fluid and edema. Further, the small bowel proximal to the hernia is moderately fluid-filled (but not overly dilated) which may represent developing obstruction. Patient's history less consistent with current bowel obstruction (no N/V, no abdominal pain, last BM 9/16 AM). General Surgery was consulted in the ED and signed off, continue to monitor for developing symptoms.  - General Surgery consulted. Signed off.   - hold PTA ASA 81 mg  - continue to monitor for pain      #Pancreatic cyst on ED Scan  CT in ED noted well-defined low-attenuation 1.4 x 2x 1.3 cm lesion on the anterior aspect of the pancreatic head that may represent a cystic lesion but remains indeterminate. No surrounding pancreatic ductal dilatation or inflammation.   - Outpatient follow-up recommended      #HTN  Briefly held carvedilol due to bradycardia, but marked increase in BP.   - Continue PTA nifedipine   - Restart carvedilol 9/18  - Goal -150 per Nephrology recommendations.   - Avoid IV hydralazine if PRN IV hypertensives needed given hx of  possible drug-induced lupus      #Anemia  #History of Rectal Cancer  Recent baseline Hgb 7-9s. Currently at baseline. On last admission, iron panel done c/f BEVERLY with possible EPO deficiency given CKD. Patient denies recent melena or hematochezia, although per last admission discharge he endorsed dark black stools and diarrhea x 3 months. Last colonoscopy 2015 with recommended 5 year f/u.   - Follow-up screening colonoscopy recommended outpatient     #Type 2 DM  Last A1c 9.5% on 7/16/21. Pt reports recent lability of BGs between low 100s and into the 200s. Recently had his regimen changed with glipizide discontinued d/t RONY and hypoglycemia risk and sitagliptin was held on discharge with plan to use Lantus 10u at bedtime. Planned to discuss as outpatient with his PCP. Pt notes he is confused about his regimen changing and also states he is not on glargine, although his records report that is what he is using. BGs 100s in ED, will continue to monitor with sliding scale insulin and add on glargine as needed.   - moderate sliding scale insulin   - holding PTA glargine   - glucose checks  - hypoglycemia protocol      #BPH  - Continue PTA finasteride      #Osteoporosis  - Continue PTA Vitamin D     #Subclinical Hypothyroidism  #Thyroid nodule  Last TSH 7.46 with free T4 1.03 on 8/16/2021. Follows with Endocrinology with Dr. Amin. Had unsuccessful thyroid biopsy attempt in 7/2021, plan for repeat 10/2021.   -Continue PTA levothyroxine      #Chart History of Afib  On chart review, appears patient had an isolated episode of Afib and was on warfarin, but this appears to have been stopped about a decade ago. No more recent notes reporting Afib. EKG here showed sinus rhythm.     # Severe Malnutrition, POA: based on Weight loss;Reduced intake (09/17/21 1400)   Nutrition following.      Diet: Clear liquids   DVT Prophylaxis: Pneumatic Compression Devices  High Catheter: Not present  Fluids: mIVF 100 ml/hr NaCl   Central Lines:  None  Code Status: No CPR- Do NOT Intubate      Disposition Plan   Expected discharge: 2+ days pending improvement on abx for diverticulitis.       Jorge Grier MD   of Medicine  Med-Peds Hospitalist  Pager [phone]    [facility]  Securely message with the Vocera Web Console (learn more here)  Text page via [employer] Paging/Directory  Please see sign in/sign out for up to date coverage information    Clinically Significant Risk Factors Present on Admission              ______________________________________________________________________    Interval History   No acute events overnight. Nursing notes reviewed. Doing well on antibiotics. More alert today. Still complains of fatigue. Tolerating clears but poor appetite. No pain or nausea. No stool since admission.  Breathing feels comfortable.     4 Point ROS negative except for that noted above     Data reviewed today: I reviewed all medications, new labs and imaging results over the last 24 hours.     Physical Exam   Vital Signs: Temp: 98.4  F (36.9  C) Temp src: Oral BP: (!) 184/75 Pulse: 66   Resp: 18 SpO2: 99 % O2 Device: Nasal cannula Oxygen Delivery: 2 LPM  Weight: 158 lbs 0 oz     General Appearance: Frail appearing man, NAD. Resting comfortably.   Respiratory: CTAB, no wheezes or crackles   Cardiovascular: RRR, no m/r/g appreciated. No LE edema.    GI: Soft, non-tender, non-distended. No rebound tenderness or guarding.   Skin: No acute rashes. Warm and dry.     POCUS exam: JVP ~7cm with US assist. Suggests euvolemia/hypovolemia. Unable to visualize IVC.       Data   Recent Labs   Lab 09/18/21  0907 09/18/21  0851 09/18/21  0343 09/17/21  0613 09/17/21  0546 09/16/21  2115 09/16/21  2110   WBC  --  5.7  --   --  5.7  --  5.8   HGB  --  9.2*  --   --  9.2*  --  9.1*   MCV  --  88  --   --  88  --  87   PLT  --  207  --   --  222  --  217   NA  --  140  --   --  137  --  136   POTASSIUM  --  4.7  --    --  4.5  --  4.8   CHLORIDE  --  114*  --   --  110*  --  107   CO2  --  18*  --   --  20  --  20   BUN  --  84*  --   --  93*  --  103*   CR  --  4.76*  --   --  5.13*  --  5.72*   ANIONGAP  --  8  --   --  7  --  9   DUSTIN  --  8.2*  --   --  8.5  --  8.5   GLC 92 90 84   < > 115*   < > 158*   ALBUMIN  --   --   --   --   --   --  2.8*   PROTTOTAL  --   --   --   --   --   --  7.4   BILITOTAL  --   --   --   --   --   --  0.3   ALKPHOS  --   --   --   --   --   --  74   ALT  --   --   --   --   --   --  20   AST  --   --   --   --   --   --  9   LIPASE  --   --   --   --   --   --  983*   TROPONIN  --   --   --   --   --   --  <0.015    < > = values in this interval not displayed.     No results found for this or any previous visit (from the past 24 hour(s)).

## 2021-09-19 LAB
ALBUMIN SERPL-MCNC: 2.4 G/DL (ref 3.4–5)
ANION GAP SERPL CALCULATED.3IONS-SCNC: 8 MMOL/L (ref 3–14)
BUN SERPL-MCNC: 81 MG/DL (ref 7–30)
CALCIUM SERPL-MCNC: 8.3 MG/DL (ref 8.5–10.1)
CHLORIDE BLD-SCNC: 114 MMOL/L (ref 94–109)
CO2 SERPL-SCNC: 17 MMOL/L (ref 20–32)
CREAT SERPL-MCNC: 4.67 MG/DL (ref 0.52–1.25)
ERYTHROCYTE [DISTWIDTH] IN BLOOD BY AUTOMATED COUNT: 17.1 % (ref 10–15)
GFR SERPL CREATININE-BSD FRML MDRD: 11 ML/MIN/1.73M2
GLUCOSE BLD-MCNC: 114 MG/DL (ref 70–99)
GLUCOSE BLDC GLUCOMTR-MCNC: 133 MG/DL (ref 70–99)
GLUCOSE BLDC GLUCOMTR-MCNC: 217 MG/DL (ref 70–99)
GLUCOSE BLDC GLUCOMTR-MCNC: 232 MG/DL (ref 70–99)
GLUCOSE BLDC GLUCOMTR-MCNC: 257 MG/DL (ref 70–99)
HCT VFR BLD AUTO: 27.9 % (ref 35–53)
HGB BLD-MCNC: 8.8 G/DL (ref 11.7–17.7)
MCH RBC QN AUTO: 27.8 PG (ref 26.5–33)
MCHC RBC AUTO-ENTMCNC: 31.5 G/DL (ref 31.5–36.5)
MCV RBC AUTO: 88 FL (ref 78–100)
PHOSPHATE SERPL-MCNC: 5.4 MG/DL (ref 2.5–4.5)
PLATELET # BLD AUTO: 189 10E3/UL (ref 150–450)
POTASSIUM BLD-SCNC: 4.5 MMOL/L (ref 3.4–5.3)
RBC # BLD AUTO: 3.17 10E6/UL (ref 3.8–5.9)
SODIUM SERPL-SCNC: 139 MMOL/L (ref 133–144)
WBC # BLD AUTO: 6.4 10E3/UL (ref 4–11)

## 2021-09-19 PROCEDURE — 250N000011 HC RX IP 250 OP 636: Performed by: STUDENT IN AN ORGANIZED HEALTH CARE EDUCATION/TRAINING PROGRAM

## 2021-09-19 PROCEDURE — 36415 COLL VENOUS BLD VENIPUNCTURE: CPT | Performed by: INTERNAL MEDICINE

## 2021-09-19 PROCEDURE — 250N000012 HC RX MED GY IP 250 OP 636 PS 637

## 2021-09-19 PROCEDURE — 250N000013 HC RX MED GY IP 250 OP 250 PS 637: Performed by: INTERNAL MEDICINE

## 2021-09-19 PROCEDURE — 999N000127 HC STATISTIC PERIPHERAL IV START W US GUIDANCE

## 2021-09-19 PROCEDURE — 250N000013 HC RX MED GY IP 250 OP 250 PS 637: Performed by: STUDENT IN AN ORGANIZED HEALTH CARE EDUCATION/TRAINING PROGRAM

## 2021-09-19 PROCEDURE — 80069 RENAL FUNCTION PANEL: CPT | Performed by: INTERNAL MEDICINE

## 2021-09-19 PROCEDURE — 99232 SBSQ HOSP IP/OBS MODERATE 35: CPT | Performed by: INTERNAL MEDICINE

## 2021-09-19 PROCEDURE — 85027 COMPLETE CBC AUTOMATED: CPT | Performed by: INTERNAL MEDICINE

## 2021-09-19 PROCEDURE — 250N000013 HC RX MED GY IP 250 OP 250 PS 637

## 2021-09-19 PROCEDURE — 120N000002 HC R&B MED SURG/OB UMMC

## 2021-09-19 RX ADMIN — METRONIDAZOLE 500 MG: 500 INJECTION, SOLUTION INTRAVENOUS at 23:48

## 2021-09-19 RX ADMIN — Medication 1 TABLET: at 08:47

## 2021-09-19 RX ADMIN — CARVEDILOL 25 MG: 25 TABLET, FILM COATED ORAL at 08:48

## 2021-09-19 RX ADMIN — Medication 25 MCG: at 08:47

## 2021-09-19 RX ADMIN — NIFEDIPINE 60 MG: 60 TABLET, EXTENDED RELEASE ORAL at 08:48

## 2021-09-19 RX ADMIN — INSULIN ASPART 3 UNITS: 100 INJECTION, SOLUTION INTRAVENOUS; SUBCUTANEOUS at 20:07

## 2021-09-19 RX ADMIN — FINASTERIDE 5 MG: 5 TABLET, FILM COATED ORAL at 08:48

## 2021-09-19 RX ADMIN — LEVOTHYROXINE SODIUM 25 MCG: 0.03 TABLET ORAL at 08:47

## 2021-09-19 RX ADMIN — METRONIDAZOLE 500 MG: 500 INJECTION, SOLUTION INTRAVENOUS at 12:05

## 2021-09-19 RX ADMIN — METRONIDAZOLE 500 MG: 500 INJECTION, SOLUTION INTRAVENOUS at 00:14

## 2021-09-19 RX ADMIN — INSULIN ASPART 2 UNITS: 100 INJECTION, SOLUTION INTRAVENOUS; SUBCUTANEOUS at 16:35

## 2021-09-19 RX ADMIN — CEFTRIAXONE SODIUM 1 G: 1 INJECTION, POWDER, FOR SOLUTION INTRAMUSCULAR; INTRAVENOUS at 09:40

## 2021-09-19 ASSESSMENT — ACTIVITIES OF DAILY LIVING (ADL)
ADLS_ACUITY_SCORE: 15
ADLS_ACUITY_SCORE: 16
ADLS_ACUITY_SCORE: 16
ADLS_ACUITY_SCORE: 15
ADLS_ACUITY_SCORE: 15
ADLS_ACUITY_SCORE: 16

## 2021-09-19 NOTE — PROGRESS NOTES
[facility]    Progress Note - Violetta Dangelo Service        Date of Admission:  9/16/2021    Assessment & Plan   Farzad East is a 86 year old man with PMHx of CKD, Type 2 DM, hx rectal cancer, subclinical hypothyroidism with thyroid nodule who presents with fatigue, weakness and diarrhea and was found to have diverticulitis, possible bowel obstruction (although asymptomatic) and Cr of 5.13 consistent with RONY with likely component of worsening CKD.      Changes Today:   -continue antibiotics. Likely transition to PO 9/20  -Advance diet to regular as tolerated  -Monitor Cr      #Acute Diverticulitis  #Diarrhea, resolved  Patient reported diarrhea and anorexia for the past few days PTA. Denied hematochezia or melena. Lipase elevated at 983 (<3 ULN), but no epigastric pain, and no imaging evidence of pancreatitis. CTAP in ED indicative of uncomplicated diverticulitis. Started on Cipro, flagyl in the ED but switched to ceftriaxone and flagyl. C diff and enteric panel ordered on admission but has not stooled since admission. Also ordered C. Diff to r/o given patient's recent hospitalization.   - Continue ceftriaxone, flagyl   - Discontinue c diff and enteric panel  - Antiemetics PRN  - Advance diet to regular    #RONY on CKD  Likely prerenal 2/2 poor PO intake and increased output given diarrhea, but component of worsening progression of CKD likely. Has had progressively worsening kidney disease and was recently admitted for RONY on CKD. On presentation at last hospitalization, his Cr was 2.89 and increased to 5.77 (8/26) before trending down to and plateauing around 4.7 by discharge. Nephrology was involved during previous hospitalization and felt the RONY was 2/2 hypoperfusion in the setting of tightly controlled blood pressures vs drug-induced lupus 2/2 hydralazine (discontinued on 8/25/21).  Cr 5.72 on this admission. Trended down again with hydration. Now appears  euvolemic.   - Nephrology consulted, appreciate recs   - permissive HTN with SBP goal of 140-150   - Continue to hold PTA Lasix in setting of RONY   - Hold off on further hydration at this point  - Strict I/Os  - Daily weights   - Avoid nephrotoxins      #Fatigue   #Weakness  #Bradycardia   Likely multifactorial including infection such as diverticulitis, worsening CKD/uremia, deconditioning, and poor PO intake the past few days with fluid losses. Improving with IVF and improved renal function but still fatigued--c/w resent baseline  - PT/OT consulted, appreciate recs   - Nutrition consulted, appreciate recs  - Treatment of diverticulitis, as above     #C/f bowel obstruction on CT, however, patient has been asymptomatic and per surgery there is nothing to do unless patient becomes symptomatic   #Inguinal Hernia  CT in ED concerning for herniation of a short segment of small bowel into the left inguinal canal with mild air-filled and and fluid-filled loops in the inguinal canal with minimal adjacent fluid and edema. Further, the small bowel proximal to the hernia is moderately fluid-filled (but not overly dilated) which may represent developing obstruction. Patient's history less consistent with current bowel obstruction (no N/V, no abdominal pain, last BM 9/16 AM). General Surgery was consulted in the ED and signed off, continue to monitor for developing symptoms.  - General Surgery consulted. Signed off.   - hold PTA ASA 81 mg  - continue to monitor for pain      #Pancreatic cyst on ED Scan  CT in ED noted well-defined low-attenuation 1.4 x 2x 1.3 cm lesion on the anterior aspect of the pancreatic head that may represent a cystic lesion but remains indeterminate. No surrounding pancreatic ductal dilatation or inflammation.   - Outpatient follow-up recommended      #HTN  Briefly held carvedilol due to bradycardia, but marked increase in BP.   - Continue PTA nifedipine   - Continue carvedilol  - Goal -150 per  Nephrology recommendations.   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 Currently at baseline. On last admission, iron panel done c/f BEVERLY with possible EPO deficiency given CKD. Patient denies recent melena or hematochezia, although per last admission discharge he endorsed dark black stools and diarrhea x 3 months. Last colonoscopy 2015 with recommended 5 year f/u.   - Follow-up screening colonoscopy recommended outpatient     #Type 2 DM  Last A1c 9.5% on 7/16/21. Pt reports recent lability of BGs between low 100s and into the 200s. Recently had his regimen changed with glipizide discontinued d/t RONY and hypoglycemia risk and sitagliptin was held on discharge with plan to use Lantus 10u at bedtime. Planned to discuss as outpatient with his PCP. Pt notes he is confused about his regimen changing and also states he is not on glargine, although his records report that is what he is using. BGs 100s in ED, will continue to monitor with sliding scale insulin and add on glargine as needed.   - moderate sliding scale insulin   - holding PTA glargine   - glucose checks  - hypoglycemia protocol      #BPH  - Continue PTA finasteride      #Osteoporosis  - Continue PTA Vitamin D     #Subclinical Hypothyroidism  #Thyroid nodule  Last TSH 7.46 with free T4 1.03 on 8/16/2021. Follows with Endocrinology with Dr. Amin. Had unsuccessful thyroid biopsy attempt in 7/2021, plan for repeat 10/2021.   -Continue PTA levothyroxine      #Chart History of Afib  On chart review, appears patient had an isolated episode of Afib and was on warfarin, but this appears to have been stopped about a decade ago. No more recent notes reporting Afib. EKG here showed sinus rhythm.     # Severe Malnutrition, POA: based on Weight loss;Reduced intake (09/17/21 1400)   Nutrition following.      Diet: Clear liquids   DVT Prophylaxis:  Pneumatic Compression Devices  High Catheter: Not present  Fluids: mIVF 100 ml/hr NaCl   Central Lines: None  Code Status: No CPR- Do NOT Intubate      Disposition Plan   Expected discharge: 2+ days pending improvement on abx for diverticulitis.       Jorge Grier MD   of Medicine  Med-Peds Hospitalist  Pager [phone]    [facility]  Securely message with the Vocera Web Console (learn more here)  Text page via [facility] Paging/Directory  Please see sign in/sign out for up to date coverage information    Clinically Significant Risk Factors Present on Admission              ______________________________________________________________________    Interval History   No acute events overnight. Nursing notes reviewed. Much more alert today. Tolerating clears. No pain or nausea. No stool since admission.  Breathing feels comfortable.     4 Point ROS negative except for that noted above     Data reviewed today: I reviewed all medications, new labs and imaging results over the last 24 hours.     Physical Exam   Vital Signs: Temp: 98  F (36.7  C) Temp src: Oral BP: (!) 188/74 Pulse: 66   Resp: 18 SpO2: 98 % O2 Device: Nasal cannula Oxygen Delivery: 2 LPM  Weight: 146 lbs 1.6 oz     General Appearance: Sitting up in chair, reading newspaper. NAD  Respiratory: CTAB, no wheezes or crackles   Cardiovascular: RRR, no m/r/g appreciated. No LE edema.    GI: Soft, non-tender, non-distended. No rebound tenderness or guarding.   Skin: No acute rashes. Warm and dry.         Data   Recent Labs   Lab 09/19/21  0535 09/19/21  0530 09/19/21  0004 09/18/21 2027 09/18/21  0907 09/18/21  0851 09/17/21  0613 09/17/21  0546 09/16/21  2115 09/16/21  2110   WBC 6.4  --   --   --   --  5.7  --  5.7   < > 5.8   HGB 8.8*  --   --   --   --  9.2*  --  9.2*   < > 9.1*   MCV 88  --   --   --   --  88  --  88   < > 87     --   --   --   --  207  --  222   < > 217   NA  --  139  --   --    --  140  --  137   < > 136   POTASSIUM  --  4.5  --   --   --  4.7  --  4.5   < > 4.8   CHLORIDE  --  114*  --   --   --  114*  --  110*   < > 107   CO2  --  17*  --   --   --  18*  --  20   < > 20   BUN  --  81*  --   --   --  84*  --  93*   < > 103*   CR  --  4.67*  --   --   --  4.76*  --  5.13*   < > 5.72*   ANIONGAP  --  8  --   --   --  8  --  7   < > 9   DUSTIN  --  8.3*  --   --   --  8.2*  --  8.5   < > 8.5   GLC  --  114* 133* 174*   < > 90   < > 115*   < > 158*   ALBUMIN  --  2.4*  --   --   --   --   --   --   --  2.8*   PROTTOTAL  --   --   --   --   --   --   --   --   --  7.4   BILITOTAL  --   --   --   --   --   --   --   --   --  0.3   ALKPHOS  --   --   --   --   --   --   --   --   --  74   ALT  --   --   --   --   --   --   --   --   --  20   AST  --   --   --   --   --   --   --   --   --  9   LIPASE  --   --   --   --   --   --   --   --   --  983*   TROPONIN  --   --   --   --   --   --   --   --   --  <0.015    < > = values in this interval not displayed.     No results found for this or any previous visit (from the past 24 hour(s)).

## 2021-09-19 NOTE — CONSULTS
Care Management Initial Consult    General Information  Assessment completed with: PatientAlessio  Type of CM/SW Visit: Initial Assessment    Primary Care Provider verified and updated as needed: Yes   Readmission within the last 30 days: Yes  Return Category: Exacerbation of disease    Reason for Consult: discharge planning  Advance Care Planning: Nursing note in flowsheet says it is on file, but not in Epic header.  Did not discuss with patient today.     Communication Assessment  Patient's communication style: spoken language (English or Bilingual)    Hearing Difficulty or Deaf: yes   Wear Glasses or Blind: no    Cognitive  Cognitive/Neuro/Behavioral: WDL                      Living Environment:   People in home: alone     Current living Arrangements: assisted living (housekeeping services, meals; no nursing currently)  Name of Facility: [facility]     Able to return to prior arrangements: TBD     Living Arrangement Comments: Immediately PTA was at [facility] in [facility]   Admissions Phone: [phone] (Weekend)    Family/Social Support:  Care provided by: self  Provides care for: no one  Marital Status:  (wife is currently in Memory Care at [facility] with advanced dementia)    Support:  Adult Children; all four live locally          Description of Support System: Supportive, Involved    Support Assessment: Adequate family and caregiver support, Patient communicates needs well met    Current Resources:   Patient receiving home care services: No     Community Resources: Transitional Care  Equipment currently used at home: cane, straight, grab bar, toilet, grab bar, tub/shower, blood glucose monitor  Supplies currently used at home: Diabetic testing supplies, insulin administration supplies (on Lantus)    Employment/Financial:  Employment Status: retired     Employment/ Comments: former   Financial  "Concerns: No concerns identified       Functional Status:  At recent baseline, but baseline has been declining, hard to say what current potential function is.  Once PT evals, please their documentation.    Mental Health Status:  Mental Health Status: No Current Concerns       Chemical Dependency Status:  Chemical Dependency Status: No Current Concerns          Values/Beliefs:  Spiritual, Cultural Beliefs, Denominational Practices, Values that affect care:  (Deferred)               Additional Information:  I met with patient and MD team this morning at bedside for assessment and to begin discharge planning.  Patient has chronic kidney disease with an acute episode and Cr trending down from admission high of 5.8 ; Type 2 diabetes managed with blood glucose checks, Lantus daily, and two oral medications; anemia with Hgb baseline 7-9; hypertension on two medications; and possible hypothyroidism currently untreated.  He was hospitalized on 9/15 with diarrhea (resolved), diverticulitis, findings of \"impending bowel obstruction,\" fatigue/weakness, and RONY.      Patient hospitalized recently and subsequently discharged to [facility] for transitional care on 9/2/2021. His baseline living situation is an apartment at [facility], where he lives fairly independently with meals and housekeeping.  His wife moved to memory care at [facility] in January 2021 and he is grieving this loss.  Patient reports getting help and companionship from his adult children and says he is still able and interested in making his own decisions about medical care and disposition.      Patient declined PT visit this morning because he was unclear on purpose of PT evaluation and treatment in hospital. When asked if he would like to return to TCU on discharge, patient replied, \"I do not know what the best thing for me to do is right now.\" I explained that in addition to the clinical benefits of physical therapy to prevent " deconditioning in acute care, a thorough evaluation of patient's current functional status by PT and OT would provide valuable information to him and his hospital providers in determining the best discharge disposition.  Patient verbalized understanding.     RNCC and SW will continue to follow.  MD team anticipates discharge when patient is tolerating full diet, continues to show no symptoms of bowel obstruction, and Cr stays at baseline. Will be appropriate for close follow-up on discharge.    Hemant Jiménez, RN, PHN  RN Care Coordinator   [address] [address]   [email or web address]   Casual Employee - Weekend Coverage only  To contact weekend RNCC, dial [phone] and enter pager number [phone] at prompt OR use [employer] to text page.  This pager can not be contacted by outside line.

## 2021-09-20 ENCOUNTER — APPOINTMENT (OUTPATIENT)
Dept: GENERAL RADIOLOGY | Facility: CLINIC | Age: 86
DRG: 391 | End: 2021-09-20
Attending: INTERNAL MEDICINE
Payer: MEDICARE

## 2021-09-20 ENCOUNTER — APPOINTMENT (OUTPATIENT)
Dept: PHYSICAL THERAPY | Facility: CLINIC | Age: 86
DRG: 391 | End: 2021-09-20
Payer: MEDICARE

## 2021-09-20 LAB
ANION GAP SERPL CALCULATED.3IONS-SCNC: 8 MMOL/L (ref 3–14)
BUN SERPL-MCNC: 79 MG/DL (ref 7–30)
CALCIUM SERPL-MCNC: 8.7 MG/DL (ref 8.5–10.1)
CHLORIDE BLD-SCNC: 113 MMOL/L (ref 94–109)
CO2 SERPL-SCNC: 19 MMOL/L (ref 20–32)
CREAT SERPL-MCNC: 4.53 MG/DL (ref 0.52–1.25)
GFR SERPL CREATININE-BSD FRML MDRD: 11 ML/MIN/1.73M2
GLUCOSE BLD-MCNC: 148 MG/DL (ref 70–99)
GLUCOSE BLDC GLUCOMTR-MCNC: 142 MG/DL (ref 70–99)
GLUCOSE BLDC GLUCOMTR-MCNC: 245 MG/DL (ref 70–99)
GLUCOSE BLDC GLUCOMTR-MCNC: 263 MG/DL (ref 70–99)
GLUCOSE BLDC GLUCOMTR-MCNC: 290 MG/DL (ref 70–99)
POTASSIUM BLD-SCNC: 4.6 MMOL/L (ref 3.4–5.3)
SODIUM SERPL-SCNC: 140 MMOL/L (ref 133–144)

## 2021-09-20 PROCEDURE — 250N000013 HC RX MED GY IP 250 OP 250 PS 637

## 2021-09-20 PROCEDURE — 250N000011 HC RX IP 250 OP 636: Performed by: STUDENT IN AN ORGANIZED HEALTH CARE EDUCATION/TRAINING PROGRAM

## 2021-09-20 PROCEDURE — 250N000013 HC RX MED GY IP 250 OP 250 PS 637: Performed by: STUDENT IN AN ORGANIZED HEALTH CARE EDUCATION/TRAINING PROGRAM

## 2021-09-20 PROCEDURE — 97530 THERAPEUTIC ACTIVITIES: CPT | Mod: GP | Performed by: REHABILITATION PRACTITIONER

## 2021-09-20 PROCEDURE — 99232 SBSQ HOSP IP/OBS MODERATE 35: CPT | Mod: GC | Performed by: INTERNAL MEDICINE

## 2021-09-20 PROCEDURE — 80048 BASIC METABOLIC PNL TOTAL CA: CPT | Performed by: STUDENT IN AN ORGANIZED HEALTH CARE EDUCATION/TRAINING PROGRAM

## 2021-09-20 PROCEDURE — 74018 RADEX ABDOMEN 1 VIEW: CPT

## 2021-09-20 PROCEDURE — 250N000013 HC RX MED GY IP 250 OP 250 PS 637: Performed by: INTERNAL MEDICINE

## 2021-09-20 PROCEDURE — 120N000002 HC R&B MED SURG/OB UMMC

## 2021-09-20 PROCEDURE — 36415 COLL VENOUS BLD VENIPUNCTURE: CPT | Performed by: STUDENT IN AN ORGANIZED HEALTH CARE EDUCATION/TRAINING PROGRAM

## 2021-09-20 PROCEDURE — 97161 PT EVAL LOW COMPLEX 20 MIN: CPT | Mod: GP | Performed by: REHABILITATION PRACTITIONER

## 2021-09-20 PROCEDURE — 74018 RADEX ABDOMEN 1 VIEW: CPT | Mod: 26 | Performed by: STUDENT IN AN ORGANIZED HEALTH CARE EDUCATION/TRAINING PROGRAM

## 2021-09-20 RX ORDER — METRONIDAZOLE 500 MG/1
500 TABLET ORAL 3 TIMES DAILY
Status: DISCONTINUED | OUTPATIENT
Start: 2021-09-20 | End: 2021-09-23 | Stop reason: HOSPADM

## 2021-09-20 RX ORDER — CEFDINIR 300 MG/1
300 CAPSULE ORAL DAILY
Status: DISCONTINUED | OUTPATIENT
Start: 2021-09-20 | End: 2021-09-23 | Stop reason: HOSPADM

## 2021-09-20 RX ADMIN — Medication 25 MCG: at 08:42

## 2021-09-20 RX ADMIN — INSULIN ASPART 3 UNITS: 100 INJECTION, SOLUTION INTRAVENOUS; SUBCUTANEOUS at 20:27

## 2021-09-20 RX ADMIN — FINASTERIDE 5 MG: 5 TABLET, FILM COATED ORAL at 08:43

## 2021-09-20 RX ADMIN — METRONIDAZOLE 500 MG: 500 TABLET ORAL at 20:28

## 2021-09-20 RX ADMIN — CARVEDILOL 25 MG: 25 TABLET, FILM COATED ORAL at 08:42

## 2021-09-20 RX ADMIN — LEVOTHYROXINE SODIUM 25 MCG: 0.03 TABLET ORAL at 08:42

## 2021-09-20 RX ADMIN — METRONIDAZOLE 500 MG: 500 TABLET ORAL at 14:17

## 2021-09-20 RX ADMIN — DOCUSATE SODIUM 50 MG AND SENNOSIDES 8.6 MG 1 TABLET: 8.6; 5 TABLET, FILM COATED ORAL at 20:28

## 2021-09-20 RX ADMIN — CEFDINIR 300 MG: 300 CAPSULE ORAL at 13:16

## 2021-09-20 RX ADMIN — NIFEDIPINE 60 MG: 60 TABLET, EXTENDED RELEASE ORAL at 08:42

## 2021-09-20 RX ADMIN — CEFTRIAXONE SODIUM 1 G: 1 INJECTION, POWDER, FOR SOLUTION INTRAMUSCULAR; INTRAVENOUS at 09:53

## 2021-09-20 RX ADMIN — INSULIN ASPART 3 UNITS: 100 INJECTION, SOLUTION INTRAVENOUS; SUBCUTANEOUS at 17:40

## 2021-09-20 RX ADMIN — Medication 1 TABLET: at 08:42

## 2021-09-20 ASSESSMENT — ACTIVITIES OF DAILY LIVING (ADL)
ADLS_ACUITY_SCORE: 16
ADLS_ACUITY_SCORE: 15
ADLS_ACUITY_SCORE: 15
ADLS_ACUITY_SCORE: 16
ADLS_ACUITY_SCORE: 15
ADLS_ACUITY_SCORE: 15

## 2021-09-20 NOTE — PROGRESS NOTES
[facility]    Progress Note - [facility] 5 Service        Date of Admission:  9/16/2021    Assessment & Plan   Farzad East is a 86 year old man with PMHx of CKD, Type 2 DM, hx rectal cancer, subclinical hypothyroidism with thyroid nodule who presents with fatigue, weakness and diarrhea and was found to have diverticulitis, possible bowel obstruction (although asymptomatic) and Cr of 5.13 consistent with RONY with likely component of worsening CKD. Patient now on oral abx and improving Cr.     Changes Today:   -Switched from IV Flagyl and Ceftriaxone to PO flagyl and Cefdinir. Continue for total abx course of 10-14 days.   -No BM while in hospital--ordered abdominal Xray to r/o obstruction. No obstruction found.    -Monitor Cr  -PT to see patient today for evaluation and discharge recommendations       #Acute Diverticulitis  #Diarrhea, resolved  Patient reported diarrhea and anorexia for the past few days PTA. Denied hematochezia or melena. Lipase elevated at 983 (<3 ULN), but no epigastric pain, and no imaging evidence of pancreatitis. CTAP in ED indicative of uncomplicated diverticulitis. Started on Cipro, flagyl in the ED but switched to ceftriaxone and flagyl. With tolerating PO, now switched to PO flagyl and cefdinir.   - Switched to PO flagyl and cefdinir  - Antiemetics PRN  - Regular diet     #RONY on CKD  Likely prerenal 2/2 poor PO intake and increased output given diarrhea, but component of worsening progression of CKD likely. Has had progressively worsening kidney disease and was recently admitted for RONY on CKD. On presentation at last hospitalization, his Cr was 2.89 and increased to 5.77 (8/26) before trending down to and plateauing around 4.7 by discharge. Nephrology was involved during previous hospitalization and felt the RONY was 2/2 hypoperfusion in the setting of tightly controlled blood pressures vs drug-induced lupus 2/2 hydralazine (discontinued on  8/25/21). Cr 5.72 on this admission. Trended down with hydration and has continued to trend down on its own.  - Nephrology consulted, appreciate recs   - permissive HTN with SBP goal of 140-150   - Continue to hold PTA Lasix in setting of RONY   - Hold off on further hydration at this point  - Strict I/Os  - Daily weights   - Avoid nephrotoxins      #Fatigue   #Weakness  #Bradycardia   Likely multifactorial including infection such as diverticulitis, worsening CKD/uremia, deconditioning, and poor PO intake the past few days with fluid losses. Improving with IVF and improved renal function but still fatigued--c/w resent baseline  - PT/OT consulted, appreciate recs   - Nutrition consulted, appreciate recs  - Treatment of diverticulitis, as above     #C/f bowel obstruction on CT, however, patient has been asymptomatic and per surgery there is nothing to do unless patient becomes symptomatic  #Constipation--no BM while in hospital    #Inguinal Hernia  CT in ED concerning for herniation of a short segment of small bowel into the left inguinal canal with mild air-filled and and fluid-filled loops in the inguinal canal with minimal adjacent fluid and edema. Further, the small bowel proximal to the hernia is moderately fluid-filled (but not overly dilated) which may represent developing obstruction. On admission, patient's history less consistent with bowel obstruction (no N/V, no abdominal pain, last BM 9/16 AM). General Surgery was consulted in the ED and signed off. However, patient has not had BM while in hospital He does not have any abdominal pain or N+V, but c/f possible worsening obstruction.    - Abdominal xray. No obstruction found.   - General Surgery consulted. Signed off.   - hold PTA ASA 81 mg  - continue to monitor for pain      #Pancreatic cyst on ED Scan  CT in ED noted well-defined low-attenuation 1.4 x 2x 1.3 cm lesion on the anterior aspect of the pancreatic head that may represent a cystic lesion but  remains indeterminate. No surrounding pancreatic ductal dilatation or inflammation.   - Outpatient follow-up recommended      #HTN  Briefly held carvedilol due to bradycardia, but marked increase in BP.   - Continue PTA nifedipine   - Continue carvedilol  - Goal -150 per Nephrology recommendations.   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 Currently at baseline. On last admission, iron panel done c/f BEVERLY with possible EPO deficiency given CKD. Patient denies recent melena or hematochezia, although per last admission discharge he endorsed dark black stools and diarrhea x 3 months. Last colonoscopy 2015 with recommended 5 year f/u.   - Follow-up screening colonoscopy recommended outpatient     #Type 2 DM  Last A1c 9.5% on 7/16/21. Pt reports recent lability of BGs between low 100s and into the 200s. Recently had his regimen changed with glipizide discontinued d/t RONY and hypoglycemia risk and sitagliptin was held on discharge with plan to use Lantus 10u at bedtime. Planned to discuss as outpatient with his PCP. Pt notes he is confused about his regimen changing and also states he is not on glargine, although his records report that is what he is using. BGs 100s in ED, will continue to monitor with sliding scale insulin and add on glargine as needed.   - moderate sliding scale insulin   - holding PTA glargine   - glucose checks  - hypoglycemia protocol      #BPH  - Continue PTA finasteride      #Osteoporosis  - Continue PTA Vitamin D     #Subclinical Hypothyroidism  #Thyroid nodule  Last TSH 7.46 with free T4 1.03 on 8/16/2021. Follows with Endocrinology with Dr. Amin. Had unsuccessful thyroid biopsy attempt in 7/2021, plan for repeat 10/2021.   -Continue PTA levothyroxine      #Chart History of Afib  On chart review, appears patient had an isolated episode of Afib and was on warfarin, but  this appears to have been stopped about a decade ago. No more recent notes reporting Afib. EKG here showed sinus rhythm.     # Severe Malnutrition, POA: based on Weight loss;Reduced intake (09/17/21 1400)   Nutrition following.      Diet: Regular diet   DVT Prophylaxis: Pneumatic Compression Devices  High Catheter: Not present  Fluids: PO   Central Lines: None  Code Status: No CPR- Do NOT Intubate      Disposition Plan   Expected discharge: 1-2 days pending evaluation from PT regarding discharge plans.     The patient's care was discussed with the Attending Physician, Dr. Grier.     Marjan Lopez  Medical Student  [address]  Securely message with the Vocera Web Console (learn more here)  Text page via [facility] Paging/Directory  Please see sign in/sign out for up to date coverage information    Clinically Significant Risk Factors Present on Admission               Resident/Fellow Attestation   I, Lopez Redmond, was present with the medical/[employer] student who participated in the service and in the documentation of the note.  I have verified the history and personally performed the physical exam and medical decision making.  I agree with the assessment and plan of care as documented in the note.      Transitioning to PO antibiotics, potential discharge tomorrow    Lopez Redmond MD  PGY2  Date of Service (when I saw the patient): 9/20/21      ______________________________________________________________________    Interval History   No acute events overnight. Nursing notes reviewed. Patient has not had BM while in hospital. On regular diet, tolerated breakfast this AM. No abdominal pain.     No fevers, chills, nausea or vomiting.   4 Point ROS negative except for that noted above     Data reviewed today: I reviewed all medications, new labs and imaging results over the last 24 hours.     Physical Exam   Vital Signs: Temp: 97.1  F (36.2  C) Temp src:  Oral BP: 114/44 Pulse: 96   Resp: 16 SpO2: 91 % O2 Device: None (Room air)    Weight: 147 lbs 11.33 oz     General Appearance: Sitting up in chair, NAD  Respiratory: CTAB, no wheezes or crackles. No accessory muscle use.    Cardiovascular: RRR, no m/r/g appreciated. No LE edema.    GI: Soft, non-tender, non-distended. No rebound tenderness or guarding.   Skin: No acute rashes. Warm and dry.       Data   Recent Labs   Lab 09/20/21  0758 09/19/21 2004 09/19/21  1622 09/19/21  1223 09/19/21  0535 09/19/21  0530 09/18/21  0907 09/18/21  0851 09/17/21  0613 09/17/21  0546 09/16/21 2115 09/16/21 2110   WBC  --   --   --   --  6.4  --   --  5.7  --  5.7   < > 5.8   HGB  --   --   --   --  8.8*  --   --  9.2*  --  9.2*   < > 9.1*   MCV  --   --   --   --  88  --   --  88  --  88   < > 87   PLT  --   --   --   --  189  --   --  207  --  222   < > 217     --   --   --   --  139  --  140   < > 137   < > 136   POTASSIUM 4.6  --   --   --   --  4.5  --  4.7   < > 4.5   < > 4.8   CHLORIDE 113*  --   --   --   --  114*  --  114*   < > 110*   < > 107   CO2 19*  --   --   --   --  17*  --  18*   < > 20   < > 20   BUN 79*  --   --   --   --  81*  --  84*   < > 93*   < > 103*   CR 4.53*  --   --   --   --  4.67*  --  4.76*   < > 5.13*   < > 5.72*   ANIONGAP 8  --   --   --   --  8  --  8   < > 7   < > 9   DUSTIN 8.7  --   --   --   --  8.3*  --  8.2*   < > 8.5   < > 8.5   * 257* 217*   < >  --  114*   < > 90   < > 115*   < > 158*   ALBUMIN  --   --   --   --   --  2.4*  --   --   --   --   --  2.8*   PROTTOTAL  --   --   --   --   --   --   --   --   --   --   --  7.4   BILITOTAL  --   --   --   --   --   --   --   --   --   --   --  0.3   ALKPHOS  --   --   --   --   --   --   --   --   --   --   --  74   ALT  --   --   --   --   --   --   --   --   --   --   --  20   AST  --   --   --   --   --   --   --   --   --   --   --  9   LIPASE  --   --   --   --   --   --   --   --   --   --   --  983*   TROPONIN  --   --   --    --   --   --   --   --   --   --   --  <0.015    < > = values in this interval not displayed.     Recent Results (from the past 24 hour(s))   XR Abdomen Port 1 View    Narrative    EXAMINATION:  XR ABDOMEN PORT 1 VIEWS 9/20/2021 11:38 AM.    COMPARISON: 9/16/2021.    HISTORY:  previous CT showed impending obstruction. No abdominal pain  or vomiting, but not stooling. Assess for obstruction    FINDINGS: Single portable supine view of the abdomen. No abnormal air  filled bowel visualized. Minimal colonic stool burden. Vascular  calcifications. Bibasilar opacities.      Impression    IMPRESSION: Nonspecific bowel gas pattern. No abnormal air filled  bowel.    JUAN TIAN MD         SYSTEM ID:  [id number]

## 2021-09-20 NOTE — PROGRESS NOTES
09/20/21 1438   Quick Adds   Type of Visit Initial PT Evaluation   Living Environment   People in home alone   Current Living Arrangements assisted living   Home Accessibility no concerns   Transportation Anticipated family or friend will provide   Living Environment Comments Per chart review pt was most recently at TCU, pts son present and anticipates pt will return to same TCU.    Self-Care   Usual Activity Tolerance moderate   Current Activity Tolerance fair   Regular Exercise Yes   Activity/Exercise Type other (see comments)  (PT and OT at TCU)   Exercise Amount/Frequency 3-5 times/wk   Equipment Currently Used at Home cane, straight;grab bar, toilet;grab bar, tub/shower;other (see comments)  (4WW)   Activity/Exercise/Self-Care Comment Pt reports he was working on BLE and BUE exercises at [facility] with rehab therapists, but had not yet been up walking.   Disability/Function   Hearing Difficulty or Deaf yes   Patient's preferred means of communication English speaker with hearing loss, no speech problems.   Describe hearing loss bilateral hearing loss   Use of hearing assistive devices none   Were auxiliary aids offered? yes   The following aids were provided; pocket talker   Hearing Management pocket talker   Wear Glasses or Blind yes   Vision Management glasses   Concentrating, Remembering or Making Decisions Difficulty no   Difficulty Communicating no   Difficulty Eating/Swallowing no   Walking or Climbing Stairs Difficulty yes   Walking or Climbing Stairs ambulation difficulty, requires equipment   Mobility Management Pt uses 4WW for ambulation at baseline.   Dressing/Bathing Difficulty yes   Dressing/Bathing bathing difficulty, requires equipment   Dressing/Bathing Management shower chair and grab bars   Toileting issues no   Doing Errands Independently Difficulty (such as shopping) yes   Errands Management Pt receives assistance from his children as needed   Fall history within last six months yes   Number of  times patient has fallen within last six months 1   Change in Functional Status Since Onset of Current Illness/Injury yes   General Information   Onset of Illness/Injury or Date of Surgery 09/16/21   Referring Physician Palmira Coreas MD   Pertinent History of Current Problem (include personal factors and/or comorbidities that impact the POC) Pt is a 86 year old man with PMHx of CKD, Type 2 DM, hx rectal cancer, subclinical hypothyroidism with thyroid nodule who presents with fatigue, weakness and diarrhea and was found to have diverticulitis, possible bowel obstruction (although asymptomatic) and Cr of 5.13 consistent with RONY with likely component of worsening CKD.   Existing Precautions/Restrictions fall   Cognition   Orientation Status (Cognition) oriented x 4   Affect/Mental Status (Cognition) flat/blunted affect   Follows Commands (Cognition) follows two-step commands   Safety Deficit (Cognition) other (see comments)  (bed alarm armed at PT arrival)   Pain Assessment   Patient Currently in Pain Yes, see Vital Sign flowsheet  (Pt reports muscle soreness in B upper traps)   Integumentary/Edema   Integumentary/Edema no deficits were identifed   Posture    Posture Forward head position;Protracted shoulders;Kyphosis   Range of Motion (ROM)   ROM Quick Adds ROM WFL   Strength   Strength Comments BLE 3/5 throughout   Bed Mobility   Comment (Bed Mobility) Pt tx supine->sit with Min A.    Transfers   Transfer Safety Comments Pt tx sit->stand with Min A.   Balance   Balance other (describe)   Balance Comments minimal sway in static standing   Sensory Examination   Sensory Perception patient reports no sensory changes   Clinical Impression   Criteria for Skilled Therapeutic Intervention yes, treatment indicated   PT Diagnosis (PT) impaired functional mobility   Influenced by the following impairments decreased strength, activity intolerance, impaired balance,    Functional limitations due to impairments bed mob,  transfers, gait   Clinical Presentation Stable/Uncomplicated   Clinical Presentation Rationale clinical judgement, current presentation, PMHx   Clinical Decision Making (Complexity) low complexity   Therapy Frequency (PT) 5x/week   Predicted Duration of Therapy Intervention (days/wks) 9/27/21   Planned Therapy Interventions (PT) bed mobility training;gait training;strengthening;neuromuscular re-education;transfer training   Risk & Benefits of therapy have been explained care plan/treatment goals reviewed   PT Discharge Planning    PT Discharge Recommendation (DC Rec) Transitional Care Facility   PT Rationale for DC Rec Pt is below baseline level of function, decreased strength, activity intolerance,    PT Brief overview of current status  Nursing Staff: up with A x 1 + WW + gait belt   Total Evaluation Time   Total Evaluation Time (Minutes) 8

## 2021-09-20 NOTE — PROGRESS NOTES
Care Management Follow Up    Length of Stay (days): 4    Expected Discharge Date: 09/22/2021     Concerns to be Addressed: discharge planning       Patient plan of care discussed at interdisciplinary rounds: Yes    Anticipated Discharge Disposition: Return to Transitional Care    [facility]  [address] [place]  [address]  P: [phone]  F: [phone]     Anticipated Discharge Services: None     Anticipated Discharge DME: None    Patient/family educated on Medicare website which has current facility and service quality ratings: yes    Education Provided on the Discharge Plan:  Yes    Patient/Family in Agreement with the Plan:  Yes    Referrals Placed by CM/SW: Internal Clinic Care Coordination    Private pay costs discussed: transportation costs    Additional Information:    Patient agreed to work with PT. PT recommending return to TCU for ongoing therapies.      sent updated recorded to [facility] (P: [phone]; F: [phone]).     Awaiting medical readiness.    NETTA Lewis  7D Hematology/Oncology Social Worker  Phone: [phone]  Pager: [phone]  [email or web address]

## 2021-09-21 ENCOUNTER — APPOINTMENT (OUTPATIENT)
Dept: PHYSICAL THERAPY | Facility: CLINIC | Age: 86
DRG: 391 | End: 2021-09-21
Payer: MEDICARE

## 2021-09-21 LAB
ANION GAP SERPL CALCULATED.3IONS-SCNC: 8 MMOL/L (ref 3–14)
BUN SERPL-MCNC: 76 MG/DL (ref 7–30)
CALCIUM SERPL-MCNC: 9.2 MG/DL (ref 8.5–10.1)
CHLORIDE BLD-SCNC: 112 MMOL/L (ref 94–109)
CO2 SERPL-SCNC: 19 MMOL/L (ref 20–32)
CREAT SERPL-MCNC: 4.69 MG/DL (ref 0.52–1.25)
GFR SERPL CREATININE-BSD FRML MDRD: 11 ML/MIN/1.73M2
GLUCOSE BLD-MCNC: 110 MG/DL (ref 70–99)
GLUCOSE BLDC GLUCOMTR-MCNC: 149 MG/DL (ref 70–99)
GLUCOSE BLDC GLUCOMTR-MCNC: 169 MG/DL (ref 70–99)
GLUCOSE BLDC GLUCOMTR-MCNC: 239 MG/DL (ref 70–99)
GLUCOSE BLDC GLUCOMTR-MCNC: 248 MG/DL (ref 70–99)
GLUCOSE BLDC GLUCOMTR-MCNC: 95 MG/DL (ref 70–99)
GLUCOSE BLDC GLUCOMTR-MCNC: 98 MG/DL (ref 70–99)
POTASSIUM BLD-SCNC: 4.7 MMOL/L (ref 3.4–5.3)
SODIUM SERPL-SCNC: 139 MMOL/L (ref 133–144)
ZINC SERPL-MCNC: 68.3 UG/DL

## 2021-09-21 PROCEDURE — 250N000013 HC RX MED GY IP 250 OP 250 PS 637

## 2021-09-21 PROCEDURE — 250N000013 HC RX MED GY IP 250 OP 250 PS 637: Performed by: INTERNAL MEDICINE

## 2021-09-21 PROCEDURE — 120N000002 HC R&B MED SURG/OB UMMC

## 2021-09-21 PROCEDURE — 999N000111 HC STATISTIC OT IP EVAL DEFER

## 2021-09-21 PROCEDURE — 97110 THERAPEUTIC EXERCISES: CPT | Mod: GP | Performed by: REHABILITATION PRACTITIONER

## 2021-09-21 PROCEDURE — 97530 THERAPEUTIC ACTIVITIES: CPT | Mod: GP | Performed by: REHABILITATION PRACTITIONER

## 2021-09-21 PROCEDURE — 36415 COLL VENOUS BLD VENIPUNCTURE: CPT | Performed by: STUDENT IN AN ORGANIZED HEALTH CARE EDUCATION/TRAINING PROGRAM

## 2021-09-21 PROCEDURE — 250N000013 HC RX MED GY IP 250 OP 250 PS 637: Performed by: STUDENT IN AN ORGANIZED HEALTH CARE EDUCATION/TRAINING PROGRAM

## 2021-09-21 PROCEDURE — 97116 GAIT TRAINING THERAPY: CPT | Mod: GP | Performed by: REHABILITATION PRACTITIONER

## 2021-09-21 PROCEDURE — 99232 SBSQ HOSP IP/OBS MODERATE 35: CPT | Mod: GC | Performed by: INTERNAL MEDICINE

## 2021-09-21 PROCEDURE — 80048 BASIC METABOLIC PNL TOTAL CA: CPT | Performed by: STUDENT IN AN ORGANIZED HEALTH CARE EDUCATION/TRAINING PROGRAM

## 2021-09-21 RX ADMIN — FINASTERIDE 5 MG: 5 TABLET, FILM COATED ORAL at 08:30

## 2021-09-21 RX ADMIN — METRONIDAZOLE 500 MG: 500 TABLET ORAL at 08:30

## 2021-09-21 RX ADMIN — CARVEDILOL 25 MG: 25 TABLET, FILM COATED ORAL at 18:36

## 2021-09-21 RX ADMIN — CARVEDILOL 25 MG: 25 TABLET, FILM COATED ORAL at 07:06

## 2021-09-21 RX ADMIN — LEVOTHYROXINE SODIUM 25 MCG: 0.03 TABLET ORAL at 07:07

## 2021-09-21 RX ADMIN — CEFDINIR 300 MG: 300 CAPSULE ORAL at 08:30

## 2021-09-21 RX ADMIN — INSULIN ASPART 3 UNITS: 100 INJECTION, SOLUTION INTRAVENOUS; SUBCUTANEOUS at 16:39

## 2021-09-21 RX ADMIN — INSULIN ASPART 1 UNITS: 100 INJECTION, SOLUTION INTRAVENOUS; SUBCUTANEOUS at 00:00

## 2021-09-21 RX ADMIN — METRONIDAZOLE 500 MG: 500 TABLET ORAL at 14:45

## 2021-09-21 RX ADMIN — METRONIDAZOLE 500 MG: 500 TABLET ORAL at 20:09

## 2021-09-21 RX ADMIN — INSULIN ASPART 1 UNITS: 100 INJECTION, SOLUTION INTRAVENOUS; SUBCUTANEOUS at 13:35

## 2021-09-21 RX ADMIN — Medication 25 MCG: at 08:30

## 2021-09-21 RX ADMIN — Medication 1 TABLET: at 08:30

## 2021-09-21 RX ADMIN — NIFEDIPINE 60 MG: 60 TABLET, EXTENDED RELEASE ORAL at 07:06

## 2021-09-21 RX ADMIN — INSULIN ASPART 2 UNITS: 100 INJECTION, SOLUTION INTRAVENOUS; SUBCUTANEOUS at 20:09

## 2021-09-21 ASSESSMENT — ACTIVITIES OF DAILY LIVING (ADL)
ADLS_ACUITY_SCORE: 16

## 2021-09-21 NOTE — PROGRESS NOTES
[facility]    Progress Note - Violetta 5 Service        Date of Admission:  9/16/2021  Resident/Fellow Attestation   I, Lopez Redmond, was present with the medical/EAGLE student who participated in the service and in the documentation of the note.  I have verified the history and personally performed the physical exam and medical decision making.  I agree with the assessment and plan of care as documented in the note.      Lopez Redmond MD  PGY2  Date of Service (when I saw the patient): 09/21/21     Assessment & Plan   Farzad East is a 86 year old man with PMHx of CKD, Type 2 DM, hx rectal cancer, subclinical hypothyroidism with thyroid nodule who presents with fatigue, weakness and diarrhea and was found to have diverticulitis, possible bowel obstruction (although asymptomatic) and Cr of 5.13 consistent with RONY with likely component of worsening CKD, Cr improving. Patient on flagyl and cefdinir.    Changes Today:   -Continue Flagyl and Cefdinir   -Monitor Cr  -PT recommending return to TCU on discharge, hopefully tomorrow vs. Next day.  - BP slightly over goal 140-150 systolic (per nephrology recs), will consider intensification of regimen on discharge.      #Acute Diverticulitis  #Diarrhea, resolved  Patient reported diarrhea and anorexia for the past few days PTA. Denied hematochezia or melena. Lipase elevated at 983 (<3 ULN), but no epigastric pain, and no imaging evidence of pancreatitis. CTAP in ED indicative of uncomplicated diverticulitis. Started on Cipro, flagyl in the ED but switched to ceftriaxone and flagyl. With tolerating PO, switched to PO flagyl and cefdinir.   - Continue flagyl and cefdinir  - Antiemetics PRN  - Regular diet     #RONY on CKD  Likely prerenal 2/2 poor PO intake and increased output given diarrhea, but component of worsening progression of CKD likely. Has had progressively worsening kidney disease and was recently admitted for  RONY on CKD. On presentation at last hospitalization, his Cr was 2.89 and increased to 5.77 (8/26) before trending down to and plateauing around 4.7 by discharge. Nephrology was involved during previous hospitalization and felt the RONY was 2/2 hypoperfusion in the setting of tightly controlled blood pressures vs drug-induced lupus 2/2 hydralazine (discontinued on 8/25/21). Cr 5.72 on this admission. Trended down with hydration and has continued to trend down on its own.  - Nephrology consulted, appreciate recs   - permissive HTN with SBP goal of 140-150   - Continue to hold PTA Lasix in setting of RONY   - Hold off on further hydration at this point  - Strict I/Os  - Daily weights   - Avoid nephrotoxins      #Fatigue   #Weakness  #Bradycardia   Likely multifactorial including infection such as diverticulitis, worsening CKD/uremia, deconditioning, and poor PO intake the past few days with fluid losses. Improving with IVF and improved renal function but still fatigued--c/w resent baseline  - PT/OT consulted, appreciate recs   - Nutrition consulted, appreciate recs  - Treatment of diverticulitis, as above     #C/f bowel obstruction on CT, however, patient has been asymptomatic and per surgery there is nothing to do unless patient becomes symptomatic  #Constipation, improved   #Inguinal Hernia  CT in ED concerning for herniation of a short segment of small bowel into the left inguinal canal with mild air-filled and and fluid-filled loops in the inguinal canal with minimal adjacent fluid and edema. Further, the small bowel proximal to the hernia is moderately fluid-filled (but not overly dilated) which may represent developing obstruction. On admission, patient's history less consistent with bowel obstruction (no N/V, no abdominal pain, diarrhea). General Surgery was consulted in the ED and signed off.   - General Surgery consulted. Signed off.   - hold PTA ASA 81 mg  - continue to monitor for pain      #Pancreatic cyst on  ED Scan  CT in ED noted well-defined low-attenuation 1.4 x 2x 1.3 cm lesion on the anterior aspect of the pancreatic head that may represent a cystic lesion but remains indeterminate. No surrounding pancreatic ductal dilatation or inflammation.   - Outpatient follow-up recommended      #HTN  Briefly held carvedilol due to bradycardia, but marked increase in BP.   - Continue PTA nifedipine   - Continue carvedilol  - Goal -150 per Nephrology recommendations.   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 Currently at baseline. On last admission, iron panel done c/f BEVELRY with possible EPO deficiency given CKD. Patient denies recent melena or hematochezia, although per last admission discharge he endorsed dark black stools and diarrhea x 3 months. Last colonoscopy 2015 with recommended 5 year f/u.   - Follow-up screening colonoscopy recommended outpatient     #Type 2 DM  Last A1c 9.5% on 7/16/21. Pt reports recent lability of BGs between low 100s and into the 200s. Recently had his regimen changed with glipizide discontinued d/t RONY and hypoglycemia risk and sitagliptin was held on discharge with plan to use Lantus 10u at bedtime. Planned to discuss as outpatient with his PCP. Pt notes he is confused about his regimen changing and also states he is not on glargine, although his records report that is what he is using. BGs 100s in ED, will continue to monitor with sliding scale insulin and add on glargine as needed.   - moderate sliding scale insulin   - holding PTA glargine   - glucose checks  - hypoglycemia protocol      #BPH  - Continue PTA finasteride      #Osteoporosis  - Continue PTA Vitamin D     #Subclinical Hypothyroidism  #Thyroid nodule  Last TSH 7.46 with free T4 1.03 on 8/16/2021. Follows with Endocrinology with Dr. Amin. Had unsuccessful thyroid biopsy attempt in 7/2021, plan for  repeat 10/2021.   -Continue PTA levothyroxine      #Chart History of Afib  On chart review, appears patient had an isolated episode of Afib and was on warfarin, but this appears to have been stopped about a decade ago. No more recent notes reporting Afib. EKG here showed sinus rhythm.     # Severe Malnutrition, POA: based on Weight loss;Reduced intake (09/17/21 1400)   Nutrition following.      Diet: Regular diet   DVT Prophylaxis: Pneumatic Compression Devices  High Catheter: Not present  Fluids: PO   Central Lines: None  Code Status: No CPR- Do NOT Intubate      Disposition Plan   Expected discharge: 1-2 days pending clinical improvement.     The patient's care was discussed with the Attending Physician, Dr. Corey.     Marjan Lopez  Medical Student  [address]  Securely message with the Vocera Web Console (learn more here)  Text page via [facility] Paging/Directory  Please see sign in/sign out for up to date coverage information    Clinically Significant Risk Factors Present on Admission     ______________________________________________________________________    Interval History   No acute events overnight. Nursing notes reviewed. Patient's carvedilol held due to bradycardia last night. This AM his BP was 197/73, asymptomatic. Given PTA carvedilol and nifedipine and blood pressures came down. Patient had BM this AM. On regular diet.     No fevers or chills. No nausea or vomiting.   4 Point ROS negative except for that noted above     Data reviewed today: I reviewed all medications, new labs and imaging results over the last 24 hours.     Physical Exam   Vital Signs: Temp: 98.6  F (37  C) Temp src: Oral BP: (!) 145/59 Pulse: 55   Resp: 18 SpO2: 97 % O2 Device: None (Room air)    Weight: 157 lbs 3.2 oz     General Appearance: Sitting up in chair, NAD  Respiratory: CTAB, no wheezes or crackles. No accessory muscle use.    Cardiovascular: RRR, no m/r/g  appreciated. Moderate LE edema, L>R.    GI: Soft, non-tender, non-distended. No rebound tenderness or guarding.   Skin: No acute rashes. Warm and dry.       Data   Recent Labs   Lab 09/21/21  1544 09/21/21  1158 09/21/21  0703 09/21/21  0643 09/21/21  0359 09/20/21  1256 09/20/21  0758 09/19/21  1223 09/19/21  0535 09/19/21  0530 09/18/21  0907 09/18/21  0851 09/17/21  0613 09/17/21  0546 09/16/21  2115 09/16/21 2110   0000   WBC  --   --   --   --   --   --   --   --  6.4  --   --  5.7  --  5.7   < > 5.8  --    HGB  --   --   --   --   --   --   --   --  8.8*  --   --  9.2*  --  9.2*   < > 9.1*  --    MCV  --   --   --   --   --   --   --   --  88  --   --  88  --  88   < > 87  --    PLT  --   --   --   --   --   --   --   --  189  --   --  207  --  222   < > 217  --    NA  --   --   --  139  --   --  140  --   --  139   < > 140   < > 137   < > 136  --    POTASSIUM  --   --   --  4.7  --   --  4.6  --   --  4.5   < > 4.7   < > 4.5   < > 4.8  --    CHLORIDE  --   --   --  112*  --   --  113*  --   --  114*   < > 114*   < > 110*   < > 107  --    CO2  --   --   --  19*  --   --  19*  --   --  17*   < > 18*   < > 20   < > 20  --    BUN  --   --   --  76*  --   --  79*  --   --  81*   < > 84*   < > 93*   < > 103*  --    CR  --   --   --  4.69*  --   --  4.53*  --   --  4.67*   < > 4.76*   < > 5.13*   < > 5.72*  --    ANIONGAP  --   --   --  8  --   --  8  --   --  8   < > 8   < > 7   < > 9  --    DUSTIN  --   --   --  9.2  --   --  8.7  --   --  8.3*   < > 8.2*   < > 8.5   < > 8.5  --    * 169* 98 110*   < >   < > 148*   < >  --  114*   < > 90   < > 115*   < > 158*   < >   ALBUMIN  --   --   --   --   --   --   --   --   --  2.4*  --   --   --   --   --  2.8*  --    PROTTOTAL  --   --   --   --   --   --   --   --   --   --   --   --   --   --   --  7.4  --    BILITOTAL  --   --   --   --   --   --   --   --   --   --   --   --   --   --   --  0.3  --    ALKPHOS  --   --   --   --   --   --   --   --   --   --   --    --   --   --   --  74  --    ALT  --   --   --   --   --   --   --   --   --   --   --   --   --   --   --  20  --    AST  --   --   --   --   --   --   --   --   --   --   --   --   --   --   --  9  --    LIPASE  --   --   --   --   --   --   --   --   --   --   --   --   --   --   --  983*  --    TROPONIN  --   --   --   --   --   --   --   --   --   --   --   --   --   --   --  <0.015  --     < > = values in this interval not displayed.     No results found for this or any previous visit (from the past 24 hour(s)).

## 2021-09-21 NOTE — PROVIDER NOTIFICATION
"Web-based messaging to [phone]    \"7D - 7519-1 - L.C.  /73 and HR 68bpm. BP parameters met to notify.   Margaret, [id number]\"    Response: give AM BP meds early  "

## 2021-09-22 ENCOUNTER — APPOINTMENT (OUTPATIENT)
Dept: PHYSICAL THERAPY | Facility: CLINIC | Age: 86
DRG: 391 | End: 2021-09-22
Payer: MEDICARE

## 2021-09-22 LAB
ANION GAP SERPL CALCULATED.3IONS-SCNC: 10 MMOL/L (ref 3–14)
BUN SERPL-MCNC: 76 MG/DL (ref 7–30)
CALCIUM SERPL-MCNC: 8.5 MG/DL (ref 8.5–10.1)
CHLORIDE BLD-SCNC: 108 MMOL/L (ref 94–109)
CO2 SERPL-SCNC: 19 MMOL/L (ref 20–32)
CREAT SERPL-MCNC: 4.6 MG/DL (ref 0.52–1.25)
ERYTHROCYTE [DISTWIDTH] IN BLOOD BY AUTOMATED COUNT: 17.7 % (ref 10–15)
GFR SERPL CREATININE-BSD FRML MDRD: 11 ML/MIN/1.73M2
GLUCOSE BLD-MCNC: 318 MG/DL (ref 70–99)
GLUCOSE BLDC GLUCOMTR-MCNC: 143 MG/DL (ref 70–99)
GLUCOSE BLDC GLUCOMTR-MCNC: 180 MG/DL (ref 70–99)
GLUCOSE BLDC GLUCOMTR-MCNC: 260 MG/DL (ref 70–99)
GLUCOSE BLDC GLUCOMTR-MCNC: 290 MG/DL (ref 70–99)
HCT VFR BLD AUTO: 28.6 % (ref 35–53)
HGB BLD-MCNC: 9.2 G/DL (ref 11.7–17.7)
MCH RBC QN AUTO: 28 PG (ref 26.5–33)
MCHC RBC AUTO-ENTMCNC: 32.2 G/DL (ref 31.5–36.5)
MCV RBC AUTO: 87 FL (ref 78–100)
PLATELET # BLD AUTO: 183 10E3/UL (ref 150–450)
POTASSIUM BLD-SCNC: 4.9 MMOL/L (ref 3.4–5.3)
RBC # BLD AUTO: 3.29 10E6/UL (ref 3.8–5.9)
SODIUM SERPL-SCNC: 137 MMOL/L (ref 133–144)
WBC # BLD AUTO: 6 10E3/UL (ref 4–11)

## 2021-09-22 PROCEDURE — 36415 COLL VENOUS BLD VENIPUNCTURE: CPT | Performed by: STUDENT IN AN ORGANIZED HEALTH CARE EDUCATION/TRAINING PROGRAM

## 2021-09-22 PROCEDURE — 250N000013 HC RX MED GY IP 250 OP 250 PS 637

## 2021-09-22 PROCEDURE — 85014 HEMATOCRIT: CPT | Performed by: STUDENT IN AN ORGANIZED HEALTH CARE EDUCATION/TRAINING PROGRAM

## 2021-09-22 PROCEDURE — 97116 GAIT TRAINING THERAPY: CPT | Mod: GP | Performed by: REHABILITATION PRACTITIONER

## 2021-09-22 PROCEDURE — 80048 BASIC METABOLIC PNL TOTAL CA: CPT | Performed by: STUDENT IN AN ORGANIZED HEALTH CARE EDUCATION/TRAINING PROGRAM

## 2021-09-22 PROCEDURE — 250N000013 HC RX MED GY IP 250 OP 250 PS 637: Performed by: INTERNAL MEDICINE

## 2021-09-22 PROCEDURE — 999N000127 HC STATISTIC PERIPHERAL IV START W US GUIDANCE

## 2021-09-22 PROCEDURE — 99232 SBSQ HOSP IP/OBS MODERATE 35: CPT | Mod: GC | Performed by: INTERNAL MEDICINE

## 2021-09-22 PROCEDURE — 97530 THERAPEUTIC ACTIVITIES: CPT | Mod: GP | Performed by: REHABILITATION PRACTITIONER

## 2021-09-22 PROCEDURE — 250N000013 HC RX MED GY IP 250 OP 250 PS 637: Performed by: STUDENT IN AN ORGANIZED HEALTH CARE EDUCATION/TRAINING PROGRAM

## 2021-09-22 PROCEDURE — 120N000002 HC R&B MED SURG/OB UMMC

## 2021-09-22 RX ORDER — LABETALOL 300 MG/1
300 TABLET, FILM COATED ORAL EVERY 12 HOURS SCHEDULED
Status: DISCONTINUED | OUTPATIENT
Start: 2021-09-22 | End: 2021-09-22

## 2021-09-22 RX ORDER — LABETALOL 100 MG/1
100 TABLET, FILM COATED ORAL EVERY 12 HOURS SCHEDULED
Status: DISCONTINUED | OUTPATIENT
Start: 2021-09-22 | End: 2021-09-23 | Stop reason: HOSPADM

## 2021-09-22 RX ADMIN — CARVEDILOL 25 MG: 25 TABLET, FILM COATED ORAL at 08:33

## 2021-09-22 RX ADMIN — INSULIN ASPART 1 UNITS: 100 INJECTION, SOLUTION INTRAVENOUS; SUBCUTANEOUS at 20:34

## 2021-09-22 RX ADMIN — INSULIN ASPART 2 UNITS: 100 INJECTION, SOLUTION INTRAVENOUS; SUBCUTANEOUS at 12:09

## 2021-09-22 RX ADMIN — Medication 25 MCG: at 08:33

## 2021-09-22 RX ADMIN — METRONIDAZOLE 500 MG: 500 TABLET ORAL at 20:35

## 2021-09-22 RX ADMIN — METRONIDAZOLE 500 MG: 500 TABLET ORAL at 13:36

## 2021-09-22 RX ADMIN — INSULIN ASPART 1 UNITS: 100 INJECTION, SOLUTION INTRAVENOUS; SUBCUTANEOUS at 04:33

## 2021-09-22 RX ADMIN — NIFEDIPINE 60 MG: 60 TABLET, EXTENDED RELEASE ORAL at 08:33

## 2021-09-22 RX ADMIN — METRONIDAZOLE 500 MG: 500 TABLET ORAL at 08:33

## 2021-09-22 RX ADMIN — LEVOTHYROXINE SODIUM 25 MCG: 0.03 TABLET ORAL at 08:33

## 2021-09-22 RX ADMIN — INSULIN ASPART 3 UNITS: 100 INJECTION, SOLUTION INTRAVENOUS; SUBCUTANEOUS at 17:42

## 2021-09-22 RX ADMIN — Medication 1 TABLET: at 08:33

## 2021-09-22 RX ADMIN — CEFDINIR 300 MG: 300 CAPSULE ORAL at 08:33

## 2021-09-22 RX ADMIN — FINASTERIDE 5 MG: 5 TABLET, FILM COATED ORAL at 08:33

## 2021-09-22 ASSESSMENT — ACTIVITIES OF DAILY LIVING (ADL)
ADLS_ACUITY_SCORE: 18
ADLS_ACUITY_SCORE: 16
ADLS_ACUITY_SCORE: 16
ADLS_ACUITY_SCORE: 18
ADLS_ACUITY_SCORE: 16
ADLS_ACUITY_SCORE: 18

## 2021-09-22 NOTE — PROGRESS NOTES
Pt's daughter. Jaleesa, called to ask what the next steps toward hospice care are. She stated that pt has stated that he does not want dialysis and doesn't want to keep bouncing in and out of the hospital feeling poorly. Information paged to Dr. Lopez Redmond MD.  also notified of daughter's request.

## 2021-09-22 NOTE — PROGRESS NOTES
[facility]    Progress Note - Violetta 5 Service        Date of Admission:  9/16/2021    Resident/Fellow Attestation   I, Lopez Redmond, was present with the medical/EAGLE student who participated in the service and in the documentation of the note.  I have verified the history and personally performed the physical exam and medical decision making.  I agree with the assessment and plan of care as documented in the note.      Continues to be intermittently hypertensive, switched to scheduled labetalol. Hopefully discharge tomorrow    Lopez Redmond MD  PGY2  Date of Service (when I saw the patient): 09/22/21    Assessment & Plan   Farzad East is a 86 year old man with PMHx of CKD, Type 2 DM, hx rectal cancer, subclinical hypothyroidism with thyroid nodule who presents with fatigue, weakness and diarrhea and was found to have diverticulitis, possible bowel obstruction (although asymptomatic) and Cr of 5.13 consistent with RONY with likely component of worsening CKD, Cr improving. Patient on flagyl and cefdinir.    Changes Today:   -Continue Flagyl and Cefdinir   -Patient having diarrhea and chills. Ordered C diff. CBC and BMP wnl.   -BP has been over goal of 140-150 systolic (per nephrology recs). Has been bradycardic on carvedilol. Discontinued carvedilol and switched to labetalol 100 mg BID.     -Monitor Cr    #Acute Diverticulitis  #Diarrhea  Patient reported diarrhea and anorexia for the past few days PTA. Denied hematochezia or melena. Lipase elevated at 983 (<3 ULN), but no epigastric pain, and no imaging evidence of pancreatitis. CTAP in ED indicative of uncomplicated diverticulitis. Started on Cipro, flagyl in the ED but switched to ceftriaxone and flagyl. With tolerating PO, switched to PO flagyl and cefdinir. On 9/22, patient developed diarrhea and chills after a bout of constipation while in hospital (only received one dose of senna for constipation).  Patient afebrile and WBC wnl but with new diarrhea c/f C.diff.     - Continue flagyl and cefdinir  - Antiemetics PRN  - Regular diet   - C diff pending      #RONY on CKD  Likely prerenal 2/2 poor PO intake and increased output given diarrhea, but component of worsening progression of CKD likely. Has had progressively worsening kidney disease and was recently admitted for RONY on CKD. On presentation at last hospitalization, his Cr was 2.89 and increased to 5.77 (8/26) before trending down to and plateauing around 4.7 by discharge. Nephrology was involved during previous hospitalization and felt the RONY was 2/2 hypoperfusion in the setting of tightly controlled blood pressures vs drug-induced lupus 2/2 hydralazine (discontinued on 8/25/21). Cr 5.72 on this admission. Trended down with hydration and has continued to trend down on its own.  - Nephrology consulted, appreciate recs   - permissive HTN with SBP goal of 140-150   - Continue to hold PTA Lasix in setting of RONY   - Hold off on further hydration at this point  - Strict I/Os   - Daily weights   - Avoid nephrotoxins      #Fatigue   #Weakness   Likely multifactorial including infection such as diverticulitis, worsening CKD/uremia, deconditioning, and poor PO intake the past few days with fluid losses. Improving with IVF and improved renal function but still fatigued--c/w resent baseline.  - PT/OT consulted, appreciate recs   - Nutrition consulted, appreciate recs  - Treatment of diverticulitis, as above     #C/f bowel obstruction on CT, however, patient has been asymptomatic and per surgery there is nothing to do unless patient becomes symptomatic  #Inguinal Hernia  CT in ED concerning for herniation of a short segment of small bowel into the left inguinal canal with mild air-filled and and fluid-filled loops in the inguinal canal with minimal adjacent fluid and edema. Further, the small bowel proximal to the hernia is moderately fluid-filled (but not overly  dilated) which may represent developing obstruction. On admission, patient's history less consistent with bowel obstruction (no N/V, no abdominal pain, diarrhea). General Surgery was consulted in the ED and signed off.   - General Surgery consulted. Signed off.   - hold PTA ASA 81 mg  - continue to monitor for pain      #Pancreatic cyst on ED Scan  CT in ED noted well-defined low-attenuation 1.4 x 2x 1.3 cm lesion on the anterior aspect of the pancreatic head that may represent a cystic lesion but remains indeterminate. No surrounding pancreatic ductal dilatation or inflammation.   - Outpatient follow-up recommended      #HTN  #Bradycardia likely 2/2 carvedilol   Patient has been over SBP goal of 140-150 and has been bradycardic. Likely that carvedilol was leading to bradycardia. Will switch carvedilol to labetalol with hopes of gaining better blood pressure control without affecting HR.   - Continue PTA nifedipine   - Switch carvedilol to labetalol   - Goal -150 per Nephrology recommendations.   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 Currently at baseline. On last admission, iron panel done c/f BEVERLY with possible EPO deficiency given CKD. Patient denies recent melena or hematochezia, although per last admission discharge he endorsed dark black stools and diarrhea x 3 months. Last colonoscopy 2015 with recommended 5 year f/u.   - Follow-up screening colonoscopy recommended outpatient     #Type 2 DM  Last A1c 9.5% on 7/16/21. Pt reports recent lability of BGs between low 100s and into the 200s. Recently had his regimen changed with glipizide discontinued d/t RONY and hypoglycemia risk and sitagliptin was held on discharge with plan to use Lantus 10u at bedtime. Planned to discuss as outpatient with his PCP. Pt notes he is confused about his regimen changing and also states he is not on  glargine, although his records report that is what he is using. BGs 100s in ED, will continue to monitor with sliding scale insulin and add on glargine as needed.   - moderate sliding scale insulin   - holding PTA glargine   - glucose checks  - hypoglycemia protocol      #BPH  - Continue PTA finasteride       #Osteoporosis  - Continue PTA Vitamin D     #Subclinical Hypothyroidism  #Thyroid nodule  Last TSH 7.46 with free T4 1.03 on 8/16/2021. Follows with Endocrinology with Dr. Amin. Had unsuccessful thyroid biopsy attempt in 7/2021, plan for repeat 10/2021.   -Continue PTA levothyroxine      #Chart History of Afib  On chart review, appears patient had an isolated episode of Afib and was on warfarin, but this appears to have been stopped about a decade ago. No more recent notes reporting Afib. EKG here showed sinus rhythm.     # Severe Malnutrition, POA: based on Weight loss; Reduced intake (09/17/21 1400)   Nutrition following.       Diet: Regular diet   DVT Prophylaxis: Pneumatic Compression Devices  High Catheter: Not present  Fluids: PO   Central Lines: None  Code Status: No CPR- Do NOT Intubate      Disposition Plan   Expected discharge: 1-2 days pending clinical improvement.     The patient's care was discussed with the Attending Physician, Dr. Corey.     Marjan Lopez  Medical Student  [address]  Securely message with the Vocera Web Console (learn more here)  Text page via [facility] Paging/Directory  Please see sign in/sign out for up to date coverage information    Clinically Significant Risk Factors Present on Admission     ______________________________________________________________________    Interval History   Nursing notes reviewed. Patient continued to have elevated blood pressures and bradycardia overnight. This AM switched carvedilol to labetalol. Patient now having diarrhea and chills. No fevers. No nausea, no abdominal pain and no  vomiting.     4 Point ROS negative except for that noted above     Data reviewed today: I reviewed all medications, new labs and imaging results over the last 24 hours.     Physical Exam   Vital Signs: Temp: 97.7  F (36.5  C) Temp src: Temporal BP: 122/49 Pulse: (!) 47   Resp: 16 SpO2: 95 % O2 Device: None (Room air)    Weight: 157 lbs 3.2 oz     General Appearance: Laying in bed, NAD, feeling chilled   Respiratory: CTAB, no wheezes or crackles. No accessory muscle use.    Cardiovascular: RRR, no m/r/g appreciated. Moderate LE edema, L>R.    GI: Soft, non-tender, non-distended. No rebound tenderness or guarding.   Skin: No acute rashes. Warm and dry.       Data   Recent Labs   Lab 09/22/21  1149 09/22/21  1052 09/22/21  0408 09/21/21  0703 09/21/21  0643 09/20/21  1256 09/20/21  0758 09/19/21  1223 09/19/21  0535 09/19/21  0530 09/18/21  0907 09/18/21  0851 09/16/21  2115 09/16/21  2110   0000   WBC  --  6.0  --   --   --   --   --   --  6.4  --   --  5.7   < > 5.8  --    HGB  --  9.2*  --   --   --   --   --   --  8.8*  --   --  9.2*   < > 9.1*  --    MCV  --  87  --   --   --   --   --   --  88  --   --  88   < > 87  --    PLT  --  183  --   --   --   --   --   --  189  --   --  207   < > 217  --    NA  --  137  --   --  139  --  140  --   --  139   < > 140   < > 136   < >   POTASSIUM  --  4.9  --   --  4.7  --  4.6  --   --  4.5   < > 4.7   < > 4.8   < >   CHLORIDE  --  108  --   --  112*  --  113*  --   --  114*   < > 114*   < > 107   < >   CO2  --  19*  --   --  19*  --  19*  --   --  17*   < > 18*   < > 20   < >   BUN  --  76*  --   --  76*  --  79*  --   --  81*   < > 84*   < > 103*   < >   CR  --  4.60*  --   --  4.69*  --  4.53*  --   --  4.67*   < > 4.76*   < > 5.72*   < >   ANIONGAP  --  10  --   --  8  --  8  --   --  8   < > 8   < > 9   < >   DUSTIN  --  8.5  --   --  9.2  --  8.7  --   --  8.3*   < > 8.2*   < > 8.5   < >   * 318* 143*   < > 110*   < > 148*   < >  --  114*   < > 90   < > 158*   < >    ALBUMIN  --   --   --   --   --   --   --   --   --  2.4*  --   --   --  2.8*  --    PROTTOTAL  --   --   --   --   --   --   --   --   --   --   --   --   --  7.4  --    BILITOTAL  --   --   --   --   --   --   --   --   --   --   --   --   --  0.3  --    ALKPHOS  --   --   --   --   --   --   --   --   --   --   --   --   --  74  --    ALT  --   --   --   --   --   --   --   --   --   --   --   --   --  20  --    AST  --   --   --   --   --   --   --   --   --   --   --   --   --  9  --    LIPASE  --   --   --   --   --   --   --   --   --   --   --   --   --  983*  --    TROPONIN  --   --   --   --   --   --   --   --   --   --   --   --   --  <0.015  --     < > = values in this interval not displayed.     No results found for this or any previous visit (from the past 24 hour(s)).

## 2021-09-23 ENCOUNTER — APPOINTMENT (OUTPATIENT)
Dept: PHYSICAL THERAPY | Facility: CLINIC | Age: 86
DRG: 391 | End: 2021-09-23
Payer: MEDICARE

## 2021-09-23 VITALS
TEMPERATURE: 99.5 F | SYSTOLIC BLOOD PRESSURE: 165 MMHG | WEIGHT: 157.2 LBS | HEART RATE: 58 BPM | DIASTOLIC BLOOD PRESSURE: 65 MMHG | OXYGEN SATURATION: 97 % | RESPIRATION RATE: 20 BRPM | BODY MASS INDEX: 21.92 KG/M2

## 2021-09-23 LAB
GLUCOSE BLDC GLUCOMTR-MCNC: 138 MG/DL (ref 70–99)
GLUCOSE BLDC GLUCOMTR-MCNC: 167 MG/DL (ref 70–99)
GLUCOSE BLDC GLUCOMTR-MCNC: 215 MG/DL (ref 70–99)
TSH SERPL DL<=0.005 MIU/L-ACNC: 3.55 MU/L (ref 0.4–4)

## 2021-09-23 PROCEDURE — 250N000013 HC RX MED GY IP 250 OP 250 PS 637: Performed by: STUDENT IN AN ORGANIZED HEALTH CARE EDUCATION/TRAINING PROGRAM

## 2021-09-23 PROCEDURE — 250N000013 HC RX MED GY IP 250 OP 250 PS 637: Performed by: INTERNAL MEDICINE

## 2021-09-23 PROCEDURE — 84443 ASSAY THYROID STIM HORMONE: CPT | Performed by: STUDENT IN AN ORGANIZED HEALTH CARE EDUCATION/TRAINING PROGRAM

## 2021-09-23 PROCEDURE — 250N000013 HC RX MED GY IP 250 OP 250 PS 637

## 2021-09-23 PROCEDURE — 99238 HOSP IP/OBS DSCHRG MGMT 30/<: CPT | Mod: GC | Performed by: INTERNAL MEDICINE

## 2021-09-23 PROCEDURE — 36415 COLL VENOUS BLD VENIPUNCTURE: CPT | Performed by: STUDENT IN AN ORGANIZED HEALTH CARE EDUCATION/TRAINING PROGRAM

## 2021-09-23 PROCEDURE — 97116 GAIT TRAINING THERAPY: CPT | Mod: GP | Performed by: REHABILITATION PRACTITIONER

## 2021-09-23 PROCEDURE — 99207 PR CDG-CHARGE REQUIRED MANUAL ENTRY: CPT | Performed by: INTERNAL MEDICINE

## 2021-09-23 PROCEDURE — 97530 THERAPEUTIC ACTIVITIES: CPT | Mod: GP | Performed by: REHABILITATION PRACTITIONER

## 2021-09-23 RX ORDER — METRONIDAZOLE 500 MG/1
500 TABLET ORAL 3 TIMES DAILY
Qty: 9 TABLET | Refills: 0 | Status: SHIPPED | OUTPATIENT
Start: 2021-09-23 | End: 2021-09-26

## 2021-09-23 RX ORDER — LABETALOL 100 MG/1
100 TABLET, FILM COATED ORAL 2 TIMES DAILY
Qty: 60 TABLET | Refills: 0 | Status: SHIPPED | OUTPATIENT
Start: 2021-09-23 | End: 2021-10-19

## 2021-09-23 RX ORDER — CEFDINIR 300 MG/1
300 CAPSULE ORAL DAILY
Qty: 2 CAPSULE | Refills: 0 | Status: SHIPPED | OUTPATIENT
Start: 2021-09-24 | End: 2021-09-26

## 2021-09-23 RX ADMIN — INSULIN ASPART 2 UNITS: 100 INJECTION, SOLUTION INTRAVENOUS; SUBCUTANEOUS at 00:49

## 2021-09-23 RX ADMIN — INSULIN ASPART 1 UNITS: 100 INJECTION, SOLUTION INTRAVENOUS; SUBCUTANEOUS at 04:25

## 2021-09-23 RX ADMIN — LEVOTHYROXINE SODIUM 25 MCG: 0.03 TABLET ORAL at 08:50

## 2021-09-23 RX ADMIN — FINASTERIDE 5 MG: 5 TABLET, FILM COATED ORAL at 08:50

## 2021-09-23 RX ADMIN — CEFDINIR 300 MG: 300 CAPSULE ORAL at 08:50

## 2021-09-23 RX ADMIN — Medication 1 TABLET: at 08:50

## 2021-09-23 RX ADMIN — METRONIDAZOLE 500 MG: 500 TABLET ORAL at 08:50

## 2021-09-23 RX ADMIN — Medication 25 MCG: at 08:50

## 2021-09-23 RX ADMIN — NIFEDIPINE 60 MG: 60 TABLET, EXTENDED RELEASE ORAL at 08:50

## 2021-09-23 ASSESSMENT — ACTIVITIES OF DAILY LIVING (ADL)
ADLS_ACUITY_SCORE: 18

## 2021-09-23 NOTE — PROGRESS NOTES
Care Management Discharge Note    Discharge Date: 09/23/2021     Discharge Disposition: Return to U    [facility]  [address] [place]  [address]  P: [phone]  F: [phone]    Discharge Services:  None    Discharge DME: None    Discharge Transportation: [employer] ([phone]) wheelchair transport at 1215.    Private pay costs discussed: Not applicable    PAS Confirmation Code:  Not Needed - Patient returning to facility    Patient/family educated on Medicare website which has current facility and service quality ratings: yes    Education Provided on the Discharge Plan:  Yes    Persons Notified of Discharge Plans: Patient; patient's family; Violetta Dangelo MD; charge nurse; bedside nurse; accepting facility    Patient/Family in Agreement with the Plan:  Yes    Handoff Referral Completed: No - External    NETTA Lewis  7D Hematology/Oncology Social Worker  Phone: [phone]  Pager: [phone]  [email or web address]

## 2021-09-24 ENCOUNTER — TELEPHONE (OUTPATIENT)
Dept: INTERNAL MEDICINE | Facility: CLINIC | Age: 86
End: 2021-09-24

## 2021-09-24 NOTE — DISCHARGE SUMMARY
[facility]  Discharge Summary - Medicine & Pediatrics       Date of Admission:  9/16/2021  Date of Discharge:  9/23/2021 12:16 PM  Discharging Provider: Jerome Corey  Discharge Service: Violetta Dangelo    Discharge Diagnoses   Acute Diverticulitis    Follow-ups Needed After Discharge   Follow-up Appointments     Follow Up and recommended labs and tests      Follow up with primary care provider in 2 weeks.  The following   labs/tests are recommended: Creatinine, CBC.          #Pancreatic cyst on ED Scan  CT in ED noted well-defined low-attenuation 1.4 x 2x 1.3 cm lesion on the anterior aspect of the pancreatic head that may represent a cystic lesion but remains indeterminate. No surrounding pancreatic ductal dilatation or inflammation. Outpatient follow-up recommended     #Anemia  #History of Rectal Cancer  Recent baseline Hgb 7-9s. Currently at baseline. On last admission, iron panel done c/f BEVERLY with possible EPO deficiency given CKD. Patient denies recent melena or hematochezia, although per last admission discharge he endorsed dark black stools and diarrhea x 3 months. Last colonoscopy 2015 with recommended 5 year f/u. Follow-up screening colonoscopy recommended outpatient      Discharge Disposition   Discharged to rehabilitation facility  Condition at discharge: Good      Hospital Course   Farzad East is a 86 year old man with PMHx of CKD, Type 2 DM, hx rectal cancer, subclinical hypothyroidism with thyroid nodule who presents with fatigue, weakness and diarrhea and was found to have diverticulitis, possible bowel obstruction (although asymptomatic) and Cr of 5.13 consistent with RONY with likely component of worsening CKD. The following problems were addressed this hospitalization:     #Acute Diverticulitis  #Diarrhea  Patient reported diarrhea and anorexia for the past few days PTA. Denied hematochezia or melena. Lipase elevated at 983 (<3 ULN), but no epigastric  pain, and no imaging evidence of pancreatitis. CTAP in ED indicative of uncomplicated diverticulitis. Started on Cipro, flagyl in the ED but switched to ceftriaxone and flagyl. With tolerating PO, switched to PO flagyl and cefdinir for planned 10-day course (last day 9/26)     #RONY on CKD  Likely prerenal 2/2 poor PO intake and increased output given diarrhea, but component of worsening progression of CKD likely. Has had progressively worsening kidney disease and was recently admitted for RONY on CKD. On presentation at last hospitalization, his Cr was 2.89 and increased to 5.77 (8/26) before trending down to and plateauing around 4.7 by discharge. Nephrology was involved during previous hospitalization and felt the RONY was 2/2 hypoperfusion in the setting of tightly controlled blood pressures vs drug-induced lupus 2/2 hydralazine (discontinued on 8/25/21). Cr 5.72 on this admission. Trended down with hydration and has continued to trend down on its own.     #C/f bowel obstruction on CT, however, patient has been asymptomatic and per surgery there is nothing to do unless patient becomes symptomatic  #Inguinal Hernia  CT in ED concerning for herniation of a short segment of small bowel into the left inguinal canal with mild air-filled and and fluid-filled loops in the inguinal canal with minimal adjacent fluid and edema. Further, the small bowel proximal to the hernia is moderately fluid-filled (but not overly dilated) which may represent developing obstruction. On admission, patient's history less consistent with bowel obstruction (no N/V, no abdominal pain, diarrhea). General Surgery was consulted in the ED and signed off.     #HTN, resistant  #Bradycardia likely 2/2 carvedilol   Patient has been over SBP goal of 140-150 (per nephrology for CKD) and has been bradycardic. Likely that carvedilol was leading to bradycardia. Will switch carvedilol to labetalol with hopes of gaining better blood pressure control without  affecting HR. Discharged following this change      Consultations This Hospital Stay   PHYSICAL THERAPY ADULT IP CONSULT  OCCUPATIONAL THERAPY ADULT IP CONSULT  NUTRITION SERVICES ADULT IP CONSULT  NEPHROLOGY GENERAL ADULT IP CONSULT  CARE MANAGEMENT / SOCIAL WORK IP CONSULT  VASCULAR ACCESS CARE ADULT IP CONSULT  VASCULAR ACCESS CARE ADULT IP CONSULT    Code Status   Prior       The patient was discussed with MD Violetta Deal 5 Service  [facility] [address] [address]  Phone: [phone]  ______________________________________________________________________    Physical Exam   Vital Signs: Temp: 99.5  F (37.5  C) Temp src: Temporal BP: (!) 165/65 Pulse: 58   Resp: 20 SpO2: 97 % O2 Device: None (Room air)    Weight: 157 lbs 3.2 oz    General Appearance:  Laying in bed, NAD, feeling chilled   Respiratory: CTAB, no wheezes or crackles. No accessory muscle use.    Cardiovascular: RRR, no m/r/g appreciated. Moderate LE edema, L>R.    GI: Soft, non-tender, non-distended. No rebound tenderness or guarding.   Skin: No acute rashes. Warm and dry.     Primary Care Physician   Renu Lantigua    Discharge Orders      General info for SNF    Length of Stay Estimate: Short Term Care: Estimated # of Days <30  Condition at Discharge: Stable  Level of care:skilled   Rehabilitation Potential: Fair  Admission H&P remains valid and up-to-date: Yes  Recent Chemotherapy: N/A  Use Nursing Home Standing Orders: Yes     Mantoux instructions    Give two-step Mantoux (PPD) Per Facility Policy Yes     Follow Up and recommended labs and tests    Follow up with primary care provider in 2 weeks.  The following labs/tests are recommended: Creatinine, CBC.     Reason for your hospital stay    Diverticulitis     Glucose monitor nursing POCT    Before meals and at bedtime     Activity - Up with nursing assistance     Fall precautions     Advance Diet as Tolerated     Follow this diet upon discharge: Orders Placed This Encounter      Snacks/Supplements Adult: Ensure Clear; With Meals      Room Service      Regular Diet Adult       Significant Results and Procedures   Most Recent 3 CBC's:Recent Labs   Lab Test 09/22/21  1052 09/19/21  0535 09/18/21  0851   WBC 6.0 6.4 5.7   HGB 9.2* 8.8* 9.2*   MCV 87 88 88    189 207     Most Recent 3 BMP's:Recent Labs   Lab Test 09/23/21  0822 09/23/21  0410 09/23/21  0040 09/22/21  1149 09/22/21  1052 09/21/21  0703 09/21/21  0643 09/20/21  1256 09/20/21  0758   NA  --   --   --   --  137  --  139  --  140   POTASSIUM  --   --   --   --  4.9  --  4.7  --  4.6   CHLORIDE  --   --   --   --  108  --  112*  --  113*   CO2  --   --   --   --  19*  --  19*  --  19*   BUN  --   --   --   --  76*  --  76*  --  79*   CR  --   --   --   --  4.60*  --  4.69*  --  4.53*   ANIONGAP  --   --   --   --  10  --  8  --  8   DUSTIN  --   --   --   --  8.5  --  9.2  --  8.7   * 167* 215*   < > 318*   < > 110*   < > 148*    < > = values in this interval not displayed.       Discharge Medications   Discharge Medication List as of 9/23/2021 11:48 AM      START taking these medications    Details   cefdinir (OMNICEF) 300 MG capsule Take 1 capsule (300 mg) by mouth daily for 2 days, Disp-2 capsule, R-0, E-Prescribe      labetalol (NORMODYNE) 100 MG tablet Take 1 tablet (100 mg) by mouth 2 times daily for 60 doses, Disp-60 tablet, R-0, E-PrescribeHold medication if HR less than 50, blood pressure less than 130 systolic      metroNIDAZOLE (FLAGYL) 500 MG tablet Take 1 tablet (500 mg) by mouth 3 times daily for 3 days, Disp-9 tablet, R-0, E-Prescribe         CONTINUE these medications which have NOT CHANGED    Details   aspirin (ASA) 81 MG EC tablet Take 1 tablet by mouth daily , Historical      blood glucose (NO BRAND SPECIFIED) lancets standard Use to test blood sugar 1 times daily or as directed. Use brand compatible with patients insurance and  device.Disp-100 each, [id number]      blood glucose (ONETOUCH VERIO IQ) test strip Use to test blood sugar 1 time daily or as directed., Disp-100 each, R-3, E-Prescribe      blood glucose monitoring (ONETOUCH VERIO IQ SYSTEM) meter device kit Use to test blood sugar dailyDisp-1 kit, [id number]      Cholecalciferol (VITAMIN D-3 PO) Take 1 Dose by mouth daily , Historical      COMPRESSION STOCKINGS 1 each daily Measure and fit.  Style and color per patient preference.  Doff n Wilfrid per patient need., Disp-6 each, R-1, Local PrintLower Extremity: Knee High;  bilateral;  20-30 mm Hg      finasteride (PROSCAR) 5 MG tablet Take 1 tablet by mouth daily, R-3, Historical      furosemide (LASIX) 40 MG tablet Take 1.5 tablets (60 mg) by mouth daily, Transitional      !! insulin aspart (NOVOLOG PEN) 100 UNIT/ML pen Inject 1 Units Subcutaneous 3 times daily (with meals) With lunch and dinner in addition to any needed sliding scale, Disp-15 mL, R-0, E-Prescribe      !! insulin aspart (NOVOLOG PEN) 100 UNIT/ML pen Inject 1-7 Units Subcutaneous 3 times daily (before meals) Correction Scale - MEDIUM INSULIN RESISTANCE DOSING     Do Not give Correction Insulin if Pre-Meal BG less than 140.   For Pre-Meal  - 189 give 1 unit.   For Pre-Meal  - 239 give 2 un its.   For Pre-Meal  - 289 give 3 units.   For Pre-Meal  - 339 give 4 units.   For Pre-Meal - 399 give 5 units.   For Pre-Meal -449 give 6 units  For Pre-Meal BG greater than or equal to 450 give 7 units.   To be given with prandi al insulin, and based on pre-meal blood glucose.    Notify provider if glucose greater than or equal to 350 mg/dL after administration of correction dose. If given at mealtime, administer within 30 minutes of start of meal, Disp-20 mL, R-0, Transitional      insulin glargine (LANTUS SOLOSTAR) 100 UNIT/ML pen Inject 8 Units Subcutaneous every morning, Disp-15 mL, R-0, E-PrescribeIf Lantus is not covered by  insurance, may substitute Basaglar at same dose and frequency.        insulin pen needle (31G X 5 MM) 31G X 5 MM miscellaneous Use1 pen needles daily or as directed.Disp-90 each, R-3, DAWE-PrescribePlease dispense as Insulin Pen Needle 31G X 5MM MISC      multivitamin w/minerals (MULTI-VITAMIN) tablet Take 1 tablet by mouth daily, Historical      NIFEdipine ER OSMOTIC (NIFEDICAL XL) 60 MG 24 hr tablet Take 60 mg by mouth daily, Historical      sennosides (SENOKOT) 8.6 MG tablet Take 2 tablets by mouth nightly as needed for constipation, Historical      SYNTHROID 25 MCG tablet Take 1 tablet (25 mcg) by mouth daily Check TSH in 8 weeks., Disp-90 tablet, R-0, GRISELDA, E-Prescribe      tamsulosin (FLOMAX) 0.4 MG capsule Take 1 capsule (0.4 mg) by mouth daily Advise clinic if causes lightheadedness., Disp-90 capsule, R-3, E-Prescribe       !! - Potential duplicate medications found. Please discuss with provider.      STOP taking these medications       carvedilol (COREG) 25 MG tablet Comments:   Reason for Stopping:             Allergies   Allergies   Allergen Reactions     Hydralazine Nephrotoxicity     Sulfa Drugs      Unknown reaction. Occurred during childhood. Has not taken Sulfa medications since allergic response.

## 2021-09-24 NOTE — TELEPHONE ENCOUNTER
Dr. Carver agreed to take this pt if pt agrees.  Pt was discharged from the hospital yesterday, 9/23.    I left a detailed message to the pt regarding establishing new care with Dr. Carver. Phone number given for the scheduling.    In that way, he can be evaluated after the discharge and have the care plan with PCP.    Soon-Mi    -----------------------------------------------------------------------------------    ----- Message from Renu Lantigua NP sent   Hi Soon Mi,   He will likely be in hospital for a few days at least. Can you check in a few days/follow up and schedule his hospital follow up/establish care with Dr. Carver as able? Thank you so much. Let me know if you have questions.   ESTEPHANIE Green, CNP

## 2021-09-24 NOTE — PLAN OF CARE
Problem: Fatigue  Goal: Improved Activity Tolerance  Intervention: Promote Energy Conservation  Recent Flowsheet Documentation  Taken 9/20/2021 0300 by Leyla Garcia RN  Activity Management: activity adjusted per tolerance       Pt requested to stand and sit on the edge of the bed. Assist of one staff with repositioning. Blood glucose level was done and refused 0400 glucose check. Denies pain. BP (!) 147/60 (BP Location: Left arm)   Pulse 58   Temp 97.2  F (36.2  C) (Oral)   Resp 18   Wt 66.3 kg (146 lb 1.6 oz)   SpO2 97%. IV flagyl given. Pt slept most of the night. Uses urinal at bedside. Will continue to monitor.   
"ED PT Hold: Prolonged contact with patient discussing goals of acute PT but patient adamantly refusing. Discussed benefits of early mobility especially since patient was admitted from TCU but patient continued to refuse stating that he feels \"there is no point\". Patient's son clarifies that patient has had limited participation in rehab services at TCU and feels hopeless about functional recovery. Patient stating that he would prefer to speak with medical team before considering participation in therapy services.   "
0700 - 1500 Patient is alert and oriented x4, vitals stable, afebrile. Very hard of hearing at baseline, pocket talker in use. Denies pain, nausea, shortness of breath. Voiding spontaneously. No BM since he was admitted on 9/16, abdominal x-ray completed to assess for blockage. Regular diet ordered, tolerating but has a poor appetite.  and 290, refused sliding scale insulin today. Transitioned from IV to oral antibiotics, IV saline locked. Generalized weakness, up to chair for a few hours with assist of 1-2. PT evaluation pending to determine if patient can return to TCU. Up with assist x1-2 with walker and gait belt.    
0700 - 1930: A&O, AVSS ex BP been elevated from last night, BP stable for now, restarted on carvedilol.   LR bolus infusing at 100 ml/hr over 10 hours, done at 2200.  On iv cetriaxone q24hrs & flagyl for diverticulitis.   C-diff sample collected pending, last bm 09/17, no stool for today, pls page his team to discontinue order if pt doesn't have stool by tonight.  On clear liquid diet, ate 100% for supper, likely advance diet tomorrow 09/19.  Up with assist x 2 to chair, uses urinal in bed, bed alarm on for safety & weakness.  Continue with poc..  
1500 - 1900: Pt is [place], uses pocket talkies A&O, AVSS ex HR marc in 50's, on RA sating at 96%.   Denies pain/nausea/sob, on regular diet,  insulin given per sliding scale, ate 75% from supper.  Bed alarm on for safety, up with assist x 2, uses urinal in bed.  Continue with poc...  
2668-9613:  OVSS on RA, hypertensive, provider notified d/t nephrology's note stating SBP goal 140-150. Q4H blood sugar checks and scheduled insulin.  & 239, covered with with sliding scale. BM x1 during shift. Adequate UOP. Plan is for pt to discharge back to TCU tomorrow morning. Continue to monitor & with POC.  
4935-7294: Pt continuing with hypertensive BP - 165/67. OVSS. Pt denies having any pain, N/V, or SOB.   & 167, corrected with sliding scale. Continue POC.  
7840-7753:     Pt OVSS with HR of 47 and BP of 143/48. Carvedilo held. Provider notified. Pt is A/Ox4.  Denies pain/ nausea and shortness of breath. NO BM, PRN Senokot given x1 for constipation. Voiding spontaneously. Pt is hard of hearing at baseline, pocket talker in use.   Pt had x-ray done this morning of his abdominal to assess for blockage. Pt ate 75% of his dinner. BG was 245-263. Pt transitioned from IV to oral antibiotics. Pt is assist of 1-2 with walker and gait belt.  Pt is waiting to know when to return to TCU. Continue with POC.  
8396-3595:  OVSS on RA, continues to be hypertensive however labetalol held d/t low HR<60. Q4H blood sugar checks and scheduled insulin.  & 180, covered with with sliding scale. No more BMs since this morning. Order for C.Diff sample cancelled. Adequate UOP. Planfor pt to discharge back to TCU tomorrow. Family wants to discuss option of hospice, team was made aware. Continue to monitor & with POC.  
8483-3751: AVSS. Denied pain and n/v. BM this AM. Good appetite today. BG 98 before breakfast and 169 before lunch. Plan to discharge back to TCU tomorrow. Continue with POC.  
8522-1886: elevated BP: 187/74 and 188/74 - provider notified. OVSS. Pt denies having any pain, N/V, or SOB. Dressing to right shin skin tear is CDI. Pt refused 12am blood glucose and insulin, but allowed 4am blood glucose, BG - 143, 1 unit novolg given per sliding scale orders. Left forearm PIV leaking, new PIV ordered - right forearm PIV placed this morning.   
Discharge  D: Pt afebrile, vital signs stable. Orders for discharge and outpatient medications written.  I: Home medications and return to clinic schedule reviewed with patient and daughter. Discharge instructions and parameters for calling Health Care Provider reviewed. Patient left at 1215 accompanied by daughter and transport.   A: Patient/family verbalized understanding and was ready for discharge.   P: Discharging to Health system. Discharge medications provided by facility.     
Nursing Focus: Admission  D: Arrived at 7D at 0530 from ED via hospital bed. Patient accompanied by hospital transport. Admitted for fatigue and diarrhea. Complains of loose stools.      I: Admission process began.  Patient oriented to room, enviroment, call light.  Md. notified of patients arrival on unit.     A: Vital signs stable, afebrile and on 2L O2 via NC. Patient stable at this time.  Complaining of loose stools. Placed on enteric precautions until c-diff is ruled out.      P: Implement plan of care when available. Continue to monitor patient. Nursing interventions as appropriate. Notify md with changes in pt status.     [id number]    BP (!) 183/73, OVSS, afebrile and on 2L O2 via NC. A&Ox4. Denies nausea/pain/SOB. Pt is hard of hearing, pocket talker is at bedside. Placed on enteric precautions until c-diff is ruled out. Stool sample still needs to be collected. Pt has poor appetite and is currently on clear liquid diet. Has wound on lower right leg with mepilex in place. Voiding adequately via bedside urinal. No BM during shift. Continue to monitor & w/ POC.   
Physical Therapy Discharge Summary    Reason for therapy discharge:    Discharged to transitional care facility.    Progress towards therapy goal(s). See goals on [facility] Plan [facility] electronic health record for goal details.  Goals partially met.  Barriers to achieving goals:   discharge from facility.    Therapy recommendation(s):    Continued therapy is recommended.  Rationale/Recommendations:  patient will benefit from continued skilled PT intervention at the TCU to address mobility deficits, improve strength & activity tolerance.      
Pt afebrile with stable vs except hypertensive (193/74) in the morning. Antihypertensives changes made by MD (not yet received change). Good po intake for breakfast, then had diarrhea. Just before lunch time pt stated that he did not feel well. Stated he was nauseous and chilled. Pt assisted to bed. HR 45, /43. MD notified of change in vs. No new orders. VS improved to closer to baseline. Pt reported stuart he was feeling slightly better. Sleeping ever since (arousable). Refused lunch. Lunchtime glucose 290. Corrected. Cdiff cx ordered, no stools since order placed.   
Sbp 188 this am. Recheck WDL. Bradycardia 47-51.other vss. Enteric precaution dcd per md due to no stool. Reg diet now. Ate good breakfast without abd pain or n/v.o2 sats 98-97% at RM. No c/o sob or contreras.need 2 assist with adls.please order meals for pt.pt is [place] [facility].  
[place] Filler  Volume In Cc: 0
Time: 4640-6588    Afebrile with max BP of 197/73, provider paged. NATASHASS on RACam BOND on for safety. Urinal within reach, frequent small bouts of yellow urine. BG Q4H checks, 149 and 95. LBM charted was 9/17. Regular diet. Given education on labs, agreeable to AM labs. Possible discharge back to TCU today or tomorrow.   
Additional Anesthesia Volume In Cc: 6
Lot #: [id number]
Anesthesia Type: 1% lidocaine without epinephrine
Additional Area 1 Volume In Cc: 1
Additional Area 1 Location: Face
Lot #: [id number]
Post-Care Instructions: Patient instructed to apply ice to reduce swelling.
Expiration Date (Month Year): 08/18
Filler: Juvederm Ultra Plus XC
Filler: Juvederm Volbella XC
Detail Level: Zone
Use Map Statement For Sites (Optional): No
Expiration Date (Month Year): 02/19
Map Statment: See Attach Map for Complete Details
Anesthesia Volume In Cc: 0.5
Consent: Written consent obtained. Risks include but not limited to bruising, beading, irregular texture, ulceration, infection, allergic reaction, scar formation, incomplete augmentation, temporary nature, procedural pain.

## 2021-09-27 ENCOUNTER — TRANSITIONAL CARE UNIT VISIT (OUTPATIENT)
Dept: GERIATRICS | Facility: CLINIC | Age: 86
End: 2021-09-27
Payer: MEDICARE

## 2021-09-27 VITALS
TEMPERATURE: 97.3 F | BODY MASS INDEX: 22.34 KG/M2 | DIASTOLIC BLOOD PRESSURE: 68 MMHG | SYSTOLIC BLOOD PRESSURE: 152 MMHG | HEART RATE: 60 BPM | OXYGEN SATURATION: 95 % | RESPIRATION RATE: 18 BRPM | WEIGHT: 160.2 LBS

## 2021-09-27 DIAGNOSIS — E11.65 TYPE 2 DIABETES MELLITUS WITH HYPERGLYCEMIA, UNSPECIFIED WHETHER LONG TERM INSULIN USE (H): ICD-10-CM

## 2021-09-27 DIAGNOSIS — K57.92 DIVERTICULITIS: Primary | ICD-10-CM

## 2021-09-27 DIAGNOSIS — K86.2 PANCREATIC CYST: ICD-10-CM

## 2021-09-27 DIAGNOSIS — I10 BENIGN ESSENTIAL HYPERTENSION: ICD-10-CM

## 2021-09-27 DIAGNOSIS — R53.81 PHYSICAL DECONDITIONING: ICD-10-CM

## 2021-09-27 DIAGNOSIS — N17.9 ACUTE KIDNEY INJURY (H): ICD-10-CM

## 2021-09-27 DIAGNOSIS — N18.4 CKD (CHRONIC KIDNEY DISEASE) STAGE 4, GFR 15-29 ML/MIN (H): ICD-10-CM

## 2021-09-27 DIAGNOSIS — D64.9 ACUTE ON CHRONIC ANEMIA: ICD-10-CM

## 2021-09-27 DIAGNOSIS — R00.1 BRADYCARDIA: ICD-10-CM

## 2021-09-27 PROCEDURE — 99309 SBSQ NF CARE MODERATE MDM 30: CPT | Performed by: NURSE PRACTITIONER

## 2021-09-27 PROCEDURE — 99207 PR CDG-CODE INCORRECT PER BILLING BASED ON TIME: CPT | Performed by: NURSE PRACTITIONER

## 2021-09-27 NOTE — PROGRESS NOTES
"[facility] GERIATRIC SERVICES    Chief Complaint   Patient presents with     RECHECK     HPI:  Farzad East is a 86 year old  (1935), who is being seen today for an episodic care visit at: [facility] ([facility]) [[id number]].     Background:    This is an 86-year-old male, with a past medical history significant for hypertension, anemia, rectal cancer, type 2 diabetes mellitus, lower extremity edema, benign prostatic hypertrophy and thyroid nodule, who was admitted to the [facility] 8/17/21 through 9/2/21 for progressive shortness of breath, increasing lower extremity edema and fatigue. Labs revealed Potassium 5.7, Creatinine 2.89, with peak at 5.77 on 8/26/21, and TSH 7.46. Nephrology was consulted and suspected acute kidney injury due to hypoperfusion in the setting of tightly controlled blood pressures versus drug induced lupus due secondary to Hydralazine. Hydralazine was discontinued on 8/25/21. Permissive blood pressures goal of systolic 140-150. Initiated on Furosemide and Nifedipine. Hemoglobin 6.8 on 8/19/21 and required 1 U of PRBCs. 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 Found to have \".. Superficial clot noted in the cephalic vein within both forearms\" on 8/26/21 ultrasound. Recommended to elevate arms. Blood sugars elevated. Glipizide discontinued due to RONY and hypoglycemia risk. Sitagliptin held. A TCU stay was recommended for ongoing physical rehabilitation.     While in the TCU, became lethargic with fatigue, decreased oral intake, loose stools, bradycardia and decline of progress in therapy so was sent back to the [facility] 9/16/21 through 9/23/21. Labs revealed Lipase of 983. An abdominal and pelvic CT revealed \"Herniation of a short segment of small bowel into the left inguinal canal with mild air-filled and fluid-filled loops in the inguinal canal " "with minimal adjacent fluid and edema. The proximal small bowel up to the level of the herniation is moderately fluid-filled although not overtly dilated. Finding may be indicative of developing obstruction related to a small bowel containing left inguinal hernia... Acute uncomplicated diverticulitis\". 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 General Surgery was consulted. Patient did not want hernia repaired. Medical management for uncomplicated diverticulitis was recommended. DIarrhea resolved. IV fluids were administered due to poor oral intake leading up to hospitalization and renal function improved. PTA Furosemide was also held. Carvedilol was briefly held for bradycardia. Discharged back to TCU when medically stable.    Today, patient states he is doing \"so so\". Asks what provider is doing in room. Feels he's eating ok, but is not hungry. Also doesn't care for the food. Feels his bowels are getting better. Has had issues with diarrhea every time he eats for months and \"No doctor can figure it out\". Denies pain.     Allergies, and PMH/PSH reviewed in EPIC today.  REVIEW OF SYSTEMS:  4 point ROS including Respiratory, CV, GI and , other than that noted in the HPI,  is negative    Objective:   BP (!) 152/68   Pulse 60   Temp 97.3  F (36.3  C)   Resp 18   Wt 72.7 kg (160 lb 3.2 oz)   SpO2 95%   BMI 22.34 kg/m    GENERAL APPEARANCE:  Alert, in no distress  ENT:  Mouth and posterior oropharynx normal, moist mucous membranes  EYES:  EOM, conjunctivae, lids, pupils and irises normal  RESP:  Respiratory effort and palpation of chest normal, lungs clear to auscultation , no respiratory distress  CV:  Palpation and auscultation of heart done , regular rate and rhythm, no murmur, rub, or gallop  ABDOMEN:  normal bowel sounds, soft, nontender, no hepatosplenomegaly or other masses  M/S:   Active movement of bilateral upper and lower extremities. Trace edema in BLE.  SKIN:  Inspection of skin and " "subcutaneous tissue baseline, Palpation of skin and subcutaneous tissue baseline  NEURO:   Cranial nerves 2-12 are normal tested and grossly at patient's baseline  PSYCH:  affect and mood normal    Labs done in SNF are in [place] EPIC. Please refer to them using [facility]/Care Everywhere.    Assessment/Plan:  Acute Diverticulitis and Diarrhea. With diarrhea and anorexia. Noted on abdominal and pelvic CT. Started on Ciprofloxacin and Flagyl with transition to Ceftriaxone and Flagyl to complete 10 day course. Monitor bowel movements closely.    Acute on Chronic Kidney Disease Stage IV. Possibly due to hypoperfusion in the setting of tightly controlled blood pressures versus drug induced lupus secondary to Hydralazine. Follow-up with Nephrology on 9/29/21 as scheduled. Permissive hypertension with a goal of systolic readings between 140-150. 2 g Sodium diet with 2 L fluid restriction. Monitor urine output. Patient has stated he would not want dialysis if indicated.    Imaging Concerning for Bowel Obstruction and Inguinal Hernia. Patient declined surgical intervention for hernia. Symptoms not consistent with bowel obstruction. General surgery recommended medical management.     Pancreatic Cyst. An abdominal and pelvic CT on 9/16/21 revealed \"Low-attenuation indeterminant probable cystic lesion at the pancreatic head measuring 1.4 x 2 x 1.3 cm. Recommend nonemergent contrast-enhanced MRI of the pancreas for further evaluation\". Follow-up outpatient as indicated.     Bradycardia. Likely secondary to Carvedilol. Carvedilol was changed to Labetalol during hospitalization. Monitor heart rate daily.     Hypertension. Hydralazine discontinued as noted above. Permissive systolic blood pressures 140-150 recommended as noted above. Started on Nifedipine during hospitalization in August with increase on 9/9/21. PTA Hydrochlorothiazide discontinued.Carvedilol changed to Labetalol due to bradycardia.     Acute on Chronic Anemia with " History of Rectal Cancer. Received 1 U of PRBCs and Iron Sucrose during hospitalization in August. Labs consistent with iron deficiency and thought to be a component of Erythropoietin deficiency with history of chronic kidney disease. Had been having dark black stools and diarrhea over the past 3 months prior to hospitalization. Colonoscopy recommended outpatient. Baseline Hemoglobin ~ 8-9. Repeat CBC on 9/30/21.     Type 2 Diabetes Mellitus. Last A1C 9.5 on 7/16/21. PTA Glipizide and Sitagliptin discontinued during August hospitalization. Discharged on Glargine and sliding scale insulin. Seen by Endocrinology on 9/14/21. Glargine decreased to 8 U. Consider GLP-1 agonist once determination is made regarding thyroid nodules.     Lower Extremity Edema. Ultrasound negative on 8/18/21. Takes Furosemide.     Thyroid Nodule. Follow-up with Endocrinology on 12/14/21 as scheduled.      Subclinical Hypothyroidism. TSH 7.46 and Free T4 1.03 on 8/16/21. Seen by Endocrinology on 9/14/21 and started on low dose Levothyroxine with follow-up TSH in 8 weeks.     Benign Prostatic Hypertrophy. Continue Tamsulosin and Finasteride as ordered.     Physical Deconditioning. Secondary to recent hospitalization and co-morbidities. Physical and Occupational Therapy ordered.    Orders:  BMP on 9/30/21    Total time spent with patient visit was 35 min including patient visit and review of past records. Greater than 50% of total time spent with counseling and coordinating care due to review of hospitalization, review of code status, review of TCU stay, reviewed role of NP, changing settings in Epic to release labs in [facility], discussion with staff, ordering labs and review of vital signs.    Electronically signed by: ESTEPHANIE Enciso CNP

## 2021-09-27 NOTE — LETTER
"    9/27/2021        RE: Farzad East  [address]  [facility] [address]         HEALTH GERIATRIC SERVICES    Chief Complaint   Patient presents with     RECHECK     HPI:  Farzad East is a 86 year old  (1935), who is being seen today for an episodic care visit at: [facility] ([facility]) [[id number]].     Background:    This is an 86-year-old male, with a past medical history significant for hypertension, anemia, rectal cancer, type 2 diabetes mellitus, lower extremity edema, benign prostatic hypertrophy and thyroid nodule, who was admitted to the [facility] 8/17/21 through 9/2/21 for progressive shortness of breath, increasing lower extremity edema and fatigue. Labs revealed Potassium 5.7, Creatinine 2.89, with peak at 5.77 on 8/26/21, and TSH 7.46. Nephrology was consulted and suspected acute kidney injury due to hypoperfusion in the setting of tightly controlled blood pressures versus drug induced lupus due secondary to Hydralazine. Hydralazine was discontinued on 8/25/21. Permissive blood pressures goal of systolic 140-150. Initiated on Furosemide and Nifedipine. Hemoglobin 6.8 on 8/19/21 and required 1 U of PRBCs. 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 Found to have \".. Superficial clot noted in the cephalic vein within both forearms\" on 8/26/21 ultrasound. Recommended to elevate arms. Blood sugars elevated. Glipizide discontinued due to RONY and hypoglycemia risk. Sitagliptin held. A TCU stay was recommended for ongoing physical rehabilitation.     While in the TCU, became lethargic with fatigue, decreased oral intake, loose stools, bradycardia and decline of progress in therapy so was sent back to the [facility] 9/16/21 through 9/23/21. Labs revealed Lipase of 983. An abdominal and pelvic CT revealed \"Herniation of a short segment of " "small bowel into the left inguinal canal with mild air-filled and fluid-filled loops in the inguinal canal with minimal adjacent fluid and edema. The proximal small bowel up to the level of the herniation is moderately fluid-filled although not overtly dilated. Finding may be indicative of developing obstruction related to a small bowel containing left inguinal hernia... Acute uncomplicated diverticulitis\". 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 General Surgery was consulted. Patient did not want hernia repaired. Medical management for uncomplicated diverticulitis was recommended. DIarrhea resolved. IV fluids were administered due to poor oral intake leading up to hospitalization and renal function improved. PTA Furosemide was also held. Carvedilol was briefly held for bradycardia. Discharged back to TCU when medically stable.    Today, patient states he is doing \"so so\". Asks what provider is doing in room. Feels he's eating ok, but is not hungry. Also doesn't care for the food. Feels his bowels are getting better. Has had issues with diarrhea every time he eats for months and \"No doctor can figure it out\". Denies pain.     Allergies, and PMH/PSH reviewed in [employer] today.  REVIEW OF SYSTEMS:  4 point ROS including Respiratory, CV, GI and , other than that noted in the HPI,  is negative    Objective:   BP (!) 152/68   Pulse 60   Temp 97.3  F (36.3  C)   Resp 18   Wt 72.7 kg (160 lb 3.2 oz)   SpO2 95%   BMI 22.34 kg/m    GENERAL APPEARANCE:  Alert, in no distress  ENT:  Mouth and posterior oropharynx normal, moist mucous membranes  EYES:  EOM, conjunctivae, lids, pupils and irises normal  RESP:  Respiratory effort and palpation of chest normal, lungs clear to auscultation , no respiratory distress  CV:  Palpation and auscultation of heart done , regular rate and rhythm, no murmur, rub, or gallop  ABDOMEN:  normal bowel sounds, soft, nontender, no hepatosplenomegaly or other masses  M/S:   Active " "movement of bilateral upper and lower extremities. Trace edema in BLE.  SKIN:  Inspection of skin and subcutaneous tissue baseline, Palpation of skin and subcutaneous tissue baseline  NEURO:   Cranial nerves 2-12 are normal tested and grossly at patient's baseline  PSYCH:  affect and mood normal    Labs done in SNF are in [place] EPIC. Please refer to them using EPIC/Care Everywhere.    Assessment/Plan:  Acute Diverticulitis and Diarrhea. With diarrhea and anorexia. Noted on abdominal and pelvic CT. Started on Ciprofloxacin and Flagyl with transition to Ceftriaxone and Flagyl to complete 10 day course. Monitor bowel movements closely.    Acute on Chronic Kidney Disease Stage IV. Possibly due to hypoperfusion in the setting of tightly controlled blood pressures versus drug induced lupus secondary to Hydralazine. Follow-up with Nephrology on 9/29/21 as scheduled. Permissive hypertension with a goal of systolic readings between 140-150. 2 g Sodium diet with 2 L fluid restriction. Monitor urine output. Patient has stated he would not want dialysis if indicated.    Imaging Concerning for Bowel Obstruction and Inguinal Hernia. Patient declined surgical intervention for hernia. Symptoms not consistent with bowel obstruction. General surgery recommended medical management.     Pancreatic Cyst. An abdominal and pelvic CT on 9/16/21 revealed \"Low-attenuation indeterminant probable cystic lesion at the pancreatic head measuring 1.4 x 2 x 1.3 cm. Recommend nonemergent contrast-enhanced MRI of the pancreas for further evaluation\". Follow-up outpatient as indicated.     Bradycardia. Likely secondary to Carvedilol. Carvedilol was changed to Labetalol during hospitalization. Monitor heart rate daily.     Hypertension. Hydralazine discontinued as noted above. Permissive systolic blood pressures 140-150 recommended as noted above. Started on Nifedipine during hospitalization in August with increase on 9/9/21. PTA Hydrochlorothiazide " discontinued.Carvedilol changed to Labetalol due to bradycardia.     Acute on Chronic Anemia with History of Rectal Cancer. Received 1 U of PRBCs and Iron Sucrose during hospitalization in August. Labs consistent with iron deficiency and thought to be a component of Erythropoietin deficiency with history of chronic kidney disease. Had been having dark black stools and diarrhea over the past 3 months prior to hospitalization. Colonoscopy recommended outpatient. Baseline Hemoglobin ~ 8-9. Repeat CBC on 9/30/21.     Type 2 Diabetes Mellitus. Last A1C 9.5 on 7/16/21. PTA Glipizide and Sitagliptin discontinued during August hospitalization. Discharged on Glargine and sliding scale insulin. Seen by Endocrinology on 9/14/21. Glargine decreased to 8 U. Consider GLP-1 agonist once determination is made regarding thyroid nodules.     Lower Extremity Edema. Ultrasound negative on 8/18/21. Takes Furosemide.     Thyroid Nodule. Follow-up with Endocrinology on 12/14/21 as scheduled.      Subclinical Hypothyroidism. TSH 7.46 and Free T4 1.03 on 8/16/21. Seen by Endocrinology on 9/14/21 and started on low dose Levothyroxine with follow-up TSH in 8 weeks.     Benign Prostatic Hypertrophy. Continue Tamsulosin and Finasteride as ordered.     Physical Deconditioning. Secondary to recent hospitalization and co-morbidities. Physical and Occupational Therapy ordered.    Orders:  BMP on 9/30/21    Total time spent with patient visit was 35 min including patient visit and review of past records. Greater than 50% of total time spent with counseling and coordinating care due to review of hospitalization, review of code status, review of TCU stay, reviewed role of NP, changing settings in Epic to release labs in [facility], discussion with staff, ordering labs and review of vital signs.    Electronically signed by: ESTEPHANIE Enciso CNP             Sincerely,        ESTEPHANIE Enciso CNP

## 2021-09-29 ENCOUNTER — LAB REQUISITION (OUTPATIENT)
Dept: LAB | Facility: CLINIC | Age: 86
End: 2021-09-29
Payer: MEDICARE

## 2021-09-29 ENCOUNTER — VIRTUAL VISIT (OUTPATIENT)
Dept: NEPHROLOGY | Facility: CLINIC | Age: 86
End: 2021-09-29
Attending: INTERNAL MEDICINE
Payer: MEDICARE

## 2021-09-29 DIAGNOSIS — I10 ESSENTIAL (PRIMARY) HYPERTENSION: ICD-10-CM

## 2021-09-29 DIAGNOSIS — N18.4 CKD (CHRONIC KIDNEY DISEASE) STAGE 4, GFR 15-29 ML/MIN (H): ICD-10-CM

## 2021-09-29 PROCEDURE — 99204 OFFICE O/P NEW MOD 45 MIN: CPT | Mod: 95 | Performed by: INTERNAL MEDICINE

## 2021-09-29 NOTE — LETTER
9/29/2021       RE: Farzad East  [address]  [facility] [address]     Dear Colleague,    Thank you for referring your patient, Farzad East, to the [facility] [place] at [facility]. Please see a copy of my visit note below.    Alessio is a 86 year old who is being evaluated via a billable video visit.    How would you like to obtain your AVS? MyChart  If the video visit is dropped, the invitation should be resent by: Text to cell phone: [phone]  Will anyone else be joining your video visit? No      Video-Visit Details    Type of service:  Video Visit    Video Start Time: 4:35 PM    Video End Time: 5:21 PM    Originating Location (pt. Location): [facility]    Distant Location (provider location):  [facility] [place]    Platform used for Video Visit: [employer]    September 29, 2021    Assessment/Plan:  CKD5  Patient has advanced CKD in the setting of DM2 with proteinuria, HTN and atherosclerotic disease exacerbated by recent recurrent RONY with likely need for RRT soon. Recent baseline Cr likely ~4.6 after peak of ~5.8 with RONY.   Patient has strongly expressed before that he does not want to pursue dialysis and we would support this as given his advanced age, co-morbidities and frailty, dialysis will likely pose more risk than benefit at this time. Discussed that the focus of management now would be to work to preserve renal function and prevent further progression with adequate BP and BG control and avoidance of further injury. At the same time do not want to control BP too tightly as this may compromise renal perfusion and provoke injury.  - Will continue close follow-up q4-6 weeks with labs    HTN, goal <160/90  - take nifedipine HS instead of in the morning to hopefully prevent variability in BP; this was verbally communicated to RN at care facility  - continue daily  lasix and BID labetalol    Acid/base  Bicarb borderline, will consider supplementation at follow-up if remains persistently low    Electrolytes  No acute issue    Anemia  Hgb slowly improving    BMD  Ca and phos at goal. Continue vitamin D supplement    Discussed with Dr Emily Kim MD  Renal Fellow  [phone]    HPI: Farzad East is a 86 year old adult who presents for follow-up. PMH includes DM2, HTN, Aflutter, lap cecectomy for rectal cancer, chronic normocytic anemia, hypothyroidism, recent admission for diverticulitis and RONY.    Denies any new symptoms since being discharged from the hospital. Notes BP remains labile and BGs have been erratic too. Denies pain, SOB. RLE does have some edema but none on LLE. Does wear compression socks which help. No skin changes or pain in RLE.    Interview was quite difficult during today's visit due to patient's difficulty hearing and son Andi assisted with interview. Andi agreed to being the point of contact henceforth.    Home BP: Notes this can be quite variable with both high and low numbers. SBP has ranged from 120s to 170s recently.    Allergies   Allergen Reactions     Hydralazine Nephrotoxicity     Sulfa Drugs      Unknown reaction. Occurred during childhood. Has not taken Sulfa medications since allergic response.        aspirin (ASA) 81 MG EC tablet, Take 1 tablet by mouth daily   blood glucose (NO BRAND SPECIFIED) lancets standard, Use to test blood sugar 1 times daily or as directed. Use brand compatible with patients insurance and device.  blood glucose (ONETOUCH VERIO IQ) test strip, Use to test blood sugar 1 time daily or as directed.  blood glucose monitoring (ONETOUCH VERIO IQ SYSTEM) meter device kit, Use to test blood sugar daily  Cholecalciferol (VITAMIN D-3 PO), Take 1 Dose by mouth daily   COMPRESSION STOCKINGS, 1 each daily Measure and fit.  Style and color per patient preference.  Doff n Wilfrid per patient need.  finasteride  (PROSCAR) 5 MG tablet, Take 1 tablet by mouth daily  furosemide (LASIX) 40 MG tablet, Take 1.5 tablets (60 mg) by mouth daily  insulin aspart (NOVOLOG PEN) 100 UNIT/ML pen, Inject 1 Units Subcutaneous 3 times daily (with meals) With lunch and dinner in addition to any needed sliding scale  insulin aspart (NOVOLOG PEN) 100 UNIT/ML pen, Inject 1-7 Units Subcutaneous 3 times daily (before meals) Correction Scale - MEDIUM INSULIN RESISTANCE DOSING     Do Not give Correction Insulin if Pre-Meal BG less than 140.   For Pre-Meal  - 189 give 1 unit.   For Pre-Meal  - 239 give 2 units.   For Pre-Meal  - 289 give 3 units.   For Pre-Meal  - 339 give 4 units.   For Pre-Meal - 399 give 5 units.   For Pre-Meal -449 give 6 units  For Pre-Meal BG greater than or equal to 450 give 7 units.   To be given with prandial insulin, and based on pre-meal blood glucose.    Notify provider if glucose greater than or equal to 350 mg/dL after administration of correction dose. If given at mealtime, administer within 30 minutes of start of meal  insulin glargine (LANTUS SOLOSTAR) 100 UNIT/ML pen, Inject 8 Units Subcutaneous every morning  insulin pen needle (31G X 5 MM) 31G X 5 MM miscellaneous, Use1 pen needles daily or as directed.  labetalol (NORMODYNE) 100 MG tablet, Take 1 tablet (100 mg) by mouth 2 times daily for 60 doses  multivitamin w/minerals (MULTI-VITAMIN) tablet, Take 1 tablet by mouth daily  NIFEdipine ER OSMOTIC (NIFEDICAL XL) 60 MG 24 hr tablet, Take 60 mg by mouth daily  sennosides (SENOKOT) 8.6 MG tablet, Take 2 tablets by mouth nightly as needed for constipation  SYNTHROID 25 MCG tablet, Take 1 tablet (25 mcg) by mouth daily Check TSH in 8 weeks.  tamsulosin (FLOMAX) 0.4 MG capsule, Take 1 capsule (0.4 mg) by mouth daily Advise clinic if causes lightheadedness.    No current facility-administered medications on file prior to visit.      Past Medical History:   Diagnosis Date     Atrial  flutter (H)      Choroidal nevus of left eye      CKD (chronic kidney disease) stage 3, GFR 30-59 ml/min      Diabetes mellitus (H)      Diabetic peripheral neuropathy (H)      Hypertension      Microalbuminuria      Overweight(278.02)      Rectal-type adenocarcinoma (H)      Retinopathies, diabetic      Vitreous detachment of both eyes        Past Surgical History:   Procedure Laterality Date     CATARACT IOL, RT/LT Bilateral 2005     left hip replacement       OPEN REDUCTION INTERNAL FIXATION FEMUR PROXIMAL  1/24/2014    Procedure: OPEN REDUCTION INTERNAL FIXATION FEMUR PROXIMAL;  Periprosthetic Fracture, ORIF Left Femur;  Surgeon: Andi Garcia MD;  Location: [facility] OR       Social History     Tobacco Use     Smoking status: Never Smoker     Smokeless tobacco: Never Used   Substance Use Topics     Alcohol use: Yes     Alcohol/week: 2.5 - 3.3 standard drinks     Types: 3 - 4 Standard drinks or equivalent per week     Comment: 3 martinis per week     Drug use: No       Family History   Problem Relation Age of Onset     Diabetes Father      Circulatory Father         PAD     Macular Degeneration Father      Arthritis Mother      Amblyopia No family hx of      Retinal detachment No family hx of      Glaucoma No family hx of        ROS: A 12 point review of systems was negative other than noted above.     Exam:  There were no vitals taken for this visit.     GENERAL APPEARANCE: NAD  EYES:  No scleral icterus, EOMI  PULM: breathing non-labored, speaking in full sentences  NEURO:  AOx3, speech normal  Psych: Affect normal    Results:   No results displayed because visit has over 200 results.          Attestation signed by Jaleesa Diaz MD at 11/2/2021  4:45 PM:  Attestation:  Farzad East has been evaluated and examined by me, Jaleesa Diaz MD.  Discussed with the fellow or resident and agree with the findings and plan in this note.  I have reviewed Medications, Vital Signs, and Labs as well as  provider notes.    Date of Service (when I saw the patient): 9/29/2021   I was on video from 5:14 pm- 5:20 pm  Jaleesa Diaz MD  [facility]  Department of Medicine  Division of Renal Disease and Hypertension  [facility]  [employer] Web Console      Again, thank you for allowing me to participate in the care of your patient.      Sincerely,    Nehemias Kim MD

## 2021-09-29 NOTE — PROGRESS NOTES
Alessio is a 86 year old who is being evaluated via a billable video visit.    How would you like to obtain your AVS? MyChart  If the video visit is dropped, the invitation should be resent by: Text to cell phone: [phone]  Will anyone else be joining your video visit? No      Video-Visit Details    Type of service:  Video Visit    Video Start Time: 4:35 PM    Video End Time: 5:21 PM    Originating Location (pt. Location): [facility]    Distant Location (provider location):  [facility] [place]    Platform used for Video Visit: DroneCast    September 29, 2021    Assessment/Plan:  CKD5  Patient has advanced CKD in the setting of DM2 with proteinuria, HTN and atherosclerotic disease exacerbated by recent recurrent RONY with likely need for RRT soon. Recent baseline Cr likely ~4.6 after peak of ~5.8 with RONY.   Patient has strongly expressed before that he does not want to pursue dialysis and we would support this as given his advanced age, co-morbidities and frailty, dialysis will likely pose more risk than benefit at this time. Discussed that the focus of management now would be to work to preserve renal function and prevent further progression with adequate BP and BG control and avoidance of further injury. At the same time do not want to control BP too tightly as this may compromise renal perfusion and provoke injury.  - Will continue close follow-up q4-6 weeks with labs    HTN, goal <160/90  - take nifedipine HS instead of in the morning to hopefully prevent variability in BP; this was verbally communicated to RN at care facility  - continue daily lasix and BID labetalol    Acid/base  Bicarb borderline, will consider supplementation at follow-up if remains persistently low    Electrolytes  No acute issue    Anemia  Hgb slowly improving    BMD  Ca and phos at goal. Continue vitamin D supplement    Discussed with Dr Emily Kim MD  Renal Fellow  [phone]    HPI:  Farzad East is a 86 year old adult who presents for follow-up. PMH includes DM2, HTN, Aflutter, lap cecectomy for rectal cancer, chronic normocytic anemia, hypothyroidism, recent admission for diverticulitis and RONY.    Denies any new symptoms since being discharged from the hospital. Notes BP remains labile and BGs have been erratic too. Denies pain, SOB. RLE does have some edema but none on LLE. Does wear compression socks which help. No skin changes or pain in RLE.    Interview was quite difficult during today's visit due to patient's difficulty hearing and son Andi assisted with interview. Andi agreed to being the point of contact henceforth.    Home BP: Notes this can be quite variable with both high and low numbers. SBP has ranged from 120s to 170s recently.    Allergies   Allergen Reactions     Hydralazine Nephrotoxicity     Sulfa Drugs      Unknown reaction. Occurred during childhood. Has not taken Sulfa medications since allergic response.        aspirin (ASA) 81 MG EC tablet, Take 1 tablet by mouth daily   blood glucose (NO BRAND SPECIFIED) lancets standard, Use to test blood sugar 1 times daily or as directed. Use brand compatible with patients insurance and device.  blood glucose (ONETOUCH VERIO IQ) test strip, Use to test blood sugar 1 time daily or as directed.  blood glucose monitoring (ONETOUCH VERIO IQ SYSTEM) meter device kit, Use to test blood sugar daily  Cholecalciferol (VITAMIN D-3 PO), Take 1 Dose by mouth daily   COMPRESSION STOCKINGS, 1 each daily Measure and fit.  Style and color per patient preference.  Doff n Wilfrid per patient need.  finasteride (PROSCAR) 5 MG tablet, Take 1 tablet by mouth daily  furosemide (LASIX) 40 MG tablet, Take 1.5 tablets (60 mg) by mouth daily  insulin aspart (NOVOLOG PEN) 100 UNIT/ML pen, Inject 1 Units Subcutaneous 3 times daily (with meals) With lunch and dinner in addition to any needed sliding scale  insulin aspart (NOVOLOG PEN) 100 UNIT/ML pen,  Inject 1-7 Units Subcutaneous 3 times daily (before meals) Correction Scale - MEDIUM INSULIN RESISTANCE DOSING     Do Not give Correction Insulin if Pre-Meal BG less than 140.   For Pre-Meal  - 189 give 1 unit.   For Pre-Meal  - 239 give 2 units.   For Pre-Meal  - 289 give 3 units.   For Pre-Meal  - 339 give 4 units.   For Pre-Meal - 399 give 5 units.   For Pre-Meal -449 give 6 units  For Pre-Meal BG greater than or equal to 450 give 7 units.   To be given with prandial insulin, and based on pre-meal blood glucose.    Notify provider if glucose greater than or equal to 350 mg/dL after administration of correction dose. If given at mealtime, administer within 30 minutes of start of meal  insulin glargine (LANTUS SOLOSTAR) 100 UNIT/ML pen, Inject 8 Units Subcutaneous every morning  insulin pen needle (31G X 5 MM) 31G X 5 MM miscellaneous, Use1 pen needles daily or as directed.  labetalol (NORMODYNE) 100 MG tablet, Take 1 tablet (100 mg) by mouth 2 times daily for 60 doses  multivitamin w/minerals (MULTI-VITAMIN) tablet, Take 1 tablet by mouth daily  NIFEdipine ER OSMOTIC (NIFEDICAL XL) 60 MG 24 hr tablet, Take 60 mg by mouth daily  sennosides (SENOKOT) 8.6 MG tablet, Take 2 tablets by mouth nightly as needed for constipation  SYNTHROID 25 MCG tablet, Take 1 tablet (25 mcg) by mouth daily Check TSH in 8 weeks.  tamsulosin (FLOMAX) 0.4 MG capsule, Take 1 capsule (0.4 mg) by mouth daily Advise clinic if causes lightheadedness.    No current facility-administered medications on file prior to visit.      Past Medical History:   Diagnosis Date     Atrial flutter (H)      Choroidal nevus of left eye      CKD (chronic kidney disease) stage 3, GFR 30-59 ml/min      Diabetes mellitus (H)      Diabetic peripheral neuropathy (H)      Hypertension      Microalbuminuria      Overweight(278.02)      Rectal-type adenocarcinoma (H)      Retinopathies, diabetic      Vitreous detachment of both eyes         Past Surgical History:   Procedure Laterality Date     CATARACT IOL, RT/LT Bilateral 2005     left hip replacement       OPEN REDUCTION INTERNAL FIXATION FEMUR PROXIMAL  1/24/2014    Procedure: OPEN REDUCTION INTERNAL FIXATION FEMUR PROXIMAL;  Periprosthetic Fracture, ORIF Left Femur;  Surgeon: Andi Garcia MD;  Location:  OR       Social History     Tobacco Use     Smoking status: Never Smoker     Smokeless tobacco: Never Used   Substance Use Topics     Alcohol use: Yes     Alcohol/week: 2.5 - 3.3 standard drinks     Types: 3 - 4 Standard drinks or equivalent per week     Comment: 3 martinis per week     Drug use: No       Family History   Problem Relation Age of Onset     Diabetes Father      Circulatory Father         PAD     Macular Degeneration Father      Arthritis Mother      Amblyopia No family hx of      Retinal detachment No family hx of      Glaucoma No family hx of        ROS: A 12 point review of systems was negative other than noted above.     Exam:  There were no vitals taken for this visit.     GENERAL APPEARANCE: NAD  EYES:  No scleral icterus, EOMI  PULM: breathing non-labored, speaking in full sentences  NEURO:  AOx3, speech normal  Psych: Affect normal    Results:   No results displayed because visit has over 200 results.

## 2021-09-30 ENCOUNTER — TELEPHONE (OUTPATIENT)
Dept: GERIATRICS | Facility: CLINIC | Age: 86
End: 2021-09-30

## 2021-09-30 LAB
ANION GAP SERPL CALCULATED.3IONS-SCNC: 10 MMOL/L (ref 5–18)
BUN SERPL-MCNC: 81 MG/DL (ref 8–28)
CALCIUM SERPL-MCNC: 8.9 MG/DL (ref 8.5–10.5)
CHLORIDE BLD-SCNC: 108 MMOL/L (ref 98–107)
CO2 SERPL-SCNC: 18 MMOL/L (ref 22–31)
CREAT SERPL-MCNC: 5.47 MG/DL (ref 0.7–1.3)
ERYTHROCYTE [DISTWIDTH] IN BLOOD BY AUTOMATED COUNT: 19.4 % (ref 10–15)
GFR SERPL CREATININE-BSD FRML MDRD: 9 ML/MIN/1.73M2
GLUCOSE BLD-MCNC: 227 MG/DL (ref 70–125)
HCT VFR BLD AUTO: 26.3 % (ref 40–53)
HGB BLD-MCNC: 8.5 G/DL (ref 13.3–17.7)
MCH RBC QN AUTO: 28.1 PG (ref 26.5–33)
MCHC RBC AUTO-ENTMCNC: 32.3 G/DL (ref 31.5–36.5)
MCV RBC AUTO: 87 FL (ref 78–100)
PLATELET # BLD AUTO: 228 10E3/UL (ref 150–450)
POTASSIUM BLD-SCNC: 5.8 MMOL/L (ref 3.5–5)
RBC # BLD AUTO: 3.02 10E6/UL (ref 4.4–5.9)
SODIUM SERPL-SCNC: 136 MMOL/L (ref 136–145)
WBC # BLD AUTO: 6.6 10E3/UL (ref 4–11)

## 2021-09-30 PROCEDURE — P9603 ONE-WAY ALLOW PRORATED MILES: HCPCS | Performed by: INTERNAL MEDICINE

## 2021-09-30 PROCEDURE — 80048 BASIC METABOLIC PNL TOTAL CA: CPT | Performed by: INTERNAL MEDICINE

## 2021-09-30 PROCEDURE — 85027 COMPLETE CBC AUTOMATED: CPT | Performed by: INTERNAL MEDICINE

## 2021-09-30 PROCEDURE — 36415 COLL VENOUS BLD VENIPUNCTURE: CPT | Performed by: INTERNAL MEDICINE

## 2021-10-01 ENCOUNTER — LAB REQUISITION (OUTPATIENT)
Dept: LAB | Facility: CLINIC | Age: 86
End: 2021-10-01
Payer: MEDICARE

## 2021-10-01 ENCOUNTER — TRANSITIONAL CARE UNIT VISIT (OUTPATIENT)
Dept: GERIATRICS | Facility: CLINIC | Age: 86
End: 2021-10-01
Payer: MEDICARE

## 2021-10-01 VITALS
DIASTOLIC BLOOD PRESSURE: 69 MMHG | SYSTOLIC BLOOD PRESSURE: 145 MMHG | TEMPERATURE: 95.1 F | BODY MASS INDEX: 22.34 KG/M2 | HEART RATE: 61 BPM | WEIGHT: 159.6 LBS | RESPIRATION RATE: 18 BRPM | OXYGEN SATURATION: 98 % | HEIGHT: 71 IN

## 2021-10-01 DIAGNOSIS — K86.2 PANCREATIC CYST: ICD-10-CM

## 2021-10-01 DIAGNOSIS — R41.89 COGNITIVE IMPAIRMENT: ICD-10-CM

## 2021-10-01 DIAGNOSIS — N17.9 ACUTE KIDNEY FAILURE, UNSPECIFIED (H): ICD-10-CM

## 2021-10-01 DIAGNOSIS — H91.93 BILATERAL HEARING LOSS, UNSPECIFIED HEARING LOSS TYPE: ICD-10-CM

## 2021-10-01 DIAGNOSIS — I12.0 TYPE 2 DM WITH CKD STAGE 5 AND HYPERTENSION (H): ICD-10-CM

## 2021-10-01 DIAGNOSIS — E11.22 TYPE 2 DM WITH CKD STAGE 5 AND HYPERTENSION (H): ICD-10-CM

## 2021-10-01 DIAGNOSIS — K57.32 DIVERTICULITIS OF COLON: ICD-10-CM

## 2021-10-01 DIAGNOSIS — N18.5 CKD (CHRONIC KIDNEY DISEASE) STAGE 5, GFR LESS THAN 15 ML/MIN (H): Primary | ICD-10-CM

## 2021-10-01 DIAGNOSIS — R53.81 PHYSICAL DECONDITIONING: ICD-10-CM

## 2021-10-01 DIAGNOSIS — E87.5 HYPERKALEMIA: ICD-10-CM

## 2021-10-01 DIAGNOSIS — E03.8 SUBCLINICAL HYPOTHYROIDISM: ICD-10-CM

## 2021-10-01 DIAGNOSIS — I10 BENIGN ESSENTIAL HYPERTENSION: ICD-10-CM

## 2021-10-01 DIAGNOSIS — E04.1 THYROID NODULE: ICD-10-CM

## 2021-10-01 DIAGNOSIS — N18.5 TYPE 2 DM WITH CKD STAGE 5 AND HYPERTENSION (H): ICD-10-CM

## 2021-10-01 DIAGNOSIS — D64.9 ANEMIA, UNSPECIFIED TYPE: ICD-10-CM

## 2021-10-01 LAB
ANION GAP SERPL CALCULATED.3IONS-SCNC: 12 MMOL/L (ref 5–18)
BUN SERPL-MCNC: 88 MG/DL (ref 8–28)
CALCIUM SERPL-MCNC: 9.3 MG/DL (ref 8.5–10.5)
CHLORIDE BLD-SCNC: 106 MMOL/L (ref 98–107)
CO2 SERPL-SCNC: 18 MMOL/L (ref 22–31)
CREAT SERPL-MCNC: 5.61 MG/DL (ref 0.7–1.3)
GFR SERPL CREATININE-BSD FRML MDRD: 8 ML/MIN/1.73M2
GLUCOSE BLD-MCNC: 277 MG/DL (ref 70–125)
POTASSIUM BLD-SCNC: 5.5 MMOL/L (ref 3.5–5)
SODIUM SERPL-SCNC: 136 MMOL/L (ref 136–145)

## 2021-10-01 PROCEDURE — P9603 ONE-WAY ALLOW PRORATED MILES: HCPCS | Performed by: INTERNAL MEDICINE

## 2021-10-01 PROCEDURE — 99306 1ST NF CARE HIGH MDM 50: CPT | Performed by: INTERNAL MEDICINE

## 2021-10-01 PROCEDURE — 82565 ASSAY OF CREATININE: CPT | Performed by: INTERNAL MEDICINE

## 2021-10-01 PROCEDURE — 36415 COLL VENOUS BLD VENIPUNCTURE: CPT | Performed by: INTERNAL MEDICINE

## 2021-10-01 ASSESSMENT — MIFFLIN-ST. JEOR: SCORE: 1431.07

## 2021-10-01 NOTE — TELEPHONE ENCOUNTER
[place] [facility] TELEPHONE ENCOUNTER    Chief Complaint   Patient presents with     Results     Farzad East is a 86 year old  (1935). Lab results reveal Potassium 5.8, BUN 81 and Creatinine 5.47. .    ASSESSMENT/PLAN  Hyperkalemia.    Give Kayexalate 15 g PO x 1 now    Repeat BMP on 10/1/21    Electronically signed by:   ESTEPHANIE Enciso CNP

## 2021-10-01 NOTE — LETTER
10/1/2021        RE: Farzad East  [address]  [facility] [id number]        [place]  PHYSICIAN NOTE    PRIMARY CARE PROVIDER AND CLINIC:  Renu Lantigua, NP, [facility]  [facility], FLOOR 5 / [place]    Chief Complaint   Patient presents with     Hospital F/U     [facility] Medical Record Number:  [id number]  Place of Service where encounter took place:  [facility]) [[id number]]    Farzad East is a 86 year old (1935), admitted to the above facility from  [facility]. Hospital stay 8/17/21-9/2/21 and again 9/16/21-9/23/21. Admitted to this facility for  rehab, medical management and nursing care.     HPI:    HPI information obtained from: facility chart records, facility staff, patient report and [facility] chart review.     Brief summary of hospital course: Mr. Alessio East is an 86yoM with h/o CKD and DM2 admitted initially with progressive fatigue, dyspnea and edema found to have RONY on CKD. Possibility of hypoperfusion of kidneys in the setting of HTN management and/or drug induced lupus secondary to Hydralazine which was subsequently stopped. Permissive HTN allowed with goal -150. Lasix utilized to manage fluid status and hyperkalemia. Close outpatient follow up with nephrology indicated and he voiced wanting to avoid dialysis. He did need a unit of blood transfused plus iron infusions for Hgb yuridia of 6.8. Additionally diabetic regimen adjusted to include insulin therapy only and avoid oral therapy for the time being though pending how renal function stabilizes consideration could be for an agent such as GLP-1 and/ro SGLT2. Subclinical hypothyroidism to be monitored on subsequent labs. Discharged to [facility] for further monitoring and rehabilitation in the setting of physical deconditioning.    However while at U later developed increased weakness, bradycardia, chills, on-going  kidney injury and was readmitted to the hospital. Found to have acute diverticulitis needing antibiotic therapy. Treated with IVF as well for RONY but creatinine still elevated and may be his new baseline and needs close monitoring. Coreg changed to Labetalol to hopefully lessen bradycardia. Discharged back to TCU.    Updates on status since skilled nursing admission: Alessio is seen resting in bed this afternoon watching TV. Is feeling a bit bummed about being at TCU and missing home which is understandable. Not craving the food here though family is very helpful at bringing in snacks he enjoys and he snacks on a cookie during my visit. He thinks he is making some progress but its slow. RN does report he had a slip and fall attempting self transfer yesterday without injuries. Denies abdominal pain or nausea. Says used to have a BM after almost every meal but now that has thankfully slowed down after treatment for recent diverticulitis.     CODE STATUS/ADVANCE DIRECTIVES DISCUSSION:   DNR / DNI  Patient's living condition: Independent senior apartment    ALLERGIES: Hydralazine and Sulfa drugs    Past Medical History:   Diagnosis Date     Atrial flutter (H)      Choroidal nevus of left eye      CKD (chronic kidney disease) stage 3, GFR 30-59 ml/min      Diabetes mellitus (H)      Diabetic peripheral neuropathy (H)      Hypertension      Microalbuminuria      Overweight(278.02)      Rectal-type adenocarcinoma (H)      Retinopathies, diabetic      Vitreous detachment of both eyes       Past Surgical History:   Procedure Laterality Date     CATARACT IOL, RT/LT Bilateral 2005     left hip replacement       OPEN REDUCTION INTERNAL FIXATION FEMUR PROXIMAL  1/24/2014    Procedure: OPEN REDUCTION INTERNAL FIXATION FEMUR PROXIMAL;  Periprosthetic Fracture, ORIF Left Femur;  Surgeon: Andi Garcia MD;  Location:  OR     Family History   Problem Relation Age of Onset     Diabetes Father      Circulatory Father         PAD      Macular Degeneration Father      Arthritis Mother      Amblyopia No family hx of      Retinal detachment No family hx of      Glaucoma No family hx of      Social History     Tobacco Use     Smoking status: Never Smoker     Smokeless tobacco: Never Used   Substance Use Topics     Alcohol use: Yes     Alcohol/week: 2.5 - 3.3 standard drinks     Types: 3 - 4 Standard drinks or equivalent per week     Comment: 3 martinis per week     Drug use: No        Post-discharge medication reconciliation status: Reviewed and updated in [facility] according to facility MAR    Current Outpatient Medications   Medication Sig Dispense Refill     aspirin (ASA) 81 MG EC tablet Take 1 tablet by mouth daily        blood glucose (NO BRAND SPECIFIED) lancets standard Use to test blood sugar 1 times daily or as directed. Use brand compatible with patients insurance and device. 100 each 3     blood glucose (ONETOUCH VERIO IQ) test strip Use to test blood sugar 1 time daily or as directed. 100 each 3     blood glucose monitoring (ONETOUCH VERIO IQ SYSTEM) meter device kit Use to test blood sugar daily 1 kit 0     Cholecalciferol (VITAMIN D-3 PO) Take 1 Dose by mouth daily        COMPRESSION STOCKINGS 1 each daily Measure and fit.  Style and color per patient preference.  Rebeca Yuen per patient need. 6 each 1     finasteride (PROSCAR) 5 MG tablet Take 1 tablet by mouth daily  3     furosemide (LASIX) 40 MG tablet Take 1.5 tablets (60 mg) by mouth daily       insulin aspart (NOVOLOG PEN) 100 UNIT/ML pen Inject 1 Units Subcutaneous 3 times daily (with meals) With lunch and dinner in addition to any needed sliding scale 15 mL 0     insulin aspart (NOVOLOG PEN) 100 UNIT/ML pen Inject 1-7 Units Subcutaneous 3 times daily (before meals) Correction Scale - MEDIUM INSULIN RESISTANCE DOSING     Do Not give Correction Insulin if Pre-Meal BG less than 140.   For Pre-Meal  - 189 give 1 unit.   For Pre-Meal  - 239 give 2 units.   For Pre-Meal  " - 289 give 3 units.   For Pre-Meal  - 339 give 4 units.   For Pre-Meal - 399 give 5 units.   For Pre-Meal -449 give 6 units  For Pre-Meal BG greater than or equal to 450 give 7 units.   To be given with prandial insulin, and based on pre-meal blood glucose.    Notify provider if glucose greater than or equal to 350 mg/dL after administration of correction dose. If given at mealtime, administer within 30 minutes of start of meal 20 mL 0     insulin glargine (LANTUS SOLOSTAR) 100 UNIT/ML pen Inject 8 Units Subcutaneous every morning 15 mL 0     insulin pen needle (31G X 5 MM) 31G X 5 MM miscellaneous Use1 pen needles daily or as directed. 90 each 3     labetalol (NORMODYNE) 100 MG tablet Take 1 tablet (100 mg) by mouth 2 times daily for 60 doses 60 tablet 0     multivitamin w/minerals (MULTI-VITAMIN) tablet Take 1 tablet by mouth daily       NIFEdipine ER OSMOTIC (NIFEDICAL XL) 60 MG 24 hr tablet Take 60 mg by mouth daily       sennosides (SENOKOT) 8.6 MG tablet Take 2 tablets by mouth nightly as needed for constipation       SYNTHROID 25 MCG tablet Take 1 tablet (25 mcg) by mouth daily Check TSH in 8 weeks. 90 tablet 0     tamsulosin (FLOMAX) 0.4 MG capsule Take 1 capsule (0.4 mg) by mouth daily Advise clinic if causes lightheadedness. 90 capsule 3       ROS:  Limited secondary to cognitive impairment but today pt reports as above in HPI    Exam:  BP (!) 145/69   Pulse 61   Temp (!) 95.1  F (35.1  C)   Resp 18   Ht 1.803 m (5' 11\")   Wt 72.4 kg (159 lb 9.6 oz)   SpO2 98%   BMI 22.26 kg/m    Frail appearing elderly man lying awake in bed  No scleral icterus  Moist oral mucosa  Very [place] but utilizes pocket talker for assistance  HRRR without mrg  Lungs clear  Trace ankle edema with compression hose in place bilaterally  Abdomen soft, NT, +BS  Skin warm, non-diaphoretic, non-pale  Normal speech, no tremor  Slightly sad affect    Lab/Diagnostic data:  BMP today: Na 136, K 5.5, BUN 88, Cr " 5.61  BMP yesterday: Na 136, K 5.8, BUN 81, Cr 5.47 and Hgb yesterday 8.5    ASSESSMENT/PLAN:  (N18.5) CKD (chronic kidney disease) stage 5, GFR less than 15 ml/min (H)  (primary encounter diagnosis)  (E87.5) Hyperkalemia  (D64.9) Anemia  Follows with nephrology and wishes to not pursue hemodialysis; had recent visit 9/29/21  Cr seems to now range in 4-5 range  Seems euvolemic today to slightly on dry side - encouraged hydration  Is on Lasix (to help maintain fluid status and given he is prone to hyperkalemia)  Had order of Kayexalate 15 g x 1 yesterday but not received from pharmacy; recheck K+ today improved on its own from 5.8 to 5.5 but still high with Cr 5.61  Orders for Kayexalate 15 g every 6 hours x 2 doses once it comes in from pharmacy  Recheck BMP on Monday 10/4/21   Hgb remains stable in the 8-9 range; consider outpatient colonoscopy per PCP pending goals of care in the setting of his rectal cancer    (I10) Benign essential hypertension  BP overall well controlled; occasional highs but need to avoid hypotension which has been hard on his kidneys as well as could lead to orthostasis and falls  Per nephrology approximate goal -150    (E11.22,  I12.0,  N18.5) Type 2 DM with CKD stage 5 and hypertension (H)  Increased meal time insulin from Novolog 1 unit TID to 2 units TID  Continue Lantus 8 units daily and Novolog sliding scale insulin with meals  Consider if Cr stabilizes to switch to an appropriate oral agent with endocrinology input (per notes endocrinology is considering a GLP-1 pending thyroid nodule evaluation - see below)    (K57.32) Diverticulitis of colon  Symptoms resolved; antibiotic course already completed    (H91.93) Bilateral hearing loss, unspecified hearing loss type  Utilizes pocket talker with improvement in communication    (R41.89) Cognitive impairment  (R53.81) Physical deconditioning  Physical therapy and occupational therapy eval and treat  He is a bit down in terms of his  mood re: not being home; monitor closely and consider ACP and/or medication management for adjustment disorder with depressed mood  SLUMS 24/30 with CPT 4.7/5.6  Assist of one with CGA with STS transfers, walking 45 feet with FWW and CGA, minimal assist with dressing and moderate with toileting    (E03.8) Subclinical hypothyroidism  (E04.1) H/o thyroid nodules  Followed by endocrinology; newly started on low dose Synthroid 25 mcg 9/14/21 with planned f/u labs in about 8 weeks  To see Dr. Amin in December re: thyroid nodules      (K86.2) Pancreatic cyst  Outpatient imaging with MRI (would have to be without contrast d/t his CKD) follow up per PCP pending goals of care discussions      Electronically signed by:  Kusum Irizarry DO          Sincerely,        Kusum Irizarry DO

## 2021-10-01 NOTE — PROGRESS NOTES
[place] [facility]  PHYSICIAN NOTE    PRIMARY CARE PROVIDER AND CLINIC:  Renu Lantigua, NP, [facility] NP CLINIC [facility] [address] [facility], FLOOR 5 / [place]    Chief Complaint   Patient presents with     Hospital F/U     [facility] Medical Record Number:  [id number]  Place of Service where encounter took place:  [facility] ([facility]) [[id number]]    Farzad East is a 86 year old (1935), admitted to the above facility from  [facility]. Hospital stay 8/17/21-9/2/21 and again 9/16/21-9/23/21. Admitted to this facility for  rehab, medical management and nursing care.     HPI:    HPI information obtained from: facility chart records, facility staff, patient report and [facility] chart review.     Brief summary of hospital course: Mr. Alessio East is an 86yoM with h/o CKD and DM2 admitted initially with progressive fatigue, dyspnea and edema found to have RONY on CKD. Possibility of hypoperfusion of kidneys in the setting of HTN management and/or drug induced lupus secondary to Hydralazine which was subsequently stopped. Permissive HTN allowed with goal -150. Lasix utilized to manage fluid status and hyperkalemia. Close outpatient follow up with nephrology indicated and he voiced wanting to avoid dialysis. He did need a unit of blood transfused plus iron infusions for Hgb yuridia of 6.8. Additionally diabetic regimen adjusted to include insulin therapy only and avoid oral therapy for the time being though pending how renal function stabilizes consideration could be for an agent such as GLP-1 and/ro SGLT2. Subclinical hypothyroidism to be monitored on subsequent labs. Discharged to [facility]CU for further monitoring and rehabilitation in the setting of physical deconditioning.    However while at [facility] later developed increased weakness, bradycardia, chills, on-going kidney injury and was readmitted to the hospital. Found to have acute diverticulitis needing  antibiotic therapy. Treated with IVF as well for RONY but creatinine still elevated and may be his new baseline and needs close monitoring. Coreg changed to Labetalol to hopefully lessen bradycardia. Discharged back to TCU.    Updates on status since skilled nursing admission: Alessio is seen resting in bed this afternoon watching TV. Is feeling a bit bummed about being at TCU and missing home which is understandable. Not craving the food here though family is very helpful at bringing in snacks he enjoys and he snacks on a cookie during my visit. He thinks he is making some progress but its slow. RN does report he had a slip and fall attempting self transfer yesterday without injuries. Denies abdominal pain or nausea. Says used to have a BM after almost every meal but now that has thankfully slowed down after treatment for recent diverticulitis.     CODE STATUS/ADVANCE DIRECTIVES DISCUSSION:   DNR / DNI  Patient's living condition: Independent senior apartment    ALLERGIES: Hydralazine and Sulfa drugs    Past Medical History:   Diagnosis Date     Atrial flutter (H)      Choroidal nevus of left eye      CKD (chronic kidney disease) stage 3, GFR 30-59 ml/min      Diabetes mellitus (H)      Diabetic peripheral neuropathy (H)      Hypertension      Microalbuminuria      Overweight(278.02)      Rectal-type adenocarcinoma (H)      Retinopathies, diabetic      Vitreous detachment of both eyes       Past Surgical History:   Procedure Laterality Date     CATARACT IOL, RT/LT Bilateral 2005     left hip replacement       OPEN REDUCTION INTERNAL FIXATION FEMUR PROXIMAL  1/24/2014    Procedure: OPEN REDUCTION INTERNAL FIXATION FEMUR PROXIMAL;  Periprosthetic Fracture, ORIF Left Femur;  Surgeon: Andi Garcia MD;  Location: [facility] OR     Family History   Problem Relation Age of Onset     Diabetes Father      Circulatory Father         PAD     Macular Degeneration Father      Arthritis Mother      Amblyopia No family hx of       Retinal detachment No family hx of      Glaucoma No family hx of      Social History     Tobacco Use     Smoking status: Never Smoker     Smokeless tobacco: Never Used   Substance Use Topics     Alcohol use: Yes     Alcohol/week: 2.5 - 3.3 standard drinks     Types: 3 - 4 Standard drinks or equivalent per week     Comment: 3 martinis per week     Drug use: No        Post-discharge medication reconciliation status: Reviewed and updated in Epic according to facility MAR    Current Outpatient Medications   Medication Sig Dispense Refill     aspirin (ASA) 81 MG EC tablet Take 1 tablet by mouth daily        blood glucose (NO BRAND SPECIFIED) lancets standard Use to test blood sugar 1 times daily or as directed. Use brand compatible with patients insurance and device. 100 each 3     blood glucose (ONETOUCH VERIO IQ) test strip Use to test blood sugar 1 time daily or as directed. 100 each 3     blood glucose monitoring (ONETOUCH VERIO IQ SYSTEM) meter device kit Use to test blood sugar daily 1 kit 0     Cholecalciferol (VITAMIN D-3 PO) Take 1 Dose by mouth daily        COMPRESSION STOCKINGS 1 each daily Measure and fit.  Style and color per patient preference.  Rebeca Yuen per patient need. 6 each 1     finasteride (PROSCAR) 5 MG tablet Take 1 tablet by mouth daily  3     furosemide (LASIX) 40 MG tablet Take 1.5 tablets (60 mg) by mouth daily       insulin aspart (NOVOLOG PEN) 100 UNIT/ML pen Inject 1 Units Subcutaneous 3 times daily (with meals) With lunch and dinner in addition to any needed sliding scale 15 mL 0     insulin aspart (NOVOLOG PEN) 100 UNIT/ML pen Inject 1-7 Units Subcutaneous 3 times daily (before meals) Correction Scale - MEDIUM INSULIN RESISTANCE DOSING     Do Not give Correction Insulin if Pre-Meal BG less than 140.   For Pre-Meal  - 189 give 1 unit.   For Pre-Meal  - 239 give 2 units.   For Pre-Meal  - 289 give 3 units.   For Pre-Meal  - 339 give 4 units.   For Pre-Meal BG  "340- 399 give 5 units.   For Pre-Meal -449 give 6 units  For Pre-Meal BG greater than or equal to 450 give 7 units.   To be given with prandial insulin, and based on pre-meal blood glucose.    Notify provider if glucose greater than or equal to 350 mg/dL after administration of correction dose. If given at mealtime, administer within 30 minutes of start of meal 20 mL 0     insulin glargine (LANTUS SOLOSTAR) 100 UNIT/ML pen Inject 8 Units Subcutaneous every morning 15 mL 0     insulin pen needle (31G X 5 MM) 31G X 5 MM miscellaneous Use1 pen needles daily or as directed. 90 each 3     labetalol (NORMODYNE) 100 MG tablet Take 1 tablet (100 mg) by mouth 2 times daily for 60 doses 60 tablet 0     multivitamin w/minerals (MULTI-VITAMIN) tablet Take 1 tablet by mouth daily       NIFEdipine ER OSMOTIC (NIFEDICAL XL) 60 MG 24 hr tablet Take 60 mg by mouth daily       sennosides (SENOKOT) 8.6 MG tablet Take 2 tablets by mouth nightly as needed for constipation       SYNTHROID 25 MCG tablet Take 1 tablet (25 mcg) by mouth daily Check TSH in 8 weeks. 90 tablet 0     tamsulosin (FLOMAX) 0.4 MG capsule Take 1 capsule (0.4 mg) by mouth daily Advise clinic if causes lightheadedness. 90 capsule 3       ROS:  Limited secondary to cognitive impairment but today pt reports as above in HPI    Exam:  BP (!) 145/69   Pulse 61   Temp (!) 95.1  F (35.1  C)   Resp 18   Ht 1.803 m (5' 11\")   Wt 72.4 kg (159 lb 9.6 oz)   SpO2 98%   BMI 22.26 kg/m    Frail appearing elderly man lying awake in bed  No scleral icterus  Moist oral mucosa  Very [place] but utilizes pocket talker for assistance  HRRR without mrg  Lungs clear  Trace ankle edema with compression hose in place bilaterally  Abdomen soft, NT, +BS  Skin warm, non-diaphoretic, non-pale  Normal speech, no tremor  Slightly sad affect    Lab/Diagnostic data:  BMP today: Na 136, K 5.5, BUN 88, Cr 5.61  BMP yesterday: Na 136, K 5.8, BUN 81, Cr 5.47 and Hgb yesterday " 8.5    ASSESSMENT/PLAN:  (N18.5) CKD (chronic kidney disease) stage 5, GFR less than 15 ml/min (H)  (primary encounter diagnosis)  (E87.5) Hyperkalemia  (D64.9) Anemia  Follows with nephrology and wishes to not pursue hemodialysis; had recent visit 9/29/21  Cr seems to now range in 4-5 range  Seems euvolemic today to slightly on dry side - encouraged hydration  Is on Lasix (to help maintain fluid status and given he is prone to hyperkalemia)  Had order of Kayexalate 15 g x 1 yesterday but not received from pharmacy; recheck K+ today improved on its own from 5.8 to 5.5 but still high with Cr 5.61  Orders for Kayexalate 15 g every 6 hours x 2 doses once it comes in from pharmacy  Recheck BMP on Monday 10/4/21   Hgb remains stable in the 8-9 range; consider outpatient colonoscopy per PCP pending goals of care in the setting of his rectal cancer    (I10) Benign essential hypertension  BP overall well controlled; occasional highs but need to avoid hypotension which has been hard on his kidneys as well as could lead to orthostasis and falls  Per nephrology approximate goal -150    (E11.22,  I12.0,  N18.5) Type 2 DM with CKD stage 5 and hypertension (H)  Increased meal time insulin from Novolog 1 unit TID to 2 units TID  Continue Lantus 8 units daily and Novolog sliding scale insulin with meals  Consider if Cr stabilizes to switch to an appropriate oral agent with endocrinology input (per notes endocrinology is considering a GLP-1 pending thyroid nodule evaluation - see below)    (K57.32) Diverticulitis of colon  Symptoms resolved; antibiotic course already completed    (H91.93) Bilateral hearing loss, unspecified hearing loss type  Utilizes pocket talker with improvement in communication    (R41.89) Cognitive impairment  (R53.81) Physical deconditioning  Physical therapy and occupational therapy eval and treat  He is a bit down in terms of his mood re: not being home; monitor closely and consider ACP and/or  medication management for adjustment disorder with depressed mood  SLUMS 24/30 with CPT 4.7/5.6  Assist of one with CGA with STS transfers, walking 45 feet with FWW and CGA, minimal assist with dressing and moderate with toileting    (E03.8) Subclinical hypothyroidism  (E04.1) H/o thyroid nodules  Followed by endocrinology; newly started on low dose Synthroid 25 mcg 9/14/21 with planned f/u labs in about 8 weeks  To see Dr. Amin in December re: thyroid nodules      (K86.2) Pancreatic cyst  Outpatient imaging with MRI (would have to be without contrast d/t his CKD) follow up per PCP pending goals of care discussions      Electronically signed by:  Kusum Irizarry DO

## 2021-10-03 ENCOUNTER — LAB REQUISITION (OUTPATIENT)
Dept: LAB | Facility: CLINIC | Age: 86
End: 2021-10-03
Payer: MEDICARE

## 2021-10-03 DIAGNOSIS — E11.8 TYPE 2 DIABETES MELLITUS WITH COMPLICATION (H): ICD-10-CM

## 2021-10-03 DIAGNOSIS — I10 ESSENTIAL (PRIMARY) HYPERTENSION: ICD-10-CM

## 2021-10-04 ENCOUNTER — TRANSITIONAL CARE UNIT VISIT (OUTPATIENT)
Dept: GERIATRICS | Facility: CLINIC | Age: 86
End: 2021-10-04
Payer: MEDICARE

## 2021-10-04 VITALS
WEIGHT: 158 LBS | SYSTOLIC BLOOD PRESSURE: 172 MMHG | RESPIRATION RATE: 20 BRPM | HEART RATE: 68 BPM | OXYGEN SATURATION: 98 % | DIASTOLIC BLOOD PRESSURE: 88 MMHG | TEMPERATURE: 96.5 F | BODY MASS INDEX: 22.04 KG/M2

## 2021-10-04 DIAGNOSIS — K57.92 DIVERTICULITIS: ICD-10-CM

## 2021-10-04 DIAGNOSIS — R00.1 BRADYCARDIA: ICD-10-CM

## 2021-10-04 DIAGNOSIS — N18.5 TYPE 2 DM WITH CKD STAGE 5 AND HYPERTENSION (H): ICD-10-CM

## 2021-10-04 DIAGNOSIS — I12.0 TYPE 2 DM WITH CKD STAGE 5 AND HYPERTENSION (H): ICD-10-CM

## 2021-10-04 DIAGNOSIS — E11.22 TYPE 2 DM WITH CKD STAGE 5 AND HYPERTENSION (H): ICD-10-CM

## 2021-10-04 DIAGNOSIS — I10 BENIGN ESSENTIAL HYPERTENSION: Primary | ICD-10-CM

## 2021-10-04 LAB
ANION GAP SERPL CALCULATED.3IONS-SCNC: 11 MMOL/L (ref 5–18)
BUN SERPL-MCNC: 93 MG/DL (ref 8–28)
CALCIUM SERPL-MCNC: 8.2 MG/DL (ref 8.5–10.5)
CHLORIDE BLD-SCNC: 108 MMOL/L (ref 98–107)
CO2 SERPL-SCNC: 17 MMOL/L (ref 22–31)
CREAT SERPL-MCNC: 5.59 MG/DL (ref 0.7–1.3)
GFR SERPL CREATININE-BSD FRML MDRD: 9 ML/MIN/1.73M2
GLUCOSE BLD-MCNC: 222 MG/DL (ref 70–125)
POTASSIUM BLD-SCNC: 5.3 MMOL/L (ref 3.5–5)
SODIUM SERPL-SCNC: 136 MMOL/L (ref 136–145)

## 2021-10-04 PROCEDURE — 36415 COLL VENOUS BLD VENIPUNCTURE: CPT | Performed by: INTERNAL MEDICINE

## 2021-10-04 PROCEDURE — 80048 BASIC METABOLIC PNL TOTAL CA: CPT | Performed by: INTERNAL MEDICINE

## 2021-10-04 PROCEDURE — P9604 ONE-WAY ALLOW PRORATED TRIP: HCPCS | Performed by: INTERNAL MEDICINE

## 2021-10-04 PROCEDURE — 99309 SBSQ NF CARE MODERATE MDM 30: CPT | Performed by: NURSE PRACTITIONER

## 2021-10-04 RX ORDER — BLOOD SUGAR DIAGNOSTIC
STRIP MISCELLANEOUS
Qty: 100 STRIP | Refills: 3 | Status: SHIPPED | OUTPATIENT
Start: 2021-10-04 | End: 2021-10-19

## 2021-10-04 NOTE — LETTER
"    10/4/2021        RE: Farzad East  [address]  [facility] [id number]         HEALTH GERIATRIC SERVICES    Chief Complaint   Patient presents with     RECHECK     HPI:  Farzad East is a 86 year old  (1935), who is being seen today for an episodic care visit at: [facility] ([facility]) [[id number]].     Background:     This is an 86-year-old male, with a past medical history significant for hypertension, anemia, rectal cancer, type 2 diabetes mellitus, lower extremity edema, benign prostatic hypertrophy and thyroid nodule, who was admitted to the [facility] 8/17/21 through 9/2/21 for progressive shortness of breath, increasing lower extremity edema and fatigue. Labs revealed Potassium 5.7, Creatinine 2.89, with peak at 5.77 on 8/26/21, and TSH 7.46. Nephrology was consulted and suspected acute kidney injury due to hypoperfusion in the setting of tightly controlled blood pressures versus drug induced lupus due secondary to Hydralazine. Hydralazine was discontinued on 8/25/21. Permissive blood pressures goal of systolic 140-150. Initiated on Furosemide and Nifedipine. Hemoglobin 6.8 on 8/19/21 and required 1 U of PRBCs. 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 Found to have \".. Superficial clot noted in the cephalic vein within both forearms\" on 8/26/21 ultrasound. Recommended to elevate arms. Blood sugars elevated. Glipizide discontinued due to RONY and hypoglycemia risk. Sitagliptin held. A TCU stay was recommended for ongoing physical rehabilitation.      While in the TCU, became lethargic with fatigue, decreased oral intake, loose stools, bradycardia and decline of progress in therapy so was sent back to the [facility] 9/16/21 through 9/23/21. Labs revealed Lipase of 983. An abdominal and pelvic CT revealed \"Herniation of a short segment of " "small bowel into the left inguinal canal with mild air-filled and fluid-filled loops in the inguinal canal with minimal adjacent fluid and edema. The proximal small bowel up to the level of the herniation is moderately fluid-filled although not overtly dilated. Finding may be indicative of developing obstruction related to a small bowel containing left inguinal hernia... Acute uncomplicated diverticulitis\". 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 General Surgery was consulted. Patient did not want hernia repaired. Medical management for uncomplicated diverticulitis was recommended. DIarrhea resolved. IV fluids were administered due to poor oral intake leading up to hospitalization and renal function improved. PTA Furosemide was also held. Carvedilol was briefly held for bradycardia. Discharged back to TCU when medically stable.    Today's concern is:     Recent Acute Diverticulitis and Diarrhea. Today, patient reports his stools are improving. Not going quite as frequently. No abdominal pain. Upon review of documentation, has had 4 bowel movements in the past 5 days.    Bradycardia. Upon review of heart rate over the past 5 days, range 60-98 with an episode of 50.    Type 2 Diabetes Mellitus. Upon review of blood sugars over the past 5 days, range is as follows:    Breakfast: 134-255, most < 200  Lunch: 192-245  Dinner: 147-303  Bedtime: 123-294    Allergies, and PMH/PSH reviewed in EPIC today.  REVIEW OF SYSTEMS:  4 point ROS including Respiratory, CV, GI and , other than that noted in the HPI,  is negative    Objective:   BP (!) 172/88   Pulse 68   Temp (!) 96.5  F (35.8  C)   Resp 20   Wt 71.7 kg (158 lb)   SpO2 98%   BMI 22.04 kg/m    GENERAL APPEARANCE:  Alert, in no distress  ENT:  Mouth and posterior oropharynx normal, moist mucous membranes  EYES:  EOM, conjunctivae, lids, pupils and irises normal  RESP:  Respiratory effort and palpation of chest normal, lungs clear to auscultation , no " "respiratory distress  CV:  Palpation and auscultation of heart done, regular rate and rhythm, no murmur, rub, or gallop  ABDOMEN:  normal bowel sounds, soft, nontender, no hepatosplenomegaly or other masses  M/S:   Active movement of bilateral upper and lower extremities. Trace edema in BLE.  SKIN:  Small, bleeding open area on right shin.   NEURO:   Cranial nerves 2-12 are normal tested and grossly at patient's baseline  PSYCH:  affect and mood normal    Labs done in SNF are in [place] EPIC. Please refer to them using [employer] Everywhere.    Assessment/Plan:  Acute Diverticulitis and Diarrhea. With diarrhea and anorexia. Noted on abdominal and pelvic CT. Started on Ciprofloxacin and Flagyl with transition to Ceftriaxone and Flagyl to complete 10 day course on 9/25/21. Stools improving per patient report. Continue to monitor.     Acute on Chronic Kidney Disease Stage IV. Possibly due to hypoperfusion in the setting of tightly controlled blood pressures versus drug induced lupus secondary to Hydralazine. Seen by Nephrology on 9/29/21. Permissive hypertension with a goal of systolic readings between 140-150. 2 g Sodium diet with 2 L fluid restriction. Monitor urine output. Patient has stated he would not want dialysis if indicated.     Imaging Concerning for Bowel Obstruction and Inguinal Hernia. Patient declined surgical intervention for hernia. Symptoms not consistent with bowel obstruction. General surgery recommended medical management.      Pancreatic Cyst. An abdominal and pelvic CT on 9/16/21 revealed \"Low-attenuation indeterminant probable cystic lesion at the pancreatic head measuring 1.4 x 2 x 1.3 cm. Recommend nonemergent contrast-enhanced MRI of the pancreas for further evaluation\". Follow-up outpatient as indicated.     Bradycardia. Likely secondary to Carvedilol. Carvedilol was changed to Labetalol during hospitalization. Most heart rates WNL. Monitor heart rate daily.     Hypertension. Hydralazine " discontinued as noted above. Permissive systolic blood pressures 140-150 recommended as noted above. Started on Nifedipine during hospitalization in August with increase on 9/9/21. PTA Hydrochlorothiazide discontinued. Carvedilol changed to Labetalol due to bradycardia. Monitor blood pressure daily.     Acute on Chronic Anemia with History of Rectal Cancer. Received 1 U of PRBCs and Iron Sucrose during hospitalization in August. Labs consistent with iron deficiency and thought to be a component of Erythropoietin deficiency with history of chronic kidney disease. Had been having dark black stools and diarrhea over the past 3 months prior to hospitalization. Colonoscopy recommended outpatient. Baseline Hemoglobin ~ 8-9. Last Hemoglobin 8.5 on 9/30/21.     Type 2 Diabetes Mellitus. Last A1C 9.5 on 7/16/21. PTA Glipizide and Sitagliptin discontinued during August hospitalization. Discharged on Glargine and sliding scale insulin. Seen by Endocrinology on 9/14/21. Glargine decreased to 8 U. Consider GLP-1 agonist once determination is made regarding thyroid nodules.     Lower Extremity Edema. Ultrasound negative on 8/18/21. Takes Furosemide.     Thyroid Nodule. Follow-up with Endocrinology on 12/14/21 as scheduled.      Subclinical Hypothyroidism. TSH 7.46 and Free T4 1.03 on 8/16/21. Seen by Endocrinology on 9/14/21 and started on low dose Levothyroxine with follow-up TSH in 8 weeks.     Benign Prostatic Hypertrophy. Continue Tamsulosin and Finasteride as ordered.     Physical Deconditioning. Secondary to recent hospitalization and co-morbidities. Physical and Occupational Therapy ordered.    Orders:  BMP on 10/7/21    Electronically signed by: ESTEPHANIE Enciso CNP             Sincerely,        ESTEPHANIE Enciso CNP

## 2021-10-04 NOTE — PROGRESS NOTES
"[facility] GERIATRIC SERVICES    Chief Complaint   Patient presents with     RECHECK     HPI:  Farzad East is a 86 year old  (1935), who is being seen today for an episodic care visit at: [facility] ([facility]) [[id number]].     Background:     This is an 86-year-old male, with a past medical history significant for hypertension, anemia, rectal cancer, type 2 diabetes mellitus, lower extremity edema, benign prostatic hypertrophy and thyroid nodule, who was admitted to the [facility] 8/17/21 through 9/2/21 for progressive shortness of breath, increasing lower extremity edema and fatigue. Labs revealed Potassium 5.7, Creatinine 2.89, with peak at 5.77 on 8/26/21, and TSH 7.46. Nephrology was consulted and suspected acute kidney injury due to hypoperfusion in the setting of tightly controlled blood pressures versus drug induced lupus due secondary to Hydralazine. Hydralazine was discontinued on 8/25/21. Permissive blood pressures goal of systolic 140-150. Initiated on Furosemide and Nifedipine. Hemoglobin 6.8 on 8/19/21 and required 1 U of PRBCs. 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 Found to have \".. Superficial clot noted in the cephalic vein within both forearms\" on 8/26/21 ultrasound. Recommended to elevate arms. Blood sugars elevated. Glipizide discontinued due to RONY and hypoglycemia risk. Sitagliptin held. A TCU stay was recommended for ongoing physical rehabilitation.      While in the TCU, became lethargic with fatigue, decreased oral intake, loose stools, bradycardia and decline of progress in therapy so was sent back to the [facility] 9/16/21 through 9/23/21. Labs revealed Lipase of 983. An abdominal and pelvic CT revealed \"Herniation of a short segment of small bowel into the left inguinal canal with mild air-filled and fluid-filled loops in the inguinal " "canal with minimal adjacent fluid and edema. The proximal small bowel up to the level of the herniation is moderately fluid-filled although not overtly dilated. Finding may be indicative of developing obstruction related to a small bowel containing left inguinal hernia... Acute uncomplicated diverticulitis\". 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 General Surgery was consulted. Patient did not want hernia repaired. Medical management for uncomplicated diverticulitis was recommended. DIarrhea resolved. IV fluids were administered due to poor oral intake leading up to hospitalization and renal function improved. PTA Furosemide was also held. Carvedilol was briefly held for bradycardia. Discharged back to TCU when medically stable.    Today's concern is:     Recent Acute Diverticulitis and Diarrhea. Today, patient reports his stools are improving. Not going quite as frequently. No abdominal pain. Upon review of documentation, has had 4 bowel movements in the past 5 days.    Bradycardia. Upon review of heart rate over the past 5 days, range 60-98 with an episode of 50.    Type 2 Diabetes Mellitus. Upon review of blood sugars over the past 5 days, range is as follows:    Breakfast: 134-255, most < 200  Lunch: 192-245  Dinner: 147-303  Bedtime: 123-294    Allergies, and PMH/PSH reviewed in EPIC today.  REVIEW OF SYSTEMS:  4 point ROS including Respiratory, CV, GI and , other than that noted in the HPI,  is negative    Objective:   BP (!) 172/88   Pulse 68   Temp (!) 96.5  F (35.8  C)   Resp 20   Wt 71.7 kg (158 lb)   SpO2 98%   BMI 22.04 kg/m    GENERAL APPEARANCE:  Alert, in no distress  ENT:  Mouth and posterior oropharynx normal, moist mucous membranes  EYES:  EOM, conjunctivae, lids, pupils and irises normal  RESP:  Respiratory effort and palpation of chest normal, lungs clear to auscultation , no respiratory distress  CV:  Palpation and auscultation of heart done, regular rate and rhythm, no " "murmur, rub, or gallop  ABDOMEN:  normal bowel sounds, soft, nontender, no hepatosplenomegaly or other masses  M/S:   Active movement of bilateral upper and lower extremities. Trace edema in BLE.  SKIN:  Small, bleeding open area on right shin.   NEURO:   Cranial nerves 2-12 are normal tested and grossly at patient's baseline  PSYCH:  affect and mood normal    Labs done in SNF are in [place] EPIC. Please refer to them using EPIC/Care Everywhere.    Assessment/Plan:  Acute Diverticulitis and Diarrhea. With diarrhea and anorexia. Noted on abdominal and pelvic CT. Started on Ciprofloxacin and Flagyl with transition to Ceftriaxone and Flagyl to complete 10 day course on 9/25/21. Stools improving per patient report. Continue to monitor.     Acute on Chronic Kidney Disease Stage IV. Possibly due to hypoperfusion in the setting of tightly controlled blood pressures versus drug induced lupus secondary to Hydralazine. Seen by Nephrology on 9/29/21. Permissive hypertension with a goal of systolic readings between 140-150. 2 g Sodium diet with 2 L fluid restriction. Monitor urine output. Patient has stated he would not want dialysis if indicated.     Imaging Concerning for Bowel Obstruction and Inguinal Hernia. Patient declined surgical intervention for hernia. Symptoms not consistent with bowel obstruction. General surgery recommended medical management.      Pancreatic Cyst. An abdominal and pelvic CT on 9/16/21 revealed \"Low-attenuation indeterminant probable cystic lesion at the pancreatic head measuring 1.4 x 2 x 1.3 cm. Recommend nonemergent contrast-enhanced MRI of the pancreas for further evaluation\". Follow-up outpatient as indicated.     Bradycardia. Likely secondary to Carvedilol. Carvedilol was changed to Labetalol during hospitalization. Most heart rates WNL. Monitor heart rate daily.     Hypertension. Hydralazine discontinued as noted above. Permissive systolic blood pressures 140-150 recommended as noted above. " Started on Nifedipine during hospitalization in August with increase on 9/9/21. PTA Hydrochlorothiazide discontinued. Carvedilol changed to Labetalol due to bradycardia. Monitor blood pressure daily.     Acute on Chronic Anemia with History of Rectal Cancer. Received 1 U of PRBCs and Iron Sucrose during hospitalization in August. Labs consistent with iron deficiency and thought to be a component of Erythropoietin deficiency with history of chronic kidney disease. Had been having dark black stools and diarrhea over the past 3 months prior to hospitalization. Colonoscopy recommended outpatient. Baseline Hemoglobin ~ 8-9. Last Hemoglobin 8.5 on 9/30/21.     Type 2 Diabetes Mellitus. Last A1C 9.5 on 7/16/21. PTA Glipizide and Sitagliptin discontinued during August hospitalization. Discharged on Glargine and sliding scale insulin. Seen by Endocrinology on 9/14/21. Glargine decreased to 8 U. Consider GLP-1 agonist once determination is made regarding thyroid nodules.     Lower Extremity Edema. Ultrasound negative on 8/18/21. Takes Furosemide.     Thyroid Nodule. Follow-up with Endocrinology on 12/14/21 as scheduled.      Subclinical Hypothyroidism. TSH 7.46 and Free T4 1.03 on 8/16/21. Seen by Endocrinology on 9/14/21 and started on low dose Levothyroxine with follow-up TSH in 8 weeks.     Benign Prostatic Hypertrophy. Continue Tamsulosin and Finasteride as ordered.     Physical Deconditioning. Secondary to recent hospitalization and co-morbidities. Physical and Occupational Therapy ordered.    Orders:  BMP on 10/7/21    Electronically signed by: ESTEPHANIE Enciso CNP

## 2021-10-04 NOTE — TELEPHONE ENCOUNTER
Test strips      9/14/2021  [facility] Sandhya Johnson PA-C    Endocrinology, Diabetes, and Metabolism

## 2021-10-06 ENCOUNTER — LAB REQUISITION (OUTPATIENT)
Dept: LAB | Facility: CLINIC | Age: 86
End: 2021-10-06
Payer: MEDICARE

## 2021-10-06 DIAGNOSIS — N17.9 ACUTE KIDNEY FAILURE, UNSPECIFIED (H): ICD-10-CM

## 2021-10-07 ENCOUNTER — TRANSITIONAL CARE UNIT VISIT (OUTPATIENT)
Dept: GERIATRICS | Facility: CLINIC | Age: 86
End: 2021-10-07
Payer: MEDICARE

## 2021-10-07 VITALS
SYSTOLIC BLOOD PRESSURE: 129 MMHG | TEMPERATURE: 97.7 F | WEIGHT: 157.6 LBS | OXYGEN SATURATION: 97 % | BODY MASS INDEX: 21.98 KG/M2 | DIASTOLIC BLOOD PRESSURE: 55 MMHG | RESPIRATION RATE: 18 BRPM | HEART RATE: 66 BPM

## 2021-10-07 DIAGNOSIS — R00.1 BRADYCARDIA: ICD-10-CM

## 2021-10-07 DIAGNOSIS — R53.81 PHYSICAL DECONDITIONING: ICD-10-CM

## 2021-10-07 DIAGNOSIS — N18.5 TYPE 2 DM WITH CKD STAGE 5 AND HYPERTENSION (H): ICD-10-CM

## 2021-10-07 DIAGNOSIS — I12.0 TYPE 2 DM WITH CKD STAGE 5 AND HYPERTENSION (H): ICD-10-CM

## 2021-10-07 DIAGNOSIS — E11.22 TYPE 2 DM WITH CKD STAGE 5 AND HYPERTENSION (H): ICD-10-CM

## 2021-10-07 DIAGNOSIS — N18.5 CKD (CHRONIC KIDNEY DISEASE) STAGE 5, GFR LESS THAN 15 ML/MIN (H): Primary | ICD-10-CM

## 2021-10-07 LAB
ANION GAP SERPL CALCULATED.3IONS-SCNC: 13 MMOL/L (ref 5–18)
BUN SERPL-MCNC: 103 MG/DL (ref 8–28)
CALCIUM SERPL-MCNC: 8.7 MG/DL (ref 8.5–10.5)
CHLORIDE BLD-SCNC: 105 MMOL/L (ref 98–107)
CO2 SERPL-SCNC: 17 MMOL/L (ref 22–31)
CREAT SERPL-MCNC: 5.4 MG/DL (ref 0.7–1.3)
GFR SERPL CREATININE-BSD FRML MDRD: 9 ML/MIN/1.73M2
GLUCOSE BLD-MCNC: 124 MG/DL (ref 70–125)
POTASSIUM BLD-SCNC: 5.8 MMOL/L (ref 3.5–5)
SODIUM SERPL-SCNC: 135 MMOL/L (ref 136–145)

## 2021-10-07 PROCEDURE — 36415 COLL VENOUS BLD VENIPUNCTURE: CPT | Performed by: NURSE PRACTITIONER

## 2021-10-07 PROCEDURE — 80048 BASIC METABOLIC PNL TOTAL CA: CPT | Performed by: NURSE PRACTITIONER

## 2021-10-07 PROCEDURE — P9604 ONE-WAY ALLOW PRORATED TRIP: HCPCS | Performed by: NURSE PRACTITIONER

## 2021-10-07 PROCEDURE — 99309 SBSQ NF CARE MODERATE MDM 30: CPT | Performed by: NURSE PRACTITIONER

## 2021-10-07 NOTE — PROGRESS NOTES
"[facility] GERIATRIC SERVICES    Chief Complaint   Patient presents with     RECHECK     HPI:  Farzad East is a 86 year old  (1935), who is being seen today for an episodic care visit at: [facility] ([facility]) [[id number]].     Background:    This is an 86-year-old male, with a past medical history significant for hypertension, anemia, rectal cancer, type 2 diabetes mellitus, lower extremity edema, benign prostatic hypertrophy and thyroid nodule, who was admitted to the [facility] 8/17/21 through 9/2/21 for progressive shortness of breath, increasing lower extremity edema and fatigue. Labs revealed Potassium 5.7, Creatinine 2.89, with peak at 5.77 on 8/26/21, and TSH 7.46. Nephrology was consulted and suspected acute kidney injury due to hypoperfusion in the setting of tightly controlled blood pressures versus drug induced lupus due secondary to Hydralazine. Hydralazine was discontinued on 8/25/21. Permissive blood pressures goal of systolic 140-150. Initiated on Furosemide and Nifedipine. Hemoglobin 6.8 on 8/19/21 and required 1 U of PRBCs. 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 Found to have \".. Superficial clot noted in the cephalic vein within both forearms\" on 8/26/21 ultrasound. Recommended to elevate arms. Blood sugars elevated. Glipizide discontinued due to RONY and hypoglycemia risk. Sitagliptin held. A TCU stay was recommended for ongoing physical rehabilitation.      While in the TCU, became lethargic with fatigue, decreased oral intake, loose stools, bradycardia and decline of progress in therapy so was sent back to the [facility] 9/16/21 through 9/23/21. Labs revealed Lipase of 983. An abdominal and pelvic CT revealed \"Herniation of a short segment of small bowel into the left inguinal canal with mild air-filled and fluid-filled loops in the inguinal " "canal with minimal adjacent fluid and edema. The proximal small bowel up to the level of the herniation is moderately fluid-filled although not overtly dilated. Finding may be indicative of developing obstruction related to a small bowel containing left inguinal hernia... Acute uncomplicated diverticulitis\". 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 General Surgery was consulted. Patient did not want hernia repaired. Medical management for uncomplicated diverticulitis was recommended. DIarrhea resolved. IV fluids were administered due to poor oral intake leading up to hospitalization and renal function improved. PTA Furosemide was also held. Carvedilol was briefly held for bradycardia. Discharged back to TCU when medically stable.    Today's concern is:     Hyperkalemia. Potassium 5.8 on 10/7/21. Previous Potassium 5.3 on 10/4/21.    Physical Deconditioning. Working on Physical and Occupational Therapy. According to documentation, CGA needed with transfers. Ambulating 45 feet with FWW and CGA. Moderate assistance needed with all dressing. Moderate assistance for toileting. CPT Score 4.7/5.6. SLUMS Score 24/30. Safety Questionnaire 13/19. Had a fall in the bathroom while self-transferring and is lacking to make progress.     Bradycardia. Upon review of heart rate since previous visit on 10/4/21, range 60-78.    Type 2 Diabetes Mellitus. Upon review of blood sugars since previous visit 10/4/21, range is as follows:    Breakfast: 131-165  Lunch: 137-287  Dinner: 153-222  Bedtime: 154-394, most < 300    Allergies, and PMH/PSH reviewed in EPIC today.  REVIEW OF SYSTEMS:  4 point ROS including Respiratory, CV, GI and , other than that noted in the HPI,  is negative    Objective:   /55   Pulse 66   Temp 97.7  F (36.5  C)   Resp 18   Wt 71.5 kg (157 lb 9.6 oz)   SpO2 97%   BMI 21.98 kg/m    GENERAL APPEARANCE:  Alert, in no distress  ENT:  Mouth and posterior oropharynx normal, moist mucous " "membranes  EYES:  EOM, conjunctivae, lids, pupils and irises normal  RESP:  Respiratory effort and palpation of chest normal, lungs clear to auscultation , no respiratory distress  CV:  Palpation and auscultation of heart done, regular rate and rhythm, no murmur, rub, or gallop  ABDOMEN:  normal bowel sounds, soft, nontender, no hepatosplenomegaly or other masses  M/S:   Active movement of bilateral upper and lower extremities. Trace edema in BLE.  SKIN:  CANDICE stockings of BLE.  NEURO:   Cranial nerves 2-12 are normal tested and grossly at patient's baseline  PSYCH:  affect and mood normal    Labs done in SNF are in [place] EPIC. Please refer to them using [employer]Care Everywhere.    Assessment/Plan:  Hyperkalemia. Secondary to RONY. Asymptomatic. Will order Kayexalate 15 g PO x 2. Repeat BMP on 10/11/21.     Recent Acute Diverticulitis and Diarrhea. With anorexia. Noted on abdominal and pelvic CT. Started on Ciprofloxacin and Flagyl with transition to Ceftriaxone and Flagyl to complete 10 day course on 9/25/21. Stools improving per patient report. Continue to monitor.     Acute on Chronic Kidney Disease Stage IV. Possibly due to hypoperfusion in the setting of tightly controlled blood pressures versus drug induced lupus secondary to Hydralazine. Seen by Nephrology on 9/29/21. Permissive hypertension with a goal of systolic readings between 140-150. 2 g Sodium diet with 2 L fluid restriction. Monitor urine output. Patient has stated he would not want dialysis if indicated.     Imaging Concerning for Bowel Obstruction and Inguinal Hernia. Patient declined surgical intervention for hernia. Symptoms not consistent with bowel obstruction. General surgery recommended medical management.      Pancreatic Cyst. An abdominal and pelvic CT on 9/16/21 revealed \"Low-attenuation indeterminant probable cystic lesion at the pancreatic head measuring 1.4 x 2 x 1.3 cm. Recommend nonemergent contrast-enhanced MRI of the pancreas for " "further evaluation\". Follow-up outpatient as indicated.     Bradycardia. Resolved. Likely secondary to Carvedilol. Carvedilol was changed to Labetalol during hospitalization. Most heart rates WNL. Monitor heart rate daily.     Hypertension. Hydralazine discontinued as noted above. Permissive systolic blood pressures 140-150 recommended as noted above. Started on Nifedipine during hospitalization in August with increase on 9/9/21. PTA Hydrochlorothiazide discontinued. Carvedilol changed to Labetalol due to bradycardia. Monitor blood pressure daily.     Acute on Chronic Anemia with History of Rectal Cancer. Received 1 U of PRBCs and Iron Sucrose during hospitalization in August. Labs consistent with iron deficiency and thought to be a component of Erythropoietin deficiency with history of chronic kidney disease. Had been having dark black stools and diarrhea over the past 3 months prior to hospitalization. Colonoscopy recommended outpatient. Baseline Hemoglobin ~ 8-9. Last Hemoglobin 8.5 on 9/30/21.     Type 2 Diabetes Mellitus. Last A1C 9.5 on 7/16/21. PTA Glipizide and Sitagliptin discontinued during August hospitalization. Discharged on Glargine and sliding scale insulin. Scheduled 2 U of Aspart ordered with meals. Seen by Endocrinology on 9/14/21. Glargine decreased to 8 U. Consider GLP-1 agonist once determination is made regarding thyroid nodules.     Lower Extremity Edema. Ultrasound negative on 8/18/21. Takes Furosemide.     Thyroid Nodule. Follow-up with Endocrinology on 12/14/21 as scheduled.      Subclinical Hypothyroidism. TSH 7.46 and Free T4 1.03 on 8/16/21. Seen by Endocrinology on 9/14/21 and started on low dose Levothyroxine with follow-up TSH in 8 weeks.     Benign Prostatic Hypertrophy. Continue Tamsulosin and Finasteride as ordered.     Physical Deconditioning. Secondary to recent hospitalization and co-morbidities. Physical and Occupational Therapy ordered.    Orders:  Kayexalate 15 g PO x 2   BMP " on 10/11/21    Electronically signed by: ESTEPHANIE Enciso CNP        [facility]

## 2021-10-07 NOTE — LETTER
"    10/7/2021        RE: Farzad East  [address]  [facility] [id number]         HEALTH GERIATRIC SERVICES    Chief Complaint   Patient presents with     RECHECK     HPI:  Farzad East is a 86 year old  (1935), who is being seen today for an episodic care visit at: [facility] ([facility]) [[id number]].     Background:    This is an 86-year-old male, with a past medical history significant for hypertension, anemia, rectal cancer, type 2 diabetes mellitus, lower extremity edema, benign prostatic hypertrophy and thyroid nodule, who was admitted to the [facility] 8/17/21 through 9/2/21 for progressive shortness of breath, increasing lower extremity edema and fatigue. Labs revealed Potassium 5.7, Creatinine 2.89, with peak at 5.77 on 8/26/21, and TSH 7.46. Nephrology was consulted and suspected acute kidney injury due to hypoperfusion in the setting of tightly controlled blood pressures versus drug induced lupus due secondary to Hydralazine. Hydralazine was discontinued on 8/25/21. Permissive blood pressures goal of systolic 140-150. Initiated on Furosemide and Nifedipine. Hemoglobin 6.8 on 8/19/21 and required 1 U of PRBCs. 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 Found to have \".. Superficial clot noted in the cephalic vein within both forearms\" on 8/26/21 ultrasound. Recommended to elevate arms. Blood sugars elevated. Glipizide discontinued due to RONY and hypoglycemia risk. Sitagliptin held. A TCU stay was recommended for ongoing physical rehabilitation.      While in the TCU, became lethargic with fatigue, decreased oral intake, loose stools, bradycardia and decline of progress in therapy so was sent back to the [facility] 9/16/21 through 9/23/21. Labs revealed Lipase of 983. An abdominal and pelvic CT revealed \"Herniation of a short segment of " "small bowel into the left inguinal canal with mild air-filled and fluid-filled loops in the inguinal canal with minimal adjacent fluid and edema. The proximal small bowel up to the level of the herniation is moderately fluid-filled although not overtly dilated. Finding may be indicative of developing obstruction related to a small bowel containing left inguinal hernia... Acute uncomplicated diverticulitis\". 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 General Surgery was consulted. Patient did not want hernia repaired. Medical management for uncomplicated diverticulitis was recommended. DIarrhea resolved. IV fluids were administered due to poor oral intake leading up to hospitalization and renal function improved. PTA Furosemide was also held. Carvedilol was briefly held for bradycardia. Discharged back to TCU when medically stable.    Today's concern is:     Hyperkalemia. Potassium 5.8 on 10/7/21. Previous Potassium 5.3 on 10/4/21.    Physical Deconditioning. Working on Physical and Occupational Therapy. According to documentation, CGA needed with transfers. Ambulating 45 feet with FWW and CGA. Moderate assistance needed with all dressing. Moderate assistance for toileting. CPT Score 4.7/5.6. SLUMS Score 24/30. Safety Questionnaire 13/19. Had a fall in the bathroom while self-transferring and is lacking to make progress.     Bradycardia. Upon review of heart rate since previous visit on 10/4/21, range 60-78.    Type 2 Diabetes Mellitus. Upon review of blood sugars since previous visit 10/4/21, range is as follows:    Breakfast: 131-165  Lunch: 137-287  Dinner: 153-222  Bedtime: 154-394, most < 300    Allergies, and PMH/PSH reviewed in [facility] today.  REVIEW OF SYSTEMS:  4 point ROS including Respiratory, CV, GI and , other than that noted in the HPI,  is negative    Objective:   /55   Pulse 66   Temp 97.7  F (36.5  C)   Resp 18   Wt 71.5 kg (157 lb 9.6 oz)   SpO2 97%   BMI 21.98 kg/m    GENERAL " "APPEARANCE:  Alert, in no distress  ENT:  Mouth and posterior oropharynx normal, moist mucous membranes  EYES:  EOM, conjunctivae, lids, pupils and irises normal  RESP:  Respiratory effort and palpation of chest normal, lungs clear to auscultation , no respiratory distress  CV:  Palpation and auscultation of heart done, regular rate and rhythm, no murmur, rub, or gallop  ABDOMEN:  normal bowel sounds, soft, nontender, no hepatosplenomegaly or other masses  M/S:   Active movement of bilateral upper and lower extremities. Trace edema in BLE.  SKIN:  CANDICE stockings of BLE.  NEURO:   Cranial nerves 2-12 are normal tested and grossly at patient's baseline  PSYCH:  affect and mood normal    Labs done in SNF are in [place] EPIC. Please refer to them using [employer] Everywhere.    Assessment/Plan:  Hyperkalemia. Secondary to RONY. Asymptomatic. Will order Kayexalate 15 g PO x 2. Repeat BMP on 10/11/21.     Recent Acute Diverticulitis and Diarrhea. With anorexia. Noted on abdominal and pelvic CT. Started on Ciprofloxacin and Flagyl with transition to Ceftriaxone and Flagyl to complete 10 day course on 9/25/21. Stools improving per patient report. Continue to monitor.     Acute on Chronic Kidney Disease Stage IV. Possibly due to hypoperfusion in the setting of tightly controlled blood pressures versus drug induced lupus secondary to Hydralazine. Seen by Nephrology on 9/29/21. Permissive hypertension with a goal of systolic readings between 140-150. 2 g Sodium diet with 2 L fluid restriction. Monitor urine output. Patient has stated he would not want dialysis if indicated.     Imaging Concerning for Bowel Obstruction and Inguinal Hernia. Patient declined surgical intervention for hernia. Symptoms not consistent with bowel obstruction. General surgery recommended medical management.      Pancreatic Cyst. An abdominal and pelvic CT on 9/16/21 revealed \"Low-attenuation indeterminant probable cystic lesion at the pancreatic head " "measuring 1.4 x 2 x 1.3 cm. Recommend nonemergent contrast-enhanced MRI of the pancreas for further evaluation\". Follow-up outpatient as indicated.     Bradycardia. Resolved. Likely secondary to Carvedilol. Carvedilol was changed to Labetalol during hospitalization. Most heart rates WNL. Monitor heart rate daily.     Hypertension. Hydralazine discontinued as noted above. Permissive systolic blood pressures 140-150 recommended as noted above. Started on Nifedipine during hospitalization in August with increase on 9/9/21. PTA Hydrochlorothiazide discontinued. Carvedilol changed to Labetalol due to bradycardia. Monitor blood pressure daily.     Acute on Chronic Anemia with History of Rectal Cancer. Received 1 U of PRBCs and Iron Sucrose during hospitalization in August. Labs consistent with iron deficiency and thought to be a component of Erythropoietin deficiency with history of chronic kidney disease. Had been having dark black stools and diarrhea over the past 3 months prior to hospitalization. Colonoscopy recommended outpatient. Baseline Hemoglobin ~ 8-9. Last Hemoglobin 8.5 on 9/30/21.     Type 2 Diabetes Mellitus. Last A1C 9.5 on 7/16/21. PTA Glipizide and Sitagliptin discontinued during August hospitalization. Discharged on Glargine and sliding scale insulin. Scheduled 2 U of Aspart ordered with meals. Seen by Endocrinology on 9/14/21. Glargine decreased to 8 U. Consider GLP-1 agonist once determination is made regarding thyroid nodules.     Lower Extremity Edema. Ultrasound negative on 8/18/21. Takes Furosemide.     Thyroid Nodule. Follow-up with Endocrinology on 12/14/21 as scheduled.      Subclinical Hypothyroidism. TSH 7.46 and Free T4 1.03 on 8/16/21. Seen by Endocrinology on 9/14/21 and started on low dose Levothyroxine with follow-up TSH in 8 weeks.     Benign Prostatic Hypertrophy. Continue Tamsulosin and Finasteride as ordered.     Physical Deconditioning. Secondary to recent hospitalization and " co-morbidities. Physical and Occupational Therapy ordered.    Orders:  Kayexalate 15 g PO x 2   BMP on 10/11/21    Electronically signed by: ESTEPHANIE Enciso CNP             Sincerely,        ESTEPHANIE Enciso CNP

## 2021-10-11 ENCOUNTER — LAB REQUISITION (OUTPATIENT)
Dept: LAB | Facility: CLINIC | Age: 86
End: 2021-10-11
Payer: MEDICARE

## 2021-10-11 ENCOUNTER — TRANSITIONAL CARE UNIT VISIT (OUTPATIENT)
Dept: GERIATRICS | Facility: CLINIC | Age: 86
End: 2021-10-11
Payer: MEDICARE

## 2021-10-11 VITALS
OXYGEN SATURATION: 99 % | BODY MASS INDEX: 21.51 KG/M2 | TEMPERATURE: 96.8 F | HEART RATE: 60 BPM | DIASTOLIC BLOOD PRESSURE: 66 MMHG | SYSTOLIC BLOOD PRESSURE: 137 MMHG | WEIGHT: 154.2 LBS | RESPIRATION RATE: 18 BRPM

## 2021-10-11 DIAGNOSIS — I10 ESSENTIAL (PRIMARY) HYPERTENSION: ICD-10-CM

## 2021-10-11 DIAGNOSIS — R53.81 PHYSICAL DECONDITIONING: ICD-10-CM

## 2021-10-11 DIAGNOSIS — B07.9 VIRAL WARTS, UNSPECIFIED TYPE: ICD-10-CM

## 2021-10-11 DIAGNOSIS — R25.1 TREMOR: Primary | ICD-10-CM

## 2021-10-11 DIAGNOSIS — H61.23 BILATERAL IMPACTED CERUMEN: ICD-10-CM

## 2021-10-11 PROCEDURE — 99309 SBSQ NF CARE MODERATE MDM 30: CPT | Performed by: NURSE PRACTITIONER

## 2021-10-11 NOTE — LETTER
"    10/11/2021        RE: Farzad East  [address]  [facility] [address]         HEALTH GERIATRIC SERVICES    Chief Complaint   Patient presents with     RECHECK     HPI:  Farzad East is a 86 year old  (1935), who is being seen today for an episodic care visit at: [facility] [[id number]].     Background:     This is an 86-year-old male, with a past medical history significant for hypertension, anemia, rectal cancer, type 2 diabetes mellitus, lower extremity edema, benign prostatic hypertrophy and thyroid nodule, who was admitted to the [facility] 8/17/21 through 9/2/21 for progressive shortness of breath, increasing lower extremity edema and fatigue. Labs revealed Potassium 5.7, Creatinine 2.89, with peak at 5.77 on 8/26/21, and TSH 7.46. Nephrology was consulted and suspected acute kidney injury due to hypoperfusion in the setting of tightly controlled blood pressures versus drug induced lupus due secondary to Hydralazine. Hydralazine was discontinued on 8/25/21. Permissive blood pressures goal of systolic 140-150. Initiated on Furosemide and Nifedipine. Hemoglobin 6.8 on 8/19/21 and required 1 U of PRBCs. 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 Found to have \".. Superficial clot noted in the cephalic vein within both forearms\" on 8/26/21 ultrasound. Recommended to elevate arms. Blood sugars elevated. Glipizide discontinued due to RONY and hypoglycemia risk. Sitagliptin held. A TCU stay was recommended for ongoing physical rehabilitation.      While in the TCU, became lethargic with fatigue, decreased oral intake, loose stools, bradycardia and decline of progress in therapy so was sent back to the [facility] 9/16/21 through 9/23/21. Labs revealed Lipase of 983. An abdominal and pelvic CT revealed \"Herniation of a short segment of " "small bowel into the left inguinal canal with mild air-filled and fluid-filled loops in the inguinal canal with minimal adjacent fluid and edema. The proximal small bowel up to the level of the herniation is moderately fluid-filled although not overtly dilated. Finding may be indicative of developing obstruction related to a small bowel containing left inguinal hernia... Acute uncomplicated diverticulitis\". 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 General Surgery was consulted. Patient did not want hernia repaired. Medical management for uncomplicated diverticulitis was recommended. DIarrhea resolved. IV fluids were administered due to poor oral intake leading up to hospitalization and renal function improved. PTA Furosemide was also held. Carvedilol was briefly held for bradycardia. Discharged back to TCU when medically stable.    Today's concern is:     Tremor. Today, patient states for the past week, his hands and arms have been shaking when he picks something up or does something that requires fine dexterity. Notices when he grabs things. Wonders if a new medication is causing this. Shaking occurs in both hands, right more than left. No increased caffeine intake or anxiety.     Cerumen Impaction. Requests his ears be flushed as he normally has this done at the clinic. Thinks he has wax in his ears. Does not usually use ear drops before ears are flushed in the clinic.    Wart on Left Foot. Has a wart on the left foot. Normally has a Podiatrist trim it down routinely. Has not been able to see his Podiatrist since he's been in the TCU. Is bothering him more when he walks.     Physical Deconditioning. Working with Physical and Occupational Therapy. States he hasn't heard anything yet about going home. Requires CGA with transfers. Ambulating 45 feet with FWW and CGA. Maximum assistance needed with all dressing. Moderate assistance for toileting. CPT Score 4.7/5.6. SLUMS Score 24/30. Safety Questionnaire " 13/19. TUG = 1 min 8 sec. Therapy to be completed 10/11/21.     Allergies, and PMH/PSH reviewed in EPIC today.  REVIEW OF SYSTEMS:  4 point ROS including Respiratory, CV, GI and , other than that noted in the HPI,  is negative    Objective:   /66   Pulse 60   Temp 96.8  F (36  C)   Resp 18   Wt 69.9 kg (154 lb 3.2 oz)   SpO2 99%   BMI 21.51 kg/m    GENERAL APPEARANCE:  Alert, in no distress  ENT:  Mouth and posterior oropharynx normal, moist mucous membranes  EYES:  EOM, conjunctivae, lids, pupils and irises normal  RESP:  Respiratory effort and palpation of chest normal, lungs clear to auscultation , no respiratory distress  CV:  Palpation and auscultation of heart done, regular rate and rhythm, no murmur, rub, or gallop  ABDOMEN:  normal bowel sounds, soft, nontender, no hepatosplenomegaly or other masses  M/S:   Slight tremor with flexion of bilateral upper extremities.  SKIN:  CANDICE stockings of BLE. Dressings in place on RLE.  NEURO:   Cranial nerves 2-12 are normal tested and grossly at patient's baseline  PSYCH:  affect and mood normal    Labs done in SNF are in [facility]. Please refer to them using [employer] Everywhere.    Assessment/Plan:  Tremor. Started 1 week ago per patient report. Noted to have a mild tremor in 2008 per review of Epic records. Question if related to RONY. Was previously on Carvedilol and is now on Labetolol due to bradycardia. Jitteriness and shakiness noted as side effect of Nifedipine IR as well as nervousness. TSH elevated at 7.46 on 8/16/21. Started on Levothyroxine on 9/14/21. Repeat TSH 3.55 on 9/23/21. Also has thyroid nodule. Will wait for BMP results due to be drawn. Further plans pending results.    Cerumen Impaction. Requested nursing staff flush patient's ears per his request.    Wart on Left Foot. Requested patient be placed on Podiatry list when he transitions to long term care.    Recent Hyperkalemia. Secondary to RONY. Asymptomatic. Ordered Kayexalate 15 g  "PO x 2 on 10/7/21. Repeat BMP ordered for 10/11/21, but was not performed. Will reschedule for 10/12/21.      Recent Acute Diverticulitis and Diarrhea. With anorexia. Noted on abdominal and pelvic CT on 9/16/21. Started on Ciprofloxacin and Flagyl with transition to Ceftriaxone and Flagyl to complete 10 day course on 9/25/21. Stools improving per patient report. Continue to monitor.     Acute on Chronic Kidney Disease Stage IV. Possibly due to hypoperfusion in the setting of tightly controlled blood pressures versus drug induced lupus secondary to Hydralazine. Seen by Nephrology on 9/29/21. Permissive hypertension with a goal of systolic readings between 140-150. 2 g Sodium diet with 2 L fluid restriction. Monitor urine output. Patient has stated he would not want dialysis if indicated.     Imaging Concerning for Bowel Obstruction and Inguinal Hernia. Patient declined surgical intervention for hernia. Symptoms not consistent with bowel obstruction. General surgery recommended medical management.      Pancreatic Cyst. An abdominal and pelvic CT on 9/16/21 revealed \"Low-attenuation indeterminant probable cystic lesion at the pancreatic head measuring 1.4 x 2 x 1.3 cm. Recommend nonemergent contrast-enhanced MRI of the pancreas for further evaluation\". Follow-up outpatient as indicated.     Bradycardia. Resolved. Likely secondary to Carvedilol. Carvedilol was changed to Labetalol during hospitalization. Most heart rates WNL. Monitor heart rate daily.     Hypertension. Hydralazine discontinued as noted above. Permissive systolic blood pressures 140-150 recommended as noted above. Started on Nifedipine during hospitalization in August with increase on 9/9/21. PTA Hydrochlorothiazide discontinued. Carvedilol changed to Labetalol due to bradycardia. Monitor blood pressure daily.     Acute on Chronic Anemia with History of Rectal Cancer. Received 1 U of PRBCs and Iron Sucrose during hospitalization in August. Labs " consistent with iron deficiency and thought to be a component of Erythropoietin deficiency with history of chronic kidney disease. Had been having dark black stools and diarrhea over the past 3 months prior to hospitalization. Colonoscopy recommended outpatient. Baseline Hemoglobin ~ 8-9. Last Hemoglobin 8.5 on 9/30/21.     Type 2 Diabetes Mellitus. Last A1C 9.5 on 7/16/21. PTA Glipizide and Sitagliptin discontinued during August hospitalization. Discharged on Glargine and sliding scale insulin. Scheduled 2 U of Aspart ordered with meals. Seen by Endocrinology on 9/14/21. Glargine decreased to 8 U. Consider GLP-1 agonist once determination is made regarding thyroid nodules.     Lower Extremity Edema. Ultrasound negative on 8/18/21. Takes Furosemide.     Thyroid Nodule. Follow-up with Endocrinology on 12/14/21 as scheduled.      Subclinical Hypothyroidism. TSH 7.46 and Free T4 1.03 on 8/16/21. Seen by Endocrinology on 9/14/21 and started on low dose Levothyroxine with follow-up TSH in 8 weeks. Repeat TSH on 3.55 on 9/23/21.     Benign Prostatic Hypertrophy. Continue Tamsulosin and Finasteride as ordered.     Physical Deconditioning. Secondary to recent hospitalization and co-morbidities. Physical and Occupational Therapy to be completed today. Is then planning to transition to long term care as PTA [facility] can no longer meet patient's level of care.    Orders:  None    Electronically signed by: ESTEPHANIE Enciso CNP             Sincerely,        ESTEPHANIE Enciso CNP

## 2021-10-11 NOTE — PROGRESS NOTES
"[facility] GERIATRIC SERVICES    Chief Complaint   Patient presents with     RECHECK     HPI:  Farzad East is a 86 year old  (1935), who is being seen today for an episodic care visit at: [facility] ([facility] [[id number]].     Background:     This is an 86-year-old male, with a past medical history significant for hypertension, anemia, rectal cancer, type 2 diabetes mellitus, lower extremity edema, benign prostatic hypertrophy and thyroid nodule, who was admitted to the [facility] 8/17/21 through 9/2/21 for progressive shortness of breath, increasing lower extremity edema and fatigue. Labs revealed Potassium 5.7, Creatinine 2.89, with peak at 5.77 on 8/26/21, and TSH 7.46. Nephrology was consulted and suspected acute kidney injury due to hypoperfusion in the setting of tightly controlled blood pressures versus drug induced lupus due secondary to Hydralazine. Hydralazine was discontinued on 8/25/21. Permissive blood pressures goal of systolic 140-150. Initiated on Furosemide and Nifedipine. Hemoglobin 6.8 on 8/19/21 and required 1 U of PRBCs. 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 Found to have \".. Superficial clot noted in the cephalic vein within both forearms\" on 8/26/21 ultrasound. Recommended to elevate arms. Blood sugars elevated. Glipizide discontinued due to RONY and hypoglycemia risk. Sitagliptin held. A TCU stay was recommended for ongoing physical rehabilitation.      While in the TCU, became lethargic with fatigue, decreased oral intake, loose stools, bradycardia and decline of progress in therapy so was sent back to the [facility] 9/16/21 through 9/23/21. Labs revealed Lipase of 983. An abdominal and pelvic CT revealed \"Herniation of a short segment of small bowel into the left inguinal canal with mild air-filled and fluid-filled loops in the inguinal " "canal with minimal adjacent fluid and edema. The proximal small bowel up to the level of the herniation is moderately fluid-filled although not overtly dilated. Finding may be indicative of developing obstruction related to a small bowel containing left inguinal hernia... Acute uncomplicated diverticulitis\". 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 General Surgery was consulted. Patient did not want hernia repaired. Medical management for uncomplicated diverticulitis was recommended. DIarrhea resolved. IV fluids were administered due to poor oral intake leading up to hospitalization and renal function improved. PTA Furosemide was also held. Carvedilol was briefly held for bradycardia. Discharged back to TCU when medically stable.    Today's concern is:     Tremor. Today, patient states for the past week, his hands and arms have been shaking when he picks something up or does something that requires fine dexterity. Notices when he grabs things. Wonders if a new medication is causing this. Shaking occurs in both hands, right more than left. No increased caffeine intake or anxiety.     Cerumen Impaction. Requests his ears be flushed as he normally has this done at the clinic. Thinks he has wax in his ears. Does not usually use ear drops before ears are flushed in the clinic.    Wart on Left Foot. Has a wart on the left foot. Normally has a Podiatrist trim it down routinely. Has not been able to see his Podiatrist since he's been in the TCU. Is bothering him more when he walks.     Physical Deconditioning. Working with Physical and Occupational Therapy. States he hasn't heard anything yet about going home. Requires CGA with transfers. Ambulating 45 feet with FWW and CGA. Maximum assistance needed with all dressing. Moderate assistance for toileting. CPT Score 4.7/5.6. SLUMS Score 24/30. Safety Questionnaire 13/19. TUG = 1 min 8 sec. Therapy to be completed 10/11/21.     Allergies, and PMH/PSH reviewed in " EPIC today.  REVIEW OF SYSTEMS:  4 point ROS including Respiratory, CV, GI and , other than that noted in the HPI,  is negative    Objective:   /66   Pulse 60   Temp 96.8  F (36  C)   Resp 18   Wt 69.9 kg (154 lb 3.2 oz)   SpO2 99%   BMI 21.51 kg/m    GENERAL APPEARANCE:  Alert, in no distress  ENT:  Mouth and posterior oropharynx normal, moist mucous membranes  EYES:  EOM, conjunctivae, lids, pupils and irises normal  RESP:  Respiratory effort and palpation of chest normal, lungs clear to auscultation , no respiratory distress  CV:  Palpation and auscultation of heart done, regular rate and rhythm, no murmur, rub, or gallop  ABDOMEN:  normal bowel sounds, soft, nontender, no hepatosplenomegaly or other masses  M/S:   Slight tremor with flexion of bilateral upper extremities.  SKIN:  CANDICE stockings of BLE. Dressings in place on RLE.  NEURO:   Cranial nerves 2-12 are normal tested and grossly at patient's baseline  PSYCH:  affect and mood normal    Labs done in SNF are in [facility]. Please refer to them using [employer] Everywhere.    Assessment/Plan:  Tremor. Started 1 week ago per patient report. Noted to have a mild tremor in 2008 per review of Epic records. Question if related to RONY. Was previously on Carvedilol and is now on Labetolol due to bradycardia. Jitteriness and shakiness noted as side effect of Nifedipine IR as well as nervousness. TSH elevated at 7.46 on 8/16/21. Started on Levothyroxine on 9/14/21. Repeat TSH 3.55 on 9/23/21. Also has thyroid nodule. Will wait for BMP results due to be drawn. Further plans pending results.    Cerumen Impaction. Requested nursing staff flush patient's ears per his request.    Wart on Left Foot. Requested patient be placed on Podiatry list when he transitions to long term care.    Recent Hyperkalemia. Secondary to RONY. Asymptomatic. Ordered Kayexalate 15 g PO x 2 on 10/7/21. Repeat BMP ordered for 10/11/21, but was not performed. Will reschedule for  "10/12/21.      Recent Acute Diverticulitis and Diarrhea. With anorexia. Noted on abdominal and pelvic CT on 9/16/21. Started on Ciprofloxacin and Flagyl with transition to Ceftriaxone and Flagyl to complete 10 day course on 9/25/21. Stools improving per patient report. Continue to monitor.     Acute on Chronic Kidney Disease Stage IV. Possibly due to hypoperfusion in the setting of tightly controlled blood pressures versus drug induced lupus secondary to Hydralazine. Seen by Nephrology on 9/29/21. Permissive hypertension with a goal of systolic readings between 140-150. 2 g Sodium diet with 2 L fluid restriction. Monitor urine output. Patient has stated he would not want dialysis if indicated.     Imaging Concerning for Bowel Obstruction and Inguinal Hernia. Patient declined surgical intervention for hernia. Symptoms not consistent with bowel obstruction. General surgery recommended medical management.      Pancreatic Cyst. An abdominal and pelvic CT on 9/16/21 revealed \"Low-attenuation indeterminant probable cystic lesion at the pancreatic head measuring 1.4 x 2 x 1.3 cm. Recommend nonemergent contrast-enhanced MRI of the pancreas for further evaluation\". Follow-up outpatient as indicated.     Bradycardia. Resolved. Likely secondary to Carvedilol. Carvedilol was changed to Labetalol during hospitalization. Most heart rates WNL. Monitor heart rate daily.     Hypertension. Hydralazine discontinued as noted above. Permissive systolic blood pressures 140-150 recommended as noted above. Started on Nifedipine during hospitalization in August with increase on 9/9/21. PTA Hydrochlorothiazide discontinued. Carvedilol changed to Labetalol due to bradycardia. Monitor blood pressure daily.     Acute on Chronic Anemia with History of Rectal Cancer. Received 1 U of PRBCs and Iron Sucrose during hospitalization in August. Labs consistent with iron deficiency and thought to be a component of Erythropoietin deficiency with history " of chronic kidney disease. Had been having dark black stools and diarrhea over the past 3 months prior to hospitalization. Colonoscopy recommended outpatient. Baseline Hemoglobin ~ 8-9. Last Hemoglobin 8.5 on 9/30/21.     Type 2 Diabetes Mellitus. Last A1C 9.5 on 7/16/21. PTA Glipizide and Sitagliptin discontinued during August hospitalization. Discharged on Glargine and sliding scale insulin. Scheduled 2 U of Aspart ordered with meals. Seen by Endocrinology on 9/14/21. Glargine decreased to 8 U. Consider GLP-1 agonist once determination is made regarding thyroid nodules.     Lower Extremity Edema. Ultrasound negative on 8/18/21. Takes Furosemide.     Thyroid Nodule. Follow-up with Endocrinology on 12/14/21 as scheduled.      Subclinical Hypothyroidism. TSH 7.46 and Free T4 1.03 on 8/16/21. Seen by Endocrinology on 9/14/21 and started on low dose Levothyroxine with follow-up TSH in 8 weeks. Repeat TSH on 3.55 on 9/23/21.     Benign Prostatic Hypertrophy. Continue Tamsulosin and Finasteride as ordered.     Physical Deconditioning. Secondary to recent hospitalization and co-morbidities. Physical and Occupational Therapy to be completed today. Is then planning to transition to long term care as PTA [facility] can no longer meet patient's level of care.    Orders:  None    Electronically signed by: ESTEPHANIE Enciso CNP

## 2021-10-12 LAB
ANION GAP SERPL CALCULATED.3IONS-SCNC: 10 MMOL/L (ref 5–18)
BUN SERPL-MCNC: 109 MG/DL (ref 8–28)
CALCIUM SERPL-MCNC: 8.7 MG/DL (ref 8.5–10.5)
CHLORIDE BLD-SCNC: 107 MMOL/L (ref 98–107)
CO2 SERPL-SCNC: 18 MMOL/L (ref 22–31)
CREAT SERPL-MCNC: 5.58 MG/DL (ref 0.7–1.3)
GFR SERPL CREATININE-BSD FRML MDRD: 9 ML/MIN/1.73M2
GLUCOSE BLD-MCNC: 139 MG/DL (ref 70–125)
POTASSIUM BLD-SCNC: 5.2 MMOL/L (ref 3.5–5)
SODIUM SERPL-SCNC: 135 MMOL/L (ref 136–145)

## 2021-10-12 PROCEDURE — 80048 BASIC METABOLIC PNL TOTAL CA: CPT | Performed by: INTERNAL MEDICINE

## 2021-10-12 PROCEDURE — P9603 ONE-WAY ALLOW PRORATED MILES: HCPCS | Performed by: INTERNAL MEDICINE

## 2021-10-12 PROCEDURE — 36415 COLL VENOUS BLD VENIPUNCTURE: CPT | Performed by: INTERNAL MEDICINE

## 2021-10-13 ENCOUNTER — TELEPHONE (OUTPATIENT)
Dept: FAMILY MEDICINE | Facility: CLINIC | Age: 86
End: 2021-10-13

## 2021-10-13 NOTE — TELEPHONE ENCOUNTER
Addendum 10/13/2021 at 1054: I called and left Jahaiar Cat [phone] a message regarding orders needed for Alessio.   ESTEPHANIE Green, CNP    [facility] Call Center    Phone Message    May a detailed message be left on voicemail: yes     Reason for Call: Other: Patient's son called regarding patient going into long term care and they were looking to get a hospice order for him as well. Please call back and advise.     Action Taken: Message routed to:  [facility] ([facility]): NP    Travel Screening: Not Applicable

## 2021-10-14 VITALS
RESPIRATION RATE: 18 BRPM | BODY MASS INDEX: 21.53 KG/M2 | TEMPERATURE: 97.8 F | SYSTOLIC BLOOD PRESSURE: 149 MMHG | OXYGEN SATURATION: 100 % | WEIGHT: 154.4 LBS | HEART RATE: 67 BPM | DIASTOLIC BLOOD PRESSURE: 76 MMHG

## 2021-10-15 ENCOUNTER — VIRTUAL VISIT (OUTPATIENT)
Dept: GERIATRICS | Facility: CLINIC | Age: 86
End: 2021-10-15
Payer: MEDICARE

## 2021-10-15 DIAGNOSIS — Z53.9 ERRONEOUS ENCOUNTER--DISREGARD: Primary | ICD-10-CM

## 2021-10-16 ENCOUNTER — MYC MEDICAL ADVICE (OUTPATIENT)
Dept: GERIATRICS | Facility: CLINIC | Age: 86
End: 2021-10-16

## 2021-10-17 DIAGNOSIS — I10 BENIGN ESSENTIAL HYPERTENSION: Primary | ICD-10-CM

## 2021-10-18 ENCOUNTER — TRANSITIONAL CARE UNIT VISIT (OUTPATIENT)
Dept: GERIATRICS | Facility: CLINIC | Age: 86
End: 2021-10-18
Payer: MEDICARE

## 2021-10-18 VITALS
HEIGHT: 71 IN | BODY MASS INDEX: 21.61 KG/M2 | HEART RATE: 60 BPM | OXYGEN SATURATION: 96 % | TEMPERATURE: 97.7 F | DIASTOLIC BLOOD PRESSURE: 67 MMHG | WEIGHT: 154.4 LBS | RESPIRATION RATE: 18 BRPM | SYSTOLIC BLOOD PRESSURE: 156 MMHG

## 2021-10-18 DIAGNOSIS — L98.9 LESION OF SKIN OF SCALP: ICD-10-CM

## 2021-10-18 DIAGNOSIS — E11.65 TYPE 2 DIABETES MELLITUS WITH HYPERGLYCEMIA (H): ICD-10-CM

## 2021-10-18 DIAGNOSIS — H91.93 BILATERAL HEARING LOSS, UNSPECIFIED HEARING LOSS TYPE: Primary | ICD-10-CM

## 2021-10-18 DIAGNOSIS — B07.0 PLANTAR WARTS: ICD-10-CM

## 2021-10-18 DIAGNOSIS — R25.1 TREMOR: ICD-10-CM

## 2021-10-18 DIAGNOSIS — E03.8 SUBCLINICAL HYPOTHYROIDISM: ICD-10-CM

## 2021-10-18 DIAGNOSIS — N18.5 CKD (CHRONIC KIDNEY DISEASE) STAGE 5, GFR LESS THAN 15 ML/MIN (H): ICD-10-CM

## 2021-10-18 DIAGNOSIS — B35.1 ONYCHOMYCOSIS: ICD-10-CM

## 2021-10-18 DIAGNOSIS — E04.1 THYROID NODULE: ICD-10-CM

## 2021-10-18 PROCEDURE — 99310 SBSQ NF CARE HIGH MDM 45: CPT | Performed by: INTERNAL MEDICINE

## 2021-10-18 ASSESSMENT — MIFFLIN-ST. JEOR: SCORE: 1407.48

## 2021-10-18 NOTE — Clinical Note
ZENAIDAI, I'm checking with Endocrinology and will include you on note re: possibly stopping Levothyroxine

## 2021-10-18 NOTE — LETTER
10/18/2021        RE: Farzad East  [address]  [address]  [facility] [address]        [place] [facility]  PHYSICIAN NOTE    Chief Complaint   Patient presents with     Nursing Home Acute; number of questions - see below for details       HPI:    Farzad East is a 86 year old  (1935), who is being seen today for an episodic TCU visit at [facility] . Alessio requested to see me on site today re: questions. First re: wishing ear lavage could be done d/t frequent impactions like he has done as outpatient d/t progressive hearing loss. Also wanting to see podiatry re: thick nails and needing his plantar wart filed down. Is glad his R shin wound is healing (covered). Wonders about having a topical therapy Rx for plaques on his scalp that he took at home Rx by Dr. Odom his dermatologist and welcomed me to call pharmacy for name of Rx. Also has tremor noted the past couple weeks only with action (ex: eating). Family noticed it as well.  He wonders if its r/t any of his medications. Of note he is done with formal on site therapy and potentially will be transitioning to LTC.    I later spoke with his son Andi who is in agreement with me checking with endocrinology about the Levothyroxine. He mentions though the thyroid labs were only slightly off, the medication was started d/t Alessio c/o feeling cold and tired and its uncertain if those symptoms have really changed since the addition of the Levothyroxine. Andi does recognize some of the fatigue is r/t chronic conditions like his CKD and I agree.     ALLERGIES: Hydralazine and Sulfa drugs    Past Medical History:   Diagnosis Date     Atrial flutter (H)      Choroidal nevus of left eye      CKD (chronic kidney disease) stage 3, GFR 30-59 ml/min (H)      Diabetes mellitus (H)      Diabetic peripheral neuropathy (H)      Hypertension      Melanoma of skin (H)     R shoulder s/p excision 4/2021     Microalbuminuria       Overweight(278.02)      Rectal-type adenocarcinoma (H)      Retinopathies, diabetic      Vitreous detachment of both eyes       CODE STATUS: DNR/I    MEDICATIONS: Reviewed and updated in Epic according to facility MAR  Current Outpatient Medications   Medication Sig Dispense Refill     ACE/ARB/ARNI NOT PRESCRIBED (INTENTIONAL) Please choose reason not prescribed from choices below.       aspirin (ASA) 81 MG EC tablet Take 1 tablet by mouth daily        Cholecalciferol (VITAMIN D-3 PO) Take 1,000 Units by mouth daily        finasteride (PROSCAR) 5 MG tablet Take 1 tablet by mouth daily  3     furosemide (LASIX) 40 MG tablet Take 1.5 tablets (60 mg) by mouth daily       insulin aspart (NOVOLOG PEN) 100 UNIT/ML pen Inject 2 Units Subcutaneous 3 times daily (with meals) 15 mL 0     insulin aspart (NOVOLOG PEN) 100 UNIT/ML pen Inject 1-7 Units Subcutaneous 3 times daily (before meals) Correction Scale - MEDIUM INSULIN RESISTANCE DOSING     Do Not give Correction Insulin if Pre-Meal BG less than 140.   For Pre-Meal  - 189 give 1 unit.   For Pre-Meal  - 239 give 2 units.   For Pre-Meal  - 289 give 3 units.   For Pre-Meal  - 339 give 4 units.   For Pre-Meal - 399 give 5 units.   For Pre-Meal -449 give 6 units  For Pre-Meal BG greater than or equal to 450 give 7 units.   To be given with prandial insulin, and based on pre-meal blood glucose.    Notify provider if glucose greater than or equal to 350 mg/dL after administration of correction dose. If given at mealtime, administer within 30 minutes of start of meal 20 mL 0     insulin glargine (LANTUS SOLOSTAR) 100 UNIT/ML pen Inject 8 Units Subcutaneous every morning 15 mL 0     labetalol (NORMODYNE) 100 MG tablet Take 100 mg by mouth 2 times daily HOLD if HR < 50 or SBP < 130       multivitamin w/minerals (MULTI-VITAMIN) tablet Take 1 tablet by mouth daily       NIFEdipine ER OSMOTIC (NIFEDICAL XL) 60 MG 24 hr tablet Take 60 mg by mouth  "every evening        sennosides (SENOKOT) 8.6 MG tablet Take 2 tablets by mouth nightly as needed for constipation       SYNTHROID 25 MCG tablet Take 1 tablet (25 mcg) by mouth daily Check TSH in 8 weeks. 90 tablet 0     tamsulosin (FLOMAX) 0.4 MG capsule Take 1 capsule (0.4 mg) by mouth daily Advise clinic if causes lightheadedness. 90 capsule 3       ROS:  Detailed as above    Exam:  BP (!) 156/67   Pulse 60   Temp 97.7  F (36.5  C)   Resp 18   Ht 1.803 m (5' 11\")   Wt 70 kg (154 lb 6.4 oz)   SpO2 96%   BMI 21.53 kg/m    [place] utilizing pocket talker for support  Sitting up in wheelchair  Breathing non-labored  No significant edema, compression hose in place  Palpated large callus/wart on base of left foot (he didn't want me to remove stockings)  Scalp has thick plaques (?SK)    Lab/Diagnostic Data:    TSH 7.46 down to 3.55 from Aug to Sept noted in Epic  Normal Free T4 consistently noted  POC glucoses mostly 100s with some higher outliers  10/12/21 BMP noting stability for him with Na 135, K 5.2, , Cr 5.58  9/30/21 Hgb 8.5    ASSESSMENT/PLAN:  (H91.93) Bilateral hearing loss, unspecified hearing loss type  (primary encounter diagnosis)  Discussed with nurse manager that staff haven't yet been able to do the standing house orders for Debrox followed by lavage as previously ordered by my colleague last week but they will be sure to start this tonight  Alessio was relieved to hear this    (B35.1) Onychomycosis  (B07.0) Plantar warts  Verified with nurse manager he is on the list for podiatry follow up on site and let Alessio know this as well    (R25.1) Tremor  Could be multifactorial d/t meds/CKD/age/neurologic d/o  Mild action tremor described by Alessio that he perceives being just the past week or so  See below will discuss with endocrinology about stopping Levothyroxine to see if that helps  If not, then continue to evaluate and address    (E04.1) Thyroid nodule  (E03.8) Subclinical hypothyroidism  Newly " started on Levothyroxine last month (9/14/21) by endocrinology for subclinical hypothyroidism (+symptoms per notes)  TSH already down from 7.46 to 3.55 by 9/23/21 which would not even be full effect of medication; no Free T4 checked at that time  Wonder if he is on the hyperthyroid side now and that is contributing to his sensation of new tremor (see above)  Will check with endocrinology about stopping Levothyroxine vs checking labs first  He does have f/u on thyroid nodules in December    (N18.5) CKD (chronic kidney disease) stage 5, GFR less than 15 ml/min (H)  Continues to show no significant improvement in renal function unfortunately with mild stable electrolyte abnormalities  Remains on Lasix; periodically needs Kayexallate for hyperkalemia  Mild uremia may contribute to tremor   Hospice eval possibly being pursued by family and he is transitioning to LTC unit at [facility] in the next week or so  He has voiced not wanting to pursue hemodialysis    (L98.9) Lesion of skin of scalp  Called pharmacy and confirmed his dermatologist Dr. Odom had Rx Augmented Betamethasone 0.05% cream for his scalp lesions BID until healed      Total time spent with patient visit was >45 minutes, including patient visit as well as discussing above care needs with nurse manager (3:30 pm - 4pm = 30 minutes) and review of past records + reaching out to endocrinology team (>5 minutes) as well as discussing care with son Andi (10 minutes - 4:35 pm - 4:45 pm). Greater than 50% of total time spent with counseling and coordinating care.    Electronically signed by:  Kusum Irizarry DO        Sincerely,        Kusum Irizarry DO

## 2021-10-19 RX ORDER — LABETALOL 100 MG/1
100 TABLET, FILM COATED ORAL 2 TIMES DAILY
COMMUNITY

## 2021-10-19 RX ORDER — BETAMETHASONE DIPROPIONATE 0.5 MG/G
CREAM TOPICAL 2 TIMES DAILY
COMMUNITY
Start: 2021-10-19 | End: 2021-11-11

## 2021-10-19 NOTE — TELEPHONE ENCOUNTER
sitagliptin (JANUVIA) 50 MG tablet      Last Written Prescription Date:  10/20/2020  Last Fill Quantity: 90,   # refills: 1  Last Office Visit : 9/14/2021  Future Office visit:  12/14/2021    Routing refill request to provider for review/approval because:  Drug not active on patient's medication list  - last ordered by Endo 10/20/2020  - historical entry 8/16/2021 by NP clinic

## 2021-10-19 NOTE — PROGRESS NOTES
[place] [facility]  PHYSICIAN NOTE    Chief Complaint   Patient presents with     Nursing Home Acute; number of questions - see below for details       HPI:    Farzad East is a 86 year old  (1935), who is being seen today for an episodic TCU visit at [facility] . Alessio requested to see me on site today re: questions. First re: wishing ear lavage could be done d/t frequent impactions like he has done as outpatient d/t progressive hearing loss. Also wanting to see podiatry re: thick nails and needing his plantar wart filed down. Is glad his R shin wound is healing (covered). Wonders about having a topical therapy Rx for plaques on his scalp that he took at home Rx by Dr. Odom his dermatologist and welcomed me to call pharmacy for name of Rx. Also has tremor noted the past couple weeks only with action (ex: eating). Family noticed it as well.  He wonders if its r/t any of his medications. Of note he is done with formal on site therapy and potentially will be transitioning to LTC.    I later spoke with his son Andi who is in agreement with me checking with endocrinology about the Levothyroxine. He mentions though the thyroid labs were only slightly off, the medication was started d/t Alessio c/o feeling cold and tired and its uncertain if those symptoms have really changed since the addition of the Levothyroxine. Andi does recognize some of the fatigue is r/t chronic conditions like his CKD and I agree.     ALLERGIES: Hydralazine and Sulfa drugs    Past Medical History:   Diagnosis Date     Atrial flutter (H)      Choroidal nevus of left eye      CKD (chronic kidney disease) stage 3, GFR 30-59 ml/min (H)      Diabetes mellitus (H)      Diabetic peripheral neuropathy (H)      Hypertension      Melanoma of skin (H)     R shoulder s/p excision 4/2021     Microalbuminuria      Overweight(278.02)      Rectal-type adenocarcinoma (H)      Retinopathies, diabetic      Vitreous detachment of  both eyes       CODE STATUS: DNR/I    MEDICATIONS: Reviewed and updated in Epic according to facility MAR  Current Outpatient Medications   Medication Sig Dispense Refill     ACE/ARB/ARNI NOT PRESCRIBED (INTENTIONAL) Please choose reason not prescribed from choices below.       aspirin (ASA) 81 MG EC tablet Take 1 tablet by mouth daily        Cholecalciferol (VITAMIN D-3 PO) Take 1,000 Units by mouth daily        finasteride (PROSCAR) 5 MG tablet Take 1 tablet by mouth daily  3     furosemide (LASIX) 40 MG tablet Take 1.5 tablets (60 mg) by mouth daily       insulin aspart (NOVOLOG PEN) 100 UNIT/ML pen Inject 2 Units Subcutaneous 3 times daily (with meals) 15 mL 0     insulin aspart (NOVOLOG PEN) 100 UNIT/ML pen Inject 1-7 Units Subcutaneous 3 times daily (before meals) Correction Scale - MEDIUM INSULIN RESISTANCE DOSING     Do Not give Correction Insulin if Pre-Meal BG less than 140.   For Pre-Meal  - 189 give 1 unit.   For Pre-Meal  - 239 give 2 units.   For Pre-Meal  - 289 give 3 units.   For Pre-Meal  - 339 give 4 units.   For Pre-Meal - 399 give 5 units.   For Pre-Meal -449 give 6 units  For Pre-Meal BG greater than or equal to 450 give 7 units.   To be given with prandial insulin, and based on pre-meal blood glucose.    Notify provider if glucose greater than or equal to 350 mg/dL after administration of correction dose. If given at mealtime, administer within 30 minutes of start of meal 20 mL 0     insulin glargine (LANTUS SOLOSTAR) 100 UNIT/ML pen Inject 8 Units Subcutaneous every morning 15 mL 0     labetalol (NORMODYNE) 100 MG tablet Take 100 mg by mouth 2 times daily HOLD if HR < 50 or SBP < 130       multivitamin w/minerals (MULTI-VITAMIN) tablet Take 1 tablet by mouth daily       NIFEdipine ER OSMOTIC (NIFEDICAL XL) 60 MG 24 hr tablet Take 60 mg by mouth every evening        sennosides (SENOKOT) 8.6 MG tablet Take 2 tablets by mouth nightly as needed for constipation   "     SYNTHROID 25 MCG tablet Take 1 tablet (25 mcg) by mouth daily Check TSH in 8 weeks. 90 tablet 0     tamsulosin (FLOMAX) 0.4 MG capsule Take 1 capsule (0.4 mg) by mouth daily Advise clinic if causes lightheadedness. 90 capsule 3       ROS:  Detailed as above    Exam:  BP (!) 156/67   Pulse 60   Temp 97.7  F (36.5  C)   Resp 18   Ht 1.803 m (5' 11\")   Wt 70 kg (154 lb 6.4 oz)   SpO2 96%   BMI 21.53 kg/m    [place] utilizing pocket talker for support  Sitting up in wheelchair  Breathing non-labored  No significant edema, compression hose in place  Palpated large callus/wart on base of left foot (he didn't want me to remove stockings)  Scalp has thick plaques (?SK)    Lab/Diagnostic Data:    TSH 7.46 down to 3.55 from Aug to Sept noted in Epic  Normal Free T4 consistently noted  POC glucoses mostly 100s with some higher outliers  10/12/21 BMP noting stability for him with Na 135, K 5.2, , Cr 5.58  9/30/21 Hgb 8.5    ASSESSMENT/PLAN:  (H91.93) Bilateral hearing loss, unspecified hearing loss type  (primary encounter diagnosis)  Discussed with nurse manager that staff haven't yet been able to do the standing house orders for Debrox followed by lavage as previously ordered by my colleague last week but they will be sure to start this tonight  Alessio was relieved to hear this    (B35.1) Onychomycosis  (B07.0) Plantar warts  Verified with nurse manager he is on the list for podiatry follow up on site and let Alessio know this as well    (R25.1) Tremor  Could be multifactorial d/t meds/CKD/age/neurologic d/o  Mild action tremor described by Alessio that he perceives being just the past week or so  See below will discuss with endocrinology about stopping Levothyroxine to see if that helps  If not, then continue to evaluate and address    (E04.1) Thyroid nodule  (E03.8) Subclinical hypothyroidism  Newly started on Levothyroxine last month (9/14/21) by endocrinology for subclinical hypothyroidism (+symptoms per " notes)  TSH already down from 7.46 to 3.55 by 9/23/21 which would not even be full effect of medication; no Free T4 checked at that time  Wonder if he is on the hyperthyroid side now and that is contributing to his sensation of new tremor (see above)  Will check with endocrinology about stopping Levothyroxine vs checking labs first  He does have f/u on thyroid nodules in December    (N18.5) CKD (chronic kidney disease) stage 5, GFR less than 15 ml/min (H)  Continues to show no significant improvement in renal function unfortunately with mild stable electrolyte abnormalities  Remains on Lasix; periodically needs Kayexallate for hyperkalemia  Mild uremia may contribute to tremor   Hospice eval possibly being pursued by family and he is transitioning to LTC unit at [facility] in the next week or so  He has voiced not wanting to pursue hemodialysis    (L98.9) Lesion of skin of scalp  Called pharmacy and confirmed his dermatologist Dr. Odom had Rx Augmented Betamethasone 0.05% cream for his scalp lesions BID until healed      Total time spent with patient visit was >45 minutes, including patient visit as well as discussing above care needs with nurse manager (3:30 pm - 4pm = 30 minutes) and review of past records + reaching out to endocrinology team (>5 minutes) as well as discussing care with son Andi (10 minutes - 4:35 pm - 4:45 pm). Greater than 50% of total time spent with counseling and coordinating care.    Electronically signed by:  Kusum Irizarry DO

## 2021-10-20 RX ORDER — HYDROCHLOROTHIAZIDE 12.5 MG/1
CAPSULE ORAL
Qty: 90 CAPSULE | Refills: 0 | Status: SHIPPED | OUTPATIENT
Start: 2021-10-20 | End: 2021-10-31

## 2021-10-20 NOTE — TELEPHONE ENCOUNTER
HYDROCHLOROTHIAZIDE 12.5 MG CP      Last Written Prescription Date:  Not on active med list    Last Office Visit : 9-14-21  Future Office visit:  12-14-21    Creatinine   Date Value Ref Range Status   10/12/2021 5.58 (H) 0.70 - 1.30 mg/dL Final   03/16/2021 2.32 (H) 0.66 - 1.25 mg/dL Final     Potassium   Date Value Ref Range Status   10/12/2021 5.2 (H) 3.5 - 5.0 mmol/L Final   03/16/2021 4.5 3.4 - 5.3 mmol/L Final     Routing refill request to provider for review/approval because:  Blood pressure out of range   Abnormal labs  Med not on active med list:        Past medication hx: past fills-                            multiple provider/service has order rx

## 2021-10-28 ENCOUNTER — DISCHARGE SUMMARY NURSING HOME (OUTPATIENT)
Dept: GERIATRICS | Facility: CLINIC | Age: 86
End: 2021-10-28
Payer: MEDICARE

## 2021-10-28 VITALS
TEMPERATURE: 98 F | HEART RATE: 60 BPM | BODY MASS INDEX: 20.54 KG/M2 | SYSTOLIC BLOOD PRESSURE: 150 MMHG | RESPIRATION RATE: 20 BRPM | DIASTOLIC BLOOD PRESSURE: 62 MMHG | OXYGEN SATURATION: 99 % | WEIGHT: 147.3 LBS

## 2021-10-28 DIAGNOSIS — K86.2 PANCREATIC CYST: ICD-10-CM

## 2021-10-28 DIAGNOSIS — N18.5 CHRONIC KIDNEY DISEASE, STAGE V (H): Primary | ICD-10-CM

## 2021-10-28 DIAGNOSIS — I10 BENIGN ESSENTIAL HYPERTENSION: ICD-10-CM

## 2021-10-28 DIAGNOSIS — E03.8 SUBCLINICAL HYPOTHYROIDISM: ICD-10-CM

## 2021-10-28 DIAGNOSIS — E04.1 THYROID NODULE: ICD-10-CM

## 2021-10-28 DIAGNOSIS — R63.4 WEIGHT LOSS: ICD-10-CM

## 2021-10-28 DIAGNOSIS — R25.1 TREMOR: ICD-10-CM

## 2021-10-28 PROCEDURE — 99316 NF DSCHRG MGMT 30 MIN+: CPT | Mod: GW | Performed by: NURSE PRACTITIONER

## 2021-10-28 NOTE — PROGRESS NOTES
[facility] GERIATRIC SERVICES DISCHARGE SUMMARY  PATIENT'S NAME: Farzad East  YOB: 1935  MEDICAL RECORD NUMBER:  [id number]  Place of Service where encounter took place:  [facility] (TCU) [[id number]]    PRIMARY CARE PROVIDER AND CLINIC RESPONSIBLE AFTER TRANSFER:   Previously Renu Lantigua, NP - Will need to establish care with a new provider and patient is aware   FMG Provider     Transferring providers: Terra Coyne, ESTEPHANIE MARKHAM, Kusum Irizarry DO  Recent Hospitalization/ED:  Hospital  [facility] stay 9/16/21 to 9/23/21.  Date of SNF Admission: 9/2/21  Date of SNF (anticipated) Discharge: 11/1/21  Discharged to: previous assisted living - [facility]  Cognitive Scores: CPT Score 4.7/5.6. SLUMS Score 24/30. Safety Questionnaire 13/19.  Physical Function: CGA needed with transfers. Ambulating 46 feet with FWW and CGA. Maximum assistance needed for dressing. Moderate assistance for toileting.  DME: No new DME needed    CODE STATUS/ADVANCE DIRECTIVES DISCUSSION:  No CPR- Pre-arrest intubation OK   ALLERGIES: Hydralazine and Sulfa drugs    NURSING FACILITY COURSE   Summary of nursing facility stay:   This is an 86-year-old male, with a past medical history significant for hypertension, anemia, rectal cancer, type 2 diabetes mellitus, lower extremity edema, benign prostatic hypertrophy and thyroid nodule, who was admitted to the [facility] 8/17/21 through 9/2/21 for progressive shortness of breath, increasing lower extremity edema and fatigue. Labs revealed Potassium 5.7, Creatinine 2.89, with peak at 5.77 on 8/26/21, and TSH 7.46. Nephrology was consulted and suspected acute kidney injury due to hypoperfusion in the setting of tightly controlled blood pressures versus drug induced lupus due secondary to Hydralazine. Hydralazine was discontinued on 8/25/21. Permissive blood pressures goal of systolic 140-150. Initiated on  "Furosemide and Nifedipine. Hemoglobin 6.8 on 8/19/21 and required 1 U of PRBCs. 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 Found to have \".. Superficial clot noted in the cephalic vein within both forearms\" on 8/26/21 ultrasound. Recommended to elevate arms. Blood sugars elevated. Glipizide discontinued due to RONY and hypoglycemia risk. Sitagliptin held. A TCU stay was recommended for ongoing physical rehabilitation.     While in the TCU, became lethargic with fatigue, decreased oral intake, loose stools, bradycardia and decline of progress in therapy so was sent back to the [facility] 9/16/21 through 9/23/21. Labs revealed Lipase of 983. An abdominal and pelvic CT revealed \"Herniation of a short segment of small bowel into the left inguinal canal with mild air-filled and fluid-filled loops in the inguinal canal with minimal adjacent fluid and edema. The proximal small bowel up to the level of the herniation is moderately fluid-filled although not overtly dilated. Finding may be indicative of developing obstruction related to a small bowel containing left inguinal hernia... Acute uncomplicated diverticulitis\". 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 General Surgery was consulted. Patient did not want hernia repaired. Medical management for uncomplicated diverticulitis was recommended. DIarrhea resolved. IV fluids were administered due to poor oral intake leading up to hospitalization and renal function improved. PTA Furosemide was also held. Carvedilol was briefly held for bradycardia. Discharged back to TCU when medically stable.    Chronic Kidney Disease Stage V. Possibly due to hypoperfusion in the setting of tightly controlled blood pressures versus drug induced lupus secondary to Hydralazine. Seen by Nephrology on 9/29/21. Permissive hypertension with a goal of systolic readings between " "140-150.Patient has stated he would not want dialysis if indicated and signed onto [facility] on 10/24/21 with a terminal diagnosis of acute on chronic systolic heart failure.    Weight Loss. During TCU stay, 167.2 lbs on 9/2/21 -> 147.4 lbs on 10/22/21. Enrolled in [facility] as noted above.    Tremor. Started in the beginning of October. Noted to have a mild tremor in 2008 per review of Epic records. Has RONY. Was previously on Carvedilol and is now on Labetolol due to bradycardia. Started on Levothyroxine on 9/14/21 so question if this was contributing and patient could have hyperthyroid as TSH was 3.55 on 9/23/21, before medication would have taken effect. Checked with Endocrinology and Levothyroxine was discontinued on 10/26/21. Monitor closely and adjust plan accordingly.    Wart on Left Foot. Previously treated by Podiatry outpatient and had periodic trimming.     Recent Hyperkalemia. Secondary to RONY. Asymptomatic. Received Kayexalate intermittently during TCU stay. Last Potassium 5.2 on 10/12/21. Now enrolled in hospice and labs are no longer checked.     Recent Acute Diverticulitis and Diarrhea. With anorexia. Noted on abdominal and pelvic CT on 9/16/21. Started on Ciprofloxacin and Flagyl with transition to Ceftriaxone and Flagyl to complete 10 day course on 9/25/21.      Imaging Concerning for Bowel Obstruction and Inguinal Hernia. Patient declined surgical intervention for hernia. Symptoms not consistent with bowel obstruction. General surgery recommended medical management.      Pancreatic Cyst. An abdominal and pelvic CT on 9/16/21 revealed \"Low-attenuation indeterminant probable cystic lesion at the pancreatic head measuring 1.4 x 2 x 1.3 cm. Recommend nonemergent contrast-enhanced MRI of the pancreas for further evaluation\". Will no longer follow-up given hospice enrollment.     Recent Bradycardia. Resolved. Carvedilol was changed to Labetalol during hospitalization. Heart " rate over the past 5 days, range from 60-76.     Hypertension. Hydralazine discontinued as noted above. Permissive systolic blood pressures 140-150 recommended as noted above. Started on Nifedipine during hospitalization in August with increase on 9/9/21. PTA Hydrochlorothiazide discontinued. Carvedilol changed to Labetalol due to bradycardia. Upon review of systolic blood pressure over the past 5 days, systolic blood pressure range 118-180, most 120-160s. Diastolic range 60-90.     Acute on Chronic Anemia with History of Rectal Cancer. Received 1 U of PRBCs and Iron Sucrose during hospitalization in August. Labs consistent with iron deficiency and thought to be a component of Erythropoietin deficiency with history of chronic kidney disease. Had been having dark black stools and diarrhea over the past 3 months prior to hospitalization. Colonoscopy recommended outpatient, but will no longer be performed given goal is comfort. Baseline Hemoglobin ~ 8-9. Last Hemoglobin 8.5 on 9/30/21.     Type 2 Diabetes Mellitus. Last A1C 9.5 on 7/16/21. PTA Glipizide and Sitagliptin discontinued during August hospitalization. Discharged on Glargine and sliding scale insulin. Scheduled 2 U of Aspart ordered with meals. Seen by Endocrinology on 9/14/21. Glargine decreased to 8 U. Sliding scale insulin discontinued on 10/27/21 as AL would not accept. Blood sugar range over the past 5 days have been:     Breakfast:   Lunch: 130-308, most < 200  Dinner: 163-344  Bedtime: 180-402     Lower Extremity Edema. Ultrasound negative on 8/18/21. Takes Furosemide.     Thyroid Nodule. Follow-up with Endocrinology scheduled for 12/14/21, but patient has since signed onto hospice.     Subclinical Hypothyroidism. TSH 7.46 and Free T4 1.03 on 8/16/21. Seen by Endocrinology on 9/14/21 and started on low dose Levothyroxine with follow-up TSH in 8 weeks. Repeat TSH on 3.55 on 9/23/21. Given tremor correlated with time Levothyroxine was initiated,  discontinued with approval from Endocrinology on 10/26/21.      Benign Prostatic Hypertrophy. Continue Tamsulosin and Finasteride as ordered.     Discharge Medications:  Current Outpatient Medications   Medication Sig Dispense Refill     aspirin (ASA) 81 MG EC tablet Take 1 tablet by mouth daily        augmented betamethasone dipropionate (DIPROLENE-AF) 0.05 % external cream Apply topically 2 times daily to scalp lesions until healed       Cholecalciferol (VITAMIN D-3 PO) Take 1,000 Units by mouth daily        finasteride (PROSCAR) 5 MG tablet Take 1 tablet by mouth daily  3     furosemide (LASIX) 40 MG tablet Take 1.5 tablets (60 mg) by mouth daily       haloperidol (HALDOL) 2 MG/ML (HIGH CONC) solution Take 0.5 mg by mouth every hour as needed for agitation       hyoscyamine (LEVSIN/SL) 0.125 MG sublingual tablet Place 0.125 mg under the tongue every 2 hours as needed for cramping       insulin aspart (NOVOLOG PEN) 100 UNIT/ML pen Inject 2 Units Subcutaneous 3 times daily (with meals) 15 mL 0     insulin glargine (LANTUS SOLOSTAR) 100 UNIT/ML pen Inject 8 Units Subcutaneous every morning 15 mL 0     labetalol (NORMODYNE) 100 MG tablet Take 100 mg by mouth 2 times daily HOLD if HR < 50 or SBP < 130       LORazepam (LORAZEPAM INTENSOL) 2 MG/ML (HIGH CONC) PO solution Take 0.5 mg by mouth every 2 hours as needed for anxiety       multivitamin w/minerals (MULTI-VITAMIN) tablet Take 1 tablet by mouth daily       NIFEdipine ER OSMOTIC (NIFEDICAL XL) 60 MG 24 hr tablet Take 60 mg by mouth every evening        oxyCODONE HCl (ROXICODONE INTENSOL) 10 MG/0.5ML (HIGH CONC) solution Take 5 mg by mouth every hour as needed for severe pain       sennosides (SENOKOT) 8.6 MG tablet Take 2 tablets by mouth nightly as needed for constipation       tamsulosin (FLOMAX) 0.4 MG capsule Take 1 capsule (0.4 mg) by mouth daily Advise clinic if causes lightheadedness. 90 capsule 3     ACE/ARB/ARNI NOT PRESCRIBED (INTENTIONAL) Please choose  reason not prescribed from choices below.        Controlled medications:   To be ordered by [facility]     Past Medical History:   Past Medical History:   Diagnosis Date     Atrial flutter (H)      Choroidal nevus of left eye      CKD (chronic kidney disease) stage 3, GFR 30-59 ml/min (H)      Diabetes mellitus (H)      Diabetic peripheral neuropathy (H)      Hypertension      Melanoma of skin (H)     R shoulder s/p excision 4/2021     Microalbuminuria      Overweight(278.02)      Rectal-type adenocarcinoma (H)      Retinopathies, diabetic      Vitreous detachment of both eyes      Physical Exam:   Vitals: BP (!) 150/62   Pulse 60   Temp 98  F (36.7  C)   Resp 20   Wt 66.8 kg (147 lb 4.8 oz)   SpO2 99%   BMI 20.54 kg/m    BMI= Body mass index is 20.54 kg/m .  GENERAL APPEARANCE:  Alert, in no distress  ENT:  Mouth and posterior oropharynx normal, moist mucous membranes  EYES:  EOM, conjunctivae, lids, pupils and irises normal  RESP:  Respiratory effort and palpation of chest normal, lungs clear to auscultation , no respiratory distress  CV:  Palpation and auscultation of heart done, regular rate and rhythm, no murmur, rub, or gallop  ABDOMEN:  normal bowel sounds, soft, nontender, no hepatosplenomegaly or other masses  M/S:   Slight tremor with flexion of bilateral upper extremities.  SKIN:  CANDICE stockings of BLE. Dressings in place on RLE.  NEURO:   Cranial nerves 2-12 are normal tested and grossly at patient's baseline  PSYCH:  affect and mood normal    SNF labs: Labs will no longer be drawn due to hospice enrollment    DISCHARGE PLAN:    Follow up labs: No labs orders/due    Discharge Services: Discharged with Health [facility] to the [facility]    Discharge Orders: Continue dressing change to right shin per facility orders.     TOTAL DISCHARGE TIME:   Greater than 30 minutes  Electronically signed by:  ESTEPHANIE Enciso CNP

## 2021-10-28 NOTE — LETTER
10/28/2021        RE: Farzad East  [address]  [place] [address]        [facility] GERIATRIC SERVICES DISCHARGE SUMMARY  PATIENT'S NAME: Farzad East  YOB: 1935  MEDICAL RECORD NUMBER:  [id number]  Place of Service where encounter took place:  [facility] (TCU) [[id number]]    PRIMARY CARE PROVIDER AND CLINIC RESPONSIBLE AFTER TRANSFER:   Previously Renu Lantigua, NP - Will need to establish care with a new provider and patient is aware   FMG Provider     Transferring providers: Terra Coyne, ESTEPHANIE MARKHAM, Kusum Irizarry DO  Recent Hospitalization/ED:  [facility] stay 9/16/21 to 9/23/21.  Date of SNF Admission: 9/2/21  Date of SNF (anticipated) Discharge: 11/1/21  Discharged to: previous assisted living - [facility] [place]  Cognitive Scores: CPT Score 4.7/5.6. SLUMS Score 24/30. Safety Questionnaire 13/19.  Physical Function: CGA needed with transfers. Ambulating 46 feet with FWW and CGA. Maximum assistance needed for dressing. Moderate assistance for toileting.  DME: No new DME needed    CODE STATUS/ADVANCE DIRECTIVES DISCUSSION:  No CPR- Pre-arrest intubation OK   ALLERGIES: Hydralazine and Sulfa drugs    NURSING FACILITY COURSE   Summary of nursing facility stay:   This is an 86-year-old male, with a past medical history significant for hypertension, anemia, rectal cancer, type 2 diabetes mellitus, lower extremity edema, benign prostatic hypertrophy and thyroid nodule, who was admitted to the [facility] 8/17/21 through 9/2/21 for progressive shortness of breath, increasing lower extremity edema and fatigue. Labs revealed Potassium 5.7, Creatinine 2.89, with peak at 5.77 on 8/26/21, and TSH 7.46. Nephrology was consulted and suspected acute kidney injury due to hypoperfusion in the setting of tightly controlled blood pressures versus drug induced lupus due secondary to Hydralazine.  "Hydralazine was discontinued on 8/25/21. Permissive blood pressures goal of systolic 140-150. Initiated on Furosemide and Nifedipine. Hemoglobin 6.8 on 8/19/21 and required 1 U of PRBCs. 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 Found to have \".. Superficial clot noted in the cephalic vein within both forearms\" on 8/26/21 ultrasound. Recommended to elevate arms. Blood sugars elevated. Glipizide discontinued due to RONY and hypoglycemia risk. Sitagliptin held. A TCU stay was recommended for ongoing physical rehabilitation.     While in the TCU, became lethargic with fatigue, decreased oral intake, loose stools, bradycardia and decline of progress in therapy so was sent back to the [facility] 9/16/21 through 9/23/21. Labs revealed Lipase of 983. An abdominal and pelvic CT revealed \"Herniation of a short segment of small bowel into the left inguinal canal with mild air-filled and fluid-filled loops in the inguinal canal with minimal adjacent fluid and edema. The proximal small bowel up to the level of the herniation is moderately fluid-filled although not overtly dilated. Finding may be indicative of developing obstruction related to a small bowel containing left inguinal hernia... Acute uncomplicated diverticulitis\". 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 General Surgery was consulted. Patient did not want hernia repaired. Medical management for uncomplicated diverticulitis was recommended. DIarrhea resolved. IV fluids were administered due to poor oral intake leading up to hospitalization and renal function improved. PTA Furosemide was also held. Carvedilol was briefly held for bradycardia. Discharged back to TCU when medically stable.    Chronic Kidney Disease Stage V. Possibly due to hypoperfusion in the setting of tightly controlled blood pressures versus drug induced lupus secondary to " "Hydralazine. Seen by Nephrology on 9/29/21. Permissive hypertension with a goal of systolic readings between 140-150.Patient has stated he would not want dialysis if indicated and signed onto [facility] on 10/24/21 with a terminal diagnosis of acute on chronic systolic heart failure.    Weight Loss. During TCU stay, 167.2 lbs on 9/2/21 -> 147.4 lbs on 10/22/21. Enrolled in [facility] as noted above.    Tremor. Started in the beginning of October. Noted to have a mild tremor in 2008 per review of Epic records. Has RONY. Was previously on Carvedilol and is now on Labetolol due to bradycardia. Started on Levothyroxine on 9/14/21 so question if this was contributing and patient could have hyperthyroid as TSH was 3.55 on 9/23/21, before medication would have taken effect. Checked with Endocrinology and Levothyroxine was discontinued on 10/26/21. Monitor closely and adjust plan accordingly.    Wart on Left Foot. Previously treated by Podiatry outpatient and had periodic trimming.     Recent Hyperkalemia. Secondary to RONY. Asymptomatic. Received Kayexalate intermittently during TCU stay. Last Potassium 5.2 on 10/12/21. Now enrolled in hospice and labs are no longer checked.     Recent Acute Diverticulitis and Diarrhea. With anorexia. Noted on abdominal and pelvic CT on 9/16/21. Started on Ciprofloxacin and Flagyl with transition to Ceftriaxone and Flagyl to complete 10 day course on 9/25/21.      Imaging Concerning for Bowel Obstruction and Inguinal Hernia. Patient declined surgical intervention for hernia. Symptoms not consistent with bowel obstruction. General surgery recommended medical management.      Pancreatic Cyst. An abdominal and pelvic CT on 9/16/21 revealed \"Low-attenuation indeterminant probable cystic lesion at the pancreatic head measuring 1.4 x 2 x 1.3 cm. Recommend nonemergent contrast-enhanced MRI of the pancreas for further evaluation\". Will no longer follow-up given hospice " enrollment.     Recent Bradycardia. Resolved. Carvedilol was changed to Labetalol during hospitalization. Heart rate over the past 5 days, range from 60-76.     Hypertension. Hydralazine discontinued as noted above. Permissive systolic blood pressures 140-150 recommended as noted above. Started on Nifedipine during hospitalization in August with increase on 9/9/21. PTA Hydrochlorothiazide discontinued. Carvedilol changed to Labetalol due to bradycardia. Upon review of systolic blood pressure over the past 5 days, systolic blood pressure range 118-180, most 120-160s. Diastolic range 60-90.     Acute on Chronic Anemia with History of Rectal Cancer. Received 1 U of PRBCs and Iron Sucrose during hospitalization in August. Labs consistent with iron deficiency and thought to be a component of Erythropoietin deficiency with history of chronic kidney disease. Had been having dark black stools and diarrhea over the past 3 months prior to hospitalization. Colonoscopy recommended outpatient, but will no longer be performed given goal is comfort. Baseline Hemoglobin ~ 8-9. Last Hemoglobin 8.5 on 9/30/21.     Type 2 Diabetes Mellitus. Last A1C 9.5 on 7/16/21. PTA Glipizide and Sitagliptin discontinued during August hospitalization. Discharged on Glargine and sliding scale insulin. Scheduled 2 U of Aspart ordered with meals. Seen by Endocrinology on 9/14/21. Glargine decreased to 8 U. Sliding scale insulin discontinued on 10/27/21 as AL would not accept. Blood sugar range over the past 5 days have been:     Breakfast:   Lunch: 130-308, most < 200  Dinner: 163-344  Bedtime: 180-402     Lower Extremity Edema. Ultrasound negative on 8/18/21. Takes Furosemide.     Thyroid Nodule. Follow-up with Endocrinology scheduled for 12/14/21, but patient has since signed onto hospice.     Subclinical Hypothyroidism. TSH 7.46 and Free T4 1.03 on 8/16/21. Seen by Endocrinology on 9/14/21 and started on low dose Levothyroxine with follow-up  TSH in 8 weeks. Repeat TSH on 3.55 on 9/23/21. Given tremor correlated with time Levothyroxine was initiated, discontinued with approval from Endocrinology on 10/26/21.      Benign Prostatic Hypertrophy. Continue Tamsulosin and Finasteride as ordered.     Discharge Medications:  Current Outpatient Medications   Medication Sig Dispense Refill     aspirin (ASA) 81 MG EC tablet Take 1 tablet by mouth daily        augmented betamethasone dipropionate (DIPROLENE-AF) 0.05 % external cream Apply topically 2 times daily to scalp lesions until healed       Cholecalciferol (VITAMIN D-3 PO) Take 1,000 Units by mouth daily        finasteride (PROSCAR) 5 MG tablet Take 1 tablet by mouth daily  3     furosemide (LASIX) 40 MG tablet Take 1.5 tablets (60 mg) by mouth daily       haloperidol (HALDOL) 2 MG/ML (HIGH CONC) solution Take 0.5 mg by mouth every hour as needed for agitation       hyoscyamine (LEVSIN/SL) 0.125 MG sublingual tablet Place 0.125 mg under the tongue every 2 hours as needed for cramping       insulin aspart (NOVOLOG PEN) 100 UNIT/ML pen Inject 2 Units Subcutaneous 3 times daily (with meals) 15 mL 0     insulin glargine (LANTUS SOLOSTAR) 100 UNIT/ML pen Inject 8 Units Subcutaneous every morning 15 mL 0     labetalol (NORMODYNE) 100 MG tablet Take 100 mg by mouth 2 times daily HOLD if HR < 50 or SBP < 130       LORazepam (LORAZEPAM INTENSOL) 2 MG/ML (HIGH CONC) PO solution Take 0.5 mg by mouth every 2 hours as needed for anxiety       multivitamin w/minerals (MULTI-VITAMIN) tablet Take 1 tablet by mouth daily       NIFEdipine ER OSMOTIC (NIFEDICAL XL) 60 MG 24 hr tablet Take 60 mg by mouth every evening        oxyCODONE HCl (ROXICODONE INTENSOL) 10 MG/0.5ML (HIGH CONC) solution Take 5 mg by mouth every hour as needed for severe pain       sennosides (SENOKOT) 8.6 MG tablet Take 2 tablets by mouth nightly as needed for constipation       tamsulosin (FLOMAX) 0.4 MG capsule Take 1 capsule (0.4 mg) by mouth daily  Advise clinic if causes lightheadedness. 90 capsule 3     ACE/ARB/ARNI NOT PRESCRIBED (INTENTIONAL) Please choose reason not prescribed from choices below.        Controlled medications:   To be ordered by [facility]     Past Medical History:   Past Medical History:   Diagnosis Date     Atrial flutter (H)      Choroidal nevus of left eye      CKD (chronic kidney disease) stage 3, GFR 30-59 ml/min (H)      Diabetes mellitus (H)      Diabetic peripheral neuropathy (H)      Hypertension      Melanoma of skin (H)     R shoulder s/p excision 4/2021     Microalbuminuria      Overweight(278.02)      Rectal-type adenocarcinoma (H)      Retinopathies, diabetic      Vitreous detachment of both eyes      Physical Exam:   Vitals: BP (!) 150/62   Pulse 60   Temp 98  F (36.7  C)   Resp 20   Wt 66.8 kg (147 lb 4.8 oz)   SpO2 99%   BMI 20.54 kg/m    BMI= Body mass index is 20.54 kg/m .  GENERAL APPEARANCE:  Alert, in no distress  ENT:  Mouth and posterior oropharynx normal, moist mucous membranes  EYES:  EOM, conjunctivae, lids, pupils and irises normal  RESP:  Respiratory effort and palpation of chest normal, lungs clear to auscultation , no respiratory distress  CV:  Palpation and auscultation of heart done, regular rate and rhythm, no murmur, rub, or gallop  ABDOMEN:  normal bowel sounds, soft, nontender, no hepatosplenomegaly or other masses  M/S:   Slight tremor with flexion of bilateral upper extremities.  SKIN:  CANDICE stockings of BLE. Dressings in place on RLE.  NEURO:   Cranial nerves 2-12 are normal tested and grossly at patient's baseline  PSYCH:  affect and mood normal    SNF labs: Labs will no longer be drawn due to hospice enrollment    DISCHARGE PLAN:    Follow up labs: No labs orders/due    Discharge Services: Discharged with Health [facility] to the [facility]    TOTAL DISCHARGE TIME:   Greater than 30 minutes  Electronically signed by:  ESTEPHANIE Enciso CNP                      Sincerely,        ESTEPHANIE Enciso CNP

## 2021-10-31 RX ORDER — HALOPERIDOL 2 MG/ML
0.5 SOLUTION ORAL DAILY PRN
COMMUNITY
End: 2022-01-06 | Stop reason: ALTCHOICE

## 2021-10-31 RX ORDER — OXYCODONE HYDROCHLORIDE 100 MG/5ML
5 SOLUTION ORAL
COMMUNITY
End: 2022-01-06 | Stop reason: DRUGHIGH

## 2021-10-31 RX ORDER — LORAZEPAM 2 MG/ML
0.5 CONCENTRATE ORAL
COMMUNITY
End: 2022-01-06 | Stop reason: DRUGHIGH

## 2021-11-02 ENCOUNTER — TELEPHONE (OUTPATIENT)
Dept: GERIATRICS | Facility: CLINIC | Age: 86
End: 2021-11-02

## 2021-11-02 NOTE — TELEPHONE ENCOUNTER
[facility] Nurse Triage Telephone Note    Provider: ESTEPHANIE Schwartz CNP   Facility: [facility]  Facility Type:  AL    Caller: Raphael  Call Back Number: [phone]    Allergies:    Allergies   Allergen Reactions     Hydralazine Nephrotoxicity     Sulfa Drugs      Unknown reaction. Occurred during childhood. Has not taken Sulfa medications since allergic response.         Reason for call: Writer notified via fax about a new admission to AL and requesting to clarify orders.  Of note, patient is on [facility] hospice.       Verbal Order/Direction given by Provider: Discontinue Novolog.  Continue Lantus as ordered.  Discontinue compression stockings.  Cleanse the abrasion on the left leg with wound cleanser---pat dry---the cover with a foam dressing change Q 3 days and PRN(hospice to provide dressing supplies).  Check BG BID(obtain patient's BG supplies from his apartment or hospice)     Provider giving Order:  ESTEPHANIE Schwartz CNP     Verbal Order given to: Raphael Lopez RN

## 2021-11-10 VITALS
BODY MASS INDEX: 19.5 KG/M2 | WEIGHT: 139.8 LBS | RESPIRATION RATE: 14 BRPM | DIASTOLIC BLOOD PRESSURE: 57 MMHG | SYSTOLIC BLOOD PRESSURE: 150 MMHG | TEMPERATURE: 96.9 F | HEART RATE: 54 BPM

## 2021-11-11 ENCOUNTER — ASSISTED LIVING VISIT (OUTPATIENT)
Dept: GERIATRICS | Facility: CLINIC | Age: 86
End: 2021-11-11
Payer: MEDICARE

## 2021-11-11 DIAGNOSIS — I10 BENIGN ESSENTIAL HYPERTENSION: ICD-10-CM

## 2021-11-11 DIAGNOSIS — R41.89 COGNITIVE IMPAIRMENT: ICD-10-CM

## 2021-11-11 DIAGNOSIS — I12.0 TYPE 2 DM WITH CKD STAGE 5 AND HYPERTENSION (H): ICD-10-CM

## 2021-11-11 DIAGNOSIS — S81.801D OPEN WOUND OF RIGHT LOWER EXTREMITY, SUBSEQUENT ENCOUNTER: ICD-10-CM

## 2021-11-11 DIAGNOSIS — N40.0 BENIGN PROSTATIC HYPERPLASIA WITHOUT LOWER URINARY TRACT SYMPTOMS: ICD-10-CM

## 2021-11-11 DIAGNOSIS — N18.5 ANEMIA DUE TO STAGE 5 CHRONIC KIDNEY DISEASE, NOT ON CHRONIC DIALYSIS (H): ICD-10-CM

## 2021-11-11 DIAGNOSIS — R63.4 WEIGHT LOSS: ICD-10-CM

## 2021-11-11 DIAGNOSIS — N18.5 TYPE 2 DM WITH CKD STAGE 5 AND HYPERTENSION (H): ICD-10-CM

## 2021-11-11 DIAGNOSIS — K86.2 PANCREATIC CYST: ICD-10-CM

## 2021-11-11 DIAGNOSIS — Z51.5 HOSPICE CARE PATIENT: ICD-10-CM

## 2021-11-11 DIAGNOSIS — N18.5 CHRONIC KIDNEY DISEASE, STAGE V (H): Primary | ICD-10-CM

## 2021-11-11 DIAGNOSIS — E04.1 THYROID NODULE: ICD-10-CM

## 2021-11-11 DIAGNOSIS — D63.1 ANEMIA DUE TO STAGE 5 CHRONIC KIDNEY DISEASE, NOT ON CHRONIC DIALYSIS (H): ICD-10-CM

## 2021-11-11 DIAGNOSIS — E11.22 TYPE 2 DM WITH CKD STAGE 5 AND HYPERTENSION (H): ICD-10-CM

## 2021-11-11 DIAGNOSIS — R00.1 BRADYCARDIA: ICD-10-CM

## 2021-11-11 DIAGNOSIS — K57.92 DIVERTICULITIS: ICD-10-CM

## 2021-11-11 NOTE — LETTER
11/11/2021        RE: Farzad East  [facility]  [address]  [place] [facility] [address]        [place] [facility]  PRIMARY CARE PROVIDER AND CLINIC:  ESTEPHANIE Mitchell CNP, [address] [facility] [address] / [facility] [address]  Chief Complaint   Patient presents with     Establish Care     [place] Medical Record Number:  [id number]  Place of Service where encounter took place:  [facility] ([facility]) [[id number]]    Farzad East  is a 86 year old  (1935), living in above facility since 9/1/2020 and now choosing to change PCPs to [facility].   Admitted to this facility for  medical management, nursing care and hospice.    HPI:    HPI information obtained from: facility chart records, facility staff, patient report and [facility] chart review.   Brief Summary of Hospital Course:   He has lived at this facility since 9/2020 and transferred primary care to [facility] 11/1/2021 following hospitalization and tcu stay. Medical history significant for diabetes type 2, HTN, CKD,  rectal cancer, BPH, osteoporosis, subclinical hypothyroidism, chronic anemia. He was hospitalized at [facility] 8/17-9/2/2021 with LE edema, worsening fatigue and dyspnea. He was found to have RONY with creatinine 2.9. Nephrology suspected RONY was due to hypoperfusion in the setting of tightly controlled BP vs drug induced lupus secondary to hydralazine, which was discontinued. Hgb was 6.8 and he received 1 unit of PRBC and IV iron sucrose X 3 doses. Labs were consistent with iron deficiency and EPO deficiency in the setting of CKD. Glipizide and sitagliptin were discontinued. He discharged to [facility] tcu.   He was rehospitalized at [facility] 9/16-9/23/2021 with fatigue, weakness, bradycardia, worsening renal function and diarrhea. CT abdomen showed diverticulitis, possible developing obstruction related to a small bowel containing left inguinal  hernia and indeterminate lesion in the  Was the patient seen in the last year in this department? Yes    Does patient have an active prescription for medications requested? No     Received Request Via: Pharmacy   "pancreas. He was treated with ceftriaxone and flagyl, discharged on cefdinir and flagyl through 9/26/2021. Creatinine was 5.27 on admission and trended down to 4.6 with IVF.  Carvedilol was changed to labetalol due to bradycardia. He returned to [facility] tcu on 9/23/2021,  but unfortunately, was not able to progress in therapies. He had hyperkalemia requiring intermittent kayexalate. He saw Nephrology 9/29/2021 and declined dialysis. He subsequently enrolled with [facility].     Updates on Status Since Skilled nursing Admission:   He is [place] and uses a pocket talker. Fairly good historian. Reports \"terrible\"  appetite and believes he has lost 40 lbs in the past 2  months. Denies nausea, vomiting or diarrhea. Has had a wound on his right shin for 6 months and the dressing is currently saturated. Reports chronic left back and hip pain is controlled. He fell 11/5/2021 and waited \"too long\" for help to get up. No injuries. He is wheelchair bound, able to stand for transfers and has a lift chair. Requires stand by to max assist with cares.   Prior to hospitalization, he was driving and independent with cares.   Nurses report he has been resistive with cares and aggressive at times.   While on tcu: ambulating 46 ft with walker and contact guard assist. CPT 4.7.5.6, SLUMS 24/30, safety questionnaire 13/19.     CODE STATUS/ADVANCE DIRECTIVES DISCUSSION:   DNR/DNI   Patient's living condition: lives in an assisted living facility. His wife lives in Memory Care.   ALLERGIES: Hydralazine and Sulfa drugs  PAST MEDICAL HISTORY:  has a past medical history of Atrial flutter (H), Choroidal nevus of left eye, CKD (chronic kidney disease) stage 3, GFR 30-59 ml/min (H), Diabetes mellitus (H), Diabetic peripheral neuropathy (H), Hypertension, Melanoma of skin (H), Microalbuminuria, Overweight(278.02), Rectal-type adenocarcinoma (H), Retinopathies, diabetic, and Vitreous detachment of both eyes.  PAST SURGICAL " HISTORY:   has a past surgical history that includes left hip replacement; Open reduction internal fixation femur proximal (01/24/2014); cataract iol, rt/lt (Bilateral, 01/01/2005); and Skin Lesion Excision (04/2021).  FAMILY HISTORY: family history includes Arthritis in his mother; Circulatory in his father; Diabetes in his father; Macular Degeneration in his father.  SOCIAL HISTORY:   reports that he has never smoked. He has never used smokeless tobacco. He reports current alcohol use of about 2.5 - 3.3 standard drinks of alcohol per week. He reports that he does not use drugs.    Post Discharge Medication Reconciliation Status: discharge medications reconciled, continue medications without change    Current Outpatient Medications   Medication Sig Dispense Refill     ACE/ARB/ARNI NOT PRESCRIBED (INTENTIONAL) Please choose reason not prescribed from choices below.       aspirin (ASA) 81 MG EC tablet Take 1 tablet by mouth daily        Cholecalciferol (VITAMIN D-3 PO) Take 1,000 Units by mouth daily        finasteride (PROSCAR) 5 MG tablet Take 1 tablet by mouth daily  3     furosemide (LASIX) 40 MG tablet Take 1.5 tablets (60 mg) by mouth daily       haloperidol (HALDOL) 2 MG/ML (HIGH CONC) solution Take 0.5 mg by mouth daily as needed for agitation        hyoscyamine (LEVSIN/SL) 0.125 MG sublingual tablet Place 0.125 mg under the tongue daily as needed for cramping        insulin glargine (LANTUS SOLOSTAR) 100 UNIT/ML pen Inject 8 Units Subcutaneous every morning (Patient taking differently: Inject 10 Units Subcutaneous every morning ) 15 mL 0     labetalol (NORMODYNE) 100 MG tablet Take 100 mg by mouth 2 times daily HOLD if HR < 50 or SBP < 130       LORazepam (LORAZEPAM INTENSOL) 2 MG/ML (HIGH CONC) PO solution Take 0.5 mg by mouth every 2 hours as needed for anxiety       multivitamin w/minerals (MULTI-VITAMIN) tablet Take 1 tablet by mouth daily       NIFEdipine ER OSMOTIC (NIFEDICAL XL) 60 MG 24 hr tablet  Take 60 mg by mouth every evening        oxyCODONE HCl (ROXICODONE INTENSOL) 10 MG/0.5ML (HIGH CONC) solution Take 5 mg by mouth every hour as needed for severe pain       sennosides (SENOKOT) 8.6 MG tablet Take 2 tablets by mouth nightly as needed for constipation       tamsulosin (FLOMAX) 0.4 MG capsule Take 1 capsule (0.4 mg) by mouth daily Advise clinic if causes lightheadedness. 90 capsule 3         ROS:  10 point ROS of systems including Constitutional, Eyes, Respiratory, Cardiovascular, Gastroenterology, Genitourinary, Integumentary, Musculoskeletal, Psychiatric were all negative except for pertinent positives noted in my HPI.    Vitals:  BP (!) 150/57   Pulse 54   Temp 96.9  F (36.1  C)   Resp 14   Wt 63.4 kg (139 lb 12.8 oz)   BMI 19.50 kg/m    Exam:  GENERAL APPEARANCE:  Alert, in no distress, frail appearing  ENT:  [place], oropharynx clear  EYES:  EOM normal, conjunctiva and lids normal  NECK:  no adenopathy  RESP:  no respiratory distress, diminished breath sounds bilaterally, no crackles or wheezes  CV:  regular rate and rhythm, no murmur,  +2 pedal pulses, peripheral edema trace+ in both LE  ABDOMEN:  soft, non-tender, no distension, no masses  M/S:   wheelchair. MCMANUS with good strength. No joint inflammation. Bilateral hand tremor  SKIN:  superficial open area right shin with granulation, no drainage, no erythema. No rashes  PSYCH:  oriented X 3, memory impaired , affect and mood normal    Lab/Diagnostic data:  Recent labs in [facility] reviewed by me today.    ECHO 8/18/2021: EF 55-60%.     ASSESSMENT / PLAN:  (N18.5) Chronic kidney disease, stage V (H)  (primary encounter diagnosis)  (Z51.5) Hospice care patient  Comment: significant decline in functional status since hospitalization. Worsening renal function and hyperkalemia while on tcu. He declined dialysis and transitioned to comfort care. Managing fairly well in AL with increased services and additional support from hospice  Plan: continue comfort  meds and care with [facility].     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 HgbA1c was 9.5 on 7/16/2021  Plan: continue Lantus 10 units daily. Avoid hypoglycemia due to advanced age, falls and goals of care.     (R63.4) Weight loss  Comment: records indicate 20 lb weight loss in recent months; patient thinks it is closer to 40 lbs. No swallow or acute GI symptoms   Plan: encourage nutritional supplements and intake as tolerated.     (I10) Benign essential hypertension  (R00.1) Bradycardia  Comment: acceptable control with /79, 150/57  HR: 54-65  Plan: continue lasix, labetalol, nifedipine.     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 No s/s of active bleeding   Plan: no need for labs given comfort care    (K57.92) Diverticulitis  Comment: appears resolved following antibiotic treatment   Plan: monitor symptoms     (K86.2) Pancreatic cyst  Comment: noted on imaging.   Plan: no need for follow up given goals of care.     (E04.1) Thyroid nodule  Comment: levothyroxine was started per Endocrine for TSH 7.46 on 8/16/2021.  Follow up TSH was 3.55 on 9/23/2021. Levothyroxine was discontinued 10/26/2021 as possibly contributing to his new hand tremor.   Plan: no need for follow up given goals of care     BPH  Comment: symptoms controlled  Plan: continue tamsulosin     Open wound right shin  Comment: chronic, no s/s of infection. May not heal given his poor nutritional status   Plan: wound care every 3 days and prn using a foam dressing.     (R41.89) Cognitive impairment  Comment: mild deficits on cognitive testing. Conversation is appropriate with short term memory loss. He has a fairly good understanding of his situation.   Plan: AL staff to assist with cares, meals and med admin. Additional support from  hospice.         Total time spent with patient visit at the assisted living facility was 45 min including patient visit and review of past records. Greater than 50% of total time spent with counseling and coordinating care due to establishing primary care at the facility. Counseling patient re: medications and potential side effects and hospice plan of care. Coordinating the following with facility staff: medication orders, wound care, plan of care, POLST completed.     Electronically signed by:  ESTEPHANIE Mitchell CNP                           Sincerely,        ESTEPHANIE Mitchell CNP

## 2021-11-11 NOTE — PROGRESS NOTES
[place] [facility]  PRIMARY CARE PROVIDER AND CLINIC:  Javier Stevenson, ESTEPHANIE CNP, [address] [place] [address] / [place] [address]  Chief Complaint   Patient presents with     Establish Care     [place] Medical Record Number:  [id number]  Place of Service where encounter took place:  [facility]) [[id number]]    Farzad East  is a 86 year old  (1935), living in above facility since 9/1/2020 and now choosing to change PCPs to [facility].   Admitted to this facility for  medical management, nursing care and hospice.    HPI:    HPI information obtained from: facility chart records, facility staff, patient report and [facility] chart review.   Brief Summary of Hospital Course:   He has lived at this facility since 9/2020 and transferred primary care to [facility] 11/1/2021 following hospitalization and tcu stay. Medical history significant for diabetes type 2, HTN, CKD,  rectal cancer, BPH, osteoporosis, subclinical hypothyroidism, chronic anemia. He was hospitalized at [facility] 8/17-9/2/2021 with LE edema, worsening fatigue and dyspnea. He was found to have RONY with creatinine 2.9. Nephrology suspected RONY was due to hypoperfusion in the setting of tightly controlled BP vs drug induced lupus secondary to hydralazine, which was discontinued. Hgb was 6.8 and he received 1 unit of PRBC and IV iron sucrose X 3 doses. Labs were consistent with iron deficiency and EPO deficiency in the setting of CKD. Glipizide and sitagliptin were discontinued. He discharged to [facility] tcu.   He was rehospitalized at [facility] 9/16-9/23/2021 with fatigue, weakness, bradycardia, worsening renal function and diarrhea. CT abdomen showed diverticulitis, possible developing obstruction related to a small bowel containing left inguinal  hernia and indeterminate lesion in the pancreas. He was treated with ceftriaxone and flagyl, discharged on cefdinir and flagyl through 9/26/2021. Creatinine was  "5.27 on admission and trended down to 4.6 with IVF.  Carvedilol was changed to labetalol due to bradycardia. He returned to [facility] tcu on 9/23/2021,  but unfortunately, was not able to progress in therapies. He had hyperkalemia requiring intermittent kayexalate. He saw Nephrology 9/29/2021 and declined dialysis. He subsequently enrolled with [facility].     Updates on Status Since Skilled nursing Admission:   He is [place] and uses a pocket talker. Fairly good historian. Reports \"terrible\"  appetite and believes he has lost 40 lbs in the past 2  months. Denies nausea, vomiting or diarrhea. Has had a wound on his right shin for 6 months and the dressing is currently saturated. Reports chronic left back and hip pain is controlled. He fell 11/5/2021 and waited \"too long\" for help to get up. No injuries. He is wheelchair bound, able to stand for transfers and has a lift chair. Requires stand by to max assist with cares.   Prior to hospitalization, he was driving and independent with cares.   Nurses report he has been resistive with cares and aggressive at times.   While on tcu: ambulating 46 ft with walker and contact guard assist. CPT 4.7.5.6, SLUMS 24/30, safety questionnaire 13/19.     CODE STATUS/ADVANCE DIRECTIVES DISCUSSION:   DNR/DNI   Patient's living condition: lives in an assisted living facility. His wife lives in Memory Care.   ALLERGIES: Hydralazine and Sulfa drugs  PAST MEDICAL HISTORY:  has a past medical history of Atrial flutter (H), Choroidal nevus of left eye, CKD (chronic kidney disease) stage 3, GFR 30-59 ml/min (H), Diabetes mellitus (H), Diabetic peripheral neuropathy (H), Hypertension, Melanoma of skin (H), Microalbuminuria, Overweight(278.02), Rectal-type adenocarcinoma (H), Retinopathies, diabetic, and Vitreous detachment of both eyes.  PAST SURGICAL HISTORY:   has a past surgical history that includes left hip replacement; Open reduction internal fixation femur proximal " (01/24/2014); cataract iol, rt/lt (Bilateral, 01/01/2005); and Skin Lesion Excision (04/2021).  FAMILY HISTORY: family history includes Arthritis in his mother; Circulatory in his father; Diabetes in his father; Macular Degeneration in his father.  SOCIAL HISTORY:   reports that he has never smoked. He has never used smokeless tobacco. He reports current alcohol use of about 2.5 - 3.3 standard drinks of alcohol per week. He reports that he does not use drugs.    Post Discharge Medication Reconciliation Status: discharge medications reconciled, continue medications without change    Current Outpatient Medications   Medication Sig Dispense Refill     ACE/ARB/ARNI NOT PRESCRIBED (INTENTIONAL) Please choose reason not prescribed from choices below.       aspirin (ASA) 81 MG EC tablet Take 1 tablet by mouth daily        Cholecalciferol (VITAMIN D-3 PO) Take 1,000 Units by mouth daily        finasteride (PROSCAR) 5 MG tablet Take 1 tablet by mouth daily  3     furosemide (LASIX) 40 MG tablet Take 1.5 tablets (60 mg) by mouth daily       haloperidol (HALDOL) 2 MG/ML (HIGH CONC) solution Take 0.5 mg by mouth daily as needed for agitation        hyoscyamine (LEVSIN/SL) 0.125 MG sublingual tablet Place 0.125 mg under the tongue daily as needed for cramping        insulin glargine (LANTUS SOLOSTAR) 100 UNIT/ML pen Inject 8 Units Subcutaneous every morning (Patient taking differently: Inject 10 Units Subcutaneous every morning ) 15 mL 0     labetalol (NORMODYNE) 100 MG tablet Take 100 mg by mouth 2 times daily HOLD if HR < 50 or SBP < 130       LORazepam (LORAZEPAM INTENSOL) 2 MG/ML (HIGH CONC) PO solution Take 0.5 mg by mouth every 2 hours as needed for anxiety       multivitamin w/minerals (MULTI-VITAMIN) tablet Take 1 tablet by mouth daily       NIFEdipine ER OSMOTIC (NIFEDICAL XL) 60 MG 24 hr tablet Take 60 mg by mouth every evening        oxyCODONE HCl (ROXICODONE INTENSOL) 10 MG/0.5ML (HIGH CONC) solution Take 5 mg by  mouth every hour as needed for severe pain       sennosides (SENOKOT) 8.6 MG tablet Take 2 tablets by mouth nightly as needed for constipation       tamsulosin (FLOMAX) 0.4 MG capsule Take 1 capsule (0.4 mg) by mouth daily Advise clinic if causes lightheadedness. 90 capsule 3         ROS:  10 point ROS of systems including Constitutional, Eyes, Respiratory, Cardiovascular, Gastroenterology, Genitourinary, Integumentary, Musculoskeletal, Psychiatric were all negative except for pertinent positives noted in my HPI.    Vitals:  BP (!) 150/57   Pulse 54   Temp 96.9  F (36.1  C)   Resp 14   Wt 63.4 kg (139 lb 12.8 oz)   BMI 19.50 kg/m    Exam:  GENERAL APPEARANCE:  Alert, in no distress, frail appearing  ENT:  [place], oropharynx clear  EYES:  EOM normal, conjunctiva and lids normal  NECK:  no adenopathy  RESP:  no respiratory distress, diminished breath sounds bilaterally, no crackles or wheezes  CV:  regular rate and rhythm, no murmur,  +2 pedal pulses, peripheral edema trace+ in both LE  ABDOMEN:  soft, non-tender, no distension, no masses  M/S:   wheelchair. MCMANUS with good strength. No joint inflammation. Bilateral hand tremor  SKIN:  superficial open area right shin with granulation, no drainage, no erythema. No rashes  PSYCH:  oriented X 3, memory impaired , affect and mood normal    Lab/Diagnostic data:  Recent labs in [facility] reviewed by me today.    ECHO 8/18/2021: EF 55-60%.     ASSESSMENT / PLAN:  (N18.5) Chronic kidney disease, stage V (H)  (primary encounter diagnosis)  (Z51.5) Hospice care patient  Comment: significant decline in functional status since hospitalization. Worsening renal function and hyperkalemia while on tcu. He declined dialysis and transitioned to comfort care. Managing fairly well in AL with increased services and additional support from hospice  Plan: continue comfort meds and care with [facility].     (E11.22,  I12.0,  N18.5) Type 2 DM with CKD stage 5 and hypertension  (H)  Comment: blood glucose controlled. HgbA1c was 9.5 on 7/16/2021  Plan: continue Lantus 10 units daily. Avoid hypoglycemia due to advanced age, falls and goals of care.     (R63.4) Weight loss  Comment: records indicate 20 lb weight loss in recent months; patient thinks it is closer to 40 lbs. No swallow or acute GI symptoms   Plan: encourage nutritional supplements and intake as tolerated.     (I10) Benign essential hypertension  (R00.1) Bradycardia  Comment: acceptable control with /79, 150/57  HR: 54-65  Plan: continue lasix, labetalol, nifedipine.     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 No s/s of active bleeding   Plan: no need for labs given comfort care    (K57.92) Diverticulitis  Comment: appears resolved following antibiotic treatment   Plan: monitor symptoms     (K86.2) Pancreatic cyst  Comment: noted on imaging.   Plan: no need for follow up given goals of care.     (E04.1) Thyroid nodule  Comment: levothyroxine was started per Endocrine for TSH 7.46 on 8/16/2021.  Follow up TSH was 3.55 on 9/23/2021. Levothyroxine was discontinued 10/26/2021 as possibly contributing to his new hand tremor.   Plan: no need for follow up given goals of care     BPH  Comment: symptoms controlled  Plan: continue tamsulosin     Open wound right shin  Comment: chronic, no s/s of infection. May not heal given his poor nutritional status   Plan: wound care every 3 days and prn using a foam dressing.     (R41.89) Cognitive impairment  Comment: mild deficits on cognitive testing. Conversation is appropriate with short term memory loss. He has a fairly good understanding of his situation.   Plan: AL staff to assist with cares, meals and med admin. Additional support from hospice.         Total time spent with patient visit at the assisted living facility was 45 min including patient  visit and review of past records. Greater than 50% of total time spent with counseling and coordinating care due to establishing primary care at the facility. Counseling patient re: medications and potential side effects and hospice plan of care. Coordinating the following with facility staff: medication orders, wound care, plan of care, POLST completed.     Electronically signed by:  ESTEPHANIE Mitchell CNP

## 2021-11-22 ENCOUNTER — LAB REQUISITION (OUTPATIENT)
Dept: LAB | Facility: CLINIC | Age: 86
End: 2021-11-22
Payer: MEDICARE

## 2021-11-22 DIAGNOSIS — E03.9 HYPOTHYROIDISM, UNSPECIFIED: ICD-10-CM

## 2021-11-26 DIAGNOSIS — N18.4 CKD (CHRONIC KIDNEY DISEASE) STAGE 4, GFR 15-29 ML/MIN (H): Primary | ICD-10-CM

## 2021-11-26 RX ORDER — CHOLECALCIFEROL (VITAMIN D3) 25 MCG
TABLET ORAL
Qty: 30 TABLET | Refills: 97 | Status: SHIPPED | OUTPATIENT
Start: 2021-11-26

## 2021-11-26 RX ORDER — MULTIVITAMIN WITH FOLIC ACID 400 MCG
TABLET ORAL
Qty: 30 TABLET | Refills: 97 | Status: SHIPPED | OUTPATIENT
Start: 2021-11-26

## 2021-12-02 ENCOUNTER — DOCUMENTATION ONLY (OUTPATIENT)
Dept: OTHER | Facility: CLINIC | Age: 86
End: 2021-12-02
Payer: MEDICARE

## 2021-12-08 DIAGNOSIS — N40.0 BPH (BENIGN PROSTATIC HYPERPLASIA): Primary | ICD-10-CM

## 2021-12-08 RX ORDER — FINASTERIDE 5 MG/1
1 TABLET, FILM COATED ORAL DAILY
Qty: 90 TABLET | Refills: 3 | Status: SHIPPED | OUTPATIENT
Start: 2021-12-08

## 2021-12-10 DIAGNOSIS — Z79.4 TYPE 2 DIABETES MELLITUS WITH COMPLICATION, WITH LONG-TERM CURRENT USE OF INSULIN (H): ICD-10-CM

## 2021-12-10 DIAGNOSIS — E11.8 TYPE 2 DIABETES MELLITUS WITH COMPLICATION, WITH LONG-TERM CURRENT USE OF INSULIN (H): ICD-10-CM

## 2021-12-10 RX ORDER — TAMSULOSIN HYDROCHLORIDE 0.4 MG/1
0.4 CAPSULE ORAL DAILY
Qty: 90 CAPSULE | Refills: 3 | Status: SHIPPED | OUTPATIENT
Start: 2021-12-10

## 2021-12-31 ENCOUNTER — TELEPHONE (OUTPATIENT)
Dept: GERIATRICS | Facility: CLINIC | Age: 86
End: 2021-12-31
Payer: MEDICARE

## 2021-12-31 NOTE — TELEPHONE ENCOUNTER
[facility] Triage Nurse Telephone Encounter    Provider: ESTEPHANIE Schwartz CNP   Facility: [facility]  Facility Type:  AL    Caller: Shanae  Call Back Number: [phone]    Allergies:    Allergies   Allergen Reactions     Hydralazine Nephrotoxicity     Sulfa Drugs      Unknown reaction. Occurred during childhood. Has not taken Sulfa medications since allergic response.         Reason for call:     Verbal Order/Direction given by Provider: Continue to monitor.  Update hospice.      Provider giving Order:  ESTEPHANIE Schwartz CNP     Verbal Order given to: Shanae Lopez RN

## 2022-01-06 ENCOUNTER — ASSISTED LIVING VISIT (OUTPATIENT)
Dept: GERIATRICS | Facility: CLINIC | Age: 87
End: 2022-01-06
Payer: MEDICARE

## 2022-01-06 VITALS
HEART RATE: 69 BPM | BODY MASS INDEX: 19.5 KG/M2 | TEMPERATURE: 97.3 F | RESPIRATION RATE: 16 BRPM | SYSTOLIC BLOOD PRESSURE: 195 MMHG | WEIGHT: 139.8 LBS | DIASTOLIC BLOOD PRESSURE: 86 MMHG

## 2022-01-06 DIAGNOSIS — Z79.4 TYPE 2 DIABETES MELLITUS WITH COMPLICATION, WITH LONG-TERM CURRENT USE OF INSULIN (H): ICD-10-CM

## 2022-01-06 DIAGNOSIS — E11.8 TYPE 2 DIABETES MELLITUS WITH COMPLICATION, WITH LONG-TERM CURRENT USE OF INSULIN (H): ICD-10-CM

## 2022-01-06 DIAGNOSIS — N40.0 BENIGN PROSTATIC HYPERPLASIA WITHOUT LOWER URINARY TRACT SYMPTOMS: ICD-10-CM

## 2022-01-06 DIAGNOSIS — R41.89 COGNITIVE IMPAIRMENT: ICD-10-CM

## 2022-01-06 DIAGNOSIS — N18.5 CHRONIC KIDNEY DISEASE, STAGE V (H): ICD-10-CM

## 2022-01-06 DIAGNOSIS — Z51.5 HOSPICE CARE PATIENT: ICD-10-CM

## 2022-01-06 DIAGNOSIS — I10 BENIGN ESSENTIAL HYPERTENSION: ICD-10-CM

## 2022-01-06 DIAGNOSIS — K86.2 PANCREATIC CYST: ICD-10-CM

## 2022-01-06 DIAGNOSIS — S81.801D OPEN WOUND OF RIGHT LOWER EXTREMITY, SUBSEQUENT ENCOUNTER: Primary | ICD-10-CM

## 2022-01-06 PROCEDURE — 99207 PR CDG-MDM COMPONENT: MEETS MODERATE - UP CODED: CPT | Performed by: NURSE PRACTITIONER

## 2022-01-06 RX ORDER — HALOPERIDOL 0.5 MG/1
0.5 TABLET ORAL
COMMUNITY

## 2022-01-06 RX ORDER — OXYCODONE HYDROCHLORIDE 5 MG/1
5 TABLET ORAL
COMMUNITY

## 2022-01-06 RX ORDER — INSULIN ASPART 100 [IU]/ML
2 INJECTION, SOLUTION INTRAVENOUS; SUBCUTANEOUS
COMMUNITY

## 2022-01-06 RX ORDER — LORAZEPAM 0.5 MG/1
0.5 TABLET ORAL
COMMUNITY

## 2022-01-06 RX ORDER — LOPERAMIDE HYDROCHLORIDE 2 MG/1
2 TABLET ORAL 4 TIMES DAILY PRN
COMMUNITY

## 2022-01-06 NOTE — PROGRESS NOTES
[facility]  [place] Medical Record Number:  [id number]  Place of Service where encounter took place:  [facility] ([facility]) [[id number]]  Chief Complaint   Patient presents with     RECHECK       HPI:    Farzad East  is a 86 year old (1935), who is being seen today for an episodic care visit.  HPI information obtained from: facility chart records, facility staff, patient report and [facility] chart review.   He has lived at this facility since 9/2020 and transferred primary care to [facility] 11/1/2021 following hospitalization and tcu stay.   Medical history significant for diabetes type 2, HTN, CKD,  rectal cancer, BPH, osteoporosis, subclinical hypothyroidism, chronic anemia. He was hospitalized at [facility] 8/17-9/2/2021 with LE edema, worsening fatigue and dyspnea. He was found to have RONY with creatinine 2.9. Nephrology suspected RONY was due to hypoperfusion in the setting of tightly controlled BP vs drug induced lupus secondary to hydralazine, which was discontinued. Hgb was 6.8 and he received 1 unit of PRBC and IV iron sucrose X 3 doses. Labs were consistent with iron deficiency and EPO deficiency in the setting of CKD. Glipizide and sitagliptin were discontinued. He discharged to [facility] tcu.   He was rehospitalized at [facility] 9/16-9/23/2021 with fatigue, weakness, bradycardia, worsening renal function and diarrhea. CT abdomen showed diverticulitis, possible developing obstruction related to a small bowel containing left inguinal  hernia and indeterminate lesion in the pancreas. He was treated with ceftriaxone and flagyl, then discharged on cefdinir and flagyl through 9/26/2021. Creatinine was 5.27 on admission and trended down to 4.6 with IVF.  Carvedilol was changed to labetalol due to bradycardia. He returned to [facility] tcu on 9/23/2021,  but unfortunately, was not able to progress in therapies. He had hyperkalemia requiring  "intermittent kayexalate. He saw Nephrology 9/29/2021 and declined dialysis. He subsequently enrolled with [facility]. He returned to [place] 11/1/2021.       Today's concern is:     Open wound of right lower extremity, subsequent encounter  Chronic kidney disease, stage V (H)  Hospice care patient  Type 2 diabetes mellitus with complication, with long-term current use of insulin (H)  Benign essential hypertension  Pancreatic cyst  Benign prostatic hyperplasia without lower urinary tract symptoms  Cognitive impairment   He's a fairly good historian. Pleasant and talkative. He's concerned about blood glucose control and has had a few recent readings in the 500s. Fair appetite. Reports chronic back pain is controlled. Right shin wound is \"the same.\" It's been present for several months. Wheelchair bound, able to stand for transfers. Requires stand by to max assist with cares. Nurses report he sometimes refuses meds, blood glucose checks and cares. Can be irritable with staff.     Past Medical and Surgical History reviewed in Epic today.    MEDICATIONS:    Current Outpatient Medications   Medication Sig Dispense Refill     ACE/ARB/ARNI NOT PRESCRIBED (INTENTIONAL) Please choose reason not prescribed from choices below.       Cholecalciferol (VITAMIN D-3 PO) Take 1,000 Units by mouth daily        finasteride (PROSCAR) 5 MG tablet Take 1 tablet (5 mg) by mouth daily 90 tablet 3     furosemide (LASIX) 40 MG tablet Take 1.5 tablets (60 mg) by mouth daily       haloperidol (HALDOL) 0.5 MG tablet Take 0.5 mg by mouth every hour as needed for agitation       hyoscyamine (LEVSIN/SL) 0.125 MG sublingual tablet Place 0.125 mg under the tongue every 2 hours as needed for cramping        insulin aspart (NOVOLOG FLEXPEN) 100 UNIT/ML pen Inject 2 Units Subcutaneous 3 times daily (with meals) Hold for BG <100       insulin glargine (LANTUS SOLOSTAR) 100 UNIT/ML pen Inject 8 Units Subcutaneous every morning (Patient taking " differently: Inject 10 Units Subcutaneous every morning ) 15 mL 0     labetalol (NORMODYNE) 100 MG tablet Take 100 mg by mouth 2 times daily HOLD if HR < 50 or SBP < 130       loperamide (LOPERAMIDE A-D) 2 MG tablet Take 2 mg by mouth 4 times daily as needed for diarrhea       LORazepam (ATIVAN) 0.5 MG tablet Take 0.5 mg by mouth every 2 hours as needed for anxiety       Multiple Vitamin (TAB-A-KARAN) TABS TAKE 1 TABLET BY MOUTH ONCE DAILY 30 tablet 97     NIFEdipine ER OSMOTIC (NIFEDICAL XL) 60 MG 24 hr tablet Take 60 mg by mouth every evening        oxyCODONE (ROXICODONE) 5 MG tablet Take 5 mg by mouth every hour as needed for severe pain Pain/dyspnea per hospice       sennosides (SENOKOT) 8.6 MG tablet Take 2 tablets by mouth nightly as needed for constipation       tamsulosin (FLOMAX) 0.4 MG capsule Take 1 capsule (0.4 mg) by mouth daily Advise clinic if causes lightheadedness. 90 capsule 3     VITAMIN D3 25 MCG (1000 UT) tablet TAKE 1 TABLET BY MOUTH ONCE DAILY 30 tablet 97         REVIEW OF SYSTEMS:  4 point ROS including Respiratory, CV, GI and , other than that noted in the HPI,  is negative    Objective:  BP (!) 195/86   Pulse 69   Temp 97.3  F (36.3  C)   Resp 16   Wt 63.4 kg (139 lb 12.8 oz)   BMI 19.50 kg/m    Exam:  GENERAL APPEARANCE:  Alert, in no distress, frail appearing  ENT:  [place], oropharynx clear  EYES:  EOM normal, conjunctiva and lids normal  NECK:  no adenopathy  RESP: diminished breath sounds bilaterally, no crackles or wheezes  CV:  regular rate and rhythm, no murmur, trace bilateral LE edema   ABDOMEN:  soft, non-tender, no distension, no masses  M/S: in recliner. MCMANUS with good strength. No joint inflammation. Bilateral hand tremor  SKIN:  quarter sized superficial open area right shin with granulation, no drainage, no erythema. No rashes  PSYCH:  oriented X 3, memory impaired , affect and mood normal    Labs:   Recent labs in [facility] reviewed by me today.     ASSESSMENT /  PLAN:  (S81.801D) Open wound of right lower extremity, subsequent encounter  (primary encounter diagnosis)  Comment: chronic wound appears unchanged, no s/s of infection. Unlikely to heal given his comorbidities, which he seems to understand.   Plan: continue would care using a foam dressing.     (N18.5) Chronic kidney disease, stage V (H)  (Z51.5) Hospice care patient  Comment: marked decline in functional status following hospitalizations in Aug and Sept 2021. He declined dialysis. He appears comfortable with no acute symptoms   Plan: continue comfort meds and care with [facility]     (E11.8,  Z79.4) Type 2 diabetes mellitus with complication, with long-term current use of insulin (H)  Comment: not optimally controlled with blood glucoses: 220-538. Most readings are in the 300s.   Plan: increase Lantus to 10 units daily. Continue Novolog 2 units with meals.     (I10) Benign essential hypertension  Comment: acceptable control with recent BPs: 158/59, 162/74, 118/49  HR: 58-71   Plan: continue lasix, nifedipine, labetalol     (K86.2) Pancreatic cyst  Comment: noted on imaging   Plan: no need for follow up     (N40.0) Benign prostatic hyperplasia without lower urinary tract symptoms  Comment: symptoms managed   Plan: continue tamsulosin, finasteride     (R41.89) Cognitive impairment  Comment: mild deficits with SLUMS 24/30, CPT 4.7 and safety questionnaire 12/19 while on tcu. Conversation is appropriate   Plan: [place] staff assistance with cares, meals, activities and med admin.       Electronically signed by:  ESTEPHANIE Mitchell CNP

## 2022-01-06 NOTE — LETTER
1/6/2022        RE: Farzad Eats  [facility]  [address]  [place] [address]        [facility]  [place] Medical Record Number:  [id number]  Place of Service where encounter took place:  [facility] ([facility]) [[id number]]  Chief Complaint   Patient presents with     RECHECK       HPI:    Farzad East  is a 86 year old (1935), who is being seen today for an episodic care visit.  HPI information obtained from: facility chart records, facility staff, patient report and [facility] chart review.   He has lived at this facility since 9/2020 and transferred primary care to [facility] 11/1/2021 following hospitalization and tcu stay.   Medical history significant for diabetes type 2, HTN, CKD,  rectal cancer, BPH, osteoporosis, subclinical hypothyroidism, chronic anemia. He was hospitalized at [facility] 8/17-9/2/2021 with LE edema, worsening fatigue and dyspnea. He was found to have RONY with creatinine 2.9. Nephrology suspected RONY was due to hypoperfusion in the setting of tightly controlled BP vs drug induced lupus secondary to hydralazine, which was discontinued. Hgb was 6.8 and he received 1 unit of PRBC and IV iron sucrose X 3 doses. Labs were consistent with iron deficiency and EPO deficiency in the setting of CKD. Glipizide and sitagliptin were discontinued. He discharged to [facility] tcu.   He was rehospitalized at [facility] 9/16-9/23/2021 with fatigue, weakness, bradycardia, worsening renal function and diarrhea. CT abdomen showed diverticulitis, possible developing obstruction related to a small bowel containing left inguinal  hernia and indeterminate lesion in the pancreas. He was treated with ceftriaxone and flagyl, then discharged on cefdinir and flagyl through 9/26/2021. Creatinine was 5.27 on admission and trended down to 4.6 with IVF.  Carvedilol was changed to labetalol due to bradycardia. He returned to [facility] on 9/23/2021,  but unfortunately, was not able to progress in therapies. He had hyperkalemia requiring intermittent kayexalate. He saw Nephrology 9/29/2021 and declined dialysis. He subsequently enrolled with [facility]. He returned to [place] 11/1/2021.       Today's concern is:     Open wound of right lower extremity, subsequent encounter  Chronic kidney disease, stage V (H)  Hospice care patient  Type 2 diabetes mellitus with complication, with long-term current use of insulin (H)  Benign essential hypertension  Pancreatic cyst  Benign prostatic hyperplasia without lower urinary tract symptoms  Cognitive impairment   He's a fairly good historian. Pleasant and talkative. He's concerned about blood glucose control and has had a few recent readings in the 500s. Fair appetite. Reports chronic back pain is controlled. Right shin wound is \"the same.\" It's been present for several months. Wheelchair bound, able to stand for transfers. Requires stand by to max assist with cares. Nurses report he sometimes refuses meds, blood glucose checks and cares. Can be irritable with staff.     Past Medical and Surgical History reviewed in Epic today.    MEDICATIONS:    Current Outpatient Medications   Medication Sig Dispense Refill     ACE/ARB/ARNI NOT PRESCRIBED (INTENTIONAL) Please choose reason not prescribed from choices below.       Cholecalciferol (VITAMIN D-3 PO) Take 1,000 Units by mouth daily        finasteride (PROSCAR) 5 MG tablet Take 1 tablet (5 mg) by mouth daily 90 tablet 3     furosemide (LASIX) 40 MG tablet Take 1.5 tablets (60 mg) by mouth daily       haloperidol (HALDOL) 0.5 MG tablet Take 0.5 mg by mouth every hour as needed for agitation       hyoscyamine (LEVSIN/SL) 0.125 MG sublingual tablet Place 0.125 mg under the tongue every 2 hours as needed for cramping        insulin aspart (NOVOLOG FLEXPEN) 100 UNIT/ML pen Inject 2 Units Subcutaneous 3 times daily (with meals) Hold for BG <100   "     insulin glargine (LANTUS SOLOSTAR) 100 UNIT/ML pen Inject 8 Units Subcutaneous every morning (Patient taking differently: Inject 10 Units Subcutaneous every morning ) 15 mL 0     labetalol (NORMODYNE) 100 MG tablet Take 100 mg by mouth 2 times daily HOLD if HR < 50 or SBP < 130       loperamide (LOPERAMIDE A-D) 2 MG tablet Take 2 mg by mouth 4 times daily as needed for diarrhea       LORazepam (ATIVAN) 0.5 MG tablet Take 0.5 mg by mouth every 2 hours as needed for anxiety       Multiple Vitamin (TAB-A-KARAN) TABS TAKE 1 TABLET BY MOUTH ONCE DAILY 30 tablet 97     NIFEdipine ER OSMOTIC (NIFEDICAL XL) 60 MG 24 hr tablet Take 60 mg by mouth every evening        oxyCODONE (ROXICODONE) 5 MG tablet Take 5 mg by mouth every hour as needed for severe pain Pain/dyspnea per hospice       sennosides (SENOKOT) 8.6 MG tablet Take 2 tablets by mouth nightly as needed for constipation       tamsulosin (FLOMAX) 0.4 MG capsule Take 1 capsule (0.4 mg) by mouth daily Advise clinic if causes lightheadedness. 90 capsule 3     VITAMIN D3 25 MCG (1000 UT) tablet TAKE 1 TABLET BY MOUTH ONCE DAILY 30 tablet 97         REVIEW OF SYSTEMS:  4 point ROS including Respiratory, CV, GI and , other than that noted in the HPI,  is negative    Objective:  BP (!) 195/86   Pulse 69   Temp 97.3  F (36.3  C)   Resp 16   Wt 63.4 kg (139 lb 12.8 oz)   BMI 19.50 kg/m    Exam:  GENERAL APPEARANCE:  Alert, in no distress, frail appearing  ENT:  [place], oropharynx clear  EYES:  EOM normal, conjunctiva and lids normal  NECK:  no adenopathy  RESP: diminished breath sounds bilaterally, no crackles or wheezes  CV:  regular rate and rhythm, no murmur, trace bilateral LE edema   ABDOMEN:  soft, non-tender, no distension, no masses  M/S: in recliner. MCMANUS with good strength. No joint inflammation. Bilateral hand tremor  SKIN:  quarter sized superficial open area right shin with granulation, no drainage, no erythema. No rashes  PSYCH:  oriented X 3, memory  impaired , affect and mood normal    Labs:   Recent labs in [facility] reviewed by me today.     ASSESSMENT / PLAN:  (S81.801D) Open wound of right lower extremity, subsequent encounter  (primary encounter diagnosis)  Comment: chronic wound appears unchanged, no s/s of infection. Unlikely to heal given his comorbidities, which he seems to understand.   Plan: continue would care using a foam dressing.     (N18.5) Chronic kidney disease, stage V (H)  (Z51.5) Hospice care patient  Comment: marked decline in functional status following hospitalizations in Aug and Sept 2021. He declined dialysis. He appears comfortable with no acute symptoms   Plan: continue comfort meds and care with [facility]     (E11.8,  Z79.4) Type 2 diabetes mellitus with complication, with long-term current use of insulin (H)  Comment: not optimally controlled with blood glucoses: 220-538. Most readings are in the 300s.   Plan: increase Lantus to 10 units daily. Continue Novolog 2 units with meals.     (I10) Benign essential hypertension  Comment: acceptable control with recent BPs: 158/59, 162/74, 118/49  HR: 58-71   Plan: continue lasix, nifedipine, labetalol     (K86.2) Pancreatic cyst  Comment: noted on imaging   Plan: no need for follow up     (N40.0) Benign prostatic hyperplasia without lower urinary tract symptoms  Comment: symptoms managed   Plan: continue tamsulosin, finasteride     (R41.89) Cognitive impairment  Comment: mild deficits with SLUMS 24/30, CPT 4.7 and safety questionnaire 12/19 while on tcu. Conversation is appropriate   Plan: AL staff assistance with cares, meals, activities and med admin.       Electronically signed by:  ESTEPHANIE Mitchell CNP                 Sincerely,        ESTEPHANIE Mitchell CNP

## 2022-01-25 NOTE — PROCEDURE: MOHS SURGERY
PAST MEDICAL HISTORY:  Alzheimer disease     Anxiety     Atherosclerosis of native artery of extremity     Cancer of Bladder 2000    HTN (Hypertension)     Hyperlipidemia     Malignant neoplasm of urinary bladder, unspecified site      [facility]    DOLORES on CPAP     PAD (peripheral artery disease)     Peripheral neuropathy     Rotator Cuff Disorder     Type 2 diabetes mellitus      Island Pedicle Flap With Canthal Suspension Text: The defect edges were debeveled with a #15 scalpel blade.  Given the location of the defect, shape of the defect and the proximity to free margins an island pedicle advancement flap was deemed most appropriate.  Using a sterile surgical marker, an appropriate advancement flap was drawn incorporating the defect, outlining the appropriate donor tissue and placing the expected incisions within the relaxed skin tension lines where possible. The area thus outlined was incised deep to adipose tissue with a #15 scalpel blade.  The skin margins were undermined to an appropriate distance in all directions around the primary defect and laterally outward around the island pedicle utilizing iris scissors.  There was minimal undermining beneath the pedicle flap. A suspension suture was placed in the canthal tendon to prevent tension and prevent ectropion.

## 2022-02-10 ENCOUNTER — ASSISTED LIVING VISIT (OUTPATIENT)
Dept: GERIATRICS | Facility: CLINIC | Age: 87
End: 2022-02-10
Payer: MEDICARE

## 2022-02-10 VITALS
TEMPERATURE: 97.8 F | BODY MASS INDEX: 20.2 KG/M2 | RESPIRATION RATE: 16 BRPM | SYSTOLIC BLOOD PRESSURE: 152 MMHG | DIASTOLIC BLOOD PRESSURE: 76 MMHG | WEIGHT: 144.8 LBS | HEART RATE: 75 BPM

## 2022-02-10 DIAGNOSIS — Z51.5 HOSPICE CARE PATIENT: ICD-10-CM

## 2022-02-10 DIAGNOSIS — I10 BENIGN ESSENTIAL HYPERTENSION: ICD-10-CM

## 2022-02-10 DIAGNOSIS — N18.5 CHRONIC KIDNEY DISEASE, STAGE V (H): Primary | ICD-10-CM

## 2022-02-10 DIAGNOSIS — Z79.4 TYPE 2 DIABETES MELLITUS WITH COMPLICATION, WITH LONG-TERM CURRENT USE OF INSULIN (H): ICD-10-CM

## 2022-02-10 DIAGNOSIS — E11.8 TYPE 2 DIABETES MELLITUS WITH COMPLICATION, WITH LONG-TERM CURRENT USE OF INSULIN (H): ICD-10-CM

## 2022-02-10 NOTE — PROGRESS NOTES
[facility]    Chief Complaint   Patient presents with     RECHECK     HPI:  Farzad East is a 86 year old  (1935), who is being seen today for an episodic care visit at: [facility] ([facility]) [[id number]].   He has lived at this facility since 9/2020 and transferred primary care to [facility] 11/1/2021 following hospitalization and tcu stay.   Medical history significant for diabetes type 2, HTN, CKD,  rectal cancer, BPH, osteoporosis, subclinical hypothyroidism, chronic anemia. He was hospitalized at [facility] 8/17-9/2/2021 with LE edema, worsening fatigue and dyspnea. He was found to have RONY with creatinine 2.9. Nephrology suspected RONY was due to hypoperfusion in the setting of tightly controlled BP vs drug induced lupus secondary to hydralazine, which was discontinued. Hgb was 6.8 and he received 1 unit of PRBC and IV iron sucrose X 3 doses. Labs were consistent with iron deficiency and EPO deficiency in the setting of CKD. Glipizide and sitagliptin were discontinued. He discharged to [facility] tcu.   He was rehospitalized at [facility] 9/16-9/23/2021 with fatigue, weakness, bradycardia, worsening renal function and diarrhea. CT abdomen showed diverticulitis, possible developing obstruction related to a small bowel containing left inguinal  hernia and indeterminate lesion in the pancreas. He was treated with ceftriaxone and flagyl, then discharged on cefdinir and flagyl through 9/26/2021. Creatinine was 5.27 on admission and trended down to 4.6 with IVF.  Carvedilol was changed to labetalol due to bradycardia. He returned to [facility] tcu on 9/23/2021,  but unfortunately, was not able to progress in therapies. He had hyperkalemia requiring intermittent kayexalate. He saw Nephrology 9/29/2021 and declined dialysis. [facility] He returned to [place] 11/1/2021.     Today's concern is:     Chronic kidney disease,  stage V (H)  Hospice care patient  Type 2 diabetes mellitus with complication, with long-term current use of insulin (H)  Benign essential hypertension  He is seen today after nurses reported a decline in status since 2/7/2022. O2 has been started for hypoxia and dyspnea. Hospice has been adjusting meds for comfort.  He is in bed and sleeping soundly. Does not awaken. O2 is at 4L.     Allergies, and PMH/PSH reviewed in EPIC today    REVIEW OF SYSTEMS:  Unobtainable secondary to end of life     Objective:   BP (!) 152/76   Pulse 75   Temp 97.8  F (36.6  C)   Resp 16   Wt 65.7 kg (144 lb 12.8 oz)   BMI 20.20 kg/m    GENERAL APPEARANCE:  sleeping soundly, in no distress  RESP:  diffuse crackles bilaterally, respirations non labored  CV:  regular rate and rhythm, 2/6 murmur, peripheral edema 1+ in both LE  ABDOMEN:  soft, non-tender, no distension  SKIN:  dressing to RLE dry and intact. No visible rashes    Recent labs in [facility] reviewed by me today.     ASSESSMENT / PLAN:  (N18.5) Chronic kidney disease, stage V (H)  (primary encounter diagnosis)  (Z51.5) Hospice care patient  Comment: marked decline in status over the past 3 days. Appears to be in the early stages of actively dying. Appears comfortable   Plan: continue comfort meds and cares with [facility].       (E11.8,  Z79.4) Type 2 diabetes mellitus with complication, with long-term current use of insulin (H)  (I10) Benign essential hypertension  Comment: no need for monitoring of blood glucose or VS  Plan: as above         Electronically signed by: ESTEPHANIE Mitchell CNP

## 2022-02-27 ENCOUNTER — HEALTH MAINTENANCE LETTER (OUTPATIENT)
Age: 87
End: 2022-02-27

## 2022-04-24 ENCOUNTER — HEALTH MAINTENANCE LETTER (OUTPATIENT)
Age: 87
End: 2022-04-24

## 2022-10-21 NOTE — PROCEDURE: MOHS SURGERY
Subsequent Stages Histo Method Verbiage: Using a similar technique to that described above, a thin layer of tissue was removed from all areas where tumor was visible on the previous stage.  The tissue was again oriented, mapped, dyed, and processed as above. normal/clear to auscultation bilaterally/no wheezes/no rales/no rhonchi

## 2022-11-11 NOTE — PLAN OF CARE
Alessio said he had and ok night - did not get to sleep as long as he wanted.  Denies pain and ate well for breakfast.  Pt transferred to [facility] this am.  Nurse to nurse report completed before transport.  PIV removed.   K+ 5.4 and CR 4.58.  Both levels elevated this am.  Dr Calhoun aware and said to continue to monitor levels at TCU.     Please see if pt would like to make an appt to further discuss otherwise I would recommend keeping appt that is already scheduled with GYN.

## 2022-11-19 ENCOUNTER — HEALTH MAINTENANCE LETTER (OUTPATIENT)
Age: 87
End: 2022-11-19

## 2022-12-02 NOTE — TELEPHONE ENCOUNTER
Pharmacy is requesting direction clarification and refill   W Plasty Text: The lesion was extirpated to the level of the fat with a #15 scalpel blade.  Given the location of the defect, shape of the defect and the proximity to free margins a W-plasty was deemed most appropriate for repair.  Using a sterile surgical marker, the appropriate transposition arms of the W-plasty were drawn incorporating the defect and placing the expected incisions within the relaxed skin tension lines where possible.    The area thus outlined was incised deep to adipose tissue with a #15 scalpel blade.  The skin margins were undermined to an appropriate distance in all directions utilizing iris scissors.  The opposing transposition arms were then transposed into place in opposite direction and anchored with interrupted buried subcutaneous sutures.

## 2023-06-01 ENCOUNTER — HEALTH MAINTENANCE LETTER (OUTPATIENT)
Age: 88
End: 2023-06-01

## 2024-01-26 NOTE — TELEPHONE ENCOUNTER
Detail Level: Detailed
[facility] Call Center    Phone Message    May a detailed message be left on voicemail: yes     Reason for Call: Other: Rahab calling from [facility] stating that patient had labs done today 9/10 BMP and CBC. States that kidney function is a little off, wondering what care team would like to do.    Please advise and call Rahab back to discuss further     Action Taken: Other:  MEDICINE RENAL     Travel Screening: Not Applicable                                                                      
Spoke to [facility] nurse, concern for elevated Cr from 4.81 to 5.21. she reports that the patient is doing well, no changes to his medication. Vitals below. Will send update to [facility] and call back with any recommendations.    /64 O2 92%RA HR 65  RR 18  Temp 97.4  Weight 163 (9/11) 160 (9/10)      Results for SHANT CHAVES (MRN [id number]) as of 9/13/2021 09:25   Ref. Range 9/7/2021 10:23 9/10/2021 09:47   Carbon Dioxide Latest Ref Range: 22 - 31 mmol/L 22 21 (L)   Urea Nitrogen Latest Ref Range: 8 - 28 mg/dL 79 (H) 89 (H)   Creatinine Latest Ref Range: 0.70 - 1.30 mg/dL 4.81 (H) 5.21 (H)   GFR Estimate Latest Ref Range: >60 mL/min/1.73m2 10 (L) 9 (L)   Calcium Latest Ref Range: 8.5 - 10.5 mg/dL 9.3 8.7       Jimena Potter LPN  Nephrology  [phone]        
Add 66390 Cpt? (Important Note: In 2017 The Use Of 39998 Is Being Tracked By Cms To Determine Future Global Period Reimbursement For Global Periods): yes

## 2024-01-27 ENCOUNTER — HEALTH MAINTENANCE LETTER (OUTPATIENT)
Age: 89
End: 2024-01-27

## 2024-06-15 ENCOUNTER — HEALTH MAINTENANCE LETTER (OUTPATIENT)
Age: 89
End: 2024-06-15

## 2024-08-24 ENCOUNTER — HEALTH MAINTENANCE LETTER (OUTPATIENT)
Age: 89
End: 2024-08-24

## 2025-03-27 NOTE — PROCEDURE: MOHS SURGERY
abnormal fetal heart rate tracing Complex Repair Preamble Text (Leave Blank If You Do Not Want): Extensive wide and differential undermining were performed.  During reconstruction, hemostasis was performed using electrofulguration.  Initial buried vertical mattress sutures defined redundant cutaneous columns (Burow's triangles) which were removed to the level of the adipose tissue using a #15 blade scalpel and the triangulation technique.  Hemostasis was obtained and the resulting defects were repaired with the same suture material and techniques.  The epidermis was then carefully apposed using polypropylene suture (running).  The wound was then stressed in various directions to assure the adequacy of closure and of hemostasis.  The wound edges were pink and well perfused.  Reconstruction was considered complete.

## (undated) RX ORDER — LIDOCAINE HYDROCHLORIDE 10 MG/ML
INJECTION, SOLUTION EPIDURAL; INFILTRATION; INTRACAUDAL; PERINEURAL
Status: DISPENSED
Start: 2021-07-19